# Patient Record
Sex: MALE | Race: WHITE | Employment: OTHER | URBAN - NONMETROPOLITAN AREA
[De-identification: names, ages, dates, MRNs, and addresses within clinical notes are randomized per-mention and may not be internally consistent; named-entity substitution may affect disease eponyms.]

---

## 2017-06-03 ENCOUNTER — HOSPITAL ENCOUNTER (EMERGENCY)
Age: 47
Discharge: HOME | End: 2017-06-03
Attending: EMERGENCY MEDICINE
Payer: MEDICARE

## 2017-06-03 VITALS
SYSTOLIC BLOOD PRESSURE: 133 MMHG | HEIGHT: 73 IN | RESPIRATION RATE: 18 BRPM | DIASTOLIC BLOOD PRESSURE: 91 MMHG | TEMPERATURE: 97.8 F | OXYGEN SATURATION: 97 % | HEART RATE: 71 BPM | BODY MASS INDEX: 30.22 KG/M2 | WEIGHT: 228 LBS

## 2017-06-03 DIAGNOSIS — S46.912A STRAIN OF UPPER ARM, LEFT, INITIAL ENCOUNTER: Primary | ICD-10-CM

## 2017-06-03 DIAGNOSIS — F11.20 NARCOTIC DEPENDENCY, CONTINUOUS (HCC): ICD-10-CM

## 2017-06-03 DIAGNOSIS — Z87.19 HISTORY OF CIRRHOSIS OF LIVER: ICD-10-CM

## 2017-06-03 DIAGNOSIS — Z94.4 HISTORY OF LIVER TRANSPLANT (HCC): ICD-10-CM

## 2017-06-03 PROCEDURE — 93005 ELECTROCARDIOGRAM TRACING: CPT

## 2017-06-03 PROCEDURE — 96372 THER/PROPH/DIAG INJ SC/IM: CPT

## 2017-06-03 PROCEDURE — 99283 EMERGENCY DEPT VISIT LOW MDM: CPT | Performed by: EMERGENCY MEDICINE

## 2017-06-03 PROCEDURE — 99282 EMERGENCY DEPT VISIT SF MDM: CPT

## 2017-06-03 PROCEDURE — 6360000002 HC RX W HCPCS: Performed by: EMERGENCY MEDICINE

## 2017-06-03 RX ORDER — KETOROLAC TROMETHAMINE 10 MG/1
10 TABLET, FILM COATED ORAL EVERY 6 HOURS PRN
Qty: 15 TABLET | Refills: 0 | Status: SHIPPED | OUTPATIENT
Start: 2017-06-03 | End: 2017-08-24 | Stop reason: ALTCHOICE

## 2017-06-03 RX ORDER — KETOROLAC TROMETHAMINE 30 MG/ML
30 INJECTION, SOLUTION INTRAMUSCULAR; INTRAVENOUS ONCE
Status: COMPLETED | OUTPATIENT
Start: 2017-06-03 | End: 2017-06-03

## 2017-06-03 RX ORDER — MYCOPHENOLATE MOFETIL 250 MG/1
500 CAPSULE ORAL 2 TIMES DAILY
COMMUNITY
End: 2021-02-15

## 2017-06-03 RX ADMIN — KETOROLAC TROMETHAMINE 30 MG: 30 INJECTION, SOLUTION INTRAMUSCULAR at 11:19

## 2017-06-03 ASSESSMENT — PAIN SCALES - GENERAL
PAINLEVEL_OUTOF10: 10
PAINLEVEL_OUTOF10: 10

## 2017-06-03 ASSESSMENT — PAIN DESCRIPTION - PAIN TYPE: TYPE: ACUTE PAIN

## 2017-06-12 LAB
ALBUMIN SERPL-MCNC: 4.1 G/DL (ref 3.5–5.2)
ALP BLD-CCNC: 123 U/L (ref 40–130)
ALT SERPL-CCNC: 25 U/L (ref 5–41)
ANION GAP SERPL CALCULATED.3IONS-SCNC: 18 MMOL/L (ref 7–19)
AST SERPL-CCNC: 33 U/L (ref 5–40)
BASOPHILS ABSOLUTE: 0.1 K/UL (ref 0–0.2)
BASOPHILS RELATIVE PERCENT: 0.7 % (ref 0–1)
BILIRUB SERPL-MCNC: 0.5 MG/DL (ref 0.2–1.2)
BUN BLDV-MCNC: 13 MG/DL (ref 6–20)
CALCIUM SERPL-MCNC: 8.8 MG/DL (ref 8.6–10)
CHLORIDE BLD-SCNC: 99 MMOL/L (ref 98–111)
CO2: 23 MMOL/L (ref 22–29)
CREAT SERPL-MCNC: 0.9 MG/DL (ref 0.5–1.2)
EOSINOPHILS ABSOLUTE: 0.1 K/UL (ref 0–0.6)
EOSINOPHILS RELATIVE PERCENT: 1.3 % (ref 0–5)
GAMMA GLUTAMYL TRANSFERASE: 209 U/L (ref 7–54)
GFR NON-AFRICAN AMERICAN: >60
GLUCOSE BLD-MCNC: 108 MG/DL (ref 74–109)
HCT VFR BLD CALC: 45 % (ref 42–52)
HEMOGLOBIN: 14.5 G/DL (ref 14–18)
LYMPHOCYTES ABSOLUTE: 0.7 K/UL (ref 1.1–4.5)
LYMPHOCYTES RELATIVE PERCENT: 9.9 % (ref 20–40)
MAGNESIUM: 1.3 MG/DL (ref 1.6–2.6)
MCH RBC QN AUTO: 33.4 PG (ref 27–31)
MCHC RBC AUTO-ENTMCNC: 32.2 G/DL (ref 33–37)
MCV RBC AUTO: 103.7 FL (ref 80–94)
MONOCYTES ABSOLUTE: 0.5 K/UL (ref 0–0.9)
MONOCYTES RELATIVE PERCENT: 7.8 % (ref 0–10)
NEUTROPHILS ABSOLUTE: 5.5 K/UL (ref 1.5–7.5)
NEUTROPHILS RELATIVE PERCENT: 79.7 % (ref 50–65)
PDW BLD-RTO: 12.9 % (ref 11.5–14.5)
PLATELET # BLD: 221 K/UL (ref 130–400)
PMV BLD AUTO: 10.8 FL (ref 9.4–12.4)
POTASSIUM SERPL-SCNC: 3.7 MMOL/L (ref 3.5–5)
RBC # BLD: 4.34 M/UL (ref 4.7–6.1)
SODIUM BLD-SCNC: 140 MMOL/L (ref 136–145)
TOTAL PROTEIN: 6.7 G/DL (ref 6.6–8.7)
WBC # BLD: 6.9 K/UL (ref 4.8–10.8)

## 2017-06-14 LAB
HIV-1 AND HIV-2 ANTIBODIES: NEGATIVE
TACROLIMUS BLOOD: 3.7 NG/ML

## 2017-06-15 LAB
EER HCV RNA QNT PCR: NORMAL
HCV RNA QNT REAL-TIME PCR INTERP: NOT DETECTED
HCV RNA, QUANTITATIVE REAL TIME PCR: <1.2 LOG IU
HEPATITIS C RNA PCR QUANT: <15 IU/ML

## 2017-07-25 LAB
ALBUMIN SERPL-MCNC: 4.1 G/DL (ref 3.5–5.2)
ALP BLD-CCNC: 215 U/L (ref 40–130)
ALT SERPL-CCNC: 54 U/L (ref 5–41)
ANION GAP SERPL CALCULATED.3IONS-SCNC: 17 MMOL/L (ref 7–19)
AST SERPL-CCNC: 94 U/L (ref 5–40)
BASOPHILS ABSOLUTE: 0 K/UL (ref 0–0.2)
BASOPHILS RELATIVE PERCENT: 0.5 % (ref 0–1)
BILIRUB SERPL-MCNC: 1 MG/DL (ref 0.2–1.2)
BUN BLDV-MCNC: 18 MG/DL (ref 6–20)
CALCIUM SERPL-MCNC: 9.4 MG/DL (ref 8.6–10)
CHLORIDE BLD-SCNC: 102 MMOL/L (ref 98–111)
CO2: 23 MMOL/L (ref 22–29)
CREAT SERPL-MCNC: 0.8 MG/DL (ref 0.5–1.2)
EOSINOPHILS ABSOLUTE: 0.2 K/UL (ref 0–0.6)
EOSINOPHILS RELATIVE PERCENT: 1.9 % (ref 0–5)
GAMMA GLUTAMYL TRANSFERASE: 508 U/L (ref 7–54)
GFR NON-AFRICAN AMERICAN: >60
GLUCOSE BLD-MCNC: 112 MG/DL (ref 74–109)
HCT VFR BLD CALC: 43.4 % (ref 42–52)
HEMOGLOBIN: 14.9 G/DL (ref 14–18)
LYMPHOCYTES ABSOLUTE: 0.9 K/UL (ref 1.1–4.5)
LYMPHOCYTES RELATIVE PERCENT: 10.8 % (ref 20–40)
MAGNESIUM: 1.2 MG/DL (ref 1.6–2.6)
MCH RBC QN AUTO: 32.8 PG (ref 27–31)
MCHC RBC AUTO-ENTMCNC: 34.3 G/DL (ref 33–37)
MCV RBC AUTO: 95.6 FL (ref 80–94)
MONOCYTES ABSOLUTE: 0.6 K/UL (ref 0–0.9)
MONOCYTES RELATIVE PERCENT: 7.2 % (ref 0–10)
NEUTROPHILS ABSOLUTE: 6.9 K/UL (ref 1.5–7.5)
NEUTROPHILS RELATIVE PERCENT: 79 % (ref 50–65)
PDW BLD-RTO: 12.7 % (ref 11.5–14.5)
PLATELET # BLD: 158 K/UL (ref 130–400)
PMV BLD AUTO: 10.8 FL (ref 9.4–12.4)
POTASSIUM SERPL-SCNC: 4.4 MMOL/L (ref 3.5–5)
RBC # BLD: 4.54 M/UL (ref 4.7–6.1)
SODIUM BLD-SCNC: 142 MMOL/L (ref 136–145)
TOTAL PROTEIN: 6.8 G/DL (ref 6.6–8.7)
WBC # BLD: 8.7 K/UL (ref 4.8–10.8)

## 2017-07-28 LAB — TACROLIMUS BLOOD: 7.6 NG/ML

## 2017-08-11 LAB
ALBUMIN SERPL-MCNC: 4.1 G/DL (ref 3.5–5.2)
ALP BLD-CCNC: 210 U/L (ref 40–130)
ALT SERPL-CCNC: 31 U/L (ref 5–41)
ANION GAP SERPL CALCULATED.3IONS-SCNC: 17 MMOL/L (ref 7–19)
AST SERPL-CCNC: 22 U/L (ref 5–40)
BASOPHILS ABSOLUTE: 0 K/UL (ref 0–0.2)
BASOPHILS RELATIVE PERCENT: 0.3 % (ref 0–1)
BILIRUB SERPL-MCNC: 1 MG/DL (ref 0.2–1.2)
BUN BLDV-MCNC: 12 MG/DL (ref 6–20)
CALCIUM SERPL-MCNC: 9.7 MG/DL (ref 8.6–10)
CHLORIDE BLD-SCNC: 91 MMOL/L (ref 98–111)
CO2: 26 MMOL/L (ref 22–29)
CREAT SERPL-MCNC: 0.8 MG/DL (ref 0.5–1.2)
EOSINOPHILS ABSOLUTE: 0 K/UL (ref 0–0.6)
EOSINOPHILS RELATIVE PERCENT: 0.4 % (ref 0–5)
GAMMA GLUTAMYL TRANSFERASE: 554 U/L (ref 7–54)
GFR NON-AFRICAN AMERICAN: >60
GLUCOSE BLD-MCNC: 229 MG/DL (ref 74–109)
HCT VFR BLD CALC: 43.2 % (ref 42–52)
HEMOGLOBIN: 15.3 G/DL (ref 14–18)
LYMPHOCYTES ABSOLUTE: 0.8 K/UL (ref 1.1–4.5)
LYMPHOCYTES RELATIVE PERCENT: 8 % (ref 20–40)
MAGNESIUM: 1.4 MG/DL (ref 1.6–2.6)
MCH RBC QN AUTO: 33.4 PG (ref 27–31)
MCHC RBC AUTO-ENTMCNC: 35.4 G/DL (ref 33–37)
MCV RBC AUTO: 94.3 FL (ref 80–94)
MONOCYTES ABSOLUTE: 0.8 K/UL (ref 0–0.9)
MONOCYTES RELATIVE PERCENT: 8.1 % (ref 0–10)
NEUTROPHILS ABSOLUTE: 8.4 K/UL (ref 1.5–7.5)
NEUTROPHILS RELATIVE PERCENT: 82.1 % (ref 50–65)
PDW BLD-RTO: 12.8 % (ref 11.5–14.5)
PLATELET # BLD: 183 K/UL (ref 130–400)
PMV BLD AUTO: 10.3 FL (ref 9.4–12.4)
POTASSIUM SERPL-SCNC: 3.9 MMOL/L (ref 3.5–5)
RBC # BLD: 4.58 M/UL (ref 4.7–6.1)
SODIUM BLD-SCNC: 134 MMOL/L (ref 136–145)
TOTAL PROTEIN: 7 G/DL (ref 6.6–8.7)
WBC # BLD: 10.2 K/UL (ref 4.8–10.8)

## 2017-08-12 LAB — TACROLIMUS BLOOD: 8.9 NG/ML

## 2017-08-24 ENCOUNTER — HOSPITAL ENCOUNTER (EMERGENCY)
Age: 47
Discharge: HOME | End: 2017-08-24
Attending: EMERGENCY MEDICINE
Payer: MEDICARE

## 2017-08-24 ENCOUNTER — APPOINTMENT (OUTPATIENT)
Dept: GENERAL RADIOLOGY | Age: 47
End: 2017-08-24
Payer: MEDICARE

## 2017-08-24 VITALS
BODY MASS INDEX: 27.83 KG/M2 | DIASTOLIC BLOOD PRESSURE: 94 MMHG | HEIGHT: 73 IN | RESPIRATION RATE: 18 BRPM | HEART RATE: 62 BPM | TEMPERATURE: 98.4 F | SYSTOLIC BLOOD PRESSURE: 164 MMHG | OXYGEN SATURATION: 97 % | WEIGHT: 210 LBS

## 2017-08-24 DIAGNOSIS — R79.89 ELEVATED SERUM CREATININE: ICD-10-CM

## 2017-08-24 DIAGNOSIS — K20.90 ESOPHAGITIS: Primary | ICD-10-CM

## 2017-08-24 LAB
ALBUMIN SERPL-MCNC: 3.9 G/DL (ref 3.5–5.2)
ALP BLD-CCNC: 154 U/L (ref 40–130)
ALT SERPL-CCNC: 24 U/L (ref 5–41)
ANION GAP SERPL CALCULATED.3IONS-SCNC: 15 MMOL/L (ref 7–19)
AST SERPL-CCNC: 19 U/L (ref 5–40)
BILIRUB SERPL-MCNC: 0.7 MG/DL (ref 0.2–1.2)
BUN BLDV-MCNC: 20 MG/DL (ref 6–20)
CALCIUM SERPL-MCNC: 9.3 MG/DL (ref 8.6–10)
CHLORIDE BLD-SCNC: 92 MMOL/L (ref 98–111)
CO2: 29 MMOL/L (ref 22–29)
CREAT SERPL-MCNC: 1.4 MG/DL (ref 0.5–1.2)
GFR NON-AFRICAN AMERICAN: 54
GLUCOSE BLD-MCNC: 130 MG/DL (ref 74–109)
HCT VFR BLD CALC: 42.6 % (ref 42–52)
HEMOGLOBIN: 15.4 G/DL (ref 14–18)
LIPASE: 9 U/L (ref 13–60)
MCH RBC QN AUTO: 33.9 PG (ref 27–31)
MCHC RBC AUTO-ENTMCNC: 36.2 G/DL (ref 33–37)
MCV RBC AUTO: 93.8 FL (ref 80–94)
PDW BLD-RTO: 12.2 % (ref 11.5–14.5)
PLATELET # BLD: 153 K/UL (ref 130–400)
PMV BLD AUTO: 9.9 FL (ref 9.4–12.4)
POTASSIUM SERPL-SCNC: 3.6 MMOL/L (ref 3.5–5)
RBC # BLD: 4.54 M/UL (ref 4.7–6.1)
SODIUM BLD-SCNC: 136 MMOL/L (ref 136–145)
TOTAL PROTEIN: 6.8 G/DL (ref 6.6–8.7)
WBC # BLD: 9.3 K/UL (ref 4.8–10.8)

## 2017-08-24 PROCEDURE — 99283 EMERGENCY DEPT VISIT LOW MDM: CPT | Performed by: EMERGENCY MEDICINE

## 2017-08-24 PROCEDURE — 71020 XR CHEST STANDARD TWO VW: CPT

## 2017-08-24 PROCEDURE — 93005 ELECTROCARDIOGRAM TRACING: CPT

## 2017-08-24 PROCEDURE — 85027 COMPLETE CBC AUTOMATED: CPT

## 2017-08-24 PROCEDURE — 2580000003 HC RX 258: Performed by: NURSE PRACTITIONER

## 2017-08-24 PROCEDURE — 6360000002 HC RX W HCPCS: Performed by: NURSE PRACTITIONER

## 2017-08-24 PROCEDURE — 83690 ASSAY OF LIPASE: CPT

## 2017-08-24 PROCEDURE — 36415 COLL VENOUS BLD VENIPUNCTURE: CPT

## 2017-08-24 PROCEDURE — 99284 EMERGENCY DEPT VISIT MOD MDM: CPT

## 2017-08-24 PROCEDURE — 80053 COMPREHEN METABOLIC PANEL: CPT

## 2017-08-24 PROCEDURE — 96374 THER/PROPH/DIAG INJ IV PUSH: CPT

## 2017-08-24 PROCEDURE — 6370000000 HC RX 637 (ALT 250 FOR IP): Performed by: NURSE PRACTITIONER

## 2017-08-24 RX ORDER — 0.9 % SODIUM CHLORIDE 0.9 %
1000 INTRAVENOUS SOLUTION INTRAVENOUS ONCE
Status: COMPLETED | OUTPATIENT
Start: 2017-08-24 | End: 2017-08-24

## 2017-08-24 RX ORDER — ONDANSETRON 2 MG/ML
4 INJECTION INTRAMUSCULAR; INTRAVENOUS ONCE
Status: COMPLETED | OUTPATIENT
Start: 2017-08-24 | End: 2017-08-24

## 2017-08-24 RX ORDER — SUCRALFATE 1 G/1
1 TABLET ORAL 4 TIMES DAILY
Qty: 120 TABLET | Refills: 0 | Status: SHIPPED | OUTPATIENT
Start: 2017-08-24 | End: 2019-12-09

## 2017-08-24 RX ORDER — OMEPRAZOLE 20 MG/1
20 CAPSULE, DELAYED RELEASE ORAL DAILY
Qty: 30 CAPSULE | Refills: 0 | Status: SHIPPED | OUTPATIENT
Start: 2017-08-24 | End: 2020-02-13

## 2017-08-24 RX ORDER — ONDANSETRON 4 MG/1
4 TABLET, ORALLY DISINTEGRATING ORAL EVERY 8 HOURS PRN
Qty: 15 TABLET | Refills: 0 | Status: SHIPPED | OUTPATIENT
Start: 2017-08-24 | End: 2019-12-09

## 2017-08-24 RX ADMIN — Medication 30 ML: at 14:32

## 2017-08-24 RX ADMIN — SODIUM CHLORIDE 1000 ML: 9 INJECTION, SOLUTION INTRAVENOUS at 14:32

## 2017-08-24 RX ADMIN — ONDANSETRON 4 MG: 2 INJECTION INTRAMUSCULAR; INTRAVENOUS at 14:32

## 2017-08-24 ASSESSMENT — ENCOUNTER SYMPTOMS
RESPIRATORY NEGATIVE: 1
TROUBLE SWALLOWING: 1
VOMITING: 1
ABDOMINAL PAIN: 1

## 2017-08-24 ASSESSMENT — PAIN SCALES - GENERAL: PAINLEVEL_OUTOF10: 10

## 2017-08-25 ENCOUNTER — APPOINTMENT (OUTPATIENT)
Dept: GENERAL RADIOLOGY | Age: 47
End: 2017-08-25
Payer: MEDICARE

## 2017-08-25 ENCOUNTER — HOSPITAL ENCOUNTER (EMERGENCY)
Age: 47
Discharge: LEFT BEFORE DISCHARGE | End: 2017-08-25
Payer: MEDICARE

## 2017-08-25 ENCOUNTER — HOSPITAL ENCOUNTER (EMERGENCY)
Age: 47
Discharge: HOME | End: 2017-08-25
Attending: EMERGENCY MEDICINE
Payer: MEDICARE

## 2017-08-25 ENCOUNTER — APPOINTMENT (OUTPATIENT)
Dept: CT IMAGING | Age: 47
End: 2017-08-25
Payer: MEDICARE

## 2017-08-25 VITALS
OXYGEN SATURATION: 93 % | WEIGHT: 210 LBS | HEIGHT: 73 IN | HEART RATE: 76 BPM | RESPIRATION RATE: 18 BRPM | DIASTOLIC BLOOD PRESSURE: 91 MMHG | TEMPERATURE: 97.8 F | BODY MASS INDEX: 27.83 KG/M2 | SYSTOLIC BLOOD PRESSURE: 151 MMHG

## 2017-08-25 VITALS
WEIGHT: 200 LBS | BODY MASS INDEX: 26.51 KG/M2 | TEMPERATURE: 97.8 F | HEIGHT: 73 IN | HEART RATE: 58 BPM | SYSTOLIC BLOOD PRESSURE: 163 MMHG | DIASTOLIC BLOOD PRESSURE: 94 MMHG | OXYGEN SATURATION: 98 % | RESPIRATION RATE: 16 BRPM

## 2017-08-25 VITALS
BODY MASS INDEX: 26.51 KG/M2 | WEIGHT: 200 LBS | SYSTOLIC BLOOD PRESSURE: 136 MMHG | DIASTOLIC BLOOD PRESSURE: 88 MMHG | TEMPERATURE: 98.6 F | OXYGEN SATURATION: 97 % | HEIGHT: 73 IN | HEART RATE: 61 BPM | RESPIRATION RATE: 20 BRPM

## 2017-08-25 DIAGNOSIS — R07.9 CHEST PAIN, UNSPECIFIED: Primary | ICD-10-CM

## 2017-08-25 DIAGNOSIS — E86.0 DEHYDRATION: ICD-10-CM

## 2017-08-25 DIAGNOSIS — K20.90 ESOPHAGITIS: ICD-10-CM

## 2017-08-25 LAB
ANION GAP SERPL CALCULATED.3IONS-SCNC: 15 MMOL/L (ref 7–19)
APTT: 24.2 SEC (ref 26–36.2)
BASOPHILS ABSOLUTE: 0 K/UL (ref 0–0.2)
BASOPHILS RELATIVE PERCENT: 0.3 % (ref 0–1)
BUN BLDV-MCNC: 27 MG/DL (ref 6–20)
CALCIUM SERPL-MCNC: 9.2 MG/DL (ref 8.6–10)
CHLORIDE BLD-SCNC: 92 MMOL/L (ref 98–111)
CO2: 29 MMOL/L (ref 22–29)
CREAT SERPL-MCNC: 1.6 MG/DL (ref 0.5–1.2)
EKG P AXIS: 50 DEGREES
EKG P-R INTERVAL: 140 MS
EKG Q-T INTERVAL: 440 MS
EKG QRS DURATION: 106 MS
EKG QTC CALCULATION (BAZETT): 445 MS
EKG T AXIS: 75 DEGREES
EOSINOPHILS ABSOLUTE: 0 K/UL (ref 0–0.6)
EOSINOPHILS RELATIVE PERCENT: 0.3 % (ref 0–5)
GFR NON-AFRICAN AMERICAN: 47
GLUCOSE BLD-MCNC: 117 MG/DL (ref 74–109)
HCT VFR BLD CALC: 41.4 % (ref 42–52)
HEMOGLOBIN: 14.9 G/DL (ref 14–18)
INR BLD: 0.98 (ref 0.88–1.18)
LYMPHOCYTES ABSOLUTE: 0.6 K/UL (ref 1.1–4.5)
LYMPHOCYTES RELATIVE PERCENT: 6.2 % (ref 20–40)
MCH RBC QN AUTO: 34.3 PG (ref 27–31)
MCHC RBC AUTO-ENTMCNC: 36 G/DL (ref 33–37)
MCV RBC AUTO: 95.2 FL (ref 80–94)
MONOCYTES ABSOLUTE: 0.9 K/UL (ref 0–0.9)
MONOCYTES RELATIVE PERCENT: 9 % (ref 0–10)
NEUTROPHILS ABSOLUTE: 8.6 K/UL (ref 1.5–7.5)
NEUTROPHILS RELATIVE PERCENT: 82.7 % (ref 50–65)
PDW BLD-RTO: 12.4 % (ref 11.5–14.5)
PLATELET # BLD: 143 K/UL (ref 130–400)
PMV BLD AUTO: 10.2 FL (ref 9.4–12.4)
POTASSIUM SERPL-SCNC: 3.9 MMOL/L (ref 3.5–5)
PROTHROMBIN TIME: 12.9 SEC (ref 12–14.6)
RBC # BLD: 4.35 M/UL (ref 4.7–6.1)
SODIUM BLD-SCNC: 136 MMOL/L (ref 136–145)
WBC # BLD: 10.4 K/UL (ref 4.8–10.8)

## 2017-08-25 PROCEDURE — 85610 PROTHROMBIN TIME: CPT

## 2017-08-25 PROCEDURE — 71260 CT THORAX DX C+: CPT

## 2017-08-25 PROCEDURE — 99285 EMERGENCY DEPT VISIT HI MDM: CPT

## 2017-08-25 PROCEDURE — 6360000004 HC RX CONTRAST MEDICATION: Performed by: EMERGENCY MEDICINE

## 2017-08-25 PROCEDURE — 85025 COMPLETE CBC W/AUTO DIFF WBC: CPT

## 2017-08-25 PROCEDURE — 36415 COLL VENOUS BLD VENIPUNCTURE: CPT

## 2017-08-25 PROCEDURE — 85730 THROMBOPLASTIN TIME PARTIAL: CPT

## 2017-08-25 PROCEDURE — 99284 EMERGENCY DEPT VISIT MOD MDM: CPT | Performed by: EMERGENCY MEDICINE

## 2017-08-25 PROCEDURE — 80048 BASIC METABOLIC PNL TOTAL CA: CPT

## 2017-08-25 RX ORDER — 0.9 % SODIUM CHLORIDE 0.9 %
500 INTRAVENOUS SOLUTION INTRAVENOUS ONCE
Status: DISCONTINUED | OUTPATIENT
Start: 2017-08-25 | End: 2017-08-25 | Stop reason: HOSPADM

## 2017-08-25 RX ADMIN — IOVERSOL 90 ML: 741 INJECTION INTRA-ARTERIAL; INTRAVENOUS at 20:22

## 2017-08-25 ASSESSMENT — PAIN SCALES - GENERAL
PAINLEVEL_OUTOF10: 10

## 2017-08-25 ASSESSMENT — PAIN DESCRIPTION - LOCATION: LOCATION: CHEST

## 2017-08-25 ASSESSMENT — PAIN DESCRIPTION - ORIENTATION: ORIENTATION: RIGHT

## 2017-08-26 ASSESSMENT — ENCOUNTER SYMPTOMS
SORE THROAT: 0
VOMITING: 1
BACK PAIN: 0
NAUSEA: 1
RHINORRHEA: 0
ABDOMINAL PAIN: 0
SHORTNESS OF BREATH: 0
CHEST TIGHTNESS: 0

## 2017-09-11 LAB
ALBUMIN SERPL-MCNC: 3.9 G/DL (ref 3.5–5.2)
ALP BLD-CCNC: 130 U/L (ref 40–130)
ALT SERPL-CCNC: 19 U/L (ref 5–41)
ANION GAP SERPL CALCULATED.3IONS-SCNC: 14 MMOL/L (ref 7–19)
AST SERPL-CCNC: 19 U/L (ref 5–40)
BASOPHILS ABSOLUTE: 0 K/UL (ref 0–0.2)
BASOPHILS RELATIVE PERCENT: 0.4 % (ref 0–1)
BILIRUB SERPL-MCNC: 0.6 MG/DL (ref 0.2–1.2)
BUN BLDV-MCNC: 16 MG/DL (ref 6–20)
CALCIUM SERPL-MCNC: 8.6 MG/DL (ref 8.6–10)
CHLORIDE BLD-SCNC: 100 MMOL/L (ref 98–111)
CO2: 25 MMOL/L (ref 22–29)
CREAT SERPL-MCNC: 0.9 MG/DL (ref 0.5–1.2)
EOSINOPHILS ABSOLUTE: 0.1 K/UL (ref 0–0.6)
EOSINOPHILS RELATIVE PERCENT: 1 % (ref 0–5)
GAMMA GLUTAMYL TRANSFERASE: 257 U/L (ref 7–54)
GFR NON-AFRICAN AMERICAN: >60
GLUCOSE BLD-MCNC: 121 MG/DL (ref 74–109)
HCT VFR BLD CALC: 40.4 % (ref 42–52)
HEMOGLOBIN: 14.1 G/DL (ref 14–18)
LYMPHOCYTES ABSOLUTE: 1.3 K/UL (ref 1.1–4.5)
LYMPHOCYTES RELATIVE PERCENT: 13.6 % (ref 20–40)
MAGNESIUM: 1.3 MG/DL (ref 1.6–2.6)
MCH RBC QN AUTO: 34.1 PG (ref 27–31)
MCHC RBC AUTO-ENTMCNC: 34.9 G/DL (ref 33–37)
MCV RBC AUTO: 97.8 FL (ref 80–94)
MONOCYTES ABSOLUTE: 0.7 K/UL (ref 0–0.9)
MONOCYTES RELATIVE PERCENT: 7.6 % (ref 0–10)
NEUTROPHILS ABSOLUTE: 7.4 K/UL (ref 1.5–7.5)
NEUTROPHILS RELATIVE PERCENT: 76.7 % (ref 50–65)
PDW BLD-RTO: 12.7 % (ref 11.5–14.5)
PLATELET # BLD: 162 K/UL (ref 130–400)
PMV BLD AUTO: 9.8 FL (ref 9.4–12.4)
POTASSIUM SERPL-SCNC: 3.9 MMOL/L (ref 3.5–5)
RBC # BLD: 4.13 M/UL (ref 4.7–6.1)
SODIUM BLD-SCNC: 139 MMOL/L (ref 136–145)
TOTAL PROTEIN: 6.4 G/DL (ref 6.6–8.7)
WBC # BLD: 9.7 K/UL (ref 4.8–10.8)

## 2017-09-12 LAB — TACROLIMUS BLOOD: 6.7 NG/ML

## 2017-10-10 LAB
ALBUMIN SERPL-MCNC: 3.8 G/DL (ref 3.5–5.2)
ALP BLD-CCNC: 171 U/L (ref 40–130)
ALT SERPL-CCNC: 42 U/L (ref 5–41)
ANION GAP SERPL CALCULATED.3IONS-SCNC: 12 MMOL/L (ref 7–19)
AST SERPL-CCNC: 34 U/L (ref 5–40)
BASOPHILS ABSOLUTE: 0 K/UL (ref 0–0.2)
BASOPHILS RELATIVE PERCENT: 0.5 % (ref 0–1)
BILIRUB SERPL-MCNC: 0.9 MG/DL (ref 0.2–1.2)
BUN BLDV-MCNC: 12 MG/DL (ref 6–20)
CALCIUM SERPL-MCNC: 8.9 MG/DL (ref 8.6–10)
CHLORIDE BLD-SCNC: 99 MMOL/L (ref 98–111)
CO2: 28 MMOL/L (ref 22–29)
CREAT SERPL-MCNC: 0.8 MG/DL (ref 0.5–1.2)
EOSINOPHILS ABSOLUTE: 0.1 K/UL (ref 0–0.6)
EOSINOPHILS RELATIVE PERCENT: 1.7 % (ref 0–5)
GAMMA GLUTAMYL TRANSFERASE: 558 U/L (ref 7–54)
GFR NON-AFRICAN AMERICAN: >60
GLUCOSE BLD-MCNC: 190 MG/DL (ref 74–109)
HCT VFR BLD CALC: 42.8 % (ref 42–52)
HEMOGLOBIN: 14.8 G/DL (ref 14–18)
LYMPHOCYTES ABSOLUTE: 1 K/UL (ref 1.1–4.5)
LYMPHOCYTES RELATIVE PERCENT: 12.9 % (ref 20–40)
MAGNESIUM: 1.3 MG/DL (ref 1.6–2.6)
MCH RBC QN AUTO: 34.3 PG (ref 27–31)
MCHC RBC AUTO-ENTMCNC: 34.6 G/DL (ref 33–37)
MCV RBC AUTO: 99.1 FL (ref 80–94)
MONOCYTES ABSOLUTE: 0.7 K/UL (ref 0–0.9)
MONOCYTES RELATIVE PERCENT: 9.1 % (ref 0–10)
NEUTROPHILS ABSOLUTE: 6 K/UL (ref 1.5–7.5)
NEUTROPHILS RELATIVE PERCENT: 74.9 % (ref 50–65)
PDW BLD-RTO: 12.7 % (ref 11.5–14.5)
PLATELET # BLD: 167 K/UL (ref 130–400)
PMV BLD AUTO: 10.6 FL (ref 9.4–12.4)
POTASSIUM SERPL-SCNC: 4 MMOL/L (ref 3.5–5)
RBC # BLD: 4.32 M/UL (ref 4.7–6.1)
SODIUM BLD-SCNC: 139 MMOL/L (ref 136–145)
TOTAL PROTEIN: 6.2 G/DL (ref 6.6–8.7)
WBC # BLD: 8 K/UL (ref 4.8–10.8)

## 2017-12-19 LAB
ALBUMIN SERPL-MCNC: 4.1 G/DL (ref 3.5–5.2)
ALP BLD-CCNC: 196 U/L (ref 40–130)
ALT SERPL-CCNC: 88 U/L (ref 5–41)
ANION GAP SERPL CALCULATED.3IONS-SCNC: 12 MMOL/L (ref 7–19)
AST SERPL-CCNC: 124 U/L (ref 5–40)
BASOPHILS ABSOLUTE: 0.1 K/UL (ref 0–0.2)
BASOPHILS RELATIVE PERCENT: 0.9 % (ref 0–1)
BILIRUB SERPL-MCNC: 0.7 MG/DL (ref 0.2–1.2)
BUN BLDV-MCNC: 20 MG/DL (ref 6–20)
CALCIUM SERPL-MCNC: 8.8 MG/DL (ref 8.6–10)
CHLORIDE BLD-SCNC: 101 MMOL/L (ref 98–111)
CO2: 26 MMOL/L (ref 22–29)
CREAT SERPL-MCNC: 0.6 MG/DL (ref 0.5–1.2)
EOSINOPHILS ABSOLUTE: 0.1 K/UL (ref 0–0.6)
EOSINOPHILS RELATIVE PERCENT: 1.6 % (ref 0–5)
GAMMA GLUTAMYL TRANSFERASE: 712 U/L (ref 7–54)
GFR NON-AFRICAN AMERICAN: >60
GLUCOSE BLD-MCNC: 106 MG/DL (ref 74–109)
HCT VFR BLD CALC: 45 % (ref 42–52)
HEMOGLOBIN: 15.4 G/DL (ref 14–18)
LYMPHOCYTES ABSOLUTE: 1.4 K/UL (ref 1.1–4.5)
LYMPHOCYTES RELATIVE PERCENT: 17.8 % (ref 20–40)
MAGNESIUM: 1.8 MG/DL (ref 1.6–2.6)
MCH RBC QN AUTO: 34.4 PG (ref 27–31)
MCHC RBC AUTO-ENTMCNC: 34.2 G/DL (ref 33–37)
MCV RBC AUTO: 100.4 FL (ref 80–94)
MONOCYTES ABSOLUTE: 0.6 K/UL (ref 0–0.9)
MONOCYTES RELATIVE PERCENT: 7.6 % (ref 0–10)
NEUTROPHILS ABSOLUTE: 5.7 K/UL (ref 1.5–7.5)
NEUTROPHILS RELATIVE PERCENT: 70.7 % (ref 50–65)
PDW BLD-RTO: 12.5 % (ref 11.5–14.5)
PLATELET # BLD: 181 K/UL (ref 130–400)
PMV BLD AUTO: 10.4 FL (ref 9.4–12.4)
POTASSIUM SERPL-SCNC: 4 MMOL/L (ref 3.5–5)
RBC # BLD: 4.48 M/UL (ref 4.7–6.1)
SODIUM BLD-SCNC: 139 MMOL/L (ref 136–145)
TOTAL PROTEIN: 6.6 G/DL (ref 6.6–8.7)
WBC # BLD: 8.1 K/UL (ref 4.8–10.8)

## 2017-12-21 LAB — TACROLIMUS BLOOD: 3 NG/ML

## 2017-12-29 LAB
ALBUMIN SERPL-MCNC: 4 G/DL (ref 3.5–5.2)
ALP BLD-CCNC: 179 U/L (ref 40–130)
ALT SERPL-CCNC: 56 U/L (ref 5–41)
ANION GAP SERPL CALCULATED.3IONS-SCNC: 14 MMOL/L (ref 7–19)
AST SERPL-CCNC: 66 U/L (ref 5–40)
BASOPHILS ABSOLUTE: 0 K/UL (ref 0–0.2)
BASOPHILS RELATIVE PERCENT: 0.5 % (ref 0–1)
BILIRUB SERPL-MCNC: 0.9 MG/DL (ref 0.2–1.2)
BUN BLDV-MCNC: 13 MG/DL (ref 6–20)
CALCIUM SERPL-MCNC: 8.9 MG/DL (ref 8.6–10)
CHLORIDE BLD-SCNC: 100 MMOL/L (ref 98–111)
CO2: 27 MMOL/L (ref 22–29)
CREAT SERPL-MCNC: 0.6 MG/DL (ref 0.5–1.2)
EOSINOPHILS ABSOLUTE: 0.1 K/UL (ref 0–0.6)
EOSINOPHILS RELATIVE PERCENT: 1 % (ref 0–5)
GAMMA GLUTAMYL TRANSFERASE: 657 U/L (ref 7–54)
GFR NON-AFRICAN AMERICAN: >60
GLUCOSE BLD-MCNC: 129 MG/DL (ref 74–109)
HCT VFR BLD CALC: 43.5 % (ref 42–52)
HEMOGLOBIN: 14.8 G/DL (ref 14–18)
LYMPHOCYTES ABSOLUTE: 1.5 K/UL (ref 1.1–4.5)
LYMPHOCYTES RELATIVE PERCENT: 16.6 % (ref 20–40)
MAGNESIUM: 1.6 MG/DL (ref 1.6–2.6)
MCH RBC QN AUTO: 33.8 PG (ref 27–31)
MCHC RBC AUTO-ENTMCNC: 34 G/DL (ref 33–37)
MCV RBC AUTO: 99.3 FL (ref 80–94)
MONOCYTES ABSOLUTE: 0.7 K/UL (ref 0–0.9)
MONOCYTES RELATIVE PERCENT: 7.6 % (ref 0–10)
NEUTROPHILS ABSOLUTE: 6.5 K/UL (ref 1.5–7.5)
NEUTROPHILS RELATIVE PERCENT: 73.1 % (ref 50–65)
PDW BLD-RTO: 12.2 % (ref 11.5–14.5)
PLATELET # BLD: 164 K/UL (ref 130–400)
PMV BLD AUTO: 10.3 FL (ref 9.4–12.4)
POTASSIUM SERPL-SCNC: 3.5 MMOL/L (ref 3.5–5)
RBC # BLD: 4.38 M/UL (ref 4.7–6.1)
SODIUM BLD-SCNC: 141 MMOL/L (ref 136–145)
TOTAL PROTEIN: 6.3 G/DL (ref 6.6–8.7)
WBC # BLD: 8.9 K/UL (ref 4.8–10.8)

## 2017-12-31 LAB — TACROLIMUS BLOOD: 6.9 NG/ML

## 2018-01-26 LAB
ALBUMIN SERPL-MCNC: 4 G/DL (ref 3.5–5.2)
ALP BLD-CCNC: 124 U/L (ref 40–130)
ALT SERPL-CCNC: 18 U/L (ref 5–41)
ANION GAP SERPL CALCULATED.3IONS-SCNC: 14 MMOL/L (ref 7–19)
AST SERPL-CCNC: 21 U/L (ref 5–40)
BILIRUB SERPL-MCNC: 0.7 MG/DL (ref 0.2–1.2)
BUN BLDV-MCNC: 22 MG/DL (ref 6–20)
CALCIUM SERPL-MCNC: 9.5 MG/DL (ref 8.6–10)
CHLORIDE BLD-SCNC: 100 MMOL/L (ref 98–111)
CO2: 28 MMOL/L (ref 22–29)
CREAT SERPL-MCNC: 0.8 MG/DL (ref 0.5–1.2)
GAMMA GLUTAMYL TRANSFERASE: 469 U/L (ref 7–54)
GFR NON-AFRICAN AMERICAN: >60
GLUCOSE BLD-MCNC: 110 MG/DL (ref 74–109)
HCT VFR BLD CALC: 45 % (ref 42–52)
HEMOGLOBIN: 15.1 G/DL (ref 14–18)
MAGNESIUM: 1.7 MG/DL (ref 1.6–2.6)
MCH RBC QN AUTO: 33.5 PG (ref 27–31)
MCHC RBC AUTO-ENTMCNC: 33.6 G/DL (ref 33–37)
MCV RBC AUTO: 99.8 FL (ref 80–94)
PDW BLD-RTO: 11.8 % (ref 11.5–14.5)
PLATELET # BLD: 188 K/UL (ref 130–400)
PMV BLD AUTO: 10 FL (ref 9.4–12.4)
POTASSIUM SERPL-SCNC: 4 MMOL/L (ref 3.5–5)
RBC # BLD: 4.51 M/UL (ref 4.7–6.1)
SODIUM BLD-SCNC: 142 MMOL/L (ref 136–145)
TOTAL PROTEIN: 6.5 G/DL (ref 6.6–8.7)
WBC # BLD: 9.3 K/UL (ref 4.8–10.8)

## 2018-01-28 LAB — TACROLIMUS BLOOD: 7 NG/ML

## 2019-12-09 ENCOUNTER — OFFICE VISIT (OUTPATIENT)
Dept: PRIMARY CARE CLINIC | Age: 49
End: 2019-12-09
Payer: MEDICARE

## 2019-12-09 VITALS
TEMPERATURE: 98 F | DIASTOLIC BLOOD PRESSURE: 80 MMHG | WEIGHT: 211 LBS | BODY MASS INDEX: 27.96 KG/M2 | HEART RATE: 96 BPM | SYSTOLIC BLOOD PRESSURE: 100 MMHG | HEIGHT: 73 IN | OXYGEN SATURATION: 98 % | RESPIRATION RATE: 20 BRPM

## 2019-12-09 DIAGNOSIS — Z86.19 HISTORY OF HEPATITIS C VIRUS INFECTION: ICD-10-CM

## 2019-12-09 DIAGNOSIS — F32.1 MAJOR DEPRESSIVE DISORDER, SINGLE EPISODE, MODERATE (HCC): ICD-10-CM

## 2019-12-09 DIAGNOSIS — Z94.4 HISTORY OF LIVER TRANSPLANT (HCC): ICD-10-CM

## 2019-12-09 DIAGNOSIS — Z87.19 HISTORY OF ESOPHAGEAL VARICES: Primary | ICD-10-CM

## 2019-12-09 DIAGNOSIS — Z87.19 HISTORY OF CIRRHOSIS: ICD-10-CM

## 2019-12-09 DIAGNOSIS — Z13.220 ENCOUNTER FOR SCREENING FOR LIPID DISORDER: ICD-10-CM

## 2019-12-09 PROBLEM — F32.2 CURRENT SEVERE EPISODE OF MAJOR DEPRESSIVE DISORDER WITHOUT PSYCHOTIC FEATURES (HCC): Status: ACTIVE | Noted: 2019-12-09

## 2019-12-09 PROCEDURE — 99214 OFFICE O/P EST MOD 30 MIN: CPT | Performed by: NURSE PRACTITIONER

## 2019-12-09 PROCEDURE — G0444 DEPRESSION SCREEN ANNUAL: HCPCS | Performed by: NURSE PRACTITIONER

## 2019-12-09 ASSESSMENT — ENCOUNTER SYMPTOMS
ABDOMINAL PAIN: 1
COUGH: 0
EYES NEGATIVE: 1
ALLERGIC/IMMUNOLOGIC NEGATIVE: 1
SORE THROAT: 0
VOMITING: 0
WHEEZING: 0
SHORTNESS OF BREATH: 0
RESPIRATORY NEGATIVE: 1
BLOOD IN STOOL: 0
DIARRHEA: 0
SINUS PRESSURE: 0
NAUSEA: 0
EYE DISCHARGE: 0
TROUBLE SWALLOWING: 0

## 2019-12-09 ASSESSMENT — PATIENT HEALTH QUESTIONNAIRE - PHQ9
SUM OF ALL RESPONSES TO PHQ QUESTIONS 1-9: 16
9. THOUGHTS THAT YOU WOULD BE BETTER OFF DEAD, OR OF HURTING YOURSELF: 2
SUM OF ALL RESPONSES TO PHQ9 QUESTIONS 1 & 2: 4
2. FEELING DOWN, DEPRESSED OR HOPELESS: 2
1. LITTLE INTEREST OR PLEASURE IN DOING THINGS: 2
8. MOVING OR SPEAKING SO SLOWLY THAT OTHER PEOPLE COULD HAVE NOTICED. OR THE OPPOSITE, BEING SO FIGETY OR RESTLESS THAT YOU HAVE BEEN MOVING AROUND A LOT MORE THAN USUAL: 2
10. IF YOU CHECKED OFF ANY PROBLEMS, HOW DIFFICULT HAVE THESE PROBLEMS MADE IT FOR YOU TO DO YOUR WORK, TAKE CARE OF THINGS AT HOME, OR GET ALONG WITH OTHER PEOPLE: 0
6. FEELING BAD ABOUT YOURSELF - OR THAT YOU ARE A FAILURE OR HAVE LET YOURSELF OR YOUR FAMILY DOWN: 1
4. FEELING TIRED OR HAVING LITTLE ENERGY: 2
3. TROUBLE FALLING OR STAYING ASLEEP: 2
SUM OF ALL RESPONSES TO PHQ QUESTIONS 1-9: 16
7. TROUBLE CONCENTRATING ON THINGS, SUCH AS READING THE NEWSPAPER OR WATCHING TELEVISION: 2
5. POOR APPETITE OR OVEREATING: 1

## 2019-12-15 PROBLEM — F32.1 MAJOR DEPRESSIVE DISORDER, SINGLE EPISODE, MODERATE (HCC): Status: ACTIVE | Noted: 2019-12-09

## 2019-12-17 ENCOUNTER — TELEPHONE (OUTPATIENT)
Dept: PRIMARY CARE CLINIC | Age: 49
End: 2019-12-17

## 2019-12-17 NOTE — TELEPHONE ENCOUNTER
----- Message from Fabian Rinne, [address] - NP sent at 12/16/2019  8:57 AM CST -----    Pt was LM for a return call    Need to know if he started his medication   vilazodone HCl (VIIBRYD) 10 MG TABS [[id number]  Start taking 0.5 tablets for 1 week then increase to a whole tablet. Spoke to pt and he would rather wait for the results of the testing before starting a medication. Stated that his mood is fine at this time and seems like he is       Regarding: Medication adjustment  Lets start patient on 10 mg Lexapro. Please inform patient if he has any suicidal, homicidal, or self injurious thoughts, he needs to seek medical help immediately. His next appointment is 1/10/20. If something changes between now and then, patient needs to call for a sooner appointment.

## 2019-12-18 ENCOUNTER — TELEPHONE (OUTPATIENT)
Dept: PRIMARY CARE CLINIC | Age: 49
End: 2019-12-18

## 2019-12-18 DIAGNOSIS — F32.1 MAJOR DEPRESSIVE DISORDER, SINGLE EPISODE, MODERATE (HCC): Primary | ICD-10-CM

## 2019-12-18 DIAGNOSIS — Z94.4 HISTORY OF LIVER TRANSPLANT (HCC): ICD-10-CM

## 2019-12-18 RX ORDER — VILAZODONE HYDROCHLORIDE 10 MG/1
TABLET ORAL
Qty: 30 TABLET | Refills: 2 | Status: SHIPPED | OUTPATIENT
Start: 2019-12-18 | End: 2020-02-13

## 2020-02-13 ENCOUNTER — OFFICE VISIT (OUTPATIENT)
Dept: PRIMARY CARE CLINIC | Age: 50
End: 2020-02-13
Payer: MEDICARE

## 2020-02-13 VITALS
HEIGHT: 72 IN | HEART RATE: 113 BPM | SYSTOLIC BLOOD PRESSURE: 110 MMHG | TEMPERATURE: 97.4 F | BODY MASS INDEX: 34 KG/M2 | DIASTOLIC BLOOD PRESSURE: 86 MMHG | OXYGEN SATURATION: 98 % | WEIGHT: 251 LBS | RESPIRATION RATE: 14 BRPM

## 2020-02-13 PROBLEM — F11.20 NARCOTIC DEPENDENCY, CONTINUOUS (HCC): Status: ACTIVE | Noted: 2020-02-13

## 2020-02-13 PROCEDURE — G0438 PPPS, INITIAL VISIT: HCPCS | Performed by: NURSE PRACTITIONER

## 2020-02-13 PROCEDURE — G8484 FLU IMMUNIZE NO ADMIN: HCPCS | Performed by: NURSE PRACTITIONER

## 2020-02-13 RX ORDER — OXYCODONE HYDROCHLORIDE 15 MG/1
TABLET ORAL
COMMUNITY
Start: 2020-01-28 | End: 2020-08-07 | Stop reason: ALTCHOICE

## 2020-02-13 RX ORDER — TACROLIMUS 1 MG/1
CAPSULE ORAL
Status: ON HOLD | COMMUNITY
Start: 2020-01-11 | End: 2021-03-05 | Stop reason: HOSPADM

## 2020-02-13 RX ORDER — PREDNISONE 10 MG/1
5 TABLET ORAL 2 TIMES DAILY
COMMUNITY
End: 2022-06-14 | Stop reason: SDUPTHER

## 2020-02-13 RX ORDER — DULOXETIN HYDROCHLORIDE 30 MG/1
30 CAPSULE, DELAYED RELEASE ORAL DAILY
Qty: 30 CAPSULE | Refills: 2 | Status: SHIPPED | OUTPATIENT
Start: 2020-02-13 | End: 2020-08-07

## 2020-02-13 ASSESSMENT — PATIENT HEALTH QUESTIONNAIRE - PHQ9
SUM OF ALL RESPONSES TO PHQ9 QUESTIONS 1 & 2: 6
9. THOUGHTS THAT YOU WOULD BE BETTER OFF DEAD, OR OF HURTING YOURSELF: 2
7. TROUBLE CONCENTRATING ON THINGS, SUCH AS READING THE NEWSPAPER OR WATCHING TELEVISION: 1
6. FEELING BAD ABOUT YOURSELF - OR THAT YOU ARE A FAILURE OR HAVE LET YOURSELF OR YOUR FAMILY DOWN: 3
8. MOVING OR SPEAKING SO SLOWLY THAT OTHER PEOPLE COULD HAVE NOTICED. OR THE OPPOSITE, BEING SO FIGETY OR RESTLESS THAT YOU HAVE BEEN MOVING AROUND A LOT MORE THAN USUAL: 0
1. LITTLE INTEREST OR PLEASURE IN DOING THINGS: 3
2. FEELING DOWN, DEPRESSED OR HOPELESS: 3
SUM OF ALL RESPONSES TO PHQ QUESTIONS 1-9: 19
10. IF YOU CHECKED OFF ANY PROBLEMS, HOW DIFFICULT HAVE THESE PROBLEMS MADE IT FOR YOU TO DO YOUR WORK, TAKE CARE OF THINGS AT HOME, OR GET ALONG WITH OTHER PEOPLE: 3
SUM OF ALL RESPONSES TO PHQ QUESTIONS 1-9: 19
4. FEELING TIRED OR HAVING LITTLE ENERGY: 3
3. TROUBLE FALLING OR STAYING ASLEEP: 3
5. POOR APPETITE OR OVEREATING: 1

## 2020-02-13 ASSESSMENT — COLUMBIA-SUICIDE SEVERITY RATING SCALE - C-SSRS
1. WITHIN THE PAST MONTH, HAVE YOU WISHED YOU WERE DEAD OR WISHED YOU COULD GO TO SLEEP AND NOT WAKE UP?: YES
2. HAVE YOU ACTUALLY HAD ANY THOUGHTS OF KILLING YOURSELF?: YES
5. HAVE YOU STARTED TO WORK OUT OR WORKED OUT THE DETAILS OF HOW TO KILL YOURSELF? DO YOU INTEND TO CARRY OUT THIS PLAN?: NO
3. HAVE YOU BEEN THINKING ABOUT HOW YOU MIGHT KILL YOURSELF?: YES
6. HAVE YOU EVER DONE ANYTHING, STARTED TO DO ANYTHING, OR PREPARED TO DO ANYTHING TO END YOUR LIFE?: NO
4. HAVE YOU HAD THESE THOUGHTS AND HAD SOME INTENTION OF ACTING ON THEM?: NO

## 2020-02-13 ASSESSMENT — LIFESTYLE VARIABLES
HOW OFTEN DO YOU HAVE A DRINK CONTAINING ALCOHOL: 2
HAS A RELATIVE, FRIEND, DOCTOR, OR ANOTHER HEALTH PROFESSIONAL EXPRESSED CONCERN ABOUT YOUR DRINKING OR SUGGESTED YOU CUT DOWN: 0
HAVE YOU OR SOMEONE ELSE BEEN INJURED AS A RESULT OF YOUR DRINKING: 0
HOW OFTEN DURING THE LAST YEAR HAVE YOU BEEN UNABLE TO REMEMBER WHAT HAPPENED THE NIGHT BEFORE BECAUSE YOU HAD BEEN DRINKING: 0
HOW OFTEN DURING THE LAST YEAR HAVE YOU HAD A FEELING OF GUILT OR REMORSE AFTER DRINKING: 0
AUDIT-C TOTAL SCORE: 6
HOW OFTEN DURING THE LAST YEAR HAVE YOU FAILED TO DO WHAT WAS NORMALLY EXPECTED FROM YOU BECAUSE OF DRINKING: 0
HOW MANY STANDARD DRINKS CONTAINING ALCOHOL DO YOU HAVE ON A TYPICAL DAY: 2
HOW OFTEN DURING THE LAST YEAR HAVE YOU NEEDED AN ALCOHOLIC DRINK FIRST THING IN THE MORNING TO GET YOURSELF GOING AFTER A NIGHT OF HEAVY DRINKING: 0
HOW OFTEN DO YOU HAVE SIX OR MORE DRINKS ON ONE OCCASION: 2

## 2020-02-13 NOTE — PROGRESS NOTES
Ollie Guzman - NP as PCP - General (Nurse Practitioner)    Wt Readings from Last 3 Encounters:   02/13/20 251 lb (113.9 kg)   12/09/19 211 lb (95.7 kg)   08/25/17 200 lb (90.7 kg)     Vitals:    02/13/20 0910   BP: 110/86   Pulse: 113   Resp: 14   Temp: 97.4 °F (36.3 °C)   TempSrc: Temporal   SpO2: 98%   Weight: 251 lb (113.9 kg)   Height: 6' (1.829 m)     Body mass index is 34.04 kg/m². Based upon direct observation of the patient, evaluation of cognition reveals recent and remote memory intact. General Appearance: alert and oriented to person, place and time, well developed and well- nourished, in no acute distress  Skin: warm and dry, no rash or erythema  Head: normocephalic and atraumatic  Eyes: pupils equal, round, and reactive to light, extraocular eye movements intact, conjunctivae normal  ENT: tympanic membrane, external ear and ear canal normal bilaterally, nose without deformity, nasal mucosa and turbinates normal without polyps  Neck: supple and non-tender without mass, no thyromegaly or thyroid nodules, no cervical lymphadenopathy  Pulmonary/Chest: clear to auscultation bilaterally- no wheezes, rales or rhonchi, normal air movement, no respiratory distress  Cardiovascular: normal rate, regular rhythm, normal S1 and S2, no murmurs, rubs, clicks, or gallops, distal pulses intact, no carotid bruits  Abdomen: soft, non-tender, non-distended, normal bowel sounds, no masses or organomegaly  Extremities: no cyanosis, clubbing or edema  Musculoskeletal: normal range of motion, no joint swelling, deformity or tenderness  Neurologic: reflexes normal and symmetric, no cranial nerve deficit, gait, coordination and speech normal    Patient's complete Health Risk Assessment and screening values have been reviewed and are found in Flowsheets. The following problems were reviewed today and where indicated follow up appointments were made and/or referrals ordered.     Positive Risk Factor Screenings with Interventions:     Fall Risk:  Timed Up and Go Test > 12 seconds?  (Complete if either Fall Risk answers are Yes): no  2 or more falls in past year?: (!) yes  Fall with injury in past year?: no  Fall Risk Interventions:    · Patient declines any further evaluation/treatment for this issue    Substance Abuse:  Social History     Tobacco History     Smoking Status  Current Some Day Smoker Smoking Start Date  12/9/1994 Last attempt to quit  9/1/2012 Smoking Frequency  1 pack/day for 25 years (25 pk yrs)    Smokeless Tobacco Use  Never Used    Tobacco Comment  works as a           Alcohol History     Alcohol Use Status  No Comment  quit x 8 months ago with one relapse, former ETOH          Drug Use     Drug Use Status  No          Sexual Activity     Sexually Active  Not Asked               Audit Questionnaire: Screen for Alcohol Misuse  How often do you have a drink containing alcohol?: Two to four times a month  How many standard drinks containing alcohol do you have on a typical day when drinking?: Five or six  How often do you have six or more drinks on one occasion?: Monthly  Audit-C Score: 6  During the past year, how often have you failed to do what was normally expected of you because of drinking?: Never  During the past year, how often have you needed a drink in the morning to get yourself going after a heavy drinking session?: Never  During the past year, how often have you had a feeling of guilt or remorse after drinking?: Never  During the past year, have you been unable to remember what happened the night before because you had been drinking?: Never  Have you or someone else been injured as a result of your drinking?: No  Has a relative or friend, doctor or health worker been concerned about your drinking or suggested you cut down?: No  Substance Abuse Interventions:  · Alcohol misuse/dependence:  patient agrees to limit alcohol intake to a moderate level- no more than 14 drinks/week and 4 drinks per occasion for men, or no more than 7 drinks/week and 3 drinks/occasion for women    General Health:  General  In general, how would you say your health is?: Fair  In the past 7 days, have you experienced any of the following? New or Increased Pain, New or Increased Fatigue, Loneliness, Social Isolation, Stress or Anger?: (!) Anger, Stress, Social Isolation, Loneliness, New or Increased Fatigue, New or Increased Pain  Do you get the social and emotional support that you need?: Yes  Do you have a Living Will?: (!) No  General Health Risk Interventions:  · Pain issues: referral to vascular  · Fatigue: patient declines any further evaluation/treatment for this issue  · Loneliness: depression - starting cymbalta today  · Social isolation: patient declines any further intervention for this issue    Health Habits/Nutrition:  Health Habits/Nutrition  Do you exercise for at least 20 minutes 2-3 times per week?: (!) No  Have you lost any weight without trying in the past 3 months?: No  Do you eat fewer than 2 meals per day?: No  Have you seen a dentist within the past year?: Yes  Body mass index is 34.04 kg/m².   Health Habits/Nutrition Interventions:  · Inadequate physical activity:  patient is not ready to increase his/her physical activity level at this time    Hearing/Vision:  No exam data present  Hearing/Vision  Do you or your family notice any trouble with your hearing?: No  Do you have difficulty driving, watching TV, or doing any of your daily activities because of your eyesight?: (!) Yes(at night driving)  Have you had an eye exam within the past year?: (!) No  Hearing/Vision Interventions:  · Vision concerns:  patient encouraged to make appointment with his/her eye specialist    Safety:  Safety  Do you have working smoke detectors?: Yes  Have all throw rugs been removed or fastened?: (!) No  Do you have non-slip mats or surfaces in all bathtubs/showers?: (!) No  Do all of your stairways have a railing or banister?:

## 2020-02-21 ENCOUNTER — TELEPHONE (OUTPATIENT)
Dept: PRIMARY CARE CLINIC | Age: 50
End: 2020-02-21

## 2020-02-21 NOTE — TELEPHONE ENCOUNTER
Farida called in regards to patient stating that the cymbalta that was just prescribed for the patient is making him sick, dizzy, and unable to function. Patient would like something different in place of that.

## 2020-02-26 ENCOUNTER — OFFICE VISIT (OUTPATIENT)
Dept: VASCULAR SURGERY | Age: 50
End: 2020-02-26
Payer: MEDICARE

## 2020-02-26 VITALS
DIASTOLIC BLOOD PRESSURE: 88 MMHG | HEART RATE: 58 BPM | SYSTOLIC BLOOD PRESSURE: 151 MMHG | TEMPERATURE: 97.5 F | OXYGEN SATURATION: 97 % | RESPIRATION RATE: 18 BRPM

## 2020-02-26 PROCEDURE — G8484 FLU IMMUNIZE NO ADMIN: HCPCS | Performed by: NURSE PRACTITIONER

## 2020-02-26 PROCEDURE — G8417 CALC BMI ABV UP PARAM F/U: HCPCS | Performed by: NURSE PRACTITIONER

## 2020-02-26 PROCEDURE — G8427 DOCREV CUR MEDS BY ELIG CLIN: HCPCS | Performed by: NURSE PRACTITIONER

## 2020-02-26 PROCEDURE — 99213 OFFICE O/P EST LOW 20 MIN: CPT | Performed by: NURSE PRACTITIONER

## 2020-02-26 PROCEDURE — 4004F PT TOBACCO SCREEN RCVD TLK: CPT | Performed by: NURSE PRACTITIONER

## 2020-02-26 NOTE — PROGRESS NOTES
of breath. Cardiovascular - no chest pain, syncope, or significant dizziness. No palpitations. has leg swelling. No claudication. Gastrointestinal - no abdominal swelling or pain. No blood in stool. No severe constipation, diarrhea, nausea, or vomiting. Genitourinary - No difficulty urinating, dysuria, frequency, or urgency. No flank pain or hematuria. Musculoskeletal - no back pain, gait disturbance, or myalgia. Skin - no color change, rash, pallor, or new wound. Neurologic - no dizziness, facial asymmetry, or light headedness. No seizures. No speech difficulty or lateralizing weakness. Hematologic - no easy bruising or excessive bleeding. Psychiatric - no severe anxiety or nervousness. No confusion. All other review of systems are negative. Physical Exam    BP (!) 151/88 Comment: left  Pulse 58   Temp 97.5 °F (36.4 °C)   Resp 18   SpO2 97%     Constitutional - well developed, well nourished. No diaphoresis or acute distress. HENT - head normocephalic. Right external ear canal appears normal.  Left external ear canal appears normal.  Septum appears midline. Eyes - conjunctiva normal.  EOMS normal.  No exudate. No icterus. Neck- ROM appears normal, no tracheal deviation. Cardiovascular - Regular rate and rhythm. Heart sounds are normal.  No murmur, rub, or gallop. Carotid pulses are 2+ to palpation bilaterally without bruit. Extremities - Radial and brachial pulses are 2+ to palpation bilaterally. Right femoral pulse: present 2+; Right popliteal pulse: absent Right DP: present 2+; Right PT absent; Left femoral pulse: present 2+; Left popliteal pulse: absent; Left DP: present 2+; Left PT: absent  No cyanosis, clubbing, has edema. No signs atheroembolic event. Varicose veins left large ropey all the way up leg. Right also with large varicose veins  Pulmonary - effort appears normal.  No respiratory distress. Lungs - Breath sounds normal. No wheezes or rales.     GI - Abdomen - soft, non tender, bowel sounds X 4 quadrants. No guarding or rebound tenderness. No distension or palpable mass. Genitourinary - deferred. Musculoskeletal - ROM appears normal.  Neurologic - alert and oriented X 3. Physiologic. Skin - warm, dry, and intact. No rash, erythema, or pallor. Psychiatric - mood, affect, and behavior appear normal.  Judgment and thought processes appear normal.    Options have been discussed with the patient including continued medical management. Patient has opted to proceed with continued medical management. Assessment    1. Leg pain, left    2. Leg pain, right    3. Leg swelling    4.  Varicose veins of both lower extremities with pain          Plan    Support hose  20-30 mmHg compression  Start/Continue ASA EC 81 mg daily  Leg elevation  Good moisturization  Good skin care  Follow up after  his venous scan for reflux  Diet   excercise

## 2020-02-27 PROBLEM — I83.813 VARICOSE VEINS OF BOTH LOWER EXTREMITIES WITH PAIN: Status: ACTIVE | Noted: 2020-02-27

## 2020-02-27 RX ORDER — DESVENLAFAXINE 50 MG/1
50 TABLET, EXTENDED RELEASE ORAL NIGHTLY
Qty: 30 TABLET | Refills: 1 | Status: SHIPPED | OUTPATIENT
Start: 2020-02-27 | End: 2020-08-07

## 2020-04-20 ENCOUNTER — TELEPHONE (OUTPATIENT)
Dept: VASCULAR SURGERY | Age: 50
End: 2020-04-20

## 2020-06-10 ENCOUNTER — TELEPHONE (OUTPATIENT)
Dept: VASCULAR SURGERY | Age: 50
End: 2020-06-10

## 2020-06-10 NOTE — TELEPHONE ENCOUNTER
I sw Farida the pt's gf to inform her unfortunately the pt's study was scheduled incorrectly and for that reason it must be cancelled. Once the appt is r/s to a correct time we will contact the pt with this information. Farida requested the soonest appt. I let her know we will do our best to accommodate. Farida voiced understanding and is aware.

## 2020-06-29 ENCOUNTER — TELEPHONE (OUTPATIENT)
Dept: PRIMARY CARE CLINIC | Age: 50
End: 2020-06-29

## 2020-07-07 ENCOUNTER — HOSPITAL ENCOUNTER (OUTPATIENT)
Dept: VASCULAR LAB | Age: 50
Discharge: HOME | End: 2020-07-07
Payer: MEDICARE

## 2020-07-07 PROCEDURE — 93970 EXTREMITY STUDY: CPT

## 2020-07-28 ENCOUNTER — TELEPHONE (OUTPATIENT)
Dept: PRIMARY CARE CLINIC | Age: 50
End: 2020-07-28
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
## 2020-07-31 ENCOUNTER — TELEPHONE (OUTPATIENT)
Dept: PRIMARY CARE CLINIC | Age: 50
End: 2020-07-31

## 2020-07-31 NOTE — TELEPHONE ENCOUNTER
Farida called on Driss's behalf. He saw his Liver  transplant  The other day. She would like to talk with Meaghan Lino to let her know a few things, he was started on insulin etc. He needs a couple of referrals, one being a local plastic surgeon. Please call her back at [phone], or you may call [facility]. I see that Cami already spoke with the Jesús Nicholas yesterday.    Day SWEENEY

## 2020-08-07 ENCOUNTER — OFFICE VISIT (OUTPATIENT)
Dept: PRIMARY CARE CLINIC | Age: 50
End: 2020-08-07
Payer: MEDICARE

## 2020-08-07 VITALS
HEIGHT: 72 IN | HEART RATE: 107 BPM | TEMPERATURE: 98.7 F | OXYGEN SATURATION: 98 % | BODY MASS INDEX: 35.8 KG/M2 | WEIGHT: 264.3 LBS | DIASTOLIC BLOOD PRESSURE: 80 MMHG | SYSTOLIC BLOOD PRESSURE: 124 MMHG

## 2020-08-07 PROBLEM — E66.01 MORBIDLY OBESE (HCC): Status: ACTIVE | Noted: 2020-08-07

## 2020-08-07 PROCEDURE — 3046F HEMOGLOBIN A1C LEVEL >9.0%: CPT | Performed by: NURSE PRACTITIONER

## 2020-08-07 PROCEDURE — G8417 CALC BMI ABV UP PARAM F/U: HCPCS | Performed by: NURSE PRACTITIONER

## 2020-08-07 PROCEDURE — 4004F PT TOBACCO SCREEN RCVD TLK: CPT | Performed by: NURSE PRACTITIONER

## 2020-08-07 PROCEDURE — 2022F DILAT RTA XM EVC RTNOPTHY: CPT | Performed by: NURSE PRACTITIONER

## 2020-08-07 PROCEDURE — 3017F COLORECTAL CA SCREEN DOC REV: CPT | Performed by: NURSE PRACTITIONER

## 2020-08-07 PROCEDURE — 99215 OFFICE O/P EST HI 40 MIN: CPT | Performed by: NURSE PRACTITIONER

## 2020-08-07 PROCEDURE — G8427 DOCREV CUR MEDS BY ELIG CLIN: HCPCS | Performed by: NURSE PRACTITIONER

## 2020-08-07 RX ORDER — INSULIN ASPART 100 [IU]/ML
INJECTION, SOLUTION INTRAVENOUS; SUBCUTANEOUS
COMMUNITY
Start: 2020-07-29 | End: 2021-02-15 | Stop reason: SDUPTHER

## 2020-08-07 RX ORDER — INSULIN GLARGINE 100 [IU]/ML
INJECTION, SOLUTION SUBCUTANEOUS
COMMUNITY
Start: 2020-07-30 | End: 2021-02-15 | Stop reason: SDUPTHER

## 2020-08-07 RX ORDER — LANCETS 30 GAUGE
1 EACH MISCELLANEOUS DAILY
Qty: 100 EACH | Refills: 3 | Status: SHIPPED | OUTPATIENT
Start: 2020-08-07 | End: 2020-09-04 | Stop reason: SDUPTHER

## 2020-08-07 RX ORDER — PEN NEEDLE, DIABETIC 31 GX5/16"
NEEDLE, DISPOSABLE MISCELLANEOUS
COMMUNITY
Start: 2020-07-30 | End: 2020-10-13

## 2020-08-07 RX ORDER — GLUCOSAMINE HCL/CHONDROITIN SU 500-400 MG
CAPSULE ORAL
Qty: 200 STRIP | Refills: 3 | Status: SHIPPED | OUTPATIENT
Start: 2020-08-07 | End: 2020-08-28 | Stop reason: SDUPTHER

## 2020-08-07 NOTE — PATIENT INSTRUCTIONS
NOVOLOG INSTRUCTIONS    Check your fasting blood sugar before breakfast, lunch, and dinner. Take 1 units of novolog plus the additional sliding scale amount for your blood sugar before that meal.    <150: 0  151-200:  1  201-250: 2  251-300: 3  301-350: 4  351-400: 5  >400: 6   Example: If your pre-meal blood sugar is 175, take 1 unit + 1 unit = 2 units. Keep a log of your sugars.      BASAGLAR INSTRUCTIONS    10 units nightly
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
## 2020-08-10 ASSESSMENT — ENCOUNTER SYMPTOMS
BLURRED VISION: 0
GASTROINTESTINAL NEGATIVE: 1
EYES NEGATIVE: 1
VISUAL CHANGE: 1
RESPIRATORY NEGATIVE: 1

## 2020-08-13 ENCOUNTER — VIRTUAL VISIT (OUTPATIENT)
Dept: VASCULAR SURGERY | Age: 50
End: 2020-08-13
Payer: MEDICARE

## 2020-08-13 PROCEDURE — 4004F PT TOBACCO SCREEN RCVD TLK: CPT | Performed by: NURSE PRACTITIONER

## 2020-08-13 PROCEDURE — 3017F COLORECTAL CA SCREEN DOC REV: CPT | Performed by: NURSE PRACTITIONER

## 2020-08-13 PROCEDURE — 99213 OFFICE O/P EST LOW 20 MIN: CPT | Performed by: NURSE PRACTITIONER

## 2020-08-13 PROCEDURE — G8427 DOCREV CUR MEDS BY ELIG CLIN: HCPCS | Performed by: NURSE PRACTITIONER

## 2020-08-13 PROCEDURE — G8417 CALC BMI ABV UP PARAM F/U: HCPCS | Performed by: NURSE PRACTITIONER
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
## 2020-08-17 ENCOUNTER — TELEPHONE (OUTPATIENT)
Dept: PRIMARY CARE CLINIC | Age: 50
End: 2020-08-17

## 2020-08-17 ENCOUNTER — HOSPITAL ENCOUNTER (OUTPATIENT)
Dept: WOUND CARE | Age: 50
Discharge: HOME | End: 2020-08-17
Payer: MEDICARE

## 2020-08-17 VITALS
HEART RATE: 47 BPM | HEIGHT: 72 IN | RESPIRATION RATE: 20 BRPM | SYSTOLIC BLOOD PRESSURE: 147 MMHG | DIASTOLIC BLOOD PRESSURE: 97 MMHG | BODY MASS INDEX: 35.76 KG/M2 | WEIGHT: 264 LBS | TEMPERATURE: 96.8 F

## 2020-08-17 PROBLEM — E11.628 TYPE 2 DIABETES MELLITUS WITH SKIN COMPLICATION, WITH LONG-TERM CURRENT USE OF INSULIN (HCC): Status: ACTIVE | Noted: 2020-08-17

## 2020-08-17 PROBLEM — Z79.4 TYPE 2 DIABETES MELLITUS WITH SKIN COMPLICATION, WITH LONG-TERM CURRENT USE OF INSULIN (HCC): Status: ACTIVE | Noted: 2020-08-17

## 2020-08-17 PROBLEM — T31.11 BURN (ANY DEGREE) INVOLVING 10-19 PERCENT OF BODY SURFACE WITH THIRD DEGREE BURN OF 10-19% (HCC): Status: ACTIVE | Noted: 2020-08-17

## 2020-08-17 PROCEDURE — 16020 DRESS/DEBRID P-THICK BURN S: CPT

## 2020-08-17 PROCEDURE — 99213 OFFICE O/P EST LOW 20 MIN: CPT

## 2020-08-17 PROCEDURE — 99213 OFFICE O/P EST LOW 20 MIN: CPT | Performed by: NURSE PRACTITIONER

## 2020-08-17 PROCEDURE — 16030 DRESS/DEBRID P-THICK BURN L: CPT | Performed by: NURSE PRACTITIONER

## 2020-08-17 ASSESSMENT — VISUAL ACUITY: OU: 1

## 2020-08-17 ASSESSMENT — PAIN SCALES - GENERAL: PAINLEVEL_OUTOF10: 5

## 2020-08-17 ASSESSMENT — ENCOUNTER SYMPTOMS: COLOR CHANGE: 1

## 2020-08-17 ASSESSMENT — PAIN DESCRIPTION - ORIENTATION: ORIENTATION: LEFT

## 2020-08-17 ASSESSMENT — PAIN DESCRIPTION - PAIN TYPE: TYPE: ACUTE PAIN

## 2020-08-17 ASSESSMENT — PAIN DESCRIPTION - LOCATION: LOCATION: FOOT
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
## 2020-08-24 ENCOUNTER — TELEPHONE (OUTPATIENT)
Dept: ADMINISTRATIVE | Age: 50
End: 2020-08-24

## 2020-08-25 ENCOUNTER — HOSPITAL ENCOUNTER (OUTPATIENT)
Dept: WOUND CARE | Age: 50
Discharge: HOME | End: 2020-08-25
Payer: MEDICARE

## 2020-08-25 VITALS
HEART RATE: 49 BPM | SYSTOLIC BLOOD PRESSURE: 129 MMHG | BODY MASS INDEX: 35.76 KG/M2 | RESPIRATION RATE: 16 BRPM | TEMPERATURE: 96.3 F | WEIGHT: 264 LBS | DIASTOLIC BLOOD PRESSURE: 80 MMHG | HEIGHT: 72 IN

## 2020-08-25 PROBLEM — E11.628 TYPE 2 DIABETES MELLITUS WITH SKIN COMPLICATION, WITH LONG-TERM CURRENT USE OF INSULIN (HCC): Chronic | Status: ACTIVE | Noted: 2020-08-17

## 2020-08-25 PROBLEM — Z79.4 TYPE 2 DIABETES MELLITUS WITH SKIN COMPLICATION, WITH LONG-TERM CURRENT USE OF INSULIN (HCC): Chronic | Status: ACTIVE | Noted: 2020-08-17

## 2020-08-25 PROBLEM — T21.22XD: Chronic | Status: ACTIVE | Noted: 2020-08-25

## 2020-08-25 PROCEDURE — 97597 DBRDMT OPN WND 1ST 20 CM/<: CPT

## 2020-08-25 PROCEDURE — 97598 DBRDMT OPN WND ADDL 20CM/<: CPT | Performed by: SURGERY

## 2020-08-25 PROCEDURE — 97598 DBRDMT OPN WND ADDL 20CM/<: CPT

## 2020-08-25 PROCEDURE — 97597 DBRDMT OPN WND 1ST 20 CM/<: CPT | Performed by: SURGERY

## 2020-08-25 ASSESSMENT — PAIN DESCRIPTION - PROGRESSION: CLINICAL_PROGRESSION: NOT CHANGED

## 2020-08-25 ASSESSMENT — PAIN SCALES - GENERAL: PAINLEVEL_OUTOF10: 5

## 2020-08-25 ASSESSMENT — PAIN DESCRIPTION - LOCATION: LOCATION: FOOT;ANKLE

## 2020-08-25 ASSESSMENT — PAIN DESCRIPTION - ONSET: ONSET: ON-GOING

## 2020-08-25 ASSESSMENT — PAIN DESCRIPTION - FREQUENCY: FREQUENCY: CONTINUOUS

## 2020-08-25 ASSESSMENT — PAIN DESCRIPTION - ORIENTATION: ORIENTATION: LEFT;RIGHT

## 2020-08-25 ASSESSMENT — PAIN DESCRIPTION - DESCRIPTORS: DESCRIPTORS: ACHING;CONSTANT

## 2020-08-25 ASSESSMENT — PAIN DESCRIPTION - PAIN TYPE: TYPE: ACUTE PAIN
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
## 2020-08-26 PROBLEM — L98.492 ABDOMINAL WALL SKIN ULCER, WITH FAT LAYER EXPOSED (HCC): Status: ACTIVE | Noted: 2020-08-26

## 2020-08-27 ENCOUNTER — TELEPHONE (OUTPATIENT)
Dept: PRIMARY CARE CLINIC | Age: 50
End: 2020-08-27

## 2020-08-28 RX ORDER — GLUCOSAMINE HCL/CHONDROITIN SU 500-400 MG
CAPSULE ORAL
Qty: 200 STRIP | Refills: 3 | Status: SHIPPED | OUTPATIENT
Start: 2020-08-28 | End: 2020-09-04 | Stop reason: SDUPTHER

## 2020-09-01 ENCOUNTER — HOSPITAL ENCOUNTER (OUTPATIENT)
Dept: WOUND CARE | Age: 50
Discharge: HOME | End: 2020-09-01

## 2020-09-04 ENCOUNTER — OFFICE VISIT (OUTPATIENT)
Dept: PRIMARY CARE CLINIC | Age: 50
End: 2020-09-04
Payer: MEDICARE

## 2020-09-04 ENCOUNTER — HOSPITAL ENCOUNTER (OUTPATIENT)
Dept: VASCULAR LAB | Age: 50
Discharge: HOME | End: 2020-09-04
Payer: MEDICARE

## 2020-09-04 VITALS
BODY MASS INDEX: 32.6 KG/M2 | RESPIRATION RATE: 18 BRPM | HEIGHT: 73 IN | WEIGHT: 246 LBS | SYSTOLIC BLOOD PRESSURE: 130 MMHG | TEMPERATURE: 98.9 F | OXYGEN SATURATION: 98 % | HEART RATE: 68 BPM | DIASTOLIC BLOOD PRESSURE: 80 MMHG

## 2020-09-04 LAB — HBA1C MFR BLD: 10.7 %

## 2020-09-04 PROCEDURE — G8427 DOCREV CUR MEDS BY ELIG CLIN: HCPCS | Performed by: NURSE PRACTITIONER

## 2020-09-04 PROCEDURE — 3017F COLORECTAL CA SCREEN DOC REV: CPT | Performed by: NURSE PRACTITIONER

## 2020-09-04 PROCEDURE — 99214 OFFICE O/P EST MOD 30 MIN: CPT | Performed by: NURSE PRACTITIONER

## 2020-09-04 PROCEDURE — 4004F PT TOBACCO SCREEN RCVD TLK: CPT | Performed by: NURSE PRACTITIONER

## 2020-09-04 PROCEDURE — 83036 HEMOGLOBIN GLYCOSYLATED A1C: CPT | Performed by: NURSE PRACTITIONER

## 2020-09-04 PROCEDURE — 2022F DILAT RTA XM EVC RTNOPTHY: CPT | Performed by: NURSE PRACTITIONER

## 2020-09-04 PROCEDURE — G8417 CALC BMI ABV UP PARAM F/U: HCPCS | Performed by: NURSE PRACTITIONER

## 2020-09-04 PROCEDURE — 3046F HEMOGLOBIN A1C LEVEL >9.0%: CPT | Performed by: NURSE PRACTITIONER

## 2020-09-04 PROCEDURE — 93923 UPR/LXTR ART STDY 3+ LVLS: CPT

## 2020-09-04 RX ORDER — GABAPENTIN 100 MG/1
100 CAPSULE ORAL 3 TIMES DAILY
Qty: 90 CAPSULE | Refills: 0 | OUTPATIENT
Start: 2020-09-04 | End: 2020-10-13 | Stop reason: SDUPTHER

## 2020-09-04 RX ORDER — LANCETS 30 GAUGE
EACH MISCELLANEOUS
Qty: 200 EACH | Refills: 3 | Status: SHIPPED | OUTPATIENT
Start: 2020-09-04

## 2020-09-04 RX ORDER — GLUCOSAMINE HCL/CHONDROITIN SU 500-400 MG
CAPSULE ORAL
Qty: 200 STRIP | Refills: 3 | Status: SHIPPED | OUTPATIENT
Start: 2020-09-04 | End: 2021-04-29 | Stop reason: SDUPTHER

## 2020-09-04 RX ORDER — GABAPENTIN 100 MG/1
100 CAPSULE ORAL 3 TIMES DAILY
Qty: 90 CAPSULE | Refills: 0 | Status: CANCELLED | OUTPATIENT
Start: 2020-09-04

## 2020-09-04 NOTE — TELEPHONE ENCOUNTER
[address] called to request a refill on his medication. Last office visit : 8/21/2020   Next office visit : 9/4/2020     Requested Prescriptions     Pending Prescriptions Disp Refills    Lancets MISC 200 each 3     Sig: Use to test blood glucose four times a day and as needed for hypoglycemic events  DXE11.622 Z79.4    blood glucose monitor strips 200 strip 3     Sig: Test 4 times a day & as needed for symptoms of irregular blood glucose. Dispense sufficient amount for indicated testing frequency plus additional to accommodate PRN testing needs.  HUM41.717 Z79.4            Keegan Laird MA

## 2020-09-04 NOTE — TELEPHONE ENCOUNTER
Per verbal order from [facility] Gabapentin 100mg tid. [address] called to request a refill on his medication. Last office visit : 9/4/2020   Next office visit : 10/5/2020     Last Sumaya Salty: 09/04/2020    Last UDS: No results found for: LABAMPH, LABBARB, LABBENZ, BUPRENUR, COCAIMETSCRU, GABAPENTIN, MDMA, METAMPU, OPIATESCREENURINE, OXTCOSU, PHENCYCLIDINESCREENURINE, PROPOXYPHENE, THCSCREENUR, TRICYUR              Requested Prescriptions     Pending Prescriptions Disp Refills    gabapentin (NEURONTIN) 100 MG capsule 90 capsule 0     Sig: Take 1 capsule by mouth 3 times daily for 30 days. Please approve or refuse this medication.    Jess Chaudhary MA

## 2020-09-04 NOTE — PROGRESS NOTES
Thought content normal.         Judgment: Judgment normal.       Monofilament Exam Reveals:  Pulses: normal  Edema:normal  Skin Lesions:calus present  Right Foot:    Left Foot:  Normal sensation at 0   Normal sensation at 0   Diminished sensation1,2,3,4,5,6,8,10 Diminished sensation at 1,2,3,4,5,6,8,10  No sensation at 7,9     No sensation at 7,9         Assessment:    ICD-10-CM    1. Type 2 diabetes mellitus with other skin ulcer, with long-term current use of insulin (HCC)  E11.622 POCT glycosylated hemoglobin (Hb A1C)    Z79.4  DIABETES FOOT EXAM   2. Encounter for screening for malignant neoplasm of colon  Z12.11 Cologuard (For External Results Only)   3. Encounter for counseling  Z71.9        Plan:   1. Type 2 diabetes mellitus with other skin ulcer, with long-term current use of insulin ([facility])  - POCT glycosylated hemoglobin (Hb A1C)  -  DIABETES FOOT EXAM    2. Encounter for screening for malignant neoplasm of colon  - Cologuard (For External Results Only); Future    3. Encounter for counseling  - medications, foot exam, cologuard, and follow-up    Over 50% of the total visit time of 25 minutes was spent on counseling and or coordination of care of diabetes, colon screening, medications, foot exam, cologuard, and follow-up. Orders Placed This Encounter   Procedures    Cologuard (For External Results Only)     This test is performed by an external laboratory and is used for result attachment only. It is required that this order requisition be faxed to: [email or web address] [place] @ [phone]. See [email or web address] for further information. Standing Status:   Future     Standing Expiration Date:   9/4/2021    POCT glycosylated hemoglobin (Hb A1C)     DIABETES FOOT EXAM     No orders of the defined types were placed in this encounter. There are no discontinued medications. Patient Instructions   NOVOLOG INSTRUCTIONS     Check your fasting blood sugar before breakfast, lunch, and dinner.   Take 1 units of novolog plus the additional sliding scale amount for your blood sugar before that meal.    <150: 0  151-200:  1  201-250: 2  251-300: 3  301-350: 4  351-400: 5  >400: 6   Example: If your pre-meal blood sugar is 175, take 1 unit + 1 unit = 2 units.     Keep a log of your sugars.      BASAGLAR INSTRUCTIONS     10 units nightly    MUST TAKE INSULIN     Patient given educational handouts and has had all questions answered. Patient voices understanding and agrees to plans along with risks and benefits of plan. Patient isinstructed to continue prior meds, diet, and exercise plans unless instructed otherwise. Patient agrees to follow up as instructed and sooner if needed. Patient agrees to go to ER if condition becomes emergent. Notesmay be completed with dictation device and spelling errors may occur. Return in about 4 weeks (around 10/2/2020) for DM.

## 2020-09-08 ENCOUNTER — HOSPITAL ENCOUNTER (OUTPATIENT)
Dept: WOUND CARE | Age: 50
Discharge: HOME | End: 2020-09-08

## 2020-09-09 ASSESSMENT — ENCOUNTER SYMPTOMS
GASTROINTESTINAL NEGATIVE: 1
EYES NEGATIVE: 1
BLURRED VISION: 0
RESPIRATORY NEGATIVE: 1

## 2020-10-13 ENCOUNTER — OFFICE VISIT (OUTPATIENT)
Dept: PRIMARY CARE CLINIC | Age: 50
End: 2020-10-13
Payer: MEDICARE

## 2020-10-13 VITALS
BODY MASS INDEX: 32.2 KG/M2 | SYSTOLIC BLOOD PRESSURE: 132 MMHG | HEART RATE: 64 BPM | RESPIRATION RATE: 18 BRPM | WEIGHT: 243 LBS | DIASTOLIC BLOOD PRESSURE: 84 MMHG | HEIGHT: 73 IN | TEMPERATURE: 97.7 F | OXYGEN SATURATION: 98 %

## 2020-10-13 LAB — HBA1C MFR BLD: 9.5 %

## 2020-10-13 PROCEDURE — G8427 DOCREV CUR MEDS BY ELIG CLIN: HCPCS | Performed by: NURSE PRACTITIONER

## 2020-10-13 PROCEDURE — 3017F COLORECTAL CA SCREEN DOC REV: CPT | Performed by: NURSE PRACTITIONER

## 2020-10-13 PROCEDURE — 3046F HEMOGLOBIN A1C LEVEL >9.0%: CPT | Performed by: NURSE PRACTITIONER

## 2020-10-13 PROCEDURE — 99213 OFFICE O/P EST LOW 20 MIN: CPT | Performed by: NURSE PRACTITIONER

## 2020-10-13 PROCEDURE — 2022F DILAT RTA XM EVC RTNOPTHY: CPT | Performed by: NURSE PRACTITIONER

## 2020-10-13 RX ORDER — PEN NEEDLE, DIABETIC 31 G X1/4"
1 NEEDLE, DISPOSABLE MISCELLANEOUS DAILY
Qty: 100 EACH | Refills: 3 | Status: SHIPPED | OUTPATIENT
Start: 2020-10-13 | End: 2021-04-29 | Stop reason: SDUPTHER

## 2020-10-13 RX ORDER — OMEPRAZOLE 20 MG/1
20 CAPSULE, DELAYED RELEASE ORAL DAILY
Qty: 30 CAPSULE | Refills: 3 | Status: SHIPPED | OUTPATIENT
Start: 2020-10-13 | End: 2021-03-31

## 2020-10-13 RX ORDER — GABAPENTIN 100 MG/1
100 CAPSULE ORAL 3 TIMES DAILY
Qty: 90 CAPSULE | Refills: 1 | Status: ON HOLD | OUTPATIENT
Start: 2020-10-13 | End: 2021-03-05

## 2020-10-13 ASSESSMENT — ENCOUNTER SYMPTOMS
GASTROINTESTINAL NEGATIVE: 1
RESPIRATORY NEGATIVE: 1
EYES NEGATIVE: 1

## 2020-10-13 NOTE — PROGRESS NOTES
(MEIJER PEN NEEDLES) 31G X 6 MM MISC 1 each by Does not apply route daily 100 each 3    omeprazole (PRILOSEC) 20 MG delayed release capsule Take 1 capsule by mouth daily 30 capsule 3    gabapentin (NEURONTIN) 100 MG capsule Take 1 capsule by mouth 3 times daily for 30 days. 90 capsule 1    Lancets MISC Use to test blood glucose four times a day and as needed for hypoglycemic events  DXE11.622 Z79.4 200 each 3    blood glucose monitor strips Test 4 times a day & as needed for symptoms of irregular blood glucose. Dispense sufficient amount for indicated testing frequency plus additional to accommodate PRN testing needs. VWZ19.228 Z79.4 200 strip 3    NOVOLOG FLEXPEN 100 UNIT/ML injection pen       BASAGLAR KWIKPEN 100 UNIT/ML injection pen       predniSONE (DELTASONE) 10 MG tablet Take by mouth      tacrolimus (PROGRAF) 1 MG capsule TAKE 3 CAPSULES BY MOUTH IN THE MORNING AND 3 CAPSULES BY MOUTH IN THE EVENING      mycophenolate (CELLCEPT) 250 MG capsule Take 500 mg by mouth 2 times daily       No current facility-administered medications for this visit. No Known Allergies    Family History   Problem Relation Age of Onset    Breast Cancer Mother 61    Emphysema Father                Subjective:      Review of Systems   Constitutional: Negative. HENT: Negative. Eyes: Negative. Respiratory: Negative. Cardiovascular: Negative. Gastrointestinal: Negative. Endocrine: Negative. Genitourinary: Negative. Musculoskeletal: Negative. Skin: Negative. Neurological: Negative. Hematological: Negative. Psychiatric/Behavioral: Negative. Objective:     Physical Exam  Vitals signs and nursing note reviewed. Constitutional:       Appearance: Normal appearance. HENT:      Head: Normocephalic and atraumatic.       Right Ear: Hearing, tympanic membrane, ear canal and external ear normal.      Left Ear: Hearing, tympanic membrane, ear canal and external ear normal.      Nose: Nose normal.      Mouth/Throat:      Lips: Pink. Mouth: Mucous membranes are moist.      Pharynx: Oropharynx is clear. Eyes:      General: Lids are normal.      Extraocular Movements: Extraocular movements intact. Conjunctiva/sclera: Conjunctivae normal.      Pupils: Pupils are equal, round, and reactive to light. Neck:      Musculoskeletal: Full passive range of motion without pain, normal range of motion and neck supple. Thyroid: No thyromegaly. Cardiovascular:      Rate and Rhythm: Normal rate and regular rhythm. Pulses: Normal pulses. Dorsalis pedis pulses are 2+ on the right side and 2+ on the left side. Posterior tibial pulses are 2+ on the right side and 2+ on the left side. Heart sounds: Normal heart sounds. Pulmonary:      Effort: Pulmonary effort is normal.      Breath sounds: Normal breath sounds and air entry. Abdominal:      General: Bowel sounds are normal.      Palpations: Abdomen is soft. Musculoskeletal:      Thoracic back: He exhibits normal range of motion and no tenderness. Lumbar back: He exhibits normal range of motion and no tenderness. Lymphadenopathy:      Cervical: No cervical adenopathy. Skin:     General: Skin is warm and dry. Capillary Refill: Capillary refill takes 2 to 3 seconds. Neurological:      General: No focal deficit present. Mental Status: He is alert and oriented to person, place, and time. Mental status is at baseline. Coordination: Coordination is intact. Psychiatric:         Mood and Affect: Mood normal.         Speech: Speech normal.         Behavior: Behavior normal.         Thought Content:  Thought content normal.         Cognition and Memory: Cognition and memory normal.         Judgment: Judgment normal.         /84   Pulse 64   Temp 97.7 °F (36.5 °C) (Temporal)   Resp 18   Ht 6' 1\" (1.854 m)   Wt 243 lb (110.2 kg)   SpO2 98%   BMI 32.06 kg/m²     Assessment:      Diagnosis

## 2020-10-13 NOTE — PATIENT INSTRUCTIONS
Increase night insulin up to 15 units at bedtime. If glucose is 150 or higher at meals take 5 units of Novolog      Patient Education        Learning About Diabetes Food Guidelines  Your Care Instructions     Meal planning is important to manage diabetes. It helps keep your blood sugar at a target level (which you set with your doctor). You don't have to eat special foods. You can eat what your family eats, including sweets once in a while. But you do have to pay attention to how often you eat and how much you eat of certain foods. You may want to work with a dietitian or a certified diabetes educator (CDE) to help you plan meals and snacks. A dietitian or CDE can also help you lose weight if that is one of your goals. What should you know about eating carbs? Managing the amount of carbohydrate (carbs) you eat is an important part of healthy meals when you have diabetes. Carbohydrate is found in many foods. · Learn which foods have carbs. And learn the amounts of carbs in different foods. ? Bread, cereal, pasta, and rice have about 15 grams of carbs in a serving. A serving is 1 slice of bread (1 ounce), ½ cup of cooked cereal, or 1/3 cup of cooked pasta or rice. ? Fruits have 15 grams of carbs in a serving. A serving is 1 small fresh fruit, such as an apple or orange; ½ of a banana; ½ cup of cooked or canned fruit; ½ cup of fruit juice; 1 cup of melon or raspberries; or 2 tablespoons of dried fruit. ? Milk and no-sugar-added yogurt have 15 grams of carbs in a serving. A serving is 1 cup of milk or 2/3 cup of no-sugar-added yogurt. ? Starchy vegetables have 15 grams of carbs in a serving. A serving is ½ cup of mashed potatoes or sweet potato; 1 cup winter squash; ½ of a small baked potato; ½ cup of cooked beans; or ½ cup cooked corn or green peas. · Learn how much carbs to eat each day and at each meal. A dietitian or CDE can teach you how to keep track of the amount of carbs you eat.  This is called carbohydrate counting. · If you are not sure how to count carbohydrate grams, use the Plate Method to plan meals. It is a good, quick way to make sure that you have a balanced meal. It also helps you spread carbs throughout the day. ? Divide your plate by types of foods. Put non-starchy vegetables on half the plate, meat or other protein food on one-quarter of the plate, and a grain or starchy vegetable in the final quarter of the plate. To this you can add a small piece of fruit and 1 cup of milk or yogurt, depending on how many carbs you are supposed to eat at a meal.  · Try to eat about the same amount of carbs at each meal. Do not \"save up\" your daily allowance of carbs to eat at one meal.  · Proteins have very little or no carbs per serving. Examples of proteins are beef, chicken, turkey, fish, eggs, tofu, cheese, cottage cheese, and peanut butter. A serving size of meat is 3 ounces, which is about the size of a deck of cards. Examples of meat substitute serving sizes (equal to 1 ounce of meat) are 1/4 cup of cottage cheese, 1 egg, 1 tablespoon of peanut butter, and ½ cup of tofu. How can you eat out and still eat healthy? · Learn to estimate the serving sizes of foods that have carbohydrate. If you measure food at home, it will be easier to estimate the amount in a serving of restaurant food. · If the meal you order has too much carbohydrate (such as potatoes, corn, or baked beans), ask to have a low-carbohydrate food instead. Ask for a salad or green vegetables. · If you use insulin, check your blood sugar before and after eating out to help you plan how much to eat in the future. · If you eat more carbohydrate at a meal than you had planned, take a walk or do other exercise. This will help lower your blood sugar. What else should you know? · Limit saturated fat, such as the fat from meat and dairy products.  This is a healthy choice because people who have diabetes are at higher risk of heart disease. So choose lean cuts of meat and nonfat or low-fat dairy products. Use olive or canola oil instead of butter or shortening when cooking. · Don't skip meals. Your blood sugar may drop too low if you skip meals and take insulin or certain medicines for diabetes. · Check with your doctor before you drink alcohol. Alcohol can cause your blood sugar to drop too low. Alcohol can also cause a bad reaction if you take certain diabetes medicines. Follow-up care is a key part of your treatment and safety. Be sure to make and go to all appointments, and call your doctor if you are having problems. It's also a good idea to know your test results and keep a list of the medicines you take. Where can you learn more? Go to https://Kognitio.[employer] org and sign in to your The [employer] List account. Enter Y746 in the Synerscope box to learn more about \"Learning About Diabetes Food Guidelines. \"     If you do not have an account, please click on the \"Sign Up Now\" link. Current as of: December 20, 2019               Content Version: 12.6  © 5335-8325 [employer]. Care instructions adapted under license by [facility] ([facility]). If you have questions about a medical condition or this instruction, always ask your healthcare professional. [address] any warranty or liability for your use of this information. Patient Education        Learning About Meal Planning for Diabetes  Why plan your meals? Meal planning can be a key part of managing diabetes. Planning meals and snacks with the right balance of carbohydrate, protein, and fat can help you keep your blood sugar at the target level you set with your doctor. You don't have to eat special foods. You can eat what your family eats, including sweets once in a while. But you do have to pay attention to how often you eat and how much you eat of certain foods.   You may want to work with a dietitian or a certified diabetes educator. He or she can give you tips and meal ideas and can answer your questions about meal planning. This health professional can also help you reach a healthy weight if that is one of your goals. What plan is right for you? Your dietitian or diabetes educator may suggest that you start with the plate format or carbohydrate counting. The plate format  The plate format is a simple way to help you manage how you eat. You plan meals by learning how much space each food should take on a plate. Using the plate format helps you spread carbohydrate throughout the day. It can make it easier to keep your blood sugar level within your target range. It also helps you see if you're eating healthy portion sizes. To use the plate format, you put non-starchy vegetables on half your plate. Add meat or meat substitutes on one-quarter of the plate. Put a grain or starchy vegetable (such as brown rice or a potato) on the final quarter of the plate. You can add a small piece of fruit and some low-fat or fat-free milk or yogurt, depending on your carbohydrate goal for each meal.  Here are some tips for using the plate format:  · Make sure that you are not using an oversized plate. A 9-inch plate is best. Many restaurants use larger plates. · Get used to using the plate format at home. Then you can use it when you eat out. · Write down your questions about using the plate format. Talk to your doctor, a dietitian, or a diabetes educator about your concerns. Carbohydrate counting  With carbohydrate counting, you plan meals based on the amount of carbohydrate in each food. Carbohydrate raises blood sugar higher and more quickly than any other nutrient. It is found in desserts, breads and cereals, and fruit. It's also found in starchy vegetables such as potatoes and corn, grains such as rice and pasta, and milk and yogurt. Spreading carbohydrate throughout the day helps keep your blood sugar levels within your target range.   Your daily amount depends on several things, including your weight, how active you are, which diabetes medicines you take, and what your goals are for your blood sugar levels. A registered dietitian or diabetes educator can help you plan how much carbohydrate to include in each meal and snack. A guideline for your daily amount of carbohydrate is:  · 45 to 60 grams at each meal. That's about the same as 3 to 4 carbohydrate servings. · 15 to 20 grams at each snack. That's about the same as 1 carbohydrate serving. The Nutrition Facts label on packaged foods tells you how much carbohydrate is in a serving of the food. First, look at the serving size on the food label. Is that the amount you eat in a serving? All of the nutrition information on a food label is based on that serving size. So if you eat more or less than that, you'll need to adjust the other numbers. Total carbohydrate is the next thing you need to look for on the label. If you count carbohydrate servings, one serving of carbohydrate is 15 grams. For foods that don't come with labels, such as fresh fruits and vegetables, you'll need a guide that lists carbohydrate in these foods. Ask your doctor, dietitian, or diabetes educator about books or other nutrition guides you can use. If you take insulin, you need to know how many grams of carbohydrate are in a meal. This lets you know how much rapid-acting insulin to take before you eat. If you use an insulin pump, you get a constant rate of insulin during the day. So the pump must be programmed at meals to give you extra insulin to cover the rise in blood sugar after meals. When you know how much carbohydrate you will eat, you can take the right amount of insulin. Or, if you always use the same amount of insulin, you need to make sure that you eat the same amount of carbohydrate at meals.   If you need more help to understand carbohydrate counting and food labels, ask your doctor, dietitian, or diabetes educator. How do you get started with meal planning? Here are some tips to get started:  · Plan your meals a week at a time. Don't forget to include snacks too. · Use cookbooks or online recipes to plan several main meals. Plan some quick meals for busy nights. You also can double some recipes that freeze well. Then you can save half for other busy nights when you don't have time to cook. · Make sure you have the ingredients you need for your recipes. If you're running low on basic items, put these items on your shopping list too. · List foods that you use to make breakfasts, lunches, and snacks. List plenty of fruits and vegetables. · Post this list on the refrigerator. Add to it as you think of more things you need. · Take the list to the store to do your weekly shopping. Follow-up care is a key part of your treatment and safety. Be sure to make and go to all appointments, and call your doctor if you are having problems. It's also a good idea to know your test results and keep a list of the medicines you take. Where can you learn more? Go to https://Reset Therapeuticspepiceworagenics.[employer]wa[employer]. org and sign in to your [employer] Bridge account. Enter C864 in the Finexkap box to learn more about \"Learning About Meal Planning for Diabetes. \"     If you do not have an account, please click on the \"Sign Up Now\" link. Current as of: December 20, 2019               Content Version: 12.6  © 7327-5948 [employer]. Care instructions adapted under license by [facility] ([facility]). If you have questions about a medical condition or this instruction, always ask your healthcare professional. [place] [address] any warranty or liability for your use of this information.

## 2020-10-19 ENCOUNTER — TELEPHONE (OUTPATIENT)
Dept: VASCULAR SURGERY | Age: 50
End: 2020-10-19

## 2020-12-02 ENCOUNTER — VIRTUAL VISIT (OUTPATIENT)
Dept: VASCULAR SURGERY | Age: 50
End: 2020-12-02
Payer: MEDICARE

## 2020-12-02 PROCEDURE — G8417 CALC BMI ABV UP PARAM F/U: HCPCS | Performed by: NURSE PRACTITIONER

## 2020-12-02 PROCEDURE — G8484 FLU IMMUNIZE NO ADMIN: HCPCS | Performed by: NURSE PRACTITIONER

## 2020-12-02 PROCEDURE — 3017F COLORECTAL CA SCREEN DOC REV: CPT | Performed by: NURSE PRACTITIONER

## 2020-12-02 PROCEDURE — 4004F PT TOBACCO SCREEN RCVD TLK: CPT | Performed by: NURSE PRACTITIONER

## 2020-12-02 PROCEDURE — G8427 DOCREV CUR MEDS BY ELIG CLIN: HCPCS | Performed by: NURSE PRACTITIONER

## 2020-12-02 PROCEDURE — 99213 OFFICE O/P EST LOW 20 MIN: CPT | Performed by: NURSE PRACTITIONER

## 2020-12-02 NOTE — PROGRESS NOTES
2020    TELEHEALTH EVALUATION -- Audio/Visual (During HVUFV-52 public health emergency)    HPI:    Patient is located at home  Provider is located at [facility]   Also present during call is no one    Raza Ayala (:  1970) has requested an audio/video evaluation for the following concern(s):    He has a known history of of varicose veins and chronic venous insufficiency. He reports prominent veins on the  Bilateral leg(s), spider veins on the  Bilateral leg(s), lower extremity edema on the  Bilateral leg(s), the veins are painful -- severity:  moderate and pain is aggravated by upright posture. He reports previous treatment includes none. He does not have a history of spontaneous rupture. Differential diagnosis for leg pain includes but is not limited to: varicose veins, peripheral vascular disease, arthritic pain, DVT, lumbar disc disease, and neurogenic pain. Prior to Visit Medications    Medication Sig Taking? Authorizing Provider   Insulin Pen Needle (MEIJER PEN NEEDLES) 31G X 6 MM MISC 1 each by Does not apply route daily  DINORAH Perez   omeprazole (PRILOSEC) 20 MG delayed release capsule Take 1 capsule by mouth daily  DINORAH Perez   gabapentin (NEURONTIN) 100 MG capsule Take 1 capsule by mouth 3 times daily for 30 days. DINORAH Peerz   Lancets MISC Use to test blood glucose four times a day and as needed for hypoglycemic events  DXE11.622 Z79.4  DINORAH Ray NP   blood glucose monitor strips Test 4 times a day & as needed for symptoms of irregular blood glucose. Dispense sufficient amount for indicated testing frequency plus additional to accommodate PRN testing needs.  ELE18.054 Z79.4  DINORAH Ray NP   NOVOLOG FLEXPEN 100 UNIT/ML injection pen   Historical Provider, MD   Yosi Ready 100 UNIT/ML injection pen   Historical Provider, MD   predniSONE (DELTASONE) 10 MG tablet Take by mouth  Historical Provider, MD   tacrolimus (PROGRAF) 1 MG capsule TAKE 3 CAPSULES BY MOUTH IN THE MORNING AND 3 CAPSULES BY MOUTH IN THE EVENING  Historical Provider, MD   mycophenolate (CELLCEPT) 250 MG capsule Take 500 mg by mouth 2 times daily  Historical Provider, MD       Social History     Tobacco Use    Smoking status: Current Some Day Smoker     Packs/day: 1.00     Years: 25.00     Pack years: 25.00     Start date: 1994     Last attempt to quit: 2012     Years since quittin.2    Smokeless tobacco: Never Used    Tobacco comment: works as a    Substance Use Topics    Alcohol use: No     Comment: quit x 8 months ago with one relapse, former ETOH    Drug use: No        No Known Allergies,   Past Medical History:   Diagnosis Date    Antral vascular ectasia     Appendicitis 2009    ruptured - no surgery done    Cirrhosis ([facility] [address]     Duodenal ulcer     Esophageal varices (HCC)     grade 2    Hepatitis C     Hernia     History of GI bleed     Liver disease     Personal history of ascites     Portal hypertensive gastropathy (HCC)     Thrombocytopenia (HCC)    ,   Past Surgical History:   Procedure Laterality Date    UPPER GASTROINTESTINAL ENDOSCOPY  2013    Dr.Jaiyeola    UPPER GASTROINTESTINAL ENDOSCOPY  13    Dr Juyd Carter   ,   Social History     Tobacco Use    Smoking status: Current Some Day Smoker     Packs/day: 1.00     Years: 25.00     Pack years: 25.00     Start date: 1994     Last attempt to quit: 2012     Years since quittin.2    Smokeless tobacco: Never Used    Tobacco comment: works as a    Substance Use Topics    Alcohol use: No     Comment: quit x 8 months ago with one relapse, former ETOH    Drug use: No   ,   Family History   Problem Relation Age of Onset    Breast Cancer Mother 61    Emphysema Father    ,   Health Maintenance   Topic Date Due    Pneumococcal 0-64 years Vaccine (1 of 1 - PPSV23) 1976    Diabetic retinal exam  1980    Lipid screen  1980  Diabetic microalbuminuria test  07/25/1988    Hepatitis B vaccine (1 of 3 - Risk 3-dose series) 07/25/1989    DTaP/Tdap/Td vaccine (1 - Tdap) 07/25/1989    Shingles Vaccine (1 of 2) 07/25/2020    Colon cancer screen colonoscopy  07/25/2020    Flu vaccine (1) 09/01/2020    A1C test (Diabetic or Prediabetic)  01/13/2021    Annual Wellness Visit (AWV)  02/13/2021    Diabetic foot exam  09/09/2021    HIV screen  Completed    Hepatitis A vaccine  Aged Out    Hib vaccine  Aged Out    Meningococcal (ACWY) vaccine  Aged Out       Review of Systems    Constitutional - no significant activity change, appetite change, or unexpected weight change. No fever or chills. No diaphoresis or significant fatigue. HENT - no significant rhinorrhea or epistaxis. No tinnitus or significant hearing loss. Eyes - no sudden vision change or amaurosis. Respiratory - no significant shortness of breath, wheezing, or stridor. No apnea, cough, or chest tightness associated with shortness of breath. Cardiovascular - no chest pain, syncope, or significant dizziness. No palpitations or significant leg swelling.  has not had claudication. Gastrointestinal -  has not had abdominal swelling or pain. No blood in stool. No severe constipation, diarrhea, nausea, or vomiting. Genitourinary - No difficulty urinating, dysuria, frequency, or urgency. No flank pain or hematuria. Musculoskeletal - has not had back pain, gait disturbance, or myalgia. Skin - no color change, rash, pallor, or new wound. Neurologic - no dizziness, facial asymmetry, or light headedness. No seizures. No speech difficulty or lateralizing weakness. Hematologic - no easy bruising or excessive bleeding. Psychiatric - no severe anxiety or nervousness. No confusion. All other review of systems are negative.     PHYSICAL EXAMINATION:    Due to this being a TeleHealth encounter, evaluation of the following organ systems is limited: Vitals/Constitutional/EENT/Resp/CV/GI//MS/Neuro/Skin/Heme-Lymph-Imm. Constitutional - well developed, well nourished. No diaphoresis or acute distress. HENT - head normocephalic. Right external ear canal appears normal.  Left external ear canal appears normal.  Septum appears midline. Eyes - conjunctiva normal.   No exudate. No icterus. Neck- ROM appears normal, no tracheal deviation. Extremities -No cyanosis, clubbing, has edema. No signs atheroembolic event. Large ropey varicose veins both legs  Pulmonary - effort appears normal.  No respiratory distress. No accessory muscle use  Musculoskeletal -   Motor grossly intact in visible lower extremities and upper extremities  Neurologic - alert and oriented X 3. Cranial Nerves II-XII grossly intact  Skin - intact. No rash, erythema, or pallor. Psychiatric - mood, affect, and behavior appear normal.  Judgment and thought processes appear normal.    Venous scan -   Right Side: The greater saphenous vein exhibits reflux lasting for a     maximum of 4922 milliseconds 2 cm distal to the saphenofemoral junction.     There is no evidence of deep venous thrombosis in the segments insonated.     There is 1342milli seconds of deep vein reflux.    Suann Pillar is no short saphenous vein reflux.     Left Side: The greater saphenous vein exhibits reflux lasting for a     maximum of 5120 milliseconds in the saphenofemoral junction. There is no     evidence of deep venous thrombosis in the segments insonated. There is no     deep vein reflux.    Suann Pillar is 2589milli seconds of short saphenous vein reflux.     Incompetent Perforating vein seen 10 cm above left medial maleolus.     Multiple varicosities seen left calf.     Study done with standing protocol. Individual images were reviewed. Results were reviewed with the patient. ASSESSMENT/PLAN:  1. Leg pain, right    2. Leg pain, left    3. Venous insufficiency    4.  Leg swelling        No follow-ups on

## 2021-02-08 ENCOUNTER — OFFICE VISIT (OUTPATIENT)
Dept: URGENT CARE | Age: 51
End: 2021-02-08
Payer: MEDICARE

## 2021-02-08 ENCOUNTER — APPOINTMENT (OUTPATIENT)
Dept: GENERAL RADIOLOGY | Age: 51
DRG: 935 | End: 2021-02-08
Payer: MEDICARE

## 2021-02-08 ENCOUNTER — HOSPITAL ENCOUNTER (EMERGENCY)
Age: 51
Discharge: HOME | DRG: 935 | End: 2021-02-08
Attending: EMERGENCY MEDICINE
Payer: MEDICARE

## 2021-02-08 VITALS
RESPIRATION RATE: 20 BRPM | OXYGEN SATURATION: 97 % | BODY MASS INDEX: 35.89 KG/M2 | HEART RATE: 128 BPM | TEMPERATURE: 97.8 F | SYSTOLIC BLOOD PRESSURE: 130 MMHG | WEIGHT: 272 LBS | DIASTOLIC BLOOD PRESSURE: 72 MMHG

## 2021-02-08 VITALS
SYSTOLIC BLOOD PRESSURE: 129 MMHG | HEIGHT: 72 IN | BODY MASS INDEX: 36.84 KG/M2 | TEMPERATURE: 98.2 F | HEART RATE: 92 BPM | DIASTOLIC BLOOD PRESSURE: 79 MMHG | WEIGHT: 272 LBS | OXYGEN SATURATION: 98 % | RESPIRATION RATE: 14 BRPM

## 2021-02-08 DIAGNOSIS — R42 DIZZINESS: ICD-10-CM

## 2021-02-08 DIAGNOSIS — Z94.4 HISTORY OF LIVER TRANSPLANT (HCC): ICD-10-CM

## 2021-02-08 DIAGNOSIS — N39.0 URINARY TRACT INFECTION WITHOUT HEMATURIA, SITE UNSPECIFIED: Primary | ICD-10-CM

## 2021-02-08 DIAGNOSIS — I49.9 IRREGULAR CARDIAC RHYTHM: Primary | ICD-10-CM

## 2021-02-08 DIAGNOSIS — R50.9 FEVER, UNSPECIFIED FEVER CAUSE: ICD-10-CM

## 2021-02-08 DIAGNOSIS — R00.0 SINUS TACHYCARDIA: ICD-10-CM

## 2021-02-08 DIAGNOSIS — R00.0 TACHYCARDIA: ICD-10-CM

## 2021-02-08 DIAGNOSIS — E86.0 DEHYDRATION: ICD-10-CM

## 2021-02-08 LAB
ALBUMIN SERPL-MCNC: 2.7 G/DL (ref 3.5–5.2)
ALP BLD-CCNC: 154 U/L (ref 40–130)
ALT SERPL-CCNC: 42 U/L (ref 5–41)
ANION GAP SERPL CALCULATED.3IONS-SCNC: 13 MMOL/L (ref 7–19)
APTT: 31.8 SEC (ref 26–36.2)
AST SERPL-CCNC: 74 U/L (ref 5–40)
ATYPICAL LYMPHOCYTE RELATIVE PERCENT: 2 % (ref 0–8)
BACTERIA: ABNORMAL /HPF
BANDED NEUTROPHILS RELATIVE PERCENT: 10 % (ref 0–5)
BASOPHILS ABSOLUTE: 0.1 K/UL (ref 0–0.2)
BASOPHILS RELATIVE PERCENT: 1 % (ref 0–1)
BILIRUB SERPL-MCNC: 0.6 MG/DL (ref 0.2–1.2)
BILIRUBIN URINE: ABNORMAL
BLOOD, URINE: ABNORMAL
BUN BLDV-MCNC: 16 MG/DL (ref 6–20)
CALCIUM SERPL-MCNC: 8.5 MG/DL (ref 8.6–10)
CHLORIDE BLD-SCNC: 95 MMOL/L (ref 98–111)
CHP ED QC CHECK: ABNORMAL
CLARITY: ABNORMAL
CO2: 18 MMOL/L (ref 22–29)
COLOR: ABNORMAL
CREAT SERPL-MCNC: 1 MG/DL (ref 0.5–1.2)
EOSINOPHILS ABSOLUTE: 0 K/UL (ref 0–0.6)
EOSINOPHILS RELATIVE PERCENT: 0 % (ref 0–5)
EPITHELIAL CELLS, UA: ABNORMAL /HPF
GFR AFRICAN AMERICAN: >59
GFR NON-AFRICAN AMERICAN: >60
GLUCOSE BLD-MCNC: 244 MG/DL
GLUCOSE BLD-MCNC: 271 MG/DL (ref 74–109)
GLUCOSE URINE: 100 MG/DL
HCT VFR BLD CALC: 43.4 % (ref 42–52)
HEMOGLOBIN: 14.4 G/DL (ref 14–18)
HYALINE CASTS: ABNORMAL /LPF (ref 0–5)
IMMATURE GRANULOCYTES #: 0.2 K/UL
INR BLD: 1.28 (ref 0.88–1.18)
KETONES, URINE: 40 MG/DL
LEUKOCYTE ESTERASE, URINE: ABNORMAL
LYMPHOCYTES ABSOLUTE: 0.3 K/UL (ref 1.1–4.5)
LYMPHOCYTES RELATIVE PERCENT: 1 % (ref 20–40)
MCH RBC QN AUTO: 30.1 PG (ref 27–31)
MCHC RBC AUTO-ENTMCNC: 33.2 G/DL (ref 33–37)
MCV RBC AUTO: 90.8 FL (ref 80–94)
MONOCYTES ABSOLUTE: 0 K/UL (ref 0–0.9)
MONOCYTES RELATIVE PERCENT: 0 % (ref 0–10)
NEUTROPHILS ABSOLUTE: 11 K/UL (ref 1.5–7.5)
NEUTROPHILS RELATIVE PERCENT: 86 % (ref 50–65)
NITRITE, URINE: NEGATIVE
PDW BLD-RTO: 12.9 % (ref 11.5–14.5)
PH UA: 6 (ref 5–8)
PLATELET # BLD: 180 K/UL (ref 130–400)
PLATELET SLIDE REVIEW: ADEQUATE
PMV BLD AUTO: 10 FL (ref 9.4–12.4)
POTASSIUM REFLEX MAGNESIUM: 4 MMOL/L (ref 3.5–5)
PRO-BNP: 566 PG/ML (ref 0–900)
PROTEIN UA: =>1000 MG/DL
PROTHROMBIN TIME: 16 SEC (ref 12–14.6)
RBC # BLD: 4.78 M/UL (ref 4.7–6.1)
RBC # BLD: NORMAL 10*6/UL
RBC UA: ABNORMAL /HPF (ref 0–2)
SARS-COV-2, NAAT: NOT DETECTED
SODIUM BLD-SCNC: 126 MMOL/L (ref 136–145)
SPECIFIC GRAVITY UA: 1.03 (ref 1–1.03)
TOTAL PROTEIN: 6.9 G/DL (ref 6.6–8.7)
TROPONIN: <0.01 NG/ML (ref 0–0.03)
UROBILINOGEN, URINE: 1 E.U./DL
WBC # BLD: 11.5 K/UL (ref 4.8–10.8)
WBC UA: ABNORMAL /HPF (ref 0–5)
YEAST: PRESENT /HPF

## 2021-02-08 PROCEDURE — 81001 URINALYSIS AUTO W/SCOPE: CPT

## 2021-02-08 PROCEDURE — 6370000000 HC RX 637 (ALT 250 FOR IP): Performed by: EMERGENCY MEDICINE

## 2021-02-08 PROCEDURE — 2580000003 HC RX 258: Performed by: EMERGENCY MEDICINE

## 2021-02-08 PROCEDURE — U0002 COVID-19 LAB TEST NON-CDC: HCPCS

## 2021-02-08 PROCEDURE — 96365 THER/PROPH/DIAG IV INF INIT: CPT

## 2021-02-08 PROCEDURE — 85730 THROMBOPLASTIN TIME PARTIAL: CPT

## 2021-02-08 PROCEDURE — 99283 EMERGENCY DEPT VISIT LOW MDM: CPT

## 2021-02-08 PROCEDURE — 85025 COMPLETE CBC W/AUTO DIFF WBC: CPT

## 2021-02-08 PROCEDURE — 6360000002 HC RX W HCPCS: Performed by: EMERGENCY MEDICINE

## 2021-02-08 PROCEDURE — G0405 EKG INTERPRET & REPORT PREVE: HCPCS | Performed by: NURSE PRACTITIONER

## 2021-02-08 PROCEDURE — 83880 ASSAY OF NATRIURETIC PEPTIDE: CPT

## 2021-02-08 PROCEDURE — 87186 SC STD MICRODIL/AGAR DIL: CPT

## 2021-02-08 PROCEDURE — 96375 TX/PRO/DX INJ NEW DRUG ADDON: CPT

## 2021-02-08 PROCEDURE — 2500000003 HC RX 250 WO HCPCS: Performed by: EMERGENCY MEDICINE

## 2021-02-08 PROCEDURE — 84484 ASSAY OF TROPONIN QUANT: CPT

## 2021-02-08 PROCEDURE — 93005 ELECTROCARDIOGRAM TRACING: CPT | Performed by: EMERGENCY MEDICINE

## 2021-02-08 PROCEDURE — 80053 COMPREHEN METABOLIC PANEL: CPT

## 2021-02-08 PROCEDURE — 82962 GLUCOSE BLOOD TEST: CPT | Performed by: NURSE PRACTITIONER

## 2021-02-08 PROCEDURE — 87150 DNA/RNA AMPLIFIED PROBE: CPT

## 2021-02-08 PROCEDURE — 71045 X-RAY EXAM CHEST 1 VIEW: CPT

## 2021-02-08 PROCEDURE — 36415 COLL VENOUS BLD VENIPUNCTURE: CPT

## 2021-02-08 PROCEDURE — 85610 PROTHROMBIN TIME: CPT

## 2021-02-08 PROCEDURE — 87040 BLOOD CULTURE FOR BACTERIA: CPT

## 2021-02-08 RX ORDER — FLUCONAZOLE 100 MG/1
200 TABLET ORAL ONCE
Status: COMPLETED | OUTPATIENT
Start: 2021-02-08 | End: 2021-02-08

## 2021-02-08 RX ORDER — 0.9 % SODIUM CHLORIDE 0.9 %
1000 INTRAVENOUS SOLUTION INTRAVENOUS ONCE
Status: COMPLETED | OUTPATIENT
Start: 2021-02-08 | End: 2021-02-08

## 2021-02-08 RX ORDER — CEFDINIR 300 MG/1
300 CAPSULE ORAL 2 TIMES DAILY
Qty: 14 CAPSULE | Refills: 0 | Status: ON HOLD | OUTPATIENT
Start: 2021-02-08 | End: 2021-02-12 | Stop reason: HOSPADM

## 2021-02-08 RX ORDER — DILTIAZEM HYDROCHLORIDE 5 MG/ML
10 INJECTION INTRAVENOUS ONCE
Status: COMPLETED | OUTPATIENT
Start: 2021-02-08 | End: 2021-02-08

## 2021-02-08 RX ORDER — ONDANSETRON 4 MG/1
4 TABLET, ORALLY DISINTEGRATING ORAL EVERY 8 HOURS PRN
Qty: 12 TABLET | Refills: 0 | Status: SHIPPED | OUTPATIENT
Start: 2021-02-08 | End: 2021-02-15 | Stop reason: ALTCHOICE

## 2021-02-08 RX ADMIN — FLUCONAZOLE 200 MG: 100 TABLET ORAL at 21:03

## 2021-02-08 RX ADMIN — DILTIAZEM HYDROCHLORIDE 10 MG: 5 INJECTION INTRAVENOUS at 15:01

## 2021-02-08 RX ADMIN — SODIUM CHLORIDE 1000 ML: 9 INJECTION, SOLUTION INTRAVENOUS at 15:01

## 2021-02-08 RX ADMIN — SODIUM CHLORIDE 1000 ML: 9 INJECTION, SOLUTION INTRAVENOUS at 19:55

## 2021-02-08 RX ADMIN — CEFTRIAXONE 1000 MG: 1 INJECTION, POWDER, FOR SOLUTION INTRAMUSCULAR; INTRAVENOUS at 19:55

## 2021-02-08 ASSESSMENT — ENCOUNTER SYMPTOMS
DIARRHEA: 0
VOMITING: 0
CONSTIPATION: 0
FACIAL SWELLING: 0
EYES NEGATIVE: 1
NAUSEA: 1
NAUSEA: 1
SORE THROAT: 0
ABDOMINAL PAIN: 0
BLOOD IN STOOL: 0
SHORTNESS OF BREATH: 1
VOICE CHANGE: 0
VOMITING: 0
CHOKING: 0
APNEA: 0
COUGH: 0
SORE THROAT: 0
DIARRHEA: 0
CONSTIPATION: 0
CHEST TIGHTNESS: 0
WHEEZING: 0
SHORTNESS OF BREATH: 0
SINUS PRESSURE: 0
EYE DISCHARGE: 0

## 2021-02-08 NOTE — ED PROVIDER NOTES
[facility] EMERGENCY DEPT  eMERGENCY dEPARTMENT eNCOUnter      Pt Name: Carlton Delarosa  MRN: [id number]  [place] 1970  Date of evaluation: 2/8/2021  Provider: MD Chau Dee       Chief Complaint   Patient presents with    Atrial Fibrillation     sent from urgent care. HISTORY OF PRESENT ILLNESS   (Location/Symptom, Timing/Onset,Context/Setting, Quality, Duration, Modifying Factors, Severity)  Note limiting factors. Carlton Delarosa is a 48 y.o. male who presents to the emergency department elevated heart rate    49-year-old male. Recipient of a liver transplant. His transplant team is in [place]. Developed fever of 100.9 this morning. Went to urgent care. Was found to be tachycardic and referred here for further evaluation higher level of care. He is not having any chest pain. States he has not felt 100% his usual.  Admits to not drinking enough fluids. Has not eaten today. He is a diabetic. Is not complaining of any obvious source for the infection cough congestion dysuria he has had some nausea today. But no volume of diarrhea. Everything's been going well with his liver. He is on immunosuppressive therapy. I have not been any issues recently. He has not had Covid or known exposure. No prior cardiac history. The history is provided by the patient and medical records. NursingNotes were reviewed. REVIEW OF SYSTEMS    (2-9 systems for level 4, 10 or more for level 5)     Review of Systems   Constitutional: Positive for appetite change, chills, fatigue and fever. HENT: Negative for congestion, drooling, facial swelling, nosebleeds, sinus pressure, sore throat and voice change. Eyes: Negative for discharge. Respiratory: Negative for apnea, cough, choking and shortness of breath. Cardiovascular: Negative for chest pain and leg swelling. Gastrointestinal: Positive for nausea.  Negative for abdominal pain, blood in stool, constipation, diarrhea and vomiting. Genitourinary: Negative for dysuria and enuresis. Musculoskeletal: Negative for joint swelling. Skin: Negative for rash and wound. Neurological: Negative for seizures and syncope. Psychiatric/Behavioral: Negative for behavioral problems, hallucinations and suicidal ideas. All other systems reviewed and are negative. A complete review of systems was performed and is negative except as noted above in the HPI. PAST MEDICAL HISTORY     Past Medical History:   Diagnosis Date    Antral vascular ectasia     Appendicitis 2009    ruptured - no surgery done    Cirrhosis ([facility] [address]     Duodenal ulcer     Esophageal varices (HCC)     grade 2    Hepatitis C     Hernia     History of GI bleed     Liver disease     Personal history of ascites     Portal hypertensive gastropathy (HCC)     Thrombocytopenia (HCC)          SURGICAL HISTORY       Past Surgical History:   Procedure Laterality Date    UPPER GASTROINTESTINAL ENDOSCOPY  4/16/2013    Dr.Jaiyeola    UPPER GASTROINTESTINAL ENDOSCOPY  5-9-13    Dr Kel Gomez       Previous Medications    BASAGLAR KWIKPEN 100 UNIT/ML INJECTION PEN        BLOOD GLUCOSE MONITOR STRIPS    Test 4 times a day & as needed for symptoms of irregular blood glucose. Dispense sufficient amount for indicated testing frequency plus additional to accommodate PRN testing needs. PYF53.953 Z79.4    GABAPENTIN (NEURONTIN) 100 MG CAPSULE    Take 1 capsule by mouth 3 times daily for 30 days.     INSULIN PEN NEEDLE (MEIJER PEN NEEDLES) 31G X 6 MM MISC    1 each by Does not apply route daily    LANCETS MISC    Use to test blood glucose four times a day and as needed for hypoglycemic events  DXE11.622 Z79.4    MYCOPHENOLATE (CELLCEPT) 250 MG CAPSULE    Take 500 mg by mouth 2 times daily    NOVOLOG FLEXPEN 100 UNIT/ML INJECTION PEN        OMEPRAZOLE (PRILOSEC) 20 MG DELAYED RELEASE CAPSULE    Take 1 capsule by mouth daily    PREDNISONE (DELTASONE) 10 MG TABLET    Take by mouth    TACROLIMUS (PROGRAF) 1 MG CAPSULE    TAKE 3 CAPSULES BY MOUTH IN THE MORNING AND 3 CAPSULES BY MOUTH IN THE EVENING       ALLERGIES     Patient has no known allergies.     FAMILY HISTORY       Family History   Problem Relation Age of Onset    Breast Cancer Mother 61   [facility] Emphysema Father           SOCIAL HISTORY       Social History     Socioeconomic History    Marital status: Single     Spouse name: None    Number of children: None    Years of education: None    Highest education level: None   Occupational History    None   Social Needs    Financial resource strain: None    Food insecurity     Worry: None     Inability: None    Transportation needs     Medical: None     Non-medical: None   Tobacco Use    Smoking status: Current Some Day Smoker     Packs/day: 1.00     Years: 25.00     Pack years: 25.00     Start date: 1994     Last attempt to quit: 2012     Years since quittin.4    Smokeless tobacco: Never Used    Tobacco comment: works as a    Substance and Sexual Activity    Alcohol use: No     Comment: quit x 8 months ago with one relapse, former ETOH    Drug use: No    Sexual activity: None   Lifestyle    Physical activity     Days per week: None     Minutes per session: None    Stress: None   Relationships    Social connections     Talks on phone: None     Gets together: None     Attends Temple service: None     Active member of club or organization: None     Attends meetings of clubs or organizations: None     Relationship status: None    Intimate partner violence     Fear of current or ex partner: None     Emotionally abused: None     Physically abused: None     Forced sexual activity: None   Other Topics Concern    None   Social History Narrative    None       SCREENINGS             PHYSICAL EXAM    (up to 7 for level 4, 8 or more for level 5)     ED Triage Vitals [21 1354]   BP Temp Temp Source Pulse Resp SpO2 Height Weight 108/68 98.2 °F (36.8 °C) Oral 143 16 99 % 6' (1.829 m) 272 lb (123.4 kg)       Physical Exam  Vitals signs and nursing note reviewed. Constitutional:       General: He is not in acute distress. Appearance: He is well-developed. HENT:      Head: Normocephalic and atraumatic. Right Ear: External ear normal.      Left Ear: External ear normal.   Eyes:      General: No scleral icterus. Conjunctiva/sclera: Conjunctivae normal.      Pupils: Pupils are equal, round, and reactive to light. Neck:      Musculoskeletal: Normal range of motion and neck supple. Cardiovascular:      Rate and Rhythm: Normal rate and regular rhythm. Pulses: Normal pulses. Heart sounds: Normal heart sounds. No murmur. Pulmonary:      Effort: Pulmonary effort is normal. No respiratory distress. Breath sounds: Normal breath sounds. Abdominal:      General: Bowel sounds are normal.      Palpations: Abdomen is soft. Tenderness: There is no abdominal tenderness. Musculoskeletal: Normal range of motion. Skin:     General: Skin is warm and dry. Coloration: Skin is not jaundiced or pale. Neurological:      General: No focal deficit present. Mental Status: He is alert and oriented to person, place, and time. Psychiatric:         Mood and Affect: Mood normal.         Behavior: Behavior normal.         DIAGNOSTIC RESULTS     EKG: All EKG's are interpreted by the Emergency Department Physician who either signs or Co-signs this chart in the absence of a cardiologist.    EKG #1 appearance of atrial for but occasional P wave. After treatment follow-up EKG shows sinus tachycardia. RADIOLOGY:   Non-plain film images such as CT, Ultrasound and MRI are read by the radiologist. Plainradiographic images are visualized and preliminarily interpreted by the emergency physician with the below findings:    I have reviewed the results.     Interpretation per the Radiologist below, if available at the time of this note:    XR CHEST PORTABLE   Final Result   Impression: Shallow inspiration, no acute findings. Signed by Dr Malini Lujan. Gladys on 2/8/2021 2:35 PM            ED BEDSIDE ULTRASOUND:   Performed by ED Physician - none    LABS:  Labs Reviewed   CBC WITH AUTO DIFFERENTIAL - Abnormal; Notable for the following components:       Result Value    WBC 11.5 (*)     Neutrophils % 86.0 (*)     Lymphocytes % 1.0 (*)     Neutrophils Absolute 11.0 (*)     Lymphocytes Absolute 0.3 (*)     Bands Relative 10 (*)     All other components within normal limits   COMPREHENSIVE METABOLIC PANEL W/ REFLEX TO MG FOR LOW K - Abnormal; Notable for the following components:    Sodium 126 (*)     Chloride 95 (*)     CO2 18 (*)     Glucose 271 (*)     Calcium 8.5 (*)     Albumin 2.7 (*)     Alkaline Phosphatase 154 (*)     ALT 42 (*)     AST 74 (*)     All other components within normal limits   PROTIME-INR - Abnormal; Notable for the following components:    Protime 16.0 (*)     INR 1.28 (*)     All other components within normal limits   URINE RT REFLEX TO CULTURE - Abnormal; Notable for the following components:    Color, UA ORANGE (*)     Clarity, UA CLOUDY (*)     Glucose, Ur 100 (*)     Bilirubin Urine MODERATE (*)     Ketones, Urine 40 (*)     Blood, Urine LARGE (*)     Leukocyte Esterase, Urine SMALL (*)     All other components within normal limits   MICROSCOPIC URINALYSIS - Abnormal; Notable for the following components:    RBC, UA 31-50 (*)     Bacteria, UA 1+ (*)     Yeast, UA Present (*)     All other components within normal limits   CULTURE, BLOOD 1   CULTURE, BLOOD 2   TROPONIN   BRAIN NATRIURETIC PEPTIDE   APTT   COVID-19       All other labs were within normal range or not returned as of this dictation.     EMERGENCY DEPARTMENT COURSE and DIFFERENTIALDIAGNOSIS/MDM:   Vitals:    Vitals:    02/08/21 1630 02/08/21 1633 02/08/21 1703 02/08/21 1732   BP:  103/72 103/75 98/78   Pulse: 95 105 101    Resp:  (!) 31 22    Temp: TempSrc:       SpO2: 96% 94% 92% 94%   Weight:       Height:           MDM  Number of Diagnoses or Management Options  Diagnosis management comments: On arrival suspicion of EKG might be atrial fib versus sinus tach with PACs. Occasionally I see a P wave. It was a variable rate. After fluids and 1 dose of Cardizem he is now sinus tachycardia 103. He did not have any chest pain and troponins negative. Slight elevated white count. His urine was very yellow he is not on any Azo or any other medications to affect it. He states he gets that way when he does not keep his diabetes and check blood sugars only 271. Sodium slightly low 126. Liver enzymes in his general range. His heart rate blood pressure improved with fluids. He admits to not drinking much recently. I believe he stable to go home but I am going to rehydrate him with a second liter of normal saline and go ahead and administer IV antibiotics Rocephin for coverage until we get his culture back and he can  the prescription. Encouraged hydration. I did give him 1 dose of Diflucan for his budding yeast seen on his urinalysis. He is circumcised. CONSULTS:  None    PROCEDURES:  Unless otherwise notedbelow, none     Procedures    FINAL IMPRESSION     1. Urinary tract infection without hematuria, site unspecified    2. Dehydration    3. Sinus tachycardia    4.  History of liver transplant [facility])          DISPOSITION/PLAN   DISPOSITION Discharge - Pending Orders Complete 02/08/2021 07:50:51 PM      PATIENT REFERRED TO:  @FUP@    DISCHARGE MEDICATIONS:  New Prescriptions    CEFDINIR (OMNICEF) 300 MG CAPSULE    Take 1 capsule by mouth 2 times daily for 7 days    ONDANSETRON (ZOFRAN ODT) 4 MG DISINTEGRATING TABLET    Take 1 tablet by mouth every 8 hours as needed for Nausea or Vomiting          (Please note that portions of this note were completed with a voice recognition program.  Efforts were made to edit the dictations butoccasionally words are mis-transcribed.)    Yvonne Headley MD (electronically signed)  AttendingEmergency Physician          Jason Wiseman MD  02/08/21 [id number]

## 2021-02-08 NOTE — PROGRESS NOTES
[address] [facility] URGENT CARE  [address] [facility]  [address] [address]  Dept: [phone]  Dept Fax: [phone]  Loc: [phone]    Jai Rankin is a 48 y.o. male who presents today for his medical conditions/complaintsas noted below. Jai Rankin is c/o of Fever, Headache, Fatigue, Dizziness, and Nausea        HPI:     HPI  Complaints of fever, headaches, fatigue, dizziness, and nausea. This has been ongoing for 24 hours. Concerned for covid, no known contact.       Past Medical History:   Diagnosis Date    Antral vascular ectasia     Appendicitis 2009    ruptured - no surgery done    Cirrhosis (HCC)     Duodenal ulcer     Esophageal varices (HCC)     grade 2    Hepatitis C     Hernia     History of GI bleed     Liver disease     Personal history of ascites     Portal hypertensive gastropathy (HCC)     Thrombocytopenia (HCC)      Past Surgical History:   Procedure Laterality Date    UPPER GASTROINTESTINAL ENDOSCOPY  2013    Dr.Jaiyeola    UPPER GASTROINTESTINAL ENDOSCOPY  13    Dr Crystal Hale       Family History   Problem Relation Age of Onset    Breast Cancer Mother 61    Emphysema Father        Social History     Tobacco Use    Smoking status: Current Some Day Smoker     Packs/day: 1.00     Years: 25.00     Pack years: 25.00     Start date: 1994     Last attempt to quit: 2012     Years since quittin.4    Smokeless tobacco: Never Used    Tobacco comment: works as a [employer]   Substance Use Topics    Alcohol use: No     Comment: quit x 8 months ago with one relapse, former ETOH      Current Outpatient Medications   Medication Sig Dispense Refill    Insulin Pen Needle (MEIJER PEN NEEDLES) 31G X 6 MM MISC 1 each by Does not apply route daily 100 each 3    omeprazole (PRILOSEC) 20 MG delayed release capsule Take 1 capsule by mouth daily 30 capsule 3 Orders Placed This Encounter   Procedures    POCT Glucose    DE EKG INTERPRET & REPORT PREVE    DE ELECTROCARDIOGRAM, COMPLETE     Results for orders placed or performed in visit on 02/08/21   POCT Glucose   Result Value Ref Range    Glucose 244 mg/dL    QC OK? Patient noted in irregular rhythm, sent to ER for higher level of care. Family drove patient and he declined ambulance. Left UC in stable condition; report given to Jackeline Garcia in ED. No follow-ups on file. No orders of the defined types were placed in this encounter. Patient given educational materials- see patient instructions. Discussed use, benefit, and side effects of prescribedmedications. All patient questions answered. Pt voiced understanding. Reviewedhealth maintenance. Instructed to continue current medications, diet and exercise. Patient agreed with treatment plan. Follow up as directed. There are no Patient Instructions on file for this visit.       Electronically signed by DINORAH Johnson CNP on 2/8/2021 at 1:24 PM

## 2021-02-09 ENCOUNTER — HOSPITAL ENCOUNTER (INPATIENT)
Age: 51
LOS: 3 days | Discharge: HOME | DRG: 935 | End: 2021-02-12
Attending: EMERGENCY MEDICINE | Admitting: STUDENT IN AN ORGANIZED HEALTH CARE EDUCATION/TRAINING PROGRAM
Payer: MEDICARE

## 2021-02-09 DIAGNOSIS — R78.81 BACTEREMIA: Primary | ICD-10-CM

## 2021-02-09 DIAGNOSIS — S31.109A OPEN WOUND OF ABDOMINAL WALL, INITIAL ENCOUNTER: ICD-10-CM

## 2021-02-09 DIAGNOSIS — B95.5 STREPTOCOCCAL BACTEREMIA: ICD-10-CM

## 2021-02-09 DIAGNOSIS — R78.81 STREPTOCOCCAL BACTEREMIA: ICD-10-CM

## 2021-02-09 DIAGNOSIS — Z94.4 HISTORY OF LIVER TRANSPLANT (HCC): ICD-10-CM

## 2021-02-09 LAB
ACINETOBACTER BAUMANNII BY PCR: NOT DETECTED
ALBUMIN SERPL-MCNC: 2.8 G/DL (ref 3.5–5.2)
ALP BLD-CCNC: 136 U/L (ref 40–130)
ALT SERPL-CCNC: 40 U/L (ref 5–41)
ANION GAP SERPL CALCULATED.3IONS-SCNC: 9 MMOL/L (ref 7–19)
AST SERPL-CCNC: 55 U/L (ref 5–40)
ATYPICAL LYMPHOCYTE RELATIVE PERCENT: 3 % (ref 0–8)
BANDED NEUTROPHILS RELATIVE PERCENT: 10 % (ref 0–5)
BASOPHILS ABSOLUTE: 0 K/UL (ref 0–0.2)
BASOPHILS RELATIVE PERCENT: 0 % (ref 0–1)
BILIRUB SERPL-MCNC: 0.4 MG/DL (ref 0.2–1.2)
BUN BLDV-MCNC: 15 MG/DL (ref 6–20)
CALCIUM SERPL-MCNC: 8.7 MG/DL (ref 8.6–10)
CANDIDA ALBICANS BY PCR: NOT DETECTED
CANDIDA GLABRATA BY PCR: NOT DETECTED
CANDIDA KRUSEI BY PCR: NOT DETECTED
CANDIDA PARAPSILOSIS BY PCR: NOT DETECTED
CANDIDA TROPICALIS BY PCR: NOT DETECTED
CHLORIDE BLD-SCNC: 101 MMOL/L (ref 98–111)
CO2: 24 MMOL/L (ref 22–29)
CREAT SERPL-MCNC: 0.8 MG/DL (ref 0.5–1.2)
EKG P AXIS: 59 DEGREES
EKG P-R INTERVAL: 154 MS
EKG Q-T INTERVAL: 346 MS
EKG QRS DURATION: 94 MS
EKG QTC CALCULATION (BAZETT): 433 MS
EKG T AXIS: 55 DEGREES
ENTEROBACTER CLOACAE COMPLEX BY PCR: NOT DETECTED
ENTEROBACTERALES BY PCR: NOT DETECTED
ENTEROCOCCUS BY PCR: NOT DETECTED
EOSINOPHILS ABSOLUTE: 0 K/UL (ref 0–0.6)
EOSINOPHILS RELATIVE PERCENT: 0 % (ref 0–5)
ESCHERICHIA COLI BY PCR: NOT DETECTED
GFR AFRICAN AMERICAN: >59
GFR NON-AFRICAN AMERICAN: >60
GLUCOSE BLD-MCNC: 174 MG/DL (ref 70–99)
GLUCOSE BLD-MCNC: 191 MG/DL (ref 74–109)
GLUCOSE BLD-MCNC: 260 MG/DL (ref 70–99)
HAEMOPHILUS INFLUENZAE BY PCR: NOT DETECTED
HCT VFR BLD CALC: 44.9 % (ref 42–52)
HEMOGLOBIN: 14.7 G/DL (ref 14–18)
IMMATURE GRANULOCYTES #: 0.1 K/UL
INR BLD: 1.14 (ref 0.88–1.18)
KLEBSIELLA OXYTOCA BY PCR: NOT DETECTED
KLEBSIELLA PNEUMONIAE GROUP BY PCR: NOT DETECTED
LACTIC ACID: 2 MMOL/L (ref 0.5–1.9)
LIPASE: 6 U/L (ref 13–60)
LISTERIA MONOCYTOGENES BY PCR: NOT DETECTED
LYMPHOCYTES ABSOLUTE: 0.5 K/UL (ref 1.1–4.5)
LYMPHOCYTES RELATIVE PERCENT: 4 % (ref 20–40)
MCH RBC QN AUTO: 30.8 PG (ref 27–31)
MCHC RBC AUTO-ENTMCNC: 32.7 G/DL (ref 33–37)
MCV RBC AUTO: 93.9 FL (ref 80–94)
MONOCYTES ABSOLUTE: 0.3 K/UL (ref 0–0.9)
MONOCYTES RELATIVE PERCENT: 4 % (ref 0–10)
NEISSERIA MENINGITIDIS BY PCR: NOT DETECTED
NEUTROPHILS ABSOLUTE: 6.1 K/UL (ref 1.5–7.5)
NEUTROPHILS RELATIVE PERCENT: 79 % (ref 50–65)
PDW BLD-RTO: 13 % (ref 11.5–14.5)
PERFORMED ON: ABNORMAL
PERFORMED ON: ABNORMAL
PLATELET # BLD: 147 K/UL (ref 130–400)
PLATELET SLIDE REVIEW: ADEQUATE
PMV BLD AUTO: 10.4 FL (ref 9.4–12.4)
POTASSIUM SERPL-SCNC: 4.3 MMOL/L (ref 3.5–5)
PROTEUS SPECIES BY PCR: NOT DETECTED
PROTHROMBIN TIME: 14.6 SEC (ref 12–14.6)
PSEUDOMONAS AERUGINOSA BY PCR: NOT DETECTED
RBC # BLD: 4.78 M/UL (ref 4.7–6.1)
RBC # BLD: NORMAL 10*6/UL
SARS-COV-2, NAAT: NOT DETECTED
SERRATIA MARCESCENS BY PCR: NOT DETECTED
SODIUM BLD-SCNC: 134 MMOL/L (ref 136–145)
STAPHYLOCOCCUS AUREUS BY PCR: NOT DETECTED
STAPHYLOCOCCUS SPECIES BY PCR: NOT DETECTED
STREPTOCOCCUS AGALACTIAE BY PCR: DETECTED
STREPTOCOCCUS PNEUMONIAE BY PCR: NOT DETECTED
STREPTOCOCCUS PYOGENES  BY PCR: NOT DETECTED
STREPTOCOCCUS SPECIES BY PCR: DETECTED
TOTAL PROTEIN: 6.9 G/DL (ref 6.6–8.7)
WBC # BLD: 6.9 K/UL (ref 4.8–10.8)

## 2021-02-09 PROCEDURE — 6360000002 HC RX W HCPCS: Performed by: EMERGENCY MEDICINE

## 2021-02-09 PROCEDURE — 80053 COMPREHEN METABOLIC PANEL: CPT

## 2021-02-09 PROCEDURE — 2580000003 HC RX 258: Performed by: EMERGENCY MEDICINE

## 2021-02-09 PROCEDURE — 87077 CULTURE AEROBIC IDENTIFY: CPT

## 2021-02-09 PROCEDURE — 2580000003 HC RX 258: Performed by: STUDENT IN AN ORGANIZED HEALTH CARE EDUCATION/TRAINING PROGRAM

## 2021-02-09 PROCEDURE — 85025 COMPLETE CBC W/AUTO DIFF WBC: CPT

## 2021-02-09 PROCEDURE — 85610 PROTHROMBIN TIME: CPT

## 2021-02-09 PROCEDURE — 36415 COLL VENOUS BLD VENIPUNCTURE: CPT

## 2021-02-09 PROCEDURE — 93010 ELECTROCARDIOGRAM REPORT: CPT | Performed by: INTERNAL MEDICINE

## 2021-02-09 PROCEDURE — 83605 ASSAY OF LACTIC ACID: CPT

## 2021-02-09 PROCEDURE — 1210000000 HC MED SURG R&B

## 2021-02-09 PROCEDURE — 82947 ASSAY GLUCOSE BLOOD QUANT: CPT

## 2021-02-09 PROCEDURE — 83690 ASSAY OF LIPASE: CPT

## 2021-02-09 PROCEDURE — 87040 BLOOD CULTURE FOR BACTERIA: CPT

## 2021-02-09 PROCEDURE — 6360000002 HC RX W HCPCS: Performed by: STUDENT IN AN ORGANIZED HEALTH CARE EDUCATION/TRAINING PROGRAM

## 2021-02-09 PROCEDURE — 96374 THER/PROPH/DIAG INJ IV PUSH: CPT

## 2021-02-09 PROCEDURE — 99283 EMERGENCY DEPT VISIT LOW MDM: CPT

## 2021-02-09 PROCEDURE — 80197 ASSAY OF TACROLIMUS: CPT

## 2021-02-09 PROCEDURE — 87150 DNA/RNA AMPLIFIED PROBE: CPT

## 2021-02-09 PROCEDURE — 6370000000 HC RX 637 (ALT 250 FOR IP): Performed by: STUDENT IN AN ORGANIZED HEALTH CARE EDUCATION/TRAINING PROGRAM

## 2021-02-09 PROCEDURE — U0002 COVID-19 LAB TEST NON-CDC: HCPCS

## 2021-02-09 RX ORDER — ONDANSETRON 2 MG/ML
4 INJECTION INTRAMUSCULAR; INTRAVENOUS EVERY 6 HOURS PRN
Status: DISCONTINUED | OUTPATIENT
Start: 2021-02-09 | End: 2021-02-12 | Stop reason: HOSPADM

## 2021-02-09 RX ORDER — MECOBALAMIN 5000 MCG
5 TABLET,DISINTEGRATING ORAL NIGHTLY
Status: DISCONTINUED | OUTPATIENT
Start: 2021-02-09 | End: 2021-02-12 | Stop reason: HOSPADM

## 2021-02-09 RX ORDER — POLYETHYLENE GLYCOL 3350 17 G/17G
17 POWDER, FOR SOLUTION ORAL DAILY PRN
Status: DISCONTINUED | OUTPATIENT
Start: 2021-02-09 | End: 2021-02-12 | Stop reason: HOSPADM

## 2021-02-09 RX ORDER — PANTOPRAZOLE SODIUM 40 MG/1
40 TABLET, DELAYED RELEASE ORAL
Status: DISCONTINUED | OUTPATIENT
Start: 2021-02-10 | End: 2021-02-12 | Stop reason: HOSPADM

## 2021-02-09 RX ORDER — ACETAMINOPHEN 650 MG/1
650 SUPPOSITORY RECTAL EVERY 6 HOURS PRN
Status: DISCONTINUED | OUTPATIENT
Start: 2021-02-09 | End: 2021-02-12 | Stop reason: HOSPADM

## 2021-02-09 RX ORDER — PROMETHAZINE HYDROCHLORIDE 25 MG/1
12.5 TABLET ORAL EVERY 6 HOURS PRN
Status: DISCONTINUED | OUTPATIENT
Start: 2021-02-09 | End: 2021-02-12 | Stop reason: HOSPADM

## 2021-02-09 RX ORDER — MYCOPHENOLATE MOFETIL 250 MG/1
500 CAPSULE ORAL 2 TIMES DAILY
Status: DISCONTINUED | OUTPATIENT
Start: 2021-02-09 | End: 2021-02-12 | Stop reason: HOSPADM

## 2021-02-09 RX ORDER — POTASSIUM CHLORIDE 7.45 MG/ML
10 INJECTION INTRAVENOUS PRN
Status: DISCONTINUED | OUTPATIENT
Start: 2021-02-09 | End: 2021-02-12 | Stop reason: HOSPADM

## 2021-02-09 RX ORDER — TACROLIMUS 1 MG/1
3 CAPSULE ORAL 2 TIMES DAILY
Status: DISCONTINUED | OUTPATIENT
Start: 2021-02-09 | End: 2021-02-12 | Stop reason: HOSPADM

## 2021-02-09 RX ORDER — SODIUM CHLORIDE 0.9 % (FLUSH) 0.9 %
10 SYRINGE (ML) INJECTION EVERY 12 HOURS SCHEDULED
Status: DISCONTINUED | OUTPATIENT
Start: 2021-02-09 | End: 2021-02-12 | Stop reason: HOSPADM

## 2021-02-09 RX ORDER — ACETAMINOPHEN 325 MG/1
650 TABLET ORAL EVERY 6 HOURS PRN
Status: DISCONTINUED | OUTPATIENT
Start: 2021-02-09 | End: 2021-02-12 | Stop reason: HOSPADM

## 2021-02-09 RX ORDER — SODIUM CHLORIDE 0.9 % (FLUSH) 0.9 %
10 SYRINGE (ML) INJECTION PRN
Status: DISCONTINUED | OUTPATIENT
Start: 2021-02-09 | End: 2021-02-12 | Stop reason: HOSPADM

## 2021-02-09 RX ORDER — GABAPENTIN 100 MG/1
100 CAPSULE ORAL 3 TIMES DAILY
Status: DISCONTINUED | OUTPATIENT
Start: 2021-02-09 | End: 2021-02-12 | Stop reason: HOSPADM

## 2021-02-09 RX ORDER — SODIUM CHLORIDE, SODIUM LACTATE, POTASSIUM CHLORIDE, CALCIUM CHLORIDE 600; 310; 30; 20 MG/100ML; MG/100ML; MG/100ML; MG/100ML
1000 INJECTION, SOLUTION INTRAVENOUS ONCE
Status: COMPLETED | OUTPATIENT
Start: 2021-02-09 | End: 2021-02-09

## 2021-02-09 RX ORDER — POTASSIUM CHLORIDE 20 MEQ/1
40 TABLET, EXTENDED RELEASE ORAL PRN
Status: DISCONTINUED | OUTPATIENT
Start: 2021-02-09 | End: 2021-02-12 | Stop reason: HOSPADM

## 2021-02-09 RX ORDER — PREDNISONE 10 MG/1
10 TABLET ORAL 2 TIMES DAILY
Status: DISCONTINUED | OUTPATIENT
Start: 2021-02-09 | End: 2021-02-12 | Stop reason: HOSPADM

## 2021-02-09 RX ORDER — ONDANSETRON 4 MG/1
4 TABLET, ORALLY DISINTEGRATING ORAL EVERY 8 HOURS PRN
Status: DISCONTINUED | OUTPATIENT
Start: 2021-02-09 | End: 2021-02-12 | Stop reason: HOSPADM

## 2021-02-09 RX ORDER — MAGNESIUM SULFATE IN WATER 40 MG/ML
2000 INJECTION, SOLUTION INTRAVENOUS PRN
Status: DISCONTINUED | OUTPATIENT
Start: 2021-02-09 | End: 2021-02-12 | Stop reason: HOSPADM

## 2021-02-09 RX ADMIN — CEFTRIAXONE 1000 MG: 1 INJECTION, POWDER, FOR SOLUTION INTRAMUSCULAR; INTRAVENOUS at 14:07

## 2021-02-09 RX ADMIN — MYCOPHENOLATE MOFETIL 500 MG: 250 CAPSULE ORAL at 21:40

## 2021-02-09 RX ADMIN — PREDNISONE 10 MG: 10 TABLET ORAL at 21:40

## 2021-02-09 RX ADMIN — SODIUM CHLORIDE, PRESERVATIVE FREE 10 ML: 5 INJECTION INTRAVENOUS at 21:41

## 2021-02-09 RX ADMIN — SODIUM CHLORIDE, POTASSIUM CHLORIDE, SODIUM LACTATE AND CALCIUM CHLORIDE 1000 ML: 600; 310; 30; 20 INJECTION, SOLUTION INTRAVENOUS at 14:08

## 2021-02-09 RX ADMIN — GABAPENTIN 100 MG: 100 CAPSULE ORAL at 17:32

## 2021-02-09 RX ADMIN — INSULIN LISPRO 3 UNITS: 100 INJECTION, SOLUTION INTRAVENOUS; SUBCUTANEOUS at 21:40

## 2021-02-09 RX ADMIN — Medication 5 MG: at 21:40

## 2021-02-09 RX ADMIN — ENOXAPARIN SODIUM 40 MG: 40 INJECTION SUBCUTANEOUS at 17:31

## 2021-02-09 RX ADMIN — TACROLIMUS 3 MG: 1 CAPSULE ORAL at 21:40

## 2021-02-09 RX ADMIN — GABAPENTIN 100 MG: 100 CAPSULE ORAL at 21:40

## 2021-02-09 ASSESSMENT — ENCOUNTER SYMPTOMS
BACK PAIN: 0
SHORTNESS OF BREATH: 0
ABDOMINAL PAIN: 0
VOMITING: 0
COUGH: 0

## 2021-02-09 NOTE — H&P
cefdinir (OMNICEF) 300 MG capsule Take 1 capsule by mouth 2 times daily for 7 days 2/8/21 2/15/21 Yes Robyn Renteria MD   ondansetron (ZOFRAN ODT) 4 MG disintegrating tablet Take 1 tablet by mouth every 8 hours as needed for Nausea or Vomiting 2/8/21  Yes Robyn Renteria MD   omeprazole (PRILOSEC) 20 MG delayed release capsule Take 1 capsule by mouth daily 10/13/20  Yes Elspeth Felty, APRN   gabapentin (NEURONTIN) 100 MG capsule Take 1 capsule by mouth 3 times daily for 30 days. 10/13/20 2/9/21 Yes Elspeth Felty, APRN   NOVOLOG FLEXPEN 100 UNIT/ML injection pen  7/29/20  Yes Historical Provider, MD   Rosi Huh 100 UNIT/ML injection pen  7/30/20  Yes Historical Provider, MD   predniSONE (DELTASONE) 10 MG tablet Take 10 mg by mouth 2 times daily    Yes Historical Provider, MD   tacrolimus (PROGRAF) 1 MG capsule TAKE 3 CAPSULES BY MOUTH IN THE MORNING AND 3 CAPSULES BY MOUTH IN THE EVENING 1/11/20  Yes Historical Provider, MD   mycophenolate (CELLCEPT) 250 MG capsule Take 500 mg by mouth 2 times daily   Yes Historical Provider, MD   Insulin Pen Needle (MEIJER PEN NEEDLES) 31G X 6 MM MISC 1 each by Does not apply route daily 10/13/20   Elspeth Felty, APRN   Lancets MISC Use to test blood glucose four times a day and as needed for hypoglycemic events  DXE11.622 Z79.4 9/4/20   DINORAH Castillo NP   blood glucose monitor strips Test 4 times a day & as needed for symptoms of irregular blood glucose. Dispense sufficient amount for indicated testing frequency plus additional to accommodate PRN testing needs. TDK51.108 Z79.4 9/4/20   DINORAH Castillo NP     Allergies:    Patient has no known allergies. Social History:    The patient currently lives at home. Tobacco:   reports that he has been smoking. He started smoking about 26 years ago. He has a 25.00 pack-year smoking history. He has never used smokeless tobacco.  Alcohol:   reports no history of alcohol use.   Illicit Drugs: denies    Family History:      Problem Relation Age of Onset   [employer] Breast Cancer Mother 61    Emphysema Father      Review of Systems:   Constitutional / general:  + fever / chills / sweats +fatigue  Head:  Denies headache / neck stiffness / trauma / visual change  Eyes:  Denies blurry vision / acute visual change or loss / itching / redness  ENT: Denies sore throat / hoarseness / nasal drainage / ear pain  CV: Denies chest pain / palpitations/ orthopnea   Respiratory: Denies cough / shortness of breath / sputum / hemoptysis  GI: + nausea /Denies vomiting / abdominal pain / diarrhea / constipation  : Denies dysuria / hesitancy / urgency / hematuria   Neuro: Denies paralysis / syncope / seizure / dysphagia /+ headache / paresthesias  Musculoskeletal:  Denies muscle weakness /joint stiffness / pain  Vascular: Denies edema / claudication / varicosities  Heme / endocrine: Denies easy bruising / bleeding / excessive sweating / heat or cold intolerance  Psychiatric: Denies depression / anxiety / insomnia / mood changes  Skin:  Denies new rashes / lesions / skin hair or nail changes    14 point review of systems is negative except as specifically addressed above. Physical Examination:  /89   Pulse 99   Temp 98.7 °F (37.1 °C)   Resp 16   Ht 6' 1\" (1.854 m)   Wt 260 lb (117.9 kg)   SpO2 97%   BMI 34.30 kg/m²   General appearance: alert, appears stated age, cooperative and no distress  Head: Normocephalic, without obvious abnormality, atraumatic  Eyes: conjunctivae/corneas clear. PERRL, EOM's intact.    Ears: normal external ears and nose, throat without exudate  Neck: no adenopathy, no carotid bruit, no JVD, supple, symmetrical, trachea midline   Lungs: clear to auscultation bilaterally,no rales or wheezes   Heart: regular rate and rhythm, S1, S2 normal, no murmur  Abdomen: soft, non-tender; non-distended, normal bowel sounds no masses, no organomegaly  Extremities: No lower extremity edema,  No erythema, no tenderness to palpation  Skin: Large abdominal scar/wound with eschar, open areas, denuded epidermis   Lymphatic: No palpable lymph node enlargment  Neurologic: Alert and oriented X 3, normal strength and tone. Normal symmetric reflexes.  Mental status: Alert, oriented, thought content appropriate  Cranial nerves:II-XII Grossly intact Sensory: normal Motor:grossly normal  Psychiatric: Alert and oriented, thought content appropriate, normal insight, mood appropriate    Diagnostic Data:  CBC:  Recent Labs     02/08/21  1450 02/09/21  1340   WBC 11.5* 6.9   HGB 14.4 14.7   HCT 43.4 44.9    147     BMP:  Recent Labs     02/08/21  1450 02/09/21  1340   * 134*   K 4.0 4.3   CL 95* 101   CO2 18* 24   BUN 16 15   CREATININE 1.0 0.8   CALCIUM 8.5* 8.7     Recent Labs     02/08/21  1450 02/09/21  1340   AST 74* 55*   ALT 42* 40   BILITOT 0.6 0.4   ALKPHOS 154* 136*     Coag Panel:   Recent Labs     02/08/21  1450 02/09/21  1340   INR 1.28* 1.14   PROTIME 16.0* 14.6   APTT 31.8  --      Cardiac Enzymes:   Recent Labs     02/08/21  1450   TROPONINI <0.01     Urinalysis:  Lab Results   Component Value Date    NITRU Negative 02/08/2021    WBCUA 6-9 02/08/2021    BACTERIA 1+ 02/08/2021    RBCUA 31-50 02/08/2021    BLOODU LARGE 02/08/2021    SPECGRAV 1.031 02/08/2021    GLUCOSEU 100 02/08/2021     Assessment/Plan:  Principal Problem:    Streptococcal bacteremia-continue Rocephin, await repeat blood cultures, ID consult placed    Active Problems:    Abdominal wound-wound care consulted, patient instructed to keep clean/dry, stop picking at wound      History of liver transplant (HCC)-Prednisone, Cellcept, Tacrolimus resumed       Type 2 diabetes mellitus with skin complication, with long-term current use of insulin (HCC)-SSI, accuchecks, hypoglycemia tx orders      GERD-PPI continued     Further orders per clinical course/attending    Signed:  Kassidy Mosqueda PA-C

## 2021-02-09 NOTE — PROGRESS NOTES
Pharmacy Michelle Samuel is a 48 y.o. male for whom pharmacy has been consulted to dose Tacrolimus, monitoring levels in liver transplant patient originally done at [facility]. Allergies:  Patient has no known allergies. Estimated Creatinine Clearance: 149 mL/min (based on SCr of 0.8 mg/dL). Assessment/Plan: Patient is currently taking Prograf 3mg in am and 3mg in evening. The pharmacy does not currently have monitoring and adjust Tacrolimus according to levels in its scope of practice. Lab does offer levels but has to send them out to [facility] with results returned in 24 hrs per lab personnel. [employer] lists the following about dosing with levels and timing to draw:    Whole blood concentrations should be used for monitoring (trough for oral therapy drawn typically within 30 minutes prior to the next dose); frequency varies depending on transplant type, time since transplantation, and clinical situation. Tacrolimus serum levels may be falsely elevated in infected liver transplant patients due to interference from beta-galactosidase antibodies. Reference Range   Note: In solid organ transplant recipients, goal levels vary depending on patient risk factors, concomitant immunosuppression, and time post-transplant. Liver transplant: Whole blood trough concentrations:  Immediate release: Months 1 to 12: 5 to 20 ng/mL  Recommended therapeutic ranges when administered in combination with everolimus for liver transplant ([employer] product labeling 2013): By 3 weeks after first everolimus dose and through month 12 post-transplant: 3 to 5 ng/mL  Extended release (Advagraf [[place] product], Envarsus PA [[place] product]):  Months 1 to 2: 5 to 20 ng/mL  > Month 2: 5 to 15 ng/mL    Up-to date:  A level of 6 ng/mL is usually satisfactory after six months, and maintenance at a level of 4 to 6 ng/mL is common beyond one year.  We aim for higher levels in patients who are transplanted for autoimmune liver diseases, including primary biliary cholangitis (PBC) and primary sclerosing cholangitis. Patients transplanted for alcohol-associated liver disease or hemochromatosis usually tolerate low levels of CNIs after their initial recovery. With improved survival, our goal is to use as little immunosuppression as possible to minimize the known long-term complications of these drugs, including renal insufficiency and post-transplant lymphoproliferative disorders (PTLD). Hope this helps in your treatment therapy. Sorry we do not provide this service at this time.     Electronically signed by Fercho Lewis, [address] [place] [address] on 2/9/2021 at 3:44 PM

## 2021-02-09 NOTE — ED NOTES
First set of blood cultures drawn at 1340 from left wrist.  Second set of blood cultures drawn from left AC at 1350. Both sets done by hospital policy.      Jo Ann Nicholas RN  02/09/21 [id number]
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
## 2021-02-10 LAB
ACINETOBACTER BAUMANNII BY PCR: NOT DETECTED
ANION GAP SERPL CALCULATED.3IONS-SCNC: 12 MMOL/L (ref 7–19)
BASOPHILS ABSOLUTE: 0 K/UL (ref 0–0.2)
BASOPHILS RELATIVE PERCENT: 0.5 % (ref 0–1)
BUN BLDV-MCNC: 12 MG/DL (ref 6–20)
CALCIUM SERPL-MCNC: 8.3 MG/DL (ref 8.6–10)
CANDIDA ALBICANS BY PCR: NOT DETECTED
CANDIDA GLABRATA BY PCR: NOT DETECTED
CANDIDA KRUSEI BY PCR: NOT DETECTED
CANDIDA PARAPSILOSIS BY PCR: NOT DETECTED
CANDIDA TROPICALIS BY PCR: NOT DETECTED
CHLORIDE BLD-SCNC: 101 MMOL/L (ref 98–111)
CO2: 21 MMOL/L (ref 22–29)
CREAT SERPL-MCNC: 0.8 MG/DL (ref 0.5–1.2)
EKG P AXIS: 80 DEGREES
EKG P-R INTERVAL: 98 MS
EKG Q-T INTERVAL: 294 MS
EKG QRS DURATION: 96 MS
EKG QTC CALCULATION (BAZETT): 451 MS
EKG T AXIS: 73 DEGREES
ENTEROBACTER CLOACAE COMPLEX BY PCR: NOT DETECTED
ENTEROBACTERALES BY PCR: NOT DETECTED
ENTEROCOCCUS BY PCR: NOT DETECTED
EOSINOPHILS ABSOLUTE: 0 K/UL (ref 0–0.6)
EOSINOPHILS RELATIVE PERCENT: 0.3 % (ref 0–5)
ESCHERICHIA COLI BY PCR: NOT DETECTED
GFR AFRICAN AMERICAN: >59
GFR NON-AFRICAN AMERICAN: >60
GLUCOSE BLD-MCNC: 242 MG/DL (ref 70–99)
GLUCOSE BLD-MCNC: 252 MG/DL (ref 70–99)
GLUCOSE BLD-MCNC: 262 MG/DL (ref 70–99)
GLUCOSE BLD-MCNC: 277 MG/DL (ref 74–109)
GLUCOSE BLD-MCNC: 286 MG/DL (ref 70–99)
HAEMOPHILUS INFLUENZAE BY PCR: NOT DETECTED
HCT VFR BLD CALC: 39.8 % (ref 42–52)
HEMOGLOBIN: 13.2 G/DL (ref 14–18)
IMMATURE GRANULOCYTES #: 0.1 K/UL
KLEBSIELLA OXYTOCA BY PCR: NOT DETECTED
KLEBSIELLA PNEUMONIAE GROUP BY PCR: NOT DETECTED
LACTIC ACID: 1 MMOL/L (ref 0.5–1.9)
LISTERIA MONOCYTOGENES BY PCR: NOT DETECTED
LYMPHOCYTES ABSOLUTE: 0.6 K/UL (ref 1.1–4.5)
LYMPHOCYTES RELATIVE PERCENT: 10 % (ref 20–40)
MCH RBC QN AUTO: 30.3 PG (ref 27–31)
MCHC RBC AUTO-ENTMCNC: 33.2 G/DL (ref 33–37)
MCV RBC AUTO: 91.3 FL (ref 80–94)
MONOCYTES ABSOLUTE: 0.7 K/UL (ref 0–0.9)
MONOCYTES RELATIVE PERCENT: 11 % (ref 0–10)
NEISSERIA MENINGITIDIS BY PCR: NOT DETECTED
NEUTROPHILS ABSOLUTE: 4.7 K/UL (ref 1.5–7.5)
NEUTROPHILS RELATIVE PERCENT: 77.1 % (ref 50–65)
PDW BLD-RTO: 13 % (ref 11.5–14.5)
PERFORMED ON: ABNORMAL
PLATELET # BLD: 131 K/UL (ref 130–400)
PMV BLD AUTO: 10.3 FL (ref 9.4–12.4)
POTASSIUM REFLEX MAGNESIUM: 3.8 MMOL/L (ref 3.5–5)
PROTEUS SPECIES BY PCR: NOT DETECTED
PSEUDOMONAS AERUGINOSA BY PCR: NOT DETECTED
RBC # BLD: 4.36 M/UL (ref 4.7–6.1)
SERRATIA MARCESCENS BY PCR: NOT DETECTED
SODIUM BLD-SCNC: 134 MMOL/L (ref 136–145)
STAPHYLOCOCCUS AUREUS BY PCR: NOT DETECTED
STAPHYLOCOCCUS SPECIES BY PCR: NOT DETECTED
STREPTOCOCCUS AGALACTIAE BY PCR: DETECTED
STREPTOCOCCUS PNEUMONIAE BY PCR: NOT DETECTED
STREPTOCOCCUS PYOGENES  BY PCR: NOT DETECTED
STREPTOCOCCUS SPECIES BY PCR: DETECTED
WBC # BLD: 6.1 K/UL (ref 4.8–10.8)

## 2021-02-10 PROCEDURE — 2580000003 HC RX 258: Performed by: INTERNAL MEDICINE

## 2021-02-10 PROCEDURE — 36415 COLL VENOUS BLD VENIPUNCTURE: CPT

## 2021-02-10 PROCEDURE — 6360000002 HC RX W HCPCS: Performed by: INTERNAL MEDICINE

## 2021-02-10 PROCEDURE — 2580000003 HC RX 258: Performed by: STUDENT IN AN ORGANIZED HEALTH CARE EDUCATION/TRAINING PROGRAM

## 2021-02-10 PROCEDURE — 85025 COMPLETE CBC W/AUTO DIFF WBC: CPT

## 2021-02-10 PROCEDURE — 6360000002 HC RX W HCPCS: Performed by: STUDENT IN AN ORGANIZED HEALTH CARE EDUCATION/TRAINING PROGRAM

## 2021-02-10 PROCEDURE — 1210000000 HC MED SURG R&B

## 2021-02-10 PROCEDURE — 80197 ASSAY OF TACROLIMUS: CPT

## 2021-02-10 PROCEDURE — 83605 ASSAY OF LACTIC ACID: CPT

## 2021-02-10 PROCEDURE — 80048 BASIC METABOLIC PNL TOTAL CA: CPT

## 2021-02-10 PROCEDURE — 82947 ASSAY GLUCOSE BLOOD QUANT: CPT

## 2021-02-10 PROCEDURE — 6370000000 HC RX 637 (ALT 250 FOR IP): Performed by: STUDENT IN AN ORGANIZED HEALTH CARE EDUCATION/TRAINING PROGRAM

## 2021-02-10 PROCEDURE — 6370000000 HC RX 637 (ALT 250 FOR IP): Performed by: PHYSICIAN ASSISTANT

## 2021-02-10 RX ORDER — MORPHINE SULFATE 4 MG/ML
1 INJECTION, SOLUTION INTRAMUSCULAR; INTRAVENOUS EVERY 6 HOURS PRN
Status: DISCONTINUED | OUTPATIENT
Start: 2021-02-10 | End: 2021-02-12 | Stop reason: HOSPADM

## 2021-02-10 RX ORDER — DEXTROSE MONOHYDRATE 25 G/50ML
12.5 INJECTION, SOLUTION INTRAVENOUS PRN
Status: DISCONTINUED | OUTPATIENT
Start: 2021-02-10 | End: 2021-02-12 | Stop reason: HOSPADM

## 2021-02-10 RX ORDER — INSULIN GLARGINE 100 [IU]/ML
10 INJECTION, SOLUTION SUBCUTANEOUS NIGHTLY
Status: DISCONTINUED | OUTPATIENT
Start: 2021-02-10 | End: 2021-02-11

## 2021-02-10 RX ORDER — NICOTINE POLACRILEX 4 MG
15 LOZENGE BUCCAL PRN
Status: DISCONTINUED | OUTPATIENT
Start: 2021-02-10 | End: 2021-02-12 | Stop reason: HOSPADM

## 2021-02-10 RX ORDER — DEXTROSE MONOHYDRATE 50 MG/ML
100 INJECTION, SOLUTION INTRAVENOUS PRN
Status: DISCONTINUED | OUTPATIENT
Start: 2021-02-10 | End: 2021-02-12 | Stop reason: HOSPADM

## 2021-02-10 RX ORDER — LORAZEPAM 2 MG/ML
1 INJECTION INTRAMUSCULAR ONCE
Status: COMPLETED | OUTPATIENT
Start: 2021-02-10 | End: 2021-02-10

## 2021-02-10 RX ORDER — LORAZEPAM 2 MG/ML
1 INJECTION INTRAMUSCULAR EVERY 6 HOURS PRN
Status: DISCONTINUED | OUTPATIENT
Start: 2021-02-10 | End: 2021-02-12 | Stop reason: HOSPADM

## 2021-02-10 RX ADMIN — TACROLIMUS 3 MG: 1 CAPSULE ORAL at 08:39

## 2021-02-10 RX ADMIN — INSULIN LISPRO 4 UNITS: 100 INJECTION, SOLUTION INTRAVENOUS; SUBCUTANEOUS at 16:56

## 2021-02-10 RX ADMIN — PANTOPRAZOLE SODIUM 40 MG: 40 TABLET, DELAYED RELEASE ORAL at 08:04

## 2021-02-10 RX ADMIN — ACETAMINOPHEN 650 MG: 325 TABLET ORAL at 00:28

## 2021-02-10 RX ADMIN — GABAPENTIN 100 MG: 100 CAPSULE ORAL at 08:39

## 2021-02-10 RX ADMIN — CEFAZOLIN SODIUM 1000 MG: 1 INJECTION, POWDER, FOR SOLUTION INTRAMUSCULAR; INTRAVENOUS at 15:18

## 2021-02-10 RX ADMIN — PREDNISONE 10 MG: 10 TABLET ORAL at 20:06

## 2021-02-10 RX ADMIN — PREDNISONE 10 MG: 10 TABLET ORAL at 08:39

## 2021-02-10 RX ADMIN — MYCOPHENOLATE MOFETIL 500 MG: 250 CAPSULE ORAL at 20:06

## 2021-02-10 RX ADMIN — MYCOPHENOLATE MOFETIL 500 MG: 250 CAPSULE ORAL at 08:39

## 2021-02-10 RX ADMIN — LORAZEPAM 1 MG: 2 INJECTION INTRAMUSCULAR; INTRAVENOUS at 02:59

## 2021-02-10 RX ADMIN — INSULIN LISPRO 6 UNITS: 100 INJECTION, SOLUTION INTRAVENOUS; SUBCUTANEOUS at 08:40

## 2021-02-10 RX ADMIN — INSULIN GLARGINE 10 UNITS: 100 INJECTION, SOLUTION SUBCUTANEOUS at 20:17

## 2021-02-10 RX ADMIN — ENOXAPARIN SODIUM 40 MG: 40 INJECTION SUBCUTANEOUS at 15:18

## 2021-02-10 RX ADMIN — LORAZEPAM 1 MG: 2 INJECTION INTRAMUSCULAR; INTRAVENOUS at 20:06

## 2021-02-10 RX ADMIN — TACROLIMUS 3 MG: 1 CAPSULE ORAL at 20:05

## 2021-02-10 RX ADMIN — PROMETHAZINE HYDROCHLORIDE 12.5 MG: 25 TABLET ORAL at 00:28

## 2021-02-10 RX ADMIN — GABAPENTIN 100 MG: 100 CAPSULE ORAL at 15:17

## 2021-02-10 RX ADMIN — INSULIN LISPRO 6 UNITS: 100 INJECTION, SOLUTION INTRAVENOUS; SUBCUTANEOUS at 13:27

## 2021-02-10 RX ADMIN — Medication 5 MG: at 20:06

## 2021-02-10 RX ADMIN — SODIUM CHLORIDE, PRESERVATIVE FREE 10 ML: 5 INJECTION INTRAVENOUS at 20:07

## 2021-02-10 RX ADMIN — GABAPENTIN 100 MG: 100 CAPSULE ORAL at 20:06

## 2021-02-10 RX ADMIN — MORPHINE SULFATE 1 MG: 4 INJECTION, SOLUTION INTRAMUSCULAR; INTRAVENOUS at 20:06

## 2021-02-10 RX ADMIN — CEFAZOLIN SODIUM 1000 MG: 1 INJECTION, POWDER, FOR SOLUTION INTRAMUSCULAR; INTRAVENOUS at 23:50

## 2021-02-10 RX ADMIN — INSULIN LISPRO 3 UNITS: 100 INJECTION, SOLUTION INTRAVENOUS; SUBCUTANEOUS at 20:17

## 2021-02-10 ASSESSMENT — PAIN DESCRIPTION - PAIN TYPE: TYPE: ACUTE PAIN

## 2021-02-10 ASSESSMENT — PAIN SCALES - GENERAL
PAINLEVEL_OUTOF10: 0
PAINLEVEL_OUTOF10: 7

## 2021-02-10 ASSESSMENT — PAIN DESCRIPTION - DESCRIPTORS: DESCRIPTORS: ACHING;POUNDING

## 2021-02-10 ASSESSMENT — PAIN DESCRIPTION - ONSET: ONSET: ON-GOING

## 2021-02-10 ASSESSMENT — PAIN DESCRIPTION - PROGRESSION: CLINICAL_PROGRESSION: GRADUALLY WORSENING

## 2021-02-10 ASSESSMENT — PAIN DESCRIPTION - ORIENTATION: ORIENTATION: ANTERIOR

## 2021-02-10 ASSESSMENT — PAIN DESCRIPTION - FREQUENCY: FREQUENCY: CONTINUOUS

## 2021-02-10 ASSESSMENT — PAIN - FUNCTIONAL ASSESSMENT: PAIN_FUNCTIONAL_ASSESSMENT: ACTIVITIES ARE NOT PREVENTED
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
## 2021-02-11 LAB
ALBUMIN SERPL-MCNC: 2.7 G/DL (ref 3.5–5.2)
ALP BLD-CCNC: 115 U/L (ref 40–130)
ALT SERPL-CCNC: 29 U/L (ref 5–41)
ANION GAP SERPL CALCULATED.3IONS-SCNC: 10 MMOL/L (ref 7–19)
AST SERPL-CCNC: 24 U/L (ref 5–40)
BASOPHILS ABSOLUTE: 0 K/UL (ref 0–0.2)
BASOPHILS RELATIVE PERCENT: 0.6 % (ref 0–1)
BILIRUB SERPL-MCNC: <0.2 MG/DL (ref 0.2–1.2)
BLOOD CULTURE, ROUTINE: ABNORMAL
BLOOD CULTURE, ROUTINE: ABNORMAL
BUN BLDV-MCNC: 15 MG/DL (ref 6–20)
CALCIUM SERPL-MCNC: 8.3 MG/DL (ref 8.6–10)
CHLORIDE BLD-SCNC: 101 MMOL/L (ref 98–111)
CO2: 22 MMOL/L (ref 22–29)
CREAT SERPL-MCNC: 0.7 MG/DL (ref 0.5–1.2)
CULTURE, BLOOD 2: ABNORMAL
EOSINOPHILS ABSOLUTE: 0 K/UL (ref 0–0.6)
EOSINOPHILS RELATIVE PERCENT: 0.1 % (ref 0–5)
GFR AFRICAN AMERICAN: >59
GFR NON-AFRICAN AMERICAN: >60
GLUCOSE BLD-MCNC: 234 MG/DL (ref 70–99)
GLUCOSE BLD-MCNC: 247 MG/DL (ref 70–99)
GLUCOSE BLD-MCNC: 294 MG/DL (ref 70–99)
GLUCOSE BLD-MCNC: 330 MG/DL (ref 70–99)
GLUCOSE BLD-MCNC: 355 MG/DL (ref 74–109)
HCT VFR BLD CALC: 39.9 % (ref 42–52)
HEMOGLOBIN: 13.5 G/DL (ref 14–18)
IMMATURE GRANULOCYTES #: 0.1 K/UL
LYMPHOCYTES ABSOLUTE: 1.1 K/UL (ref 1.1–4.5)
LYMPHOCYTES RELATIVE PERCENT: 14.8 % (ref 20–40)
MCH RBC QN AUTO: 30.5 PG (ref 27–31)
MCHC RBC AUTO-ENTMCNC: 33.8 G/DL (ref 33–37)
MCV RBC AUTO: 90.1 FL (ref 80–94)
MONOCYTES ABSOLUTE: 0.7 K/UL (ref 0–0.9)
MONOCYTES RELATIVE PERCENT: 9.4 % (ref 0–10)
NEUTROPHILS ABSOLUTE: 5.3 K/UL (ref 1.5–7.5)
NEUTROPHILS RELATIVE PERCENT: 73.6 % (ref 50–65)
ORGANISM: ABNORMAL
ORGANISM: ABNORMAL
PDW BLD-RTO: 12.9 % (ref 11.5–14.5)
PERFORMED ON: ABNORMAL
PLATELET # BLD: 147 K/UL (ref 130–400)
PMV BLD AUTO: 10.7 FL (ref 9.4–12.4)
POTASSIUM REFLEX MAGNESIUM: 4.5 MMOL/L (ref 3.5–5)
RBC # BLD: 4.43 M/UL (ref 4.7–6.1)
SODIUM BLD-SCNC: 133 MMOL/L (ref 136–145)
TOTAL PROTEIN: 5.7 G/DL (ref 6.6–8.7)
WBC # BLD: 7.3 K/UL (ref 4.8–10.8)

## 2021-02-11 PROCEDURE — 82947 ASSAY GLUCOSE BLOOD QUANT: CPT

## 2021-02-11 PROCEDURE — 6370000000 HC RX 637 (ALT 250 FOR IP): Performed by: PHYSICIAN ASSISTANT

## 2021-02-11 PROCEDURE — 6360000002 HC RX W HCPCS: Performed by: STUDENT IN AN ORGANIZED HEALTH CARE EDUCATION/TRAINING PROGRAM

## 2021-02-11 PROCEDURE — 6370000000 HC RX 637 (ALT 250 FOR IP): Performed by: INTERNAL MEDICINE

## 2021-02-11 PROCEDURE — 2580000003 HC RX 258: Performed by: INTERNAL MEDICINE

## 2021-02-11 PROCEDURE — 1210000000 HC MED SURG R&B

## 2021-02-11 PROCEDURE — 6360000002 HC RX W HCPCS: Performed by: INTERNAL MEDICINE

## 2021-02-11 PROCEDURE — 36415 COLL VENOUS BLD VENIPUNCTURE: CPT

## 2021-02-11 PROCEDURE — 80197 ASSAY OF TACROLIMUS: CPT

## 2021-02-11 PROCEDURE — 85025 COMPLETE CBC W/AUTO DIFF WBC: CPT

## 2021-02-11 PROCEDURE — 87324 CLOSTRIDIUM AG IA: CPT

## 2021-02-11 PROCEDURE — 6370000000 HC RX 637 (ALT 250 FOR IP): Performed by: STUDENT IN AN ORGANIZED HEALTH CARE EDUCATION/TRAINING PROGRAM

## 2021-02-11 PROCEDURE — 2580000003 HC RX 258: Performed by: STUDENT IN AN ORGANIZED HEALTH CARE EDUCATION/TRAINING PROGRAM

## 2021-02-11 PROCEDURE — 80053 COMPREHEN METABOLIC PANEL: CPT

## 2021-02-11 PROCEDURE — 87449 NOS EACH ORGANISM AG IA: CPT

## 2021-02-11 PROCEDURE — 87040 BLOOD CULTURE FOR BACTERIA: CPT

## 2021-02-11 RX ORDER — BISMUTH TRIBROMOPH/PETROLATUM 5"X9"
2 BANDAGE TOPICAL DAILY
Status: DISCONTINUED | OUTPATIENT
Start: 2021-02-11 | End: 2021-02-12 | Stop reason: HOSPADM

## 2021-02-11 RX ORDER — INSULIN GLARGINE 100 [IU]/ML
20 INJECTION, SOLUTION SUBCUTANEOUS NIGHTLY
Status: DISCONTINUED | OUTPATIENT
Start: 2021-02-11 | End: 2021-02-12 | Stop reason: HOSPADM

## 2021-02-11 RX ADMIN — PANTOPRAZOLE SODIUM 40 MG: 40 TABLET, DELAYED RELEASE ORAL at 06:34

## 2021-02-11 RX ADMIN — GABAPENTIN 100 MG: 100 CAPSULE ORAL at 10:31

## 2021-02-11 RX ADMIN — INSULIN LISPRO 8 UNITS: 100 INJECTION, SOLUTION INTRAVENOUS; SUBCUTANEOUS at 17:49

## 2021-02-11 RX ADMIN — MYCOPHENOLATE MOFETIL 500 MG: 250 CAPSULE ORAL at 10:31

## 2021-02-11 RX ADMIN — PREDNISONE 10 MG: 10 TABLET ORAL at 22:52

## 2021-02-11 RX ADMIN — MYCOPHENOLATE MOFETIL 500 MG: 250 CAPSULE ORAL at 22:52

## 2021-02-11 RX ADMIN — LORAZEPAM 1 MG: 2 INJECTION INTRAMUSCULAR; INTRAVENOUS at 19:15

## 2021-02-11 RX ADMIN — INSULIN LISPRO 2 UNITS: 100 INJECTION, SOLUTION INTRAVENOUS; SUBCUTANEOUS at 22:53

## 2021-02-11 RX ADMIN — INSULIN LISPRO 4 UNITS: 100 INJECTION, SOLUTION INTRAVENOUS; SUBCUTANEOUS at 13:44

## 2021-02-11 RX ADMIN — GABAPENTIN 100 MG: 100 CAPSULE ORAL at 22:52

## 2021-02-11 RX ADMIN — INSULIN LISPRO 10 UNITS: 100 INJECTION, SOLUTION INTRAVENOUS; SUBCUTANEOUS at 08:30

## 2021-02-11 RX ADMIN — SODIUM CHLORIDE, PRESERVATIVE FREE 10 ML: 5 INJECTION INTRAVENOUS at 10:33

## 2021-02-11 RX ADMIN — TACROLIMUS 3 MG: 1 CAPSULE ORAL at 22:57

## 2021-02-11 RX ADMIN — INSULIN HUMAN 5 UNITS: 100 INJECTION, SOLUTION PARENTERAL at 17:50

## 2021-02-11 RX ADMIN — CEFAZOLIN SODIUM 1000 MG: 1 INJECTION, POWDER, FOR SOLUTION INTRAMUSCULAR; INTRAVENOUS at 22:54

## 2021-02-11 RX ADMIN — CEFAZOLIN SODIUM 1000 MG: 1 INJECTION, POWDER, FOR SOLUTION INTRAMUSCULAR; INTRAVENOUS at 06:34

## 2021-02-11 RX ADMIN — CEFAZOLIN SODIUM 1000 MG: 1 INJECTION, POWDER, FOR SOLUTION INTRAMUSCULAR; INTRAVENOUS at 15:09

## 2021-02-11 RX ADMIN — ENOXAPARIN SODIUM 40 MG: 40 INJECTION SUBCUTANEOUS at 17:47

## 2021-02-11 RX ADMIN — PREDNISONE 10 MG: 10 TABLET ORAL at 10:31

## 2021-02-11 RX ADMIN — INSULIN GLARGINE 20 UNITS: 100 INJECTION, SOLUTION SUBCUTANEOUS at 22:53

## 2021-02-11 RX ADMIN — Medication 5 MG: at 22:53

## 2021-02-11 RX ADMIN — GABAPENTIN 100 MG: 100 CAPSULE ORAL at 13:54

## 2021-02-11 RX ADMIN — Medication 2 EACH: at 13:53

## 2021-02-11 RX ADMIN — TACROLIMUS 3 MG: 1 CAPSULE ORAL at 10:30

## 2021-02-11 RX ADMIN — SODIUM CHLORIDE, PRESERVATIVE FREE 10 ML: 5 INJECTION INTRAVENOUS at 23:21

## 2021-02-11 NOTE — PROGRESS NOTES
Infectious Diseases Progress Note    Patient:  Alok Love  YOB: 1970  MRN: [id number]   Admit date: 2/9/2021   Admitting Physician: Kenny Jarquin MD  Primary Care Physician: DINORAH Macedo NP    Chief Complaint/Interval History: He feels okay. He feels much better. He would like to go home soon. He was hoping today. He has some loose stool. No abdominal pain. No nausea or vomiting. No cardiopulmonary or genitourinary symptoms. He has not had further fever or chills. Strength and energy improving. He has been hemodynamically stable.     In/Out    Intake/Output Summary (Last 24 hours) at 2/11/2021 7087  Last data filed at 2/11/2021 0229  Gross per 24 hour   Intake 900 ml   Output --   Net 900 ml     Allergies: No Known Allergies  Current Meds:     insulin glargine (LANTUS) injection vial 10 Units, Nightly      glucose (GLUTOSE) 40 % oral gel 15 g, PRN      dextrose 50 % IV solution, PRN      glucagon (rDNA) injection 1 mg, PRN      dextrose 5 % solution, PRN      ceFAZolin (ANCEF) 1,000 mg in sterile water 10 mL IV syringe, Q8H      LORazepam (ATIVAN) injection 1 mg, Q6H PRN      morphine injection 1 mg, Q6H PRN      insulin lispro (HUMALOG) injection vial 0-12 Units, TID WC      insulin lispro (HUMALOG) injection vial 0-6 Units, Nightly      sodium chloride flush 0.9 % injection 10 mL, 2 times per day      sodium chloride flush 0.9 % injection 10 mL, PRN      enoxaparin (LOVENOX) injection 40 mg, Q24H      promethazine (PHENERGAN) tablet 12.5 mg, Q6H PRN    Or      ondansetron (ZOFRAN) injection 4 mg, Q6H PRN      polyethylene glycol (GLYCOLAX) packet 17 g, Daily PRN      acetaminophen (TYLENOL) tablet 650 mg, Q6H PRN    Or      acetaminophen (TYLENOL) suppository 650 mg, Q6H PRN      potassium chloride (KLOR-CON M) extended release tablet 40 mEq, PRN    Or      potassium bicarb-citric acid (EFFER-K) effervescent tablet 40 mEq, PRN    Or      potassium chloride 10 mEq/100 mL IVPB (Peripheral Line), PRN      magnesium sulfate 2000 mg in 50 mL IVPB premix, PRN      gabapentin (NEURONTIN) capsule 100 mg, TID      mycophenolate (CELLCEPT) capsule 500 mg, BID      pantoprazole (PROTONIX) tablet 40 mg, QAM AC      ondansetron (ZOFRAN-ODT) disintegrating tablet 4 mg, Q8H PRN      predniSONE (DELTASONE) tablet 10 mg, BID      tacrolimus (PROGRAF) capsule 3 mg, BID      melatonin disintegrating tablet 5 mg, Nightly      Review of Systems see HPI    VitalSigns:  /76   Pulse 71   Temp 97.2 °F (36.2 °C) (Temporal)   Resp 17   Ht 6' 1\" (1.854 m)   Wt 260 lb (117.9 kg)   SpO2 95%   BMI 34.30 kg/m²      Physical Exam  Line/IV (peripheral) site: No erythema, warmth, induration, or tenderness. Abdomen has dressing in place over chronic excoriated wound mid abdominal area. No erythema outside of dressing. Lungs clear to auscultation without crackles  Abdomen without guarding or rebound  Extremities without cellulitis. No significant edema.   Heart without murmur    Lab Results:  CBC:   Recent Labs     02/09/21  1340 02/10/21  0424 02/11/21  0531   WBC 6.9 6.1 7.3   HGB 14.7 13.2* 13.5*    131 147     BMP:  Recent Labs     02/09/21  1340 02/10/21  0424 02/11/21  0531   * 134* 133*   K 4.3 3.8 4.5    101 101   CO2 24 21* 22   BUN 15 12 15   CREATININE 0.8 0.8 0.7   GLUCOSE 191* 277* 355*     CultureResults:  Laboratory work February 9, 2021-gram-positive cocci in pairs and chains-identification pending  Laboratory work February 8, 2021:  Susceptibility    Strep agalactiae (beta strep group b) (1)    Antibiotic Interpretation KASSANDRA Status   ampicillin Sensitive <=0.25 mcg/mL    benzylpenicillin Sensitive 0.12 mcg/mL    cefTRIAXone Sensitive <=0.12 mcg/mL    clindamycin Sensitive <=0.25 mcg/mL    erythromycin Sensitive <=0.12 mcg/mL    vancomycin Sensitive 0.5 mcg/mL      Radiology: None    Additional Studies Reviewed:  None    Impression:  Bacteremia secondary to Streptococcus agalactiae. Suspect possible cutaneous source (abdominal wall). Clinically he is improving. Recommendations:  Continue treatment with cefazolin  Repeat blood cultures to make sure bacteremia is cleared  Repeat lab in a.m.   Continue to follow  Hope to transition to oral antibiotic treatment soon  Given his immunosuppression want to be aggressive with his antibiotic treatment  Discussed above with patient    Ann Marie Marrero MD
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
## 2021-02-12 LAB
ALBUMIN SERPL-MCNC: 2.8 G/DL (ref 3.5–5.2)
ALP BLD-CCNC: 118 U/L (ref 40–130)
ALT SERPL-CCNC: 28 U/L (ref 5–41)
ANION GAP SERPL CALCULATED.3IONS-SCNC: 11 MMOL/L (ref 7–19)
AST SERPL-CCNC: 29 U/L (ref 5–40)
BASOPHILS ABSOLUTE: 0 K/UL (ref 0–0.2)
BASOPHILS RELATIVE PERCENT: 0.5 % (ref 0–1)
BILIRUB SERPL-MCNC: 0.3 MG/DL (ref 0.2–1.2)
BLOOD CULTURE, ROUTINE: ABNORMAL
BUN BLDV-MCNC: 16 MG/DL (ref 6–20)
C DIFF TOXIN/ANTIGEN: ABNORMAL
CALCIUM SERPL-MCNC: 8 MG/DL (ref 8.6–10)
CHLORIDE BLD-SCNC: 102 MMOL/L (ref 98–111)
CO2: 23 MMOL/L (ref 22–29)
CREAT SERPL-MCNC: 0.8 MG/DL (ref 0.5–1.2)
EOSINOPHILS ABSOLUTE: 0 K/UL (ref 0–0.6)
EOSINOPHILS RELATIVE PERCENT: 0.2 % (ref 0–5)
GFR AFRICAN AMERICAN: >59
GFR NON-AFRICAN AMERICAN: >60
GLUCOSE BLD-MCNC: 235 MG/DL (ref 70–99)
GLUCOSE BLD-MCNC: 253 MG/DL (ref 70–99)
GLUCOSE BLD-MCNC: 281 MG/DL (ref 74–109)
HCT VFR BLD CALC: 40.4 % (ref 42–52)
HEMOGLOBIN: 13.4 G/DL (ref 14–18)
IMMATURE GRANULOCYTES #: 0.1 K/UL
LYMPHOCYTES ABSOLUTE: 1.1 K/UL (ref 1.1–4.5)
LYMPHOCYTES RELATIVE PERCENT: 12.6 % (ref 20–40)
MCH RBC QN AUTO: 30 PG (ref 27–31)
MCHC RBC AUTO-ENTMCNC: 33.2 G/DL (ref 33–37)
MCV RBC AUTO: 90.6 FL (ref 80–94)
MONOCYTES ABSOLUTE: 0.7 K/UL (ref 0–0.9)
MONOCYTES RELATIVE PERCENT: 7.9 % (ref 0–10)
NEUTROPHILS ABSOLUTE: 6.9 K/UL (ref 1.5–7.5)
NEUTROPHILS RELATIVE PERCENT: 77.7 % (ref 50–65)
ORGANISM: ABNORMAL
PDW BLD-RTO: 12.8 % (ref 11.5–14.5)
PERFORMED ON: ABNORMAL
PERFORMED ON: ABNORMAL
PLATELET # BLD: 156 K/UL (ref 130–400)
PMV BLD AUTO: 11 FL (ref 9.4–12.4)
POTASSIUM REFLEX MAGNESIUM: 4.1 MMOL/L (ref 3.5–5)
RBC # BLD: 4.46 M/UL (ref 4.7–6.1)
SODIUM BLD-SCNC: 136 MMOL/L (ref 136–145)
TACROLIMUS BLOOD: 7.7 NG/ML
TACROLIMUS BLOOD: <2 NG/ML
TOTAL PROTEIN: 5.6 G/DL (ref 6.6–8.7)
WBC # BLD: 8.8 K/UL (ref 4.8–10.8)

## 2021-02-12 PROCEDURE — 6370000000 HC RX 637 (ALT 250 FOR IP): Performed by: PHYSICIAN ASSISTANT

## 2021-02-12 PROCEDURE — 6360000002 HC RX W HCPCS: Performed by: INTERNAL MEDICINE

## 2021-02-12 PROCEDURE — 82947 ASSAY GLUCOSE BLOOD QUANT: CPT

## 2021-02-12 PROCEDURE — 6370000000 HC RX 637 (ALT 250 FOR IP): Performed by: STUDENT IN AN ORGANIZED HEALTH CARE EDUCATION/TRAINING PROGRAM

## 2021-02-12 PROCEDURE — 80053 COMPREHEN METABOLIC PANEL: CPT

## 2021-02-12 PROCEDURE — 2580000003 HC RX 258: Performed by: INTERNAL MEDICINE

## 2021-02-12 PROCEDURE — 6360000002 HC RX W HCPCS: Performed by: STUDENT IN AN ORGANIZED HEALTH CARE EDUCATION/TRAINING PROGRAM

## 2021-02-12 PROCEDURE — 36415 COLL VENOUS BLD VENIPUNCTURE: CPT

## 2021-02-12 PROCEDURE — 2580000003 HC RX 258: Performed by: STUDENT IN AN ORGANIZED HEALTH CARE EDUCATION/TRAINING PROGRAM

## 2021-02-12 PROCEDURE — 80197 ASSAY OF TACROLIMUS: CPT

## 2021-02-12 PROCEDURE — 85025 COMPLETE CBC W/AUTO DIFF WBC: CPT

## 2021-02-12 RX ORDER — CEPHALEXIN 500 MG/1
1000 CAPSULE ORAL EVERY 12 HOURS SCHEDULED
Status: DISCONTINUED | OUTPATIENT
Start: 2021-02-13 | End: 2021-02-12 | Stop reason: HOSPADM

## 2021-02-12 RX ORDER — CEPHALEXIN 500 MG/1
1000 CAPSULE ORAL EVERY 12 HOURS
Qty: 12 CAPSULE | Refills: 0 | Status: SHIPPED | OUTPATIENT
Start: 2021-02-13 | End: 2021-02-16

## 2021-02-12 RX ORDER — BISMUTH TRIBROMOPH/PETROLATUM 5"X9"
2 BANDAGE TOPICAL DAILY
Qty: 50 EACH | Refills: 0 | Status: SHIPPED | OUTPATIENT
Start: 2021-02-13 | End: 2021-03-31 | Stop reason: SDUPTHER

## 2021-02-12 RX ADMIN — PREDNISONE 10 MG: 10 TABLET ORAL at 09:24

## 2021-02-12 RX ADMIN — SODIUM CHLORIDE, PRESERVATIVE FREE 10 ML: 5 INJECTION INTRAVENOUS at 09:58

## 2021-02-12 RX ADMIN — MORPHINE SULFATE 1 MG: 4 INJECTION, SOLUTION INTRAMUSCULAR; INTRAVENOUS at 02:18

## 2021-02-12 RX ADMIN — CEFAZOLIN SODIUM 1000 MG: 1 INJECTION, POWDER, FOR SOLUTION INTRAMUSCULAR; INTRAVENOUS at 05:57

## 2021-02-12 RX ADMIN — SODIUM CHLORIDE 1000 MG: 9 INJECTION INTRAMUSCULAR; INTRAVENOUS; SUBCUTANEOUS at 12:51

## 2021-02-12 RX ADMIN — INSULIN HUMAN 5 UNITS: 100 INJECTION, SOLUTION PARENTERAL at 05:57

## 2021-02-12 RX ADMIN — MYCOPHENOLATE MOFETIL 500 MG: 250 CAPSULE ORAL at 09:24

## 2021-02-12 RX ADMIN — INSULIN LISPRO 6 UNITS: 100 INJECTION, SOLUTION INTRAVENOUS; SUBCUTANEOUS at 09:24

## 2021-02-12 RX ADMIN — PANTOPRAZOLE SODIUM 40 MG: 40 TABLET, DELAYED RELEASE ORAL at 05:57

## 2021-02-12 RX ADMIN — GABAPENTIN 100 MG: 100 CAPSULE ORAL at 09:24

## 2021-02-12 RX ADMIN — VANCOMYCIN HYDROCHLORIDE 250 MG: 1 INJECTION, POWDER, LYOPHILIZED, FOR SOLUTION INTRAVENOUS at 06:08

## 2021-02-12 RX ADMIN — LORAZEPAM 1 MG: 2 INJECTION INTRAMUSCULAR; INTRAVENOUS at 02:18

## 2021-02-12 RX ADMIN — TACROLIMUS 3 MG: 1 CAPSULE ORAL at 09:24

## 2021-02-12 ASSESSMENT — PAIN SCALES - GENERAL
PAINLEVEL_OUTOF10: 0
PAINLEVEL_OUTOF10: 7

## 2021-02-12 NOTE — PROGRESS NOTES
I assisted MAYLIN Rogers with patients discharge. Patient made me aware he had plans on driving himself home. The patient had not had any pain medication since 0218 2/12/21. I told the patient that it was against hospital family for patients to drive themselves home after inpatient admission. Patient stated he would have a family member pick him up. Patient refused wheelchair and walked himself to the elevators.

## 2021-02-12 NOTE — PROGRESS NOTES
Infectious Diseases Progress Note    Patient:  Ruth Ann Cao  YOB: 1970  MRN: [id number]   Admit date: 2/9/2021   Admitting Physician: Kong Law MD  Primary Care Physician: DINORAH Vega NP    Chief Complaint/Interval History: He is feeling better. He would like to go home. He indicates stool is starting to form. He is not having nausea vomiting. He has good oral intake. No fevers or chills. Stable hemodynamics. No abdominal pain or discomfort.     In/Out    Intake/Output Summary (Last 24 hours) at 2/12/2021 0836  Last data filed at 2/12/2021 0741  Gross per 24 hour   Intake 710 ml   Output 525 ml   Net 185 ml     Allergies: No Known Allergies  Current Meds:     vancomycin (VANCOCIN) oral solution 250 mg, 4 times per day      insulin glargine (LANTUS) injection vial 20 Units, Nightly      insulin regular (HUMULIN R;NOVOLIN R) injection 5 Units, TID AC      xeroform petrolatum gauze 5\"X9\" external pads 2 each, Daily      glucose (GLUTOSE) 40 % oral gel 15 g, PRN      dextrose 50 % IV solution, PRN      glucagon (rDNA) injection 1 mg, PRN      dextrose 5 % solution, PRN      ceFAZolin (ANCEF) 1,000 mg in sterile water 10 mL IV syringe, Q8H      LORazepam (ATIVAN) injection 1 mg, Q6H PRN      morphine injection 1 mg, Q6H PRN      insulin lispro (HUMALOG) injection vial 0-12 Units, TID WC      insulin lispro (HUMALOG) injection vial 0-6 Units, Nightly      sodium chloride flush 0.9 % injection 10 mL, 2 times per day      sodium chloride flush 0.9 % injection 10 mL, PRN      enoxaparin (LOVENOX) injection 40 mg, Q24H      promethazine (PHENERGAN) tablet 12.5 mg, Q6H PRN    Or      ondansetron (ZOFRAN) injection 4 mg, Q6H PRN      polyethylene glycol (GLYCOLAX) packet 17 g, Daily PRN      acetaminophen (TYLENOL) tablet 650 mg, Q6H PRN    Or      acetaminophen (TYLENOL) suppository 650 mg, Q6H PRN      potassium chloride (KLOR-CON M) extended release tablet 40 mEq, PRN Or      potassium bicarb-citric acid (EFFER-K) effervescent tablet 40 mEq, PRN    Or      potassium chloride 10 mEq/100 mL IVPB (Peripheral Line), PRN      magnesium sulfate 2000 mg in 50 mL IVPB premix, PRN      gabapentin (NEURONTIN) capsule 100 mg, TID      mycophenolate (CELLCEPT) capsule 500 mg, BID      pantoprazole (PROTONIX) tablet 40 mg, QAM AC      ondansetron (ZOFRAN-ODT) disintegrating tablet 4 mg, Q8H PRN      predniSONE (DELTASONE) tablet 10 mg, BID      tacrolimus (PROGRAF) capsule 3 mg, BID      melatonin disintegrating tablet 5 mg, Nightly      Review of Systems    VitalSigns:  /73   Pulse 80   Temp 98.9 °F (37.2 °C) (Temporal)   Resp 18   Ht 6' 1\" (1.854 m)   Wt 260 lb (117.9 kg)   SpO2 95%   BMI 34.30 kg/m²      Physical Exam  Line/IV (peripheral) site: No erythema, warmth, induration, or tenderness. Abdomen soft and nontender. No guarding or rebound. Heart without murmur    Lab Results:  CBC:   Recent Labs     02/10/21  0424 02/11/21  0531 02/12/21  0636   WBC 6.1 7.3 8.8   HGB 13.2* 13.5* 13.4*    147 156     BMP:  Recent Labs     02/10/21  0424 02/11/21  0531 02/12/21  0636   * 133* 136   K 3.8 4.5 4.1    101 102   CO2 21* 22 23   BUN 12 15 16   CREATININE 0.8 0.7 0.8   GLUCOSE 277* 355* 281*     C. difficile toxin/antigen screen-positive    CultureResults:    Blood cultures February 8, 2021-Streptococcus agalactiae  Blood cultures February 9, 2021-1 of 2 sets of Streptococcus appearing organism  Blood cultures yesterday no growth to date    Radiology: None    Additional Studies Reviewed:  None    Impression:    1. Bacteremia secondary to Streptococcus agalactiae-suspect possible abdominal wall source. Improving. See additional discussion and #2 below. 2.  He had had loose stool-this may have been toxin mediated related to his Streptococcus agalactiae bloodstream infection. C. difficile is also a possibility.   His abrupt onset of fever, chills,
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
## 2021-02-14 LAB
CULTURE, BLOOD 2: NORMAL
TACROLIMUS BLOOD: 11 NG/ML
TACROLIMUS BLOOD: 12 NG/ML

## 2021-02-15 ENCOUNTER — VIRTUAL VISIT (OUTPATIENT)
Dept: PRIMARY CARE CLINIC | Age: 51
End: 2021-02-15
Payer: MEDICARE

## 2021-02-15 DIAGNOSIS — B95.5 STREPTOCOCCAL BACTEREMIA: ICD-10-CM

## 2021-02-15 DIAGNOSIS — F33.41 RECURRENT MAJOR DEPRESSIVE DISORDER, IN PARTIAL REMISSION (HCC): ICD-10-CM

## 2021-02-15 DIAGNOSIS — Z09 HOSPITAL DISCHARGE FOLLOW-UP: Primary | ICD-10-CM

## 2021-02-15 DIAGNOSIS — R78.81 STREPTOCOCCAL BACTEREMIA: ICD-10-CM

## 2021-02-15 DIAGNOSIS — L98.492 ABDOMINAL WALL SKIN ULCER, WITH FAT LAYER EXPOSED (HCC): ICD-10-CM

## 2021-02-15 DIAGNOSIS — F11.21 NARCOTIC DEPENDENCE, IN REMISSION (HCC): ICD-10-CM

## 2021-02-15 DIAGNOSIS — Z79.4 TYPE 2 DIABETES MELLITUS WITH OTHER SKIN ULCER, WITH LONG-TERM CURRENT USE OF INSULIN (HCC): Chronic | ICD-10-CM

## 2021-02-15 DIAGNOSIS — I73.9 CLAUDICATION (HCC): ICD-10-CM

## 2021-02-15 DIAGNOSIS — E66.01 MORBIDLY OBESE (HCC): ICD-10-CM

## 2021-02-15 DIAGNOSIS — E11.622 TYPE 2 DIABETES MELLITUS WITH OTHER SKIN ULCER, WITH LONG-TERM CURRENT USE OF INSULIN (HCC): Chronic | ICD-10-CM

## 2021-02-15 DIAGNOSIS — Z94.4 HISTORY OF LIVER TRANSPLANT (HCC): ICD-10-CM

## 2021-02-15 PROCEDURE — 1111F DSCHRG MED/CURRENT MED MERGE: CPT | Performed by: NURSE PRACTITIONER

## 2021-02-15 PROCEDURE — 99496 TRANSJ CARE MGMT HIGH F2F 7D: CPT | Performed by: NURSE PRACTITIONER

## 2021-02-15 RX ORDER — INSULIN ASPART 100 [IU]/ML
1-6 INJECTION, SOLUTION INTRAVENOUS; SUBCUTANEOUS
Qty: 5 PEN | Refills: 1 | Status: SHIPPED | OUTPATIENT
Start: 2021-02-15 | End: 2021-06-01 | Stop reason: SDUPTHER

## 2021-02-15 RX ORDER — INSULIN GLARGINE 100 [IU]/ML
12 INJECTION, SOLUTION SUBCUTANEOUS NIGHTLY
Qty: 5 PEN | Refills: 1 | Status: SHIPPED | OUTPATIENT
Start: 2021-02-15 | End: 2021-03-28 | Stop reason: SDUPTHER

## 2021-02-15 RX ORDER — VANCOMYCIN HYDROCHLORIDE 250 MG/1
250 CAPSULE ORAL 3 TIMES DAILY
Qty: 42 CAPSULE | Refills: 0 | Status: ON HOLD | OUTPATIENT
Start: 2021-02-15 | End: 2021-03-05 | Stop reason: HOSPADM

## 2021-02-15 NOTE — PROGRESS NOTES
Consent:  Johanna Course  and/or health care decision maker is aware that that he may receive a bill for this telephone service, depending on his insurance coverage, and has provided verbal consent to proceed: Yes     Post-Discharge Transitional Care Management Services or Mery Olvera   YOB: 1970    Date of Office Visit:  2/15/2021  Date of Hospital Admission: 2/9/21  Date of Hospital Discharge: 2/12/21  Readmission Risk Score(high >=14%.  Medium >=10%):Readmission Risk Score: 24      Care management risk score Rising risk (score 2-5) and Complex Care (Scores >=6): 2     Non face to face  following discharge, date last encounter closed (first attempt may have been earlier): *No documented post hospital discharge outreach found in the last 14 days *No documented post hospital discharge outreach found in the last 14 days    Call initiated 2 business days of discharge: *No response recorded in the last 14 days     Patient Active Problem List   Diagnosis    Gastritis with hemorrhage due to alcohol    Pyloric ulcer    Duodenal ulcer, acute    History of GI bleed    Esophageal ulcer    History of cirrhosis    History of hepatitis C virus infection    History of liver transplant ([address]    History of esophageal varices    Major depressive disorder, single episode, moderate ([address]    Narcotic dependency, continuous ([address]    Varicose veins of both lower extremities with pain    Morbidly obese ([address]    Type 2 diabetes mellitus with skin complication, with long-term current use of insulin ([address]    Abdominal wall skin ulcer, with fat layer exposed ([address]    Streptococcal bacteremia    Claudication ([address]       No Known Allergies    Medications listed as ordered at the time of discharge from hospital   [facility] Medication Instructions TROY:    Printed on:02/17/21 1947   Medication Information                      BASAGLAR KWIKPEN 100 UNIT/ML injection pen Inject 12 Units into the skin nightly             Bismuth Tribromoph-Petrolatum (XEROFORM PETROLATUM GAUZE 5\"X9\") MISC external pads  Apply 2 each topically daily             blood glucose monitor strips  Test 4 times a day & as needed for symptoms of irregular blood glucose. Dispense sufficient amount for indicated testing frequency plus additional to accommodate PRN testing needs. XYZ98.320 Z79.4             gabapentin (NEURONTIN) 100 MG capsule  Take 1 capsule by mouth 3 times daily for 30 days.              Insulin Pen Needle (MEIJER PEN NEEDLES) 31G X 6 MM MISC  1 each by Does not apply route daily             Lancets MISC  Use to test blood glucose four times a day and as needed for hypoglycemic events  DXE11.622 Z79.4             NOVOLOG FLEXPEN 100 UNIT/ML injection pen  Inject 1-6 Units into the skin 3 times daily (before meals)             omeprazole (PRILOSEC) 20 MG delayed release capsule  Take 1 capsule by mouth daily             predniSONE (DELTASONE) 10 MG tablet  Take 10 mg by mouth 2 times daily              tacrolimus (PROGRAF) 1 MG capsule  TAKE 3 CAPSULES BY MOUTH IN THE MORNING AND 3 CAPSULES BY MOUTH IN THE EVENING             vancomycin (VANCOCIN) 250 MG capsule  Take 1 capsule by mouth 3 times daily for 14 days                   Medications marked \"taking\" at this time  Outpatient Medications Marked as Taking for the 2/15/21 encounter (Virtual Visit) with DINORAH Plascencia NP   Medication Sig Dispense Refill    BASAGLAR KWIKPEN 100 UNIT/ML injection pen Inject 12 Units into the skin nightly 5 pen 1    NOVOLOG FLEXPEN 100 UNIT/ML injection pen Inject 1-6 Units into the skin 3 times daily (before meals) 5 pen 1    vancomycin (VANCOCIN) 250 MG capsule Take 1 capsule by mouth 3 times daily for 14 days 42 capsule 0    [] cephALEXin (KEFLEX) 500 MG capsule Take 2 capsules by mouth every 12 hours for 3 days 12 capsule 0  Insulin Pen Needle (MEIJER PEN NEEDLES) 31G X 6 MM MISC 1 each by Does not apply route daily 100 each 3    omeprazole (PRILOSEC) 20 MG delayed release capsule Take 1 capsule by mouth daily 30 capsule 3    gabapentin (NEURONTIN) 100 MG capsule Take 1 capsule by mouth 3 times daily for 30 days. 90 capsule 1    Lancets MISC Use to test blood glucose four times a day and as needed for hypoglycemic events  DXE11.622 Z79.4 200 each 3    blood glucose monitor strips Test 4 times a day & as needed for symptoms of irregular blood glucose. Dispense sufficient amount for indicated testing frequency plus additional to accommodate PRN testing needs. RDN60.877 Z79.4 200 strip 3    predniSONE (DELTASONE) 10 MG tablet Take 10 mg by mouth 2 times daily       tacrolimus (PROGRAF) 1 MG capsule TAKE 3 CAPSULES BY MOUTH IN THE MORNING AND 3 CAPSULES BY MOUTH IN THE EVENING          Medications patient taking as of now reconciled against medications ordered at time of hospital discharge: Yes    Chief Complaint   Patient presents with   [address] from Hospital       HPI    Inpatient course: Discharge summary reviewed- see chart.     Interval history/Current status:     Stable   Follow-up with Infectious Disease 2.5 to 3 weeks - appt scheduled per patient   Reports he has to have blood work completed before visit   Wound care appt 2/18/21 2:30  Patient reports he was prescribed three different antibiotics to take at discharge; reports cephalexin and cefdinir were covered by insurance, but vancomycin in liquid form was not; patient reports he sent a message to the prescriber for the pill form and plans to  today   Patient was prescribed cefdinir by Dr Saint Clair on 2/8/2021 and was discontinued but still given to patient by pharmacy   Patient is only supposed to take cephalexin and vancomycin; instructed patient to stop cefdinir Diabetes: patient reports he runs around 180 at home, but was higher in the hospital because he wasn't on a diabetic diet   basaglar 10 units at night currently   novolog around 5 units currently    Review of Systems   Constitutional: Positive for fatigue. HENT: Negative. Eyes: Negative. Respiratory: Negative. Cardiovascular: Negative. Gastrointestinal: Negative. Endocrine: Negative. Negative for polydipsia, polyphagia and polyuria. Genitourinary: Negative. Musculoskeletal: Positive for arthralgias. Skin: Positive for wound (abdominal). Neurological: Positive for weakness and numbness (and tingling bilateral feet). Psychiatric/Behavioral: Negative for self-injury and suicidal ideas. There were no vitals filed for this visit. There is no height or weight on file to calculate BMI. Wt Readings from Last 3 Encounters:   02/09/21 260 lb (117.9 kg)   02/08/21 272 lb (123.4 kg)   02/08/21 272 lb (123.4 kg)     BP Readings from Last 3 Encounters:   02/12/21 125/73   02/08/21 129/79   02/08/21 130/72       PHYSICAL EXAMINATION:    Constitutional: [x] Appears well-developed and well-nourished [x] No apparent distress      [] Abnormal-   Mental status  [x] Alert and awake  [x] Oriented to person/place/time [x]Able to follow commands      Eyes:  EOM    [x]  Normal  [] Abnormal-  Sclera  [x]  Normal  [] Abnormal -         Discharge [x]  None visible  [] Abnormal -    HENT:   [x] Normocephalic, atraumatic.   [] Abnormal   [x] Mouth/Throat: Mucous membranes are moist.     External Ears [x] Normal  [] Abnormal-     Neck: [x] No visualized mass     Pulmonary/Chest: [x] Respiratory effort normal.  [x] No visualized signs of difficulty breathing or respiratory distress        [] Abnormal-      Musculoskeletal:   [x] Normal gait with no signs of ataxia         [x] Normal range of motion of neck        [] Abnormal- Neurological:        [x] No Facial Asymmetry (Cranial nerve 7 motor function) (limited exam to video visit)          [x] No gaze palsy        [] Abnormal-         Skin:        [x] No significant exanthematous lesions or discoloration noted on facial skin         [] Abnormal-            Psychiatric:       [x] Normal Affect [x] No Hallucinations        [] Abnormal-     Assessment/Plan:   Diagnosis Orders   1. Hospital discharge follow-up  CA DISCHARGE MEDS RECONCILED W/ CURRENT OUTPATIENT MED LIST   2. Streptococcal bacteremia Improving vancomycin (VANCOCIN) 250 MG capsule   3. Type 2 diabetes mellitus with other skin ulcer, with long-term current use of insulin (HCC) Inadequately Controlled BASAGLAR KWIKPEN 100 UNIT/ML injection pen    NOVOLOG FLEXPEN 100 UNIT/ML injection pen   4. Abdominal wall skin ulcer, with fat layer exposed ([address] Improving    5. History of liver transplant ([address] Stable    6. Recurrent major depressive disorder, in partial remission (HCC) Stable    7. Narcotic dependence, in remission ([address] Stable     Will follow-up in 6 months or PRN   8. Claudication ([address] Stable, but not controlled     Will follow-up in 6 months or PRN   9. Morbidly obese ([address] Stable, but not controlled     Will follow-up in 6 months or PRN     Medical Decision Making: high complexity    Pursuant to the emergency declaration under the [facility] waiver authority and the [employer] Act, this Virtual  Visit was conducted, with patient's consent, to reduce the patient's risk of exposure to COVID-19 and provide continuity of care for an established patient. Services were provided through a video synchronous discussion virtually to substitute for in-person clinic visit.

## 2021-02-16 VITALS
TEMPERATURE: 98.9 F | HEIGHT: 73 IN | WEIGHT: 260 LBS | SYSTOLIC BLOOD PRESSURE: 125 MMHG | OXYGEN SATURATION: 97 % | HEART RATE: 86 BPM | BODY MASS INDEX: 34.46 KG/M2 | DIASTOLIC BLOOD PRESSURE: 73 MMHG | RESPIRATION RATE: 16 BRPM

## 2021-02-16 LAB
BLOOD CULTURE, ROUTINE: NORMAL
CULTURE, BLOOD 2: NORMAL

## 2021-02-17 PROBLEM — I73.9 CLAUDICATION (HCC): Status: ACTIVE | Noted: 2021-02-17

## 2021-02-17 ASSESSMENT — ENCOUNTER SYMPTOMS
RESPIRATORY NEGATIVE: 1
EYES NEGATIVE: 1
GASTROINTESTINAL NEGATIVE: 1
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
## 2021-02-26 DIAGNOSIS — R50.9 FEVER, UNSPECIFIED FEVER CAUSE: ICD-10-CM

## 2021-02-26 DIAGNOSIS — Z79.4 TYPE 2 DIABETES MELLITUS WITH OTHER SKIN ULCER, WITH LONG-TERM CURRENT USE OF INSULIN (HCC): Primary | ICD-10-CM

## 2021-02-26 DIAGNOSIS — E11.622 TYPE 2 DIABETES MELLITUS WITH OTHER SKIN ULCER, WITH LONG-TERM CURRENT USE OF INSULIN (HCC): Primary | ICD-10-CM

## 2021-02-27 ENCOUNTER — ANESTHESIA EVENT (OUTPATIENT)
Dept: OPERATING ROOM | Age: 51
DRG: 854 | End: 2021-02-27
Payer: MEDICARE

## 2021-02-27 ENCOUNTER — APPOINTMENT (OUTPATIENT)
Dept: GENERAL RADIOLOGY | Age: 51
DRG: 854 | End: 2021-02-27
Payer: MEDICARE

## 2021-02-27 ENCOUNTER — HOSPITAL ENCOUNTER (INPATIENT)
Age: 51
LOS: 6 days | Discharge: HOME HEALTH | DRG: 854 | End: 2021-03-05
Attending: EMERGENCY MEDICINE | Admitting: FAMILY MEDICINE
Payer: MEDICARE

## 2021-02-27 ENCOUNTER — ANESTHESIA (OUTPATIENT)
Dept: OPERATING ROOM | Age: 51
DRG: 854 | End: 2021-02-27
Payer: MEDICARE

## 2021-02-27 VITALS
OXYGEN SATURATION: 97 % | TEMPERATURE: 99.3 F | SYSTOLIC BLOOD PRESSURE: 88 MMHG | RESPIRATION RATE: 12 BRPM | DIASTOLIC BLOOD PRESSURE: 49 MMHG

## 2021-02-27 DIAGNOSIS — M25.552 ACUTE HIP PAIN, LEFT: Primary | ICD-10-CM

## 2021-02-27 DIAGNOSIS — Z87.898 HISTORY OF BACTEREMIA: ICD-10-CM

## 2021-02-27 DIAGNOSIS — M00.252 STREPTOCOCCAL ARTHRITIS OF LEFT HIP (HCC): ICD-10-CM

## 2021-02-27 DIAGNOSIS — A41.9 SEPSIS, DUE TO UNSPECIFIED ORGANISM, UNSPECIFIED WHETHER ACUTE ORGAN DYSFUNCTION PRESENT (HCC): ICD-10-CM

## 2021-02-27 DIAGNOSIS — L08.9 CHRONIC WOUND INFECTION OF ABDOMEN, INITIAL ENCOUNTER: ICD-10-CM

## 2021-02-27 DIAGNOSIS — S31.109A CHRONIC WOUND INFECTION OF ABDOMEN, INITIAL ENCOUNTER: ICD-10-CM

## 2021-02-27 DIAGNOSIS — N28.9 ACUTE RENAL INSUFFICIENCY: ICD-10-CM

## 2021-02-27 DIAGNOSIS — Z94.4 HISTORY OF LIVER TRANSPLANT (HCC): ICD-10-CM

## 2021-02-27 DIAGNOSIS — G89.4 CHRONIC PAIN SYNDROME: ICD-10-CM

## 2021-02-27 DIAGNOSIS — E11.65 TYPE 2 DIABETES MELLITUS WITH HYPERGLYCEMIA, WITH LONG-TERM CURRENT USE OF INSULIN (HCC): ICD-10-CM

## 2021-02-27 DIAGNOSIS — Z79.4 TYPE 2 DIABETES MELLITUS WITH HYPERGLYCEMIA, WITH LONG-TERM CURRENT USE OF INSULIN (HCC): ICD-10-CM

## 2021-02-27 DIAGNOSIS — E87.20 LACTIC ACIDOSIS: ICD-10-CM

## 2021-02-27 DIAGNOSIS — E87.1 ACUTE HYPONATREMIA: ICD-10-CM

## 2021-02-27 DIAGNOSIS — E83.42 HYPOMAGNESEMIA: ICD-10-CM

## 2021-02-27 PROBLEM — M00.9 SEPTIC ARTHRITIS OF HIP (HCC): Status: ACTIVE | Noted: 2021-02-27

## 2021-02-27 LAB
ALBUMIN SERPL-MCNC: 2.3 G/DL (ref 3.5–5.2)
ALP BLD-CCNC: 307 U/L (ref 40–130)
ALT SERPL-CCNC: 17 U/L (ref 5–41)
ANION GAP SERPL CALCULATED.3IONS-SCNC: 14 MMOL/L (ref 7–19)
APTT: 36.4 SEC (ref 26–36.2)
AST SERPL-CCNC: 19 U/L (ref 5–40)
BANDED NEUTROPHILS RELATIVE PERCENT: 38 % (ref 0–5)
BASOPHILS ABSOLUTE: 0 K/UL (ref 0–0.2)
BASOPHILS RELATIVE PERCENT: 0 % (ref 0–1)
BILIRUB SERPL-MCNC: 0.5 MG/DL (ref 0.2–1.2)
BUN BLDV-MCNC: 24 MG/DL (ref 6–20)
C-REACTIVE PROTEIN: 33.96 MG/DL (ref 0–0.5)
CALCIUM SERPL-MCNC: 8 MG/DL (ref 8.6–10)
CHLORIDE BLD-SCNC: 93 MMOL/L (ref 98–111)
CO2: 20 MMOL/L (ref 22–29)
CREAT SERPL-MCNC: 1.6 MG/DL (ref 0.5–1.2)
DOHLE BODIES: ABNORMAL
EOSINOPHILS ABSOLUTE: 0 K/UL (ref 0–0.6)
EOSINOPHILS RELATIVE PERCENT: 0 % (ref 0–5)
GFR AFRICAN AMERICAN: 56
GFR NON-AFRICAN AMERICAN: 46
GLUCOSE BLD-MCNC: 307 MG/DL (ref 70–99)
GLUCOSE BLD-MCNC: 338 MG/DL (ref 74–109)
HCT VFR BLD CALC: 37.3 % (ref 42–52)
HEMOGLOBIN: 12.7 G/DL (ref 14–18)
IMMATURE GRANULOCYTES #: 0.1 K/UL
INR BLD: 1.19 (ref 0.88–1.18)
LACTIC ACID, SEPSIS: 3.7 MG/DL (ref 0.5–1.9)
LYMPHOCYTES ABSOLUTE: 0.1 K/UL (ref 1.1–4.5)
LYMPHOCYTES RELATIVE PERCENT: 1 % (ref 20–40)
MAGNESIUM: 0.9 MG/DL (ref 1.6–2.6)
MCH RBC QN AUTO: 30 PG (ref 27–31)
MCHC RBC AUTO-ENTMCNC: 34 G/DL (ref 33–37)
MCV RBC AUTO: 88 FL (ref 80–94)
METAMYELOCYTES RELATIVE PERCENT: 1 %
MONOCYTES ABSOLUTE: 0.2 K/UL (ref 0–0.9)
MONOCYTES RELATIVE PERCENT: 2 % (ref 0–10)
NEUTROPHILS ABSOLUTE: 8.1 K/UL (ref 1.5–7.5)
NEUTROPHILS RELATIVE PERCENT: 58 % (ref 50–65)
PDW BLD-RTO: 12.8 % (ref 11.5–14.5)
PERFORMED ON: ABNORMAL
PLATELET # BLD: 93 K/UL (ref 130–400)
PLATELET SLIDE REVIEW: ABNORMAL
PMV BLD AUTO: 10.6 FL (ref 9.4–12.4)
POTASSIUM REFLEX MAGNESIUM: 3.2 MMOL/L (ref 3.5–5)
PRO-BNP: 1260 PG/ML (ref 0–900)
PROTHROMBIN TIME: 15.1 SEC (ref 12–14.6)
RBC # BLD: 4.24 M/UL (ref 4.7–6.1)
RBC # BLD: NORMAL 10*6/UL
SARS-COV-2, NAAT: NOT DETECTED
SEDIMENTATION RATE, ERYTHROCYTE: 82 MM/HR (ref 0–15)
SODIUM BLD-SCNC: 127 MMOL/L (ref 136–145)
TOTAL PROTEIN: 6 G/DL (ref 6.6–8.7)
VACUOLATED NEUTROPHILS: ABNORMAL
WBC # BLD: 8.4 K/UL (ref 4.8–10.8)

## 2021-02-27 PROCEDURE — 87205 SMEAR GRAM STAIN: CPT

## 2021-02-27 PROCEDURE — 93005 ELECTROCARDIOGRAM TRACING: CPT | Performed by: EMERGENCY MEDICINE

## 2021-02-27 PROCEDURE — 87635 SARS-COV-2 COVID-19 AMP PRB: CPT

## 2021-02-27 PROCEDURE — 87186 SC STD MICRODIL/AGAR DIL: CPT

## 2021-02-27 PROCEDURE — 2580000003 HC RX 258: Performed by: EMERGENCY MEDICINE

## 2021-02-27 PROCEDURE — 85652 RBC SED RATE AUTOMATED: CPT

## 2021-02-27 PROCEDURE — 2500000003 HC RX 250 WO HCPCS: Performed by: NURSE ANESTHETIST, CERTIFIED REGISTERED

## 2021-02-27 PROCEDURE — 6360000002 HC RX W HCPCS: Performed by: EMERGENCY MEDICINE

## 2021-02-27 PROCEDURE — 7100000001 HC PACU RECOVERY - ADDTL 15 MIN: Performed by: ORTHOPAEDIC SURGERY

## 2021-02-27 PROCEDURE — 6360000002 HC RX W HCPCS: Performed by: FAMILY MEDICINE

## 2021-02-27 PROCEDURE — 7100000000 HC PACU RECOVERY - FIRST 15 MIN: Performed by: ORTHOPAEDIC SURGERY

## 2021-02-27 PROCEDURE — 96375 TX/PRO/DX INJ NEW DRUG ADDON: CPT

## 2021-02-27 PROCEDURE — 99283 EMERGENCY DEPT VISIT LOW MDM: CPT

## 2021-02-27 PROCEDURE — 6360000002 HC RX W HCPCS: Performed by: NURSE ANESTHETIST, CERTIFIED REGISTERED

## 2021-02-27 PROCEDURE — 2720000010 HC SURG SUPPLY STERILE: Performed by: ORTHOPAEDIC SURGERY

## 2021-02-27 PROCEDURE — 73502 X-RAY EXAM HIP UNI 2-3 VIEWS: CPT

## 2021-02-27 PROCEDURE — 1210000000 HC MED SURG R&B

## 2021-02-27 PROCEDURE — 36415 COLL VENOUS BLD VENIPUNCTURE: CPT

## 2021-02-27 PROCEDURE — 3700000000 HC ANESTHESIA ATTENDED CARE: Performed by: ORTHOPAEDIC SURGERY

## 2021-02-27 PROCEDURE — 2709999900 HC NON-CHARGEABLE SUPPLY: Performed by: ORTHOPAEDIC SURGERY

## 2021-02-27 PROCEDURE — 82947 ASSAY GLUCOSE BLOOD QUANT: CPT

## 2021-02-27 PROCEDURE — 86140 C-REACTIVE PROTEIN: CPT

## 2021-02-27 PROCEDURE — 87040 BLOOD CULTURE FOR BACTERIA: CPT

## 2021-02-27 PROCEDURE — 87070 CULTURE OTHR SPECIMN AEROBIC: CPT

## 2021-02-27 PROCEDURE — 80053 COMPREHEN METABOLIC PANEL: CPT

## 2021-02-27 PROCEDURE — 6360000002 HC RX W HCPCS: Performed by: ORTHOPAEDIC SURGERY

## 2021-02-27 PROCEDURE — 6370000000 HC RX 637 (ALT 250 FOR IP): Performed by: ORTHOPAEDIC SURGERY

## 2021-02-27 PROCEDURE — 71045 X-RAY EXAM CHEST 1 VIEW: CPT

## 2021-02-27 PROCEDURE — 85610 PROTHROMBIN TIME: CPT

## 2021-02-27 PROCEDURE — 3600000003 HC SURGERY LEVEL 3 BASE: Performed by: ORTHOPAEDIC SURGERY

## 2021-02-27 PROCEDURE — 6370000000 HC RX 637 (ALT 250 FOR IP): Performed by: FAMILY MEDICINE

## 2021-02-27 PROCEDURE — 2580000003 HC RX 258: Performed by: ORTHOPAEDIC SURGERY

## 2021-02-27 PROCEDURE — 2580000003 HC RX 258: Performed by: NURSE ANESTHETIST, CERTIFIED REGISTERED

## 2021-02-27 PROCEDURE — 83880 ASSAY OF NATRIURETIC PEPTIDE: CPT

## 2021-02-27 PROCEDURE — 87075 CULTR BACTERIA EXCEPT BLOOD: CPT

## 2021-02-27 PROCEDURE — 3700000001 HC ADD 15 MINUTES (ANESTHESIA): Performed by: ORTHOPAEDIC SURGERY

## 2021-02-27 PROCEDURE — 3600000013 HC SURGERY LEVEL 3 ADDTL 15MIN: Performed by: ORTHOPAEDIC SURGERY

## 2021-02-27 PROCEDURE — 83735 ASSAY OF MAGNESIUM: CPT

## 2021-02-27 PROCEDURE — 85730 THROMBOPLASTIN TIME PARTIAL: CPT

## 2021-02-27 PROCEDURE — 2580000003 HC RX 258: Performed by: FAMILY MEDICINE

## 2021-02-27 PROCEDURE — 85025 COMPLETE CBC W/AUTO DIFF WBC: CPT

## 2021-02-27 PROCEDURE — 83605 ASSAY OF LACTIC ACID: CPT

## 2021-02-27 PROCEDURE — 96374 THER/PROPH/DIAG INJ IV PUSH: CPT

## 2021-02-27 RX ORDER — MEPERIDINE HYDROCHLORIDE 50 MG/ML
12.5 INJECTION INTRAMUSCULAR; INTRAVENOUS; SUBCUTANEOUS EVERY 5 MIN PRN
Status: DISCONTINUED | OUTPATIENT
Start: 2021-02-27 | End: 2021-02-27 | Stop reason: HOSPADM

## 2021-02-27 RX ORDER — FENTANYL CITRATE 50 UG/ML
25 INJECTION, SOLUTION INTRAMUSCULAR; INTRAVENOUS ONCE
Status: COMPLETED | OUTPATIENT
Start: 2021-02-27 | End: 2021-02-27

## 2021-02-27 RX ORDER — OXYCODONE HYDROCHLORIDE 5 MG/1
10 TABLET ORAL EVERY 4 HOURS PRN
Status: DISCONTINUED | OUTPATIENT
Start: 2021-02-27 | End: 2021-03-05 | Stop reason: HOSPADM

## 2021-02-27 RX ORDER — GABAPENTIN 100 MG/1
100 CAPSULE ORAL 3 TIMES DAILY
Status: DISCONTINUED | OUTPATIENT
Start: 2021-02-27 | End: 2021-03-05 | Stop reason: HOSPADM

## 2021-02-27 RX ORDER — SODIUM CHLORIDE 0.9 % (FLUSH) 0.9 %
10 SYRINGE (ML) INJECTION EVERY 12 HOURS SCHEDULED
Status: DISCONTINUED | OUTPATIENT
Start: 2021-02-27 | End: 2021-03-04 | Stop reason: SDUPTHER

## 2021-02-27 RX ORDER — SODIUM CHLORIDE 9 MG/ML
INJECTION, SOLUTION INTRAVENOUS CONTINUOUS
Status: DISCONTINUED | OUTPATIENT
Start: 2021-02-27 | End: 2021-02-27

## 2021-02-27 RX ORDER — PANTOPRAZOLE SODIUM 40 MG/1
40 TABLET, DELAYED RELEASE ORAL
Status: DISCONTINUED | OUTPATIENT
Start: 2021-02-28 | End: 2021-03-05 | Stop reason: HOSPADM

## 2021-02-27 RX ORDER — DIPHENHYDRAMINE HYDROCHLORIDE 50 MG/ML
12.5 INJECTION INTRAMUSCULAR; INTRAVENOUS
Status: DISCONTINUED | OUTPATIENT
Start: 2021-02-27 | End: 2021-02-27 | Stop reason: HOSPADM

## 2021-02-27 RX ORDER — MIDAZOLAM HYDROCHLORIDE 1 MG/ML
INJECTION INTRAMUSCULAR; INTRAVENOUS PRN
Status: DISCONTINUED | OUTPATIENT
Start: 2021-02-27 | End: 2021-02-27 | Stop reason: SDUPTHER

## 2021-02-27 RX ORDER — HYDRALAZINE HYDROCHLORIDE 20 MG/ML
5 INJECTION INTRAMUSCULAR; INTRAVENOUS EVERY 10 MIN PRN
Status: DISCONTINUED | OUTPATIENT
Start: 2021-02-27 | End: 2021-02-27 | Stop reason: HOSPADM

## 2021-02-27 RX ORDER — LIDOCAINE HYDROCHLORIDE 10 MG/ML
INJECTION, SOLUTION EPIDURAL; INFILTRATION; INTRACAUDAL; PERINEURAL PRN
Status: DISCONTINUED | OUTPATIENT
Start: 2021-02-27 | End: 2021-02-27 | Stop reason: SDUPTHER

## 2021-02-27 RX ORDER — ENALAPRILAT 2.5 MG/2ML
1.25 INJECTION INTRAVENOUS
Status: DISCONTINUED | OUTPATIENT
Start: 2021-02-27 | End: 2021-02-27 | Stop reason: HOSPADM

## 2021-02-27 RX ORDER — HYDROMORPHONE HYDROCHLORIDE 1 MG/ML
1 INJECTION, SOLUTION INTRAMUSCULAR; INTRAVENOUS; SUBCUTANEOUS
Status: DISCONTINUED | OUTPATIENT
Start: 2021-02-27 | End: 2021-03-05 | Stop reason: HOSPADM

## 2021-02-27 RX ORDER — PROPOFOL 10 MG/ML
INJECTION, EMULSION INTRAVENOUS PRN
Status: DISCONTINUED | OUTPATIENT
Start: 2021-02-27 | End: 2021-02-27 | Stop reason: SDUPTHER

## 2021-02-27 RX ORDER — DEXTROSE MONOHYDRATE 50 MG/ML
100 INJECTION, SOLUTION INTRAVENOUS PRN
Status: DISCONTINUED | OUTPATIENT
Start: 2021-02-27 | End: 2021-03-05 | Stop reason: HOSPADM

## 2021-02-27 RX ORDER — ACETAMINOPHEN 325 MG/1
650 TABLET ORAL EVERY 6 HOURS PRN
Status: DISCONTINUED | OUTPATIENT
Start: 2021-02-27 | End: 2021-03-05 | Stop reason: HOSPADM

## 2021-02-27 RX ORDER — HYDROMORPHONE HYDROCHLORIDE 1 MG/ML
0.5 INJECTION, SOLUTION INTRAMUSCULAR; INTRAVENOUS; SUBCUTANEOUS
Status: DISCONTINUED | OUTPATIENT
Start: 2021-02-27 | End: 2021-03-01

## 2021-02-27 RX ORDER — SODIUM CHLORIDE 0.9 % (FLUSH) 0.9 %
10 SYRINGE (ML) INJECTION PRN
Status: DISCONTINUED | OUTPATIENT
Start: 2021-02-27 | End: 2021-03-04 | Stop reason: SDUPTHER

## 2021-02-27 RX ORDER — DIPHENHYDRAMINE HCL 25 MG
25 TABLET ORAL EVERY 6 HOURS PRN
Status: DISCONTINUED | OUTPATIENT
Start: 2021-02-27 | End: 2021-03-05 | Stop reason: HOSPADM

## 2021-02-27 RX ORDER — INSULIN GLARGINE 100 [IU]/ML
12 INJECTION, SOLUTION SUBCUTANEOUS NIGHTLY
Status: DISCONTINUED | OUTPATIENT
Start: 2021-02-27 | End: 2021-03-05 | Stop reason: HOSPADM

## 2021-02-27 RX ORDER — SODIUM CHLORIDE, SODIUM LACTATE, POTASSIUM CHLORIDE, CALCIUM CHLORIDE 600; 310; 30; 20 MG/100ML; MG/100ML; MG/100ML; MG/100ML
INJECTION, SOLUTION INTRAVENOUS CONTINUOUS PRN
Status: DISCONTINUED | OUTPATIENT
Start: 2021-02-27 | End: 2021-02-27 | Stop reason: SDUPTHER

## 2021-02-27 RX ORDER — HYDROMORPHONE HYDROCHLORIDE 1 MG/ML
0.25 INJECTION, SOLUTION INTRAMUSCULAR; INTRAVENOUS; SUBCUTANEOUS EVERY 5 MIN PRN
Status: DISCONTINUED | OUTPATIENT
Start: 2021-02-27 | End: 2021-02-27 | Stop reason: HOSPADM

## 2021-02-27 RX ORDER — NICOTINE POLACRILEX 4 MG
15 LOZENGE BUCCAL PRN
Status: DISCONTINUED | OUTPATIENT
Start: 2021-02-27 | End: 2021-03-05 | Stop reason: HOSPADM

## 2021-02-27 RX ORDER — FENTANYL CITRATE 50 UG/ML
INJECTION, SOLUTION INTRAMUSCULAR; INTRAVENOUS PRN
Status: DISCONTINUED | OUTPATIENT
Start: 2021-02-27 | End: 2021-02-27 | Stop reason: SDUPTHER

## 2021-02-27 RX ORDER — MORPHINE SULFATE 4 MG/ML
4 INJECTION, SOLUTION INTRAMUSCULAR; INTRAVENOUS ONCE
Status: COMPLETED | OUTPATIENT
Start: 2021-02-27 | End: 2021-02-27

## 2021-02-27 RX ORDER — ONDANSETRON 2 MG/ML
INJECTION INTRAMUSCULAR; INTRAVENOUS PRN
Status: DISCONTINUED | OUTPATIENT
Start: 2021-02-27 | End: 2021-02-27 | Stop reason: SDUPTHER

## 2021-02-27 RX ORDER — LABETALOL HYDROCHLORIDE 5 MG/ML
5 INJECTION, SOLUTION INTRAVENOUS EVERY 10 MIN PRN
Status: DISCONTINUED | OUTPATIENT
Start: 2021-02-27 | End: 2021-02-27 | Stop reason: HOSPADM

## 2021-02-27 RX ORDER — HYDROMORPHONE HYDROCHLORIDE 1 MG/ML
2 INJECTION, SOLUTION INTRAMUSCULAR; INTRAVENOUS; SUBCUTANEOUS
Status: DISCONTINUED | OUTPATIENT
Start: 2021-02-27 | End: 2021-03-05 | Stop reason: HOSPADM

## 2021-02-27 RX ORDER — SODIUM CHLORIDE 0.9 % (FLUSH) 0.9 %
10 SYRINGE (ML) INJECTION EVERY 12 HOURS SCHEDULED
Status: DISCONTINUED | OUTPATIENT
Start: 2021-02-27 | End: 2021-03-01 | Stop reason: SDUPTHER

## 2021-02-27 RX ORDER — MORPHINE SULFATE 4 MG/ML
4 INJECTION, SOLUTION INTRAMUSCULAR; INTRAVENOUS EVERY 5 MIN PRN
Status: DISCONTINUED | OUTPATIENT
Start: 2021-02-27 | End: 2021-02-27 | Stop reason: HOSPADM

## 2021-02-27 RX ORDER — ROCURONIUM BROMIDE 10 MG/ML
INJECTION, SOLUTION INTRAVENOUS PRN
Status: DISCONTINUED | OUTPATIENT
Start: 2021-02-27 | End: 2021-02-27 | Stop reason: SDUPTHER

## 2021-02-27 RX ORDER — MORPHINE SULFATE 4 MG/ML
2 INJECTION, SOLUTION INTRAMUSCULAR; INTRAVENOUS EVERY 5 MIN PRN
Status: DISCONTINUED | OUTPATIENT
Start: 2021-02-27 | End: 2021-02-27 | Stop reason: HOSPADM

## 2021-02-27 RX ORDER — METOCLOPRAMIDE HYDROCHLORIDE 5 MG/ML
10 INJECTION INTRAMUSCULAR; INTRAVENOUS
Status: DISCONTINUED | OUTPATIENT
Start: 2021-02-27 | End: 2021-02-27 | Stop reason: HOSPADM

## 2021-02-27 RX ORDER — DEXTROSE MONOHYDRATE 25 G/50ML
12.5 INJECTION, SOLUTION INTRAVENOUS PRN
Status: DISCONTINUED | OUTPATIENT
Start: 2021-02-27 | End: 2021-03-05 | Stop reason: HOSPADM

## 2021-02-27 RX ORDER — SENNA AND DOCUSATE SODIUM 50; 8.6 MG/1; MG/1
1 TABLET, FILM COATED ORAL 2 TIMES DAILY
Status: DISCONTINUED | OUTPATIENT
Start: 2021-02-27 | End: 2021-03-05 | Stop reason: HOSPADM

## 2021-02-27 RX ORDER — PROMETHAZINE HYDROCHLORIDE 25 MG/ML
6.25 INJECTION, SOLUTION INTRAMUSCULAR; INTRAVENOUS
Status: DISCONTINUED | OUTPATIENT
Start: 2021-02-27 | End: 2021-02-27 | Stop reason: HOSPADM

## 2021-02-27 RX ORDER — HYDROMORPHONE HYDROCHLORIDE 1 MG/ML
1 INJECTION, SOLUTION INTRAMUSCULAR; INTRAVENOUS; SUBCUTANEOUS
Status: DISCONTINUED | OUTPATIENT
Start: 2021-02-27 | End: 2021-03-01

## 2021-02-27 RX ORDER — SUCCINYLCHOLINE CHLORIDE 20 MG/ML
INJECTION INTRAMUSCULAR; INTRAVENOUS PRN
Status: DISCONTINUED | OUTPATIENT
Start: 2021-02-27 | End: 2021-02-27 | Stop reason: SDUPTHER

## 2021-02-27 RX ORDER — SODIUM CHLORIDE 9 MG/ML
INJECTION, SOLUTION INTRAVENOUS CONTINUOUS
Status: DISCONTINUED | OUTPATIENT
Start: 2021-02-27 | End: 2021-03-05 | Stop reason: HOSPADM

## 2021-02-27 RX ORDER — OXYMETAZOLINE HYDROCHLORIDE 0.05 G/100ML
2 SPRAY NASAL ONCE
Status: DISCONTINUED | OUTPATIENT
Start: 2021-02-27 | End: 2021-02-27

## 2021-02-27 RX ORDER — HYDROMORPHONE HYDROCHLORIDE 1 MG/ML
0.5 INJECTION, SOLUTION INTRAMUSCULAR; INTRAVENOUS; SUBCUTANEOUS EVERY 5 MIN PRN
Status: DISCONTINUED | OUTPATIENT
Start: 2021-02-27 | End: 2021-02-27 | Stop reason: HOSPADM

## 2021-02-27 RX ORDER — ONDANSETRON 2 MG/ML
4 INJECTION INTRAMUSCULAR; INTRAVENOUS ONCE
Status: COMPLETED | OUTPATIENT
Start: 2021-02-27 | End: 2021-02-27

## 2021-02-27 RX ORDER — SODIUM CHLORIDE 0.9 % (FLUSH) 0.9 %
10 SYRINGE (ML) INJECTION PRN
Status: DISCONTINUED | OUTPATIENT
Start: 2021-02-27 | End: 2021-03-01 | Stop reason: SDUPTHER

## 2021-02-27 RX ORDER — SODIUM CHLORIDE, SODIUM LACTATE, POTASSIUM CHLORIDE, AND CALCIUM CHLORIDE .6; .31; .03; .02 G/100ML; G/100ML; G/100ML; G/100ML
30 INJECTION, SOLUTION INTRAVENOUS ONCE
Status: COMPLETED | OUTPATIENT
Start: 2021-02-27 | End: 2021-02-27

## 2021-02-27 RX ORDER — OXYCODONE HYDROCHLORIDE 5 MG/1
20 TABLET ORAL EVERY 4 HOURS PRN
Status: DISCONTINUED | OUTPATIENT
Start: 2021-02-27 | End: 2021-03-05 | Stop reason: HOSPADM

## 2021-02-27 RX ORDER — ACETAMINOPHEN 650 MG/1
650 SUPPOSITORY RECTAL EVERY 6 HOURS PRN
Status: DISCONTINUED | OUTPATIENT
Start: 2021-02-27 | End: 2021-03-05 | Stop reason: HOSPADM

## 2021-02-27 RX ADMIN — ONDANSETRON HYDROCHLORIDE 4 MG: 2 SOLUTION INTRAMUSCULAR; INTRAVENOUS at 14:22

## 2021-02-27 RX ADMIN — SODIUM CHLORIDE, SODIUM LACTATE, POTASSIUM CHLORIDE, AND CALCIUM CHLORIDE: 600; 310; 30; 20 INJECTION, SOLUTION INTRAVENOUS at 18:35

## 2021-02-27 RX ADMIN — MORPHINE SULFATE 4 MG: 4 INJECTION, SOLUTION INTRAMUSCULAR; INTRAVENOUS at 14:22

## 2021-02-27 RX ADMIN — SODIUM CHLORIDE: 9 INJECTION, SOLUTION INTRAVENOUS at 21:43

## 2021-02-27 RX ADMIN — FENTANYL CITRATE 50 MCG: 50 INJECTION, SOLUTION INTRAMUSCULAR; INTRAVENOUS at 18:25

## 2021-02-27 RX ADMIN — DOCUSATE SODIUM 50 MG AND SENNOSIDES 8.6 MG 1 TABLET: 8.6; 5 TABLET, FILM COATED ORAL at 21:42

## 2021-02-27 RX ADMIN — FENTANYL CITRATE 25 MCG: 50 INJECTION, SOLUTION INTRAMUSCULAR; INTRAVENOUS at 16:53

## 2021-02-27 RX ADMIN — INSULIN LISPRO 4 UNITS: 100 INJECTION, SOLUTION INTRAVENOUS; SUBCUTANEOUS at 23:21

## 2021-02-27 RX ADMIN — CEFAZOLIN SODIUM 2 G: 10 INJECTION, POWDER, FOR SOLUTION INTRAVENOUS at 18:34

## 2021-02-27 RX ADMIN — MIDAZOLAM 2 MG: 1 INJECTION INTRAMUSCULAR; INTRAVENOUS at 17:48

## 2021-02-27 RX ADMIN — VANCOMYCIN HYDROCHLORIDE 1750 MG: 5 INJECTION, POWDER, LYOPHILIZED, FOR SOLUTION INTRAVENOUS at 21:43

## 2021-02-27 RX ADMIN — SODIUM CHLORIDE, SODIUM LACTATE, POTASSIUM CHLORIDE, AND CALCIUM CHLORIDE: 600; 310; 30; 20 INJECTION, SOLUTION INTRAVENOUS at 17:48

## 2021-02-27 RX ADMIN — Medication 12 UNITS: at 23:21

## 2021-02-27 RX ADMIN — ONDANSETRON HYDROCHLORIDE 4 MG: 2 INJECTION, SOLUTION INTRAMUSCULAR; INTRAVENOUS at 17:58

## 2021-02-27 RX ADMIN — FENTANYL CITRATE 100 MCG: 50 INJECTION, SOLUTION INTRAMUSCULAR; INTRAVENOUS at 17:58

## 2021-02-27 RX ADMIN — PROPOFOL 150 MG: 10 INJECTION, EMULSION INTRAVENOUS at 17:58

## 2021-02-27 RX ADMIN — FENTANYL CITRATE 50 MCG: 50 INJECTION, SOLUTION INTRAMUSCULAR; INTRAVENOUS at 19:00

## 2021-02-27 RX ADMIN — FENTANYL CITRATE 50 MCG: 50 INJECTION, SOLUTION INTRAMUSCULAR; INTRAVENOUS at 18:35

## 2021-02-27 RX ADMIN — HYDROMORPHONE HYDROCHLORIDE 1 MG: 1 INJECTION, SOLUTION INTRAMUSCULAR; INTRAVENOUS; SUBCUTANEOUS at 23:42

## 2021-02-27 RX ADMIN — SUCCINYLCHOLINE CHLORIDE 100 MG: 20 INJECTION, SOLUTION INTRAMUSCULAR; INTRAVENOUS at 17:58

## 2021-02-27 RX ADMIN — ROCURONIUM BROMIDE 10 MG: 10 INJECTION, SOLUTION INTRAVENOUS at 17:58

## 2021-02-27 RX ADMIN — LIDOCAINE HYDROCHLORIDE 5 ML: 10 INJECTION, SOLUTION EPIDURAL; INFILTRATION; INTRACAUDAL; PERINEURAL at 17:58

## 2021-02-27 RX ADMIN — HYDROMORPHONE HYDROCHLORIDE 0.25 MG: 1 INJECTION, SOLUTION INTRAMUSCULAR; INTRAVENOUS; SUBCUTANEOUS at 19:51

## 2021-02-27 RX ADMIN — SODIUM CHLORIDE, SODIUM LACTATE, POTASSIUM CHLORIDE, AND CALCIUM CHLORIDE 3402 ML: 600; 310; 30; 20 INJECTION, SOLUTION INTRAVENOUS at 14:29

## 2021-02-27 RX ADMIN — SUGAMMADEX 250 MG: 100 INJECTION, SOLUTION INTRAVENOUS at 18:48

## 2021-02-27 RX ADMIN — OXYCODONE 20 MG: 5 TABLET ORAL at 21:42

## 2021-02-27 ASSESSMENT — ENCOUNTER SYMPTOMS
COUGH: 0
DIARRHEA: 0
RHINORRHEA: 0
VOICE CHANGE: 0
VOMITING: 0
EYE PAIN: 0
ABDOMINAL PAIN: 0
EYE REDNESS: 0
SHORTNESS OF BREATH: 0

## 2021-02-27 ASSESSMENT — PAIN SCALES - GENERAL
PAINLEVEL_OUTOF10: 10

## 2021-02-27 NOTE — ANESTHESIA PRE PROCEDURE
Department of Anesthesiology  Preprocedure Note       Name:  Alma Pablo   Age:  48 y.o.  :  1970                                          MRN:  [id number]         Date:  2021      Surgeon: Simone Partida):  Jayce Dean MD    Procedure: Procedure(s):  HIP INCISION AND DRAINAGE    Medications prior to admission:   Prior to Admission medications    Medication Sig Start Date End Date Taking? Authorizing Provider   Marv Sportsman 100 UNIT/ML injection pen Inject 12 Units into the skin nightly 2/15/21   Guadelupe Brittle, APRN - HETAL   NOVOLOG FLEXPEN 100 UNIT/ML injection pen Inject 1-6 Units into the skin 3 times daily (before meals) 2/15/21   Guadelupe Brittle, APRN - NP   vancomycin (VANCOCIN) 250 MG capsule Take 1 capsule by mouth 3 times daily for 14 days 2/15/21 3/1/21  Guadelupe Brittle, APRN - NP   Bismuth Tribromoph-Petrolatum (XEROFORM PETROLATUM GAUZE 5\"X9\") MISC external pads Apply 2 each topically daily 21   Melanie Townsend PA-C   Insulin Pen Needle (MEIJER PEN NEEDLES) 31G X 6 MM MISC 1 each by Does not apply route daily 10/13/20   DINORAH Wise   omeprazole (PRILOSEC) 20 MG delayed release capsule Take 1 capsule by mouth daily 10/13/20   DINORAH Wise   gabapentin (NEURONTIN) 100 MG capsule Take 1 capsule by mouth 3 times daily for 30 days. 10/13/20 2/15/21  DINORAH Wise   Lancets MISC Use to test blood glucose four times a day and as needed for hypoglycemic events  DXE11.622 Z79.4 20   Guadelupe Brittle, APRN - NP   blood glucose monitor strips Test 4 times a day & as needed for symptoms of irregular blood glucose. Dispense sufficient amount for indicated testing frequency plus additional to accommodate PRN testing needs.  LHT57.494 Z79.4 20   Guadelupe Brittle, APRN - NP   predniSONE (DELTASONE) 10 MG tablet Take 10 mg by mouth 2 times daily     Historical Provider, MD tacrolimus (PROGRAF) 1 MG capsule TAKE 3 CAPSULES BY MOUTH IN THE MORNING AND 3 CAPSULES BY MOUTH IN THE EVENING 1/11/20   Historical Provider, MD       Current medications:    No current facility-administered medications for this encounter. Current Outpatient Medications   Medication Sig Dispense Refill    BASAGLAR KWIKPEN 100 UNIT/ML injection pen Inject 12 Units into the skin nightly 5 pen 1    NOVOLOG FLEXPEN 100 UNIT/ML injection pen Inject 1-6 Units into the skin 3 times daily (before meals) 5 pen 1    vancomycin (VANCOCIN) 250 MG capsule Take 1 capsule by mouth 3 times daily for 14 days 42 capsule 0    Bismuth Tribromoph-Petrolatum (XEROFORM PETROLATUM GAUZE 5\"X9\") MISC external pads Apply 2 each topically daily 50 each 0    Insulin Pen Needle (MEIJER PEN NEEDLES) 31G X 6 MM MISC 1 each by Does not apply route daily 100 each 3    omeprazole (PRILOSEC) 20 MG delayed release capsule Take 1 capsule by mouth daily 30 capsule 3    gabapentin (NEURONTIN) 100 MG capsule Take 1 capsule by mouth 3 times daily for 30 days. 90 capsule 1    Lancets MISC Use to test blood glucose four times a day and as needed for hypoglycemic events  DXE11.622 Z79.4 200 each 3    blood glucose monitor strips Test 4 times a day & as needed for symptoms of irregular blood glucose. Dispense sufficient amount for indicated testing frequency plus additional to accommodate PRN testing needs.  UIN31.697 Z79.4 200 strip 3    predniSONE (DELTASONE) 10 MG tablet Take 10 mg by mouth 2 times daily       tacrolimus (PROGRAF) 1 MG capsule TAKE 3 CAPSULES BY MOUTH IN THE MORNING AND 3 CAPSULES BY MOUTH IN THE EVENING         Allergies:  No Known Allergies    Problem List:    Patient Active Problem List   Diagnosis Code    Cirrhosis ([facility] [address] K74.60    Gastritis with hemorrhage due to alcohol K29.21    Pyloric ulcer K25.9    Duodenal ulcer, acute K26.3    History of GI bleed Z87.19    Esophageal ulcer K22.10 Pulse: 108 115 115 112   Resp: 23 22 15 20   Temp:    98.5 °F (36.9 °C)   TempSrc:    Temporal   SpO2: 92% 91% 93% 92%   Weight:       Height:                                                  BP Readings from Last 3 Encounters:   02/27/21 96/62   02/12/21 125/73   02/08/21 129/79       NPO Status:                                                                                 BMI:   Wt Readings from Last 3 Encounters:   02/27/21 250 lb (113.4 kg)   02/09/21 260 lb (117.9 kg)   02/08/21 272 lb (123.4 kg)     Body mass index is 32.98 kg/m². CBC:   Lab Results   Component Value Date    WBC 8.4 02/27/2021    RBC 4.24 02/27/2021    HGB 12.7 02/27/2021    HCT 37.3 02/27/2021    MCV 88.0 02/27/2021    RDW 12.8 02/27/2021    PLT 93 02/27/2021       CMP:   Lab Results   Component Value Date     02/27/2021    K 3.2 02/27/2021    CL 93 02/27/2021    CO2 20 02/27/2021    BUN 24 02/27/2021    CREATININE 1.6 02/27/2021    GFRAA 56 02/27/2021    LABGLOM 46 02/27/2021    GLUCOSE 338 02/27/2021    PROT 6.0 02/27/2021    CALCIUM 8.0 02/27/2021    BILITOT 0.5 02/27/2021    ALKPHOS 307 02/27/2021    AST 19 02/27/2021    ALT 17 02/27/2021       POC Tests: No results for input(s): POCGLU, POCNA, POCK, POCCL, POCBUN, POCHEMO, POCHCT in the last 72 hours.     Coags:   Lab Results   Component Value Date    PROTIME 15.1 02/27/2021    INR 1.19 02/27/2021    APTT 36.4 02/27/2021       HCG (If Applicable): No results found for: PREGTESTUR, PREGSERUM, HCG, HCGQUANT     ABGs: No results found for: PHART, PO2ART, RAO7TTM, VNO5LLA, BEART, K7XANRXW     Type & Screen (If Applicable):  No results found for: LABABO, LABRH    Drug/Infectious Status (If Applicable):  No results found for: HIV, HEPCAB    COVID-19 Screening (If Applicable):   Lab Results   Component Value Date    COVID19 Not Detected 02/27/2021         Anesthesia Evaluation  Patient summary reviewed and Nursing notes reviewed  Airway: Mallampati: II  TM distance: >3 FB Neck ROM: full  Mouth opening: > = 3 FB Dental:          Pulmonary:Negative Pulmonary ROS and normal exam                               Cardiovascular:                      Neuro/Psych:   (+) psychiatric history: stable with treatmentdepression/anxiety             GI/Hepatic/Renal:   (+) PUD, hepatitis: C, liver disease: esophageal varices, morbid obesity         ROS comment: History of liver transplant. Endo/Other:    (+) DiabetesType II DM, , : arthritis: OA and no interval change. , . Abdominal:   (+) obese,         Vascular:                                        Anesthesia Plan      general     ASA 3 - emergent       Induction: intravenous. MIPS: Postoperative opioids intended and Prophylactic antiemetics administered. Anesthetic plan and risks discussed with patient.                       Jonathan Singh, APRN - CRNA   2/27/2021

## 2021-02-27 NOTE — ED PROVIDER NOTES
[facility] EMERGENCY DEPT  EMERGENCY DEPARTMENT ENCOUNTER      Pt Name: Dimitri Santillan  MRN: [id number]  [place] 1970  Date of evaluation: 2/27/2021  Provider: Tiara Menjivar MD    CHIEF COMPLAINT       Chief Complaint   Patient presents with    Hip Pain     c/o left hip pain poor historian, recent hospitalization         HISTORY OF PRESENT ILLNESS   (Location/Symptom, Timing/Onset,Context/Setting, Quality, Duration, Modifying Factors, Severity)  Note limiting factors. Dimitri Santillan is a 48 y.o. male who presents to the emergency department with complaint of left hip pain that has been gradually worsening over the last 5 days. Patient was recently hospitalized after having Streptococcus bacteremia with suspected source of abdominal wall infection. Patient states the wounds on the abdominal wall healing and seem to be doing much better states he started having pain in the hip 5 days ago that has gradually worsened since then to the point now where he cannot move the hip or bear weight without severe pain. States he did have a fever 2 days ago but none since then. Still taking oral antibiotics and thinks he has about 4 days left on the course. Of note patient also has a history of liver cirrhosis with history of liver transplant. During recent hospitalization was also found to have C. difficile colitis after he developed diarrhea. Patient has been on oral Keflex since discharge for Streptococcus agalactiae that grew out in the blood cultures. Per medical records, patient should have finished the Keflex over a week ago but seems like he is still taking oral vancomycin for the C. difficile colitis. HPI    NursingNotes were reviewed. REVIEW OF SYSTEMS    (2-9 systems for level 4, 10 or more for level 5)     Review of Systems   Constitutional: Positive for fever. Negative for fatigue. HENT: Negative for congestion, rhinorrhea and voice change. Eyes: Negative for pain and redness.    Respiratory: Negative for cough and shortness of breath. Cardiovascular: Negative for chest pain. Gastrointestinal: Negative for abdominal pain, diarrhea and vomiting. Endocrine: Negative. Genitourinary: Negative. Musculoskeletal: Positive for arthralgias. Negative for gait problem. Skin: Negative for rash and wound. Neurological: Negative for headaches. Hematological: Negative. Psychiatric/Behavioral: Negative. All other systems reviewed and are negative. A complete review of systems was performed and is negative except as noted above in the HPI. PAST MEDICAL HISTORY     Past Medical History:   Diagnosis Date    Antral vascular ectasia     Appendicitis 2009    ruptured - no surgery done    Cirrhosis ([facility] [address]     Duodenal ulcer     Esophageal varices (HCC)     grade 2    Hepatitis C     Hernia     History of GI bleed     Liver disease     Personal history of ascites     Portal hypertensive gastropathy (HCC)     Thrombocytopenia (HCC)          SURGICAL HISTORY       Past Surgical History:   Procedure Laterality Date    UPPER GASTROINTESTINAL ENDOSCOPY  4/16/2013    Dr.Jaiyeola    UPPER GASTROINTESTINAL ENDOSCOPY  5-9-13    Dr Eric Denton       Previous Medications    BASAGLAR KWIKPEN 100 UNIT/ML INJECTION PEN    Inject 12 Units into the skin nightly    BISMUTH TRIBROMOPH-PETROLATUM (XEROFORM PETROLATUM GAUZE 5\"X9\") MISC EXTERNAL PADS    Apply 2 each topically daily    BLOOD GLUCOSE MONITOR STRIPS    Test 4 times a day & as needed for symptoms of irregular blood glucose. Dispense sufficient amount for indicated testing frequency plus additional to accommodate PRN testing needs. LRW53.836 Z79.4    GABAPENTIN (NEURONTIN) 100 MG CAPSULE    Take 1 capsule by mouth 3 times daily for 30 days.     INSULIN PEN NEEDLE (MEIJER PEN NEEDLES) 31G X 6 MM MISC    1 each by Does not apply route daily    LANCETS MISC    Use to test blood glucose four times a day and as needed for hypoglycemic events  DXE11.622 Z79.4    NOVOLOG FLEXPEN 100 UNIT/ML INJECTION PEN    Inject 1-6 Units into the skin 3 times daily (before meals)    OMEPRAZOLE (PRILOSEC) 20 MG DELAYED RELEASE CAPSULE    Take 1 capsule by mouth daily    PREDNISONE (DELTASONE) 10 MG TABLET    Take 10 mg by mouth 2 times daily     TACROLIMUS (PROGRAF) 1 MG CAPSULE    TAKE 3 CAPSULES BY MOUTH IN THE MORNING AND 3 CAPSULES BY MOUTH IN THE EVENING    VANCOMYCIN (VANCOCIN) 250 MG CAPSULE    Take 1 capsule by mouth 3 times daily for 14 days       ALLERGIES     Patient has no known allergies.     FAMILY HISTORY       Family History   Problem Relation Age of Onset    Breast Cancer Mother 61   Luana Draft Emphysema Father           SOCIAL HISTORY       Social History     Socioeconomic History    Marital status: Single     Spouse name: Not on file    Number of children: Not on file    Years of education: Not on file    Highest education level: Not on file   Occupational History    Not on file   Social Needs    Financial resource strain: Not on file    Food insecurity     Worry: Not on file     Inability: Not on file    Transportation needs     Medical: Not on file     Non-medical: Not on file   Tobacco Use    Smoking status: Current Some Day Smoker     Packs/day: 1.00     Years: 25.00     Pack years: 25.00     Start date: 1994     Last attempt to quit: 2012     Years since quittin.4    Smokeless tobacco: Never Used    Tobacco comment: works as a    Substance and Sexual Activity    Alcohol use: No     Comment: quit x 8 months ago with one relapse, former ETOH    Drug use: No    Sexual activity: Not on file   Lifestyle    Physical activity     Days per week: Not on file     Minutes per session: Not on file    Stress: Not on file   Relationships    Social connections     Talks on phone: Not on file     Gets together: Not on file     Attends Anabaptism service: Not on file     Active member of club or organization: Not on file     Attends meetings of clubs or organizations: Not on file     Relationship status: Not on file    Intimate partner violence     Fear of current or ex partner: Not on file     Emotionally abused: Not on file     Physically abused: Not on file     Forced sexual activity: Not on file   Other Topics Concern    Not on file   Social History Narrative    Not on file       SCREENINGS             PHYSICAL EXAM    (up to 7 for level 4, 8 or more for level 5)     ED Triage Vitals   BP Temp Temp src Pulse Resp SpO2 Height Weight   -- -- -- -- -- -- -- --       Physical Exam  Vitals signs and nursing note reviewed. Constitutional:       General: He is not in acute distress. Appearance: Normal appearance. He is well-developed. He is not diaphoretic. HENT:      Head: Normocephalic and atraumatic. Mouth/Throat:      Pharynx: No oropharyngeal exudate. Eyes:      General: No scleral icterus. Pupils: Pupils are equal, round, and reactive to light. Neck:      Musculoskeletal: Normal range of motion. Trachea: No tracheal deviation. Cardiovascular:      Rate and Rhythm: Normal rate. Pulses: Normal pulses. Heart sounds: Normal heart sounds. Pulmonary:      Effort: Pulmonary effort is normal.      Breath sounds: Normal breath sounds. No stridor. No wheezing or rhonchi. Abdominal:      General: There is no distension. Palpations: Abdomen is soft. Abdomen is not rigid. Tenderness: There is no abdominal tenderness. There is no guarding. Hernia: No hernia is present. Musculoskeletal:         General: No deformity. Left hip: He exhibits decreased range of motion. Comments: No obvious external abnormalities over the LEFT hip. There is severely reduced range of motion with severe pain with minimal passive range of motion of the left hip.       Normal range of motion including internal and external rotation of the RIGHT  hip without difficulty   Skin:     General: Skin is warm and dry. Findings: No rash. Comments: Skin over abdominal wound appears to be healing with no warmth, redness, drainage   Neurological:      Mental Status: He is alert and oriented to person, place, and time. Cranial Nerves: No cranial nerve deficit. Coordination: Coordination normal.   Psychiatric:         Behavior: Behavior normal.         DIAGNOSTIC RESULTS     EKG: All EKG's are interpreted by the Emergency Department Physician who either signs or Co-signs this chart in the absence of a cardiologist.      RADIOLOGY:   Non-plain film images such as CT, Ultrasound and MRI are read by the radiologist. Alicia Saldivar images are visualized and preliminarily interpreted by the emergency physician with the below findings:      Interpretation per the Radiologist below, if available at the time of this note:    XR CHEST PORTABLE   Final Result   No evidence of acute cardiopulmonary process. Signed by Dr Michelle Guadalupe on 2/27/2021 1:49 PM      XR HIP 2-3 VW W PELVIS LEFT   Final Result   No acute bony abnormality appreciated.    Signed by Dr Michelle Guadalupe on 2/27/2021 1:53 PM            ED BEDSIDE ULTRASOUND:   Performed by ED Physician - none    LABS:  Labs Reviewed   CBC WITH AUTO DIFFERENTIAL - Abnormal; Notable for the following components:       Result Value    RBC 4.24 (*)     Hemoglobin 12.7 (*)     Hematocrit 37.3 (*)     Platelets 93 (*)     Lymphocytes % 1.0 (*)     Neutrophils Absolute 8.1 (*)     Lymphocytes Absolute 0.1 (*)     Bands Relative 38 (*)     Metamyelocytes Relative 1 (*)     Vacuolated Neutrophils Occasional (*)     All other components within normal limits   COMPREHENSIVE METABOLIC PANEL W/ REFLEX TO MG FOR LOW K - Abnormal; Notable for the following components:    Sodium 127 (*)     Potassium reflex Magnesium 3.2 (*)     Chloride 93 (*)     CO2 20 (*)     Glucose 338 (*)     BUN 24 (*)     CREATININE 1.6 (*)     GFR Non- 46 (*)     GFR  American 56 (*)     Calcium 8.0 (*)     Total Protein 6.0 (*)     Albumin 2.3 (*)     Alkaline Phosphatase 307 (*)     All other components within normal limits   LACTATE, SEPSIS - Abnormal; Notable for the following components:    Lactic Acid, Sepsis 3.7 (*)     All other components within normal limits    Narrative:     CALL  Ramos  KLED tel. ,  Chemistry results called to and read back by Jamila Rivera in ED, 02/27/2021  14:02, by ALCIDES   PROTIME-INR - Abnormal; Notable for the following components:    Protime 15.1 (*)     INR 1.19 (*)     All other components within normal limits   APTT - Abnormal; Notable for the following components:    aPTT 36.4 (*)     All other components within normal limits   BRAIN NATRIURETIC PEPTIDE - Abnormal; Notable for the following components:    Pro-BNP 1,260 (*)     All other components within normal limits   SEDIMENTATION RATE - Abnormal; Notable for the following components:    Sed Rate 82 (*)     All other components within normal limits   C-REACTIVE PROTEIN - Abnormal; Notable for the following components:    CRP 33.96 (*)     All other components within normal limits   MAGNESIUM - Abnormal; Notable for the following components:    Magnesium 0.9 (*)     All other components within normal limits   COVID-19, RAPID   CULTURE, BLOOD 1   CULTURE, BLOOD 2   LACTATE, SEPSIS       All other labs were within normal range or not returned as of this dictation.     Medications   lactated ringers bolus (0 mLs Intravenous Stopped 2/27/21 1539)   morphine injection 4 mg (4 mg Intravenous Given 2/27/21 1422)   ondansetron (ZOFRAN) injection 4 mg (4 mg Intravenous Given 2/27/21 1422)       EMERGENCY DEPARTMENT COURSE and DIFFERENTIALDIAGNOSIS/MDM:   Vitals:    Vitals:    02/27/21 1420 02/27/21 1433 02/27/21 1503 02/27/21 1533   BP: (!) 91/58 (!) 92/58 (!) 84/51 81/63   Pulse: 113 123 109 108   Resp: 18 15 27 23   Temp: 98.6 °F (37 °C)      TempSrc: Oral      SpO2: 95% 93% 91% 92%   Weight: 250 lb (113.4 kg)      Height: 6' 1\" (1.854 m)          [facility]    ED Course as of Feb 27 1555   Sat Feb 27, 2021   1435 History and physical exam concerning for septic arthritis of the left hip given recent bacteremia and significantly decreased range of motion to active and passive movement. Labs show elevated lactate at 3.7 with elevation in inflammatory markers. White blood cell count is normal, though was not significantly elevated when patient was recently admitted with confirmed bacteremia. Orthopedics was consulted who agrees with concern for left hip arthritis and plans for the OR for tap of the left hip and likely washout this afternoon. [HARLEEN]   1435 Pressure has trended down since arrival here to systolic in the 62T though has stabilized for the last several minutes after initiating IV fluids. [HARLEEN]      ED Course User Index  [HARLEEN] Sha Jasso MD     Case was discussed with orthopedic surgery who specifically request antibiotics be withheld until cultures can be obtained at which point antibiotics will be initiated. Based on the evaluation and work-up here patient is felt to require further monitoring, work-up, or treatment that is available in the emergency department. Case was discussed with hospitalist who agrees for observation or admission for further management. Treatment and stabilization as necessary were provided in the emergency department prior to transfer of care to the medicine service. CONSULTS:  PHARMACY TO DOSE VANCOMYCIN  IP CONSULT TO INFECTIOUS DISEASES  IP CONSULT TO GI    PROCEDURES:  Unless otherwise notedbelow, none     Procedures  CRITICAL CARE TIME   Total Critical Care time was 60 minutes, excluding separately reportable procedures. There was a high probability of clinically significant/life threatening deterioration in the patient's condition which required my urgent intervention. FINAL IMPRESSION     1. Acute hip pain, left    2.  Sepsis, due to unspecified organism, unspecified whether acute organ dysfunction present ([facility] [place] [address]    3. Acute renal insufficiency    4. Acute hyponatremia    5. Type 2 diabetes mellitus with hyperglycemia, with long-term current use of insulin (HCC)    6. History of liver transplant ([facility] [place] [address]    7. History of bacteremia    8. Chronic wound infection of abdomen, initial encounter    9. Hypomagnesemia    10. Streptococcal arthritis of left hip (HCC)    11. Lactic acidosis          DISPOSITION/PLAN   DISPOSITION        PATIENT REFERRED TO:  No follow-up provider specified.     DISCHARGE MEDICATIONS:  New Prescriptions    No medications on file          (Please note that portions of this note were completed with a voice recognition program.  Efforts were made to edit the dictations butoccasionally words are mis-transcribed.)    Jasmin Mai MD (electronically signed)  AttendingEmergency Physician    te     Jasmin Rubin MD  02/27/21 [address] Jennifer Deleon MD  02/27/21 [id number]

## 2021-02-27 NOTE — ED NOTES
Pt currently hypotensive, Dr. Nandini Wyatt notified. Pt remains alert and oriented.       Alexus Acosta RN  02/27/21 [id number]

## 2021-02-27 NOTE — ED NOTES
Surgery here to get patient. 3rd floor called to inform patient going to surgery at this time at will be to room 317 afterwards.       Barb Pan RN  02/27/21 [id number]

## 2021-02-27 NOTE — H&P
Hospital Medicine  History and Physical    Patient:  Codey Sykes  MRN: [id number]    CHIEF COMPLAINT:  Fever, left hip pain    History Obtained From: patient, chart    INTERIM HISTORY AND PHYSICAL FROM THAT DATED February 9, 2021    PCP: DINORAH Lares NP    HISTORY OF PRESENT ILLNESS:  48year old white male presents to the emergency department with complaint of progressive left hip pain x2.5 days, progressing from the point of no pain to where he is unable to stand and needs to be lifted. He was recently admitted for septicemia due to Streptococcus agalactiae. He was treated with ceftriaxone and developed Clostridioides difficile diarrhea. He was discharged 2-12 on Keflex for 3 days and oral vancomycin. He states he was doing well until he developed fever and chills this week, followed by hip pain. No treatment, no exacerbating or relieving factors. History is otherwise unchanged. REVIEW OF SYSTEMS:  14 systems reviewed and unchanged except as noted above. Past Medical History:      Diagnosis Date    Antral vascular ectasia     Appendicitis 2009    ruptured - no surgery done    Cirrhosis ([facility] [address] [facility]     Duodenal ulcer     Esophageal varices (HCC)     grade 2    Hepatitis C     Hernia     History of GI bleed     Liver disease     Personal history of ascites     Portal hypertensive gastropathy (HCC)     Thrombocytopenia (HCC)        Past Surgical History:      Procedure Laterality Date    UPPER GASTROINTESTINAL ENDOSCOPY  4/16/2013    Dr.Jaiyeola    UPPER GASTROINTESTINAL ENDOSCOPY  5-9-13    Dr Jacinto Lab       Medications Prior to Admission:    Prior to Admission medications    Medication Sig Start Date End Date Taking?  Authorizing Provider   Rosi Huh 100 UNIT/ML injection pen Inject 12 Units into the skin nightly 2/15/21   DINORAH Castillo NP   NOVOLOG FLEXPEN 100 UNIT/ML injection pen Inject 1-6 Units into the skin 3 times daily (before meals) 2/15/21   DINORAH Castillo NP   vancomycin (VANCOCIN) 250 MG capsule Take 1 capsule by mouth 3 times daily for 14 days 2/15/21 3/1/21  DINORAH Quintero NP   Bismuth Tribromoph-Petrolatum (XEROFORM PETROLATUM GAUZE 5\"X9\") MISC external pads Apply 2 each topically daily 2/13/21   Jess Hammond PA-C   Insulin Pen Needle (MEIJER PEN NEEDLES) 31G X 6 MM MISC 1 each by Does not apply route daily 10/13/20   DINORAH De La Fuente   omeprazole (PRILOSEC) 20 MG delayed release capsule Take 1 capsule by mouth daily 10/13/20   DINORAH De La Fuente   gabapentin (NEURONTIN) 100 MG capsule Take 1 capsule by mouth 3 times daily for 30 days. 10/13/20 2/15/21  DINORAH De La Fuente   Lancets MISC Use to test blood glucose four times a day and as needed for hypoglycemic events  DXE11.622 Z79.4 9/4/20   DINORAH Quintero NP   blood glucose monitor strips Test 4 times a day & as needed for symptoms of irregular blood glucose. Dispense sufficient amount for indicated testing frequency plus additional to accommodate PRN testing needs. WWO49.618 Z79.4 9/4/20   DINROAH Quintero NP   predniSONE (DELTASONE) 10 MG tablet Take 10 mg by mouth 2 times daily     Historical Provider, MD   tacrolimus (PROGRAF) 1 MG capsule TAKE 3 CAPSULES BY MOUTH IN THE MORNING AND 3 CAPSULES BY MOUTH IN THE EVENING 1/11/20   Historical Provider, MD       Allergies:  Patient has no known allergies. Social History:   TOBACCO:   reports that he has been smoking. He started smoking about 26 years ago. He has a 25.00 pack-year smoking history. He has never used smokeless tobacco.  ETOH:   reports no history of alcohol use.     Family History:       Problem Relation Age of Onset    Breast Cancer Mother 61    Emphysema Father            Physical Exam:    Vitals: BP (!) 91/58   Pulse 113   Temp 98.6 °F (37 °C) (Oral)   Resp 18   Ht 6' 1\" (1.854 m)   Wt 250 lb (113.4 kg)   SpO2 95%   BMI 32.98 kg/m²   24HR INTAKE/OUTPUT:  No intake or output data in the 24 hours ending 02/27/21 1534  General appearance: alert and cooperative with exam  HEENT: atraumatic, eyes with clear conjunctiva and normal lids, pupils and irises normal, external ears and nose are normal, lips normal. Neck without masses, lympadenopathy, bruit, thyroid normal  Lungs: no increased work of breathing, clear to auscultation bilaterally without rales, rhonchi or wheezes  Heart: regular rate and rhythm, S1, S2 normal, no murmur, click, rub or gallop  Abdomen: soft, non-tender; bowel sounds normal; no masses,  no organomegaly and obese  Extremities: impaired range of motion left hip in all fields of motion  Neurologic: No focal neurologic deficits, normal sensation, alert and oriented, affect and mood appropriate. Skin: no rashes, nodules. CBC:   Recent Labs     02/27/21  1330   WBC 8.4   HGB 12.7*   PLT 93*     BMP:    Recent Labs     02/27/21  1330   *   K 3.2*   CL 93*   CO2 20*   BUN 24*   CREATININE 1.6*   GLUCOSE 338*     Hepatic:   Recent Labs     02/27/21  1330   AST 19   ALT 17   BILITOT 0.5   ALKPHOS 307*     Troponin T: No results for input(s): TROPONINI in the last 72 hours. ABGs: No results for input(s): PH, PCO2, PO2, HCO3, BE, O2SAT in the last 72 hours. INR:   Recent Labs     02/27/21  1330   INR 1.19*     Lactic Acid: No results for input(s): LACTA in the last 72 hours. -----------------------------------------------------------------  PA/lat CXR: no acute process    Assessment and Plan   Principal Problem:    [facility]  Active Problems:    History of cirrhosis    History of hepatitis C virus infection    History of liver transplant ([facility]    Type 2 diabetes mellitus with skin complication, with long-term current use of insulin (HCC)  Resolved Problems:    * No resolved hospital problems. *      Admit to med/surg. Day #1 vancomycin empiric therapy for septic arthritis with recent history of septicemia.  To be given after orthopedic surgery has obtained fluid

## 2021-02-28 LAB
ALBUMIN SERPL-MCNC: 2 G/DL (ref 3.5–5.2)
ALP BLD-CCNC: 177 U/L (ref 40–130)
ALT SERPL-CCNC: 14 U/L (ref 5–41)
ANION GAP SERPL CALCULATED.3IONS-SCNC: 15 MMOL/L (ref 7–19)
AST SERPL-CCNC: 19 U/L (ref 5–40)
BANDED NEUTROPHILS RELATIVE PERCENT: 10 % (ref 0–5)
BASOPHILS ABSOLUTE: 0 K/UL (ref 0–0.2)
BASOPHILS RELATIVE PERCENT: 0 % (ref 0–1)
BILIRUB SERPL-MCNC: 0.3 MG/DL (ref 0.2–1.2)
BUN BLDV-MCNC: 26 MG/DL (ref 6–20)
CALCIUM SERPL-MCNC: 7.3 MG/DL (ref 8.6–10)
CHLORIDE BLD-SCNC: 92 MMOL/L (ref 98–111)
CO2: 22 MMOL/L (ref 22–29)
CREAT SERPL-MCNC: 1.8 MG/DL (ref 0.5–1.2)
EOSINOPHILS ABSOLUTE: 0 K/UL (ref 0–0.6)
EOSINOPHILS RELATIVE PERCENT: 0 % (ref 0–5)
GFR AFRICAN AMERICAN: 48
GFR NON-AFRICAN AMERICAN: 40
GLUCOSE BLD-MCNC: 205 MG/DL (ref 70–99)
GLUCOSE BLD-MCNC: 217 MG/DL (ref 70–99)
GLUCOSE BLD-MCNC: 280 MG/DL (ref 70–99)
GLUCOSE BLD-MCNC: 290 MG/DL (ref 70–99)
GLUCOSE BLD-MCNC: 351 MG/DL (ref 74–109)
HCT VFR BLD CALC: 36.2 % (ref 42–52)
HEMOGLOBIN: 12 G/DL (ref 14–18)
IMMATURE GRANULOCYTES #: 0.1 K/UL
LACTIC ACID: 1.8 MMOL/L (ref 0.5–1.9)
LYMPHOCYTES ABSOLUTE: 0.8 K/UL (ref 1.1–4.5)
LYMPHOCYTES RELATIVE PERCENT: 9 % (ref 20–40)
MCH RBC QN AUTO: 29.4 PG (ref 27–31)
MCHC RBC AUTO-ENTMCNC: 33.1 G/DL (ref 33–37)
MCV RBC AUTO: 88.7 FL (ref 80–94)
MONOCYTES ABSOLUTE: 0.2 K/UL (ref 0–0.9)
MONOCYTES RELATIVE PERCENT: 2 % (ref 0–10)
NEUTROPHILS ABSOLUTE: 8.4 K/UL (ref 1.5–7.5)
NEUTROPHILS RELATIVE PERCENT: 79 % (ref 50–65)
OVALOCYTES: ABNORMAL
PDW BLD-RTO: 13 % (ref 11.5–14.5)
PERFORMED ON: ABNORMAL
PLATELET # BLD: 63 K/UL (ref 130–400)
PLATELET SLIDE REVIEW: ABNORMAL
PMV BLD AUTO: 11.2 FL (ref 9.4–12.4)
POTASSIUM REFLEX MAGNESIUM: 4.3 MMOL/L (ref 3.5–5)
RBC # BLD: 4.08 M/UL (ref 4.7–6.1)
SODIUM BLD-SCNC: 129 MMOL/L (ref 136–145)
TOTAL PROTEIN: 4.6 G/DL (ref 6.6–8.7)
WBC # BLD: 9.4 K/UL (ref 4.8–10.8)

## 2021-02-28 PROCEDURE — 80053 COMPREHEN METABOLIC PANEL: CPT

## 2021-02-28 PROCEDURE — 2580000003 HC RX 258: Performed by: INTERNAL MEDICINE

## 2021-02-28 PROCEDURE — 99222 1ST HOSP IP/OBS MODERATE 55: CPT | Performed by: INTERNAL MEDICINE

## 2021-02-28 PROCEDURE — 6360000002 HC RX W HCPCS: Performed by: INTERNAL MEDICINE

## 2021-02-28 PROCEDURE — 82947 ASSAY GLUCOSE BLOOD QUANT: CPT

## 2021-02-28 PROCEDURE — 6370000000 HC RX 637 (ALT 250 FOR IP): Performed by: FAMILY MEDICINE

## 2021-02-28 PROCEDURE — 2580000003 HC RX 258: Performed by: ORTHOPAEDIC SURGERY

## 2021-02-28 PROCEDURE — 6360000002 HC RX W HCPCS: Performed by: ORTHOPAEDIC SURGERY

## 2021-02-28 PROCEDURE — 83605 ASSAY OF LACTIC ACID: CPT

## 2021-02-28 PROCEDURE — 0S9B0ZZ DRAINAGE OF LEFT HIP JOINT, OPEN APPROACH: ICD-10-PCS | Performed by: ORTHOPAEDIC SURGERY

## 2021-02-28 PROCEDURE — 36415 COLL VENOUS BLD VENIPUNCTURE: CPT

## 2021-02-28 PROCEDURE — 85025 COMPLETE CBC W/AUTO DIFF WBC: CPT

## 2021-02-28 PROCEDURE — 1210000000 HC MED SURG R&B

## 2021-02-28 PROCEDURE — 6370000000 HC RX 637 (ALT 250 FOR IP): Performed by: ORTHOPAEDIC SURGERY

## 2021-02-28 PROCEDURE — 6360000002 HC RX W HCPCS: Performed by: FAMILY MEDICINE

## 2021-02-28 RX ADMIN — INSULIN LISPRO 4 UNITS: 100 INJECTION, SOLUTION INTRAVENOUS; SUBCUTANEOUS at 17:18

## 2021-02-28 RX ADMIN — OXYCODONE 20 MG: 5 TABLET ORAL at 13:17

## 2021-02-28 RX ADMIN — INSULIN LISPRO 6 UNITS: 100 INJECTION, SOLUTION INTRAVENOUS; SUBCUTANEOUS at 13:18

## 2021-02-28 RX ADMIN — INSULIN LISPRO 6 UNITS: 100 INJECTION, SOLUTION INTRAVENOUS; SUBCUTANEOUS at 08:46

## 2021-02-28 RX ADMIN — GABAPENTIN 100 MG: 100 CAPSULE ORAL at 20:00

## 2021-02-28 RX ADMIN — SODIUM CHLORIDE: 9 INJECTION, SOLUTION INTRAVENOUS at 08:39

## 2021-02-28 RX ADMIN — HYDROMORPHONE HYDROCHLORIDE 1 MG: 1 INJECTION, SOLUTION INTRAMUSCULAR; INTRAVENOUS; SUBCUTANEOUS at 05:47

## 2021-02-28 RX ADMIN — GABAPENTIN 100 MG: 100 CAPSULE ORAL at 13:17

## 2021-02-28 RX ADMIN — PANTOPRAZOLE SODIUM 40 MG: 40 TABLET, DELAYED RELEASE ORAL at 05:47

## 2021-02-28 RX ADMIN — Medication 2000 MG: at 01:44

## 2021-02-28 RX ADMIN — Medication 2000 MG: at 08:39

## 2021-02-28 RX ADMIN — ENOXAPARIN SODIUM 40 MG: 40 INJECTION SUBCUTANEOUS at 05:47

## 2021-02-28 RX ADMIN — OXYCODONE 20 MG: 5 TABLET ORAL at 08:45

## 2021-02-28 RX ADMIN — DOCUSATE SODIUM 50 MG AND SENNOSIDES 8.6 MG 1 TABLET: 8.6; 5 TABLET, FILM COATED ORAL at 20:00

## 2021-02-28 RX ADMIN — CEFAZOLIN 1000 MG: 1 INJECTION, POWDER, FOR SOLUTION INTRAMUSCULAR; INTRAVENOUS at 17:17

## 2021-02-28 RX ADMIN — OXYCODONE 20 MG: 5 TABLET ORAL at 01:48

## 2021-02-28 RX ADMIN — OXYCODONE 20 MG: 5 TABLET ORAL at 17:18

## 2021-02-28 RX ADMIN — Medication 12 UNITS: at 22:47

## 2021-02-28 RX ADMIN — HYDROMORPHONE HYDROCHLORIDE 1 MG: 1 INJECTION, SOLUTION INTRAMUSCULAR; INTRAVENOUS; SUBCUTANEOUS at 20:01

## 2021-02-28 RX ADMIN — INSULIN LISPRO 2 UNITS: 100 INJECTION, SOLUTION INTRAVENOUS; SUBCUTANEOUS at 20:07

## 2021-02-28 RX ADMIN — GABAPENTIN 100 MG: 100 CAPSULE ORAL at 08:39

## 2021-02-28 ASSESSMENT — PAIN SCALES - GENERAL
PAINLEVEL_OUTOF10: 10
PAINLEVEL_OUTOF10: 8
PAINLEVEL_OUTOF10: 10

## 2021-02-28 NOTE — CONSULTS
[facility] [address], [address] [facility]                                  CONSULTATION    PATIENT NAME: Gaby Knight                      :        1970  MED REC NO:   [id number]                              ROOM:       [facility]  ACCOUNT NO:   [de-identified]                           ADMIT DATE: 2021  PROVIDER:     Soco Jameson MD      CONSULT DATE:  2021    ORTHOPEDIC SURGERY CONSULTATION    TIME:  Approximately 1:30 p.m. REASON FOR REFERRAL:  Possible septic left hip. HISTORY:  This is a 58-year-old male who presented to the emergency room  with about a 2-1/2-day history of worsening pain in the left hip. The  patient said that it started out as pain in the groin region and now,  this morning, he could not even bear weight on it. Of significance, the  patient has recently been hospitalized and was discharged on 2021  for Streptococcus bacteremia. The septic source was an abdominal wall  infection. He has had progressive pain now in the left hip. Pertinent  positives, the patient has a history of liver cirrhosis, liver  transplant. During the recent hospitalization, he was also found to  have C. diff colitis and developed diarrhea. The patient has been on  oral Keflex since being discharged with Streptococcus agalactiae that  grew out in his blood cultures during that hospitalization. PAST MEDICAL HISTORY:  Appendicitis, cirrhosis, duodenal ulcers,  esophageal varices, hepatitis C, hernia repair, history of GI bleed,  history of ascites, thrombocytopenia. He is also a diabetic. PAST SURGICAL HISTORY:  Surgeries include liver transplant in the past.    MEDICATIONS:  Include insulin, gabapentin, omeprazole, prednisone,  vancomycin. He is on an immunosuppressant called Prograf and he is also  on prednisone. PHYSICAL EXAMINATION:  GENERAL:  On exam, this is a 58-year-old gentleman, alert and oriented  x3. MUSCULOSKELETAL:  As he lies in bed, he has pain with any rotation of  the left hip at all. He has no tenderness on the knee. He is able to  dorsiflex and plantar flex the left foot. Capillary refill is good in  the left foot. LABORATORY DATA:  Shows a white cell count of 8.4, hemoglobin of 12.7,  and platelets are 27,091. His CRP is elevated at 33. 96.    X-rays from today reveal a normal-appearing left hip with no evidence of  any fracture or arthritic process. IMPRESSION:  1. Probable septic left hip. 2.  Recent hospitalization for Streptococcus agalactiae bacteremia,  recently discharged on 02/12/2021. PLAN:  At this point, I talked to the patient and we will have to take  him emergently to the operating room for hip aspiration, probable I and  D of left hip. We talked about the risks, benefits, alternatives and  prognosis, and we will proceed with this as soon as possible.         Jose Roberto Correia MD    D: 02/27/2021 20:18:30      T: 02/27/2021 22:19:49     [email or web address]  Job#: [id number]     Doc#: [id number]    CC:

## 2021-02-28 NOTE — CONSULTS
[facility] DAYANA [address], [address] [facility]                                  CONSULTATION    PATIENT NAME: Marley Headley                      :        1970  MED REC NO:   [id number]                              ROOM:       [facility]  ACCOUNT NO:   [de-identified]                           ADMIT DATE: 2021  PROVIDER:     Claudette Bernard MD    CONSULT DATE:  2021    ASSESSMENT:  1. Liver transplant for end-stage cirrhosis with hepatitis C 5 years  [place] [facility], maintained on chronic immunosuppressive therapy. 2.  Chronic superficial abdominal wound. 3.  New septic arthritis of the hip.                                                            4.  Increased risk for Avascular necrosis of the hips on Prednisone. RECOMMENDATIONS:  The electronic medical records indicate that the  patient has been on maintenance Prograf 1 mg 3 capsules p.o. b.i.d. and  prednisone 10 mg p.o. b.i.d. However, the patient states that his  medical insurance would not cover for Prograf and therefore, the patient  has been maintained on a different immunosuppressive drug for the last  year. He does not recall the name of the medication. I have called  [address] [place] [address] Transplant Department through telephone number  [phone] and left a message for them to give me a call with the  needed information concerning his most recent treatment. For the time  being, I have recommended that we continue the patient on Prograf 1 mg 3  capsules p.o. b.i.d., monitor drug levels, follow LFT's trend and gradually    taper Prednisone. HISTORY OF PRESENT ILLNESS:  This chronically-ill 80-year-old white male  has a background history of hepatitis C virus infection and cirrhosis  for which he underwent a liver transplant at the [address] [place] [address]  approximately 5 years ago.   Other GI disorders have included gastritis with hemorrhage due to alcohol abuse in the past, pyloric or duodenal  ulcer, GERD with esophageal ulcer, history of esophageal varices, and a  prior infection with streptococcal bacteremia. The patient has had a  previous hospitalization approximately 3 weeks ago here at [facility] for fever with a chronic open wound of the abdominal wall. Last admission, he had culture identifying beta strep bacteremia and  this was thought to be originating from abdominal wall cellulitis for  which he was treated with vancomycin. He is now admitted with signs and  symptoms of a septic arthritis of the left hip for which he has  undergone incision and drainage & antibiotics awaiting culture results. I am consulted to give advice concerning his immunosuppressive therapy  during this problem of septic arthritis and abdominal wound infection. The patient tells me that he was previously on Prograf 1 mg 3 capsules  p.o. daily up until a year ago at which time his insurance company would  not pay for the medication and therefore, he was switched to another  immunosuppressive agent of unknown name. The patient is unable to  inform me of the medication and dosage. He is also on maintenance  prednisone 10 mg p.o. b.i.d. prior to admission. His other GI problems with GERD, prior ulcer disease, and portal  hypertensive gastropathy and esophageal varices is currently not  symptomatic while maintained on Protonix. He does not take any NSAIDs. PAST MEDICAL HISTORY:  History of cirrhosis with liver transplant and  hepatitis C, hypertensive gastropathy, type 2 insulin-dependent diabetes  mellitus, prior ruptured appendicitis. ALLERGIES TO MEDICATIONS:  None known. CURRENT MAINTENANCE MEDICATIONS:  See admission medication  reconciliation list, which has been reviewed. SOCIAL HISTORY:  The patient lives with his girlfriend. He is a former  smoker of cigarettes, but now vapes.   There is no history of alcohol or

## 2021-02-28 NOTE — ANESTHESIA POSTPROCEDURE EVALUATION
Department of Anesthesiology  Postprocedure Note    Patient: Samantha Ledesma  MRN: [id number]  YOB: 1970  Date of evaluation: 2/27/2021  Time:  7:21 PM     Procedure Summary     Date: 02/27/21 Room / Location: [address]    Anesthesia Start: 0665 Anesthesia Stop: 1921    Procedure: HIP INCISION AND DRAINAGE (Left Hip) Diagnosis: (Left hip infection)    Surgeons: Josefina Patel MD Responsible Provider: DINORAH Cho CRNA    Anesthesia Type: general ASA Status: 3 - Emergent          Anesthesia Type: general    Ashwin Phase I:      Ashwin Phase II:      Last vitals: Reviewed and per EMR flowsheets.        Anesthesia Post Evaluation    Patient location during evaluation: bedside  Patient participation: complete - patient participated  Level of consciousness: awake and alert  Pain score: 0  Airway patency: patent  Nausea & Vomiting: no nausea  Complications: no  Cardiovascular status: hemodynamically stable  Respiratory status: acceptable  Hydration status: euvolemic

## 2021-02-28 NOTE — PROGRESS NOTES
Hospitalist Progress Note  2/28/2021 1:04 PM  Subjective:   Admit Date: 2/27/2021  PCP: DINORAH Ovalle NP    Chief Complaint: left hip pain    Subjective: Patient still complains of severe left hip pain, states he is still unable to move the hip significantly. I&D performed yesterday without complication. Denies new complaints, history is otherwise unchanged. Cumulative Hospital History:   2-27: Presents to ED with recurrent chills and fever with worsening left hip pain for 2.5 days. Recently hospitalized with Streptococcus agalactiae septicemia, treated with ceftriaxone. He developed C. difficile diarrhea on this and was discharged on cephalexin and oral vancomycin. Orthopedic surgery consulted for aspiration and likely I&D. Received one dose of vancomycin and was then placed on cefazolin postoperatively. 2-28: ID consulted, continue cefazolin. Fluid culture and sensitivity pending to guide therapy. PT and OT evaluations tomorrow. ROS: 14 point review of systems is negative except as specifically addressed above. DIET GENERAL;     Intake/Output Summary (Last 24 hours) at 2/28/2021 1304  Last data filed at 2/28/2021 1125  Gross per 24 hour   Intake 3621.42 ml   Output 850 ml   Net 2771.42 ml     Medications:   dextrose      sodium chloride 125 mL/hr at 02/28/21 1267     Current Facility-Administered Medications   Medication Dose Route Frequency Provider Last Rate Last Admin    ceFAZolin (ANCEF) 1,000 mg in sterile water 10 mL IV syringe  1,000 mg Intravenous Q8H Idris Ritchie MD        insulin glargine (LANTUS) injection vial 12 Units  12 Units Subcutaneous Nightly Lucia Diana DO   12 Units at 02/27/21 2321    gabapentin (NEURONTIN) capsule 100 mg  100 mg Oral TID Lucia Diana DO   100 mg at 02/28/21 0839    pantoprazole (PROTONIX) tablet 40 mg  40 mg Oral QAM AC Kurt Ramos DO   40 mg at 02/28/21 0547    sodium chloride flush 0.9 % injection 10 mL  10 mL Intravenous 2 times per day Lucia Diana DO  sodium chloride flush 0.9 % injection 10 mL  10 mL Intravenous PRN Ermias Luria, DO        acetaminophen (TYLENOL) tablet 650 mg  650 mg Oral Q6H PRN Ermias Luria, DO        Or    acetaminophen (TYLENOL) suppository 650 mg  650 mg Rectal Q6H PRN Ermias Luria, DO        insulin lispro (HUMALOG) injection vial 0-12 Units  0-12 Units Subcutaneous TID [facility] [place] Ramos, DO   6 Units at 02/28/21 0846    insulin lispro (HUMALOG) injection vial 0-6 Units  0-6 Units Subcutaneous Nightly [facility], DO   4 Units at 02/27/21 2321    glucose (GLUTOSE) 40 % oral gel 15 g  15 g Oral PRN [facility] Ramos, DO        dextrose 50 % IV solution  12.5 g Intravenous PRN Ermias Novakria, DO        glucagon (rDNA) injection 1 mg  1 mg Intramuscular PRN Ermias Novakria, DO        dextrose 5 % solution  100 mL/hr Intravenous PRN Ermias Novakria, DO        sodium chloride flush 0.9 % injection 10 mL  10 mL Intravenous 2 times per day Yaneth Montero MD        sodium chloride flush 0.9 % injection 10 mL  10 mL Intravenous PRN Yaneth Montero MD        0.9 % sodium chloride infusion   Intravenous Continuous Yaneth Montero  mL/hr at 02/28/21 0839 New Bag at 02/28/21 0839    oxyCODONE (ROXICODONE) immediate release tablet 10 mg  10 mg Oral Q4H PRN Yaneth Montero MD        Or    oxyCODONE (ROXICODONE) immediate release tablet 20 mg  20 mg Oral Q4H PRN Yaneth Montero MD   20 mg at 02/28/21 0845    HYDROmorphone HCl PF (DILAUDID) injection 0.5 mg  0.5 mg Intravenous Q3H PRN Yaneth Montero MD        Or    HYDROmorphone HCl PF (DILAUDID) injection 1 mg  1 mg Intravenous Q3H PRN Yaneth Montero MD   1 mg at 02/28/21 0547    HYDROmorphone HCl PF (DILAUDID) injection 1 mg  1 mg Intravenous Q3H PRN Yaneth Montero MD        Or    HYDROmorphone HCl PF (DILAUDID) injection 2 mg  2 mg Intravenous Q3H PRN Yaneth Montero MD        diphenhydrAMINE (BENADRYL) tablet 25 mg  25 mg Oral Q6H PRN Yaneth Montero MD        sennosides-docusate sodium (SENOKOT-S) 8.6-50 MG tablet 1 tablet  1 tablet Oral BID Jaycee Hong MD   1 tablet at 02/27/21 2142        Labs:     Recent Labs     02/27/21  1330 02/28/21  0142   WBC 8.4 9.4   RBC 4.24* 4.08*   HGB 12.7* 12.0*   HCT 37.3* 36.2*   MCV 88.0 88.7   MCH 30.0 29.4   MCHC 34.0 33.1   PLT 93* 63*     Recent Labs     02/27/21  1330 02/28/21  0143   * 129*   K 3.2* 4.3   ANIONGAP 14 15   CL 93* 92*   CO2 20* 22   BUN 24* 26*   CREATININE 1.6* 1.8*   GLUCOSE 338* 351*   CALCIUM 8.0* 7.3*     Recent Labs     02/27/21  1330   MG 0.9*     Recent Labs     02/27/21  1330 02/28/21  0143   AST 19 19   ALT 17 14   BILITOT 0.5 0.3   ALKPHOS 307* 177*     ABGs:No results for input(s): PH, PO2, PCO2, HCO3, BE, O2SAT in the last 72 hours. Troponin T: No results for input(s): TROPONINI in the last 72 hours.   INR:   Recent Labs     02/27/21  1330   INR 1.19*     Lactic Acid:   Recent Labs     02/28/21  0710   LACTA 1.8       Objective:   Vitals: /72   Pulse 92   Temp 99 °F (37.2 °C) (Temporal)   Resp 18   Ht 6' 1\" (1.854 m)   Wt 248 lb 9.6 oz (112.8 kg)   SpO2 96%   BMI 32.80 kg/m²   24HR INTAKE/OUTPUT:      Intake/Output Summary (Last 24 hours) at 2/28/2021 1304  Last data filed at 2/28/2021 1125  Gross per 24 hour   Intake 3621.42 ml   Output 850 ml   Net 2771.42 ml     General appearance: alert and cooperative with exam  HEENT: atraumatic, eyes with clear conjunctiva and normal lids, pupils and irises normal, external ears and nose are normal, lips normal  Neck without masses, lympadenopathy, bruit, thyroid normal  Lungs: no increased work of breathing, \"clear to auscultation bilaterally\" without rales, rhonchi or wheezes  Heart: regular rate and rhythm, S1, S2 normal, no murmur, click, rub or gallop  Abdomen: soft, non-tender; bowel sounds normal; no masses,  no organomegaly and obese  Extremities: edema trace bilaterally, modified Barbara's negative  Neurologic: no focal neurologic deficits, normal sensation, alert and oriented, affect and mood appropriate  Skin: some erythema noted left hip and proximal thigh    Assessment and Plan:   Principal Problem:    Septic arthritis of hip ([facility]  Active Problems:    History of cirrhosis    History of hepatitis C virus infection    History of liver transplant ([facility]    Type 2 diabetes mellitus with skin complication, with long-term current use of insulin (HCC)    Acute hip pain, left  Resolved Problems:    * No resolved hospital problems. *      POD #1 I&D left hip. Day #1 cefazolin empiric therapy for septic arthritis, believed to be due to Streptococcus agalactiae. C&S pending. ID onboard. Clostridioides difficile antigen testing if recurrent diarrhea.     Advance Directive: Full Code    DVT prophylaxis: SCDs    Discharge planning: DO Leonie Coe Hospitalist

## 2021-02-28 NOTE — PROGRESS NOTES
Subjective:     Post-Operative Day: 1 Status Post left hip I&D  Systemic or Specific Complaints:No Complaints  no nausea Pain 5    Objective:     Patient Vitals for the past 24 hrs:   BP Temp Temp src Pulse Resp SpO2 Height Weight   02/28/21 0630 98/67 98.8 °F (37.1 °C) Temporal 109 14 100 % -- --   02/28/21 0545 118/79 98.5 °F (36.9 °C) Temporal 105 16 100 % -- --   02/27/21 2342 122/83 97.1 °F (36.2 °C) Temporal 85 14 99 % -- 248 lb 9.6 oz (112.8 kg)   02/27/21 2015 104/69 97 °F (36.1 °C) Temporal 92 14 97 % -- --   02/27/21 2007 -- -- -- 93 13 93 % -- --   02/27/21 2005 96/65 98.1 °F (36.7 °C) Temporal 92 11 93 % -- --   02/27/21 2000 94/61 -- -- 93 13 92 % -- --   02/27/21 1955 99/63 -- -- 95 13 92 % -- --   02/27/21 1950 100/63 -- -- 95 13 92 % -- --   02/27/21 1945 94/64 -- -- 96 14 91 % -- --   02/27/21 1940 105/69 -- -- 95 9 92 % -- --   02/27/21 1935 92/60 -- -- 97 15 92 % -- --   02/27/21 1930 100/62 -- -- 100 17 91 % -- --   02/27/21 1925 (!) 99/58 -- -- 103 15 (!) 89 % -- --   02/27/21 1920 (!) 90/54 98.8 °F (37.1 °C) Temporal 108 17 (!) 89 % -- --   02/27/21 1700 96/62 98.5 °F (36.9 °C) Temporal 112 20 92 % -- --   02/27/21 1633 83/60 -- -- 115 15 93 % -- --   02/27/21 1604 (!) 79/54 -- -- 115 22 91 % -- --   02/27/21 1533 81/63 -- -- 108 23 92 % -- --   02/27/21 1503 (!) 84/51 -- -- 109 27 91 % -- --   02/27/21 1433 (!) 92/58 -- -- 123 15 93 % -- --   02/27/21 1420 (!) 91/58 98.6 °F (37 °C) Oral 113 18 95 % 6' 1\" (1.854 m) 250 lb (113.4 kg)   02/27/21 1417 (!) 91/58 -- -- 129 25 93 % -- --   02/27/21 1415 91/67 -- -- -- -- 94 % -- --   02/27/21 1333 (!) 135/118 -- -- -- -- -- -- --   02/27/21 1308 (!) 127/106 -- -- -- -- (!) 87 % -- --       General: alert, appears stated age and cooperative   Exam: Incision clean, dry, and intact, no evidence of infection.     Neurovascular: Exam normal       Data Review:  Recent Labs     02/27/21  1330 02/28/21  0142   HGB 12.7* 12.0*     Recent Labs 02/28/21  0143   *   K 4.3   CREATININE 1.8*     Recent Labs     02/28/21 0143   LABGLOM 40*     Gram stain: gram positive  cocci      Assessment:     Status Post left hip I&D for septic joint. Plan:     Probable septic L hip secondary to strep organism. Will await cultures and ID recommendations.       Electronically signed by Loraine Curtis MD on 2/28/2021 at 9:00 AM

## 2021-02-28 NOTE — PROGRESS NOTES
Pharmacy Note  Vancomycin Consult    Madison Johnson is a 48 y.o. male started on Vancomycin for Bone/Joint Infection; consult received from Dr. Meagan Johnston to manage therapy. Also receiving the following antibiotics: Ancef. Principal Problem:    Septic arthritis of hip ([facility] [address]  Active Problems:    History of cirrhosis    History of hepatitis C virus infection    History of liver transplant ([facility] [address]    Type 2 diabetes mellitus with skin complication, with long-term current use of insulin (HCC)  Resolved Problems:    * No resolved hospital problems. *      Allergies:  Patient has no known allergies. Temp max: 98.8    Recent Labs     02/27/21  1330   BUN 24*       Recent Labs     02/27/21  1330   CREATININE 1.6*       Recent Labs     02/27/21  1330   WBC 8.4         Intake/Output Summary (Last 24 hours) at 2/27/2021 2242  Last data filed at 2/27/2021 1910  Gross per 24 hour   Intake 1200 ml   Output --   Net 1200 ml       Culture Date Source Results   2/27/21 Surgical Gram+ cocci                 Ht Readings from Last 1 Encounters:   02/27/21 6' 1\" (1.854 m)        Wt Readings from Last 1 Encounters:   02/27/21 250 lb (113.4 kg)         Body mass index is 32.98 kg/m². Estimated Creatinine Clearance: 73 mL/min (A) (based on SCr of 1.6 mg/dL (H)). Assessment/Plan:  Will initiate vancomycin as Pulse Dosing (1750mg x1 dose) due to decline in patients renal function. Timing of trough level will be determined based on culture results, renal function, and clinical response. Thank you for the consult. Will continue to follow.     Electronically signed by Tashia Fay [facility] on 2/27/2021 at 10:42 PM

## 2021-02-28 NOTE — CONSULTS
Scheduled Medications:    insulin glargine  12 Units Subcutaneous Nightly    gabapentin  100 mg Oral TID    pantoprazole  40 mg Oral QAM AC    sodium chloride flush  10 mL Intravenous 2 times per day    insulin lispro  0-12 Units Subcutaneous TID WC    insulin lispro  0-6 Units Subcutaneous Nightly    sodium chloride flush  10 mL Intravenous 2 times per day    ceFAZolin (ANCEF) IVPB  2,000 mg Intravenous Q8H    sennosides-docusate sodium  1 tablet Oral BID    vancomycin (VANCOCIN) intermittent dosing (placeholder)   Other RX Placeholder     Current PRN Medications:  sodium chloride flush, acetaminophen **OR** acetaminophen, glucose, dextrose, glucagon (rDNA), dextrose, sodium chloride flush, oxyCODONE **OR** oxyCODONE, HYDROmorphone **OR** HYDROmorphone, HYDROmorphone **OR** HYDROmorphone, diphenhydrAMINE    Allergies:  No Known Allergies    Past Medical History: History cirrhosis with liver transplantation. Hepatitis C. Hernia. Hypertensive gastropathy. Previous appendicitis which ruptured. Per chart review no surgery. Diabetes mellitus. No history of valvular heart disease or coronary artery disease. He reports no history of renal dysfunction.     Past Surgical History: Liver transplantation     Social History: Indicates he lives with his girlfriend. Prior tobacco use. Now vapes. No alcohol or illicit drug use. Retired .     Family History: Mother breast cancer    Exposure History: No close contacts have been ill    Review of Systems:   No recent weight gain or weight loss  No cough or sputum production  No chest pain or chest pressure  No back pain  No other joint pain besides the left hip  He indicates the abdominal wall ulcerated areas and discoloration have improved subsequent to his last hospitalization.   No dysuria or urinary frequency  No neurological complaints    Vital Signs:  /72   Pulse 92   Temp 99 °F (37.2 °C) (Temporal)   Resp 18   Ht 6' 1\" (1.854 m)   Wt 248 lb strep bacteremia in early February. He likely had abdominal wall source with transient bacteremia. 2.  Left hip septic arthritis-suspect he had seeded the left hip at the time of his prior bacteremia. He developed acute symptoms suggestive of left hip septic arthritis within the past few days. Gram stain of left hip fluid from surgical I&D yesterday would be consistent with strep. 3.  History liver transplantation  4.   Diabetes mellitus    Recommendations:    Continue treatment with cefazolin  Await ID and susceptibility testing from left hip culture-suspect will be Streptococcus agalactiae  Anticipate PICC line placement and a 4 to 6-week course of intravenous antibiotic therapy  If he is not able to get up and move and ambulate he may need physical therapy prior to return home  Going to adjust dose of cefazolin given his mild renal dysfunction (we will give him cefazolin 1 g IV every 8)  Continue to follow    Sharri Brody MD  02/28/21  11:12 AM

## 2021-02-28 NOTE — OP NOTE
VEENAGABBIE YUN [facility] OF [facility] JEFF [address], [address] [facility]                                OPERATIVE REPORT    PATIENT NAME: Karo Kyle                      :        1970  MED REC NO:   [id number]                              ROOM:       [facility]  ACCOUNT NO:   [de-identified]                           ADMIT DATE: 2021  PROVIDER:     Keisha Whittington MD      DATE OF PROCEDURE:  2021    PREOPERATIVE DIAGNOSIS:  Probable septic left hip. POSTOPERATIVE DIAGNOSIS:  Probable septic left hip. PROCEDURE:  1. Fluoroscopic-guided left hip aspiration. 2.  Left hip arthrotomy, and I and D. SURGEON:  Keisha Whittington MD    FIRST SURGICAL ASSISTANT:  Johnathan Burden PA-C. Please note, he is a  critical assistant in performing the above-stated procedures. ANESTHESIA:  General.    EBL:  Minimal.    FLUIDS:  1200 mL of crystalloid. CULTURES:  Left hip fluid, sent for Gram stain, aerobic and anaerobic  cultures. FINDINGS:  Approximately 8 mL of very thick and cloudy intra-articular  fluid from the left hip. INDICATIONS:  This is a 59-year-old male who has had gradually worsening  pain in the left hip over the last 2-1/2 days. The patient told me that  this morning he could not even bear weight without having severe pain. He just recently was hospitalized, having Streptococcus bacteremia, with  suspected abdominal wall infection. He has been on oral Keflex, since  being discharged, for Streptococcus agalactiae that grew out in his  blood cultures. Because of the severe pain in his left hip, the  suspicion was that he had a septic left hip. OPERATIVE PROCEDURE:  After informed consent, he was taken to the  operating room. The anterior aspect of the left hip was first prepped. We under fluoroscopic guidance placed an 18-gauge needle into the joint  and could not recover any fluid.   We then re-prepped and draped the left hip.  I felt that the patient's clinical presentation was still  indicative of a septic left hip. Following this, we then localized the  hip under fluoroscopy. We then made an 8 cm incision starting lateral  to the ASIS and extending distally. After incising through the skin,  TFL fascia was incised. We then carefully took the TFL laterally,  sartorius and rectus medially. A small puncture was made in the capsule  and we immediately found purulent fluid. This was aspirated and this  was seen off the inferior portion of the joint intra-articularly. Capsulitis performed. We then also were able to express purulent fluid  from superior and posteriorly around the femoral neck. Again, this was  clearly an intra-articular sepsis. This was sent to lab for Gram stain  and culture. Following this, we then irrigated with 3 liters of  antibiotic irrigation, followed by another 6 liters of normal saline  irrigation. There was a minimal amount of any more fluid in the hip at  all and I did not feel any necessity to drain this. We then closed the  TFL fascia with 0 Vicryl suture, 2-0 Vicryl suture for subcutaneous  tissues, and 3-0 Vicryl for subcuticular stitch. Prineo Dermabond and  soft dressings were applied. The patient was taken to the recovery room  in stable condition. POSTOPERATIVE PLANS:  The patient is already on oral vancomycin. We  will start him on Ancef, and Infectious Disease consult will be  obtained. Again, this was clearly a septic left hip.         Vince Rodriguez MD    D: 02/27/2021 20:12:50      T: 02/28/2021 0:47:48     [email or web address]  Job#: [id number]     Doc#: [id number]    CC:

## 2021-02-28 NOTE — PROGRESS NOTES
[address] arrived to room # 317. Presented with: I&D to left hip  Mental Status: Patient is oriented, alert, coherent, logical, thought processes intact and able to concentrate and follow conversation. Vitals:    02/27/21 2342   BP: 122/83   Pulse: 85   Resp: 14   Temp: 97.1 °F (36.2 °C)   SpO2: 99%     Patient safety contract and falls prevention contract reviewed with patient Yes. Oriented Patient to room. Call light within reach. Yes. Needs, issues or concerns expressed at this time: yes, pain addressed.       Electronically signed by Juliette Camarena RN on 2/28/2021 at 12:08 AM

## 2021-02-28 NOTE — PROGRESS NOTES
PHYSICAL THERAPY        DATE: 2/28/2021    Received order for physical therapy evaluation. The evaluation was attempted but was unable to be completed due to:     [x] The patient currently has an order for bed rest.     [x] The patient declined.     [] The patient was unavailable. [] Nursing declined.     [] Waiting on clarification orders from Ortho / Neuro     [] The patient is currently restrained. Due to facility policy on restraints physical therapy is deferred when a patient is restrained.         Electronically signed by Segundo Orozco PT on 2/28/2021 at 7:57 AM

## 2021-03-01 ENCOUNTER — PATIENT MESSAGE (OUTPATIENT)
Dept: VASCULAR SURGERY | Age: 51
End: 2021-03-01

## 2021-03-01 LAB
ALBUMIN SERPL-MCNC: 2.1 G/DL (ref 3.5–5.2)
ALP BLD-CCNC: 182 U/L (ref 40–130)
ALT SERPL-CCNC: 7 U/L (ref 5–41)
ANION GAP SERPL CALCULATED.3IONS-SCNC: 12 MMOL/L (ref 7–19)
AST SERPL-CCNC: 18 U/L (ref 5–40)
ATYPICAL LYMPHOCYTE RELATIVE PERCENT: 2 % (ref 0–8)
BANDED NEUTROPHILS RELATIVE PERCENT: 4 % (ref 0–5)
BASOPHILS ABSOLUTE: 0 K/UL (ref 0–0.2)
BASOPHILS RELATIVE PERCENT: 0 % (ref 0–1)
BILIRUB SERPL-MCNC: 0.4 MG/DL (ref 0.2–1.2)
BUN BLDV-MCNC: 26 MG/DL (ref 6–20)
CALCIUM SERPL-MCNC: 7.6 MG/DL (ref 8.6–10)
CHLORIDE BLD-SCNC: 96 MMOL/L (ref 98–111)
CO2: 24 MMOL/L (ref 22–29)
CREAT SERPL-MCNC: 1.8 MG/DL (ref 0.5–1.2)
CULTURE, BLOOD 2: ABNORMAL
CULTURE, BLOOD 2: ABNORMAL
EOSINOPHILS ABSOLUTE: 0.1 K/UL (ref 0–0.6)
EOSINOPHILS RELATIVE PERCENT: 1 % (ref 0–5)
GFR AFRICAN AMERICAN: 48
GFR NON-AFRICAN AMERICAN: 40
GLUCOSE BLD-MCNC: 151 MG/DL (ref 70–99)
GLUCOSE BLD-MCNC: 158 MG/DL (ref 74–109)
GLUCOSE BLD-MCNC: 160 MG/DL (ref 70–99)
GLUCOSE BLD-MCNC: 175 MG/DL (ref 70–99)
GLUCOSE BLD-MCNC: 185 MG/DL (ref 70–99)
HCT VFR BLD CALC: 35.5 % (ref 42–52)
HEMOGLOBIN: 11.5 G/DL (ref 14–18)
IMMATURE GRANULOCYTES #: 0.1 K/UL
LYMPHOCYTES ABSOLUTE: 1.4 K/UL (ref 1.1–4.5)
LYMPHOCYTES RELATIVE PERCENT: 13 % (ref 20–40)
MCH RBC QN AUTO: 29.6 PG (ref 27–31)
MCHC RBC AUTO-ENTMCNC: 32.4 G/DL (ref 33–37)
MCV RBC AUTO: 91.5 FL (ref 80–94)
MONOCYTES ABSOLUTE: 0.4 K/UL (ref 0–0.9)
MONOCYTES RELATIVE PERCENT: 4 % (ref 0–10)
MYELOCYTE PERCENT: 1 %
NEUTROPHILS ABSOLUTE: 7.6 K/UL (ref 1.5–7.5)
NEUTROPHILS RELATIVE PERCENT: 75 % (ref 50–65)
ORGANISM: ABNORMAL
OVALOCYTES: ABNORMAL
PDW BLD-RTO: 13.3 % (ref 11.5–14.5)
PERFORMED ON: ABNORMAL
PLATELET # BLD: 47 K/UL (ref 130–400)
PLATELET SLIDE REVIEW: ABNORMAL
PMV BLD AUTO: 12 FL (ref 9.4–12.4)
POTASSIUM REFLEX MAGNESIUM: 3.9 MMOL/L (ref 3.5–5)
RBC # BLD: 3.88 M/UL (ref 4.7–6.1)
SODIUM BLD-SCNC: 132 MMOL/L (ref 136–145)
TOTAL PROTEIN: 4.7 G/DL (ref 6.6–8.7)
WBC # BLD: 9.5 K/UL (ref 4.8–10.8)

## 2021-03-01 PROCEDURE — 2580000003 HC RX 258: Performed by: INTERNAL MEDICINE

## 2021-03-01 PROCEDURE — 82947 ASSAY GLUCOSE BLOOD QUANT: CPT

## 2021-03-01 PROCEDURE — 36415 COLL VENOUS BLD VENIPUNCTURE: CPT

## 2021-03-01 PROCEDURE — 85025 COMPLETE CBC W/AUTO DIFF WBC: CPT

## 2021-03-01 PROCEDURE — 6370000000 HC RX 637 (ALT 250 FOR IP): Performed by: ORTHOPAEDIC SURGERY

## 2021-03-01 PROCEDURE — 2700000000 HC OXYGEN THERAPY PER DAY

## 2021-03-01 PROCEDURE — 1210000000 HC MED SURG R&B

## 2021-03-01 PROCEDURE — 80053 COMPREHEN METABOLIC PANEL: CPT

## 2021-03-01 PROCEDURE — 6360000002 HC RX W HCPCS: Performed by: FAMILY MEDICINE

## 2021-03-01 PROCEDURE — 6370000000 HC RX 637 (ALT 250 FOR IP): Performed by: FAMILY MEDICINE

## 2021-03-01 PROCEDURE — 6360000002 HC RX W HCPCS: Performed by: INTERNAL MEDICINE

## 2021-03-01 PROCEDURE — 87040 BLOOD CULTURE FOR BACTERIA: CPT

## 2021-03-01 PROCEDURE — 6360000002 HC RX W HCPCS: Performed by: ORTHOPAEDIC SURGERY

## 2021-03-01 PROCEDURE — 2580000003 HC RX 258: Performed by: ORTHOPAEDIC SURGERY

## 2021-03-01 RX ORDER — LIDOCAINE 4 G/G
1 PATCH TOPICAL DAILY
Status: DISCONTINUED | OUTPATIENT
Start: 2021-03-01 | End: 2021-03-05 | Stop reason: HOSPADM

## 2021-03-01 RX ORDER — TACROLIMUS 1 MG/1
3 CAPSULE ORAL 2 TIMES DAILY
Status: DISCONTINUED | OUTPATIENT
Start: 2021-03-01 | End: 2021-03-05 | Stop reason: HOSPADM

## 2021-03-01 RX ADMIN — CEFAZOLIN 1000 MG: 1 INJECTION, POWDER, FOR SOLUTION INTRAMUSCULAR; INTRAVENOUS at 17:41

## 2021-03-01 RX ADMIN — SODIUM CHLORIDE: 9 INJECTION, SOLUTION INTRAVENOUS at 16:46

## 2021-03-01 RX ADMIN — HYDROMORPHONE HYDROCHLORIDE 1 MG: 1 INJECTION, SOLUTION INTRAMUSCULAR; INTRAVENOUS; SUBCUTANEOUS at 06:55

## 2021-03-01 RX ADMIN — Medication 12 UNITS: at 21:16

## 2021-03-01 RX ADMIN — TACROLIMUS 3 MG: 1 CAPSULE ORAL at 21:15

## 2021-03-01 RX ADMIN — INSULIN LISPRO 1 UNITS: 100 INJECTION, SOLUTION INTRAVENOUS; SUBCUTANEOUS at 21:16

## 2021-03-01 RX ADMIN — TACROLIMUS 3 MG: 1 CAPSULE ORAL at 10:30

## 2021-03-01 RX ADMIN — DOCUSATE SODIUM 50 MG AND SENNOSIDES 8.6 MG 1 TABLET: 8.6; 5 TABLET, FILM COATED ORAL at 21:15

## 2021-03-01 RX ADMIN — PANTOPRAZOLE SODIUM 40 MG: 40 TABLET, DELAYED RELEASE ORAL at 06:55

## 2021-03-01 RX ADMIN — GABAPENTIN 100 MG: 100 CAPSULE ORAL at 08:19

## 2021-03-01 RX ADMIN — DOCUSATE SODIUM 50 MG AND SENNOSIDES 8.6 MG 1 TABLET: 8.6; 5 TABLET, FILM COATED ORAL at 08:19

## 2021-03-01 RX ADMIN — SODIUM CHLORIDE: 9 INJECTION, SOLUTION INTRAVENOUS at 08:13

## 2021-03-01 RX ADMIN — OXYCODONE 20 MG: 5 TABLET ORAL at 02:11

## 2021-03-01 RX ADMIN — GABAPENTIN 100 MG: 100 CAPSULE ORAL at 13:54

## 2021-03-01 RX ADMIN — HYDROMORPHONE HYDROCHLORIDE 1 MG: 1 INJECTION, SOLUTION INTRAMUSCULAR; INTRAVENOUS; SUBCUTANEOUS at 17:40

## 2021-03-01 RX ADMIN — INSULIN LISPRO 2 UNITS: 100 INJECTION, SOLUTION INTRAVENOUS; SUBCUTANEOUS at 17:42

## 2021-03-01 RX ADMIN — HYDROMORPHONE HYDROCHLORIDE 1 MG: 1 INJECTION, SOLUTION INTRAMUSCULAR; INTRAVENOUS; SUBCUTANEOUS at 13:58

## 2021-03-01 RX ADMIN — INSULIN LISPRO 2 UNITS: 100 INJECTION, SOLUTION INTRAVENOUS; SUBCUTANEOUS at 12:53

## 2021-03-01 RX ADMIN — OXYCODONE 20 MG: 5 TABLET ORAL at 21:18

## 2021-03-01 RX ADMIN — CEFAZOLIN 1000 MG: 1 INJECTION, POWDER, FOR SOLUTION INTRAMUSCULAR; INTRAVENOUS at 10:12

## 2021-03-01 RX ADMIN — INSULIN LISPRO 2 UNITS: 100 INJECTION, SOLUTION INTRAVENOUS; SUBCUTANEOUS at 08:22

## 2021-03-01 RX ADMIN — GABAPENTIN 100 MG: 100 CAPSULE ORAL at 21:15

## 2021-03-01 RX ADMIN — CEFAZOLIN 1000 MG: 1 INJECTION, POWDER, FOR SOLUTION INTRAMUSCULAR; INTRAVENOUS at 02:11

## 2021-03-01 RX ADMIN — HYDROMORPHONE HYDROCHLORIDE 1 MG: 1 INJECTION, SOLUTION INTRAMUSCULAR; INTRAVENOUS; SUBCUTANEOUS at 10:11

## 2021-03-01 ASSESSMENT — PAIN SCALES - GENERAL
PAINLEVEL_OUTOF10: 2
PAINLEVEL_OUTOF10: 2
PAINLEVEL_OUTOF10: 6
PAINLEVEL_OUTOF10: 2

## 2021-03-01 NOTE — PROGRESS NOTES
Physical Therapy  Attempt eval 3/1 in AM. Pt refused, stating he \"just can't move at all\" and would be willing to try again tomorrow. Will attempt 3/2.     Electronically signed by Candida Hdz on 3/1/2021 at 8:40 AM

## 2021-03-01 NOTE — PROGRESS NOTES
Infectious Diseases Progress Note    Patient:  Leatha Concepcion  YOB: 1970  MRN: [id number]   Admit date: 2/27/2021   Admitting Physician: Nasima Barragan DO  Primary Care Physician: DINORAH Hernandez NP    Chief Complaint/Interval History: He is without fever overnight. He continues to have left hip discomfort. He has trouble moving his left leg due to discomfort. He has bilateral lower extremity lymphedema left greater than right. He does not report cough or shortness of breath. No rash or skin itching. No nausea or diarrhea with his antimicrobial treatment.     In/Out    Intake/Output Summary (Last 24 hours) at 3/1/2021 0645  Last data filed at 2/28/2021 1808  Gross per 24 hour   Intake 3379.58 ml   Output 400 ml   Net 2979.58 ml     Allergies: No Known Allergies  Current Meds:     ceFAZolin (ANCEF) 1,000 mg in sterile water 10 mL IV syringe, Q8H      insulin glargine (LANTUS) injection vial 12 Units, Nightly      gabapentin (NEURONTIN) capsule 100 mg, TID      pantoprazole (PROTONIX) tablet 40 mg, QAM AC      sodium chloride flush 0.9 % injection 10 mL, 2 times per day      sodium chloride flush 0.9 % injection 10 mL, PRN      acetaminophen (TYLENOL) tablet 650 mg, Q6H PRN    Or      acetaminophen (TYLENOL) suppository 650 mg, Q6H PRN      insulin lispro (HUMALOG) injection vial 0-12 Units, TID WC      insulin lispro (HUMALOG) injection vial 0-6 Units, Nightly      glucose (GLUTOSE) 40 % oral gel 15 g, PRN      dextrose 50 % IV solution, PRN      glucagon (rDNA) injection 1 mg, PRN      dextrose 5 % solution, PRN      sodium chloride flush 0.9 % injection 10 mL, 2 times per day      sodium chloride flush 0.9 % injection 10 mL, PRN      0.9 % sodium chloride infusion, Continuous      oxyCODONE (ROXICODONE) immediate release tablet 10 mg, Q4H PRN    Or      oxyCODONE (ROXICODONE) immediate release tablet 20 mg, Q4H PRN      HYDROmorphone HCl PF (DILAUDID) injection 0.5 mg, Q3H PRN Or      HYDROmorphone HCl PF (DILAUDID) injection 1 mg, Q3H PRN      HYDROmorphone HCl PF (DILAUDID) injection 1 mg, Q3H PRN    Or      HYDROmorphone HCl PF (DILAUDID) injection 2 mg, Q3H PRN      diphenhydrAMINE (BENADRYL) tablet 25 mg, Q6H PRN      sennosides-docusate sodium (SENOKOT-S) 8.6-50 MG tablet 1 tablet, BID      Review of Systems see HPI    VitalSigns:  BP 96/62   Pulse 108   Temp 97.4 °F (36.3 °C) (Temporal)   Resp 16   Ht 6' 1\" (1.854 m)   Wt 290 lb 3 oz (131.6 kg)   SpO2 93%   BMI 38.29 kg/m²      Physical Exam  Line/IV (peripheral) site: No erythema, warmth, induration, or tenderness. Left hip dressing without drainage  No left hip drain remains in place  Bilateral lower extremity [id number]+. Left slightly greater than right. Lungs without crackles  Abdomen soft and nontender    Lab Results:  CBC:   Recent Labs     02/27/21  1330 02/28/21  0142 03/01/21  0223   WBC 8.4 9.4 9.5   HGB 12.7* 12.0* 11.5*   PLT 93* 63* 47*     BMP:  Recent Labs     02/27/21  1330 02/28/21  0143 03/01/21  0223   * 129* 132*   K 3.2* 4.3 3.9   CL 93* 92* 96*   CO2 20* 22 24   BUN 24* 26* 26*   CREATININE 1.6* 1.8* 1.8*   GLUCOSE 338* 351* 158*     CultureResults:  Blood cultures February 27, 2021-Streptococcus agalactiae  Surgical cultures from left hip February 27, 2021-Streptococcus agalactiae    Radiology: None    Additional Studies Reviewed:  None    Impression:  1. Group B strep bacteremia in early February. Likely seeded left hip joint. He developed increasing left hip discomfort acutely over the past few days prior to admission. Appears to have recurrent strep bacteremia and left hip septic arthritis. 2.  Left hip septic arthritis-likely seeded at the time of his original bacteremia. 3.  History liver transplantation  4. Diabetes mellitus  5.   Renal insufficiency    Recommendations:  Continue cefazolin  Cefazolin should have excellent activity against his group B strep  Planning long

## 2021-03-01 NOTE — PROGRESS NOTES
Hospitalist Progress Note  3/1/2021 12:17 PM  Subjective:   Admit Date: 2/27/2021  PCP: DINORAH Daniel NP    Chief Complaint: left hip pain    Subjective: Still having uncontrolled pain. Is only able to rotate his left lower extremity at the hip partially and this causes severe pain. Has not worked with PT and OT. Requests topical pain control. Denies new complaints, history is otherwise unchanged. Cumulative Hospital History:   2-27: Presents to ED with recurrent chills and fever with worsening left hip pain for 2.5 days. Recently hospitalized with Streptococcus agalactiae septicemia, treated with ceftriaxone. He developed C. difficile diarrhea on this and was discharged on cephalexin and oral vancomycin. Orthopedic surgery consulted for aspiration and likely I&D. Received one dose of vancomycin and was then placed on cefazolin postoperatively. 2-28: ID consulted, continue cefazolin. Fluid culture and sensitivity pending to guide therapy. PT and OT evaluations tomorrow. 3-1: Still having too much pain to work with PT and OT. On high dose hydromorphone. Will add transdermal lidocaine to see if it helps. Encouraged to do whatever he is capable of with PT and OT. Restart tacrolimus with daily monitoring, aim for lower range of acceptable serum level. ROS: 14 point review of systems is negative except as specifically addressed above. DIET GENERAL;     Intake/Output Summary (Last 24 hours) at 3/1/2021 1217  Last data filed at 3/1/2021 1153  Gross per 24 hour   Intake 958.16 ml   Output 900 ml   Net 58.16 ml     Medications:   dextrose      sodium chloride 125 mL/hr at 03/01/21 0813     Current Facility-Administered Medications   Medication Dose Route Frequency Provider Last Rate Last Admin    tacrolimus (PROGRAF) capsule 3 mg  3 mg Oral BID Kurt Ramos, DO   3 mg at 03/01/21 1030    ceFAZolin (ANCEF) 1,000 mg in sterile water 10 mL IV syringe  1,000 mg Intravenous Q8H Ozzy Antonio MD   1,000 mg at 03/01/21 1012    insulin glargine (LANTUS) injection vial 12 Units  12 Units Subcutaneous Nightly [place] Ramos, DO   12 Units at 02/28/21 2247    gabapentin (NEURONTIN) capsule 100 mg  100 mg Oral TID Karyna Finer, DO   100 mg at 03/01/21 0819    pantoprazole (PROTONIX) tablet 40 mg  40 mg Oral QAM AC Kurt Ramos, DO   40 mg at 03/01/21 2261    acetaminophen (TYLENOL) tablet 650 mg  650 mg Oral Q6H PRN Karyna Finer, DO        Or    acetaminophen (TYLENOL) suppository 650 mg  650 mg Rectal Q6H PRN Karyna Finer, DO        insulin lispro (HUMALOG) injection vial 0-12 Units  0-12 Units Subcutaneous TID WC [place] Ramos, DO   2 Units at 03/01/21 7516    insulin lispro (HUMALOG) injection vial 0-6 Units  0-6 Units Subcutaneous Nightly [place] Ramos, DO   2 Units at 02/28/21 2007    glucose (GLUTOSE) 40 % oral gel 15 g  15 g Oral PRN Karyna Finer, DO        dextrose 50 % IV solution  12.5 g Intravenous PRN Karyna Finer, DO        glucagon (rDNA) injection 1 mg  1 mg Intramuscular PRN Karyna Finer, DO        dextrose 5 % solution  100 mL/hr Intravenous PRN Karyna Stringer, DO        sodium chloride flush 0.9 % injection 10 mL  10 mL Intravenous 2 times per day Steven Luevano MD        sodium chloride flush 0.9 % injection 10 mL  10 mL Intravenous PRN Steven Luevano MD        0.9 % sodium chloride infusion   Intravenous Continuous Steven Luevano  mL/hr at 03/01/21 0813 New Bag at 03/01/21 0813    oxyCODONE (ROXICODONE) immediate release tablet 10 mg  10 mg Oral Q4H PRN Steven Luevano MD        Or    oxyCODONE (ROXICODONE) immediate release tablet 20 mg  20 mg Oral Q4H PRN Steven Luevano MD   20 mg at 03/01/21 0211    HYDROmorphone HCl PF (DILAUDID) injection 0.5 mg  0.5 mg Intravenous Q3H PRN Steven Luevano MD        Or    HYDROmorphone HCl PF (DILAUDID) injection 1 mg  1 mg Intravenous Q3H PRN Steven Luevano MD   1 mg at 02/28/21 0547    HYDROmorphone HCl PF (DILAUDID) injection 1 mg  1 mg Intravenous Q3H PRN Steven Luevano MD 1 mg at 03/01/21 1011    Or    HYDROmorphone HCl PF (DILAUDID) injection 2 mg  2 mg Intravenous Q3H PRN Bebe Pang MD        diphenhydrAMINE (BENADRYL) tablet 25 mg  25 mg Oral Q6H PRN Bebe Pang MD        sennosides-docusate sodium (SENOKOT-S) 8.6-50 MG tablet 1 tablet  1 tablet Oral BID Bebe Pang MD   1 tablet at 03/01/21 7947        Labs:     Recent Labs     02/27/21  1330 02/28/21  0142 03/01/21 0223   WBC 8.4 9.4 9.5   RBC 4.24* 4.08* 3.88*   HGB 12.7* 12.0* 11.5*   HCT 37.3* 36.2* 35.5*   MCV 88.0 88.7 91.5   MCH 30.0 29.4 29.6   MCHC 34.0 33.1 32.4*   PLT 93* 63* 47*     Recent Labs     02/27/21  1330 02/28/21  0143 03/01/21 0223   * 129* 132*   K 3.2* 4.3 3.9   ANIONGAP 14 15 12   CL 93* 92* 96*   CO2 20* 22 24   BUN 24* 26* 26*   CREATININE 1.6* 1.8* 1.8*   GLUCOSE 338* 351* 158*   CALCIUM 8.0* 7.3* 7.6*     Recent Labs     02/27/21  1330   MG 0.9*     Recent Labs     02/27/21  1330 02/28/21  0143 03/01/21 0223   AST 19 19 18   ALT 17 14 7   BILITOT 0.5 0.3 0.4   ALKPHOS 307* 177* 182*     ABGs:No results for input(s): PH, PO2, PCO2, HCO3, BE, O2SAT in the last 72 hours. Troponin T: No results for input(s): TROPONINI in the last 72 hours.   INR:   Recent Labs     02/27/21  1330   INR 1.19*     Lactic Acid:   Recent Labs     02/28/21  0710   LACTA 1.8       Objective:   Vitals: BP 98/65   Pulse 94   Temp 97.6 °F (36.4 °C) (Temporal)   Resp 16   Ht 6' 1\" (1.854 m)   Wt 290 lb 3 oz (131.6 kg)   SpO2 96%   BMI 38.29 kg/m²   24HR INTAKE/OUTPUT:      Intake/Output Summary (Last 24 hours) at 3/1/2021 1217  Last data filed at 3/1/2021 1153  Gross per 24 hour   Intake 958.16 ml   Output 900 ml   Net 58.16 ml     General appearance: alert and cooperative with exam  HEENT: atraumatic, eyes with clear conjunctiva and normal lids, pupils and irises normal, external ears and nose are normal, lips normal  Neck without masses, lympadenopathy, bruit, thyroid normal  Lungs: no increased work of breathing, \"clear to auscultation bilaterally\" without rales, rhonchi or wheezes  Heart: regular rate and rhythm, S1, S2 normal, no murmur, click, rub or gallop  Abdomen: soft, non-tender; bowel sounds normal; no masses,  no organomegaly and obese  Extremities: edema trace bilaterally, modified Barbara's negative  Neurologic: no focal neurologic deficits, normal sensation, alert and oriented, affect and mood appropriate  Skin: some erythema noted left hip and proximal thigh    Assessment and Plan:   Principal Problem:    Septic arthritis of hip (HCC)  Active Problems:    History of cirrhosis    History of hepatitis C virus infection    History of liver transplant ([facility] [address]    Type 2 diabetes mellitus with skin complication, with long-term current use of insulin (HCC)    Acute hip pain, left  Resolved Problems:    * No resolved hospital problems. *      POD #2 I&D left hip. Day #2 cefazolin empiric therapy for septic arthritis, believed to be due to Streptococcus agalactiae. C&S pending. ID onboard. Restart tacrolimus 3 mg BID. Daily level monitoring, aim for lower end of target range due to severe infection. Lidocaine transdermal trial.    Clostridioides difficile antigen testing if recurrent diarrhea.     Advance Directive: Full Code    DVT prophylaxis: SCDs    Discharge planning: PERRI Moses DO  Rounding Hospitalist

## 2021-03-01 NOTE — TELEPHONE ENCOUNTER
From: Lory Hancock  To: DINORAH Lopez  Sent: 3/1/2021 10:45 AM CST  Subject: Non-Urgent Medical Question    I have an appointment with you 3/4/21 at 9:30 am. I am in [address] with a really bad blood infection & do not know how long I will be here.

## 2021-03-02 LAB
ALBUMIN SERPL-MCNC: 1.7 G/DL (ref 3.5–5.2)
ALP BLD-CCNC: 238 U/L (ref 40–130)
ALT SERPL-CCNC: <5 U/L (ref 5–41)
ANION GAP SERPL CALCULATED.3IONS-SCNC: 18 MMOL/L (ref 7–19)
AST SERPL-CCNC: 19 U/L (ref 5–40)
ATYPICAL LYMPHOCYTE RELATIVE PERCENT: 1 % (ref 0–8)
BANDED NEUTROPHILS RELATIVE PERCENT: 4 % (ref 0–5)
BASOPHILS ABSOLUTE: 0 K/UL (ref 0–0.2)
BASOPHILS RELATIVE PERCENT: 0 % (ref 0–1)
BILIRUB SERPL-MCNC: 0.4 MG/DL (ref 0.2–1.2)
BLOOD CULTURE, ROUTINE: ABNORMAL
BLOOD CULTURE, ROUTINE: ABNORMAL
BUN BLDV-MCNC: 25 MG/DL (ref 6–20)
CALCIUM SERPL-MCNC: 8 MG/DL (ref 8.6–10)
CHLORIDE BLD-SCNC: 96 MMOL/L (ref 98–111)
CO2: 16 MMOL/L (ref 22–29)
CREAT SERPL-MCNC: 1.4 MG/DL (ref 0.5–1.2)
EKG P AXIS: 68 DEGREES
EKG P-R INTERVAL: 144 MS
EKG Q-T INTERVAL: 332 MS
EKG QRS DURATION: 102 MS
EKG QTC CALCULATION (BAZETT): 459 MS
EKG T AXIS: 71 DEGREES
EOSINOPHILS ABSOLUTE: 0 K/UL (ref 0–0.6)
EOSINOPHILS RELATIVE PERCENT: 0 % (ref 0–5)
GFR AFRICAN AMERICAN: >59
GFR NON-AFRICAN AMERICAN: 54
GLUCOSE BLD-MCNC: 174 MG/DL (ref 74–109)
GLUCOSE BLD-MCNC: 188 MG/DL (ref 70–99)
GLUCOSE BLD-MCNC: 193 MG/DL (ref 70–99)
GLUCOSE BLD-MCNC: 209 MG/DL (ref 70–99)
GLUCOSE BLD-MCNC: 216 MG/DL (ref 70–99)
HCT VFR BLD CALC: 34.9 % (ref 42–52)
HEMOGLOBIN: 11.5 G/DL (ref 14–18)
IMMATURE GRANULOCYTES #: 0.1 K/UL
LYMPHOCYTES ABSOLUTE: 0.5 K/UL (ref 1.1–4.5)
LYMPHOCYTES RELATIVE PERCENT: 4 % (ref 20–40)
MCH RBC QN AUTO: 29.7 PG (ref 27–31)
MCHC RBC AUTO-ENTMCNC: 33 G/DL (ref 33–37)
MCV RBC AUTO: 90.2 FL (ref 80–94)
MONOCYTES ABSOLUTE: 0.5 K/UL (ref 0–0.9)
MONOCYTES RELATIVE PERCENT: 5 % (ref 0–10)
NEUTROPHILS ABSOLUTE: 8.9 K/UL (ref 1.5–7.5)
NEUTROPHILS RELATIVE PERCENT: 86 % (ref 50–65)
ORGANISM: ABNORMAL
PDW BLD-RTO: 13.2 % (ref 11.5–14.5)
PERFORMED ON: ABNORMAL
PLATELET # BLD: 49 K/UL (ref 130–400)
PLATELET SLIDE REVIEW: ABNORMAL
PMV BLD AUTO: 12.1 FL (ref 9.4–12.4)
POTASSIUM REFLEX MAGNESIUM: 3.9 MMOL/L (ref 3.5–5)
RBC # BLD: 3.87 M/UL (ref 4.7–6.1)
SODIUM BLD-SCNC: 130 MMOL/L (ref 136–145)
TOTAL PROTEIN: 5.1 G/DL (ref 6.6–8.7)
WBC # BLD: 9.9 K/UL (ref 4.8–10.8)

## 2021-03-02 PROCEDURE — 6370000000 HC RX 637 (ALT 250 FOR IP): Performed by: FAMILY MEDICINE

## 2021-03-02 PROCEDURE — 80197 ASSAY OF TACROLIMUS: CPT

## 2021-03-02 PROCEDURE — 6370000000 HC RX 637 (ALT 250 FOR IP): Performed by: ORTHOPAEDIC SURGERY

## 2021-03-02 PROCEDURE — 80053 COMPREHEN METABOLIC PANEL: CPT

## 2021-03-02 PROCEDURE — 82947 ASSAY GLUCOSE BLOOD QUANT: CPT

## 2021-03-02 PROCEDURE — 93010 ELECTROCARDIOGRAM REPORT: CPT | Performed by: INTERNAL MEDICINE

## 2021-03-02 PROCEDURE — 2700000000 HC OXYGEN THERAPY PER DAY

## 2021-03-02 PROCEDURE — 2580000003 HC RX 258: Performed by: ORTHOPAEDIC SURGERY

## 2021-03-02 PROCEDURE — 85025 COMPLETE CBC W/AUTO DIFF WBC: CPT

## 2021-03-02 PROCEDURE — 6360000002 HC RX W HCPCS: Performed by: FAMILY MEDICINE

## 2021-03-02 PROCEDURE — 36415 COLL VENOUS BLD VENIPUNCTURE: CPT

## 2021-03-02 PROCEDURE — 6360000002 HC RX W HCPCS: Performed by: INTERNAL MEDICINE

## 2021-03-02 PROCEDURE — 6360000002 HC RX W HCPCS: Performed by: ORTHOPAEDIC SURGERY

## 2021-03-02 PROCEDURE — 2580000003 HC RX 258: Performed by: INTERNAL MEDICINE

## 2021-03-02 PROCEDURE — 1210000000 HC MED SURG R&B

## 2021-03-02 RX ADMIN — OXYCODONE 10 MG: 5 TABLET ORAL at 23:58

## 2021-03-02 RX ADMIN — SODIUM CHLORIDE: 9 INJECTION, SOLUTION INTRAVENOUS at 01:29

## 2021-03-02 RX ADMIN — CEFAZOLIN 1000 MG: 1 INJECTION, POWDER, FOR SOLUTION INTRAMUSCULAR; INTRAVENOUS at 01:29

## 2021-03-02 RX ADMIN — Medication 12 UNITS: at 23:52

## 2021-03-02 RX ADMIN — INSULIN LISPRO 4 UNITS: 100 INJECTION, SOLUTION INTRAVENOUS; SUBCUTANEOUS at 17:20

## 2021-03-02 RX ADMIN — ACETAMINOPHEN 650 MG: 325 TABLET ORAL at 14:46

## 2021-03-02 RX ADMIN — INSULIN LISPRO 4 UNITS: 100 INJECTION, SOLUTION INTRAVENOUS; SUBCUTANEOUS at 12:19

## 2021-03-02 RX ADMIN — ACETAMINOPHEN 650 MG: 325 TABLET ORAL at 03:03

## 2021-03-02 RX ADMIN — INSULIN LISPRO 2 UNITS: 100 INJECTION, SOLUTION INTRAVENOUS; SUBCUTANEOUS at 10:31

## 2021-03-02 RX ADMIN — OXYCODONE 20 MG: 5 TABLET ORAL at 06:14

## 2021-03-02 RX ADMIN — TACROLIMUS 3 MG: 1 CAPSULE ORAL at 23:51

## 2021-03-02 RX ADMIN — HYDROMORPHONE HYDROCHLORIDE 1 MG: 1 INJECTION, SOLUTION INTRAMUSCULAR; INTRAVENOUS; SUBCUTANEOUS at 02:38

## 2021-03-02 RX ADMIN — GABAPENTIN 100 MG: 100 CAPSULE ORAL at 23:52

## 2021-03-02 RX ADMIN — OXYCODONE 20 MG: 5 TABLET ORAL at 16:41

## 2021-03-02 RX ADMIN — SODIUM CHLORIDE, PRESERVATIVE FREE 10 ML: 5 INJECTION INTRAVENOUS at 23:43

## 2021-03-02 RX ADMIN — TACROLIMUS 3 MG: 1 CAPSULE ORAL at 10:29

## 2021-03-02 RX ADMIN — GABAPENTIN 100 MG: 100 CAPSULE ORAL at 10:31

## 2021-03-02 RX ADMIN — CEFAZOLIN 1000 MG: 1 INJECTION, POWDER, FOR SOLUTION INTRAMUSCULAR; INTRAVENOUS at 10:28

## 2021-03-02 RX ADMIN — PANTOPRAZOLE SODIUM 40 MG: 40 TABLET, DELAYED RELEASE ORAL at 06:12

## 2021-03-02 RX ADMIN — GABAPENTIN 100 MG: 100 CAPSULE ORAL at 14:40

## 2021-03-02 RX ADMIN — SODIUM CHLORIDE, PRESERVATIVE FREE 10 ML: 5 INJECTION INTRAVENOUS at 10:29

## 2021-03-02 RX ADMIN — DOCUSATE SODIUM 50 MG AND SENNOSIDES 8.6 MG 1 TABLET: 8.6; 5 TABLET, FILM COATED ORAL at 10:31

## 2021-03-02 RX ADMIN — OXYCODONE 20 MG: 5 TABLET ORAL at 10:30

## 2021-03-02 RX ADMIN — CEFAZOLIN 1000 MG: 1 INJECTION, POWDER, FOR SOLUTION INTRAMUSCULAR; INTRAVENOUS at 17:20

## 2021-03-02 RX ADMIN — INSULIN LISPRO 1 UNITS: 100 INJECTION, SOLUTION INTRAVENOUS; SUBCUTANEOUS at 23:58

## 2021-03-02 RX ADMIN — SODIUM CHLORIDE, PRESERVATIVE FREE 10 ML: 5 INJECTION INTRAVENOUS at 17:21

## 2021-03-02 RX ADMIN — ACETAMINOPHEN 650 MG: 325 TABLET ORAL at 23:58

## 2021-03-02 ASSESSMENT — PAIN SCALES - GENERAL
PAINLEVEL_OUTOF10: 10
PAINLEVEL_OUTOF10: 5
PAINLEVEL_OUTOF10: 7
PAINLEVEL_OUTOF10: 8
PAINLEVEL_OUTOF10: 3
PAINLEVEL_OUTOF10: 7

## 2021-03-02 NOTE — PROGRESS NOTES
Physical Therapy    Pt declines any attempt at mobility. Discussed techniques of getting to side of bed with assist to move leg. Pt states he thinks tomorrow will be better. Will cont to follow.     Electronically signed by Luis Dubon PT on 3/2/2021 at 2:09 PM

## 2021-03-02 NOTE — PROGRESS NOTES
Subjective:     Post-Operative Day:3  Status Post left hip I&D  Systemic or Specific Complaints:No Complaints  no nausea Pain 5    Objective:     Patient Vitals for the past 24 hrs:   BP Temp Temp src Pulse Resp SpO2 Weight   03/02/21 0647 122/68 97.4 °F (36.3 °C) Temporal 67 18 95 % --   03/02/21 0600 -- -- -- -- -- -- 280 lb (127 kg)   03/02/21 0006 (!) 106/56 98 °F (36.7 °C) Temporal 88 20 96 % --   03/01/21 1944 (!) 100/57 97.6 °F (36.4 °C) -- 94 20 -- --   03/01/21 1053 98/65 97.6 °F (36.4 °C) Temporal 94 16 96 % --       General: alert, appears stated age and cooperative   Exam: Incision clean, dry, and intact, no evidence of infection. Neurovascular: Exam normal       Data Review:  Recent Labs     03/01/21 0223 03/02/21  0340   HGB 11.5* 11.5*     Recent Labs     03/01/21 0223   *   K 3.9   CREATININE 1.8*     Recent Labs     03/01/21 0223   LABGLOM 40*     Gram stain: gram positive  Cocci  CX: strep agalactiae      Assessment:     Status Post left hip I&D for septic joint. Plan:     Probable septic L hip secondary to strep agalactiae . NBX per ID.     Electronically signed by David Baxter MD on 3/2/2021 at 7:54 AM

## 2021-03-02 NOTE — PROGRESS NOTES
Hospitalist Progress Note  3/2/2021 2:53 PM  Subjective:   Admit Date: 2/27/2021  PCP: DINORAH Daniel - NP    Chief Complaint: left hip pain    Subjective: Pain still no better. When admonished again to work with therapy, he states \"I have been\" but again refused PT and OT today. Afebrile. Denies new complaints, history is otherwise unchanged. Cumulative Hospital History:   2-27: Presents to ED with recurrent chills and fever with worsening left hip pain for 2.5 days. Recently hospitalized with Streptococcus agalactiae septicemia, treated with ceftriaxone. He developed C. difficile diarrhea on this and was discharged on cephalexin and oral vancomycin. Orthopedic surgery consulted for aspiration and likely I&D. Received one dose of vancomycin and was then placed on cefazolin postoperatively. 2-28: ID consulted, continue cefazolin. Fluid culture and sensitivity pending to guide therapy. PT and OT evaluations tomorrow. 3-1: Still having too much pain to work with PT and OT. On high dose hydromorphone. Will add transdermal lidocaine to see if it helps. Encouraged to do whatever he is capable of with PT and OT. Restart tacrolimus with daily monitoring, aim for lower range of acceptable serum level. 3-2: Still not working with PT and OT due to pain. Suspect he will need rehab placement. ROS: 14 point review of systems is negative except as specifically addressed above. DIET GENERAL;     Intake/Output Summary (Last 24 hours) at 3/2/2021 1453  Last data filed at 3/2/2021 1349  Gross per 24 hour   Intake 1628 ml   Output 1650 ml   Net -22 ml     Medications:   dextrose      sodium chloride 125 mL/hr at 03/02/21 0129     Current Facility-Administered Medications   Medication Dose Route Frequency Provider Last Rate Last Admin    tacrolimus (PROGRAF) capsule 3 mg  3 mg Oral BID Kurt Ramos, DO   3 mg at 03/02/21 1029    lidocaine 4 % external patch 1 patch  1 patch Transdermal Daily Kurt Ramos, DO   1 patch at 03/02/21 1029    ceFAZolin (ANCEF) 1,000 mg in sterile water 10 mL IV syringe  1,000 mg Intravenous Q8H Alecia Saini MD   1,000 mg at 03/02/21 1028    insulin glargine (LANTUS) injection vial 12 Units  12 Units Subcutaneous Nightly Crawford Ramos, DO   12 Units at 03/01/21 2116    gabapentin (NEURONTIN) capsule 100 mg  100 mg Oral TID Maci Stage, DO   100 mg at 03/02/21 1440    pantoprazole (PROTONIX) tablet 40 mg  40 mg Oral QAM AC Crawford Ramos, DO   40 mg at 03/02/21 0612    acetaminophen (TYLENOL) tablet 650 mg  650 mg Oral Q6H PRN Crawford Ramos, DO   650 mg at 03/02/21 1446    Or    acetaminophen (TYLENOL) suppository 650 mg  650 mg Rectal Q6H PRN Maci Stage, DO        insulin lispro (HUMALOG) injection vial 0-12 Units  0-12 Units Subcutaneous TID [facility] Ramos, DO   4 Units at 03/02/21 1219    insulin lispro (HUMALOG) injection vial 0-6 Units  0-6 Units Subcutaneous Nightly Crawford Ramos, DO   1 Units at 03/01/21 2116    glucose (GLUTOSE) 40 % oral gel 15 g  15 g Oral PRN [facility] Ramos, DO        dextrose 50 % IV solution  12.5 g Intravenous PRN Maci Stage, DO        glucagon (rDNA) injection 1 mg  1 mg Intramuscular PRN Maci Stage, DO        dextrose 5 % solution  100 mL/hr Intravenous PRN Maci Stage, DO        sodium chloride flush 0.9 % injection 10 mL  10 mL Intravenous 2 times per day Poli Lindsay MD   10 mL at 03/02/21 1029    sodium chloride flush 0.9 % injection 10 mL  10 mL Intravenous PRN Poli Lindsay MD        0.9 % sodium chloride infusion   Intravenous Continuous Poli Lindsay  mL/hr at 03/02/21 0129 New Bag at 03/02/21 0129    oxyCODONE (ROXICODONE) immediate release tablet 10 mg  10 mg Oral Q4H PRN Poli Lindsay MD        Or    oxyCODONE (ROXICODONE) immediate release tablet 20 mg  20 mg Oral Q4H PRN Poli Lindsay MD   20 mg at 03/02/21 1030    HYDROmorphone HCl PF (DILAUDID) injection 1 mg  1 mg Intravenous Q3H PRN Poli Lindsay MD   1 mg at 03/02/21 0238    Or    HYDROmorphone HCl PF (DILAUDID) injection 2 mg  2 mg Intravenous Q3H PRN Yaneth Montero MD        diphenhydrAMINE (BENADRYL) tablet 25 mg  25 mg Oral Q6H PRN Yaneth Montero MD        sennosides-docusate sodium (SENOKOT-S) 8.6-50 MG tablet 1 tablet  1 tablet Oral BID Yaneth Montero MD   1 tablet at 03/02/21 1031        Labs:     Recent Labs     02/28/21 0142 03/01/21 0223 03/02/21  0340   WBC 9.4 9.5 9.9   RBC 4.08* 3.88* 3.87*   HGB 12.0* 11.5* 11.5*   HCT 36.2* 35.5* 34.9*   MCV 88.7 91.5 90.2   MCH 29.4 29.6 29.7   MCHC 33.1 32.4* 33.0   PLT 63* 47* 49*     Recent Labs     02/28/21 0143 03/01/21 0223 03/02/21  0340   * 132* 130*   K 4.3 3.9 3.9   ANIONGAP 15 12 18   CL 92* 96* 96*   CO2 22 24 16*   BUN 26* 26* 25*   CREATININE 1.8* 1.8* 1.4*   GLUCOSE 351* 158* 174*   CALCIUM 7.3* 7.6* 8.0*     No results for input(s): MG, PHOS in the last 72 hours. Recent Labs     02/28/21 0143 03/01/21 0223 03/02/21  0340   AST 19 18 19   ALT 14 7 <5*   BILITOT 0.3 0.4 0.4   ALKPHOS 177* 182* 238*     ABGs:No results for input(s): PH, PO2, PCO2, HCO3, BE, O2SAT in the last 72 hours. Troponin T: No results for input(s): TROPONINI in the last 72 hours. INR:   No results for input(s): INR in the last 72 hours.   Lactic Acid:   Recent Labs     02/28/21  0710   LACTA 1.8       Objective:   Vitals: /70   Pulse 70   Temp 98.9 °F (37.2 °C) (Temporal)   Resp 18   Ht 6' 1\" (1.854 m)   Wt 280 lb (127 kg)   SpO2 96%   BMI 36.94 kg/m²   24HR INTAKE/OUTPUT:      Intake/Output Summary (Last 24 hours) at 3/2/2021 1453  Last data filed at 3/2/2021 1349  Gross per 24 hour   Intake 1628 ml   Output 1650 ml   Net -22 ml     General appearance: alert and cooperative with exam  HEENT: atraumatic, eyes with clear conjunctiva and normal lids, pupils and irises normal, external ears and nose are normal, lips normal  Neck without masses, lympadenopathy, bruit, thyroid normal  Lungs: no increased work of breathing, \"clear to auscultation

## 2021-03-02 NOTE — PROGRESS NOTES
Infectious Diseases Progress Note    Patient:  Christel Palmer  YOB: 1970  MRN: [id number]   Admit date: 2/27/2021   Admitting Physician: Doretha Avila DO  Primary Care Physician: DINORAH South NP    Chief Complaint/Interval History: He indicates he can move his hip a little bit today. He seems to feel slightly better. No rash or skin itching. No diarrhea.     In/Out    Intake/Output Summary (Last 24 hours) at 3/2/2021 0859  Last data filed at 3/2/2021 0647  Gross per 24 hour   Intake 1018 ml   Output 1200 ml   Net -182 ml     Allergies: No Known Allergies  Current Meds:     tacrolimus (PROGRAF) capsule 3 mg, BID      lidocaine 4 % external patch 1 patch, Daily      ceFAZolin (ANCEF) 1,000 mg in sterile water 10 mL IV syringe, Q8H      insulin glargine (LANTUS) injection vial 12 Units, Nightly      gabapentin (NEURONTIN) capsule 100 mg, TID      pantoprazole (PROTONIX) tablet 40 mg, QAM AC      acetaminophen (TYLENOL) tablet 650 mg, Q6H PRN    Or      acetaminophen (TYLENOL) suppository 650 mg, Q6H PRN      insulin lispro (HUMALOG) injection vial 0-12 Units, TID WC      insulin lispro (HUMALOG) injection vial 0-6 Units, Nightly      glucose (GLUTOSE) 40 % oral gel 15 g, PRN      dextrose 50 % IV solution, PRN      glucagon (rDNA) injection 1 mg, PRN      dextrose 5 % solution, PRN      sodium chloride flush 0.9 % injection 10 mL, 2 times per day      sodium chloride flush 0.9 % injection 10 mL, PRN      0.9 % sodium chloride infusion, Continuous      oxyCODONE (ROXICODONE) immediate release tablet 10 mg, Q4H PRN    Or      oxyCODONE (ROXICODONE) immediate release tablet 20 mg, Q4H PRN      HYDROmorphone HCl PF (DILAUDID) injection 1 mg, Q3H PRN    Or      HYDROmorphone HCl PF (DILAUDID) injection 2 mg, Q3H PRN      diphenhydrAMINE (BENADRYL) tablet 25 mg, Q6H PRN      sennosides-docusate sodium (SENOKOT-S) 8.6-50 MG tablet 1 tablet, BID      Review of Systems see HPI    VitalSigns:  /68   Pulse 67   Temp 97.4 °F (36.3 °C) (Temporal)   Resp 18   Ht 6' 1\" (1.854 m)   Wt 280 lb (127 kg)   SpO2 95%   BMI 36.94 kg/m²      Physical Exam  Line/IV site: No erythema, warmth, induration, or tenderness. He had a little bit of contraction of left quad and was able to flex his hip slightly. That is more than he was doing previously. He overall looks a little more comfortable. Lab Results:  CBC:   Recent Labs     02/28/21  0142 03/01/21  0223 03/02/21  0340   WBC 9.4 9.5 9.9   HGB 12.0* 11.5* 11.5*   PLT 63* 47* 49*     BMP:  Recent Labs     02/27/21  1330 02/28/21  0143 03/01/21  0223   * 129* 132*   K 3.2* 4.3 3.9   CL 93* 92* 96*   CO2 20* 22 24   BUN 24* 26* 26*   CREATININE 1.6* 1.8* 1.8*   GLUCOSE 338* 351* 158*     CultureResults:      Radiology: None    Additional Studies Reviewed:  None    Impression:  1. Group B strep bacteremia  2. Left hip septic arthritis secondary to group B strep  3. History liver transplantation  4. Diabetes  5.   Renal insufficiency    Recommendations:  Continue cefazolin  Continue physical therapy  Planning intravenous cefazolin through March 28, 2021 (4-week course of IV therapy for native joint septic arthritis in immunocompromised patient)  Possible LTAC for intravenous antibiotic therapy and physical therapy    Siddhartha Kline MD

## 2021-03-02 NOTE — PROGRESS NOTES
Infectious Diseases Progress Note    Patient:  Stef Lopez  YOB: 1970  MRN: [id number]   Admit date: 2/27/2021   Admitting Physician: Stacie Freitas DO  Primary Care Physician: DINORAH Chaves NP    Chief Complaint/Interval History: He is without fever or chills. He has no rash or skin itching. Left hip still quite sore, but a little bit better. Indicates he was able to move it slightly earlier today. He reports no lower extremity numbness. He has had no diarrhea. No joint complaints elsewhere. He indicates no back pain or discomfort.     In/Out    Intake/Output Summary (Last 24 hours) at 3/2/2021 1014  Last data filed at 3/2/2021 0957  Gross per 24 hour   Intake 1138 ml   Output 1200 ml   Net -62 ml     Allergies: No Known Allergies  Current Meds:     tacrolimus (PROGRAF) capsule 3 mg, BID      lidocaine 4 % external patch 1 patch, Daily      ceFAZolin (ANCEF) 1,000 mg in sterile water 10 mL IV syringe, Q8H      insulin glargine (LANTUS) injection vial 12 Units, Nightly      gabapentin (NEURONTIN) capsule 100 mg, TID      pantoprazole (PROTONIX) tablet 40 mg, QAM AC      acetaminophen (TYLENOL) tablet 650 mg, Q6H PRN    Or      acetaminophen (TYLENOL) suppository 650 mg, Q6H PRN      insulin lispro (HUMALOG) injection vial 0-12 Units, TID WC      insulin lispro (HUMALOG) injection vial 0-6 Units, Nightly      glucose (GLUTOSE) 40 % oral gel 15 g, PRN      dextrose 50 % IV solution, PRN      glucagon (rDNA) injection 1 mg, PRN      dextrose 5 % solution, PRN      sodium chloride flush 0.9 % injection 10 mL, 2 times per day      sodium chloride flush 0.9 % injection 10 mL, PRN      0.9 % sodium chloride infusion, Continuous      oxyCODONE (ROXICODONE) immediate release tablet 10 mg, Q4H PRN    Or      oxyCODONE (ROXICODONE) immediate release tablet 20 mg, Q4H PRN      HYDROmorphone HCl PF (DILAUDID) injection 1 mg, Q3H PRN    Or      HYDROmorphone HCl PF (DILAUDID) injection 2 mg, Q3H PRN      diphenhydrAMINE (BENADRYL) tablet 25 mg, Q6H PRN      sennosides-docusate sodium (SENOKOT-S) 8.6-50 MG tablet 1 tablet, BID      Review of Systems see HPI    VitalSigns:  /68   Pulse 67   Temp 97.4 °F (36.3 °C) (Temporal)   Resp 18   Ht 6' 1\" (1.854 m)   Wt 280 lb (127 kg)   SpO2 95%   BMI 36.94 kg/m²      Physical Exam  Line/IV site: No erythema, warmth, induration, or tenderness. He has bilateral lower extremity edema. Swelling significant. Left greater than right. He has intact sensation and capillary refill both lower extremities. He is able to dorsi and plantarflex both feet and he is able to bend at both knees. He is able to contract the left quadriceps muscle, but he does not flex or extend much at the left hip due to discomfort. He is able to internally and externally rotate his hip without significant discomfort. Skin is without rash or skin itching. Lab Results:  CBC:   Recent Labs     02/28/21  0142 03/01/21  0223 03/02/21  0340   WBC 9.4 9.5 9.9   HGB 12.0* 11.5* 11.5*   PLT 63* 47* 49*     BMP:  Recent Labs     02/27/21  1330 02/28/21  0143 03/01/21  0223   * 129* 132*   K 3.2* 4.3 3.9   CL 93* 92* 96*   CO2 20* 22 24   BUN 24* 26* 26*   CREATININE 1.6* 1.8* 1.8*   GLUCOSE 338* 351* 158*     CultureResults:  Left hip culture:  Susceptibility    Strep agalactiae (beta strep group b) (1)    Antibiotic Interpretation KASSANDRA Status    ampicillin Sensitive <=0.25 mcg/mL     benzylpenicillin Sensitive 0.12 mcg/mL     cefTRIAXone Sensitive <=0.12 mcg/mL     clindamycin Sensitive <=0.25 mcg/mL     erythromycin Sensitive <=0.12 mcg/mL     vancomycin Sensitive 0.5 mcg/mL       Blood cultures February 27, 2021-Streptococcus agalactiae  Blood cultures yesterday no growth to date    Radiology: None    Additional Studies Reviewed:  None    Impression:  1. Group B strep bacteremia  2.   Left hip septic arthritis secondary to group B strep-hematogenous spread (native hip)  3. History liver transplantation  4. Diabetes mellitus  5.   Renal insufficiency    Recommendations:  Continue cefazolin  Encouraging work with physical therapy  Hopefully we can get him moving more and improve his range of motion/function with PT  Do not think he can go home until he can move better and manage activities of daily living more effectively  Planning 4 to 6 weeks of cefazolin treatment  Okay with me for PICC line placement  Discussed with hospitalist  Continue to follow    Michelle Robert MD

## 2021-03-02 NOTE — PLAN OF CARE
Problem: Pain:  Goal: Pain level will decrease  Description: Pain level will decrease  3/1/2021 2324 by Gopal Trinidad RN  Outcome: Ongoing  3/1/2021 1045 by Tashi Aguilar RN  Outcome: Ongoing  Goal: Control of acute pain  Description: Control of acute pain  3/1/2021 2324 by Gopal Trinidad RN  Outcome: Ongoing  3/1/2021 1045 by Tashi Aguilar RN  Outcome: Ongoing  Goal: Control of chronic pain  Description: Control of chronic pain  3/1/2021 2324 by Gopal Trinidad RN  Outcome: Ongoing  3/1/2021 1045 by Tashi Aguilar RN  Outcome: Ongoing     Problem: Falls - Risk of:  Goal: Will remain free from falls  Description: Will remain free from falls  3/1/2021 2324 by Gopal Trinidad RN  Outcome: Ongoing  3/1/2021 1045 by Tashi Aguilar RN  Outcome: Ongoing  Goal: Absence of physical injury  Description: Absence of physical injury  3/1/2021 2324 by Gopal Trinidad RN  Outcome: Ongoing  3/1/2021 1045 by Tashi Aguilar RN  Outcome: Ongoing

## 2021-03-03 PROBLEM — E83.42 HYPOMAGNESEMIA: Status: ACTIVE | Noted: 2021-03-03

## 2021-03-03 PROBLEM — E87.6 HYPOKALEMIA: Status: ACTIVE | Noted: 2021-03-03

## 2021-03-03 LAB
ALBUMIN SERPL-MCNC: 1.9 G/DL (ref 3.5–5.2)
ALP BLD-CCNC: 238 U/L (ref 40–130)
ALT SERPL-CCNC: <5 U/L (ref 5–41)
ANAEROBIC CULTURE: ABNORMAL
ANION GAP SERPL CALCULATED.3IONS-SCNC: 15 MMOL/L (ref 7–19)
AST SERPL-CCNC: 17 U/L (ref 5–40)
BASOPHILS ABSOLUTE: 0.1 K/UL (ref 0–0.2)
BASOPHILS RELATIVE PERCENT: 1 % (ref 0–1)
BILIRUB SERPL-MCNC: 0.5 MG/DL (ref 0.2–1.2)
BUN BLDV-MCNC: 21 MG/DL (ref 6–20)
CALCIUM SERPL-MCNC: 7.9 MG/DL (ref 8.6–10)
CHLORIDE BLD-SCNC: 95 MMOL/L (ref 98–111)
CO2: 21 MMOL/L (ref 22–29)
CREAT SERPL-MCNC: 1.2 MG/DL (ref 0.5–1.2)
CULTURE SURGICAL: ABNORMAL
DOHLE BODIES: ABNORMAL
EOSINOPHILS ABSOLUTE: 0 K/UL (ref 0–0.6)
EOSINOPHILS RELATIVE PERCENT: 0 % (ref 0–5)
GFR AFRICAN AMERICAN: >59
GFR NON-AFRICAN AMERICAN: >60
GLUCOSE BLD-MCNC: 190 MG/DL (ref 70–99)
GLUCOSE BLD-MCNC: 202 MG/DL (ref 74–109)
GLUCOSE BLD-MCNC: 206 MG/DL (ref 70–99)
GLUCOSE BLD-MCNC: 216 MG/DL (ref 70–99)
GLUCOSE BLD-MCNC: 219 MG/DL (ref 70–99)
GRAM STAIN RESULT: ABNORMAL
HCT VFR BLD CALC: 34.3 % (ref 42–52)
HEMOGLOBIN: 11.3 G/DL (ref 14–18)
IMMATURE GRANULOCYTES #: 0.2 K/UL
LYMPHOCYTES ABSOLUTE: 1.9 K/UL (ref 1.1–4.5)
LYMPHOCYTES RELATIVE PERCENT: 20 % (ref 20–40)
MAGNESIUM: 1.4 MG/DL (ref 1.6–2.6)
MCH RBC QN AUTO: 29.1 PG (ref 27–31)
MCHC RBC AUTO-ENTMCNC: 32.9 G/DL (ref 33–37)
MCV RBC AUTO: 88.4 FL (ref 80–94)
MONOCYTES ABSOLUTE: 0.6 K/UL (ref 0–0.9)
MONOCYTES RELATIVE PERCENT: 6 % (ref 0–10)
NEUTROPHILS ABSOLUTE: 6.9 K/UL (ref 1.5–7.5)
NEUTROPHILS RELATIVE PERCENT: 73 % (ref 50–65)
ORGANISM: ABNORMAL
OVALOCYTES: ABNORMAL
PDW BLD-RTO: 13 % (ref 11.5–14.5)
PERFORMED ON: ABNORMAL
PHOSPHORUS: 3 MG/DL (ref 2.5–4.5)
PLATELET # BLD: 73 K/UL (ref 130–400)
PLATELET SLIDE REVIEW: ABNORMAL
PMV BLD AUTO: 12.1 FL (ref 9.4–12.4)
POTASSIUM REFLEX MAGNESIUM: 3.4 MMOL/L (ref 3.5–5)
RBC # BLD: 3.88 M/UL (ref 4.7–6.1)
SODIUM BLD-SCNC: 131 MMOL/L (ref 136–145)
TOTAL PROTEIN: 4.5 G/DL (ref 6.6–8.7)
TOXIC GRANULATION: ABNORMAL
WBC # BLD: 9.5 K/UL (ref 4.8–10.8)

## 2021-03-03 PROCEDURE — 99232 SBSQ HOSP IP/OBS MODERATE 35: CPT | Performed by: INTERNAL MEDICINE

## 2021-03-03 PROCEDURE — 36415 COLL VENOUS BLD VENIPUNCTURE: CPT

## 2021-03-03 PROCEDURE — 82947 ASSAY GLUCOSE BLOOD QUANT: CPT

## 2021-03-03 PROCEDURE — 80053 COMPREHEN METABOLIC PANEL: CPT

## 2021-03-03 PROCEDURE — 6360000002 HC RX W HCPCS: Performed by: ORTHOPAEDIC SURGERY

## 2021-03-03 PROCEDURE — 6370000000 HC RX 637 (ALT 250 FOR IP): Performed by: INTERNAL MEDICINE

## 2021-03-03 PROCEDURE — 6370000000 HC RX 637 (ALT 250 FOR IP): Performed by: ORTHOPAEDIC SURGERY

## 2021-03-03 PROCEDURE — 6370000000 HC RX 637 (ALT 250 FOR IP): Performed by: FAMILY MEDICINE

## 2021-03-03 PROCEDURE — 6360000002 HC RX W HCPCS: Performed by: FAMILY MEDICINE

## 2021-03-03 PROCEDURE — 2580000003 HC RX 258: Performed by: INTERNAL MEDICINE

## 2021-03-03 PROCEDURE — 84100 ASSAY OF PHOSPHORUS: CPT

## 2021-03-03 PROCEDURE — 1210000000 HC MED SURG R&B

## 2021-03-03 PROCEDURE — 83735 ASSAY OF MAGNESIUM: CPT

## 2021-03-03 PROCEDURE — 2580000003 HC RX 258: Performed by: ORTHOPAEDIC SURGERY

## 2021-03-03 PROCEDURE — 6360000002 HC RX W HCPCS: Performed by: INTERNAL MEDICINE

## 2021-03-03 PROCEDURE — 85025 COMPLETE CBC W/AUTO DIFF WBC: CPT

## 2021-03-03 PROCEDURE — 6360000002 HC RX W HCPCS: Performed by: HOSPITALIST

## 2021-03-03 PROCEDURE — 80197 ASSAY OF TACROLIMUS: CPT

## 2021-03-03 RX ORDER — PREDNISONE 10 MG/1
10 TABLET ORAL 2 TIMES DAILY
Status: DISCONTINUED | OUTPATIENT
Start: 2021-03-03 | End: 2021-03-05 | Stop reason: HOSPADM

## 2021-03-03 RX ORDER — MAGNESIUM SULFATE IN WATER 40 MG/ML
2000 INJECTION, SOLUTION INTRAVENOUS ONCE
Status: COMPLETED | OUTPATIENT
Start: 2021-03-03 | End: 2021-03-03

## 2021-03-03 RX ORDER — POTASSIUM CHLORIDE 7.45 MG/ML
10 INJECTION INTRAVENOUS
Status: DISCONTINUED | OUTPATIENT
Start: 2021-03-03 | End: 2021-03-03

## 2021-03-03 RX ORDER — POTASSIUM CHLORIDE 7.45 MG/ML
10 INJECTION INTRAVENOUS
Status: DISPENSED | OUTPATIENT
Start: 2021-03-03 | End: 2021-03-03

## 2021-03-03 RX ADMIN — PREDNISONE 10 MG: 10 TABLET ORAL at 11:38

## 2021-03-03 RX ADMIN — CEFAZOLIN 1000 MG: 1 INJECTION, POWDER, FOR SOLUTION INTRAMUSCULAR; INTRAVENOUS at 02:15

## 2021-03-03 RX ADMIN — POTASSIUM CHLORIDE 10 MEQ: 7.46 INJECTION, SOLUTION INTRAVENOUS at 11:45

## 2021-03-03 RX ADMIN — GABAPENTIN 100 MG: 100 CAPSULE ORAL at 15:01

## 2021-03-03 RX ADMIN — PREDNISONE 10 MG: 10 TABLET ORAL at 20:01

## 2021-03-03 RX ADMIN — MAGNESIUM SULFATE HEPTAHYDRATE 2000 MG: 40 INJECTION, SOLUTION INTRAVENOUS at 09:33

## 2021-03-03 RX ADMIN — SODIUM CHLORIDE: 9 INJECTION, SOLUTION INTRAVENOUS at 10:14

## 2021-03-03 RX ADMIN — HYDROMORPHONE HYDROCHLORIDE 1 MG: 1 INJECTION, SOLUTION INTRAMUSCULAR; INTRAVENOUS; SUBCUTANEOUS at 17:16

## 2021-03-03 RX ADMIN — TACROLIMUS 3 MG: 1 CAPSULE ORAL at 08:09

## 2021-03-03 RX ADMIN — OXYCODONE 10 MG: 5 TABLET ORAL at 11:38

## 2021-03-03 RX ADMIN — Medication 12 UNITS: at 21:01

## 2021-03-03 RX ADMIN — OXYCODONE 10 MG: 5 TABLET ORAL at 05:12

## 2021-03-03 RX ADMIN — PANTOPRAZOLE SODIUM 40 MG: 40 TABLET, DELAYED RELEASE ORAL at 05:12

## 2021-03-03 RX ADMIN — DOCUSATE SODIUM 50 MG AND SENNOSIDES 8.6 MG 1 TABLET: 8.6; 5 TABLET, FILM COATED ORAL at 20:00

## 2021-03-03 RX ADMIN — CEFAZOLIN 1000 MG: 1 INJECTION, POWDER, FOR SOLUTION INTRAMUSCULAR; INTRAVENOUS at 08:09

## 2021-03-03 RX ADMIN — GABAPENTIN 100 MG: 100 CAPSULE ORAL at 08:09

## 2021-03-03 RX ADMIN — OXYCODONE 20 MG: 5 TABLET ORAL at 20:00

## 2021-03-03 RX ADMIN — CEFAZOLIN 1000 MG: 1 INJECTION, POWDER, FOR SOLUTION INTRAMUSCULAR; INTRAVENOUS at 17:16

## 2021-03-03 RX ADMIN — INSULIN LISPRO 4 UNITS: 100 INJECTION, SOLUTION INTRAVENOUS; SUBCUTANEOUS at 08:17

## 2021-03-03 RX ADMIN — HYDROMORPHONE HYDROCHLORIDE 1 MG: 1 INJECTION, SOLUTION INTRAMUSCULAR; INTRAVENOUS; SUBCUTANEOUS at 21:46

## 2021-03-03 RX ADMIN — TACROLIMUS 3 MG: 1 CAPSULE ORAL at 20:00

## 2021-03-03 RX ADMIN — HYDROMORPHONE HYDROCHLORIDE 1 MG: 1 INJECTION, SOLUTION INTRAMUSCULAR; INTRAVENOUS; SUBCUTANEOUS at 08:16

## 2021-03-03 RX ADMIN — ACETAMINOPHEN 650 MG: 325 TABLET ORAL at 08:16

## 2021-03-03 RX ADMIN — INSULIN LISPRO 4 UNITS: 100 INJECTION, SOLUTION INTRAVENOUS; SUBCUTANEOUS at 17:17

## 2021-03-03 RX ADMIN — INSULIN LISPRO 2 UNITS: 100 INJECTION, SOLUTION INTRAVENOUS; SUBCUTANEOUS at 12:10

## 2021-03-03 RX ADMIN — POTASSIUM CHLORIDE 10 MEQ: 7.46 INJECTION, SOLUTION INTRAVENOUS at 10:44

## 2021-03-03 RX ADMIN — HYDROMORPHONE HYDROCHLORIDE 1 MG: 1 INJECTION, SOLUTION INTRAMUSCULAR; INTRAVENOUS; SUBCUTANEOUS at 03:05

## 2021-03-03 RX ADMIN — GABAPENTIN 100 MG: 100 CAPSULE ORAL at 20:01

## 2021-03-03 RX ADMIN — SODIUM CHLORIDE, PRESERVATIVE FREE 10 ML: 5 INJECTION INTRAVENOUS at 08:11

## 2021-03-03 ASSESSMENT — PAIN SCALES - GENERAL
PAINLEVEL_OUTOF10: 10
PAINLEVEL_OUTOF10: 5
PAINLEVEL_OUTOF10: 8
PAINLEVEL_OUTOF10: 5
PAINLEVEL_OUTOF10: 9

## 2021-03-03 ASSESSMENT — PAIN DESCRIPTION - LOCATION
LOCATION: HIP
LOCATION: HIP

## 2021-03-03 NOTE — PROGRESS NOTES
Hospitalist Progress Note  3/3/2021 5:01 PM  Subjective:   Admit Date: 2/27/2021  PCP: DINORAH Dietz NP    Chief Complaint: left hip pain    Subjective: Patient still feeling weak and tired. Refused participating with physical therapy again. Cumulative Hospital History:   2-27: Presents to ED with recurrent chills and fever with worsening left hip pain for 2.5 days. Recently hospitalized with Streptococcus agalactiae septicemia, treated with ceftriaxone. He developed C. difficile diarrhea on this and was discharged on cephalexin and oral vancomycin. Orthopedic surgery consulted for aspiration and likely I&D. Received one dose of vancomycin and was then placed on cefazolin postoperatively. 2-28: ID consulted, continue cefazolin. Fluid culture and sensitivity pending to guide therapy. PT and OT evaluations tomorrow. 3-1: Still having too much pain to work with PT and OT. On high dose hydromorphone. Will add transdermal lidocaine to see if it helps. Encouraged to do whatever he is capable of with PT and OT. Restart tacrolimus with daily monitoring, aim for lower range of acceptable serum level. 3-2: Still not working with PT and OT due to pain. Suspect he will need rehab placement. 3-3: Patient will require 4 to 6 weeks of cefazolin treatment for antibiotics. PICC line placement ordered. Encourage patient to participate in PT OT. He would need DC planning to skilled nursing facility as he is very weak and not even able to participate in PT OT. Patient started on steroids as per GI.    ROS: 14 point review of systems is negative except as specifically addressed above.     DIET CARB CONTROL; Carb Control: 5 carb choices (75 gms)/meal  Dietary Nutrition Supplements: Low Calorie High Protein Supplement    Intake/Output Summary (Last 24 hours) at 3/3/2021 1701  Last data filed at 3/3/2021 1341  Gross per 24 hour   Intake 10 ml   Output 2050 ml   Net -2040 ml     Medications:   dextrose      sodium chloride 125 mL/hr at 03/03/21 1014     Current Facility-Administered Medications   Medication Dose Route Frequency Provider Last Rate Last Admin    predniSONE (DELTASONE) tablet 10 mg  10 mg Oral BID Len Chavez MD   10 mg at 03/03/21 1138    tacrolimus (PROGRAF) capsule 3 mg  3 mg Oral BID Abdirizak Fearing, DO   3 mg at 03/03/21 0809    lidocaine 4 % external patch 1 patch  1 patch Transdermal Daily Abdirizak Fearing, DO   1 patch at 03/03/21 0810    ceFAZolin (ANCEF) 1,000 mg in sterile water 10 mL IV syringe  1,000 mg Intravenous Q8H Katy Thomas MD   1,000 mg at 03/03/21 0809    insulin glargine (LANTUS) injection vial 12 Units  12 Units Subcutaneous Nightly Kurt Ramos, DO   12 Units at 03/02/21 2352    gabapentin (NEURONTIN) capsule 100 mg  100 mg Oral TID Abdirizak Fearing, DO   100 mg at 03/03/21 1501    pantoprazole (PROTONIX) tablet 40 mg  40 mg Oral QAM AC Kurt Ramos, DO   40 mg at 03/03/21 1180    acetaminophen (TYLENOL) tablet 650 mg  650 mg Oral Q6H PRN Kurt Ramos, DO   650 mg at 03/03/21 0816    Or    acetaminophen (TYLENOL) suppository 650 mg  650 mg Rectal Q6H PRN Abdirizak Fearing, DO        insulin lispro (HUMALOG) injection vial 0-12 Units  0-12 Units Subcutaneous TID WC Kurt Ramos, DO   2 Units at 03/03/21 1210    insulin lispro (HUMALOG) injection vial 0-6 Units  0-6 Units Subcutaneous Nightly Kurt Ramos, DO   1 Units at 03/02/21 2358    glucose (GLUTOSE) 40 % oral gel 15 g  15 g Oral PRN Kurt Ramos, DO        dextrose 50 % IV solution  12.5 g Intravenous PRN Abdirizak Fearing, DO        glucagon (rDNA) injection 1 mg  1 mg Intramuscular PRN Abdirizak Fearing, DO        dextrose 5 % solution  100 mL/hr Intravenous PRN Abdirizak Fearing, DO        sodium chloride flush 0.9 % injection 10 mL  10 mL Intravenous 2 times per day Parag Weinstein MD   10 mL at 03/03/21 0811    sodium chloride flush 0.9 % injection 10 mL  10 mL Intravenous PRN Parag Weinstein MD   10 mL at 03/02/21 1721    0.9 % sodium chloride infusion   Intravenous Continuous Elaine Hernandez  mL/hr at 03/03/21 1014 New Bag at 03/03/21 1014    oxyCODONE (ROXICODONE) immediate release tablet 10 mg  10 mg Oral Q4H PRN Elaine Hernandez MD   10 mg at 03/03/21 1138    Or    oxyCODONE (ROXICODONE) immediate release tablet 20 mg  20 mg Oral Q4H PRN Elaine Hernandez MD   20 mg at 03/02/21 1641    HYDROmorphone HCl PF (DILAUDID) injection 1 mg  1 mg Intravenous Q3H PRN Elaine Hernandez MD   1 mg at 03/03/21 7316    Or    HYDROmorphone HCl PF (DILAUDID) injection 2 mg  2 mg Intravenous Q3H PRN Elaine Hernandez MD        diphenhydrAMINE (BENADRYL) tablet 25 mg  25 mg Oral Q6H PRN Elaine Hernandez MD        sennosides-docusate sodium (SENOKOT-S) 8.6-50 MG tablet 1 tablet  1 tablet Oral BID Elaine Hernandez MD   1 tablet at 03/02/21 1031        Labs:     Recent Labs     03/01/21 0223 03/02/21 0340 03/03/21  0342   WBC 9.5 9.9 9.5   RBC 3.88* 3.87* 3.88*   HGB 11.5* 11.5* 11.3*   HCT 35.5* 34.9* 34.3*   MCV 91.5 90.2 88.4   MCH 29.6 29.7 29.1   MCHC 32.4* 33.0 32.9*   PLT 47* 49* 73*     Recent Labs     03/01/21 0223 03/02/21 0340 03/03/21  0342   * 130* 131*   K 3.9 3.9 3.4*   ANIONGAP 12 18 15   CL 96* 96* 95*   CO2 24 16* 21*   BUN 26* 25* 21*   CREATININE 1.8* 1.4* 1.2   GLUCOSE 158* 174* 202*   CALCIUM 7.6* 8.0* 7.9*     Recent Labs     03/03/21  0342   MG 1.4*   PHOS 3.0     Recent Labs     03/01/21 0223 03/02/21  0340 03/03/21  0342   AST 18 19 17   ALT 7 <5* <5*   BILITOT 0.4 0.4 0.5   ALKPHOS 182* 238* 238*     ABGs:No results for input(s): PH, PO2, PCO2, HCO3, BE, O2SAT in the last 72 hours. Troponin T: No results for input(s): TROPONINI in the last 72 hours. INR:   No results for input(s): INR in the last 72 hours. Lactic Acid:   No results for input(s): LACTA in the last 72 hours.     Objective:   Vitals: /78   Pulse 70   Temp 97.9 °F (36.6 °C) (Temporal)   Resp 18   Ht 6' 1\" (1.854 m)   Wt 280 lb (127 kg)   SpO2 96%   BMI 36.94 kg/m²   24HR INTAKE/OUTPUT:      Intake/Output Summary (Last 24 hours) at 3/3/2021 1701  Last data filed at 3/3/2021 1341  Gross per 24 hour   Intake 10 ml   Output 2050 ml   Net -2040 ml     General appearance: alert and cooperative with exam  HEENT: atraumatic, eyes with clear conjunctiva and normal lids, pupils and irises normal, external ears and nose are normal, lips normal  Neck without masses, lympadenopathy, bruit, thyroid normal  Lungs: no increased work of breathing, \"clear to auscultation bilaterally\" without rales, rhonchi or wheezes  Heart: regular rate and rhythm, S1, S2 normal, no murmur, click, rub or gallop  Abdomen: soft, non-tender; bowel sounds normal; no masses,  no organomegaly and obese  Extremities: edema trace bilaterally, modified Barbara's negative  Neurologic: no focal neurologic deficits, normal sensation, alert and oriented, affect and mood appropriate  Skin: some erythema noted left hip and proximal thigh    Assessment and Plan:   Principal Problem:    Septic arthritis of hip (HCC)  Active Problems:    History of cirrhosis    History of hepatitis C virus infection    History of liver transplant ([facility] [address]    Type 2 diabetes mellitus with skin complication, with long-term current use of insulin ([facility])    Acute hip pain, left    Hypokalemia    Hypomagnesemia  Resolved Problems:    * No resolved hospital problems. *      POD #4 I&D left hip. Day #4 cefazolin empiric therapy for septic arthritis, believed to be due to Streptococcus agalactiae. C&S pending. ID onboard. PICC line placement ordered for DC planning on IV antibiotics    Case management consult given to help with DC planning    IV potassium and magnesium replacement ordered    Restart tacrolimus 3 mg BID. Daily level monitoring, aim for lower end of target range due to severe infection.     Patient restarted on prednisone 20 mg p.o. twice daily as per GI    Lidocaine transdermal for pain control    Clostridioides difficile antigen testing if recurrent diarrhea. Still not working with PT and OT due to pain. I again pleaded with him to do whatever he is capable of. Advance Directive: Full Code    DVT prophylaxis: SCDs    Discharge planning: TBD    Repeat labs in a.m. Electrolyte replacement as per protocol. Patient will be monitored very closely on the floor. Further recommendations as per the hospital course. Patient  is on DVT prophylaxis  Current medications reviewed  Lab work reviewed  Radiology/Chest x-ray films reviewed  Treatment recommendations from suspecialities reviewed, appreciated and agreed with  Discussed with the nurse and addressed all questions/concerns  Discussed with Patient and/or Family at the bedside in detail . .. they understand and agree with the management plan.       Elis Philip MD  [facility] [place] Hospitalist

## 2021-03-03 NOTE — PROGRESS NOTES
Occupational Therapy    Pt. Refused repeated attempts by OT to participate with eval or sit up on EOB to start. Declined offer to elevate HOB above 20 deg to offer some hip flexion. Feels pain will be improved tomorrow. Continued to encourage pt. To sit up higher in the bed.   Electronically signed by Margaret Masterson OT on 3/3/2021 at 12:07 PM

## 2021-03-03 NOTE — PROGRESS NOTES
Physical Therapy  Att 3/3. Pt declined and states Kabam. \" Will continue to follow.   Electronically signed by Marcellina Lundborg on 3/3/2021 at 12:03 PM

## 2021-03-03 NOTE — PROGRESS NOTES
No hepatobiliary complaints, no diarrhea, afebrile, excoriation of skin over RUQ of abdomen, LFT's stable on Tacrolimus for liver transplant, Tacrolimus blood levels in progress, patient previously on Prednisone 10 mg po BID at home so reordered and then taper.

## 2021-03-04 LAB
ALBUMIN SERPL-MCNC: 2 G/DL (ref 3.5–5.2)
ALP BLD-CCNC: 402 U/L (ref 40–130)
ALT SERPL-CCNC: 8 U/L (ref 5–41)
ANION GAP SERPL CALCULATED.3IONS-SCNC: 14 MMOL/L (ref 7–19)
AST SERPL-CCNC: 22 U/L (ref 5–40)
BASOPHILS ABSOLUTE: 0 K/UL (ref 0–0.2)
BASOPHILS RELATIVE PERCENT: 0.2 % (ref 0–1)
BILIRUB SERPL-MCNC: 0.6 MG/DL (ref 0.2–1.2)
BUN BLDV-MCNC: 21 MG/DL (ref 6–20)
CALCIUM SERPL-MCNC: 8.3 MG/DL (ref 8.6–10)
CHLORIDE BLD-SCNC: 94 MMOL/L (ref 98–111)
CO2: 20 MMOL/L (ref 22–29)
CREAT SERPL-MCNC: 1 MG/DL (ref 0.5–1.2)
EOSINOPHILS ABSOLUTE: 0 K/UL (ref 0–0.6)
EOSINOPHILS RELATIVE PERCENT: 0 % (ref 0–5)
GFR AFRICAN AMERICAN: >59
GFR NON-AFRICAN AMERICAN: >60
GLUCOSE BLD-MCNC: 189 MG/DL (ref 70–99)
GLUCOSE BLD-MCNC: 227 MG/DL (ref 70–99)
GLUCOSE BLD-MCNC: 238 MG/DL (ref 70–99)
GLUCOSE BLD-MCNC: 260 MG/DL (ref 74–109)
GLUCOSE BLD-MCNC: 272 MG/DL (ref 70–99)
HCT VFR BLD CALC: 37.5 % (ref 42–52)
HEMOGLOBIN: 12.6 G/DL (ref 14–18)
IMMATURE GRANULOCYTES #: 0.3 K/UL
LACTIC ACID: 0.8 MMOL/L (ref 0.5–1.9)
LYMPHOCYTES ABSOLUTE: 0.3 K/UL (ref 1.1–4.5)
LYMPHOCYTES RELATIVE PERCENT: 3.3 % (ref 20–40)
MAGNESIUM: 1.5 MG/DL (ref 1.6–2.6)
MCH RBC QN AUTO: 29.2 PG (ref 27–31)
MCHC RBC AUTO-ENTMCNC: 33.6 G/DL (ref 33–37)
MCV RBC AUTO: 87 FL (ref 80–94)
MONOCYTES ABSOLUTE: 0.6 K/UL (ref 0–0.9)
MONOCYTES RELATIVE PERCENT: 5.4 % (ref 0–10)
NEUTROPHILS ABSOLUTE: 9.2 K/UL (ref 1.5–7.5)
NEUTROPHILS RELATIVE PERCENT: 88.1 % (ref 50–65)
PDW BLD-RTO: 13 % (ref 11.5–14.5)
PERFORMED ON: ABNORMAL
PHOSPHORUS: 2.6 MG/DL (ref 2.5–4.5)
PLATELET # BLD: 115 K/UL (ref 130–400)
PMV BLD AUTO: 11.9 FL (ref 9.4–12.4)
POTASSIUM REFLEX MAGNESIUM: 4.1 MMOL/L (ref 3.5–5)
RBC # BLD: 4.31 M/UL (ref 4.7–6.1)
SODIUM BLD-SCNC: 128 MMOL/L (ref 136–145)
TACROLIMUS BLOOD: 38.9 NG/ML
TOTAL PROTEIN: 5.8 G/DL (ref 6.6–8.7)
WBC # BLD: 10.4 K/UL (ref 4.8–10.8)

## 2021-03-04 PROCEDURE — 83735 ASSAY OF MAGNESIUM: CPT

## 2021-03-04 PROCEDURE — 82947 ASSAY GLUCOSE BLOOD QUANT: CPT

## 2021-03-04 PROCEDURE — 83605 ASSAY OF LACTIC ACID: CPT

## 2021-03-04 PROCEDURE — C1751 CATH, INF, PER/CENT/MIDLINE: HCPCS

## 2021-03-04 PROCEDURE — 6360000002 HC RX W HCPCS: Performed by: HOSPITALIST

## 2021-03-04 PROCEDURE — 6370000000 HC RX 637 (ALT 250 FOR IP): Performed by: INTERNAL MEDICINE

## 2021-03-04 PROCEDURE — 84100 ASSAY OF PHOSPHORUS: CPT

## 2021-03-04 PROCEDURE — 6370000000 HC RX 637 (ALT 250 FOR IP): Performed by: ORTHOPAEDIC SURGERY

## 2021-03-04 PROCEDURE — 36415 COLL VENOUS BLD VENIPUNCTURE: CPT

## 2021-03-04 PROCEDURE — 2580000003 HC RX 258: Performed by: INTERNAL MEDICINE

## 2021-03-04 PROCEDURE — 6360000002 HC RX W HCPCS: Performed by: ORTHOPAEDIC SURGERY

## 2021-03-04 PROCEDURE — 80053 COMPREHEN METABOLIC PANEL: CPT

## 2021-03-04 PROCEDURE — 6360000002 HC RX W HCPCS: Performed by: FAMILY MEDICINE

## 2021-03-04 PROCEDURE — 6370000000 HC RX 637 (ALT 250 FOR IP): Performed by: FAMILY MEDICINE

## 2021-03-04 PROCEDURE — 2580000003 HC RX 258: Performed by: ORTHOPAEDIC SURGERY

## 2021-03-04 PROCEDURE — 6360000002 HC RX W HCPCS: Performed by: INTERNAL MEDICINE

## 2021-03-04 PROCEDURE — 02HV33Z INSERTION OF INFUSION DEVICE INTO SUPERIOR VENA CAVA, PERCUTANEOUS APPROACH: ICD-10-PCS | Performed by: HOSPITALIST

## 2021-03-04 PROCEDURE — 1210000000 HC MED SURG R&B

## 2021-03-04 PROCEDURE — 36569 INSJ PICC 5 YR+ W/O IMAGING: CPT

## 2021-03-04 PROCEDURE — 80197 ASSAY OF TACROLIMUS: CPT

## 2021-03-04 PROCEDURE — 2580000003 HC RX 258: Performed by: HOSPITALIST

## 2021-03-04 PROCEDURE — 2500000003 HC RX 250 WO HCPCS: Performed by: HOSPITALIST

## 2021-03-04 PROCEDURE — 76937 US GUIDE VASCULAR ACCESS: CPT

## 2021-03-04 PROCEDURE — 85025 COMPLETE CBC W/AUTO DIFF WBC: CPT

## 2021-03-04 RX ORDER — SODIUM CHLORIDE 0.9 % (FLUSH) 0.9 %
10 SYRINGE (ML) INJECTION EVERY 12 HOURS SCHEDULED
Status: DISCONTINUED | OUTPATIENT
Start: 2021-03-04 | End: 2021-03-05 | Stop reason: HOSPADM

## 2021-03-04 RX ORDER — LIDOCAINE HYDROCHLORIDE 10 MG/ML
5 INJECTION, SOLUTION EPIDURAL; INFILTRATION; INTRACAUDAL; PERINEURAL ONCE
Status: COMPLETED | OUTPATIENT
Start: 2021-03-04 | End: 2021-03-04

## 2021-03-04 RX ORDER — SODIUM CHLORIDE 0.9 % (FLUSH) 0.9 %
10 SYRINGE (ML) INJECTION PRN
Status: DISCONTINUED | OUTPATIENT
Start: 2021-03-04 | End: 2021-03-05 | Stop reason: HOSPADM

## 2021-03-04 RX ADMIN — INSULIN LISPRO 4 UNITS: 100 INJECTION, SOLUTION INTRAVENOUS; SUBCUTANEOUS at 17:00

## 2021-03-04 RX ADMIN — OXYCODONE 10 MG: 5 TABLET ORAL at 06:46

## 2021-03-04 RX ADMIN — GABAPENTIN 100 MG: 100 CAPSULE ORAL at 09:42

## 2021-03-04 RX ADMIN — SODIUM CHLORIDE, PRESERVATIVE FREE 10 ML: 5 INJECTION INTRAVENOUS at 09:52

## 2021-03-04 RX ADMIN — INSULIN LISPRO 2 UNITS: 100 INJECTION, SOLUTION INTRAVENOUS; SUBCUTANEOUS at 13:46

## 2021-03-04 RX ADMIN — INSULIN LISPRO 2 UNITS: 100 INJECTION, SOLUTION INTRAVENOUS; SUBCUTANEOUS at 20:40

## 2021-03-04 RX ADMIN — OXYCODONE 10 MG: 5 TABLET ORAL at 00:56

## 2021-03-04 RX ADMIN — PREDNISONE 10 MG: 10 TABLET ORAL at 20:26

## 2021-03-04 RX ADMIN — HYDROMORPHONE HYDROCHLORIDE 1 MG: 1 INJECTION, SOLUTION INTRAMUSCULAR; INTRAVENOUS; SUBCUTANEOUS at 15:14

## 2021-03-04 RX ADMIN — DOCUSATE SODIUM 50 MG AND SENNOSIDES 8.6 MG 1 TABLET: 8.6; 5 TABLET, FILM COATED ORAL at 09:44

## 2021-03-04 RX ADMIN — TACROLIMUS 3 MG: 1 CAPSULE ORAL at 09:44

## 2021-03-04 RX ADMIN — TACROLIMUS 3 MG: 1 CAPSULE ORAL at 20:25

## 2021-03-04 RX ADMIN — SODIUM CHLORIDE, PRESERVATIVE FREE 10 ML: 5 INJECTION INTRAVENOUS at 20:27

## 2021-03-04 RX ADMIN — PREDNISONE 10 MG: 10 TABLET ORAL at 09:45

## 2021-03-04 RX ADMIN — HYDROMORPHONE HYDROCHLORIDE 1 MG: 1 INJECTION, SOLUTION INTRAMUSCULAR; INTRAVENOUS; SUBCUTANEOUS at 09:42

## 2021-03-04 RX ADMIN — GABAPENTIN 100 MG: 100 CAPSULE ORAL at 13:46

## 2021-03-04 RX ADMIN — Medication 12 UNITS: at 20:40

## 2021-03-04 RX ADMIN — SODIUM CHLORIDE: 9 INJECTION, SOLUTION INTRAVENOUS at 13:46

## 2021-03-04 RX ADMIN — HYDROMORPHONE HYDROCHLORIDE 1 MG: 1 INJECTION, SOLUTION INTRAMUSCULAR; INTRAVENOUS; SUBCUTANEOUS at 20:27

## 2021-03-04 RX ADMIN — PANTOPRAZOLE SODIUM 40 MG: 40 TABLET, DELAYED RELEASE ORAL at 04:30

## 2021-03-04 RX ADMIN — CEFAZOLIN 1000 MG: 1 INJECTION, POWDER, FOR SOLUTION INTRAMUSCULAR; INTRAVENOUS at 00:57

## 2021-03-04 RX ADMIN — GABAPENTIN 100 MG: 100 CAPSULE ORAL at 20:26

## 2021-03-04 RX ADMIN — MAGNESIUM SULFATE HEPTAHYDRATE 3000 MG: 500 INJECTION, SOLUTION INTRAMUSCULAR; INTRAVENOUS at 13:46

## 2021-03-04 RX ADMIN — LIDOCAINE HYDROCHLORIDE 5 ML: 10 INJECTION, SOLUTION EPIDURAL; INFILTRATION; INTRACAUDAL; PERINEURAL at 13:49

## 2021-03-04 RX ADMIN — OXYCODONE 20 MG: 5 TABLET ORAL at 16:47

## 2021-03-04 RX ADMIN — CEFAZOLIN 1000 MG: 1 INJECTION, POWDER, FOR SOLUTION INTRAMUSCULAR; INTRAVENOUS at 16:47

## 2021-03-04 RX ADMIN — CEFAZOLIN 1000 MG: 1 INJECTION, POWDER, FOR SOLUTION INTRAMUSCULAR; INTRAVENOUS at 09:45

## 2021-03-04 RX ADMIN — INSULIN LISPRO 6 UNITS: 100 INJECTION, SOLUTION INTRAVENOUS; SUBCUTANEOUS at 09:45

## 2021-03-04 ASSESSMENT — PAIN SCALES - GENERAL
PAINLEVEL_OUTOF10: 8
PAINLEVEL_OUTOF10: 10
PAINLEVEL_OUTOF10: 8
PAINLEVEL_OUTOF10: 6
PAINLEVEL_OUTOF10: 10
PAINLEVEL_OUTOF10: 7
PAINLEVEL_OUTOF10: 10
PAINLEVEL_OUTOF10: 10

## 2021-03-04 NOTE — PROGRESS NOTES
Arrived for PICC line placement. Able to get signed consent, however patient declined to have procedure done at this time. Stating \" No, not now. I don't feel like it. I need to sleep. \" I let patient know that procedure did not involve anything on his part and again patient declined. Will check back at another time.

## 2021-03-04 NOTE — PROGRESS NOTES
Pt states that \"Dr. Antolin Jacobs was happy with me with how much I have been moving around and stuff. \" I asked pt if therapy had been by to see him and the pt stated \"I did talk with them but I did not feel like doing anything because I'm just hurting and I can just do it later. \"    He continues to ask for his pain medication and is currently resting in bed. The pt is also refusing for a PICC line to be placed.

## 2021-03-04 NOTE — PROGRESS NOTES
Infectious Diseases Progress Note    Patient:  aJnet May  YOB: 1970  MRN: [id number]   Admit date: 2/27/2021   Admitting Physician: Ruth Martin MD  Primary Care Physician: DINORAH Guillen NP    Chief Complaint/Interval History: He is without fever. He is hemodynamically stable. He is not having diarrhea. No abdominal pain or discomfort. Continues to have some hip pain. Girlfriend at bedside. She indicates he had told physical therapy today did not want to work with them today, but would work with them tomorrow. He still is not moving much. Explained again to the patient and also talked with his girlfriend. Explained we can administer IV antibiotic treatment at home, but for him to be able to go home in my opinion he would need to be moving more significantly, able to stand, able to walk, and able to do more to manage his own activities of daily living. They are both in agreement.     In/Out    Intake/Output Summary (Last 24 hours) at 3/4/2021 1735  Last data filed at 3/4/2021 1649  Gross per 24 hour   Intake 563 ml   Output 2525 ml   Net -1962 ml     Allergies: No Known Allergies  Current Meds:     sodium chloride flush 0.9 % injection 10 mL, 2 times per day      sodium chloride flush 0.9 % injection 10 mL, PRN      magnesium sulfate 3,000 mg in dextrose 5 % 100 mL IVPB, Once      predniSONE (DELTASONE) tablet 10 mg, BID      tacrolimus (PROGRAF) capsule 3 mg, BID      lidocaine 4 % external patch 1 patch, Daily      ceFAZolin (ANCEF) 1,000 mg in sterile water 10 mL IV syringe, Q8H      insulin glargine (LANTUS) injection vial 12 Units, Nightly      gabapentin (NEURONTIN) capsule 100 mg, TID      pantoprazole (PROTONIX) tablet 40 mg, QAM AC      acetaminophen (TYLENOL) tablet 650 mg, Q6H PRN    Or      acetaminophen (TYLENOL) suppository 650 mg, Q6H PRN      insulin lispro (HUMALOG) injection vial 0-12 Units, TID WC      insulin lispro (HUMALOG) injection vial 0-6 Units, Nightly      glucose (GLUTOSE) 40 % oral gel 15 g, PRN      dextrose 50 % IV solution, PRN      glucagon (rDNA) injection 1 mg, PRN      dextrose 5 % solution, PRN      0.9 % sodium chloride infusion, Continuous      oxyCODONE (ROXICODONE) immediate release tablet 10 mg, Q4H PRN    Or      oxyCODONE (ROXICODONE) immediate release tablet 20 mg, Q4H PRN      HYDROmorphone HCl PF (DILAUDID) injection 1 mg, Q3H PRN    Or      HYDROmorphone HCl PF (DILAUDID) injection 2 mg, Q3H PRN      diphenhydrAMINE (BENADRYL) tablet 25 mg, Q6H PRN      sennosides-docusate sodium (SENOKOT-S) 8.6-50 MG tablet 1 tablet, BID      Review of Systems no cardiopulmonary symptoms    VitalSigns:  BP (!) 138/91   Pulse 60   Temp 97.6 °F (36.4 °C) (Temporal)   Resp 20   Ht 6' 1\" (1.854 m)   Wt 295 lb 5 oz (134 kg)   SpO2 97%   BMI 38.96 kg/m²      Physical Exam  Line/IV (right arm PIC) site: No erythema, warmth, induration, or tenderness. Abdomen soft nontender  Extremities edematous left slightly greater than right  He is able to contract the left quad. He is able to flex at the hip slightly. I was able to get a little more today with passive range of motion of the left hip. Lab Results:  CBC:   Recent Labs     03/02/21  0340 03/03/21  0342 03/04/21  0253   WBC 9.9 9.5 10.4   HGB 11.5* 11.3* 12.6*   PLT 49* 73* 115*     BMP:  Recent Labs     03/02/21  0340 03/03/21  0342 03/04/21  0253   * 131* 128*   K 3.9 3.4* 4.1   CL 96* 95* 94*   CO2 16* 21* 20*   BUN 25* 21* 21*   CREATININE 1.4* 1.2 1.0   GLUCOSE 174* 202* 260*     CultureResults:  Blood cultures February 27, 2021-Streptococcus agalactiae  Blood cultures March 1, 2021-no growth  Left hip cultures February 27, 2021-Streptococcus agalactiae    Radiology: None    Additional Studies Reviewed:  None    Impression:  1. Group B strep bacteremia  2. Left hip septic arthritis with group B strep (Streptococcus agalactiae)  3. History liver transplantation  4. Diabetes mellitus  5. Renal insufficiency-resolved  6. Thrombocytopenia-multifactorial.  Sepsis on admission. Liver disease. Platelet count improving.       Recommendations:  Continue cefazolin  Encouraging work with physical therapy  No change in antibiotic treatment at present  Planning IV cefazolin for a 4-week course of therapy-last dose of cefazolin March 28, 2021 (4-week course of IV antibiotic therapy for native joint septic arthritis in immunocompromised patient)  Continue to follow    Della Arnold MD

## 2021-03-05 ENCOUNTER — HOSPITAL ENCOUNTER (OUTPATIENT)
Facility: HOSPITAL | Age: 51
Discharge: HOME | End: 2021-03-25
Attending: INTERNAL MEDICINE | Admitting: INTERNAL MEDICINE

## 2021-03-05 VITALS
BODY MASS INDEX: 38.24 KG/M2 | TEMPERATURE: 99.2 F | RESPIRATION RATE: 18 BRPM | HEART RATE: 73 BPM | OXYGEN SATURATION: 97 % | DIASTOLIC BLOOD PRESSURE: 86 MMHG | HEIGHT: 73 IN | SYSTOLIC BLOOD PRESSURE: 143 MMHG | WEIGHT: 288.56 LBS

## 2021-03-05 PROBLEM — E44.0 MODERATE MALNUTRITION (HCC): Status: ACTIVE | Noted: 2021-03-05

## 2021-03-05 PROBLEM — E87.6 HYPOKALEMIA: Status: RESOLVED | Noted: 2021-03-03 | Resolved: 2021-03-05

## 2021-03-05 PROBLEM — Z71.6 TOBACCO ABUSE COUNSELING: Status: ACTIVE | Noted: 2021-03-05

## 2021-03-05 PROBLEM — F17.219 CIGARETTE NICOTINE DEPENDENCE WITH NICOTINE-INDUCED DISORDER: Status: ACTIVE | Noted: 2021-03-05

## 2021-03-05 PROBLEM — Z91.199 PERSONAL HISTORY OF NONCOMPLIANCE WITH MEDICAL TREATMENT AND REGIMEN: Status: ACTIVE | Noted: 2021-03-05

## 2021-03-05 LAB
ALBUMIN SERPL-MCNC: 2.1 G/DL (ref 3.5–5.2)
ALP BLD-CCNC: 305 U/L (ref 40–130)
ALT SERPL-CCNC: 5 U/L (ref 5–41)
ANION GAP SERPL CALCULATED.3IONS-SCNC: 11 MMOL/L (ref 7–19)
AST SERPL-CCNC: 14 U/L (ref 5–40)
BASOPHILS ABSOLUTE: 0 K/UL (ref 0–0.2)
BASOPHILS RELATIVE PERCENT: 0.2 % (ref 0–1)
BILIRUB SERPL-MCNC: 0.5 MG/DL (ref 0.2–1.2)
BUN BLDV-MCNC: 21 MG/DL (ref 6–20)
CALCIUM SERPL-MCNC: 7.6 MG/DL (ref 8.6–10)
CHLORIDE BLD-SCNC: 96 MMOL/L (ref 98–111)
CO2: 23 MMOL/L (ref 22–29)
CREAT SERPL-MCNC: 0.8 MG/DL (ref 0.5–1.2)
EOSINOPHILS ABSOLUTE: 0 K/UL (ref 0–0.6)
EOSINOPHILS RELATIVE PERCENT: 0.1 % (ref 0–5)
GFR AFRICAN AMERICAN: >59
GFR NON-AFRICAN AMERICAN: >60
GLUCOSE BLD-MCNC: 210 MG/DL (ref 70–99)
GLUCOSE BLD-MCNC: 225 MG/DL (ref 70–99)
GLUCOSE BLD-MCNC: 240 MG/DL (ref 70–99)
GLUCOSE BLD-MCNC: 253 MG/DL (ref 74–109)
GLUCOSE BLDC GLUCOMTR-MCNC: 241 MG/DL (ref 70–130)
HCT VFR BLD CALC: 34.2 % (ref 42–52)
HEMOGLOBIN: 11.6 G/DL (ref 14–18)
IMMATURE GRANULOCYTES #: 0.3 K/UL
LYMPHOCYTES ABSOLUTE: 0.7 K/UL (ref 1.1–4.5)
LYMPHOCYTES RELATIVE PERCENT: 6.5 % (ref 20–40)
MAGNESIUM: 1.5 MG/DL (ref 1.6–2.6)
MCH RBC QN AUTO: 29.1 PG (ref 27–31)
MCHC RBC AUTO-ENTMCNC: 33.9 G/DL (ref 33–37)
MCV RBC AUTO: 85.9 FL (ref 80–94)
MONOCYTES ABSOLUTE: 0.9 K/UL (ref 0–0.9)
MONOCYTES RELATIVE PERCENT: 8.7 % (ref 0–10)
NEUTROPHILS ABSOLUTE: 8.2 K/UL (ref 1.5–7.5)
NEUTROPHILS RELATIVE PERCENT: 81.6 % (ref 50–65)
PDW BLD-RTO: 13 % (ref 11.5–14.5)
PERFORMED ON: ABNORMAL
PLATELET # BLD: 124 K/UL (ref 130–400)
PMV BLD AUTO: 11.2 FL (ref 9.4–12.4)
POTASSIUM REFLEX MAGNESIUM: 3.5 MMOL/L (ref 3.5–5)
RBC # BLD: 3.98 M/UL (ref 4.7–6.1)
SODIUM BLD-SCNC: 130 MMOL/L (ref 136–145)
TACROLIMUS BLOOD: 36.7 NG/ML
TOTAL PROTEIN: 4.9 G/DL (ref 6.6–8.7)
WBC # BLD: 10.1 K/UL (ref 4.8–10.8)

## 2021-03-05 PROCEDURE — 97161 PT EVAL LOW COMPLEX 20 MIN: CPT

## 2021-03-05 PROCEDURE — 6370000000 HC RX 637 (ALT 250 FOR IP): Performed by: ORTHOPAEDIC SURGERY

## 2021-03-05 PROCEDURE — 6360000002 HC RX W HCPCS: Performed by: HOSPITALIST

## 2021-03-05 PROCEDURE — 6360000002 HC RX W HCPCS: Performed by: ORTHOPAEDIC SURGERY

## 2021-03-05 PROCEDURE — 6360000002 HC RX W HCPCS: Performed by: INTERNAL MEDICINE

## 2021-03-05 PROCEDURE — 63710000001 TACROLIMUS PER 1 MG: Performed by: INTERNAL MEDICINE

## 2021-03-05 PROCEDURE — 83735 ASSAY OF MAGNESIUM: CPT

## 2021-03-05 PROCEDURE — 6370000000 HC RX 637 (ALT 250 FOR IP): Performed by: INTERNAL MEDICINE

## 2021-03-05 PROCEDURE — 82947 ASSAY GLUCOSE BLOOD QUANT: CPT

## 2021-03-05 PROCEDURE — 97165 OT EVAL LOW COMPLEX 30 MIN: CPT

## 2021-03-05 PROCEDURE — 6360000002 HC RX W HCPCS: Performed by: FAMILY MEDICINE

## 2021-03-05 PROCEDURE — 2580000003 HC RX 258: Performed by: ORTHOPAEDIC SURGERY

## 2021-03-05 PROCEDURE — 82962 GLUCOSE BLOOD TEST: CPT

## 2021-03-05 PROCEDURE — 80053 COMPREHEN METABOLIC PANEL: CPT

## 2021-03-05 PROCEDURE — 80197 ASSAY OF TACROLIMUS: CPT

## 2021-03-05 PROCEDURE — 85025 COMPLETE CBC W/AUTO DIFF WBC: CPT

## 2021-03-05 PROCEDURE — 36592 COLLECT BLOOD FROM PICC: CPT

## 2021-03-05 PROCEDURE — 6370000000 HC RX 637 (ALT 250 FOR IP): Performed by: FAMILY MEDICINE

## 2021-03-05 PROCEDURE — 2580000003 HC RX 258: Performed by: INTERNAL MEDICINE

## 2021-03-05 PROCEDURE — 25010000002 HYDROMORPHONE 1 MG/ML SOLUTION: Performed by: INTERNAL MEDICINE

## 2021-03-05 PROCEDURE — 97530 THERAPEUTIC ACTIVITIES: CPT

## 2021-03-05 RX ORDER — SACCHAROMYCES BOULARDII 250 MG
250 CAPSULE ORAL 2 TIMES DAILY
Status: DISCONTINUED | OUTPATIENT
Start: 2021-03-05 | End: 2021-03-25 | Stop reason: HOSPADM

## 2021-03-05 RX ORDER — CEFAZOLIN SODIUM 1 G/3ML
1000 INJECTION, POWDER, FOR SOLUTION INTRAMUSCULAR; INTRAVENOUS EVERY 8 HOURS
Qty: 69000 MG | Refills: 0
Start: 2021-03-05 | End: 2021-03-31

## 2021-03-05 RX ORDER — PREDNISONE 10 MG/1
10 TABLET ORAL 2 TIMES DAILY WITH MEALS
Status: DISCONTINUED | OUTPATIENT
Start: 2021-03-06 | End: 2021-03-25 | Stop reason: HOSPADM

## 2021-03-05 RX ORDER — TACROLIMUS 1 MG/1
3 CAPSULE ORAL EVERY 12 HOURS
Status: DISCONTINUED | OUTPATIENT
Start: 2021-03-05 | End: 2021-03-25 | Stop reason: HOSPADM

## 2021-03-05 RX ORDER — MAGNESIUM SULFATE IN WATER 40 MG/ML
4000 INJECTION, SOLUTION INTRAVENOUS ONCE
Status: COMPLETED | OUTPATIENT
Start: 2021-03-05 | End: 2021-03-05

## 2021-03-05 RX ORDER — MAGNESIUM SULFATE IN WATER 40 MG/ML
2000 INJECTION, SOLUTION INTRAVENOUS ONCE
Status: DISCONTINUED | OUTPATIENT
Start: 2021-03-05 | End: 2021-03-05 | Stop reason: DRUGHIGH

## 2021-03-05 RX ORDER — DIPHENHYDRAMINE HCL 25 MG
25 CAPSULE ORAL EVERY 6 HOURS PRN
Status: DISCONTINUED | OUTPATIENT
Start: 2021-03-05 | End: 2021-03-25 | Stop reason: HOSPADM

## 2021-03-05 RX ORDER — GABAPENTIN 100 MG/1
100 CAPSULE ORAL 3 TIMES DAILY
Qty: 9 CAPSULE | Refills: 0 | Status: SHIPPED | OUTPATIENT
Start: 2021-03-05 | End: 2021-04-07

## 2021-03-05 RX ORDER — PANTOPRAZOLE SODIUM 40 MG/1
40 TABLET, DELAYED RELEASE ORAL
Status: DISCONTINUED | OUTPATIENT
Start: 2021-03-06 | End: 2021-03-25 | Stop reason: HOSPADM

## 2021-03-05 RX ORDER — ACETAMINOPHEN 325 MG/1
650 TABLET ORAL EVERY 6 HOURS PRN
Status: DISCONTINUED | OUTPATIENT
Start: 2021-03-05 | End: 2021-03-25 | Stop reason: HOSPADM

## 2021-03-05 RX ORDER — LIDOCAINE 50 MG/G
1 PATCH TOPICAL
Status: DISCONTINUED | OUTPATIENT
Start: 2021-03-06 | End: 2021-03-25 | Stop reason: HOSPADM

## 2021-03-05 RX ORDER — MAGNESIUM SULFATE IN WATER 40 MG/ML
4000 INJECTION, SOLUTION INTRAVENOUS ONCE
Status: DISCONTINUED | OUTPATIENT
Start: 2021-03-05 | End: 2021-03-05

## 2021-03-05 RX ORDER — GABAPENTIN 100 MG/1
100 CAPSULE ORAL EVERY 8 HOURS SCHEDULED
Status: DISCONTINUED | OUTPATIENT
Start: 2021-03-05 | End: 2021-03-25 | Stop reason: HOSPADM

## 2021-03-05 RX ORDER — AMOXICILLIN 250 MG
1 CAPSULE ORAL 2 TIMES DAILY
Status: DISCONTINUED | OUTPATIENT
Start: 2021-03-05 | End: 2021-03-25 | Stop reason: HOSPADM

## 2021-03-05 RX ORDER — POTASSIUM CHLORIDE 20 MEQ/1
20 TABLET, EXTENDED RELEASE ORAL DAILY
Qty: 30 TABLET | Refills: 0 | Status: SHIPPED | OUTPATIENT
Start: 2021-03-05 | End: 2021-03-31 | Stop reason: SDUPTHER

## 2021-03-05 RX ORDER — SODIUM CHLORIDE 0.9 % (FLUSH) 0.9 %
10 SYRINGE (ML) INJECTION EVERY 12 HOURS SCHEDULED
Status: DISCONTINUED | OUTPATIENT
Start: 2021-03-05 | End: 2021-03-25 | Stop reason: HOSPADM

## 2021-03-05 RX ORDER — TACROLIMUS 1 MG/1
3 CAPSULE ORAL 2 TIMES DAILY
Qty: 180 CAPSULE | Refills: 0 | Status: SHIPPED | OUTPATIENT
Start: 2021-03-05 | End: 2022-07-21

## 2021-03-05 RX ORDER — DEXTROSE MONOHYDRATE 25 G/50ML
25 INJECTION, SOLUTION INTRAVENOUS
Status: DISCONTINUED | OUTPATIENT
Start: 2021-03-05 | End: 2021-03-25 | Stop reason: HOSPADM

## 2021-03-05 RX ORDER — LANOLIN ALCOHOL/MO/W.PET/CERES
400 CREAM (GRAM) TOPICAL 2 TIMES DAILY
Qty: 60 TABLET | Refills: 0 | Status: SHIPPED | OUTPATIENT
Start: 2021-03-05 | End: 2021-04-06 | Stop reason: SDUPTHER

## 2021-03-05 RX ORDER — OXYCODONE HYDROCHLORIDE 5 MG/1
10 TABLET ORAL EVERY 4 HOURS PRN
Status: DISCONTINUED | OUTPATIENT
Start: 2021-03-05 | End: 2021-03-11

## 2021-03-05 RX ORDER — OXYCODONE HYDROCHLORIDE AND ACETAMINOPHEN 5; 325 MG/1; MG/1
1 TABLET ORAL EVERY 6 HOURS PRN
Qty: 12 TABLET | Refills: 0 | Status: SHIPPED | OUTPATIENT
Start: 2021-03-05 | End: 2021-03-08

## 2021-03-05 RX ORDER — SODIUM CHLORIDE 9 MG/ML
125 INJECTION, SOLUTION INTRAVENOUS CONTINUOUS
Status: DISCONTINUED | OUTPATIENT
Start: 2021-03-05 | End: 2021-03-08

## 2021-03-05 RX ORDER — SODIUM CHLORIDE 0.9 % (FLUSH) 0.9 %
10 SYRINGE (ML) INJECTION AS NEEDED
Status: DISCONTINUED | OUTPATIENT
Start: 2021-03-05 | End: 2021-03-25 | Stop reason: HOSPADM

## 2021-03-05 RX ORDER — OXYCODONE HYDROCHLORIDE 5 MG/1
20 TABLET ORAL EVERY 4 HOURS PRN
Status: DISCONTINUED | OUTPATIENT
Start: 2021-03-05 | End: 2021-03-11

## 2021-03-05 RX ORDER — NICOTINE POLACRILEX 4 MG
15 LOZENGE BUCCAL
Status: DISCONTINUED | OUTPATIENT
Start: 2021-03-05 | End: 2021-03-25 | Stop reason: HOSPADM

## 2021-03-05 RX ADMIN — HYDROMORPHONE HYDROCHLORIDE 1 MG: 1 INJECTION, SOLUTION INTRAMUSCULAR; INTRAVENOUS; SUBCUTANEOUS at 00:23

## 2021-03-05 RX ADMIN — TACROLIMUS 3 MG: 1 CAPSULE ORAL at 09:25

## 2021-03-05 RX ADMIN — INSULIN LISPRO 4 UNITS: 100 INJECTION, SOLUTION INTRAVENOUS; SUBCUTANEOUS at 17:31

## 2021-03-05 RX ADMIN — SODIUM CHLORIDE: 9 INJECTION, SOLUTION INTRAVENOUS at 02:14

## 2021-03-05 RX ADMIN — CEFAZOLIN 1000 MG: 1 INJECTION, POWDER, FOR SOLUTION INTRAMUSCULAR; INTRAVENOUS at 09:25

## 2021-03-05 RX ADMIN — PANTOPRAZOLE SODIUM 40 MG: 40 TABLET, DELAYED RELEASE ORAL at 06:03

## 2021-03-05 RX ADMIN — OXYCODONE 20 MG: 5 TABLET ORAL at 02:17

## 2021-03-05 RX ADMIN — OXYCODONE 20 MG: 5 TABLET ORAL at 16:46

## 2021-03-05 RX ADMIN — CEFAZOLIN 1000 MG: 1 INJECTION, POWDER, FOR SOLUTION INTRAMUSCULAR; INTRAVENOUS at 02:12

## 2021-03-05 RX ADMIN — GABAPENTIN 100 MG: 100 CAPSULE ORAL at 13:41

## 2021-03-05 RX ADMIN — OXYCODONE 20 MG: 5 TABLET ORAL at 11:16

## 2021-03-05 RX ADMIN — INSULIN LISPRO 4 UNITS: 100 INJECTION, SOLUTION INTRAVENOUS; SUBCUTANEOUS at 12:49

## 2021-03-05 RX ADMIN — HYDROMORPHONE HYDROCHLORIDE 2 MG: 1 INJECTION, SOLUTION INTRAMUSCULAR; INTRAVENOUS; SUBCUTANEOUS at 13:46

## 2021-03-05 RX ADMIN — DOCUSATE SODIUM 50 MG AND SENNOSIDES 8.6 MG 1 TABLET: 8.6; 5 TABLET, FILM COATED ORAL at 09:31

## 2021-03-05 RX ADMIN — GABAPENTIN 100 MG: 100 CAPSULE ORAL at 09:25

## 2021-03-05 RX ADMIN — PREDNISONE 10 MG: 10 TABLET ORAL at 09:31

## 2021-03-05 RX ADMIN — MAGNESIUM SULFATE HEPTAHYDRATE 4000 MG: 40 INJECTION, SOLUTION INTRAVENOUS at 11:17

## 2021-03-05 RX ADMIN — INSULIN LISPRO 4 UNITS: 100 INJECTION, SOLUTION INTRAVENOUS; SUBCUTANEOUS at 10:10

## 2021-03-05 RX ADMIN — HYDROMORPHONE HYDROCHLORIDE 2 MG: 1 INJECTION, SOLUTION INTRAMUSCULAR; INTRAVENOUS; SUBCUTANEOUS at 09:52

## 2021-03-05 RX ADMIN — HYDROMORPHONE HYDROCHLORIDE 2 MG: 1 INJECTION, SOLUTION INTRAMUSCULAR; INTRAVENOUS; SUBCUTANEOUS at 07:07

## 2021-03-05 ASSESSMENT — PAIN DESCRIPTION - LOCATION
LOCATION: HIP
LOCATION: HIP

## 2021-03-05 ASSESSMENT — PAIN - FUNCTIONAL ASSESSMENT: PAIN_FUNCTIONAL_ASSESSMENT: PREVENTS OR INTERFERES WITH MANY ACTIVE NOT PASSIVE ACTIVITIES

## 2021-03-05 ASSESSMENT — PAIN SCALES - WONG BAKER: WONGBAKER_NUMERICALRESPONSE: 10

## 2021-03-05 ASSESSMENT — PAIN DESCRIPTION - FREQUENCY
FREQUENCY: CONTINUOUS
FREQUENCY: CONTINUOUS

## 2021-03-05 ASSESSMENT — PAIN SCALES - GENERAL
PAINLEVEL_OUTOF10: 10

## 2021-03-05 ASSESSMENT — PAIN DESCRIPTION - PAIN TYPE: TYPE: SURGICAL PAIN

## 2021-03-05 ASSESSMENT — PAIN DESCRIPTION - PROGRESSION: CLINICAL_PROGRESSION: GRADUALLY WORSENING

## 2021-03-05 ASSESSMENT — PAIN DESCRIPTION - DESCRIPTORS: DESCRIPTORS: ACHING

## 2021-03-05 NOTE — PROGRESS NOTES
Occupational Therapy    Pt. Declined offer to work with therapy in PM, stating his back was hurting.  Plans to d/c to LTAC this PM.  Electronically signed by Hugo Mora OT on 3/5/2021 at 1:58 PM

## 2021-03-05 NOTE — PROGRESS NOTES
Infectious Diseases Progress Note    Patient:  Amanda [address]  YOB: 1970  MRN: [id number]   Admit date: 2/27/2021   Admitting Physician: Felipe Us MD  Primary Care Physician: DINORAH Eaton NP    Chief Complaint/Interval History: He will likely go to [facility] later today. No new localizing complaints. Moving left hip slightly more. He is without fever. No diarrhea. No rash.     In/Out    Intake/Output Summary (Last 24 hours) at 3/5/2021 1049  Last data filed at 3/5/2021 0604  Gross per 24 hour   Intake 563 ml   Output 2700 ml   Net -2137 ml     Allergies: No Known Allergies  Current Meds:     magnesium sulfate 4000 mg in 100 mL IVPB premix, Once      sodium chloride flush 0.9 % injection 10 mL, 2 times per day      sodium chloride flush 0.9 % injection 10 mL, PRN      predniSONE (DELTASONE) tablet 10 mg, BID      tacrolimus (PROGRAF) capsule 3 mg, BID      lidocaine 4 % external patch 1 patch, Daily      ceFAZolin (ANCEF) 1,000 mg in sterile water 10 mL IV syringe, Q8H      insulin glargine (LANTUS) injection vial 12 Units, Nightly      gabapentin (NEURONTIN) capsule 100 mg, TID      pantoprazole (PROTONIX) tablet 40 mg, QAM AC      acetaminophen (TYLENOL) tablet 650 mg, Q6H PRN    Or      acetaminophen (TYLENOL) suppository 650 mg, Q6H PRN      insulin lispro (HUMALOG) injection vial 0-12 Units, TID WC      insulin lispro (HUMALOG) injection vial 0-6 Units, Nightly      glucose (GLUTOSE) 40 % oral gel 15 g, PRN      dextrose 50 % IV solution, PRN      glucagon (rDNA) injection 1 mg, PRN      dextrose 5 % solution, PRN      0.9 % sodium chloride infusion, Continuous      oxyCODONE (ROXICODONE) immediate release tablet 10 mg, Q4H PRN    Or      oxyCODONE (ROXICODONE) immediate release tablet 20 mg, Q4H PRN      HYDROmorphone HCl PF (DILAUDID) injection 1 mg, Q3H PRN    Or      HYDROmorphone HCl PF (DILAUDID) injection 2 mg, Q3H PRN      diphenhydrAMINE (BENADRYL) tablet 25 mg, Q6H PRN      sennosides-docusate sodium (SENOKOT-S) 8.6-50 MG tablet 1 tablet, BID      Review of Systems see HPI    VitalSigns:  BP (!) 150/84   Pulse 84   Temp 97.4 °F (36.3 °C) (Temporal)   Resp 18   Ht 6' 1\" (1.854 m)   Wt 288 lb 9 oz (130.9 kg)   SpO2 96%   BMI 38.07 kg/m²      Physical Exam  Line/IV site: No erythema, warmth, induration, or tenderness. Left hip incision without drainage  Left hip without erythema  Edema both lower extremities left greater than right    Lab Results:  CBC:   Recent Labs     03/03/21  0342 03/04/21  0253 03/05/21  0415   WBC 9.5 10.4 10.1   HGB 11.3* 12.6* 11.6*   PLT 73* 115* 124*     BMP:  Recent Labs     03/03/21  0342 03/04/21  0253 03/05/21  0415   * 128* 130*   K 3.4* 4.1 3.5   CL 95* 94* 96*   CO2 21* 20* 23   BUN 21* 21* 21*   CREATININE 1.2 1.0 0.8   GLUCOSE 202* 260* 253*     Radiology: None    Additional Studies Reviewed:  None    Impression:  1. Group B strep bacteremia  2. Left hip septic arthritis with group B strep  3. History liver transplantation  4. Diabetes mellitus  5. Resolved renal insufficiency  6.   Thrombocytopenia-improving    Recommendations:  Continue cefazolin  Likely transfer to [facility] today  Planning cefazolin through March 28, 2021  Will plan to continue to follow on LTAC if he is transferred there    Devan Aquino MD

## 2021-03-05 NOTE — DISCHARGE SUMMARY
0.6 0.5   ALKPHOS 238* 402* 305*     Troponin T: No results for input(s): TROPONINI in the last 72 hours. Pro-BNP: No results for input(s): BNP in the last 72 hours. INR: No results for input(s): INR in the last 72 hours. UA:No results for input(s): NITRITE, COLORU, PHUR, LABCAST, WBCUA, RBCUA, MUCUS, TRICHOMONAS, YEAST, BACTERIA, CLARITYU, SPECGRAV, LEUKOCYTESUR, UROBILINOGEN, BILIRUBINUR, BLOODU, GLUCOSEU, AMORPHOUS in the last 72 hours. Invalid input(s): Nichole Antonio  A1C: No results for input(s): LABA1C in the last 72 hours. ABG:No results for input(s): PHART, SPO1DUA, PO2ART, POC2FPG, BEART, HGBAE, O8AYWPAU, CARBOXHGBART in the last 72 hours. Impressions of imaging performed in 48 hours before discharge:    No results found. [facility]   Home Medication Instructions XDS:[id number]    Printed on:03/05/21 9648   Medication Information                      BASAGLAR KWIKPEN 100 UNIT/ML injection pen  Inject 12 Units into the skin nightly             Bismuth Tribromoph-Petrolatum (XEROFORM PETROLATUM GAUZE 5\"X9\") MISC external pads  Apply 2 each topically daily             blood glucose monitor strips  Test 4 times a day & as needed for symptoms of irregular blood glucose. Dispense sufficient amount for indicated testing frequency plus additional to accommodate PRN testing needs. EZX34.861 Z79.4             ceFAZolin (ANCEF) 1 g injection  Infuse 1,000 mg intravenously every 8 hours for 23 days             gabapentin (NEURONTIN) 100 MG capsule  Take 1 capsule by mouth 3 times daily for 3 days.              Insulin Pen Needle (MEIJER PEN NEEDLES) 31G X 6 MM MISC  1 each by Does not apply route daily             Lancets MISC  Use to test blood glucose four times a day and as needed for hypoglycemic events  DXE11.622 Z79.4             magnesium oxide (MAG-OX) 400 (240 Mg) MG tablet  Take 1 tablet by mouth 2 times daily             NOVOLOG FLEXPEN 100 UNIT/ML injection pen  Inject 1-6 Units into the skin 3 times daily (before meals)             omeprazole (PRILOSEC) 20 MG delayed release capsule  Take 1 capsule by mouth daily             oxyCODONE-acetaminophen (PERCOCET) 5-325 MG per tablet  Take 1 tablet by mouth every 6 hours as needed for Pain for up to 3 days. Intended supply: 3 days. Take lowest dose possible to manage pain             potassium chloride (KLOR-CON M) 20 MEQ extended release tablet  Take 1 tablet by mouth daily             predniSONE (DELTASONE) 10 MG tablet  Take 10 mg by mouth 2 times daily              tacrolimus (PROGRAF) 1 MG capsule  Take 3 capsules by mouth 2 times daily                   ISSUES TO BE ADDRESSED AT HOSPITAL FOLLOW-UP VISIT:    Advised patient to follow-up closely with PCP upon discharge for management of chronic medical issues  Please see the detailed discharge directions delivered from earlier today! Condition on Discharge: gradually improving  Discharge Disposition: LTAC    Recommended Follow Up:  No follow-up provider specified. Followup Appointments Scheduled at Time of Discharge:  Future Appointments   Date Time Provider Logan Perry   3/19/2021 10:00 AM Uche Fallon, [address] [place], [place] [address]        Discharge Instructions:   Please see the discharge paperwork. Patient was seen at bedside today, and the examination shows improvement since yesterday. Detailed discharge directions delivered to the patient by myself and our nursing staff, who verbalizes understanding and is very happy and satisfied with the plan. Patient has been advised to continue all medications as prescribed and advised, and f/u with PCP within 1 week. Patient is stable from medical standpoint to be discharged. Total time spent during patient evaluation and assessment, discussion with the nurse/family, addressing discharge medications/scripts and coordination of care for safe discharge was in excess of 50 minutes.       Signed Electronically:    Sherren Jacobs, MD  3:29 PM 3/5/2021

## 2021-03-05 NOTE — CARE COORDINATION
GABRIEL met with pt at bedside re: dc plans/possible placement. Pt stated at this time he is unable to move, but went on to state that once his leg loses fluid he will be fine again. SW informed pt of SNF. Pt denied and stated he wants HH. Lives with girlfriend. Will inform Sonia Vaughan with [facility].
Just had a conversation with pt re: dc plans. Discussed option of LTAC with pt and he at first was not sure about it. Informed him that technically he was ready for dc and the options he had were ltac and  Nursing home since he was not safe for dc home with [facility]. PT did agree to go to [facility], I called and spoke with Rob Grande from [address] [place] while in the pts room and he confirmed to her he was agreeable to go. Rob Grande will fax over the paperwork and pt should be able to dc later today. He wants to wait until his fiance gets off work at 330 before he goes over there. She needs to  some stuff from his room. Pt will transport to LTAC per ambulance.   Electronically signed by Reji Alvarado RN on 3/5/2021 at 10:58 AM
Spoke with patient regarding MD orders for [facility] services. Patient agreeable and has chosen [facility]. Referral Faxed. [address] [place] [address] [phone]. [phone]. Please notify [address] [place] [address] when patient discharges and fax DC Summary,  DC med list and any new [facility] orders. The Patient was provided with a choice of provider and agrees   with the discharge plan. [x] Yes [] No    Freedom of choice list was provided with basic dialogue that supports the patient's individualized plan of care/goals, treatment preferences and shares the quality data associated with the providers.  [x] Yes [] No  Electronically signed by Man Severino on 3/1/2021 at 9:29 AM
during last admission- pt was adamant he was going home  Lives at home with fiance- did not want HH at that time
Barriers to discharge:  abx       Additional CM/SW Notes: patient states he lives at home with his fiance and plans on same upon dc. Currently pt c/o lots of pain to left hip and is not participating with therapy. Informed pt that they are looking at 4-6 weeks of poss iv abx pending cultures. PT states he has done this before but did it as an outpt.  ambualtion is an issue at the moment. Discussed with him the possibilty of going to a skilled nursing home   But he refuses this option at this time. Wants to give the abx a few days to work and see how the pain gets before making any descions. Will cont to follow case for needs . Electronically signed by Mehnaz Segundo RN on 3/1/2021 at 4:21 PM            Anders spencer and/or his family were provided with choice of provider.         Mehnaz Segundo RN  [facility]  Care Management Department  Ph:  [phone]   Fax:

## 2021-03-05 NOTE — PLAN OF CARE
Problem: Pain:  Description: Pain management should include both nonpharmacologic and pharmacologic interventions.   Goal: Pain level will decrease  Description: Pain level will decrease  Outcome: Ongoing  Goal: Control of acute pain  Description: Control of acute pain  Outcome: Ongoing  Goal: Control of chronic pain  Description: Control of chronic pain  Outcome: Ongoing     Problem: Falls - Risk of:  Goal: Will remain free from falls  Description: Will remain free from falls  Outcome: Ongoing  Goal: Absence of physical injury  Description: Absence of physical injury  Outcome: Ongoing     Problem: Nutrition  Goal: Optimal nutrition therapy  Outcome: Ongoing     Problem: Infection - Central Venous Catheter-Associated Bloodstream Infection:  Goal: Will show no infection signs and symptoms  Description: Will show no infection signs and symptoms  Outcome: Ongoing

## 2021-03-05 NOTE — PROGRESS NOTES
Physical Therapy    Facility/Department: [facility]/VAS/MED  Initial Assessment    NAME: Anita Dakin  : 1970  MRN: [id number]    Date of Service: 3/5/2021    Discharge Recommendations:  Continue to assess pending progress, Patient would benefit from continued therapy after discharge        Assessment   Body structures, Functions, Activity limitations: Decreased functional mobility ; Decreased ADL status; Decreased ROM; Decreased strength;Decreased balance;Decreased endurance; Increased pain;Decreased posture  Assessment: Pt will benefit from skilled therapy to address mobility deficits. Pt more willing to work w therapy this am and still c/o high pain levels. Pt able to clear mattress w attempt at STS to scoot towards head of bed but declined attempts at coming to full stand or bear much weight through LLE. Pt left in bed w needs within reach. Will continue to follow to encourage safe mobility to dec fall risk and assess needs for home equipment. Specific instructions for Next Treatment: bed mobility, transfers  Prognosis: Guarded  Decision Making: Low Complexity  PT Education: PT Role;Plan of Care;General Safety;Transfer Training;Weight-bearing Education; Functional Mobility Training  REQUIRES PT FOLLOW UP: Yes  Activity Tolerance  Activity Tolerance: Patient Tolerated treatment well;Patient limited by pain       Patient Diagnosis(es): The primary encounter diagnosis was Acute hip pain, left. Diagnoses of Sepsis, due to unspecified organism, unspecified whether acute organ dysfunction present [facility]), Acute renal insufficiency, Acute hyponatremia, Type 2 diabetes mellitus with hyperglycemia, with long-term current use of insulin ([facility] [address] History of liver transplant ([facility] [address] History of bacteremia, Chronic wound infection of abdomen, initial encounter, Hypomagnesemia, Streptococcal arthritis of left hip ([facility] [address] and Lactic acidosis were also pertinent to this visit.      has a past medical history of Antral vascular ectasia, Appendicitis, Cirrhosis ([facility] [address] Duodenal ulcer, Esophageal varices ([facility] [address] Hepatitis C, Hernia, History of GI bleed, Liver disease, Personal history of ascites, Portal hypertensive gastropathy ([facility] [address] and Thrombocytopenia ([facility] [address] has a past surgical history that includes Upper gastrointestinal endoscopy (4/16/2013); Upper gastrointestinal endoscopy (5-9-13); and hip surgery (Left, 2/27/2021). Restrictions  Restrictions/Precautions  Restrictions/Precautions: Weight Bearing  Lower Extremity Weight Bearing Restrictions  Left Lower Extremity Weight Bearing: Weight Bearing As Tolerated  Vision/Hearing        Subjective  General  Patient assessed for rehabilitation services?: Yes  Response To Previous Treatment: Not applicable  Family / Caregiver Present: No  Diagnosis: Septic arthritis of hip - I&D  Follows Commands: Within Functional Limits  Subjective  Subjective: Pt willing to attempt bed mobility this am w PT/OT. Pain Screening  Patient Currently in Pain: Yes  Pain Assessment  Pain Assessment: Faces  Sarah-Baker Pain Rating: Hurts worst  Pain Type: Surgical pain  Pain Location: Hip  Pain Orientation: Left  Pain Descriptors: Aching  Pain Frequency: Continuous  Pain Onset: Gradual  Clinical Progression: Gradually worsening  Functional Pain Assessment: Prevents or interferes with all active and some passive activities  Non-Pharmaceutical Pain Intervention(s): Ambulation/Increased Activity  Response to Pain Intervention: Patient Satisfied  Vital Signs  Patient Currently in Pain: Yes  Pre Treatment Pain Screening  Intervention List: Patient able to continue with treatment  Comments / Details: Pt states pain is \"unbearable\" but still able to work w therapy this am. Previously received pain medication per pt report.     Orientation     Social/Functional History  Social/Functional History  Lives With: Significant other  ADL Assistance: Independent  Homemaking Assistance: Independent  Ambulation Assistance: Independent  Transfer Assistance: Independent  Cognition        Objective          AROM RLE (degrees)  RLE AROM: WFL  AROM LLE (degrees)  LLE AROM : WFL  LLE General AROM: Able to sit at EOB and extend knee to neutral  Strength RLE  Strength RLE: WFL  Comment: Grossly antigravity  Strength LLE  Strength LLE: WFL  Comment: Grossly 3-/5        Bed mobility  Bridging: Moderate assistance (to lift LLE)  Supine to Sit: Moderate assistance;2 Person assistance  Sit to Supine:  Moderate assistance  Scooting: Stand by assistance  Comment: Req assist to lift LLE, assist to lift trunk for supine>sit  Transfers  Sit to Stand: Stand by assistance(Unable to come to full stand but able to clear mattress while putting minimal weight on LLE)  Stand to sit: Stand by assistance        Balance  Posture: Fair  Sitting - Static: Good  Sitting - Dynamic: Good        Plan   Plan  Times per week: 5-7x  Times per day: Daily  Plan weeks: 2 weeks  Specific instructions for Next Treatment: bed mobility, transfers  Current Treatment Recommendations: Strengthening, ROM, Balance Training, Functional Mobility Training, Transfer Training, Endurance Training, Gait Training, Pain Management, Safety Education & Training, Positioning, Equipment Evaluation, Education, & procurement, Patient/Caregiver Education & Training  Safety Devices  Type of devices: Left in bed, Call light within reach, Nurse notified, Bed alarm in place                                                                Goals  Short term goals  Time Frame for Short term goals: 2 weeks  Short term goal 1: Supine<>sit, mod Ind  Short term goal 2: STS, CGA  Short term goal 3: Amb 150ft, CGA       Therapy Time   Individual Concurrent Group Co-treatment   Time In           Time Out           Minutes                   Arabella Arias       Electronically signed by Arabella Arias on 3/5/2021 at 9:32 AM

## 2021-03-05 NOTE — PROGRESS NOTES
Occupational Therapy   Occupational Therapy Initial Assessment  Date: 3/5/2021   Patient Name: Stacy Hook  MRN: [id number]     : 1970    Date of Service: 3/5/2021    Discharge Recommendations:          Assessment   Performance deficits / Impairments: Decreased functional mobility ; Decreased endurance;Decreased ADL status; Decreased balance  Assessment: Pt having trouble bearing weight on LLE. Will progress as tolerated. Treatment Diagnosis: Septic L LE  Prognosis: Good  Decision Making: Low Complexity  Activity Tolerance  Activity Tolerance: Patient limited by pain           Patient Diagnosis(es): The primary encounter diagnosis was Acute hip pain, left. Diagnoses of Sepsis, due to unspecified organism, unspecified whether acute organ dysfunction present [facility]), Acute renal insufficiency, Acute hyponatremia, Type 2 diabetes mellitus with hyperglycemia, with long-term current use of insulin ([address] History of liver transplant ([address] History of bacteremia, Chronic wound infection of abdomen, initial encounter, Hypomagnesemia, Streptococcal arthritis of left hip ([address] and Lactic acidosis were also pertinent to this visit. has a past medical history of Antral vascular ectasia, Appendicitis, Cirrhosis ([address] Duodenal ulcer, Esophageal varices ([address] Hepatitis C, Hernia, History of GI bleed, Liver disease, Personal history of ascites, Portal hypertensive gastropathy ([address] and Thrombocytopenia ([address] has a past surgical history that includes Upper gastrointestinal endoscopy (2013); Upper gastrointestinal endoscopy (13); and hip surgery (Left, 2021).     Treatment Diagnosis: Septic L LE      Restrictions  Restrictions/Precautions  Restrictions/Precautions: Weight Bearing  Lower Extremity Weight Bearing Restrictions  Left Lower Extremity Weight Bearing: Weight Bearing As Tolerated    Subjective   General  Chart Reviewed: Yes  Patient assessed for rehabilitation services?: Yes  Family / Caregiver Present: No  Diagnosis: Septic LLE  General Comment  Comments: Pt. states LLE is extremely painful, willing to try sitting on EOB  Patient Currently in Pain: Yes  Pain Assessment  Pain Assessment: Faces  Pain Level: 10  Sarah-Baker Pain Rating: Hurts worst  Pain Type: Surgical pain  Pain Location: Hip  Pain Orientation: Left  Pain Descriptors: Aching  Pain Frequency: Continuous  Pain Onset: Gradual  Clinical Progression: Gradually worsening  Functional Pain Assessment: Prevents or interferes with many active not passive activities  Non-Pharmaceutical Pain Intervention(s): Rest  Response to Pain Intervention: Patient Satisfied  Vital Signs  Patient Currently in Pain: Yes  Social/Functional History  Social/Functional History  Lives With: Significant other  ADL Assistance: Independent  Homemaking Assistance: Independent  Ambulation Assistance: Independent  Transfer Assistance: Independent       Objective   Vision: Within Functional Limits  Hearing: Within functional limits    Orientation  Overall Orientation Status: Within Normal Limits        ADL  Feeding: Independent  Grooming: Independent  UE Bathing: Supervision  LE Bathing: Maximum assistance  UE Dressing: Supervision  LE Dressing: Dependent/Total  Toileting: Maximum assistance  Additional Comments: Unable to stand        Bed mobility  Supine to Sit: Moderate assistance  Sit to Supine: Moderate assistance  Transfers  Sit to stand: Unable to assess  Transfer Comments: SBA to half stand and move hips up to [facility].   Unable to stand fully erect because of LLE pain     Cognition  Overall Cognitive Status: WFL                 LUE AROM (degrees)  LUE AROM : WFL  RUE AROM (degrees)  RUE AROM : WFL  LUE Strength  Gross LUE Strength: WFL  RUE Strength  Gross RUE Strength: WFL                   Plan   Plan  Times per week: 3-5x/week  Times per day: Daily    G-Code     OutComes Score                                                  AM-PAC Score             Goals  Short term goals  Time Frame for Short term goals: 1 week  Short term goal 1: Meena of 2 with BSC tfers  Short term goal 2: Tolerate standing 45 seconds to assist with ADLs  Short term goal 3: Mod A with clothing mgmt in standing  Long term goals  Long term goal 1: Return to PLOF       Therapy Time   Individual Concurrent Group Co-treatment   Time In           Time Out           Minutes                   Milton Herron, OT

## 2021-03-05 NOTE — PROGRESS NOTES
Comprehensive Nutrition Assessment    Type and Reason for Visit:  Reassess    Nutrition Recommendations/Plan: modify current ONS to flavor pt likes. Continue encouragement to eat    Nutrition Assessment:  \"If I can't get up to the bathroom or comode to have a BM I can't eat. \"  Is asking for strawberry flavored ONS--will modify current Ensure hi pro to Glucerna    Malnutrition Assessment:  Malnutrition Status: Moderate malnutrition    Context:  Acute Illness     Findings of the 6 clinical characteristics of malnutrition:  Energy Intake:  7 - 50% or less of estimated energy requirements for 5 or more days  Weight Loss:  7 - Greater than 2% over 1 week     Body Fat Loss:  No significant body fat loss     Muscle Mass Loss:  No significant muscle mass loss    Fluid Accumulation:  1 - Mild Extremities   Strength:  Not Performed    Estimated Daily Nutrient Needs:  Energy (kcal):  0419-9081 kcals (15-18 kcals/kg);  Weight Used for Energy Requirements:  Current     Protein (g):  100-167g; Weight Used for Protein Requirements:  Current        Fluid (ml/day):  8462-3141 ml; Method Used for Fluid Requirements:  1 ml/kcal      Nutrition Related Findings:  well nourished      Wounds:  Surgical Incision       Current Nutrition Therapies:    DIET CARB CONTROL; Carb Control: 5 carb choices (75 gms)/meal  Dietary Nutrition Supplements: Diabetic Oral Supplement    Anthropometric Measures:  · Height: 6' 1\" (185.4 cm)  · Current Body Weight: 288 lb 9 oz (130.9 kg)   · Admission Body Weight: 250 lb (113.4 kg)    · Usual Body Weight: 272 lb (123.4 kg)(2/2021)     · Ideal Body Weight: 184 lbs; % Ideal Body Weight 156.8 %   · BMI: 38.1  · Adjusted Body Weight:  ; No Adjustment   · BMI Categories: Obese Class 2 (BMI 35.0 -39.9)       Nutrition Diagnosis:   · Inadequate protein-energy intake related to increase demand for energy/nutrients as evidenced by intake 0-25%, weight loss, wounds      Nutrition Interventions:   Food and/or Nutrient Delivery:  Continue Current Diet, Modify Oral Nutrition Supplement  Nutrition Education/Counseling:  No recommendation at this time   Coordination of Nutrition Care:  Continue to monitor while inpatient    Goals:  PO intake 50% or greater.        Nutrition Monitoring and Evaluation:   Behavioral-Environmental Outcomes:  None Identified   Food/Nutrient Intake Outcomes:  Food and Nutrient Intake, Supplement Intake  Physical Signs/Symptoms Outcomes:  Biochemical Data, Hemodynamic Status, Nutrition Focused Physical Findings, Skin, Weight     Discharge Planning:    Continue current diet, Continue Oral Nutrition Supplement     Electronically signed by Vu Woodall MS, RD, LD on 3/5/21 at 11:22 AM CST    Contact: [phone]

## 2021-03-05 NOTE — PROGRESS NOTES
Hospitalist Progress Note  3/4/2021 11:18 PM  Subjective:   Admit Date: 2/27/2021  PCP: DINORAH Mathews NP    Chief Complaint: left hip pain    Subjective: Patient was still refusing this morning about participating in physical therapy and PICC line insertion. The nurse and I spoke with him in detail and he finally agreed with PICC line placement. Cumulative Hospital History:   2-27: Presents to ED with recurrent chills and fever with worsening left hip pain for 2.5 days. Recently hospitalized with Streptococcus agalactiae septicemia, treated with ceftriaxone. He developed C. difficile diarrhea on this and was discharged on cephalexin and oral vancomycin. Orthopedic surgery consulted for aspiration and likely I&D. Received one dose of vancomycin and was then placed on cefazolin postoperatively. 2-28: ID consulted, continue cefazolin. Fluid culture and sensitivity pending to guide therapy. PT and OT evaluations tomorrow. 3-1: Still having too much pain to work with PT and OT. On high dose hydromorphone. Will add transdermal lidocaine to see if it helps. Encouraged to do whatever he is capable of with PT and OT. Restart tacrolimus with daily monitoring, aim for lower range of acceptable serum level. 3-2: Still not working with PT and OT due to pain. Suspect he will need rehab placement. 3-3: Patient will require 4 to 6 weeks of cefazolin treatment for antibiotics. PICC line placement ordered. Encourage patient to participate in PT OT. He would need DC planning to skilled nursing facility as he is very weak and not even able to participate in PT OT. Patient started on steroids as per GI.    3-4: Patient finally agreed to PICC line placement. We spoke with him about participating in physical therapy so that he can go home otherwise he will have to go to skilled nursing facility. He would need IV antibiotics till March 28, 2021 as per ID.     ROS: 14 point review of systems is negative except as specifically addressed above.     DIET CARB CONTROL; Carb Control: 5 carb choices (75 gms)/meal  Dietary Nutrition Supplements: Low Calorie High Protein Supplement    Intake/Output Summary (Last 24 hours) at 3/4/2021 2318  Last data filed at 3/4/2021 1649  Gross per 24 hour   Intake 563 ml   Output 2525 ml   Net -1962 ml     Medications:   dextrose      sodium chloride 125 mL/hr at 03/04/21 1346     Current Facility-Administered Medications   Medication Dose Route Frequency Provider Last Rate Last Admin    sodium chloride flush 0.9 % injection 10 mL  10 mL Intravenous 2 times per day Sherif Jensen MD   10 mL at 03/04/21 2027    sodium chloride flush 0.9 % injection 10 mL  10 mL Intravenous PRN Sherif Jensen MD        predniSONE (DELTASONE) tablet 10 mg  10 mg Oral BID Clive Alvares MD   10 mg at 03/04/21 2026    tacrolimus (PROGRAF) capsule 3 mg  3 mg Oral BID [facility], DO   3 mg at 03/04/21 2025    lidocaine 4 % external patch 1 patch  1 patch Transdermal Daily Nicole Estill, DO   1 patch at 03/04/21 0945    ceFAZolin (ANCEF) 1,000 mg in sterile water 10 mL IV syringe  1,000 mg Intravenous Q8H Conner Lerner MD   1,000 mg at 03/04/21 1647    insulin glargine (LANTUS) injection vial 12 Units  12 Units Subcutaneous Nightly Kurt Ramos, DO   12 Units at 03/03/21 2101    gabapentin (NEURONTIN) capsule 100 mg  100 mg Oral TID Nicole Casper, DO   100 mg at 03/04/21 2026    pantoprazole (PROTONIX) tablet 40 mg  40 mg Oral QAM [facility] Ramos, DO   40 mg at 03/04/21 0430    acetaminophen (TYLENOL) tablet 650 mg  650 mg Oral Q6H PRN [place] Ramos, DO   650 mg at 03/03/21 0816    Or    acetaminophen (TYLENOL) suppository 650 mg  650 mg Rectal Q6H PRN Nicole Casper, DO        insulin lispro (HUMALOG) injection vial 0-12 Units  0-12 Units Subcutaneous TID [facility] Ramos, DO   4 Units at 03/04/21 1700    insulin lispro (HUMALOG) injection vial 0-6 Units  0-6 Units Subcutaneous Nightly Nicole Casper, DO   1 Units at 03/02/21 9214    glucose (GLUTOSE) 40 % oral gel 15 g  15 g Oral PRN Littie Rival, DO        dextrose 50 % IV solution  12.5 g Intravenous PRN Littie Rival, DO        glucagon (rDNA) injection 1 mg  1 mg Intramuscular PRN Littie Rival, DO        dextrose 5 % solution  100 mL/hr Intravenous PRN Ha Dura Ramos, DO        0.9 % sodium chloride infusion   Intravenous Continuous Rashel Mccormack  mL/hr at 03/04/21 1346 New Bag at 03/04/21 1346    oxyCODONE (ROXICODONE) immediate release tablet 10 mg  10 mg Oral Q4H PRN Rashel Mccormack MD   10 mg at 03/04/21 1892    Or    oxyCODONE (ROXICODONE) immediate release tablet 20 mg  20 mg Oral Q4H PRN Rashel Mccormack MD   20 mg at 03/04/21 1647    HYDROmorphone HCl PF (DILAUDID) injection 1 mg  1 mg Intravenous Q3H PRN Rashel Mccormack MD   1 mg at 03/04/21 2027    Or    HYDROmorphone HCl PF (DILAUDID) injection 2 mg  2 mg Intravenous Q3H PRN Rashel Mccormack MD        diphenhydrAMINE (BENADRYL) tablet 25 mg  25 mg Oral Q6H PRN Rashel Mccormack MD        sennosides-docusate sodium (SENOKOT-S) 8.6-50 MG tablet 1 tablet  1 tablet Oral BID Rashel Mccormack MD   1 tablet at 03/04/21 0944        Labs:     Recent Labs     03/02/21  0340 03/03/21  0342 03/04/21  0253   WBC 9.9 9.5 10.4   RBC 3.87* 3.88* 4.31*   HGB 11.5* 11.3* 12.6*   HCT 34.9* 34.3* 37.5*   MCV 90.2 88.4 87.0   MCH 29.7 29.1 29.2   MCHC 33.0 32.9* 33.6   PLT 49* 73* 115*     Recent Labs     03/02/21  0340 03/03/21  0342 03/04/21  0253   * 131* 128*   K 3.9 3.4* 4.1   ANIONGAP 18 15 14   CL 96* 95* 94*   CO2 16* 21* 20*   BUN 25* 21* 21*   CREATININE 1.4* 1.2 1.0   GLUCOSE 174* 202* 260*   CALCIUM 8.0* 7.9* 8.3*     Recent Labs     03/03/21  0342 03/04/21  0253   MG 1.4* 1.5*   PHOS 3.0 2.6     Recent Labs     03/02/21  0340 03/03/21  0342 03/04/21  0253   AST 19 17 22   ALT <5* <5* 8   BILITOT 0.4 0.5 0.6   ALKPHOS 238* 238* 402*     ABGs:No results for input(s): PH, PO2, PCO2, HCO3, BE, O2SAT in the last 72 hours.   Troponin T: No results for input(s): TROPONINI in the last 72 hours. INR:   No results for input(s): INR in the last 72 hours. Lactic Acid:   Recent Labs     03/04/21  0924   LACTA 0.8       Objective:   Vitals: /78   Pulse 60   Temp 97.6 °F (36.4 °C) (Temporal)   Resp 18   Ht 6' 1\" (1.854 m)   Wt 295 lb 5 oz (134 kg)   SpO2 97%   BMI 38.96 kg/m²   24HR INTAKE/OUTPUT:      Intake/Output Summary (Last 24 hours) at 3/4/2021 2318  Last data filed at 3/4/2021 1649  Gross per 24 hour   Intake 563 ml   Output 2525 ml   Net -1962 ml     General appearance: alert and cooperative with exam  HEENT: atraumatic, eyes with clear conjunctiva and normal lids, pupils and irises normal, external ears and nose are normal, lips normal  Neck without masses, lympadenopathy, bruit, thyroid normal  Lungs: no increased work of breathing, \"clear to auscultation bilaterally\" without rales, rhonchi or wheezes  Heart: regular rate and rhythm, S1, S2 normal, no murmur, click, rub or gallop  Abdomen: soft, non-tender; bowel sounds normal; no masses,  no organomegaly and obese  Extremities: edema trace bilaterally, modified Barbara's negative  Neurologic: no focal neurologic deficits, normal sensation, alert and oriented, affect and mood appropriate  Skin: some erythema noted left hip and proximal thigh    Assessment and Plan:   Principal Problem:    Septic arthritis of hip (HCC)  Active Problems:    History of cirrhosis    History of hepatitis C virus infection    History of liver transplant [address] [address]    Type 2 diabetes mellitus with skin complication, with long-term current use of insulin (HCC)    Acute hip pain, left    Hypokalemia    Hypomagnesemia  Resolved Problems:    * No resolved hospital problems. *      POD #5 I&D left hip. Day #5 cefazolin empiric therapy for septic arthritis, believed to be due to Streptococcus agalactiae. C&S pending. ID onboard.     PICC line placement done today for administration of outpatient IV antibiotics till

## 2021-03-05 NOTE — PLAN OF CARE
Nutrition Problem #1: Inadequate protein-energy intake  Intervention: Food and/or Nutrient Delivery: Continue Current Diet, Modify Oral Nutrition Supplement  Nutritional Goals: PO intake 50% or greater.

## 2021-03-06 LAB
ALBUMIN SERPL-MCNC: 1.9 G/DL (ref 3.5–5.2)
ALBUMIN/GLOB SERPL: 0.5 G/DL
ALP SERPL-CCNC: 449 U/L (ref 39–117)
ALT SERPL W P-5'-P-CCNC: 10 U/L (ref 1–41)
ANION GAP SERPL CALCULATED.3IONS-SCNC: 9 MMOL/L (ref 5–15)
AST SERPL-CCNC: 37 U/L (ref 1–40)
BASOPHILS # BLD AUTO: 0.06 10*3/MM3 (ref 0–0.2)
BASOPHILS NFR BLD AUTO: 0.6 % (ref 0–1.5)
BILIRUB SERPL-MCNC: 0.8 MG/DL (ref 0–1.2)
BLOOD CULTURE, ROUTINE: NORMAL
BUN SERPL-MCNC: 16 MG/DL (ref 6–20)
BUN/CREAT SERPL: 22.9 (ref 7–25)
CALCIUM SPEC-SCNC: 8 MG/DL (ref 8.6–10.5)
CHLORIDE SERPL-SCNC: 97 MMOL/L (ref 98–107)
CO2 SERPL-SCNC: 27 MMOL/L (ref 22–29)
CREAT SERPL-MCNC: 0.7 MG/DL (ref 0.76–1.27)
CULTURE, BLOOD 2: NORMAL
DEPRECATED RDW RBC AUTO: 39.1 FL (ref 37–54)
EOSINOPHIL # BLD AUTO: 0.06 10*3/MM3 (ref 0–0.4)
EOSINOPHIL NFR BLD AUTO: 0.6 % (ref 0.3–6.2)
ERYTHROCYTE [DISTWIDTH] IN BLOOD BY AUTOMATED COUNT: 13.1 % (ref 12.3–15.4)
GFR SERPL CREATININE-BSD FRML MDRD: 119 ML/MIN/1.73
GLOBULIN UR ELPH-MCNC: 3.5 GM/DL
GLUCOSE BLDC GLUCOMTR-MCNC: 166 MG/DL (ref 70–130)
GLUCOSE BLDC GLUCOMTR-MCNC: 191 MG/DL (ref 70–130)
GLUCOSE BLDC GLUCOMTR-MCNC: 202 MG/DL (ref 70–130)
GLUCOSE BLDC GLUCOMTR-MCNC: 236 MG/DL (ref 70–130)
GLUCOSE SERPL-MCNC: 185 MG/DL (ref 65–99)
HCT VFR BLD AUTO: 33.7 % (ref 37.5–51)
HGB BLD-MCNC: 11.9 G/DL (ref 13–17.7)
LYMPHOCYTES # BLD AUTO: 1.01 10*3/MM3 (ref 0.7–3.1)
LYMPHOCYTES NFR BLD AUTO: 9.9 % (ref 19.6–45.3)
MCH RBC QN AUTO: 29 PG (ref 26.6–33)
MCHC RBC AUTO-ENTMCNC: 35.3 G/DL (ref 31.5–35.7)
MCV RBC AUTO: 82 FL (ref 79–97)
MONOCYTES # BLD AUTO: 0.85 10*3/MM3 (ref 0.1–0.9)
MONOCYTES NFR BLD AUTO: 8.3 % (ref 5–12)
NEUTROPHILS NFR BLD AUTO: 7.91 10*3/MM3 (ref 1.7–7)
NEUTROPHILS NFR BLD AUTO: 77.1 % (ref 42.7–76)
PLATELET # BLD AUTO: 134 10*3/MM3 (ref 140–450)
PMV BLD AUTO: 11.5 FL (ref 6–12)
POTASSIUM SERPL-SCNC: 3.1 MMOL/L (ref 3.5–5.2)
PREALB SERPL-MCNC: 9 MG/DL (ref 20–40)
PROT SERPL-MCNC: 5.4 G/DL (ref 6–8.5)
RBC # BLD AUTO: 4.11 10*6/MM3 (ref 4.14–5.8)
SODIUM SERPL-SCNC: 133 MMOL/L (ref 136–145)
TACROLIMUS BLOOD: 37.4 NG/ML
TACROLIMUS BLOOD: 45 NG/ML
WBC # BLD AUTO: 10.25 10*3/MM3 (ref 3.4–10.8)

## 2021-03-06 PROCEDURE — 84134 ASSAY OF PREALBUMIN: CPT | Performed by: INTERNAL MEDICINE

## 2021-03-06 PROCEDURE — 63710000001 PREDNISONE PER 5 MG: Performed by: INTERNAL MEDICINE

## 2021-03-06 PROCEDURE — 85025 COMPLETE CBC W/AUTO DIFF WBC: CPT | Performed by: INTERNAL MEDICINE

## 2021-03-06 PROCEDURE — 82962 GLUCOSE BLOOD TEST: CPT

## 2021-03-06 PROCEDURE — 25010000002 HYDROMORPHONE 1 MG/ML SOLUTION: Performed by: INTERNAL MEDICINE

## 2021-03-06 PROCEDURE — 80053 COMPREHEN METABOLIC PANEL: CPT | Performed by: INTERNAL MEDICINE

## 2021-03-06 PROCEDURE — 63710000001 INSULIN LISPRO (HUMAN) PER 5 UNITS: Performed by: INTERNAL MEDICINE

## 2021-03-06 PROCEDURE — 97161 PT EVAL LOW COMPLEX 20 MIN: CPT

## 2021-03-06 PROCEDURE — 63710000001 TACROLIMUS PER 1 MG: Performed by: INTERNAL MEDICINE

## 2021-03-06 NOTE — H&P
MAVERICK Cazares APRN          Internal Medicine History and Physical      Name: Gil Monroy  MRN: [id number]     Acct: [id number]  Room: 2/    Admit Date: 3/5/2021  PCP: Angelia Macdonald    Chief Complaint:     IV antibiotic therapy for Group B bacteremia/septic arthritis      History Obtained From:     chart review and the patient    History of Present Illness:      Gil Monroy is a  50 y.o.  male who presents with need for IV antibiotic therapy following an acute care stay.  Mr. Monroy initially presented to [facility] emergency room on 2/27/2021 with recurrent chills and fever and worsening left hip pain being present for approximately 2-1/2 days prior to presentation.  He had previous been hospitalized with Streptococcus agalactiae septicemia and treated with cefuroxime.  Unfortunately he then developed C. difficile and was treated with cephalexin and oral vancomycin.  Mr. Monroy was evaluated by infectious disease and recommendation was to continue with cefazolin for 4 weeks.     In relation to Mr. Monroy's history of liver transplant he was taking tacrolimus 3 mg p.o. 3 times daily prior to hospitalization.  The tacrolimus dose was decreased to 3 mg p.o. twice daily due to being on steroids and recommendation is to wean off steroids and monitor tacrolimus level closely and maintain a low normal level while tapering of steroids.    Past Medical History:     History of cirrhosis with liver transplant  History of hepatitis C  Hypertensive gastropathy  Duodenal ulcers  Esophageal varices  History of thrombocytopenia  Appendicitis  Diabetes mellitus type 2    Past Surgical History:     Liver transplant    Medications Prior to Admission:       Cefazolin 1 g every 8 hours IV  Gabapentin 100 mg p.o. 3 times daily  Lidocaine patch applied to left thigh daily  Pantoprazole 40 mg p.o. daily before breakfast  Prednisone 10 mg p.o. twice daily  daily  Tacrolimus 3 mg twice daily  Tylenol 650 mg p.o. every 6 hours as needed pain/fever  Medrol 25 mg p.o. every 6 hours as needed pruritus  Oxycodone 10 mg every 4 hours as needed pain  Oxycodone 20 mg p.o. every 4 hours as needed for severe pain  Dilaudid 1 mg IV every 3 hours for pain 4-6 as needed  Dilaudid 2 mg IV every 3 hours pain scale 7-10 as needed    Allergies:       Patient has no known allergies.    Social History:     Tobacco:   Former smoker, now vapes  Alcohol:      has no history on file for alcohol use.  Drug Use:  has no history on file for drug use.    Family History:     Mother had a history of breast cancer.  No history of GI malignancies.    Review of Systems:     Review of Systems   Constitutional: Positive for malaise/fatigue. Negative for chills, decreased appetite and fever.   HENT: Negative for congestion, hoarse voice, nosebleeds and sore throat.    Eyes: Negative for blurred vision, discharge, pain and visual disturbance.   Cardiovascular: Negative for chest pain, dyspnea on exertion, irregular heartbeat, leg swelling, orthopnea and palpitations.   Respiratory: Negative for cough, shortness of breath, sputum production and wheezing.    Hematologic/Lymphatic: Negative for bleeding problem. Does not bruise/bleed easily.   Skin: Negative for dry skin, flushing, itching, poor wound healing, rash and suspicious lesions.   Musculoskeletal: Positive for muscle weakness. Negative for arthritis, back pain, falls, joint pain, joint swelling and myalgias.        Left hip pain   Gastrointestinal: Negative for bloating, abdominal pain, change in bowel habit, diarrhea, flatus, heartburn, melena and nausea.   Genitourinary: Negative for frequency, hesitancy, incomplete emptying, pelvic pain and urgency.   Neurological: Negative for difficulty with concentration, disturbances in coordination, dizziness, headaches, numbness, paresthesias, tremors and weakness.   Psychiatric/Behavioral: Negative for  "altered mental status, hallucinations and memory loss. The patient is not nervous/anxious.        Code Status:    There are no questions and answers to display.       Physical Exam:     Vitals:  Ht 185.4 cm (73\")   Wt 131 kg (288 lb)   BMI 38.00 kg/m²   Temp 97.3, HR 86, RR 14, /85, SPO2 99% on room air      Physical Exam  Constitutional:       Appearance: Normal appearance. He is well-developed.   HENT:      Head: Normocephalic and atraumatic.      Nose: Nose normal.   Eyes:      General: Lids are normal.      Conjunctiva/sclera: Conjunctivae normal.      Pupils: Pupils are equal, round, and reactive to light.   Neck:      Trachea: Trachea normal.   Cardiovascular:      Rate and Rhythm: Normal rate and regular rhythm.      Heart sounds: Normal heart sounds.   Abdominal:      General: Bowel sounds are normal.      Palpations: Abdomen is soft.   Musculoskeletal:         General: Normal range of motion.      Cervical back: Normal range of motion and neck supple.   Skin:     General: Skin is warm and dry.      Findings: No erythema, petechiae or rash.      Nails: There is no clubbing.   Neurological:      Mental Status: He is alert.      Cranial Nerves: No cranial nerve deficit.      Sensory: No sensory deficit.   Psychiatric:         Speech: Speech normal.         Behavior: Behavior normal. Behavior is cooperative.         Thought Content: Thought content normal.         Judgment: Judgment normal.               Data:     Lab Results (last 7 days)       Procedure Component Value Units Date/Time    Prealbumin [[id number]]  (Abnormal) Collected: 03/06/21 0430    Specimen: Blood Updated: 03/06/21 1245     Prealbumin 9.0 mg/dL     POC Glucose Once [[id number]]  (Abnormal) Collected: 03/06/21 1135    Specimen: Blood Updated: 03/06/21 1200     Glucose 202 mg/dL      Comment: : [email or web address] Harris KristaMeter ID: [id number]       POC Glucose Once [[id number]]  (Abnormal) Collected: 03/06/21 0741    Specimen: Blood " Updated: 03/06/21 0752     Glucose 166 mg/dL      Comment: : MAIA Shaffer PatMeter ID: [id number]       Comprehensive Metabolic Panel [[id number]]  (Abnormal) Collected: 03/06/21 0430    Specimen: Blood Updated: 03/06/21 0605     Glucose 185 mg/dL      BUN 16 mg/dL      Creatinine 0.70 mg/dL      Sodium 133 mmol/L      Potassium 3.1 mmol/L      Chloride 97 mmol/L      CO2 27.0 mmol/L      Calcium 8.0 mg/dL      Total Protein 5.4 g/dL      Albumin 1.90 g/dL      ALT (SGPT) 10 U/L      AST (SGOT) 37 U/L      Alkaline Phosphatase 449 U/L      Total Bilirubin 0.8 mg/dL      eGFR Non African Amer 119 mL/min/1.73      Globulin 3.5 gm/dL      A/G Ratio 0.5 g/dL      BUN/Creatinine Ratio 22.9     Anion Gap 9.0 mmol/L     Narrative:      GFR Normal >60  Chronic Kidney Disease <60  Kidney Failure <15      CBC & Differential [[id number]]  (Abnormal) Collected: 03/06/21 0430    Specimen: Blood Updated: 03/06/21 0527    Narrative:      The following orders were created for panel order CBC & Differential.  Procedure                               Abnormality         Status                     ---------                               -----------         ------                     CBC Auto Differential[[id number]]        Abnormal            Final result                 Please view results for these tests on the individual orders.    CBC Auto Differential [[id number]]  (Abnormal) Collected: 03/06/21 0430    Specimen: Blood Updated: 03/06/21 0527     WBC 10.25 10*3/mm3      RBC 4.11 10*6/mm3      Hemoglobin 11.9 g/dL      Hematocrit 33.7 %      MCV 82.0 fL      MCH 29.0 pg      MCHC 35.3 g/dL      RDW 13.1 %      RDW-SD 39.1 fl      MPV 11.5 fL      Platelets 134 10*3/mm3      Neutrophil % 77.1 %      Lymphocyte % 9.9 %      Monocyte % 8.3 %      Eosinophil % 0.6 %      Basophil % 0.6 %      Neutrophils, Absolute 7.91 10*3/mm3      Lymphocytes, Absolute 1.01 10*3/mm3      Monocytes, Absolute 0.85 10*3/mm3      Eosinophils,  Absolute 0.06 10*3/mm3      Basophils, Absolute 0.06 10*3/mm3     POC Glucose Once [[id number]]  (Abnormal) Collected: 03/05/21 1959    Specimen: Blood Updated: 03/05/21 2011     Glucose 241 mg/dL      Comment: : MERRILL RamMeter ID: [id number]             No results found.      Assessment:         Plan:     Group B strep bacteremia  Left hip septic arthritis with grade B strep (Streptococcus agalactiae)  History of cirrhosis/hepatitis C virus  History of liver transplant  Diabetes mellitus type 2  Multifactoral thrombocytopenia secondary to liver disease and sepsis, platelet count 134,000 today  Moderate malnutrition  Electrolyte imbalance, hypokalemia with potassium of 3.1 and hypomagnesemia    Continue medications as prior to transfer.  Factious disease consultation continue cefazolin for 4-week course, last dose on 3/21/2021, per infectious disease.  Maintain patient safety.  PT/OT evaluation and treat.  Monitor tacrolimus level in a.m.  Monitor glucose.  Monitor counts, BMP in a.m.  Further recommendations per Dr. Shaffer    Electronically signed by ANDREW Marie on 3/6/2021 at 17:58 CST     Copy sent to Angelia Farrell  I have discussed the care of Gil Monroy, including pertinent history and exam findings, with the nurse practitioner.    I have seen and examined the patient and the key elements of all parts of the encounter have been performed by me.  I agree with the assessment, plan and orders as documented by ANDREW Peterson, after I modified the exam findings and the plan of treatments and the final version is my approved version of the assessment.        Electronically signed by Miguel Shaffer MD on 3/6/2021 at 19:05 CST

## 2021-03-06 NOTE — PROGRESS NOTES
Adult Nutrition  Assessment/PES    Patient Name:  Gil Monroy  YOB: 1970  MRN: [id number]  Admit Date:  3/5/2021    Assessment Date:  3/6/2021    Comments:  New admit to [facility]. No PO intake to assess at this time. Pt has increased nutrition needs to promote wound healing. Ordered Boost GC (strawberry flavor per pt preference) with all meals. Will continue to follow for nutrition needs.     Reason for Assessment     Row Name 03/06/21 0939          Reason for Assessment    Reason For Assessment  physician consult     Diagnosis  infection/sepsis;liver disease;endocrine conditions         Nutrition/Diet History     Row Name 03/06/21 0940          Nutrition/Diet History    Typical Food/Fluid Intake  New admit to [facility]. Pt is on a Regular ADA diet. He has increased nutrition needs to promote wound healing.     Supplemental Drinks/Foods/Additives  Previously had been on Glucerna TID, will order Boost GC with all meals.         Anthropometrics     Row Name 03/06/21 0941          Admit Weight    Admit Weight  131 kg (288 lb)        Body Mass Index (BMI)    BMI Assessment  BMI 35-39.9: obesity grade II         Labs/Tests/Procedures/Meds     Row Name 03/06/21 0941          Labs/Procedures/Meds    Lab Results Reviewed  reviewed        Medications    Pertinent Medications Reviewed  reviewed         Physical Findings     Row Name 03/06/21 0942          Physical Findings    Overall Physical Appearance  obese     Skin  surgical incision;edema         Estimated/Assessed Needs     Row Name 03/06/21 0943          Calculation Measurements    Weight Used For Calculations  131 kg (288 lb)        Estimated/Assessed Needs    Additional Documentation  Fluid Requirements (Group);KCAL/KG (Group);Protein Requirements (Group)        KCAL/KG    KCAL/KG  20 Kcal/Kg (kcal)     20 Kcal/Kg (kcal)  2612.72        Protein Requirements    Weight Used For Protein Calculations  83.5 kg (184 lb)     Est Protein Requirement Amount  (gms/kg)  1.4 gm protein     Estimated Protein Requirements (gms/day)  116.85        Fluid Requirements    Fluid Requirements (mL/day)  2612     Estimated Fluid Requirement Method  RDA Method     RDA Method (mL)  2612         Nutrition Prescription Ordered     Row Name 03/06/21 0944          Nutrition Prescription PO    Current PO Diet  Regular     Common Modifiers  Consistent Carbohydrate         Evaluation of Received Nutrient/Fluid Intake     Row Name 03/06/21 0943          Nutrient/Fluid Evaluation    Additional Documentation  Fluid Intake Evaluation (Group) new admit to [facility], no fluid or PO intake documented at this time        PO Evaluation    Number of Days PO Intake Evaluated  Insufficient Data               Problem/Interventions:  Problem 1     Row Name 03/06/21 0945          Nutrition Diagnoses Problem 1    Problem 1  Increased Nutrient Needs     Macronutrient  Kcal;Protein     Etiology (related to)  Medical Diagnosis     Hepatic  Other (comment) hx of liver transplant; hx of cirrhosis     Infectious Disease  Other (comment) Group B strep bacteremia; L hip septic arthritis     Skin  Surgical wound     Signs/Symptoms (evidenced by)  Other (comment) to promote wound healing               Intervention Goal     Row Name 03/06/21 0947          Intervention Goal    General  Reduce/improve symptoms;Meet nutritional needs for age/condition     PO  Establish PO;Meet estimated needs     Weight  Appropriate weight loss         Nutrition Intervention     Row Name 03/06/21 0948          Nutrition Intervention    RD/Tech Action  Follow Tx progress;Care plan reviewd;Recommend/ordered     Recommended/Ordered  Supplement         Nutrition Prescription     Row Name 03/06/21 0948          Nutrition Prescription PO    PO Prescription  Begin/change supplement     Supplement  Boost Glucose Control     Supplement Frequency  3 times a day     New PO Prescription Ordered?  Yes         Education/Evaluation     Row Name 03/06/21  0948          Education    Education  No discharge needs identified at this time        Monitor/Evaluation    Monitor  Per protocol           Electronically signed by:  Divya Choe  03/06/21 09:52 CST

## 2021-03-06 NOTE — CONSULTS
INFECTIOUS DISEASES CONSULT NOTE    Patient:  Gil Monroy 50 y.o. male  ROOM # 572/1  YOB: 1970  MRN: [id number]  [facility]:  [id number]  Admit date: 3/5/2021   Admitting Physician: Miguel Shaffer MD  Primary Care Physician: Angelia Macdonald  REFERRING PROVIDER: Miguel Shaffer, *    Reason for Consultation: Assistance with antibiotic management for group B strep bacteremia and left hip septic arthritis.    History of Present Illness/Chief Complaint: Pleasant 50-year-old man.  He had been admitted to [facility] February 8.  He had a chronic excoriated area on his abdomen.  He had abdominal wall cellulitis with group B strep bacteremia.  He received antibiotic treatment with improvement.  He returned to [facility] on February 27.  He had had the acute onset of left hip pain starting 3 days prior.  He again had group B strep bloodstream infection.  He underwent surgery.  He had evidence of septic arthritis involving the left hip.  He had incision and drainage of the native hip.  Operative cultures grew group B strep.  He has had some ongoing discomfort in the left hip area and has been slow to improve with physical therapy.  He was transferred to [facility] for additional IV antibiotic treatment, physical therapy, and assistance with management.  He has had prior liver transplantation and is on immunosuppressive medication therapy.  No new symptoms at present.  Overall he has been showing some slow improvement.  Infectious disease asked to assist with infection management.    Current Scheduled Medications:   ceFAZolin, 1 g, Intravenous, Q8H  gabapentin, 100 mg, Oral, Q8H  insulin lispro, 0-9 Units, Subcutaneous, TID AC  insulin lispro, 2-7 Units, Subcutaneous, Nightly  lidocaine, 1 patch, Transdermal, Q24H  pantoprazole, 40 mg, Oral, Q AM  predniSONE, 10 mg, Oral, BID With Meals  saccharomyces boulardii, 250 mg, Oral, BID  senna-docusate sodium, 1 tablet, Oral, BID  sodium chloride,  "10 mL, Intravenous, Q12H  tacrolimus, 3 mg, Oral, Q12H      Current PRN Medications:  •  acetaminophen  •  dextrose  •  dextrose  •  diphenhydrAMINE  •  glucagon (human recombinant)  •  HYDROmorphone  •  HYDROmorphone  •  oxyCODONE  •  oxyCODONE  •  sodium chloride    Allergies:  No Known Allergies    Past Medical History: History cirrhosis with liver transplantation. Hepatitis C. Hernia. Hypertensive gastropathy. Previous appendicitis which ruptured. Per chart review no surgery. Diabetes mellitus.  No history of valvular heart disease or coronary artery disease. He reports no history of renal dysfunction.     Past Surgical History: Liver transplantation     Social History: Indicates he lives with his girlfriend. Prior tobacco use. Now vapes. No alcohol or illicit drug use. Retired .     Family History: Mother breast cancer    Exposure History: No close contacts have been ill    Review of Systems   No cough or shortness of breath  No nausea or vomiting  No diarrhea  No rash or skin itching    Vital Signs:  Ht 185.4 cm (73\")   Wt 131 kg (288 lb)   BMI 38.00 kg/m²  No data recorded.    Physical Exam  Vital signs - reviewed.  Line/IV (right arm PICC) site - No erythema or tenderness.  Left hip incision without erythema or drainage  Bilateral lower extremity edema left greater than right.  This is improving from when he was at [facility].  Lungs clear without crackles or wheezes  Heart distant heart sounds without apparent murmur  When I gave him some assistance he is able to flex and extend at the left hip.  He is also able to flex and extend at the left knee.  He has no clonus with forced dorsiflexion of either foot.  He has intact sensation both lower extremities.  With assistance he has improving strength and range of motion of the left hip.    Lab Results:  CBC:   Results from last 7 days   Lab Units 03/06/21  0430 03/05/21  0415 03/04/21  0253 03/03/21  0342 03/02/21  0340 03/01/21  0223 02/28/21  0142   WBC " 10*3/mm3 10.25 10.1 10.4 9.5 9.9 9.5 9.4   HEMOGLOBIN g/dL 11.9* 11.6* 12.6* 11.3* 11.5* 11.5* 12.0*   HEMATOCRIT % 33.7* 34.2* 37.5* 34.3* 34.9* 35.5* 36.2*   PLATELETS 10*3/mm3 134* 124* 115* 73* 49* 47* 63*     CMP:   Results from last 7 days   Lab Units 03/06/21  0430   SODIUM mmol/L 133*   POTASSIUM mmol/L 3.1*   CHLORIDE mmol/L 97*   CO2 mmol/L 27.0   BUN mg/dL 16   CREATININE mg/dL 0.70*   CALCIUM mg/dL 8.0*   BILIRUBIN mg/dL 0.8   ALK PHOS U/L 449*   ALT (SGPT) U/L 10   AST (SGOT) U/L 37   GLUCOSE mg/dL 185*     Culture results:  Blood cultures February 27, 2021-Streptococcus agalactiae  Blood cultures March 1, 2021-no growth  Left hip cultures February 27, 2021 (operative cultures)-Streptococcus agalactiae    Susceptibility testing from left hip culture:  Organism Antibiotic Method Susceptibility   Strep agalactiae (beta strep group b) ampicillin BACTERIAL SUSCEPTIBILITY PANEL BY RODRÍGUEZ <=0.25 mcg/mL: Sensitive     Strep agalactiae (beta strep group b) benzylpenicillin BACTERIAL SUSCEPTIBILITY PANEL BY RODRÍGUEZ 0.12 mcg/mL: Sensitive     Strep agalactiae (beta strep group b) cefTRIAXone BACTERIAL SUSCEPTIBILITY PANEL BY RODRÍGUEZ <=0.12 mcg/mL: Sensitive     Strep agalactiae (beta strep group b) clindamycin BACTERIAL SUSCEPTIBILITY PANEL BY RODRÍGUEZ <=0.25 mcg/mL: Sensitive     Strep agalactiae (beta strep group b) erythromycin BACTERIAL SUSCEPTIBILITY PANEL BY RODRÍGUEZ <=0.12 mcg/mL: Sensitive     Strep agalactiae (beta strep group b) vancomycin BACTERIAL SUSCEPTIBILITY PANEL BY RODRÍGUEZ      Impression:   1.  Group B strep bacteremia  2.  Left hip septic arthritis secondary to group B streptococcus (negative hip)  3.  History of liver transplantation  4.  Diabetes mellitus  5.  Resolved renal insufficiency    Recommendations:    Continue cefazolin  Encouraging her to work with physical therapy  I think his muscles have tightened from some immobility post operatively.  With assistance he can do much more with his left hip  He seems to  be making favorable progress  Encouraging him to sit at bedside with assistance and get up to chair with meals etc.  Would continue with physical therapy  Continue cefazolin  Planning to continue intravenous cefazolin for a 4-week course following his surgery (last dose of cefazolin will be March 28, 2021)-that will be a 4-week course for native joint septic arthritis in immunocompromised patient  Will see every 2 to 3 days  Please call in interim if questions for infectious diseases      Zev George MD  03/06/21  10:11 CST

## 2021-03-07 LAB
ANION GAP SERPL CALCULATED.3IONS-SCNC: 10 MMOL/L (ref 5–15)
BUN SERPL-MCNC: 21 MG/DL (ref 6–20)
BUN/CREAT SERPL: 23.9 (ref 7–25)
CALCIUM SPEC-SCNC: 8.1 MG/DL (ref 8.6–10.5)
CHLORIDE SERPL-SCNC: 95 MMOL/L (ref 98–107)
CO2 SERPL-SCNC: 26 MMOL/L (ref 22–29)
CREAT SERPL-MCNC: 0.88 MG/DL (ref 0.76–1.27)
GFR SERPL CREATININE-BSD FRML MDRD: 92 ML/MIN/1.73
GLUCOSE BLDC GLUCOMTR-MCNC: 225 MG/DL (ref 70–130)
GLUCOSE BLDC GLUCOMTR-MCNC: 231 MG/DL (ref 70–130)
GLUCOSE BLDC GLUCOMTR-MCNC: 236 MG/DL (ref 70–130)
GLUCOSE BLDC GLUCOMTR-MCNC: 312 MG/DL (ref 70–130)
GLUCOSE SERPL-MCNC: 257 MG/DL (ref 65–99)
MAGNESIUM SERPL-MCNC: 1.5 MG/DL (ref 1.6–2.6)
POTASSIUM SERPL-SCNC: 3.9 MMOL/L (ref 3.5–5.2)
SODIUM SERPL-SCNC: 131 MMOL/L (ref 136–145)

## 2021-03-07 PROCEDURE — 80197 ASSAY OF TACROLIMUS: CPT | Performed by: NURSE PRACTITIONER

## 2021-03-07 PROCEDURE — 63710000001 PREDNISONE PER 5 MG: Performed by: INTERNAL MEDICINE

## 2021-03-07 PROCEDURE — 63710000001 TACROLIMUS PER 1 MG: Performed by: INTERNAL MEDICINE

## 2021-03-07 PROCEDURE — 83735 ASSAY OF MAGNESIUM: CPT | Performed by: NURSE PRACTITIONER

## 2021-03-07 PROCEDURE — 25010000002 MAGNESIUM SULFATE 2 GM/50ML SOLUTION: Performed by: INTERNAL MEDICINE

## 2021-03-07 PROCEDURE — 80048 BASIC METABOLIC PNL TOTAL CA: CPT | Performed by: NURSE PRACTITIONER

## 2021-03-07 PROCEDURE — 63710000001 INSULIN LISPRO (HUMAN) PER 5 UNITS: Performed by: INTERNAL MEDICINE

## 2021-03-07 PROCEDURE — 63710000001 INSULIN DETEMIR PER 5 UNITS: Performed by: INTERNAL MEDICINE

## 2021-03-07 PROCEDURE — 82962 GLUCOSE BLOOD TEST: CPT

## 2021-03-07 PROCEDURE — 25010000002 HYDROMORPHONE 1 MG/ML SOLUTION: Performed by: INTERNAL MEDICINE

## 2021-03-07 RX ORDER — MAGNESIUM SULFATE HEPTAHYDRATE 40 MG/ML
2 INJECTION, SOLUTION INTRAVENOUS
Status: DISPENSED | OUTPATIENT
Start: 2021-03-07 | End: 2021-03-07

## 2021-03-07 NOTE — PROGRESS NOTES
MAVERICK Cazares APRN         Internal Medicine Progress Note    3/7/2021   14:43 CST    Name:  Gil Monroy  MRN:    [id number]     Acct:     [id number]   Room:  [address] [place] [address] Day: 0     Admit Date: 3/5/2021  7:17 PM  PCP: Angelia Macdonald    Subjective:     C/C: IV antibiotic therapy for Group B bacteremia/septic arthritis      Interval History: Status:  stayed the same.  Resting in bed.  Fiancé at bedside.  Up to bedside commode and repositioning self in bed, encouraged increasing activity and participation with physical and Occupational Therapy.  Tolerating IV antibiotic therapy without difficulty.  Glucose ranging from 191-257.  Hypomagnesemia with a magnesium of 1.5.  Potassium normalized 3.9.  Mild hyponatremia. Monitor tacrolimus level in process from this a.m.      Review of Systems   Constitutional: Positive for malaise/fatigue. Negative for chills, decreased appetite and fever.   HENT: Negative for congestion, hoarse voice, nosebleeds and sore throat.    Eyes: Negative for blurred vision, discharge, pain and visual disturbance.   Cardiovascular: Negative for chest pain, dyspnea on exertion, irregular heartbeat, leg swelling, orthopnea and palpitations.   Respiratory: Negative for cough, shortness of breath, sputum production and wheezing.    Hematologic/Lymphatic: Negative for bleeding problem. Does not bruise/bleed easily.   Skin: Negative for dry skin, flushing, itching, poor wound healing, rash and suspicious lesions.   Musculoskeletal: Positive for muscle weakness. Negative for arthritis, back pain, falls, joint pain, joint swelling and myalgias.        Left hip pain   Gastrointestinal: Negative for bloating, abdominal pain, change in bowel habit, diarrhea, flatus, heartburn, melena and nausea.   Genitourinary: Negative for frequency, hesitancy, incomplete emptying, pelvic pain and urgency.   Neurological: Negative for difficulty with concentration,  disturbances in coordination, dizziness, headaches, numbness, paresthesias, tremors and weakness.   Psychiatric/Behavioral: Negative for altered mental status, hallucinations and memory loss. The patient is not nervous/anxious.          Medications:     Allergies: No Known Allergies    Current Meds:   Current Facility-Administered Medications:     acetaminophen (TYLENOL) tablet 650 mg, 650 mg, Oral, Q6H PRN, Miguel Shaffer MD    ceFAZolin (ANCEF) 1 g/100 mL 0.9% NS IVPB (mbp), 1 g, Intravenous, Q8H, Miguel Shaffer MD    dextrose (D50W) 25 g/ 50mL Intravenous Solution 25 g, 25 g, Intravenous, Q15 Min PRN, Miguel Shaffer MD    dextrose (GLUTOSE) oral gel 15 g, 15 g, Oral, Q15 Min PRN, Miguel Shaffer MD    diphenhydrAMINE (BENADRYL) capsule 25 mg, 25 mg, Oral, Q6H PRN, Miguel Shaffer MD    gabapentin (NEURONTIN) capsule 100 mg, 100 mg, Oral, Q8H, Miguel Shaffer MD    glucagon (human recombinant) (GLUCAGEN DIAGNOSTIC) injection 1 mg, 1 mg, Subcutaneous, Q15 Min PRN, Miguel Shaffer MD    HYDROmorphone (DILAUDID) injection 1 mg, 1 mg, Intravenous, Q3H PRN, Miguel Shaffer MD    HYDROmorphone (DILAUDID) injection 2 mg, 2 mg, Intravenous, Q3H PRN, Miguel Shaffer MD    insulin lispro (humaLOG) injection 0-9 Units, 0-9 Units, Subcutaneous, TID AC, Miguel Shaffer MD    insulin lispro (humaLOG) injection 2-7 Units, 2-7 Units, Subcutaneous, Nightly, Miguel Shaffer MD    lidocaine (LIDODERM) 5 % 1 patch, 1 patch, Transdermal, Q24H, Miguel Shaffer MD    oxyCODONE (ROXICODONE) immediate release tablet 10 mg, 10 mg, Oral, Q4H PRN, Miguel Shaffer MD    oxyCODONE (ROXICODONE) immediate release tablet 20 mg, 20 mg, Oral, Q4H PRN, Miguel Shaffer MD    pantoprazole (PROTONIX) EC tablet 40 mg, 40 mg, Oral, Q AM, Miguel Shaffer MD    predniSONE (DELTASONE) tablet 10 mg, 10 mg, Oral, BID With Meals, Miguel Shaffer,  "MD    saccharomyces boulardii (FLORASTOR) capsule 250 mg, 250 mg, Oral, BID, Miguel Shaffer MD    sennosides-docusate (PERICOLACE) 8.6-50 MG per tablet 1 tablet, 1 tablet, Oral, BID, Miguel Shaffer MD    sodium chloride 0.9 % flush 10 mL, 10 mL, Intravenous, Q12H, Miguel Shaffer MD    sodium chloride 0.9 % flush 10 mL, 10 mL, Intravenous, PRN, Miguel Shaffer MD    sodium chloride 0.9 % infusion, 125 mL/hr, Intravenous, Continuous, Miguel Shaffer MD    tacrolimus (PROGRAF) capsule 3 mg, 3 mg, Oral, Q12H, Miguel Shaffer MD    Data:     Code Status:    There are no questions and answers to display.       No family history on file.    Social History     Socioeconomic History    Marital status: Unknown     Spouse name: Not on file    Number of children: Not on file    Years of education: Not on file    Highest education level: Not on file       Vitals:  Ht 185.4 cm (73\")   Wt 131 kg (288 lb)   BMI 38.00 kg/m²     Temp 97.3, HR 78, RR 14, /80, SPO2 99% on room air    I/O (24Hr):  No intake or output data in the 24 hours ending 03/07/21 1443    Labs and imaging:      Recent Results (from the past 12 hour(s))   Basic Metabolic Panel    Collection Time: 03/07/21  4:54 AM    Specimen: Blood   Result Value Ref Range    Glucose 257 (H) 65 - 99 mg/dL    BUN 21 (H) 6 - 20 mg/dL    Creatinine 0.88 0.76 - 1.27 mg/dL    Sodium 131 (L) 136 - 145 mmol/L    Potassium 3.9 3.5 - 5.2 mmol/L    Chloride 95 (L) 98 - 107 mmol/L    CO2 26.0 22.0 - 29.0 mmol/L    Calcium 8.1 (L) 8.6 - 10.5 mg/dL    eGFR Non African Amer 92 >60 mL/min/1.73    BUN/Creatinine Ratio 23.9 7.0 - 25.0    Anion Gap 10.0 5.0 - 15.0 mmol/L   Magnesium    Collection Time: 03/07/21  4:54 AM    Specimen: Blood   Result Value Ref Range    Magnesium 1.5 (L) 1.6 - 2.6 mg/dL   POC Glucose Once    Collection Time: 03/07/21  7:50 AM    Specimen: Blood   Result Value Ref Range    Glucose 231 (H) 70 - 130 mg/dL   POC Glucose " Once    Collection Time: 03/07/21 11:26 AM    Specimen: Blood   Result Value Ref Range    Glucose 225 (H) 70 - 130 mg/dL         Physical Examination:        Physical Exam  Constitutional:       Appearance: Normal appearance. He is well-developed.   HENT:      Head: Normocephalic and atraumatic.      Nose: Nose normal.   Eyes:      General: Lids are normal.      Conjunctiva/sclera: Conjunctivae normal.      Pupils: Pupils are equal, round, and reactive to light.   Neck:      Trachea: Trachea normal.   Cardiovascular:      Rate and Rhythm: Normal rate and regular rhythm.      Heart sounds: Normal heart sounds.   Abdominal:      General: Bowel sounds are normal.      Palpations: Abdomen is soft.   Musculoskeletal:         General: Normal range of motion.      Cervical back: Normal range of motion and neck supple.   Skin:     General: Skin is warm and dry.      Findings: No erythema, petechiae or rash.      Nails: There is no clubbing.   Neurological:      Mental Status: He is alert.      Cranial Nerves: No cranial nerve deficit.      Sensory: No sensory deficit.   Psychiatric:         Speech: Speech normal.         Behavior: Behavior normal. Behavior is cooperative.         Thought Content: Thought content normal.         Judgment: Judgment normal.           Assessment:            Plan:           Group B strep bacteremia  Left hip septic arthritis with grade B strep (Streptococcus agalactiae)  History of cirrhosis/hepatitis C virus  History of liver transplant  Diabetes mellitus type 2  Multifactoral thrombocytopenia secondary to liver disease and sepsis, platelet count 134,000 today  Moderate malnutrition  Electrolyte imbalance, hypokalemia with potassium of 3.1 and hypomagnesemia     Continue current plan of care.  Continue IV antibiotic therapy per ID with cefazolin for 4-week course, last dose anticipated on 3/21/2021.  Maintain patient safety.  PT/OT evaluation and treat.   Will need significant encouragement and  participating with physical therapy continue tacrolimus 3 mg p.o. twice daily, dose decreased due to being on steroids and recommendation is to wean off steroids and monitor tacrolimus level closely and maintain a low normal level while tapering of steroids.  Once completed steroid therapy return to tacrolimus 3 mg p.o. 3 times daily. Tacrolimus level in process from this a.m.  Monitor glucose and adjust insulin as needed.  Monitor counts.   Replace magnesium.         Electronically signed by ANDREW Marie on 3/7/2021 at 14:43 CST     I have discussed the care of Gil Monroy, including pertinent history and exam findings, with the nurse practitioner.    I have seen and examined the patient and the key elements of all parts of the encounter have been performed by me.  I agree with the assessment, plan and orders as documented by ANDREW Peterson, after I modified the exam findings and the plan of treatments and the final version is my approved version of the assessment.        Electronically signed by Miguel Shaffer MD on 3/7/2021 at 20:26 CST

## 2021-03-08 LAB
GLUCOSE BLDC GLUCOMTR-MCNC: 169 MG/DL (ref 70–130)
GLUCOSE BLDC GLUCOMTR-MCNC: 194 MG/DL (ref 70–130)
GLUCOSE BLDC GLUCOMTR-MCNC: 235 MG/DL (ref 70–130)
GLUCOSE BLDC GLUCOMTR-MCNC: 317 MG/DL (ref 70–130)
MAGNESIUM SERPL-MCNC: 1.8 MG/DL (ref 1.6–2.6)
WHOLE BLOOD HOLD SPECIMEN: NORMAL

## 2021-03-08 PROCEDURE — 83735 ASSAY OF MAGNESIUM: CPT | Performed by: INTERNAL MEDICINE

## 2021-03-08 PROCEDURE — 97530 THERAPEUTIC ACTIVITIES: CPT | Performed by: PHYSICAL THERAPIST

## 2021-03-08 PROCEDURE — 97116 GAIT TRAINING THERAPY: CPT | Performed by: PHYSICAL THERAPIST

## 2021-03-08 PROCEDURE — 82962 GLUCOSE BLOOD TEST: CPT

## 2021-03-08 PROCEDURE — 63710000001 TACROLIMUS PER 1 MG: Performed by: INTERNAL MEDICINE

## 2021-03-08 PROCEDURE — 25010000002 HYDROMORPHONE 1 MG/ML SOLUTION: Performed by: INTERNAL MEDICINE

## 2021-03-08 PROCEDURE — 97165 OT EVAL LOW COMPLEX 30 MIN: CPT | Performed by: OCCUPATIONAL THERAPIST

## 2021-03-08 PROCEDURE — 63710000001 INSULIN DETEMIR PER 5 UNITS: Performed by: NURSE PRACTITIONER

## 2021-03-08 PROCEDURE — 63710000001 INSULIN LISPRO (HUMAN) PER 5 UNITS: Performed by: INTERNAL MEDICINE

## 2021-03-08 PROCEDURE — 63710000001 PREDNISONE PER 5 MG: Performed by: INTERNAL MEDICINE

## 2021-03-08 RX ORDER — PETROLATUM,WHITE/LANOLIN
OINTMENT (GRAM) TOPICAL EVERY 12 HOURS SCHEDULED
Status: DISCONTINUED | OUTPATIENT
Start: 2021-03-08 | End: 2021-03-25 | Stop reason: HOSPADM

## 2021-03-08 RX ORDER — PETROLATUM,WHITE/LANOLIN
OINTMENT (GRAM) TOPICAL AS NEEDED
Status: DISCONTINUED | OUTPATIENT
Start: 2021-03-08 | End: 2021-03-25 | Stop reason: HOSPADM

## 2021-03-08 RX ORDER — LACTULOSE 20 G/30ML
20 SOLUTION ORAL
Status: DISPENSED | OUTPATIENT
Start: 2021-03-08 | End: 2021-03-08

## 2021-03-08 NOTE — PROGRESS NOTES
"Infectious Diseases Progress Note    Patient:  Gil Monroy  YOB: 1970  MRN: [id number]   Admit date: 3/5/2021   Admitting Physician: Miguel Shaffer MD  Primary Care Physician: Angelia Macdonald    Chief Complaint/Interval History: He is without fever.  No diarrhea or rash.  Discussed with nursing.  He was able to walk from bed to the door and back to the chair with standby assistance from nursing and using a walker.  Patient does indicate he is working with physical therapy.  Although remained sore in the left hip, he appears to be making progress in terms of strength, range of motion, and mobility.  Overall seems to have less discomfort.  No new localizing symptoms.    No intake or output data in the 24 hours ending 03/08/21 1230  Allergies: No Known Allergies  Current Scheduled Medications:   ceFAZolin, 1 g, Intravenous, Q8H  gabapentin, 100 mg, Oral, Q8H  insulin detemir, 15 Units, Subcutaneous, Nightly  insulin lispro, 0-9 Units, Subcutaneous, TID AC  insulin lispro, 2-7 Units, Subcutaneous, Nightly  lactulose, 20 g, Oral, Once  lidocaine, 1 patch, Transdermal, Q24H  pantoprazole, 40 mg, Oral, Q AM  predniSONE, 10 mg, Oral, BID With Meals  saccharomyces boulardii, 250 mg, Oral, BID  senna-docusate sodium, 1 tablet, Oral, BID  sodium chloride, 10 mL, Intravenous, Q12H  tacrolimus, 3 mg, Oral, Q12H      Current PRN Medications:  •  acetaminophen  •  dextrose  •  dextrose  •  diphenhydrAMINE  •  glucagon (human recombinant)  •  HYDROmorphone  •  HYDROmorphone  •  oxyCODONE  •  oxyCODONE  •  sodium chloride    Review of Systems   No cough or shortness of breath.  No urinary symptoms  No rash or skin itching    Vital Signs:  Ht 185.4 cm (73\")   Wt 124 kg (272 lb 8 oz)   BMI 35.95 kg/m²     Physical Exam  Vital signs - reviewed.  Line/IV (right arm PICC) site - No erythema, warmth, induration, or tenderness.  Left hip incision without any erythema or drainage  Skin without drug rash  He " seems to have some improving range of motion evolving the left hip  Overall looks a little more comfortable than he did last week with attempts at hip movement    Lab Results:  CBC:   Results from last 7 days   Lab Units 03/06/21  0430 03/05/21  0415 03/04/21  0253 03/03/21  0342 03/02/21  0340   WBC 10*3/mm3 10.25 10.1 10.4 9.5 9.9   HEMOGLOBIN g/dL 11.9* 11.6* 12.6* 11.3* 11.5*   HEMATOCRIT % 33.7* 34.2* 37.5* 34.3* 34.9*   PLATELETS 10*3/mm3 134* 124* 115* 73* 49*     BMP:  Results from last 7 days   Lab Units 03/07/21  0454 03/06/21  0430   SODIUM mmol/L 131* 133*   POTASSIUM mmol/L 3.9 3.1*   CHLORIDE mmol/L 95* 97*   CO2 mmol/L 26.0 27.0   BUN mg/dL 21* 16   CREATININE mg/dL 0.88 0.70*   GLUCOSE mg/dL 257* 185*   CALCIUM mg/dL 8.1* 8.0*   ALT (SGPT) U/L  --  10     Radiology: None  Additional Studies Reviewed: None    Impression:   1.  Group B strep bacteremia  2.  Left hip septic arthritis with group B strep  3.  History of liver transplantation  4.  Diabetes mellitus  5.  Resolved renal insufficiency    Recommendations:   Continue treatment cefazolin  Continue physical therapy  Encouraging him to get up to chair with meals  Planning 4 weeks of intravenous antibiotic therapy from his surgical irrigation and debridement.  On March 28, 2021 he will completed 4-week course of intravenous antibiotic therapy for native joint septic arthritis.  No change in recommendations at present  Continue to follow  Will see every 2 to 3 days.  Please call in interim if any questions or new problems for infectious diseases.    Zev George MD

## 2021-03-08 NOTE — PROGRESS NOTES
"[facility] Fall Assessment Note    Gil Monroy is a 50 y.o.male  [Ht: 185.4 cm (73\"); Wt: 124 kg (272 lb 8 oz)] admitted 3/5/2021  7:17 PM.    Current medications associated with an increased risk for fall include:  Gabapentin  Levemir  Humalog  Diphenhydramine  Hydromorphone  Oxycodone    L Danilo Stafford [facility]  03/08/2115:40 CST         "

## 2021-03-08 NOTE — PROGRESS NOTES
Edmund Shaffer M.D.  ANDREW Triana         Internal Medicine Progress Note    3/8/2021   09:56 CST    Name:  Gil Monroy  MRN:    [id number]     Acct:     [id number]   Room:  [facility] Day: 0     Admit Date: 3/5/2021  7:17 PM  PCP: Angelia Macdonald    Subjective:     C/C: need for continued IV antibiotic therapy and rehabilitation efforts      Interval History: Status:  stayed the same. Up to chair. No family at bedside. Afebrile. C/o increased left hip pain. Progressing with therapy. Reports no BM since 3/1, but also reports that he has not been out of bed and has not had much of an appetite.     Review of Systems   Constitutional: Positive for malaise/fatigue. Negative for chills, decreased appetite and fever.   HENT: Negative for congestion, hoarse voice, nosebleeds and sore throat.    Eyes: Negative for blurred vision, discharge, pain and visual disturbance.   Cardiovascular: Negative for chest pain, dyspnea on exertion, irregular heartbeat, leg swelling, orthopnea and palpitations.   Respiratory: Negative for cough, shortness of breath, sputum production and wheezing.    Hematologic/Lymphatic: Negative for bleeding problem. Does not bruise/bleed easily.   Skin: Negative for dry skin, flushing, itching, poor wound healing, rash and suspicious lesions.   Musculoskeletal: Positive for muscle weakness. Negative for arthritis, back pain, falls, joint pain, joint swelling and myalgias.        Left hip pain   Gastrointestinal: Positive for constipation. Negative for bloating, abdominal pain, change in bowel habit, diarrhea, flatus, heartburn, melena and nausea.   Genitourinary: Negative for frequency, hesitancy, incomplete emptying, pelvic pain and urgency.   Neurological: Negative for difficulty with concentration, disturbances in coordination, dizziness, headaches, numbness, paresthesias, tremors and weakness.   Psychiatric/Behavioral: Negative for altered mental status,  hallucinations and memory loss. The patient is not nervous/anxious.          Medications:     Allergies: No Known Allergies    Current Meds:   Current Facility-Administered Medications:   •  acetaminophen (TYLENOL) tablet 650 mg, 650 mg, Oral, Q6H PRN, Miguel Shaffer MD  •  ceFAZolin (ANCEF) 1 g/100 mL 0.9% NS IVPB (mbp), 1 g, Intravenous, Q8H, Miguel Shaffer MD  •  dextrose (D50W) 25 g/ 50mL Intravenous Solution 25 g, 25 g, Intravenous, Q15 Min PRN, Miguel Shaffer MD  •  dextrose (GLUTOSE) oral gel 15 g, 15 g, Oral, Q15 Min PRN, Miguel Shaffer MD  •  diphenhydrAMINE (BENADRYL) capsule 25 mg, 25 mg, Oral, Q6H PRN, Miguel Shaffer MD  •  gabapentin (NEURONTIN) capsule 100 mg, 100 mg, Oral, Q8H, Miguel Shaffer MD  •  glucagon (human recombinant) (GLUCAGEN DIAGNOSTIC) injection 1 mg, 1 mg, Subcutaneous, Q15 Min PRN, Miguel Shaffer MD  •  HYDROmorphone (DILAUDID) injection 1 mg, 1 mg, Intravenous, Q3H PRN, Miguel Shaffer MD  •  HYDROmorphone (DILAUDID) injection 2 mg, 2 mg, Intravenous, Q3H PRN, Miguel Shaffer MD  •  insulin detemir (LEVEMIR) injection 10 Units, 10 Units, Subcutaneous, Nightly, Miguel Shaffer MD  •  insulin lispro (humaLOG) injection 0-9 Units, 0-9 Units, Subcutaneous, TID AC, Miguel Shaffer MD  •  insulin lispro (humaLOG) injection 2-7 Units, 2-7 Units, Subcutaneous, Nightly, Miguel Shaffer MD  •  lidocaine (LIDODERM) 5 % 1 patch, 1 patch, Transdermal, Q24H, Miguel Shaffer MD  •  oxyCODONE (ROXICODONE) immediate release tablet 10 mg, 10 mg, Oral, Q4H PRN, Miguel Shaffer MD  •  oxyCODONE (ROXICODONE) immediate release tablet 20 mg, 20 mg, Oral, Q4H PRN, Miguel Shaffer MD  •  pantoprazole (PROTONIX) EC tablet 40 mg, 40 mg, Oral, Q AM, Miguel Shaffer MD  •  predniSONE (DELTASONE) tablet 10 mg, 10 mg, Oral, BID With Meals, Miguel Shaffer MD  •  saccharomyces raynei  "(FLORASTOR) capsule 250 mg, 250 mg, Oral, BID, Miguel Shaffer MD  •  sennosides-docusate (PERICOLACE) 8.6-50 MG per tablet 1 tablet, 1 tablet, Oral, BID, Miguel Shaffer MD  •  sodium chloride 0.9 % flush 10 mL, 10 mL, Intravenous, Q12H, Miguel Shaffer MD  •  sodium chloride 0.9 % flush 10 mL, 10 mL, Intravenous, PRN, Miguel Shaffer MD  •  sodium chloride 0.9 % infusion, 125 mL/hr, Intravenous, Continuous, Miguel Shaffer MD  •  tacrolimus (PROGRAF) capsule 3 mg, 3 mg, Oral, Q12H, Miguel Shaffer MD    Data:     Code Status:    There are no questions and answers to display.       No family history on file.    Social History     Socioeconomic History   • Marital status: Unknown     Spouse name: Not on file   • Number of children: Not on file   • Years of education: Not on file   • Highest education level: Not on file       Vitals:  Ht 185.4 cm (73\")   Wt 124 kg (272 lb 8 oz)   BMI 35.95 kg/m²     T 97.9, HR 74, RR 16, /80, SPO2 98% (room air)    I/O (24Hr):  No intake or output data in the 24 hours ending 03/08/21 0956    Labs and imaging:      Recent Results (from the past 12 hour(s))   Magnesium    Collection Time: 03/08/21  5:04 AM    Specimen: Blood   Result Value Ref Range    Magnesium 1.8 1.6 - 2.6 mg/dL   POC Glucose Once    Collection Time: 03/08/21  7:31 AM    Specimen: Blood   Result Value Ref Range    Glucose 169 (H) 70 - 130 mg/dL         Physical Examination:        Physical Exam  Vitals and nursing note reviewed.   Constitutional:       Appearance: Normal appearance. He is well-developed.   HENT:      Head: Normocephalic and atraumatic.      Right Ear: External ear normal.      Left Ear: External ear normal.      Nose: Nose normal.      Mouth/Throat:      Mouth: Mucous membranes are moist.      Pharynx: Oropharynx is clear.   Eyes:      General: Lids are normal.      Conjunctiva/sclera: Conjunctivae normal.      Pupils: Pupils are equal, round, and " reactive to light.   Neck:      Trachea: Trachea normal.   Cardiovascular:      Rate and Rhythm: Normal rate and regular rhythm.      Heart sounds: Normal heart sounds.   Pulmonary:      Effort: Pulmonary effort is normal.      Breath sounds: Normal breath sounds.   Abdominal:      General: Bowel sounds are normal.      Palpations: Abdomen is soft.   Musculoskeletal:         General: Normal range of motion.      Cervical back: Normal range of motion and neck supple.      Comments: Generalized weakness  Decreased ROM left hip   Skin:     General: Skin is warm and dry.      Findings: No erythema, petechiae or rash.      Nails: There is no clubbing.      Comments: Incision left hip c/d/i   Neurological:      Mental Status: He is alert and oriented to person, place, and time. Mental status is at baseline.      Cranial Nerves: No cranial nerve deficit.      Sensory: No sensory deficit.   Psychiatric:         Speech: Speech normal.         Behavior: Behavior normal. Behavior is cooperative.         Thought Content: Thought content normal.         Judgment: Judgment normal.           Assessment:            Plan:           1. Group B strep bacteremia  2. Septic arthritis left hip  3. DM2 with hyperglycemia on long term insulin  4. Chronic immunosuppression  5. Constipation  6. Multifactorial anemia  7. History of cirrhosis/hepatitis C virus  8. History of liver transplant   9. Thrombocytopenia    Continue current treatment. Monitor counts. Increase activity. Labs Thursday. Continue IV antibiotics under direction of ID. Maintain patient safety. Wound care per wound care team. Glycemic control. Aggressive therapies as tolerated.       Electronically signed by ANDREW Hernandez on 3/8/2021 at 09:56 CST   I have discussed the care of Gil Monroy, including pertinent history and exam findings, with the nurse practitioner.    I have seen and examined the patient and the key elements of all parts of the encounter have  been performed by me.  I agree with the assessment, plan and orders as documented by ANDREW Triana, after I modified the exam findings and the plan of treatments and the final version is my approved version of the assessment.        Electronically signed by Miguel Shaffer MD on 3/8/2021 at 20:42 CST

## 2021-03-09 LAB
GLUCOSE BLDC GLUCOMTR-MCNC: 162 MG/DL (ref 70–130)
GLUCOSE BLDC GLUCOMTR-MCNC: 204 MG/DL (ref 70–130)
GLUCOSE BLDC GLUCOMTR-MCNC: 226 MG/DL (ref 70–130)
GLUCOSE BLDC GLUCOMTR-MCNC: 243 MG/DL (ref 70–130)

## 2021-03-09 PROCEDURE — 82962 GLUCOSE BLOOD TEST: CPT

## 2021-03-09 PROCEDURE — 63710000001 TACROLIMUS PER 1 MG: Performed by: INTERNAL MEDICINE

## 2021-03-09 PROCEDURE — 63710000001 INSULIN DETEMIR PER 5 UNITS: Performed by: NURSE PRACTITIONER

## 2021-03-09 PROCEDURE — 97116 GAIT TRAINING THERAPY: CPT

## 2021-03-09 PROCEDURE — 63710000001 INSULIN LISPRO (HUMAN) PER 5 UNITS: Performed by: INTERNAL MEDICINE

## 2021-03-09 PROCEDURE — 25010000002 HYDROMORPHONE 1 MG/ML SOLUTION: Performed by: INTERNAL MEDICINE

## 2021-03-09 PROCEDURE — 63710000001 PREDNISONE PER 5 MG: Performed by: INTERNAL MEDICINE

## 2021-03-09 PROCEDURE — 97110 THERAPEUTIC EXERCISES: CPT

## 2021-03-09 NOTE — PROGRESS NOTES
[place], [place], [address]    DEPARTMENT OF HOSPITALIST MEDICINE      PLAN  OF  CARE  NOTE:      Please note patient had a diagnosis of \"Sepsis\" upon admission leading to septic arthritis of the hip on review of the H&P done by Dr. Pauline Singh on 2/27/2021 and noted by infectious disease specialist during patient's stay. Patient's septic arthritis was treated and patient was discharged to [facility] in stable condition. He would require 4 weeks of IV antibiotics as per ID discharge recommendations.         Sherif Jensen MD  3/9/2021, 3:37 PM

## 2021-03-09 NOTE — PROGRESS NOTES
Edmund Shaffer M.D.  ANDREW Triana         Internal Medicine Progress Note    3/9/2021   14:28 CST    Name:  Gil Monroy  MRN:    [id number]     Acct:     [id number]   Room:  [address] [place] [address] Day: 0     Admit Date: 3/5/2021  7:17 PM  PCP: Angelia Macdonald    Subjective:     C/C: need for continued IV antibiotic therapy and rehabilitation efforts      Interval History: Status:  stayed the same. Resting in bed.  No family at bedside. Woke from sleep. Nursing reports that he was recently medicated for c/o pain. Afebrile. C/o increased left hip pain. Progressing with therapy.     Review of Systems   Constitutional: Positive for malaise/fatigue. Negative for chills, decreased appetite and fever.   HENT: Negative for congestion, hoarse voice, nosebleeds and sore throat.    Eyes: Negative for blurred vision, discharge, pain and visual disturbance.   Cardiovascular: Negative for chest pain, dyspnea on exertion, irregular heartbeat, leg swelling, orthopnea and palpitations.   Respiratory: Negative for cough, shortness of breath, sputum production and wheezing.    Hematologic/Lymphatic: Negative for bleeding problem. Does not bruise/bleed easily.   Skin: Negative for dry skin, flushing, itching, poor wound healing, rash and suspicious lesions.   Musculoskeletal: Positive for muscle weakness. Negative for arthritis, back pain, falls, joint pain, joint swelling and myalgias.        Left hip pain   Gastrointestinal: Positive for constipation. Negative for bloating, abdominal pain, change in bowel habit, diarrhea, flatus, heartburn, melena and nausea.   Genitourinary: Negative for frequency, hesitancy, incomplete emptying, pelvic pain and urgency.   Neurological: Negative for difficulty with concentration, disturbances in coordination, dizziness, headaches, numbness, paresthesias, tremors and weakness.   Psychiatric/Behavioral: Negative for altered mental status, hallucinations and memory loss. The  patient is not nervous/anxious.          Medications:     Allergies: No Known Allergies    Current Meds:   Current Facility-Administered Medications:   •  acetaminophen (TYLENOL) tablet 650 mg, 650 mg, Oral, Q6H PRN, Miguel Shaffer MD  •  ceFAZolin (ANCEF) 1 g/100 mL 0.9% NS IVPB (mbp), 1 g, Intravenous, Q8H, Zve Burris MD  •  dextrose (D50W) 25 g/ 50mL Intravenous Solution 25 g, 25 g, Intravenous, Q15 Min PRN, Miguel Shaffer MD  •  dextrose (GLUTOSE) oral gel 15 g, 15 g, Oral, Q15 Min PRN, Miguel Shaffer MD  •  diphenhydrAMINE (BENADRYL) capsule 25 mg, 25 mg, Oral, Q6H PRN, Miguel Shaffer MD  •  gabapentin (NEURONTIN) capsule 100 mg, 100 mg, Oral, Q8H, Miguel Shaffer MD  •  glucagon (human recombinant) (GLUCAGEN DIAGNOSTIC) injection 1 mg, 1 mg, Subcutaneous, Q15 Min PRN, Miguel Shaffer MD  •  HYDROmorphone (DILAUDID) injection 1 mg, 1 mg, Intravenous, Q3H PRN, Miguel Shaffer MD  •  HYDROmorphone (DILAUDID) injection 2 mg, 2 mg, Intravenous, Q3H PRN, Miguel Shaffer MD  •  insulin detemir (LEVEMIR) injection 15 Units, 15 Units, Subcutaneous, Nightly, Lolita Crooks, APRN  •  insulin lispro (humaLOG) injection 0-9 Units, 0-9 Units, Subcutaneous, TID AC, Miguel Shaffer MD  •  insulin lispro (humaLOG) injection 2-7 Units, 2-7 Units, Subcutaneous, Nightly, Miguel Shaffer MD  •  lidocaine (LIDODERM) 5 % 1 patch, 1 patch, Transdermal, Q24H, Miguel Shaffer MD  •  oxyCODONE (ROXICODONE) immediate release tablet 10 mg, 10 mg, Oral, Q4H PRN, Miguel Shaffer MD  •  oxyCODONE (ROXICODONE) immediate release tablet 20 mg, 20 mg, Oral, Q4H PRN, Miguel Shaffer MD  •  pantoprazole (PROTONIX) EC tablet 40 mg, 40 mg, Oral, Q AM, Miguel Shaffer MD  •  predniSONE (DELTASONE) tablet 10 mg, 10 mg, Oral, BID With Meals, Miguel Shaffer MD  •  saccharomyces boulardii (FLORASTOR) capsule 250 mg, 250 mg, Oral, BID,  "Miguel Shaffer MD  •  sennosides-docusate (PERICOLACE) 8.6-50 MG per tablet 1 tablet, 1 tablet, Oral, BID, Miguel Shaffer MD  •  sodium chloride 0.9 % flush 10 mL, 10 mL, Intravenous, Q12H, Miguel Shaffer MD  •  sodium chloride 0.9 % flush 10 mL, 10 mL, Intravenous, PRN, Miguel Shaffer MD  •  tacrolimus (PROGRAF) capsule 3 mg, 3 mg, Oral, Q12H, Miguel Shaffer MD  •  vitamin A & D ointment, , Topical, Q12H, Heather Amos APRN  •  vitamin A & D ointment, , Topical, PRN, Heather Amos APRN    Data:     Code Status:    There are no questions and answers to display.       No family history on file.    Social History     Socioeconomic History   • Marital status: Unknown     Spouse name: Not on file   • Number of children: Not on file   • Years of education: Not on file   • Highest education level: Not on file       Vitals:  Ht 185.4 cm (73\")   Wt 124 kg (272 lb 8 oz)   BMI 35.95 kg/m²     T 98.5, HR 82, RR 16, /83, SPO2 98% (room air)    I/O (24Hr):  No intake or output data in the 24 hours ending 03/09/21 1428    Labs and imaging:      Recent Results (from the past 12 hour(s))   POC Glucose Once    Collection Time: 03/09/21  7:54 AM    Specimen: Blood   Result Value Ref Range    Glucose 162 (H) 70 - 130 mg/dL   POC Glucose Once    Collection Time: 03/09/21 11:17 AM    Specimen: Blood   Result Value Ref Range    Glucose 204 (H) 70 - 130 mg/dL         Physical Examination:        Physical Exam  Vitals and nursing note reviewed.   Constitutional:       Appearance: Normal appearance. He is well-developed.   HENT:      Head: Normocephalic and atraumatic.      Right Ear: External ear normal.      Left Ear: External ear normal.      Nose: Nose normal.      Mouth/Throat:      Mouth: Mucous membranes are moist.      Pharynx: Oropharynx is clear.   Eyes:      General: Lids are normal.      Conjunctiva/sclera: Conjunctivae normal.      Pupils: Pupils are " equal, round, and reactive to light.   Neck:      Trachea: Trachea normal.   Cardiovascular:      Rate and Rhythm: Normal rate and regular rhythm.      Heart sounds: Normal heart sounds.   Pulmonary:      Effort: Pulmonary effort is normal.      Breath sounds: Normal breath sounds.   Abdominal:      General: Bowel sounds are normal.      Palpations: Abdomen is soft.   Musculoskeletal:         General: Normal range of motion.      Cervical back: Normal range of motion and neck supple.      Comments: Generalized weakness  Decreased ROM left hip   Skin:     General: Skin is warm and dry.      Findings: No erythema, petechiae or rash.      Nails: There is no clubbing.      Comments: Incision left hip c/d/i   Neurological:      Mental Status: He is alert and oriented to person, place, and time. Mental status is at baseline.      Cranial Nerves: No cranial nerve deficit.      Sensory: No sensory deficit.   Psychiatric:         Speech: Speech normal.         Behavior: Behavior normal. Behavior is cooperative.         Thought Content: Thought content normal.         Judgment: Judgment normal.           Assessment:            Plan:           1. Group B strep bacteremia  2. Septic arthritis left hip  3. DM2 with hyperglycemia on long term insulin  4. Chronic immunosuppression  5. Constipation  6. Multifactorial anemia  7. History of cirrhosis/hepatitis C virus  8. History of liver transplant   9. Thrombocytopenia    Continue current treatment. Monitor counts. Increase activity. Labs Thursday. Continue IV antibiotics under direction of ID. Maintain patient safety. Wound care per wound care team. Glycemic control. Aggressive therapies as tolerated. Continue pain control efforts - avoiding oversedation.       Electronically signed by ANDREW Hernandez on 3/9/2021 at 14:28 CST

## 2021-03-10 LAB
GLUCOSE BLDC GLUCOMTR-MCNC: 157 MG/DL (ref 70–130)
GLUCOSE BLDC GLUCOMTR-MCNC: 262 MG/DL (ref 70–130)
GLUCOSE BLDC GLUCOMTR-MCNC: 264 MG/DL (ref 70–130)
GLUCOSE BLDC GLUCOMTR-MCNC: 280 MG/DL (ref 70–130)
TACROLIMUS BLD LC/MS/MS-MCNC: 34.8 NG/ML (ref 2–20)

## 2021-03-10 PROCEDURE — 25010000002 HYDROMORPHONE 1 MG/ML SOLUTION: Performed by: INTERNAL MEDICINE

## 2021-03-10 PROCEDURE — 63710000001 INSULIN DETEMIR PER 5 UNITS: Performed by: INTERNAL MEDICINE

## 2021-03-10 PROCEDURE — 63710000001 TACROLIMUS PER 1 MG: Performed by: INTERNAL MEDICINE

## 2021-03-10 PROCEDURE — 63710000001 PREDNISONE PER 5 MG: Performed by: INTERNAL MEDICINE

## 2021-03-10 PROCEDURE — 63710000001 INSULIN LISPRO (HUMAN) PER 5 UNITS: Performed by: INTERNAL MEDICINE

## 2021-03-10 PROCEDURE — 82962 GLUCOSE BLOOD TEST: CPT

## 2021-03-10 PROCEDURE — 97110 THERAPEUTIC EXERCISES: CPT

## 2021-03-10 NOTE — PROGRESS NOTES
"Infectious Diseases Progress Note    Patient:  Gil Monroy  YOB: 1970  MRN: [id number]   Admit date: 3/5/2021   Admitting Physician: Miguel Shaffer MD  Primary Care Physician: Angelia Macdonald    Chief Complaint/Interval History: No new symptoms.  Indicates he walks some with physical therapy today in the room.  Still spending most of the time in bed not moving a whole lot.  Seems to have a little bit more flexion of the left hip.  No pain at right arm PICC site.  No diarrhea, rash, or skin itching.    No intake or output data in the 24 hours ending 03/10/21 5709  Allergies: No Known Allergies  Current Scheduled Medications:   ceFAZolin, 1 g, Intravenous, Q8H  gabapentin, 100 mg, Oral, Q8H  insulin detemir, 20 Units, Subcutaneous, Nightly  insulin lispro, 0-9 Units, Subcutaneous, TID AC  insulin lispro, 2-7 Units, Subcutaneous, Nightly  lidocaine, 1 patch, Transdermal, Q24H  pantoprazole, 40 mg, Oral, Q AM  predniSONE, 10 mg, Oral, BID With Meals  saccharomyces boulardii, 250 mg, Oral, BID  senna-docusate sodium, 1 tablet, Oral, BID  sodium chloride, 10 mL, Intravenous, Q12H  tacrolimus, 3 mg, Oral, Q12H  vitamin A & D, , Topical, Q12H       Current PRN Medications:  •  acetaminophen  •  dextrose  •  dextrose  •  diphenhydrAMINE  •  glucagon (human recombinant)  •  HYDROmorphone  •  HYDROmorphone  •  oxyCODONE  •  oxyCODONE  •  sodium chloride  •  vitamin A & D    Review of Systems see HPI    Vital Signs:  Ht 185.4 cm (73\")   Wt 124 kg (272 lb 8 oz)   BMI 35.95 kg/m²     Physical Exam  Vital signs - reviewed.  Line/IV site - No erythema, warmth, induration, or tenderness.    Lab Results:  CBC:   Results from last 7 days   Lab Units 03/06/21  0430 03/05/21  0415 03/04/21  0253   WBC 10*3/mm3 10.25 10.1 10.4   HEMOGLOBIN g/dL 11.9* 11.6* 12.6*   HEMATOCRIT % 33.7* 34.2* 37.5*   PLATELETS 10*3/mm3 134* 124* 115*     BMP:  Results from last 7 days   Lab Units 03/07/21  0454 03/06/21  0430 "   SODIUM mmol/L 131* 133*   POTASSIUM mmol/L 3.9 3.1*   CHLORIDE mmol/L 95* 97*   CO2 mmol/L 26.0 27.0   BUN mg/dL 21* 16   CREATININE mg/dL 0.88 0.70*   GLUCOSE mg/dL 257* 185*   CALCIUM mg/dL 8.1* 8.0*   ALT (SGPT) U/L  --  10     Radiology: None  Additional Studies Reviewed: None    Impression:   1.  Group B strep bacteremia  2.  Left hip septic arthritis with group B strep  3.  History of liver transplantation  4.  Diabetes    Recommendations:   Continue cefazolin  Continue physical therapy  No new recommendations at present    Zev George MD

## 2021-03-10 NOTE — PROGRESS NOTES
Physician Progress Note      Jessee Rodriguez  CSN #:                  [id number]  :                       1970  ADMIT DATE:       2021 1:02 PM  Kinza Diallo DATE:        3/5/2021 7:41 PM  RESPONDING  PROVIDER #:        Jeni JARQUIN MD          QUERY TEXT:    Pt admitted with Septic arthritis. Noted documentation of Sepsis present on   admission by ID consultant on 3/4. If possible, please document in progress   notes and discharge summary:    The medical record reflects the following:  Risk Factors: Septic arthritis, Strep B Bacteremia, DM,  Clinical Indicators: WBC 8.4. Heart rate 114. B/P 84/51, Lactic acid 1.8. Treatment:  Ancef, Vancomycin    Thank you    Kaleigh Whyte RN, BSN, [facility]  [phone]  Options provided:  -- Sepsis confirmed present on admission  -- Sepsis ruled out  -- Defer to ID consultant documentation regarding Sepsis  -- Other - I will add my own diagnosis  -- Disagree - Not applicable / Not valid  -- Disagree - Clinically unable to determine / Unknown  -- Refer to Clinical Documentation Reviewer    PROVIDER RESPONSE TEXT:    The diagnosis of Sepsis was confirmed as present on admission.     Query created by: King Medina on 3/9/2021 12:11 PM      Electronically signed by:  Yury Gerber MD 3/10/2021 2:49 AM

## 2021-03-10 NOTE — PROGRESS NOTES
Edmund Shaffer M.D.  ANDREW Triana         Internal Medicine Progress Note    3/10/2021   09:06 CST    Name:  Gil Monroy  MRN:    [id number]     Acct:     [id number]   Room:  [address] [place] [address] Day: 0     Admit Date: 3/5/2021  7:17 PM  PCP: Angelia Macdonald    Subjective:     C/C: need for continued IV antibiotic therapy and rehabilitation efforts      Interval History: Status:  stayed the same. Resting in bed. No family at bedside. Still with c/o increased left hip pain as well as chronic back and bilateral knee pain. Blood sugars in 200s. Afebrile. Progressing with therapy.   Review of Systems   Constitutional: Positive for malaise/fatigue. Negative for chills, decreased appetite and fever.   HENT: Negative for congestion, hoarse voice, nosebleeds and sore throat.    Eyes: Negative for blurred vision, discharge, pain and visual disturbance.   Cardiovascular: Negative for chest pain, dyspnea on exertion, irregular heartbeat, leg swelling, orthopnea and palpitations.   Respiratory: Negative for cough, shortness of breath, sputum production and wheezing.    Hematologic/Lymphatic: Negative for bleeding problem. Does not bruise/bleed easily.   Skin: Negative for dry skin, flushing, itching, poor wound healing, rash and suspicious lesions.   Musculoskeletal: Positive for muscle weakness. Negative for arthritis, back pain, falls, joint pain, joint swelling and myalgias.        Left hip pain   Gastrointestinal: Positive for constipation. Negative for bloating, abdominal pain, change in bowel habit, diarrhea, flatus, heartburn, melena and nausea.   Genitourinary: Negative for frequency, hesitancy, incomplete emptying, pelvic pain and urgency.   Neurological: Negative for difficulty with concentration, disturbances in coordination, dizziness, headaches, numbness, paresthesias, tremors and weakness.   Psychiatric/Behavioral: Negative for altered mental status, hallucinations and memory loss. The  patient is not nervous/anxious.          Medications:     Allergies: No Known Allergies    Current Meds:   Current Facility-Administered Medications:   •  acetaminophen (TYLENOL) tablet 650 mg, 650 mg, Oral, Q6H PRN, Miguel Shaffer MD  •  ceFAZolin (ANCEF) 1 g/100 mL 0.9% NS IVPB (mbp), 1 g, Intravenous, Q8H, Zev Burris MD  •  dextrose (D50W) 25 g/ 50mL Intravenous Solution 25 g, 25 g, Intravenous, Q15 Min PRN, Miguel Shaffer MD  •  dextrose (GLUTOSE) oral gel 15 g, 15 g, Oral, Q15 Min PRN, Miguel Shaffer MD  •  diphenhydrAMINE (BENADRYL) capsule 25 mg, 25 mg, Oral, Q6H PRN, Miguel Shaffer MD  •  gabapentin (NEURONTIN) capsule 100 mg, 100 mg, Oral, Q8H, Miguel Shaffer MD  •  glucagon (human recombinant) (GLUCAGEN DIAGNOSTIC) injection 1 mg, 1 mg, Subcutaneous, Q15 Min PRN, Miguel Shaffer MD  •  HYDROmorphone (DILAUDID) injection 1 mg, 1 mg, Intravenous, Q3H PRN, Miguel Shaffer MD  •  HYDROmorphone (DILAUDID) injection 2 mg, 2 mg, Intravenous, Q3H PRN, Miguel Shaffer MD  •  insulin detemir (LEVEMIR) injection 15 Units, 15 Units, Subcutaneous, Nightly, Lolita Crooks, APRN  •  insulin lispro (humaLOG) injection 0-9 Units, 0-9 Units, Subcutaneous, TID AC, Miguel Shaffer MD  •  insulin lispro (humaLOG) injection 2-7 Units, 2-7 Units, Subcutaneous, Nightly, Miguel Shaffer MD  •  lidocaine (LIDODERM) 5 % 1 patch, 1 patch, Transdermal, Q24H, Miguel Shaffer MD  •  oxyCODONE (ROXICODONE) immediate release tablet 10 mg, 10 mg, Oral, Q4H PRN, Miguel Shaffer MD  •  oxyCODONE (ROXICODONE) immediate release tablet 20 mg, 20 mg, Oral, Q4H PRN, Miguel Shaffer MD  •  pantoprazole (PROTONIX) EC tablet 40 mg, 40 mg, Oral, Q AM, Miguel Shaffer MD  •  predniSONE (DELTASONE) tablet 10 mg, 10 mg, Oral, BID With Meals, Miguel Shaffer MD  •  saccharomyces boulardii (FLORASTOR) capsule 250 mg, 250 mg, Oral, BID,  "Miguel Shaffer MD  •  sennosides-docusate (PERICOLACE) 8.6-50 MG per tablet 1 tablet, 1 tablet, Oral, BID, Miguel Shaffer MD  •  sodium chloride 0.9 % flush 10 mL, 10 mL, Intravenous, Q12H, Miguel Shaffer MD  •  sodium chloride 0.9 % flush 10 mL, 10 mL, Intravenous, PRN, Miguel Shaffer MD  •  tacrolimus (PROGRAF) capsule 3 mg, 3 mg, Oral, Q12H, Miguel Shaffer MD  •  vitamin A & D ointment, , Topical, Q12H, Heather Amos APRN  •  vitamin A & D ointment, , Topical, PRN, Heather Amos APRN    Data:     Code Status:    There are no questions and answers to display.       No family history on file.    Social History     Socioeconomic History   • Marital status: Unknown     Spouse name: Not on file   • Number of children: Not on file   • Years of education: Not on file   • Highest education level: Not on file       Vitals:  Ht 185.4 cm (73\")   Wt 124 kg (272 lb 8 oz)   BMI 35.95 kg/m²     T 98.3, HR 70, RR 16, /90, SPO2 97% (room air)    I/O (24Hr):  No intake or output data in the 24 hours ending 03/10/21 0906    Labs and imaging:      Recent Results (from the past 12 hour(s))   POC Glucose Once    Collection Time: 03/10/21  7:22 AM    Specimen: Blood   Result Value Ref Range    Glucose 262 (H) 70 - 130 mg/dL         Physical Examination:        Physical Exam  Vitals and nursing note reviewed.   Constitutional:       Appearance: Normal appearance. He is well-developed.   HENT:      Head: Normocephalic and atraumatic.      Right Ear: External ear normal.      Left Ear: External ear normal.      Nose: Nose normal.      Mouth/Throat:      Mouth: Mucous membranes are moist.      Pharynx: Oropharynx is clear.   Eyes:      General: Lids are normal.      Conjunctiva/sclera: Conjunctivae normal.      Pupils: Pupils are equal, round, and reactive to light.   Neck:      Trachea: Trachea normal.   Cardiovascular:      Rate and Rhythm: Normal rate and regular " rhythm.      Heart sounds: Normal heart sounds.   Pulmonary:      Effort: Pulmonary effort is normal.      Breath sounds: Normal breath sounds.   Abdominal:      General: Bowel sounds are normal.      Palpations: Abdomen is soft.   Musculoskeletal:         General: Normal range of motion.      Cervical back: Normal range of motion and neck supple.      Comments: Generalized weakness  Decreased ROM left hip   Skin:     General: Skin is warm and dry.      Findings: No erythema, petechiae or rash.      Nails: There is no clubbing.      Comments: Incision left hip c/d/i   Neurological:      Mental Status: He is alert and oriented to person, place, and time. Mental status is at baseline.      Cranial Nerves: No cranial nerve deficit.      Sensory: No sensory deficit.   Psychiatric:         Speech: Speech normal.         Behavior: Behavior normal. Behavior is cooperative.         Thought Content: Thought content normal.         Judgment: Judgment normal.           Assessment:            Plan:           1. Group B strep bacteremia  2. Septic arthritis left hip  3. DM2 with hyperglycemia on long term insulin  4. Chronic immunosuppression  5. Constipation  6. Multifactorial anemia  7. History of cirrhosis/hepatitis C virus  8. History of liver transplant   9. Thrombocytopenia    Continue current treatment. Monitor counts. Increase activity. Labs in am. Continue IV antibiotics under direction of ID. Maintain patient safety. Wound care per wound care team. Glycemic control. Aggressive therapies as tolerated. Continue pain control efforts - avoiding oversedation.       Electronically signed by ANDREW Hernandez on 3/10/2021 at 09:06 CST   I have discussed the care of Gil Monroy, including pertinent history and exam findings, with the nurse practitioner.    I have seen and examined the patient and the key elements of all parts of the encounter have been performed by me.  I agree with the assessment, plan and orders as  documented by ANDREW Triana, after I modified the exam findings and the plan of treatments and the final version is my approved version of the assessment.        Electronically signed by Miguel Shaffer MD on 3/10/2021 at 15:33 CST

## 2021-03-11 LAB
ALBUMIN SERPL-MCNC: 2.2 G/DL (ref 3.5–5.2)
ALBUMIN/GLOB SERPL: 0.6 G/DL
ALP SERPL-CCNC: 274 U/L (ref 39–117)
ALT SERPL W P-5'-P-CCNC: 9 U/L (ref 1–41)
ANION GAP SERPL CALCULATED.3IONS-SCNC: 8 MMOL/L (ref 5–15)
AST SERPL-CCNC: 17 U/L (ref 1–40)
BASOPHILS # BLD AUTO: 0.06 10*3/MM3 (ref 0–0.2)
BASOPHILS NFR BLD AUTO: 0.4 % (ref 0–1.5)
BILIRUB SERPL-MCNC: 0.6 MG/DL (ref 0–1.2)
BUN SERPL-MCNC: 15 MG/DL (ref 6–20)
BUN/CREAT SERPL: 19 (ref 7–25)
CALCIUM SPEC-SCNC: 8.4 MG/DL (ref 8.6–10.5)
CHLORIDE SERPL-SCNC: 95 MMOL/L (ref 98–107)
CO2 SERPL-SCNC: 27 MMOL/L (ref 22–29)
CREAT SERPL-MCNC: 0.79 MG/DL (ref 0.76–1.27)
CRP SERPL-MCNC: 13.02 MG/DL (ref 0–0.5)
DEPRECATED RDW RBC AUTO: 40.4 FL (ref 37–54)
EOSINOPHIL # BLD AUTO: 0.07 10*3/MM3 (ref 0–0.4)
EOSINOPHIL NFR BLD AUTO: 0.5 % (ref 0.3–6.2)
ERYTHROCYTE [DISTWIDTH] IN BLOOD BY AUTOMATED COUNT: 13.1 % (ref 12.3–15.4)
ERYTHROCYTE [SEDIMENTATION RATE] IN BLOOD: 43 MM/HR (ref 0–15)
GFR SERPL CREATININE-BSD FRML MDRD: 104 ML/MIN/1.73
GLOBULIN UR ELPH-MCNC: 3.8 GM/DL
GLUCOSE BLDC GLUCOMTR-MCNC: 202 MG/DL (ref 70–130)
GLUCOSE BLDC GLUCOMTR-MCNC: 233 MG/DL (ref 70–130)
GLUCOSE BLDC GLUCOMTR-MCNC: 267 MG/DL (ref 70–130)
GLUCOSE BLDC GLUCOMTR-MCNC: 274 MG/DL (ref 70–130)
GLUCOSE SERPL-MCNC: 263 MG/DL (ref 65–99)
HCT VFR BLD AUTO: 31.1 % (ref 37.5–51)
HGB BLD-MCNC: 10.6 G/DL (ref 13–17.7)
IMM GRANULOCYTES # BLD AUTO: 0.22 10*3/MM3 (ref 0–0.05)
IMM GRANULOCYTES NFR BLD AUTO: 1.6 % (ref 0–0.5)
LYMPHOCYTES # BLD AUTO: 1.05 10*3/MM3 (ref 0.7–3.1)
LYMPHOCYTES NFR BLD AUTO: 7.5 % (ref 19.6–45.3)
MCH RBC QN AUTO: 29.1 PG (ref 26.6–33)
MCHC RBC AUTO-ENTMCNC: 34.1 G/DL (ref 31.5–35.7)
MCV RBC AUTO: 85.4 FL (ref 79–97)
MONOCYTES # BLD AUTO: 1.15 10*3/MM3 (ref 0.1–0.9)
MONOCYTES NFR BLD AUTO: 8.2 % (ref 5–12)
NEUTROPHILS NFR BLD AUTO: 11.39 10*3/MM3 (ref 1.7–7)
NEUTROPHILS NFR BLD AUTO: 81.8 % (ref 42.7–76)
NRBC BLD AUTO-RTO: 0 /100 WBC (ref 0–0.2)
PLATELET # BLD AUTO: 149 10*3/MM3 (ref 140–450)
PMV BLD AUTO: 11.4 FL (ref 6–12)
POTASSIUM SERPL-SCNC: 4.2 MMOL/L (ref 3.5–5.2)
PROT SERPL-MCNC: 6 G/DL (ref 6–8.5)
RBC # BLD AUTO: 3.64 10*6/MM3 (ref 4.14–5.8)
SODIUM SERPL-SCNC: 130 MMOL/L (ref 136–145)
WBC # BLD AUTO: 13.94 10*3/MM3 (ref 3.4–10.8)

## 2021-03-11 PROCEDURE — 25010000002 HYDROMORPHONE 1 MG/ML SOLUTION: Performed by: NURSE PRACTITIONER

## 2021-03-11 PROCEDURE — 82962 GLUCOSE BLOOD TEST: CPT

## 2021-03-11 PROCEDURE — 80053 COMPREHEN METABOLIC PANEL: CPT | Performed by: INTERNAL MEDICINE

## 2021-03-11 PROCEDURE — 63710000001 TACROLIMUS PER 1 MG: Performed by: INTERNAL MEDICINE

## 2021-03-11 PROCEDURE — 86140 C-REACTIVE PROTEIN: CPT | Performed by: INTERNAL MEDICINE

## 2021-03-11 PROCEDURE — 25010000002 HYDROMORPHONE 1 MG/ML SOLUTION: Performed by: INTERNAL MEDICINE

## 2021-03-11 PROCEDURE — 85025 COMPLETE CBC W/AUTO DIFF WBC: CPT | Performed by: INTERNAL MEDICINE

## 2021-03-11 PROCEDURE — 63710000001 INSULIN DETEMIR PER 5 UNITS: Performed by: NURSE PRACTITIONER

## 2021-03-11 PROCEDURE — 63710000001 INSULIN LISPRO (HUMAN) PER 5 UNITS: Performed by: INTERNAL MEDICINE

## 2021-03-11 PROCEDURE — 85651 RBC SED RATE NONAUTOMATED: CPT | Performed by: INTERNAL MEDICINE

## 2021-03-11 PROCEDURE — 63710000001 PREDNISONE PER 5 MG: Performed by: INTERNAL MEDICINE

## 2021-03-11 RX ORDER — OXYCODONE HYDROCHLORIDE 5 MG/1
20 TABLET ORAL EVERY 4 HOURS PRN
Status: DISCONTINUED | OUTPATIENT
Start: 2021-03-11 | End: 2021-03-18 | Stop reason: SDUPTHER

## 2021-03-11 NOTE — PROGRESS NOTES
"[facility]  TUAN Shaffer M.D.  ANDREW Triana         Internal Medicine Progress Note    3/11/2021   10:49 CST    Name:  Gil Monroy  MRN:    [id number]     Acct:     [id number]   Room:  [address] [place] [address] Day: 0     Admit Date: 3/5/2021  7:17 PM  PCP: Angelia Macdonald    Subjective:     C/C: need for continued IV antibiotic therapy and rehabilitation efforts      Interval History: Status:  stayed the same. Resting in bed. No family at bedside. Woke from sleep. States, \"I'm fine as long as I don't move.\"  Still with c/o increased left hip pain as well as chronic back and bilateral knee pain. Decreased participation with therapy and ADLs. Has been refusing therapy. Appears on review of MAR that patient has refused pain pills in favor of IV dilaudid.   Review of Systems   Constitutional: Positive for malaise/fatigue. Negative for chills, decreased appetite and fever.   HENT: Negative for congestion, hoarse voice, nosebleeds and sore throat.    Eyes: Negative for blurred vision, discharge, pain and visual disturbance.   Cardiovascular: Negative for chest pain, dyspnea on exertion, irregular heartbeat, leg swelling, orthopnea and palpitations.   Respiratory: Negative for cough, shortness of breath, sputum production and wheezing.    Hematologic/Lymphatic: Negative for bleeding problem. Does not bruise/bleed easily.   Skin: Negative for dry skin, flushing, itching, poor wound healing, rash and suspicious lesions.   Musculoskeletal: Positive for muscle weakness. Negative for arthritis, back pain, falls, joint pain, joint swelling and myalgias.        Left hip pain   Gastrointestinal: Positive for constipation. Negative for bloating, abdominal pain, change in bowel habit, diarrhea, flatus, heartburn, melena and nausea.   Genitourinary: Negative for frequency, hesitancy, incomplete emptying, pelvic pain and urgency.   Neurological: Negative for difficulty with concentration, disturbances in coordination, " dizziness, headaches, numbness, paresthesias, tremors and weakness.   Psychiatric/Behavioral: Negative for altered mental status, hallucinations and memory loss. The patient is not nervous/anxious.          Medications:     Allergies: No Known Allergies    Current Meds:   Current Facility-Administered Medications:   •  acetaminophen (TYLENOL) tablet 650 mg, 650 mg, Oral, Q6H PRN, Miguel Shaffer MD  •  ceFAZolin (ANCEF) 1 g/100 mL 0.9% NS IVPB (mbp), 1 g, Intravenous, Q8H, Zev Burris MD  •  dextrose (D50W) 25 g/ 50mL Intravenous Solution 25 g, 25 g, Intravenous, Q15 Min PRN, Miguel Shaffer MD  •  dextrose (GLUTOSE) oral gel 15 g, 15 g, Oral, Q15 Min PRN, Miguel Shaffer MD  •  diphenhydrAMINE (BENADRYL) capsule 25 mg, 25 mg, Oral, Q6H PRN, Miguel Shaffer MD  •  gabapentin (NEURONTIN) capsule 100 mg, 100 mg, Oral, Q8H, Miguel Shaffer MD  •  glucagon (human recombinant) (GLUCAGEN DIAGNOSTIC) injection 1 mg, 1 mg, Subcutaneous, Q15 Min PRN, Miguel Shaffer MD  •  HYDROmorphone (DILAUDID) injection 1 mg, 1 mg, Intravenous, Q3H PRN, Miguel Shaffer MD  •  HYDROmorphone (DILAUDID) injection 2 mg, 2 mg, Intravenous, Q3H PRN, Miguel Shaffer MD  •  insulin detemir (LEVEMIR) injection 20 Units, 20 Units, Subcutaneous, Nightly, Miguel Shaffer MD  •  insulin lispro (humaLOG) injection 0-9 Units, 0-9 Units, Subcutaneous, TID AC, Miguel Shaffer MD  •  insulin lispro (humaLOG) injection 2-7 Units, 2-7 Units, Subcutaneous, Nightly, Miguel Shaffer MD  •  lidocaine (LIDODERM) 5 % 1 patch, 1 patch, Transdermal, Q24H, Miguel Shaffer MD  •  oxyCODONE (ROXICODONE) immediate release tablet 10 mg, 10 mg, Oral, Q4H PRN, Miguel Shaffer MD  •  oxyCODONE (ROXICODONE) immediate release tablet 20 mg, 20 mg, Oral, Q4H PRN, Miguel Shaffer MD  •  pantoprazole (PROTONIX) EC tablet 40 mg, 40 mg, Oral, Q AM, Miguel Shaffer MD  •   "predniSONE (DELTASONE) tablet 10 mg, 10 mg, Oral, BID With Meals, Miguel Shaffer MD  •  saccharomyces boulardii (FLORASTOR) capsule 250 mg, 250 mg, Oral, BID, Miguel Shaffer MD  •  sennosides-docusate (PERICOLACE) 8.6-50 MG per tablet 1 tablet, 1 tablet, Oral, BID, Miguel Shaffer MD  •  sodium chloride 0.9 % flush 10 mL, 10 mL, Intravenous, Q12H, Miguel Shaffer MD  •  sodium chloride 0.9 % flush 10 mL, 10 mL, Intravenous, PRN, Miguel Shaffer MD  •  tacrolimus (PROGRAF) capsule 3 mg, 3 mg, Oral, Q12H, Miguel Shaffer MD  •  vitamin A & D ointment, , Topical, Q12H, Heather Amos APRN  •  vitamin A & D ointment, , Topical, PRN, Heather Amos APRN    Data:     Code Status:    There are no questions and answers to display.       No family history on file.    Social History     Socioeconomic History   • Marital status: Unknown     Spouse name: Not on file   • Number of children: Not on file   • Years of education: Not on file   • Highest education level: Not on file       Vitals:  Ht 185.4 cm (73\")   Wt 124 kg (272 lb 8 oz)   BMI 35.95 kg/m²     T 98.2, HR 81, RR 20, /81, SPO2 98% (room air)    I/O (24Hr):  No intake or output data in the 24 hours ending 03/11/21 1049    Labs and imaging:      Recent Results (from the past 12 hour(s))   Comprehensive Metabolic Panel    Collection Time: 03/11/21  4:43 AM    Specimen: Blood   Result Value Ref Range    Glucose 263 (H) 65 - 99 mg/dL    BUN 15 6 - 20 mg/dL    Creatinine 0.79 0.76 - 1.27 mg/dL    Sodium 130 (L) 136 - 145 mmol/L    Potassium 4.2 3.5 - 5.2 mmol/L    Chloride 95 (L) 98 - 107 mmol/L    CO2 27.0 22.0 - 29.0 mmol/L    Calcium 8.4 (L) 8.6 - 10.5 mg/dL    Total Protein 6.0 6.0 - 8.5 g/dL    Albumin 2.20 (L) 3.50 - 5.20 g/dL    ALT (SGPT) 9 1 - 41 U/L    AST (SGOT) 17 1 - 40 U/L    Alkaline Phosphatase 274 (H) 39 - 117 U/L    Total Bilirubin 0.6 0.0 - 1.2 mg/dL    eGFR Non  Amer 104 " >60 mL/min/1.73    Globulin 3.8 gm/dL    A/G Ratio 0.6 g/dL    BUN/Creatinine Ratio 19.0 7.0 - 25.0    Anion Gap 8.0 5.0 - 15.0 mmol/L   Sedimentation Rate    Collection Time: 03/11/21  4:43 AM    Specimen: Blood   Result Value Ref Range    Sed Rate 43 (H) 0 - 15 mm/hr   C-reactive Protein    Collection Time: 03/11/21  4:43 AM    Specimen: Blood   Result Value Ref Range    C-Reactive Protein 13.02 (H) 0.00 - 0.50 mg/dL   CBC Auto Differential    Collection Time: 03/11/21  4:43 AM    Specimen: Blood   Result Value Ref Range    WBC 13.94 (H) 3.40 - 10.80 10*3/mm3    RBC 3.64 (L) 4.14 - 5.80 10*6/mm3    Hemoglobin 10.6 (L) 13.0 - 17.7 g/dL    Hematocrit 31.1 (L) 37.5 - 51.0 %    MCV 85.4 79.0 - 97.0 fL    MCH 29.1 26.6 - 33.0 pg    MCHC 34.1 31.5 - 35.7 g/dL    RDW 13.1 12.3 - 15.4 %    RDW-SD 40.4 37.0 - 54.0 fl    MPV 11.4 6.0 - 12.0 fL    Platelets 149 140 - 450 10*3/mm3    Neutrophil % 81.8 (H) 42.7 - 76.0 %    Lymphocyte % 7.5 (L) 19.6 - 45.3 %    Monocyte % 8.2 5.0 - 12.0 %    Eosinophil % 0.5 0.3 - 6.2 %    Basophil % 0.4 0.0 - 1.5 %    Immature Grans % 1.6 (H) 0.0 - 0.5 %    Neutrophils, Absolute 11.39 (H) 1.70 - 7.00 10*3/mm3    Lymphocytes, Absolute 1.05 0.70 - 3.10 10*3/mm3    Monocytes, Absolute 1.15 (H) 0.10 - 0.90 10*3/mm3    Eosinophils, Absolute 0.07 0.00 - 0.40 10*3/mm3    Basophils, Absolute 0.06 0.00 - 0.20 10*3/mm3    Immature Grans, Absolute 0.22 (H) 0.00 - 0.05 10*3/mm3    nRBC 0.0 0.0 - 0.2 /100 WBC   POC Glucose Once    Collection Time: 03/11/21  7:44 AM    Specimen: Blood   Result Value Ref Range    Glucose 202 (H) 70 - 130 mg/dL         Physical Examination:        Physical Exam  Vitals and nursing note reviewed.   Constitutional:       Appearance: Normal appearance. He is well-developed.   HENT:      Head: Normocephalic and atraumatic.      Right Ear: External ear normal.      Left Ear: External ear normal.      Nose: Nose normal.      Mouth/Throat:      Mouth: Mucous membranes are moist.       Pharynx: Oropharynx is clear.   Eyes:      General: Lids are normal.      Conjunctiva/sclera: Conjunctivae normal.      Pupils: Pupils are equal, round, and reactive to light.   Neck:      Trachea: Trachea normal.   Cardiovascular:      Rate and Rhythm: Normal rate and regular rhythm.      Heart sounds: Normal heart sounds.   Pulmonary:      Effort: Pulmonary effort is normal.      Breath sounds: Normal breath sounds.   Abdominal:      General: Bowel sounds are normal.      Palpations: Abdomen is soft.   Musculoskeletal:         General: Normal range of motion.      Cervical back: Normal range of motion and neck supple.      Comments: Generalized weakness  Decreased ROM left hip   Skin:     General: Skin is warm and dry.      Findings: No erythema, petechiae or rash.      Nails: There is no clubbing.      Comments: Incision left hip c/d/i   Neurological:      Mental Status: He is alert and oriented to person, place, and time. Mental status is at baseline.      Cranial Nerves: No cranial nerve deficit.      Sensory: No sensory deficit.   Psychiatric:         Speech: Speech normal.         Behavior: Behavior normal. Behavior is cooperative.         Thought Content: Thought content normal.         Judgment: Judgment normal.           Assessment:            Plan:           1. Group B strep bacteremia  2. Septic arthritis left hip  3. DM2 with hyperglycemia on long term insulin  4. Chronic immunosuppression  5. Constipation  6. Multifactorial anemia  7. History of cirrhosis/hepatitis C virus  8. History of liver transplant   9. Thrombocytopenia    Continue current treatment. Monitor counts. Increase activity. Labs Monday. Continue IV antibiotics under direction of ID. Maintain patient safety. Wound care per wound care team. Glycemic control. Aggressive therapies as tolerated. Continue pain control efforts - avoiding oversedation. Will decrease IV pain medication as patient is 1 week into his stay at our facility and is  refusing and/or having trouble staying awake to participate with therapy.       Electronically signed by ANDREW Hernandez on 3/11/2021 at 10:49 CST

## 2021-03-12 LAB
GLUCOSE BLDC GLUCOMTR-MCNC: 117 MG/DL (ref 70–130)
GLUCOSE BLDC GLUCOMTR-MCNC: 232 MG/DL (ref 70–130)
GLUCOSE BLDC GLUCOMTR-MCNC: 234 MG/DL (ref 70–130)
GLUCOSE BLDC GLUCOMTR-MCNC: 350 MG/DL (ref 70–130)

## 2021-03-12 PROCEDURE — 63710000001 INSULIN LISPRO (HUMAN) PER 5 UNITS: Performed by: INTERNAL MEDICINE

## 2021-03-12 PROCEDURE — 97116 GAIT TRAINING THERAPY: CPT

## 2021-03-12 PROCEDURE — 63710000001 TACROLIMUS PER 1 MG: Performed by: INTERNAL MEDICINE

## 2021-03-12 PROCEDURE — 97110 THERAPEUTIC EXERCISES: CPT

## 2021-03-12 PROCEDURE — 25010000002 HYDROMORPHONE 1 MG/ML SOLUTION: Performed by: NURSE PRACTITIONER

## 2021-03-12 PROCEDURE — 63710000001 PREDNISONE PER 5 MG: Performed by: INTERNAL MEDICINE

## 2021-03-12 PROCEDURE — 82962 GLUCOSE BLOOD TEST: CPT

## 2021-03-12 PROCEDURE — 63710000001 INSULIN DETEMIR PER 5 UNITS: Performed by: NURSE PRACTITIONER

## 2021-03-12 NOTE — PROGRESS NOTES
Adult Nutrition  Assessment/PES    Patient Name:  Gil Monroy  YOB: 1970  MRN: [id number]  Admit Date:  3/5/2021    Assessment Date:  3/12/2021    Comments:  Pt reports good appetite, however doesn't feel like eating as much today. Has declined drinking Boost Glucose Control at this time (has 8 available at bedside). D/c'd supplement order at this time. Encouraged intake and will continue to follow up weekly.     Reason for Assessment              Reason for Assessment    Reason For Assessment  follow-up protocol         Nutrition/Diet History              Nutrition/Diet History    Typical Food/Fluid Intake  Pt reports he has a good appetite; didn't eat lunch today states he didn't feel good. States at other meals he's eaten very well.     Supplemental Drinks/Foods/Additives  Has 8 supplements at bedside; Pt states he prefers them cold, offered to get a cold one/pour one over ice Pt declined at this time. RN reports he hasn't been drinking them either.           Labs/Tests/Procedures/Meds              Labs/Procedures/Meds    Lab Results Reviewed  reviewed     Lab Results Comments  Glucose, AlkPhos, CRP, WBC, HH        Medications    Pertinent Medications Reviewed  reviewed, pertinent     Pertinent Medications Comments  see MAR         Physical Findings              Physical Findings    Overall Physical Appearance  obese     Gastrointestinal  other (see comments) last BM 3/10     Skin  surgical incision;other (see comments) Butch Score 18           Nutrition Prescription Ordered              Nutrition Prescription PO    Current PO Diet  Regular     Supplement  Boost Glucose Control (Glucerna Shake) strawberry     Supplement Frequency  3 times a day     Common Modifiers  Consistent Carbohydrate         Evaluation of Received Nutrient/Fluid Intake              Nutrient/Fluid Evaluation    Number of Days Evaluated  3 days     Additional Documentation  Fluid Intake Evaluation (Group)        Fluid  Intake Evaluation    Oral Fluid (mL)  1680     IV Fluid (mL)  200        PO Evaluation    Number of Days PO Intake Evaluated  3 days     Number of Meals  9     % PO Intake  86%               Problem/Interventions:  Problem 1              Nutrition Diagnoses Problem 1    Problem 1  Increased Nutrient Needs     Macronutrient  Kcal;Protein     Etiology (related to)  Medical Diagnosis     Hepatic  Other (comment) hx of liver transplant; hx of cirrhosis     Infectious Disease  Other (comment) Group B strep bacteremia; L hip septic arthritis     Skin  Surgical wound     Signs/Symptoms (evidenced by)  Other (comment) to promote wound healing               Intervention Goal              Intervention Goal    General  Reduce/improve symptoms;Nutrition support treatment;Meet nutritional needs for age/condition;Disease management/therapy     PO  Maintain intake;Meet estimated needs     Weight  Appropriate weight loss         Nutrition Intervention              Nutrition Intervention    RD/Tech Action  Follow Tx progress;Care plan reviewd;Encourage intake;Adjusted supplement         Nutrition Prescription              Nutrition Prescription PO    PO Prescription  Discontinue supplement due to Pt not drinking them at this time; did let him know we could reorder them if he drinks what is at bedside and would like more     New PO Prescription Ordered?  Yes         Education/Evaluation              Education    Education  No discharge needs identified at this time        Monitor/Evaluation    Monitor  Per protocol           Electronically signed by:  Vilma Fernandez RDN, CIARRA  03/12/21 15:22 CST

## 2021-03-12 NOTE — PROGRESS NOTES
MAVERICK Cazares APRN         Internal Medicine Progress Note    3/12/2021   09:40 CST    Name:  Gil Monroy  MRN:    [id number]     Acct:     [id number]   Room:  [address] [place] [address] Day: 0     Admit Date: 3/5/2021  7:17 PM  PCP: Angelia Macdonald    Subjective:     C/C: need for continued IV antibiotic therapy and rehabilitation efforts      Interval History: Status:  stayed the same. Resting in bed. No family at bedside. C/o chills and not feeling well today. Continues with limited participation with therapies/ADLs. Spends most of his time in bed. C/o generalized pain. Pain medication regimen adjusted yesterday. Afebrile.   Review of Systems   Constitutional: Positive for malaise/fatigue. Negative for chills, decreased appetite and fever.   HENT: Negative for congestion, hoarse voice, nosebleeds and sore throat.    Eyes: Negative for blurred vision, discharge, pain and visual disturbance.   Cardiovascular: Negative for chest pain, dyspnea on exertion, irregular heartbeat, leg swelling, orthopnea and palpitations.   Respiratory: Negative for cough, shortness of breath, sputum production and wheezing.    Hematologic/Lymphatic: Negative for bleeding problem. Does not bruise/bleed easily.   Skin: Negative for dry skin, flushing, itching, poor wound healing, rash and suspicious lesions.   Musculoskeletal: Positive for muscle weakness. Negative for arthritis, back pain, falls, joint pain, joint swelling and myalgias.        Left hip pain   Gastrointestinal: Positive for constipation. Negative for bloating, abdominal pain, change in bowel habit, diarrhea, flatus, heartburn, melena and nausea.   Genitourinary: Negative for frequency, hesitancy, incomplete emptying, pelvic pain and urgency.   Neurological: Negative for difficulty with concentration, disturbances in coordination, dizziness, headaches, numbness, paresthesias, tremors and weakness.   Psychiatric/Behavioral: Negative for  altered mental status, hallucinations and memory loss. The patient is not nervous/anxious.          Medications:     Allergies: No Known Allergies    Current Meds:   Current Facility-Administered Medications:   •  acetaminophen (TYLENOL) tablet 650 mg, 650 mg, Oral, Q6H PRN, Miguel Shaffer MD  •  ceFAZolin (ANCEF) 1 g/100 mL 0.9% NS IVPB (mbp), 1 g, Intravenous, Q8H, Zev Burris MD  •  dextrose (D50W) 25 g/ 50mL Intravenous Solution 25 g, 25 g, Intravenous, Q15 Min PRN, Miguel Shaffer MD  •  dextrose (GLUTOSE) oral gel 15 g, 15 g, Oral, Q15 Min PRN, Miguel Shaffer MD  •  diphenhydrAMINE (BENADRYL) capsule 25 mg, 25 mg, Oral, Q6H PRN, Miguel Shaffer MD  •  gabapentin (NEURONTIN) capsule 100 mg, 100 mg, Oral, Q8H, Miguel Shaffer MD  •  glucagon (human recombinant) (GLUCAGEN DIAGNOSTIC) injection 1 mg, 1 mg, Subcutaneous, Q15 Min PRN, Miguel Shaffer MD  •  HYDROmorphone (DILAUDID) injection 1 mg, 1 mg, Intravenous, Q8H PRN, Lolita Crooks, APRN  •  insulin detemir (LEVEMIR) injection 25 Units, 25 Units, Subcutaneous, Nightly, Lolita Crooks, APRN  •  insulin lispro (humaLOG) injection 0-9 Units, 0-9 Units, Subcutaneous, TID AC, Miguel Shaffer MD  •  insulin lispro (humaLOG) injection 2-7 Units, 2-7 Units, Subcutaneous, Nightly, Miguel Shaffer MD  •  lidocaine (LIDODERM) 5 % 1 patch, 1 patch, Transdermal, Q24H, Miguel Shaffer MD  •  oxyCODONE (ROXICODONE) immediate release tablet 20 mg, 20 mg, Oral, Q4H PRN, Lolita Crooks, APRN  •  pantoprazole (PROTONIX) EC tablet 40 mg, 40 mg, Oral, Q AM, Miguel Shaffer MD  •  predniSONE (DELTASONE) tablet 10 mg, 10 mg, Oral, BID With Meals, Miguel Shaffer MD  •  saccharomyces boulardii (FLORASTOR) capsule 250 mg, 250 mg, Oral, BID, Miguel Shaffer MD  •  sennosides-docusate (PERICOLACE) 8.6-50 MG per tablet 1 tablet, 1 tablet, Oral, BID, Miguel Shaffer MD  •   "sodium chloride 0.9 % flush 10 mL, 10 mL, Intravenous, Q12H, Miguel Shaffer MD  •  sodium chloride 0.9 % flush 10 mL, 10 mL, Intravenous, PRN, Miguel Shaffer MD  •  tacrolimus (PROGRAF) capsule 3 mg, 3 mg, Oral, Q12H, Miguel Shaffer MD  •  vitamin A & D ointment, , Topical, Q12H, Heather Amos APRN  •  vitamin A & D ointment, , Topical, PRN, Heather Amos APRN    Data:     Code Status:    There are no questions and answers to display.       No family history on file.    Social History     Socioeconomic History   • Marital status: Unknown     Spouse name: Not on file   • Number of children: Not on file   • Years of education: Not on file   • Highest education level: Not on file       Vitals:  Ht 185.4 cm (73\")   Wt 124 kg (272 lb 8 oz)   BMI 35.95 kg/m²     T 98.0, HR 97, RR 20, /70, SPO2 98% (room air)    I/O (24Hr):  No intake or output data in the 24 hours ending 03/12/21 0940    Labs and imaging:      Recent Results (from the past 12 hour(s))   POC Glucose Once    Collection Time: 03/12/21  7:20 AM    Specimen: Blood   Result Value Ref Range    Glucose 117 70 - 130 mg/dL         Physical Examination:        Physical Exam  Vitals and nursing note reviewed.   Constitutional:       Appearance: Normal appearance. He is well-developed.   HENT:      Head: Normocephalic and atraumatic.      Right Ear: External ear normal.      Left Ear: External ear normal.      Nose: Nose normal.      Mouth/Throat:      Mouth: Mucous membranes are moist.      Pharynx: Oropharynx is clear.   Eyes:      General: Lids are normal.      Conjunctiva/sclera: Conjunctivae normal.      Pupils: Pupils are equal, round, and reactive to light.   Neck:      Trachea: Trachea normal.   Cardiovascular:      Rate and Rhythm: Normal rate and regular rhythm.      Heart sounds: Normal heart sounds.   Pulmonary:      Effort: Pulmonary effort is normal.      Breath sounds: Normal breath sounds. "   Abdominal:      General: Bowel sounds are normal.      Palpations: Abdomen is soft.   Musculoskeletal:         General: Normal range of motion.      Cervical back: Normal range of motion and neck supple.      Comments: Generalized weakness  Decreased ROM left hip   Skin:     General: Skin is warm and dry.      Findings: No erythema, petechiae or rash.      Nails: There is no clubbing.      Comments: Incision left hip c/d/i  Bruising, abrasion to abdomen noted   Neurological:      Mental Status: He is alert and oriented to person, place, and time. Mental status is at baseline.      Cranial Nerves: No cranial nerve deficit.      Sensory: No sensory deficit.   Psychiatric:         Speech: Speech normal.         Behavior: Behavior normal. Behavior is cooperative.         Thought Content: Thought content normal.         Judgment: Judgment normal.           Assessment:            Plan:           1. Group B strep bacteremia  2. Septic arthritis left hip  3. DM2 with hyperglycemia on long term insulin  4. Chronic immunosuppression  5. Constipation  6. Multifactorial anemia  7. History of cirrhosis/hepatitis C virus  8. History of liver transplant   9. Thrombocytopenia    Continue current treatment. Monitor counts. Increase activity. Labs Monday. Continue IV antibiotics under direction of ID. Maintain patient safety. Wound care per wound care team. Glycemic control. Aggressive therapies as tolerated. Continue pain control efforts - avoiding oversedation. Encourage participation with therapies/ADLs.       Electronically signed by ANDREW Hernandez on 3/12/2021 at 09:40 CST   I have discussed the care of Gil Monroy, including pertinent history and exam findings, with the nurse practitioner.    I have seen and examined the patient and the key elements of all parts of the encounter have been performed by me.  I agree with the assessment, plan and orders as documented by ANDREW Triana, after I modified the  exam findings and the plan of treatments and the final version is my approved version of the assessment.        Electronically signed by Miguel Shaffer MD on 3/12/2021 at 20:58 CST

## 2021-03-13 LAB
GLUCOSE BLDC GLUCOMTR-MCNC: 127 MG/DL (ref 70–130)
GLUCOSE BLDC GLUCOMTR-MCNC: 135 MG/DL (ref 70–130)
GLUCOSE BLDC GLUCOMTR-MCNC: 291 MG/DL (ref 70–130)
GLUCOSE BLDC GLUCOMTR-MCNC: 317 MG/DL (ref 70–130)

## 2021-03-13 PROCEDURE — 63710000001 PREDNISONE PER 5 MG: Performed by: INTERNAL MEDICINE

## 2021-03-13 PROCEDURE — 97116 GAIT TRAINING THERAPY: CPT

## 2021-03-13 PROCEDURE — 63710000001 INSULIN LISPRO (HUMAN) PER 5 UNITS: Performed by: INTERNAL MEDICINE

## 2021-03-13 PROCEDURE — 63710000001 INSULIN DETEMIR PER 5 UNITS: Performed by: NURSE PRACTITIONER

## 2021-03-13 PROCEDURE — 63710000001 TACROLIMUS PER 1 MG: Performed by: INTERNAL MEDICINE

## 2021-03-13 PROCEDURE — 82962 GLUCOSE BLOOD TEST: CPT

## 2021-03-13 NOTE — PROGRESS NOTES
Edmund Shaffer M.D.  ANDREW Triana APRN          Internal Medicine Progress Note    3/13/2021   14:50 CST    Name:  Gil Monroy  MRN:    [id number]     Acct:     [id number]   Room:  [address] [place] [address] Day: 0     Admit Date: 3/5/2021  7:17 PM  PCP: Angelia Macdonald    Subjective:     C/C: need for continued IV antibiotic therapy and rehabilitation efforts      Interval History: Status:  stayed the same. Resting in bed. No family at bedside.  Patient wouldn't participate with therapy this morning but walked the hallways with PT this afternoon, nurse states he is still limited with participation. Spends most of his time in bed. C/o generalized pain. Pain medication regimen adjusted two days ago. Afebrile.     Review of Systems   Constitutional: Positive for malaise/fatigue. Negative for chills, decreased appetite and fever.   HENT: Negative for congestion, hoarse voice, nosebleeds and sore throat.    Eyes: Negative for blurred vision, discharge, pain and visual disturbance.   Cardiovascular: Negative for chest pain, dyspnea on exertion, irregular heartbeat, leg swelling, orthopnea and palpitations.   Respiratory: Negative for cough, shortness of breath, sputum production and wheezing.    Hematologic/Lymphatic: Negative for bleeding problem. Does not bruise/bleed easily.   Skin: Negative for dry skin, flushing, itching, poor wound healing, rash and suspicious lesions.   Musculoskeletal: Positive for muscle weakness. Negative for arthritis, back pain, falls, joint pain, joint swelling and myalgias.        Left hip pain   Gastrointestinal: Positive for constipation. Negative for bloating, abdominal pain, change in bowel habit, diarrhea, flatus, heartburn, melena and nausea.   Genitourinary: Negative for frequency, hesitancy, incomplete emptying, pelvic pain and urgency.   Neurological: Negative for difficulty with concentration, disturbances in coordination, dizziness, headaches,  numbness, paresthesias, tremors and weakness.   Psychiatric/Behavioral: Negative for altered mental status, hallucinations and memory loss. The patient is not nervous/anxious.          Medications:     Allergies: No Known Allergies    Current Meds:   Current Facility-Administered Medications:   •  acetaminophen (TYLENOL) tablet 650 mg, 650 mg, Oral, Q6H PRN, Miguel Shaffer MD  •  ceFAZolin (ANCEF) 1 g/100 mL 0.9% NS IVPB (mbp), 1 g, Intravenous, Q8H, Zev Burris MD  •  dextrose (D50W) 25 g/ 50mL Intravenous Solution 25 g, 25 g, Intravenous, Q15 Min PRN, Miguel Shaffer MD  •  dextrose (GLUTOSE) oral gel 15 g, 15 g, Oral, Q15 Min PRN, Miguel Shaffer MD  •  diphenhydrAMINE (BENADRYL) capsule 25 mg, 25 mg, Oral, Q6H PRN, Miguel Shaffer MD  •  gabapentin (NEURONTIN) capsule 100 mg, 100 mg, Oral, Q8H, Miguel Shaffer MD  •  glucagon (human recombinant) (GLUCAGEN DIAGNOSTIC) injection 1 mg, 1 mg, Subcutaneous, Q15 Min PRN, Miguel Shaffer MD  •  HYDROmorphone (DILAUDID) injection 1 mg, 1 mg, Intravenous, Q8H PRN, Lolita Crooks, APRN  •  insulin detemir (LEVEMIR) injection 25 Units, 25 Units, Subcutaneous, Nightly, Lolita Crooks, APRN  •  insulin lispro (humaLOG) injection 0-9 Units, 0-9 Units, Subcutaneous, TID AC, Miguel Shaffer MD  •  insulin lispro (humaLOG) injection 2-7 Units, 2-7 Units, Subcutaneous, Nightly, Miguel Shaffer MD  •  lidocaine (LIDODERM) 5 % 1 patch, 1 patch, Transdermal, Q24H, Miguel Shaffer MD  •  oxyCODONE (ROXICODONE) immediate release tablet 20 mg, 20 mg, Oral, Q4H PRN, Lolita Crooks, APRRO  •  pantoprazole (PROTONIX) EC tablet 40 mg, 40 mg, Oral, Q AM, Miguel Shaffer MD  •  predniSONE (DELTASONE) tablet 10 mg, 10 mg, Oral, BID With Meals, Miguel Shaffer MD  •  saccharomyces boulardii (FLORASTOR) capsule 250 mg, 250 mg, Oral, BID, Miguel Shaffer MD  •  sennosides-docusate  "(PERICOLACE) 8.6-50 MG per tablet 1 tablet, 1 tablet, Oral, BID, Miguel Shaffer MD  •  sodium chloride 0.9 % flush 10 mL, 10 mL, Intravenous, Q12H, Miguel Shaffer MD  •  sodium chloride 0.9 % flush 10 mL, 10 mL, Intravenous, PRN, Miguel Shaffer MD  •  tacrolimus (PROGRAF) capsule 3 mg, 3 mg, Oral, Q12H, Miguel Shaffer MD  •  vitamin A & D ointment, , Topical, Q12H, Heather Amos APRN  •  vitamin A & D ointment, , Topical, PRN, Heather Amos APRN    Data:     Code Status:    There are no questions and answers to display.       No family history on file.    Social History     Socioeconomic History   • Marital status: Unknown     Spouse name: Not on file   • Number of children: Not on file   • Years of education: Not on file   • Highest education level: Not on file       Vitals:  Ht 185.4 cm (73\")   Wt 124 kg (272 lb 8 oz)   BMI 35.95 kg/m²     T 97.7, HR 84, RR 14, /74, SPO2 94% (room air)    I/O (24Hr):  No intake or output data in the 24 hours ending 03/13/21 1450    Labs and imaging:      Recent Results (from the past 12 hour(s))   POC Glucose Once    Collection Time: 03/13/21  7:36 AM    Specimen: Blood   Result Value Ref Range    Glucose 127 70 - 130 mg/dL   POC Glucose Once    Collection Time: 03/13/21 11:16 AM    Specimen: Blood   Result Value Ref Range    Glucose 135 (H) 70 - 130 mg/dL         Physical Examination:        Physical Exam  Vitals and nursing note reviewed.   Constitutional:       Appearance: Normal appearance. He is well-developed.   HENT:      Head: Normocephalic and atraumatic.      Right Ear: External ear normal.      Left Ear: External ear normal.      Nose: Nose normal.      Mouth/Throat:      Mouth: Mucous membranes are moist.      Pharynx: Oropharynx is clear.   Eyes:      General: Lids are normal.      Conjunctiva/sclera: Conjunctivae normal.      Pupils: Pupils are equal, round, and reactive to light.   Neck:      Trachea: " Trachea normal.   Cardiovascular:      Rate and Rhythm: Normal rate and regular rhythm.      Heart sounds: Normal heart sounds.   Pulmonary:      Effort: Pulmonary effort is normal.      Breath sounds: Normal breath sounds.   Abdominal:      General: Bowel sounds are normal.      Palpations: Abdomen is soft.   Musculoskeletal:         General: Normal range of motion.      Cervical back: Normal range of motion and neck supple.      Comments: Generalized weakness  Decreased ROM left hip   Skin:     General: Skin is warm and dry.      Findings: No erythema, petechiae or rash.      Nails: There is no clubbing.      Comments: Incision left hip c/d/i  Bruising, abrasion to abdomen noted   Neurological:      Mental Status: He is alert and oriented to person, place, and time. Mental status is at baseline.      Cranial Nerves: No cranial nerve deficit.      Sensory: No sensory deficit.   Psychiatric:         Speech: Speech normal.         Behavior: Behavior normal. Behavior is cooperative.         Thought Content: Thought content normal.         Judgment: Judgment normal.           Assessment:            Plan:           1. Group B strep bacteremia  2. Septic arthritis left hip  3. DM2 with hyperglycemia on long term insulin  4. Chronic immunosuppression  5. Constipation  6. Multifactorial anemia  7. History of cirrhosis/hepatitis C virus  8. History of liver transplant   9. Thrombocytopenia    Continue current treatment. Monitor counts. Increase activity. Labs Monday. Continue IV antibiotics under direction of ID, renéef ordered end date 3/28/2021. Maintain patient safety. Wound care per wound care team. Glycemic control. Aggressive therapies as tolerated. Continue pain control efforts - avoiding oversedation. Encourage participation with therapies/ADLs.       Electronically signed by ANDREW Foote on 3/13/2021 at 14:50 CST

## 2021-03-13 NOTE — PROGRESS NOTES
"Infectious Diseases Progress Note    Patient:  Gil Monroy  YOB: 1970  MRN: [id number]   Admit date: 3/5/2021   Admitting Physician: iMguel Shaffer MD  Primary Care Physician: Angelia Macdonald    Chief Complaint/Interval History: He is without fever.  No diarrhea or rash.  He is working with physical therapy.  Indicates he was able to walk with assistance and a walker today out to near the nursing station and back.    No intake or output data in the 24 hours ending 03/13/21 1514  Allergies: No Known Allergies  Current Scheduled Medications:   ceFAZolin, 1 g, Intravenous, Q8H  gabapentin, 100 mg, Oral, Q8H  insulin detemir, 25 Units, Subcutaneous, Nightly  insulin lispro, 0-9 Units, Subcutaneous, TID AC  insulin lispro, 2-7 Units, Subcutaneous, Nightly  lidocaine, 1 patch, Transdermal, Q24H  pantoprazole, 40 mg, Oral, Q AM  predniSONE, 10 mg, Oral, BID With Meals  saccharomyces boulardii, 250 mg, Oral, BID  senna-docusate sodium, 1 tablet, Oral, BID  sodium chloride, 10 mL, Intravenous, Q12H  tacrolimus, 3 mg, Oral, Q12H  vitamin A & D, , Topical, Q12H      Current PRN Medications:  •  acetaminophen  •  dextrose  •  dextrose  •  diphenhydrAMINE  •  glucagon (human recombinant)  •  HYDROmorphone  •  oxyCODONE  •  sodium chloride  •  vitamin A & D    Review of Systems no cardiopulmonary complaints    Vital Signs:  Ht 185.4 cm (73\")   Wt 124 kg (272 lb 8 oz)   BMI 35.95 kg/m²     Physical Exam  Vital signs - reviewed.  Line/IV (right arm PICC) site - No erythema, warmth, induration, or tenderness.  Prior left hip incision without induration or drainage.  No erythema.  Seems to be moving left hip better.  Extremities remain edematous but improved from admission    Lab Results:  CBC:   Results from last 7 days   Lab Units 03/11/21 0443   WBC 10*3/mm3 13.94*   HEMOGLOBIN g/dL 10.6*   HEMATOCRIT % 31.1*   PLATELETS 10*3/mm3 149     BMP:  Results from last 7 days   Lab Units 03/11/21 0443 " 03/07/21  0454   SODIUM mmol/L 130* 131*   POTASSIUM mmol/L 4.2 3.9   CHLORIDE mmol/L 95* 95*   CO2 mmol/L 27.0 26.0   BUN mg/dL 15 21*   CREATININE mg/dL 0.79 0.88   GLUCOSE mg/dL 263* 257*   CALCIUM mg/dL 8.4* 8.1*   ALT (SGPT) U/L 9  --      Impression:   1.  Group B strep bacteremia  2.  Left hip septic arthritis with group B strep  3.  History of liver transplantation  4.  Diabetes mellitus    Recommendations:   Encouraged him to continue to work with physical therapy  Continue cefazolin  He will complete 4 weeks of intravenous cefazolin following incision and drainage of his left hip on March 28, 2021 (that will be a 4-week course of therapy for left hip septic arthritis and immunocompromised patient-native joint)  Continue cefazolin  Will see every 2 to 3 days  Please call in interim if new symptoms or problems    Zev George MD

## 2021-03-14 LAB
GLUCOSE BLDC GLUCOMTR-MCNC: 211 MG/DL (ref 70–130)
GLUCOSE BLDC GLUCOMTR-MCNC: 242 MG/DL (ref 70–130)
GLUCOSE BLDC GLUCOMTR-MCNC: 286 MG/DL (ref 70–130)
GLUCOSE BLDC GLUCOMTR-MCNC: 288 MG/DL (ref 70–130)

## 2021-03-14 PROCEDURE — 63710000001 INSULIN DETEMIR PER 5 UNITS: Performed by: NURSE PRACTITIONER

## 2021-03-14 PROCEDURE — 63710000001 INSULIN LISPRO (HUMAN) PER 5 UNITS: Performed by: INTERNAL MEDICINE

## 2021-03-14 PROCEDURE — 63710000001 PREDNISONE PER 5 MG: Performed by: INTERNAL MEDICINE

## 2021-03-14 PROCEDURE — 63710000001 TACROLIMUS PER 1 MG: Performed by: INTERNAL MEDICINE

## 2021-03-14 PROCEDURE — 82962 GLUCOSE BLOOD TEST: CPT

## 2021-03-14 NOTE — PROGRESS NOTES
Edmund Shaffer M.D.  ANDREW Triana APRN          Internal Medicine Progress Note    3/14/2021   14:38 CDT    Name:  Gil Monroy  MRN:    [id number]     Acct:     [id number]   Room:  [address] [place] [address] Day: 0     Admit Date: 3/5/2021  7:17 PM  PCP: Angelia Macdonald    Subjective:     C/C: need for continued IV antibiotic therapy and rehabilitation efforts      Interval History: Status:  stayed the same. Resting in bed. No family at bedside.  Patient cooperative with PT today, past two days patient has ambulated in the hallways.  Spends most of his time in bed. Pain medication adjusted last week, tolerating dose now.  Afebrile.     Review of Systems   Constitutional: Positive for malaise/fatigue. Negative for chills, decreased appetite and fever.   HENT: Negative for congestion, hoarse voice, nosebleeds and sore throat.    Eyes: Negative for blurred vision, discharge, pain and visual disturbance.   Cardiovascular: Negative for chest pain, dyspnea on exertion, irregular heartbeat, leg swelling, orthopnea and palpitations.   Respiratory: Negative for cough, shortness of breath, sputum production and wheezing.    Hematologic/Lymphatic: Negative for bleeding problem. Does not bruise/bleed easily.   Skin: Negative for dry skin, flushing, itching, poor wound healing, rash and suspicious lesions.   Musculoskeletal: Positive for muscle weakness. Negative for arthritis, back pain, falls, joint pain, joint swelling and myalgias.        Left hip pain   Gastrointestinal: Negative for bloating, abdominal pain, change in bowel habit, constipation, diarrhea, flatus, heartburn, melena and nausea.   Genitourinary: Negative for frequency, hesitancy, incomplete emptying, pelvic pain and urgency.   Neurological: Negative for difficulty with concentration, disturbances in coordination, dizziness, headaches, numbness, paresthesias, tremors and weakness.   Psychiatric/Behavioral: Negative for altered  mental status, hallucinations and memory loss. The patient is not nervous/anxious.          Medications:     Allergies: No Known Allergies    Current Meds:   Current Facility-Administered Medications:     acetaminophen (TYLENOL) tablet 650 mg, 650 mg, Oral, Q6H PRN, Miguel Shaffer MD    ceFAZolin (ANCEF) 1 g/100 mL 0.9% NS IVPB (mbp), 1 g, Intravenous, Q8H, eZv Burris MD    dextrose (D50W) 25 g/ 50mL Intravenous Solution 25 g, 25 g, Intravenous, Q15 Min PRN, Miguel Shaffer MD    dextrose (GLUTOSE) oral gel 15 g, 15 g, Oral, Q15 Min PRN, Miguel Shaffer MD    diphenhydrAMINE (BENADRYL) capsule 25 mg, 25 mg, Oral, Q6H PRN, Miguel Shaffer MD    gabapentin (NEURONTIN) capsule 100 mg, 100 mg, Oral, Q8H, Miguel Shaffer MD    glucagon (human recombinant) (GLUCAGEN DIAGNOSTIC) injection 1 mg, 1 mg, Subcutaneous, Q15 Min PRN, Miguel Shaffer MD    HYDROmorphone (DILAUDID) injection 1 mg, 1 mg, Intravenous, Q8H PRN, Lolita Crooks APRN    insulin detemir (LEVEMIR) injection 25 Units, 25 Units, Subcutaneous, Nightly, Lolita Crooks Vannessa, ANDREW    insulin lispro (humaLOG) injection 0-9 Units, 0-9 Units, Subcutaneous, TID AC, Miguel Shaffer MD    insulin lispro (humaLOG) injection 2-7 Units, 2-7 Units, Subcutaneous, Nightly, Miguel Shaffer MD    lidocaine (LIDODERM) 5 % 1 patch, 1 patch, Transdermal, Q24H, Miguel Shaffer MD    magnesium hydroxide (MILK OF MAGNESIA) suspension 2400 mg/10mL 30 mL, 30 mL, Oral, Daily PRN, Miguel Shaffer MD    oxyCODONE (ROXICODONE) immediate release tablet 20 mg, 20 mg, Oral, Q4H PRN, Lolita Crooks APRN    pantoprazole (PROTONIX) EC tablet 40 mg, 40 mg, Oral, Q AM, Miguel Shaffer MD    predniSONE (DELTASONE) tablet 10 mg, 10 mg, Oral, BID With Meals, Miguel Shaffer MD    saccharomyces boulardii (FLORASTOR) capsule 250 mg, 250 mg, Oral, BID, Miguel Shaffer MD     "sennosides-docusate (PERICOLACE) 8.6-50 MG per tablet 1 tablet, 1 tablet, Oral, BID, Miguel Shaffer MD    sodium chloride 0.9 % flush 10 mL, 10 mL, Intravenous, Q12H, Miguel Shaffer MD    sodium chloride 0.9 % flush 10 mL, 10 mL, Intravenous, PRN, Miguel Shaffer MD    tacrolimus (PROGRAF) capsule 3 mg, 3 mg, Oral, Q12H, Miguel Shaffer MD    vitamin A & D ointment, , Topical, Q12H, Hetaher Amos APRN    vitamin A & D ointment, , Topical, PRN, Heather Amos APRN    Data:     Code Status:    There are no questions and answers to display.       No family history on file.    Social History     Socioeconomic History    Marital status: Unknown     Spouse name: Not on file    Number of children: Not on file    Years of education: Not on file    Highest education level: Not on file       Vitals:  Ht 185.4 cm (73\")   Wt 124 kg (272 lb 8 oz)   BMI 35.95 kg/m²     T 978.5, HR 85, RR 20, /75, SPO2 96% (room air)    I/O (24Hr):  No intake or output data in the 24 hours ending 03/14/21 1438    Labs and imaging:      Recent Results (from the past 12 hour(s))   POC Glucose Once    Collection Time: 03/14/21  7:36 AM    Specimen: Blood   Result Value Ref Range    Glucose 242 (H) 70 - 130 mg/dL   POC Glucose Once    Collection Time: 03/14/21 11:15 AM    Specimen: Blood   Result Value Ref Range    Glucose 211 (H) 70 - 130 mg/dL         Physical Examination:        Physical Exam  Vitals and nursing note reviewed.   Constitutional:       Appearance: Normal appearance. He is well-developed.   HENT:      Head: Normocephalic and atraumatic.      Right Ear: External ear normal.      Left Ear: External ear normal.      Nose: Nose normal.      Mouth/Throat:      Mouth: Mucous membranes are moist.      Pharynx: Oropharynx is clear.   Eyes:      General: Lids are normal.      Conjunctiva/sclera: Conjunctivae normal.      Pupils: Pupils are equal, round, and reactive to light. "   Neck:      Trachea: Trachea normal.   Cardiovascular:      Rate and Rhythm: Normal rate and regular rhythm.      Heart sounds: Normal heart sounds.   Pulmonary:      Effort: Pulmonary effort is normal.      Breath sounds: Normal breath sounds.   Abdominal:      General: Bowel sounds are normal.      Palpations: Abdomen is soft.   Musculoskeletal:         General: Tenderness present. Normal range of motion.      Cervical back: Normal range of motion and neck supple.      Comments: Generalized weakness  Decreased ROM left hip   Skin:     General: Skin is warm and dry.      Findings: Bruising and erythema present. No petechiae or rash.      Nails: There is no clubbing.      Comments: Incision left hip c/d/i  Bruising, abrasion to abdomen noted  Petechia present to left forearm    Neurological:      Mental Status: He is alert and oriented to person, place, and time. Mental status is at baseline.      Cranial Nerves: No cranial nerve deficit.      Sensory: No sensory deficit.   Psychiatric:         Speech: Speech normal.         Behavior: Behavior normal. Behavior is cooperative.         Thought Content: Thought content normal.         Judgment: Judgment normal.           Assessment:            Plan:           Group B strep bacteremia  Septic arthritis left hip  DM2 with hyperglycemia on long term insulin  Chronic immunosuppression  Constipation  Multifactorial anemia  History of cirrhosis/hepatitis C virus  History of liver transplant   9. Thrombocytopenia    Continue current treatment. Monitor counts. Increase activity. Labs Monday. Continue IV antibiotics under direction of zheng GARCIA ordered end date 3/28/2021. Maintain patient safety. Wound care per wound care team. Glycemic control. Aggressive therapies as tolerated. Continue pain control efforts - avoiding oversedation. Encourage participation with therapies/ADLs.       Electronically signed by ANDREW Foote on 3/14/2021 at 14:38 CDT   I have discussed the  care of Gil Monroy, including pertinent history and exam findings, with the nurse practitioner.    I have seen and examined the patient and the key elements of all parts of the encounter have been performed by me.  I agree with the assessment, plan and orders as documented by Jac MORALES, after I modified the exam findings and the plan of treatments and the final version is my approved version of the assessment.        Electronically signed by Miguel Shaffer MD on 3/14/2021 at 19:12 CDT

## 2021-03-15 LAB
ANION GAP SERPL CALCULATED.3IONS-SCNC: 6 MMOL/L (ref 5–15)
BASOPHILS # BLD AUTO: 0.07 10*3/MM3 (ref 0–0.2)
BASOPHILS NFR BLD AUTO: 0.4 % (ref 0–1.5)
BUN SERPL-MCNC: 16 MG/DL (ref 6–20)
BUN/CREAT SERPL: 18 (ref 7–25)
CALCIUM SPEC-SCNC: 8.6 MG/DL (ref 8.6–10.5)
CHLORIDE SERPL-SCNC: 95 MMOL/L (ref 98–107)
CO2 SERPL-SCNC: 28 MMOL/L (ref 22–29)
CREAT SERPL-MCNC: 0.89 MG/DL (ref 0.76–1.27)
CRP SERPL-MCNC: 19.2 MG/DL (ref 0–0.5)
DEPRECATED RDW RBC AUTO: 41.1 FL (ref 37–54)
EOSINOPHIL # BLD AUTO: 0.01 10*3/MM3 (ref 0–0.4)
EOSINOPHIL NFR BLD AUTO: 0.1 % (ref 0.3–6.2)
ERYTHROCYTE [DISTWIDTH] IN BLOOD BY AUTOMATED COUNT: 13.2 % (ref 12.3–15.4)
ERYTHROCYTE [SEDIMENTATION RATE] IN BLOOD: 55 MM/HR (ref 0–15)
GFR SERPL CREATININE-BSD FRML MDRD: 90 ML/MIN/1.73
GLUCOSE BLDC GLUCOMTR-MCNC: 153 MG/DL (ref 70–130)
GLUCOSE BLDC GLUCOMTR-MCNC: 177 MG/DL (ref 70–130)
GLUCOSE BLDC GLUCOMTR-MCNC: 199 MG/DL (ref 70–130)
GLUCOSE BLDC GLUCOMTR-MCNC: 245 MG/DL (ref 70–130)
GLUCOSE SERPL-MCNC: 203 MG/DL (ref 65–99)
HCT VFR BLD AUTO: 30 % (ref 37.5–51)
HGB BLD-MCNC: 9.9 G/DL (ref 13–17.7)
IMM GRANULOCYTES # BLD AUTO: 0.17 10*3/MM3 (ref 0–0.05)
IMM GRANULOCYTES NFR BLD AUTO: 1 % (ref 0–0.5)
LYMPHOCYTES # BLD AUTO: 0.9 10*3/MM3 (ref 0.7–3.1)
LYMPHOCYTES NFR BLD AUTO: 5.5 % (ref 19.6–45.3)
MCH RBC QN AUTO: 28.3 PG (ref 26.6–33)
MCHC RBC AUTO-ENTMCNC: 33 G/DL (ref 31.5–35.7)
MCV RBC AUTO: 85.7 FL (ref 79–97)
MONOCYTES # BLD AUTO: 1.15 10*3/MM3 (ref 0.1–0.9)
MONOCYTES NFR BLD AUTO: 7 % (ref 5–12)
NEUTROPHILS NFR BLD AUTO: 14.06 10*3/MM3 (ref 1.7–7)
NEUTROPHILS NFR BLD AUTO: 86 % (ref 42.7–76)
NRBC BLD AUTO-RTO: 0 /100 WBC (ref 0–0.2)
PLATELET # BLD AUTO: 245 10*3/MM3 (ref 140–450)
PMV BLD AUTO: 11 FL (ref 6–12)
POTASSIUM SERPL-SCNC: 4.6 MMOL/L (ref 3.5–5.2)
RBC # BLD AUTO: 3.5 10*6/MM3 (ref 4.14–5.8)
SODIUM SERPL-SCNC: 129 MMOL/L (ref 136–145)
WBC # BLD AUTO: 16.36 10*3/MM3 (ref 3.4–10.8)

## 2021-03-15 PROCEDURE — 85025 COMPLETE CBC W/AUTO DIFF WBC: CPT | Performed by: INTERNAL MEDICINE

## 2021-03-15 PROCEDURE — 85651 RBC SED RATE NONAUTOMATED: CPT | Performed by: INTERNAL MEDICINE

## 2021-03-15 PROCEDURE — 63710000001 INSULIN DETEMIR PER 5 UNITS: Performed by: NURSE PRACTITIONER

## 2021-03-15 PROCEDURE — 97116 GAIT TRAINING THERAPY: CPT

## 2021-03-15 PROCEDURE — 63710000001 INSULIN LISPRO (HUMAN) PER 5 UNITS: Performed by: INTERNAL MEDICINE

## 2021-03-15 PROCEDURE — 86140 C-REACTIVE PROTEIN: CPT | Performed by: INTERNAL MEDICINE

## 2021-03-15 PROCEDURE — 63710000001 TACROLIMUS PER 1 MG: Performed by: INTERNAL MEDICINE

## 2021-03-15 PROCEDURE — 82962 GLUCOSE BLOOD TEST: CPT

## 2021-03-15 PROCEDURE — 80048 BASIC METABOLIC PNL TOTAL CA: CPT | Performed by: INTERNAL MEDICINE

## 2021-03-15 PROCEDURE — 63710000001 PREDNISONE PER 5 MG: Performed by: INTERNAL MEDICINE

## 2021-03-15 NOTE — PROGRESS NOTES
Edmund Shaffer M.D.  ANDREW Triana APRN          Internal Medicine Progress Note    3/15/2021   10:37 CDT    Name:  Gil Monroy  MRN:    [id number]     Acct:     [id number]   Room:  [address] [place] [address] Day: 0     Admit Date: 3/5/2021  7:17 PM  PCP: Angelia Macdonald    Subjective:     C/C: need for continued IV antibiotic therapy and rehabilitation efforts      Interval History: Status:  stayed the same. Resting in bed. No family at bedside. Increased participation with therapies. Increased erythema to left hip incision. Dressing applied to abdomen as patient was picking at lesions. Asking about possible discharge. Sodium low. Blood sugars elevated. WBC elevated. Afebrile.     Review of Systems   Constitutional: Positive for malaise/fatigue. Negative for chills, decreased appetite and fever.   HENT: Negative for congestion, hoarse voice, nosebleeds and sore throat.    Eyes: Negative for blurred vision, discharge, pain and visual disturbance.   Cardiovascular: Negative for chest pain, dyspnea on exertion, irregular heartbeat, leg swelling, orthopnea and palpitations.   Respiratory: Negative for cough, shortness of breath, sputum production and wheezing.    Hematologic/Lymphatic: Negative for bleeding problem. Does not bruise/bleed easily.   Skin: Negative for dry skin, flushing, itching, poor wound healing, rash and suspicious lesions.   Musculoskeletal: Positive for muscle weakness. Negative for arthritis, back pain, falls, joint pain, joint swelling and myalgias.        Left hip pain   Gastrointestinal: Negative for bloating, abdominal pain, change in bowel habit, constipation, diarrhea, flatus, heartburn, melena and nausea.   Genitourinary: Negative for frequency, hesitancy, incomplete emptying, pelvic pain and urgency.   Neurological: Negative for difficulty with concentration, disturbances in coordination, dizziness, headaches, numbness, paresthesias, tremors and weakness.    Psychiatric/Behavioral: Negative for altered mental status, hallucinations and memory loss. The patient is not nervous/anxious.          Medications:     Allergies: No Known Allergies    Current Meds:   Current Facility-Administered Medications:   •  acetaminophen (TYLENOL) tablet 650 mg, 650 mg, Oral, Q6H PRN, Miguel Shaffer MD  •  ceFAZolin (ANCEF) 1 g/100 mL 0.9% NS IVPB (mbp), 1 g, Intravenous, Q8H, Zev Burris MD  •  dextrose (D50W) 25 g/ 50mL Intravenous Solution 25 g, 25 g, Intravenous, Q15 Min PRN, Miguel Shaffer MD  •  dextrose (GLUTOSE) oral gel 15 g, 15 g, Oral, Q15 Min PRN, Miguel Shaffer MD  •  diphenhydrAMINE (BENADRYL) capsule 25 mg, 25 mg, Oral, Q6H PRN, Miguel Shaffer MD  •  gabapentin (NEURONTIN) capsule 100 mg, 100 mg, Oral, Q8H, Miguel Shaffer MD  •  glucagon (human recombinant) (GLUCAGEN DIAGNOSTIC) injection 1 mg, 1 mg, Subcutaneous, Q15 Min PRN, Miguel Shaffer MD  •  HYDROmorphone (DILAUDID) injection 1 mg, 1 mg, Intravenous, Q8H PRN, Lolita Crooks APRN  •  insulin detemir (LEVEMIR) injection 25 Units, 25 Units, Subcutaneous, Nightly, Lolita Crooks, ANDREW  •  insulin lispro (humaLOG) injection 0-9 Units, 0-9 Units, Subcutaneous, TID AC, Miguel Shaffer MD  •  insulin lispro (humaLOG) injection 2-7 Units, 2-7 Units, Subcutaneous, Nightly, Miguel Shaffer MD  •  lidocaine (LIDODERM) 5 % 1 patch, 1 patch, Transdermal, Q24H, Miguel Shaffer MD  •  magnesium hydroxide (MILK OF MAGNESIA) suspension 2400 mg/10mL 30 mL, 30 mL, Oral, Daily PRN, Miguel Shaffer MD  •  oxyCODONE (ROXICODONE) immediate release tablet 20 mg, 20 mg, Oral, Q4H PRN, Lolita Crooks APRN  •  pantoprazole (PROTONIX) EC tablet 40 mg, 40 mg, Oral, Q AM, Miguel Shaffer MD  •  predniSONE (DELTASONE) tablet 10 mg, 10 mg, Oral, BID With Meals, Miguel Shaffer MD  •  saccharomyces boulardii (FLORASTOR) capsule 250 mg,  "250 mg, Oral, BID, Miguel Shaffer MD  •  sennosides-docusate (PERICOLACE) 8.6-50 MG per tablet 1 tablet, 1 tablet, Oral, BID, Miguel Shaffer MD  •  sodium chloride 0.9 % flush 10 mL, 10 mL, Intravenous, Q12H, Miguel Shaffer MD  •  sodium chloride 0.9 % flush 10 mL, 10 mL, Intravenous, PRN, Mgiuel Shaffer MD  •  tacrolimus (PROGRAF) capsule 3 mg, 3 mg, Oral, Q12H, Miguel Shaffer MD  •  vitamin A & D ointment, , Topical, Q12H, Heather Amos APRN  •  vitamin A & D ointment, , Topical, PRN, Heather Amos APRN    Data:     Code Status:    There are no questions and answers to display.       No family history on file.    Social History     Socioeconomic History   • Marital status: Unknown     Spouse name: Not on file   • Number of children: Not on file   • Years of education: Not on file   • Highest education level: Not on file       Vitals:  Ht 185.4 cm (73\")   Wt 119 kg (262 lb 6.4 oz)   BMI 34.62 kg/m²     T 98.0, HR 81, RR 16,   136/85, SPO2 97% (room air)    I/O (24Hr):  No intake or output data in the 24 hours ending 03/15/21 1037    Labs and imaging:      Recent Results (from the past 12 hour(s))   Basic Metabolic Panel    Collection Time: 03/15/21  5:04 AM    Specimen: Blood   Result Value Ref Range    Glucose 203 (H) 65 - 99 mg/dL    BUN 16 6 - 20 mg/dL    Creatinine 0.89 0.76 - 1.27 mg/dL    Sodium 129 (L) 136 - 145 mmol/L    Potassium 4.6 3.5 - 5.2 mmol/L    Chloride 95 (L) 98 - 107 mmol/L    CO2 28.0 22.0 - 29.0 mmol/L    Calcium 8.6 8.6 - 10.5 mg/dL    eGFR Non African Amer 90 >60 mL/min/1.73    BUN/Creatinine Ratio 18.0 7.0 - 25.0    Anion Gap 6.0 5.0 - 15.0 mmol/L   Sedimentation Rate    Collection Time: 03/15/21  5:04 AM    Specimen: Blood   Result Value Ref Range    Sed Rate 55 (H) 0 - 15 mm/hr   C-reactive Protein    Collection Time: 03/15/21  5:04 AM    Specimen: Blood   Result Value Ref Range    C-Reactive Protein 19.20 (H) 0.00 " - 0.50 mg/dL   CBC Auto Differential    Collection Time: 03/15/21  5:04 AM    Specimen: Blood   Result Value Ref Range    WBC 16.36 (H) 3.40 - 10.80 10*3/mm3    RBC 3.50 (L) 4.14 - 5.80 10*6/mm3    Hemoglobin 9.9 (L) 13.0 - 17.7 g/dL    Hematocrit 30.0 (L) 37.5 - 51.0 %    MCV 85.7 79.0 - 97.0 fL    MCH 28.3 26.6 - 33.0 pg    MCHC 33.0 31.5 - 35.7 g/dL    RDW 13.2 12.3 - 15.4 %    RDW-SD 41.1 37.0 - 54.0 fl    MPV 11.0 6.0 - 12.0 fL    Platelets 245 140 - 450 10*3/mm3    Neutrophil % 86.0 (H) 42.7 - 76.0 %    Lymphocyte % 5.5 (L) 19.6 - 45.3 %    Monocyte % 7.0 5.0 - 12.0 %    Eosinophil % 0.1 (L) 0.3 - 6.2 %    Basophil % 0.4 0.0 - 1.5 %    Immature Grans % 1.0 (H) 0.0 - 0.5 %    Neutrophils, Absolute 14.06 (H) 1.70 - 7.00 10*3/mm3    Lymphocytes, Absolute 0.90 0.70 - 3.10 10*3/mm3    Monocytes, Absolute 1.15 (H) 0.10 - 0.90 10*3/mm3    Eosinophils, Absolute 0.01 0.00 - 0.40 10*3/mm3    Basophils, Absolute 0.07 0.00 - 0.20 10*3/mm3    Immature Grans, Absolute 0.17 (H) 0.00 - 0.05 10*3/mm3    nRBC 0.0 0.0 - 0.2 /100 WBC   POC Glucose Once    Collection Time: 03/15/21  7:34 AM    Specimen: Blood   Result Value Ref Range    Glucose 153 (H) 70 - 130 mg/dL         Physical Examination:        Physical Exam  Vitals and nursing note reviewed.   Constitutional:       Appearance: Normal appearance. He is well-developed.   HENT:      Head: Normocephalic and atraumatic.      Right Ear: External ear normal.      Left Ear: External ear normal.      Nose: Nose normal.      Mouth/Throat:      Mouth: Mucous membranes are moist.      Pharynx: Oropharynx is clear.   Eyes:      General: Lids are normal.      Conjunctiva/sclera: Conjunctivae normal.      Pupils: Pupils are equal, round, and reactive to light.   Neck:      Trachea: Trachea normal.   Cardiovascular:      Rate and Rhythm: Normal rate and regular rhythm.      Heart sounds: Normal heart sounds.   Pulmonary:      Effort: Pulmonary effort is normal.      Breath sounds:  Normal breath sounds.   Abdominal:      General: Bowel sounds are normal.      Palpations: Abdomen is soft.   Musculoskeletal:         General: Tenderness present. Normal range of motion.      Cervical back: Normal range of motion and neck supple.      Comments: Generalized weakness  Decreased ROM left hip   Skin:     General: Skin is warm and dry.      Findings: Bruising and erythema present. No petechiae or rash.      Nails: There is no clubbing.      Comments: Incision left hip c/d/i with surrounding erythema  Bruising, abrasion to abdomen noted  Petechia present to left forearm    Neurological:      Mental Status: He is alert and oriented to person, place, and time. Mental status is at baseline.      Cranial Nerves: No cranial nerve deficit.      Sensory: No sensory deficit.   Psychiatric:         Speech: Speech normal.         Behavior: Behavior normal. Behavior is cooperative.         Thought Content: Thought content normal.         Judgment: Judgment normal.           Assessment:            Plan:           1. Group B strep bacteremia  2. Septic arthritis left hip  3. DM2 with hyperglycemia on long term insulin  4. Chronic immunosuppression  5. Constipation  6. Multifactorial anemia  7. History of cirrhosis/hepatitis C virus  8. History of liver transplant   9. Thrombocytopenia    Continue current treatment. Monitor counts. Increase activity. Labs Thursday. Continue IV antibiotics under direction of zheng GARCIA ordered end date 3/28/2021. Maintain patient safety. Wound care per wound care team. Glycemic control. Aggressive therapies as tolerated. Continue pain control efforts - avoiding oversedation. Encourage participation with therapies/ADLs.       Electronically signed by ANDREW Hernandez on 3/15/2021 at 10:37 CDT   I have discussed the care of Gil Monroy, including pertinent history and exam findings, with the nurse practitioner.    I have seen and examined the patient and the key elements of all  parts of the encounter have been performed by me.  I agree with the assessment, plan and orders as documented by ANDREW Triana, after I modified the exam findings and the plan of treatments and the final version is my approved version of the assessment.        Electronically signed by Miguel Shaffer MD on 3/15/2021 at 19:30 CDT

## 2021-03-15 NOTE — PROGRESS NOTES
"[facility] Fall Assessment Note    Gil Monroy is a 50 y.o.male  [Ht: 185.4 cm (73\"); Wt: 119 kg (262 lb 6.4 oz)] admitted 3/5/2021  7:17 PM.    Current medications associated with an increased risk for fall include:  Gabapentin  Levemir  Humalog  Diphenhydramine  Oxycodone    VASILE [facility]  03/15/2112:51 CDT         "

## 2021-03-16 ENCOUNTER — TELEPHONE (OUTPATIENT)
Dept: PRIMARY CARE CLINIC | Age: 51
End: 2021-03-16

## 2021-03-16 LAB
GLUCOSE BLDC GLUCOMTR-MCNC: 165 MG/DL (ref 70–130)
GLUCOSE BLDC GLUCOMTR-MCNC: 188 MG/DL (ref 70–130)
GLUCOSE BLDC GLUCOMTR-MCNC: 204 MG/DL (ref 70–130)
GLUCOSE BLDC GLUCOMTR-MCNC: 300 MG/DL (ref 70–130)

## 2021-03-16 PROCEDURE — 63710000001 TACROLIMUS PER 1 MG: Performed by: INTERNAL MEDICINE

## 2021-03-16 PROCEDURE — 63710000001 PREDNISONE PER 5 MG: Performed by: INTERNAL MEDICINE

## 2021-03-16 PROCEDURE — 63710000001 INSULIN DETEMIR PER 5 UNITS: Performed by: NURSE PRACTITIONER

## 2021-03-16 PROCEDURE — 63710000001 INSULIN LISPRO (HUMAN) PER 5 UNITS: Performed by: INTERNAL MEDICINE

## 2021-03-16 PROCEDURE — 82962 GLUCOSE BLOOD TEST: CPT

## 2021-03-16 PROCEDURE — 97530 THERAPEUTIC ACTIVITIES: CPT

## 2021-03-16 NOTE — PROGRESS NOTES
Edmund Shaffer M.D.  ANDREW Triana           Internal Medicine Progress Note    3/16/2021   12:57 CDT    Name:  Gil Monroy  MRN:    [id number]     Acct:     [id number]   Room:  [address] [place] [address] Day: 0     Admit Date: 3/5/2021  7:17 PM  PCP: Angelia Macdonald    Subjective:     C/C: need for continued IV antibiotic therapy and rehabilitation efforts      Interval History: Status:  stayed the same. Resting in bed. No family at bedside.Woke from sleep. Has had some increased participation with therapies, but has refused thus far today per therapy report. Less erythema to left hip incision today. Afebrile. Blood sugars range 160s-240s.     Review of Systems   Constitutional: Positive for malaise/fatigue. Negative for chills, decreased appetite and fever.   HENT: Negative for congestion, hoarse voice, nosebleeds and sore throat.    Eyes: Negative for blurred vision, discharge, pain and visual disturbance.   Cardiovascular: Negative for chest pain, dyspnea on exertion, irregular heartbeat, leg swelling, orthopnea and palpitations.   Respiratory: Negative for cough, shortness of breath, sputum production and wheezing.    Hematologic/Lymphatic: Negative for bleeding problem. Does not bruise/bleed easily.   Skin: Negative for dry skin, flushing, itching, poor wound healing, rash and suspicious lesions.   Musculoskeletal: Positive for muscle weakness. Negative for arthritis, back pain, falls, joint pain, joint swelling and myalgias.        Left hip pain   Gastrointestinal: Negative for bloating, abdominal pain, change in bowel habit, constipation, diarrhea, flatus, heartburn, melena and nausea.   Genitourinary: Negative for frequency, hesitancy, incomplete emptying, pelvic pain and urgency.   Neurological: Negative for difficulty with concentration, disturbances in coordination, dizziness, headaches, numbness, paresthesias, tremors and weakness.   Psychiatric/Behavioral: Negative for altered mental  status, hallucinations and memory loss. The patient is not nervous/anxious.          Medications:     Allergies: No Known Allergies    Current Meds:   Current Facility-Administered Medications:   •  acetaminophen (TYLENOL) tablet 650 mg, 650 mg, Oral, Q6H PRN, Miguel Shaffer MD  •  ceFAZolin (ANCEF) 1 g/100 mL 0.9% NS IVPB (mbp), 1 g, Intravenous, Q8H, Zev Burris MD  •  dextrose (D50W) 25 g/ 50mL Intravenous Solution 25 g, 25 g, Intravenous, Q15 Min PRN, Miguel Shaffer MD  •  dextrose (GLUTOSE) oral gel 15 g, 15 g, Oral, Q15 Min PRN, Miguel Shaffer MD  •  diphenhydrAMINE (BENADRYL) capsule 25 mg, 25 mg, Oral, Q6H PRN, Miguel Shaffer MD  •  gabapentin (NEURONTIN) capsule 100 mg, 100 mg, Oral, Q8H, Miguel Shaffre MD  •  glucagon (human recombinant) (GLUCAGEN DIAGNOSTIC) injection 1 mg, 1 mg, Subcutaneous, Q15 Min PRN, Miguel Shaffer MD  •  insulin detemir (LEVEMIR) injection 25 Units, 25 Units, Subcutaneous, Nightly, Lolita Crooks, ANDREW  •  insulin lispro (humaLOG) injection 0-9 Units, 0-9 Units, Subcutaneous, TID AC, Miguel Shaffer MD  •  insulin lispro (humaLOG) injection 2-7 Units, 2-7 Units, Subcutaneous, Nightly, Miguel Shaffer MD  •  lidocaine (LIDODERM) 5 % 1 patch, 1 patch, Transdermal, Q24H, Miguel Shaffer MD  •  magnesium hydroxide (MILK OF MAGNESIA) suspension 2400 mg/10mL 30 mL, 30 mL, Oral, Daily PRN, Miguel Shaffer MD  •  oxyCODONE (ROXICODONE) immediate release tablet 20 mg, 20 mg, Oral, Q4H PRN, Lolita Crooks, ANDREW  •  pantoprazole (PROTONIX) EC tablet 40 mg, 40 mg, Oral, Q AM, Miguel Shaffer MD  •  predniSONE (DELTASONE) tablet 10 mg, 10 mg, Oral, BID With Meals, Miguel Shaffer MD  •  saccharomyces boulardii (FLORASTOR) capsule 250 mg, 250 mg, Oral, BID, Miguel Shaffer MD  •  sennosides-docusate (PERICOLACE) 8.6-50 MG per tablet 1 tablet, 1 tablet, Oral, BID, Miguel Shaffer  "MD Shiva  •  sodium chloride 0.9 % flush 10 mL, 10 mL, Intravenous, Q12H, Miguel Shaffer MD  •  sodium chloride 0.9 % flush 10 mL, 10 mL, Intravenous, PRN, Miguel Shaffer MD  •  tacrolimus (PROGRAF) capsule 3 mg, 3 mg, Oral, Q12H, Miguel Shaffer MD  •  vitamin A & D ointment, , Topical, Q12H, Heather Amos APRN  •  vitamin A & D ointment, , Topical, PRN, Heather Amos APRN    Data:     Code Status:    There are no questions and answers to display.       No family history on file.    Social History     Socioeconomic History   • Marital status: Unknown     Spouse name: Not on file   • Number of children: Not on file   • Years of education: Not on file   • Highest education level: Not on file       Vitals:  Ht 185.4 cm (73\")   Wt 119 kg (262 lb 6.4 oz)   BMI 34.62 kg/m²     T 98.3, HR 84, RR 18, /75, SPO2 96% (room air)    I/O (24Hr):  No intake or output data in the 24 hours ending 03/16/21 1257    Labs and imaging:      Recent Results (from the past 12 hour(s))   POC Glucose Once    Collection Time: 03/16/21  7:20 AM    Specimen: Blood   Result Value Ref Range    Glucose 165 (H) 70 - 130 mg/dL   POC Glucose Once    Collection Time: 03/16/21 11:32 AM    Specimen: Blood   Result Value Ref Range    Glucose 188 (H) 70 - 130 mg/dL         Physical Examination:        Physical Exam  Vitals and nursing note reviewed.   Constitutional:       Appearance: Normal appearance. He is well-developed.   HENT:      Head: Normocephalic and atraumatic.      Right Ear: External ear normal.      Left Ear: External ear normal.      Nose: Nose normal.      Mouth/Throat:      Mouth: Mucous membranes are moist.      Pharynx: Oropharynx is clear.   Eyes:      General: Lids are normal.      Conjunctiva/sclera: Conjunctivae normal.      Pupils: Pupils are equal, round, and reactive to light.   Neck:      Trachea: Trachea normal.   Cardiovascular:      Rate and Rhythm: Normal rate and " regular rhythm.      Heart sounds: Normal heart sounds.   Pulmonary:      Effort: Pulmonary effort is normal.      Breath sounds: Normal breath sounds.   Abdominal:      General: Bowel sounds are normal.      Palpations: Abdomen is soft.   Musculoskeletal:         General: Tenderness present. Normal range of motion.      Cervical back: Normal range of motion and neck supple.      Comments: Generalized weakness  Decreased ROM left hip   Skin:     General: Skin is warm and dry.      Findings: Bruising and erythema present. No petechiae or rash.      Nails: There is no clubbing.      Comments: Incision left hip c/d/i with surrounding erythema  Bruising, abrasion to abdomen noted  Petechia present to left forearm    Neurological:      Mental Status: He is alert and oriented to person, place, and time. Mental status is at baseline.      Cranial Nerves: No cranial nerve deficit.      Sensory: No sensory deficit.   Psychiatric:         Speech: Speech normal.         Behavior: Behavior normal. Behavior is cooperative.         Thought Content: Thought content normal.         Judgment: Judgment normal.           Assessment:            Plan:           1. Group B strep bacteremia  2. Septic arthritis left hip  3. DM2 with hyperglycemia on long term insulin  4. Chronic immunosuppression  5. Constipation  6. Multifactorial anemia  7. History of cirrhosis/hepatitis C virus  8. History of liver transplant   9. Thrombocytopenia    Continue current treatment. Monitor counts. Increase activity. Labs Thursday. Continue IV antibiotics through 3/28  under direction of ID. Maintain patient safety. Wound care per wound care team. Glycemic control. Aggressive therapies as tolerated. Continue pain control efforts - avoiding oversedation. Encourage participation with therapies/ADLs.       Electronically signed by ANDREW Hernandez on 3/16/2021 at 12:57 CDT   I have discussed the care of Gil Monroy, including pertinent history and  exam findings, with the nurse practitioner.    I have seen and examined the patient and the key elements of all parts of the encounter have been performed by me.  I agree with the assessment, plan and orders as documented by ANDREW Triana, after I modified the exam findings and the plan of treatments and the final version is my approved version of the assessment.        Electronically signed by Miguel Shaffer MD on 3/16/2021 at 19:29 CDT

## 2021-03-16 NOTE — TELEPHONE ENCOUNTER
SKILLED NURSING FACILITY:   INITIAL CALL POST-HOSPITAL DISCHARGE    SNF: Continue Care @ [facility]    PHONE NUMBER: [phone]     / :    THERAPY: He continues to refuse therapy, he frustrating staff at [address] [place] [address]. ANTICIPATED LENGTH OF STAY:  Per staff he may only be able to stay for 30 days per Medicare requirements.

## 2021-03-17 LAB
GLUCOSE BLDC GLUCOMTR-MCNC: 215 MG/DL (ref 70–130)
GLUCOSE BLDC GLUCOMTR-MCNC: 230 MG/DL (ref 70–130)
GLUCOSE BLDC GLUCOMTR-MCNC: 263 MG/DL (ref 70–130)

## 2021-03-17 PROCEDURE — 63710000001 INSULIN LISPRO (HUMAN) PER 5 UNITS: Performed by: INTERNAL MEDICINE

## 2021-03-17 PROCEDURE — 63710000001 INSULIN DETEMIR PER 5 UNITS: Performed by: INTERNAL MEDICINE

## 2021-03-17 PROCEDURE — 82962 GLUCOSE BLOOD TEST: CPT

## 2021-03-17 PROCEDURE — 63710000001 PREDNISONE PER 5 MG: Performed by: INTERNAL MEDICINE

## 2021-03-17 PROCEDURE — 63710000001 TACROLIMUS PER 1 MG: Performed by: INTERNAL MEDICINE

## 2021-03-17 RX ORDER — PETROLATUM,WHITE/LANOLIN
OINTMENT (GRAM) TOPICAL DAILY
Status: DISCONTINUED | OUTPATIENT
Start: 2021-03-17 | End: 2021-03-25 | Stop reason: HOSPADM

## 2021-03-17 NOTE — PROGRESS NOTES
Edmund Shaffer M.D.  ANDREW Triana           Internal Medicine Progress Note    3/17/2021   09:46 CDT    Name:  Gil Monroy  MRN:    [id number]     Acct:     [id number]   Room:  [facility] Day: 0     Admit Date: 3/5/2021  7:17 PM  PCP: Angelia Macdonald    Subjective:     C/C: need for continued IV antibiotic therapy and rehabilitation efforts      Interval History: Status:  stayed the same. Resting in bed. No family at bedside.Woke from sleep. Has had some increased participation with therapies. Less erythema to left hip incision today, but still with areas of moisture. Afebrile. Blood sugars range 160s-300s. Asking about completing antibiotics at home.     Review of Systems   Constitutional: Positive for malaise/fatigue. Negative for chills, decreased appetite and fever.   HENT: Negative for congestion, hoarse voice, nosebleeds and sore throat.    Eyes: Negative for blurred vision, discharge, pain and visual disturbance.   Cardiovascular: Negative for chest pain, dyspnea on exertion, irregular heartbeat, leg swelling, orthopnea and palpitations.   Respiratory: Negative for cough, shortness of breath, sputum production and wheezing.    Hematologic/Lymphatic: Negative for bleeding problem. Does not bruise/bleed easily.   Skin: Negative for dry skin, flushing, itching, poor wound healing, rash and suspicious lesions.   Musculoskeletal: Positive for muscle weakness. Negative for arthritis, back pain, falls, joint pain, joint swelling and myalgias.        Left hip pain   Gastrointestinal: Negative for bloating, abdominal pain, change in bowel habit, constipation, diarrhea, flatus, heartburn, melena and nausea.   Genitourinary: Negative for frequency, hesitancy, incomplete emptying, pelvic pain and urgency.   Neurological: Negative for difficulty with concentration, disturbances in coordination, dizziness, headaches, numbness, paresthesias, tremors and weakness.   Psychiatric/Behavioral:  Negative for altered mental status, hallucinations and memory loss. The patient is not nervous/anxious.          Medications:     Allergies: No Known Allergies    Current Meds:   Current Facility-Administered Medications:   •  acetaminophen (TYLENOL) tablet 650 mg, 650 mg, Oral, Q6H PRN, Miguel Shaffer MD  •  ceFAZolin (ANCEF) 1 g/100 mL 0.9% NS IVPB (mbp), 1 g, Intravenous, Q8H, Zev Burris MD  •  dextrose (D50W) 25 g/ 50mL Intravenous Solution 25 g, 25 g, Intravenous, Q15 Min PRN, Miguel Shaffer MD  •  dextrose (GLUTOSE) oral gel 15 g, 15 g, Oral, Q15 Min PRN, Miguel Shaffer MD  •  diphenhydrAMINE (BENADRYL) capsule 25 mg, 25 mg, Oral, Q6H PRN, Miguel Shaffer MD  •  gabapentin (NEURONTIN) capsule 100 mg, 100 mg, Oral, Q8H, Miguel Shaffer MD  •  glucagon (human recombinant) (GLUCAGEN DIAGNOSTIC) injection 1 mg, 1 mg, Subcutaneous, Q15 Min PRN, Miguel Shaffer MD  •  insulin detemir (LEVEMIR) injection 25 Units, 25 Units, Subcutaneous, Nightly, Lolita Crooks APRN  •  insulin lispro (humaLOG) injection 0-9 Units, 0-9 Units, Subcutaneous, TID AC, Miguel Shaffer MD  •  insulin lispro (humaLOG) injection 2-7 Units, 2-7 Units, Subcutaneous, Nightly, Miguel Shaffer MD  •  lidocaine (LIDODERM) 5 % 1 patch, 1 patch, Transdermal, Q24H, Miguel Shaffer MD  •  magnesium hydroxide (MILK OF MAGNESIA) suspension 2400 mg/10mL 30 mL, 30 mL, Oral, Daily PRN, Miguel Shaffer MD  •  oxyCODONE (ROXICODONE) immediate release tablet 20 mg, 20 mg, Oral, Q4H PRN, Lolita Crooks, ANDREW  •  pantoprazole (PROTONIX) EC tablet 40 mg, 40 mg, Oral, Q AM, Miguel Shaffer MD  •  predniSONE (DELTASONE) tablet 10 mg, 10 mg, Oral, BID With Meals, Miguel Shaffer MD  •  saccharomyces boulardii (FLORASTOR) capsule 250 mg, 250 mg, Oral, BID, Miguel Shaffer MD  •  sennosides-docusate (PERICOLACE) 8.6-50 MG per tablet 1 tablet, 1 tablet,  "Oral, BID, Miguel Shaffer MD  •  sodium chloride 0.9 % flush 10 mL, 10 mL, Intravenous, Q12H, Miguel Shaffer MD  •  sodium chloride 0.9 % flush 10 mL, 10 mL, Intravenous, PRN, Miguel Shaffer MD  •  tacrolimus (PROGRAF) capsule 3 mg, 3 mg, Oral, Q12H, Miguel Shaffer MD  •  vitamin A & D ointment, , Topical, Q12H, Heather Amos APRN  •  vitamin A & D ointment, , Topical, PRN, Heather Amos APRN    Data:     Code Status:    There are no questions and answers to display.       No family history on file.    Social History     Socioeconomic History   • Marital status: Unknown     Spouse name: Not on file   • Number of children: Not on file   • Years of education: Not on file   • Highest education level: Not on file       Vitals:  Ht 185.4 cm (73\")   Wt 119 kg (262 lb 6.4 oz)   BMI 34.62 kg/m²     T 97.8, HR 83, RR 24, /80, SPO2 98% (room air)    I/O (24Hr):  No intake or output data in the 24 hours ending 03/17/21 0946    Labs and imaging:      No results found for this or any previous visit (from the past 12 hour(s)).      Physical Examination:        Physical Exam  Vitals and nursing note reviewed.   Constitutional:       Appearance: Normal appearance. He is well-developed.   HENT:      Head: Normocephalic and atraumatic.      Right Ear: External ear normal.      Left Ear: External ear normal.      Nose: Nose normal.      Mouth/Throat:      Mouth: Mucous membranes are moist.      Pharynx: Oropharynx is clear.   Eyes:      General: Lids are normal.      Conjunctiva/sclera: Conjunctivae normal.      Pupils: Pupils are equal, round, and reactive to light.   Neck:      Trachea: Trachea normal.   Cardiovascular:      Rate and Rhythm: Normal rate and regular rhythm.      Heart sounds: Normal heart sounds.   Pulmonary:      Effort: Pulmonary effort is normal.      Breath sounds: Normal breath sounds.   Abdominal:      General: Bowel sounds are normal.      " Palpations: Abdomen is soft.   Musculoskeletal:         General: Tenderness present. Normal range of motion.      Cervical back: Normal range of motion and neck supple.      Comments: Generalized weakness  Decreased ROM left hip   Skin:     General: Skin is warm and dry.      Findings: Bruising and erythema present. No petechiae or rash.      Nails: There is no clubbing.      Comments: Incision left hip c/d/i with surrounding erythema  Bruising, abrasion to abdomen noted - dressing in place  Petechia present to left forearm    Neurological:      Mental Status: He is alert and oriented to person, place, and time. Mental status is at baseline.      Cranial Nerves: No cranial nerve deficit.      Sensory: No sensory deficit.   Psychiatric:         Speech: Speech normal.         Behavior: Behavior normal. Behavior is cooperative.         Thought Content: Thought content normal.         Judgment: Judgment normal.           Assessment:            Plan:           1. Group B strep bacteremia  2. Septic arthritis left hip  3. DM2 with hyperglycemia on long term insulin  4. Chronic immunosuppression  5. Constipation  6. Multifactorial anemia  7. History of cirrhosis/hepatitis C virus  8. History of liver transplant   9. Thrombocytopenia    Continue current treatment. Monitor counts. Increase activity. Labs in am. Continue IV antibiotics through 3/28  under direction of ID. Maintain patient safety. Wound care per wound care team. Glycemic control. Aggressive therapies as tolerated. Continue pain control efforts - avoiding oversedation. Encourage participation with therapies/ADLs.       Electronically signed by ANDREW Hernandez on 3/17/2021 at 09:46 CDT   I have discussed the care of Gil Monroy, including pertinent history and exam findings, with the nurse practitioner.    I have seen and examined the patient and the key elements of all parts of the encounter have been performed by me.  I agree with the assessment,  plan and orders as documented by ANDREW Triana, after I modified the exam findings and the plan of treatments and the final version is my approved version of the assessment.        Electronically signed by Miguel Shaffer MD on 3/17/2021 at 18:01 CDT

## 2021-03-18 LAB
ANION GAP SERPL CALCULATED.3IONS-SCNC: 11 MMOL/L (ref 5–15)
BASOPHILS # BLD AUTO: 0.04 10*3/MM3 (ref 0–0.2)
BASOPHILS NFR BLD AUTO: 0.3 % (ref 0–1.5)
BUN SERPL-MCNC: 15 MG/DL (ref 6–20)
BUN/CREAT SERPL: 15 (ref 7–25)
CALCIUM SPEC-SCNC: 8.4 MG/DL (ref 8.6–10.5)
CHLORIDE SERPL-SCNC: 97 MMOL/L (ref 98–107)
CO2 SERPL-SCNC: 24 MMOL/L (ref 22–29)
CREAT SERPL-MCNC: 1 MG/DL (ref 0.76–1.27)
CRP SERPL-MCNC: 12.28 MG/DL (ref 0–0.5)
DEPRECATED RDW RBC AUTO: 41.1 FL (ref 37–54)
EOSINOPHIL # BLD AUTO: 0.06 10*3/MM3 (ref 0–0.4)
EOSINOPHIL NFR BLD AUTO: 0.4 % (ref 0.3–6.2)
ERYTHROCYTE [DISTWIDTH] IN BLOOD BY AUTOMATED COUNT: 13.3 % (ref 12.3–15.4)
ERYTHROCYTE [SEDIMENTATION RATE] IN BLOOD: 49 MM/HR (ref 0–15)
GFR SERPL CREATININE-BSD FRML MDRD: 79 ML/MIN/1.73
GLUCOSE BLDC GLUCOMTR-MCNC: 131 MG/DL (ref 70–130)
GLUCOSE BLDC GLUCOMTR-MCNC: 183 MG/DL (ref 70–130)
GLUCOSE BLDC GLUCOMTR-MCNC: 221 MG/DL (ref 70–130)
GLUCOSE BLDC GLUCOMTR-MCNC: 99 MG/DL (ref 70–130)
GLUCOSE SERPL-MCNC: 209 MG/DL (ref 65–99)
HCT VFR BLD AUTO: 29.8 % (ref 37.5–51)
HGB BLD-MCNC: 10 G/DL (ref 13–17.7)
IMM GRANULOCYTES # BLD AUTO: 0.23 10*3/MM3 (ref 0–0.05)
IMM GRANULOCYTES NFR BLD AUTO: 1.5 % (ref 0–0.5)
LYMPHOCYTES # BLD AUTO: 1.18 10*3/MM3 (ref 0.7–3.1)
LYMPHOCYTES NFR BLD AUTO: 7.8 % (ref 19.6–45.3)
MCH RBC QN AUTO: 28.6 PG (ref 26.6–33)
MCHC RBC AUTO-ENTMCNC: 33.6 G/DL (ref 31.5–35.7)
MCV RBC AUTO: 85.1 FL (ref 79–97)
MONOCYTES # BLD AUTO: 1.21 10*3/MM3 (ref 0.1–0.9)
MONOCYTES NFR BLD AUTO: 8 % (ref 5–12)
NEUTROPHILS NFR BLD AUTO: 12.39 10*3/MM3 (ref 1.7–7)
NEUTROPHILS NFR BLD AUTO: 82 % (ref 42.7–76)
NRBC BLD AUTO-RTO: 0 /100 WBC (ref 0–0.2)
PLATELET # BLD AUTO: 275 10*3/MM3 (ref 140–450)
PMV BLD AUTO: 10.6 FL (ref 6–12)
POTASSIUM SERPL-SCNC: 4.4 MMOL/L (ref 3.5–5.2)
RBC # BLD AUTO: 3.5 10*6/MM3 (ref 4.14–5.8)
SODIUM SERPL-SCNC: 132 MMOL/L (ref 136–145)
WBC # BLD AUTO: 15.11 10*3/MM3 (ref 3.4–10.8)

## 2021-03-18 PROCEDURE — 86140 C-REACTIVE PROTEIN: CPT | Performed by: INTERNAL MEDICINE

## 2021-03-18 PROCEDURE — 63710000001 INSULIN LISPRO (HUMAN) PER 5 UNITS: Performed by: INTERNAL MEDICINE

## 2021-03-18 PROCEDURE — 80048 BASIC METABOLIC PNL TOTAL CA: CPT | Performed by: INTERNAL MEDICINE

## 2021-03-18 PROCEDURE — 63710000001 INSULIN DETEMIR PER 5 UNITS: Performed by: NURSE PRACTITIONER

## 2021-03-18 PROCEDURE — 63710000001 TACROLIMUS PER 1 MG: Performed by: INTERNAL MEDICINE

## 2021-03-18 PROCEDURE — 85025 COMPLETE CBC W/AUTO DIFF WBC: CPT | Performed by: INTERNAL MEDICINE

## 2021-03-18 PROCEDURE — 87493 C DIFF AMPLIFIED PROBE: CPT | Performed by: INTERNAL MEDICINE

## 2021-03-18 PROCEDURE — 85651 RBC SED RATE NONAUTOMATED: CPT | Performed by: INTERNAL MEDICINE

## 2021-03-18 PROCEDURE — 63710000001 PREDNISONE PER 5 MG: Performed by: INTERNAL MEDICINE

## 2021-03-18 PROCEDURE — 82962 GLUCOSE BLOOD TEST: CPT

## 2021-03-18 RX ORDER — OXYCODONE HYDROCHLORIDE 5 MG/1
20 TABLET ORAL EVERY 4 HOURS PRN
Status: DISPENSED | OUTPATIENT
Start: 2021-03-18 | End: 2021-03-25

## 2021-03-18 NOTE — PROGRESS NOTES
Edmund Shaffer M.D.  ANDREW Triana           Internal Medicine Progress Note    3/18/2021   09:51 CDT    Name:  Gil Monroy  MRN:    [id number]     Acct:     [id number]   Room:  2[facility] Day: 0     Admit Date: 3/5/2021  7:17 PM  PCP: Angelia Macdonald    Subjective:     C/C: need for continued IV antibiotic therapy and rehabilitation efforts      Interval History: Status:  stayed the same. Resting in bed. No family at bedside. Has had some increased participation with therapies.  Afebrile. Blood sugars range 130s-260s. Asking about completing antibiotics at home. Has had further dehiscence of left groin wound. Is not getting along with therapies - has refused to work with them, but states that he's getting up and about in his room independently.     Review of Systems   Constitutional: Positive for malaise/fatigue. Negative for chills, decreased appetite and fever.   HENT: Negative for congestion, hoarse voice, nosebleeds and sore throat.    Eyes: Negative for blurred vision, discharge, pain and visual disturbance.   Cardiovascular: Negative for chest pain, dyspnea on exertion, irregular heartbeat, leg swelling, orthopnea and palpitations.   Respiratory: Negative for cough, shortness of breath, sputum production and wheezing.    Hematologic/Lymphatic: Negative for bleeding problem. Does not bruise/bleed easily.   Skin: Negative for dry skin, flushing, itching, poor wound healing, rash and suspicious lesions.   Musculoskeletal: Positive for muscle weakness. Negative for arthritis, back pain, falls, joint pain, joint swelling and myalgias.        Left hip pain   Gastrointestinal: Negative for bloating, abdominal pain, change in bowel habit, constipation, diarrhea, flatus, heartburn, melena and nausea.   Genitourinary: Negative for frequency, hesitancy, incomplete emptying, pelvic pain and urgency.   Neurological: Negative for difficulty with concentration, disturbances in coordination,  dizziness, headaches, numbness, paresthesias, tremors and weakness.   Psychiatric/Behavioral: Negative for altered mental status, hallucinations and memory loss. The patient is not nervous/anxious.          Medications:     Allergies: No Known Allergies    Current Meds:   Current Facility-Administered Medications:   •  acetaminophen (TYLENOL) tablet 650 mg, 650 mg, Oral, Q6H PRN, Miguel Shaffer MD  •  ceFAZolin (ANCEF) 1 g/100 mL 0.9% NS IVPB (mbp), 1 g, Intravenous, Q8H, Zev Burris MD  •  dextrose (D50W) 25 g/ 50mL Intravenous Solution 25 g, 25 g, Intravenous, Q15 Min PRN, Miguel Shaffer MD  •  dextrose (GLUTOSE) oral gel 15 g, 15 g, Oral, Q15 Min PRN, Miguel Shaffer MD  •  diphenhydrAMINE (BENADRYL) capsule 25 mg, 25 mg, Oral, Q6H PRN, Miguel Shaffer MD  •  gabapentin (NEURONTIN) capsule 100 mg, 100 mg, Oral, Q8H, Miguel Shaffer MD  •  glucagon (human recombinant) (GLUCAGEN DIAGNOSTIC) injection 1 mg, 1 mg, Subcutaneous, Q15 Min PRN, Miguel Shaffer MD  •  insulin detemir (LEVEMIR) injection 30 Units, 30 Units, Subcutaneous, Nightly, Miguel Shaffer MD  •  insulin lispro (humaLOG) injection 0-9 Units, 0-9 Units, Subcutaneous, TID AC, Miguel Shaffer MD  •  insulin lispro (humaLOG) injection 2-7 Units, 2-7 Units, Subcutaneous, Nightly, Miguel Shaffer MD  •  lidocaine (LIDODERM) 5 % 1 patch, 1 patch, Transdermal, Q24H, Miguel Shaffer MD  •  magnesium hydroxide (MILK OF MAGNESIA) suspension 2400 mg/10mL 30 mL, 30 mL, Oral, Daily PRN, Miguel Shaffer MD  •  oxyCODONE (ROXICODONE) immediate release tablet 20 mg, 20 mg, Oral, Q4H PRN, Miguel Shaffer MD  •  pantoprazole (PROTONIX) EC tablet 40 mg, 40 mg, Oral, Q AM, Miguel Shaffer MD  •  predniSONE (DELTASONE) tablet 10 mg, 10 mg, Oral, BID With Meals, Miguel Shaffer MD  •  saccharomyces boulardii (FLORASTOR) capsule 250 mg, 250 mg, Oral, BID, Miguel Shaffer  "MD Shiva  •  sennosides-docusate (PERICOLACE) 8.6-50 MG per tablet 1 tablet, 1 tablet, Oral, BID, Miguel Shaffer MD  •  sodium chloride 0.9 % flush 10 mL, 10 mL, Intravenous, Q12H, Miguel Shaffer MD  •  sodium chloride 0.9 % flush 10 mL, 10 mL, Intravenous, PRN, Miguel Shaffer MD  •  tacrolimus (PROGRAF) capsule 3 mg, 3 mg, Oral, Q12H, Miguel Shaffer MD  •  vitamin A & D ointment, , Topical, Q12H, Heather Amos APRN  •  vitamin A & D ointment, , Topical, PRN, Heather Amos APRN  •  vitamin A & D ointment, , Topical, Daily, Miguel Shaffer MD    Data:     Code Status:    There are no questions and answers to display.       No family history on file.    Social History     Socioeconomic History   • Marital status: Unknown     Spouse name: Not on file   • Number of children: Not on file   • Years of education: Not on file   • Highest education level: Not on file       Vitals:  Ht 185.4 cm (73\")   Wt 119 kg (262 lb 6.4 oz)   BMI 34.62 kg/m²     T 98.4, , RR 18, /55, SPO2 97% (room air)    I/O (24Hr):  No intake or output data in the 24 hours ending 03/18/21 0951    Labs and imaging:      Recent Results (from the past 12 hour(s))   Basic Metabolic Panel    Collection Time: 03/18/21  5:30 AM    Specimen: Blood   Result Value Ref Range    Glucose 209 (H) 65 - 99 mg/dL    BUN 15 6 - 20 mg/dL    Creatinine 1.00 0.76 - 1.27 mg/dL    Sodium 132 (L) 136 - 145 mmol/L    Potassium 4.4 3.5 - 5.2 mmol/L    Chloride 97 (L) 98 - 107 mmol/L    CO2 24.0 22.0 - 29.0 mmol/L    Calcium 8.4 (L) 8.6 - 10.5 mg/dL    eGFR Non African Amer 79 >60 mL/min/1.73    BUN/Creatinine Ratio 15.0 7.0 - 25.0    Anion Gap 11.0 5.0 - 15.0 mmol/L   Sedimentation Rate    Collection Time: 03/18/21  5:30 AM    Specimen: Blood   Result Value Ref Range    Sed Rate 49 (H) 0 - 15 mm/hr   C-reactive Protein    Collection Time: 03/18/21  5:30 AM    Specimen: Blood   Result Value Ref Range "    C-Reactive Protein 12.28 (H) 0.00 - 0.50 mg/dL   CBC Auto Differential    Collection Time: 03/18/21  5:30 AM    Specimen: Blood   Result Value Ref Range    WBC 15.11 (H) 3.40 - 10.80 10*3/mm3    RBC 3.50 (L) 4.14 - 5.80 10*6/mm3    Hemoglobin 10.0 (L) 13.0 - 17.7 g/dL    Hematocrit 29.8 (L) 37.5 - 51.0 %    MCV 85.1 79.0 - 97.0 fL    MCH 28.6 26.6 - 33.0 pg    MCHC 33.6 31.5 - 35.7 g/dL    RDW 13.3 12.3 - 15.4 %    RDW-SD 41.1 37.0 - 54.0 fl    MPV 10.6 6.0 - 12.0 fL    Platelets 275 140 - 450 10*3/mm3    Neutrophil % 82.0 (H) 42.7 - 76.0 %    Lymphocyte % 7.8 (L) 19.6 - 45.3 %    Monocyte % 8.0 5.0 - 12.0 %    Eosinophil % 0.4 0.3 - 6.2 %    Basophil % 0.3 0.0 - 1.5 %    Immature Grans % 1.5 (H) 0.0 - 0.5 %    Neutrophils, Absolute 12.39 (H) 1.70 - 7.00 10*3/mm3    Lymphocytes, Absolute 1.18 0.70 - 3.10 10*3/mm3    Monocytes, Absolute 1.21 (H) 0.10 - 0.90 10*3/mm3    Eosinophils, Absolute 0.06 0.00 - 0.40 10*3/mm3    Basophils, Absolute 0.04 0.00 - 0.20 10*3/mm3    Immature Grans, Absolute 0.23 (H) 0.00 - 0.05 10*3/mm3    nRBC 0.0 0.0 - 0.2 /100 WBC   POC Glucose Once    Collection Time: 03/18/21  7:52 AM    Specimen: Blood   Result Value Ref Range    Glucose 131 (H) 70 - 130 mg/dL         Physical Examination:        Physical Exam  Vitals and nursing note reviewed.   Constitutional:       Appearance: Normal appearance. He is well-developed.   HENT:      Head: Normocephalic and atraumatic.      Right Ear: External ear normal.      Left Ear: External ear normal.      Nose: Nose normal.      Mouth/Throat:      Mouth: Mucous membranes are moist.      Pharynx: Oropharynx is clear.   Eyes:      General: Lids are normal.      Conjunctiva/sclera: Conjunctivae normal.      Pupils: Pupils are equal, round, and reactive to light.   Neck:      Trachea: Trachea normal.   Cardiovascular:      Rate and Rhythm: Normal rate and regular rhythm.      Heart sounds: Normal heart sounds.   Pulmonary:      Effort: Pulmonary effort is  normal.      Breath sounds: Normal breath sounds.   Abdominal:      General: Bowel sounds are normal.      Palpations: Abdomen is soft.   Musculoskeletal:         General: Tenderness present. Normal range of motion.      Cervical back: Normal range of motion and neck supple.      Comments: Generalized weakness  Decreased ROM left hip   Skin:     General: Skin is warm and dry.      Findings: Bruising and erythema present. No petechiae or rash.      Nails: There is no clubbing.      Comments: Incision left hip with evidence of dehiscence  Bruising, abrasion to abdomen noted - dressing in place  Petechiae present to left forearm    Neurological:      Mental Status: He is alert and oriented to person, place, and time. Mental status is at baseline.      Cranial Nerves: No cranial nerve deficit.      Sensory: No sensory deficit.   Psychiatric:         Speech: Speech normal.         Behavior: Behavior normal. Behavior is cooperative.         Thought Content: Thought content normal.         Judgment: Judgment normal.           Assessment:            Plan:           1. Group B strep bacteremia  2. Septic arthritis left hip  3. DM2 with hyperglycemia on long term insulin  4. Chronic immunosuppression  5. Constipation  6. Multifactorial anemia  7. History of cirrhosis/hepatitis C virus  8. History of liver transplant   9. Thrombocytopenia    Continue current treatment. Monitor counts. Increase activity. Labs Monday. Continue IV antibiotics through 3/28  under direction of ID. Maintain patient safety. Wound care per wound care team. Glycemic control. Aggressive therapies as tolerated. Continue pain control efforts - avoiding oversedation. Encourage participation with therapies/ADLs.       Electronically signed by ANDREW Hernandez on 3/18/2021 at 09:51 CDT

## 2021-03-18 NOTE — PROGRESS NOTES
"Adult Nutrition  Assessment/PES    Patient Name:  Gil Monroy  YOB: 1970  MRN: [id number]  Admit Date:  3/5/2021    Assessment Date:  3/18/2021    Comments:  Pt reports appetite good. He is on a Consistent Carbohydrate diet with PO intake avg 59% of the last 9 meals over the past 3 days. He declines nutrition supplements , states he doesn't want to drink them anymore.  Encouraged intake and will continue to follow.    Reason for Assessment              Reason for Assessment    Reason For Assessment  follow-up protocol         Nutrition/Diet History              Nutrition/Diet History    Typical Food/Fluid Intake  Pt reports he's in pain so he probably hasn't eaten as much but says his appetite is still good.     Supplemental Drinks/Foods/Additives  no more supplements at bedside, Pt states he drank \"quite a few of them\" offered to reorder them however he declined, states he doesn't want anymore.         Anthropometrics              Usual Body Weight (UBW)    Weight Loss Time Frame  current wt (taken 3/17/21) 263 lbs; loss 8 lbs in the past wk, net loss 25 lbs since admission        Body Mass Index (BMI)    BMI Assessment  BMI 30-34.9: obesity grade I         Labs/Tests/Procedures/Meds              Labs/Procedures/Meds    Lab Results Reviewed  reviewed        Medications    Pertinent Medications Reviewed  reviewed     Pertinent Medications Comments  see MAR         Physical Findings              Physical Findings    Overall Physical Appearance  obese     Gastrointestinal  other (see comments) last BM 3/17     Skin  surgical incision L hip (dehisence) and abdominal wound; Butch Score 22           Nutrition Prescription Ordered              Nutrition Prescription PO    Current PO Diet  Regular     Common Modifiers  Consistent Carbohydrate         Evaluation of Received Nutrient/Fluid Intake              Nutrient/Fluid Evaluation    Number of Days Evaluated  3 days     Additional Documentation  Fluid " Intake Evaluation (Group)        Fluid Intake Evaluation    Oral Fluid (mL)  1480     IV Fluid (mL)  198.12        PO Evaluation    Number of Days PO Intake Evaluated  3 days     Number of Meals  9     % PO Intake  59.44%               Problem/Interventions:  Problem 1              Nutrition Diagnoses Problem 1    Problem 1  Increased Nutrient Needs     Macronutrient  Kcal;Protein     Etiology (related to)  Medical Diagnosis     Hepatic  Other (comment) hx of liver transplant; hx of cirrhosis     Infectious Disease  Other (comment) Group B strep bacteremia; L hip septic arthritis     Skin  Surgical wound     Signs/Symptoms (evidenced by)  Other (comment) to promote wound healing               Intervention Goal              Intervention Goal    General  Nutrition support treatment;Meet nutritional needs for age/condition;Disease management/therapy     PO  Increase intake;Meet estimated needs     Weight  No significant weight loss         Nutrition Intervention              Nutrition Intervention    RD/Tech Action  Follow Tx progress;Care plan reviewd;Encourage intake;Supplement offered/refused           Education/Evaluation              Education    Education  No discharge needs identified at this time        Monitor/Evaluation    Monitor  Per protocol           Electronically signed by:  Vilma Fernandez RDN, CIARRA  03/18/21 15:51 CDT

## 2021-03-19 LAB
C DIFF TOX GENS STL QL NAA+PROBE: POSITIVE
GLUCOSE BLDC GLUCOMTR-MCNC: 112 MG/DL (ref 70–130)
GLUCOSE BLDC GLUCOMTR-MCNC: 160 MG/DL (ref 70–130)
GLUCOSE BLDC GLUCOMTR-MCNC: 193 MG/DL (ref 70–130)
GLUCOSE BLDC GLUCOMTR-MCNC: 96 MG/DL (ref 70–130)

## 2021-03-19 PROCEDURE — 63710000001 PREDNISONE PER 5 MG: Performed by: INTERNAL MEDICINE

## 2021-03-19 PROCEDURE — 25010000002 CEFTRIAXONE: Performed by: INTERNAL MEDICINE

## 2021-03-19 PROCEDURE — 63710000001 INSULIN DETEMIR PER 5 UNITS: Performed by: NURSE PRACTITIONER

## 2021-03-19 PROCEDURE — 63710000001 TACROLIMUS PER 1 MG: Performed by: INTERNAL MEDICINE

## 2021-03-19 PROCEDURE — 63710000001 INSULIN LISPRO (HUMAN) PER 5 UNITS: Performed by: INTERNAL MEDICINE

## 2021-03-19 PROCEDURE — 82962 GLUCOSE BLOOD TEST: CPT

## 2021-03-19 NOTE — PROGRESS NOTES
"Infectious Diseases Progress Note    Patient:  Gil Monroy  YOB: 1970  MRN: [id number]   Admit date: 3/5/2021   Admitting Physician: Miguel Shaffer MD  Primary Care Physician: Angelia Macdonald    Chief Complaint/Interval History: He is complaining of some loose stools.  Bowel movements fairly frequent.  They are not voluminous.  He does not report abdominal pain.  No nausea or vomiting.  He is without fever.  He indicates he is walking to and from the bathroom with his walker.  He is getting around much better.  When he first goes to flex the hip it is sore.  When he is up and moving it feels better.  He clearly has made good strides with ambulation.    No intake or output data in the 24 hours ending 03/18/21 2219  Allergies: No Known Allergies  Current Scheduled Medications:   ceFAZolin, 1 g, Intravenous, Q8H  gabapentin, 100 mg, Oral, Q8H  insulin detemir, 35 Units, Subcutaneous, Nightly  insulin lispro, 0-9 Units, Subcutaneous, TID AC  insulin lispro, 2-7 Units, Subcutaneous, Nightly  lidocaine, 1 patch, Transdermal, Q24H  pantoprazole, 40 mg, Oral, Q AM  predniSONE, 10 mg, Oral, BID With Meals  saccharomyces boulardii, 250 mg, Oral, BID  senna-docusate sodium, 1 tablet, Oral, BID  sodium chloride, 10 mL, Intravenous, Q12H  tacrolimus, 3 mg, Oral, Q12H  vitamin A & D, , Topical, Q12H  vitamin A & D, , Topical, Daily      Current PRN Medications:  •  acetaminophen  •  dextrose  •  dextrose  •  diphenhydrAMINE  •  glucagon (human recombinant)  •  magnesium hydroxide  •  oxyCODONE  •  sodium chloride  •  vitamin A & D    Review of Systems see HPI    Vital Signs:  Ht 185.4 cm (73\")   Wt 119 kg (262 lb 6.4 oz)   BMI 34.62 kg/m²     Physical Exam  Vital signs - reviewed.  Line/IV (PICC) site - No erythema, warmth, induration, or tenderness.  Left hip operative incision without significant erythema or drainage  Abdomen soft.  Bowel sounds present.  No significant tenderness.  No guarding or " rebound.  Skin without rash    Lab Results:  CBC:   Results from last 7 days   Lab Units 03/18/21  0530 03/15/21  0504   WBC 10*3/mm3 15.11* 16.36*   HEMOGLOBIN g/dL 10.0* 9.9*   HEMATOCRIT % 29.8* 30.0*   PLATELETS 10*3/mm3 275 245     BMP:  Results from last 7 days   Lab Units 03/18/21  0530 03/15/21  0504   SODIUM mmol/L 132* 129*   POTASSIUM mmol/L 4.4 4.6   CHLORIDE mmol/L 97* 95*   CO2 mmol/L 24.0 28.0   BUN mg/dL 15 16   CREATININE mg/dL 1.00 0.89   GLUCOSE mg/dL 209* 203*   CALCIUM mg/dL 8.4* 8.6     Radiology: None  Additional Studies Reviewed: None    Impression:   1.  Group B strep bacteremia  2.  Left hip septic arthritis with group B strep  3.  History liver transplantation  4.  Diabetes mellitus  5.  Loose stool-antibiotic associated diarrhea.  We will check for C. difficile.    Recommendations:   He is making provement with discomfort and ambulation  He has had or close to being able to manage at home   Check stool C. difficile  Planning to continue IV antibiotic treatment through March 28, 2021 (4 weeks following irrigation of left hip)  To make it easier for antibiotic administration will change from cefazolin to ceftriaxone 1 g IV daily    Zev George MD

## 2021-03-19 NOTE — PROGRESS NOTES
"Infectious Diseases Progress Note    Patient:  Gil Monroy  YOB: 1970  MRN: [id number]   Admit date: 3/5/2021   Admitting Physician: Miguel Shaffer MD  Primary Care Physician: Angelia Macdonald    Chief Complaint/Interval History: He has some sensation of stool frequency and urgency.  He is not having large bowel movements.  His stool C. difficile has returned positive.  He is ambulating better on his hip than he was previously.  He has been showing improvement.  Good oral intake.    No intake or output data in the 24 hours ending 03/19/21 1319  Allergies: No Known Allergies  Current Scheduled Medications:   cefTRIAXone, 1 g, Intravenous, Q24H  gabapentin, 100 mg, Oral, Q8H  insulin detemir, 35 Units, Subcutaneous, Nightly  insulin lispro, 0-9 Units, Subcutaneous, TID AC  insulin lispro, 2-7 Units, Subcutaneous, Nightly  lidocaine, 1 patch, Transdermal, Q24H  pantoprazole, 40 mg, Oral, Q AM  predniSONE, 10 mg, Oral, BID With Meals  saccharomyces boulardii, 250 mg, Oral, BID  senna-docusate sodium, 1 tablet, Oral, BID  sodium chloride, 10 mL, Intravenous, Q12H  tacrolimus, 3 mg, Oral, Q12H  vancomycin, 125 mg, Oral, Q6H  vitamin A & D, , Topical, Q12H  vitamin A & D, , Topical, Daily      Current PRN Medications:  •  acetaminophen  •  dextrose  •  dextrose  •  diphenhydrAMINE  •  glucagon (human recombinant)  •  magnesium hydroxide  •  oxyCODONE  •  sodium chloride  •  vitamin A & D    Review of Systems see HPI    Vital Signs:  Ht 185.4 cm (73\")   Wt 119 kg (262 lb 6.4 oz)   BMI 34.62 kg/m²     Physical Exam  Vital signs - reviewed.  Line/IV site - No erythema, warmth, induration, or tenderness.  Abdomen is soft and nontender.  No guarding or rebound.  Left hip incision without drainage  He is clearly moving his hip more easily.  He ambulates with walker    Lab Results:  CBC:   Results from last 7 days   Lab Units 03/18/21  0530 03/15/21  0504   WBC 10*3/mm3 15.11* 16.36*   HEMOGLOBIN " g/dL 10.0* 9.9*   HEMATOCRIT % 29.8* 30.0*   PLATELETS 10*3/mm3 275 245     BMP:  Results from last 7 days   Lab Units 03/18/21  0530 03/15/21  0504   SODIUM mmol/L 132* 129*   POTASSIUM mmol/L 4.4 4.6   CHLORIDE mmol/L 97* 95*   CO2 mmol/L 24.0 28.0   BUN mg/dL 15 16   CREATININE mg/dL 1.00 0.89   GLUCOSE mg/dL 209* 203*   CALCIUM mg/dL 8.4* 8.6     Culture Results:   Procedure Abnormality Status   Clostridium Difficile Toxin, PCR - Stool, Per Rectum AbnormalAbnormal   Final result     Radiology: None  Additional Studies Reviewed: None    Impression:   1.  Group B strep bacteremia  2.  Left hip septic arthritis with group B strep  3.  New diagnosis of C. difficile  4.  History of liver transplantation  5.  Diabetes mellitus    Recommendations:   He has had 20 days of intravenous antibiotic therapy  I was planning 4 weeks of intravenous antibiotic treatment given his septic arthritis and immunosuppression  We may need to truncate his antibiotic treatment course given C. difficile development  Recommend we continue ceftriaxone for the next few days  Continue oral vancomycin which was initiated today  If stools improve, will continue with antibiotic treatment  If issues with C. difficile seem to be progressing or not improving, we may need to discontinue his ceftriaxone  Continue physical therapy  Will see again March 22, 2021  Dr. Carrillo available in the interim if worsening or new questions for infectious diseases.  Please contact her in the interim if additional questions arise.    Zev George MD

## 2021-03-19 NOTE — PROGRESS NOTES
Edmund Shaffer M.D.  ANDREW Triana           Internal Medicine Progress Note    3/19/2021   10:15 CDT    Name:  Gil Monroy  MRN:    [id number]     Acct:     [id number]   Room:  [address] [place] [address] Day: 0     Admit Date: 3/5/2021  7:17 PM  PCP: Angelia Macdonald    Subjective:     C/C: need for continued IV antibiotic therapy and rehabilitation efforts      Interval History: Status:  stayed the same. Resting in bed. No family at bedside. Has had some increased participation with therapies.  Afebrile. Blood sugars range 90s-220s.  Has had further dehiscence of left groin wound. Has had some loose stools and stool positive for c. Diff.     Review of Systems   Constitutional: Positive for malaise/fatigue. Negative for chills, decreased appetite and fever.   HENT: Negative for congestion, hoarse voice, nosebleeds and sore throat.    Eyes: Negative for blurred vision, discharge, pain and visual disturbance.   Cardiovascular: Negative for chest pain, dyspnea on exertion, irregular heartbeat, leg swelling, orthopnea and palpitations.   Respiratory: Negative for cough, shortness of breath, sputum production and wheezing.    Hematologic/Lymphatic: Negative for bleeding problem. Does not bruise/bleed easily.   Skin: Negative for dry skin, flushing, itching, poor wound healing, rash and suspicious lesions.   Musculoskeletal: Positive for muscle weakness. Negative for arthritis, back pain, falls, joint pain, joint swelling and myalgias.        Left hip pain   Gastrointestinal: Negative for bloating, abdominal pain, change in bowel habit, constipation, diarrhea, flatus, heartburn, melena and nausea.   Genitourinary: Negative for frequency, hesitancy, incomplete emptying, pelvic pain and urgency.   Neurological: Negative for difficulty with concentration, disturbances in coordination, dizziness, headaches, numbness, paresthesias, tremors and weakness.   Psychiatric/Behavioral: Negative for altered mental  status, hallucinations and memory loss. The patient is not nervous/anxious.          Medications:     Allergies: No Known Allergies    Current Meds:   Current Facility-Administered Medications:   •  acetaminophen (TYLENOL) tablet 650 mg, 650 mg, Oral, Q6H PRN, Miguel Shafefr MD  •  cefTRIAXone (ROCEPHIN) 1 g/100 mL 0.9% NS (MBP), 1 g, Intravenous, Q24H, Zev Burris MD  •  dextrose (D50W) 25 g/ 50mL Intravenous Solution 25 g, 25 g, Intravenous, Q15 Min PRN, Miguel Shaffer MD  •  dextrose (GLUTOSE) oral gel 15 g, 15 g, Oral, Q15 Min PRN, Miguel Shaffer MD  •  diphenhydrAMINE (BENADRYL) capsule 25 mg, 25 mg, Oral, Q6H PRN, Miguel Shaffer MD  •  gabapentin (NEURONTIN) capsule 100 mg, 100 mg, Oral, Q8H, Miguel Shaffer MD  •  glucagon (human recombinant) (GLUCAGEN DIAGNOSTIC) injection 1 mg, 1 mg, Subcutaneous, Q15 Min PRN, Miguel Shaffer MD  •  insulin detemir (LEVEMIR) injection 35 Units, 35 Units, Subcutaneous, Nightly, Lolita Crooks, ANDREW  •  insulin lispro (humaLOG) injection 0-9 Units, 0-9 Units, Subcutaneous, TID AC, Miguel Shaffer MD  •  insulin lispro (humaLOG) injection 2-7 Units, 2-7 Units, Subcutaneous, Nightly, Miguel Shaffer MD  •  lidocaine (LIDODERM) 5 % 1 patch, 1 patch, Transdermal, Q24H, Miguel Shaffer MD  •  magnesium hydroxide (MILK OF MAGNESIA) suspension 2400 mg/10mL 30 mL, 30 mL, Oral, Daily PRN, Miguel Shaffer MD  •  oxyCODONE (ROXICODONE) immediate release tablet 20 mg, 20 mg, Oral, Q4H PRN, Miguel Shaffer MD  •  pantoprazole (PROTONIX) EC tablet 40 mg, 40 mg, Oral, Q AM, Miguel Shaffer MD  •  predniSONE (DELTASONE) tablet 10 mg, 10 mg, Oral, BID With Meals, Miguel Shaffer MD  •  saccharomyces boulardii (FLORASTOR) capsule 250 mg, 250 mg, Oral, BID, Miguel Shaffer MD  •  sennosides-docusate (PERICOLACE) 8.6-50 MG per tablet 1 tablet, 1 tablet, Oral, BID, Miguel Shaffer  "MD Shiva  •  sodium chloride 0.9 % flush 10 mL, 10 mL, Intravenous, Q12H, Miguel Shaffer MD  •  sodium chloride 0.9 % flush 10 mL, 10 mL, Intravenous, PRN, Miguel Shaffer MD  •  tacrolimus (PROGRAF) capsule 3 mg, 3 mg, Oral, Q12H, Miguel Shaffer MD  •  vitamin A & D ointment, , Topical, Q12H, Heather Amos APRN  •  vitamin A & D ointment, , Topical, PRN, Heather Amos APRN  •  vitamin A & D ointment, , Topical, Daily, Miguel Shaffer MD    Data:     Code Status:    There are no questions and answers to display.       No family history on file.    Social History     Socioeconomic History   • Marital status: Unknown     Spouse name: Not on file   • Number of children: Not on file   • Years of education: Not on file   • Highest education level: Not on file       Vitals:  Ht 185.4 cm (73\")   Wt 119 kg (262 lb 6.4 oz)   BMI 34.62 kg/m²     T 97.8, , RR 19, /76, SPO2 98% (room air)    I/O (24Hr):  No intake or output data in the 24 hours ending 03/19/21 1015    Labs and imaging:      Recent Results (from the past 12 hour(s))   Clostridium Difficile Toxin, PCR - Stool, Per Rectum    Collection Time: 03/18/21 11:59 PM    Specimen: Per Rectum; Stool   Result Value Ref Range    C. Difficile Toxins by PCR Positive (A) Negative   POC Glucose Once    Collection Time: 03/19/21  7:50 AM    Specimen: Blood   Result Value Ref Range    Glucose 96 70 - 130 mg/dL         Physical Examination:        Physical Exam  Vitals and nursing note reviewed.   Constitutional:       Appearance: Normal appearance. He is well-developed.   HENT:      Head: Normocephalic and atraumatic.      Right Ear: External ear normal.      Left Ear: External ear normal.      Nose: Nose normal.      Mouth/Throat:      Mouth: Mucous membranes are moist.      Pharynx: Oropharynx is clear.   Eyes:      General: Lids are normal.      Conjunctiva/sclera: Conjunctivae normal.      Pupils: Pupils are " equal, round, and reactive to light.   Neck:      Trachea: Trachea normal.   Cardiovascular:      Rate and Rhythm: Normal rate and regular rhythm.      Heart sounds: Normal heart sounds.   Pulmonary:      Effort: Pulmonary effort is normal.      Breath sounds: Normal breath sounds.   Abdominal:      General: Bowel sounds are normal.      Palpations: Abdomen is soft.   Musculoskeletal:         General: Tenderness present. Normal range of motion.      Cervical back: Normal range of motion and neck supple.      Comments: Generalized weakness  Decreased ROM left hip   Skin:     General: Skin is warm and dry.      Findings: Bruising and erythema present. No petechiae or rash.      Nails: There is no clubbing.      Comments: Incision left hip with evidence of dehiscence  Bruising, abrasion to abdomen noted - dressing in place  Petechiae present to left forearm    Neurological:      Mental Status: He is alert and oriented to person, place, and time. Mental status is at baseline.      Cranial Nerves: No cranial nerve deficit.      Sensory: No sensory deficit.   Psychiatric:         Speech: Speech normal.         Behavior: Behavior normal. Behavior is cooperative.         Thought Content: Thought content normal.         Judgment: Judgment normal.           Assessment:            Plan:           1. Group B strep bacteremia  2. Septic arthritis left hip  3. DM2 with hyperglycemia on long term insulin  4. Chronic immunosuppression  5. Constipation  6. Multifactorial anemia  7. History of cirrhosis/hepatitis C virus  8. History of liver transplant   9. Thrombocytopenia  10. C. diff    Continue current treatment. Monitor counts. Increase activity. Labs Monday. Continue IV antibiotics through 3/28  under direction of ID. Maintain patient safety. Wound care per wound care team. Glycemic control. Aggressive therapies as tolerated. Continue pain control efforts - avoiding oversedation. Encourage participation with therapies/ADLs.  Begin po vancomycin.       Electronically signed by ANDREW Hernandez on 3/19/2021 at 10:15 CDT   I have discussed the care of Gil Monroy, including pertinent history and exam findings, with the nurse practitioner.    I have seen and examined the patient and the key elements of all parts of the encounter have been performed by me.  I agree with the assessment, plan and orders as documented by ANDREW Triana, after I modified the exam findings and the plan of treatments and the final version is my approved version of the assessment.        Electronically signed by Miguel Shaffer MD on 3/19/2021 at 18:47 CDT

## 2021-03-20 LAB
GLUCOSE BLDC GLUCOMTR-MCNC: 107 MG/DL (ref 70–130)
GLUCOSE BLDC GLUCOMTR-MCNC: 176 MG/DL (ref 70–130)
GLUCOSE BLDC GLUCOMTR-MCNC: 187 MG/DL (ref 70–130)
GLUCOSE BLDC GLUCOMTR-MCNC: 80 MG/DL (ref 70–130)

## 2021-03-20 PROCEDURE — 25010000002 CEFTRIAXONE: Performed by: INTERNAL MEDICINE

## 2021-03-20 PROCEDURE — 63710000001 INSULIN LISPRO (HUMAN) PER 5 UNITS: Performed by: INTERNAL MEDICINE

## 2021-03-20 PROCEDURE — 63710000001 TACROLIMUS PER 1 MG: Performed by: INTERNAL MEDICINE

## 2021-03-20 PROCEDURE — 63710000001 INSULIN DETEMIR PER 5 UNITS: Performed by: NURSE PRACTITIONER

## 2021-03-20 PROCEDURE — 63710000001 PREDNISONE PER 5 MG: Performed by: INTERNAL MEDICINE

## 2021-03-20 PROCEDURE — 82962 GLUCOSE BLOOD TEST: CPT

## 2021-03-20 NOTE — PROGRESS NOTES
Edmund Shaffer M.D.  ANDREW Triana           Internal Medicine Progress Note    3/20/2021   10:48 CDT    Name:  Gil Monroy  MRN:    [id number]     Acct:     [id number]   Room:  [address] [place] [address] Day: 0     Admit Date: 3/5/2021  7:17 PM  PCP: Angelia Macdonald    Subjective:     C/C: need for continued IV antibiotic therapy and rehabilitation efforts      Interval History: Status:  stayed the same. Resting in bed. No family at bedside. Has had some increased participation with therapies.  Afebrile. Blood sugars range 90s-220s.  Has had further dehiscence of left groin wound. Has had some loose stools and stool positive for c. Diff.     Review of Systems   Constitutional: Positive for malaise/fatigue. Negative for chills, decreased appetite and fever.   HENT: Negative for congestion, hoarse voice, nosebleeds and sore throat.    Eyes: Negative for blurred vision, discharge, pain and visual disturbance.   Cardiovascular: Negative for chest pain, dyspnea on exertion, irregular heartbeat, leg swelling, orthopnea and palpitations.   Respiratory: Negative for cough, shortness of breath, sputum production and wheezing.    Hematologic/Lymphatic: Negative for bleeding problem. Does not bruise/bleed easily.   Skin: Negative for dry skin, flushing, itching, poor wound healing, rash and suspicious lesions.   Musculoskeletal: Positive for muscle weakness. Negative for arthritis, back pain, falls, joint pain, joint swelling and myalgias.        Left hip pain   Gastrointestinal: Negative for bloating, abdominal pain, change in bowel habit, constipation, diarrhea, flatus, heartburn, melena and nausea.   Genitourinary: Negative for frequency, hesitancy, incomplete emptying, pelvic pain and urgency.   Neurological: Negative for difficulty with concentration, disturbances in coordination, dizziness, headaches, numbness, paresthesias, tremors and weakness.   Psychiatric/Behavioral: Negative for altered mental  status, hallucinations and memory loss. The patient is not nervous/anxious.          Medications:     Allergies: No Known Allergies    Current Meds:   Current Facility-Administered Medications:   •  acetaminophen (TYLENOL) tablet 650 mg, 650 mg, Oral, Q6H PRN, Miguel Shaffer MD  •  cefTRIAXone (ROCEPHIN) 1 g/100 mL 0.9% NS (MBP), 1 g, Intravenous, Q24H, Zev Burris MD  •  dextrose (D50W) 25 g/ 50mL Intravenous Solution 25 g, 25 g, Intravenous, Q15 Min PRN, Miguel Shaffer MD  •  dextrose (GLUTOSE) oral gel 15 g, 15 g, Oral, Q15 Min PRN, Miguel Shaffer MD  •  diphenhydrAMINE (BENADRYL) capsule 25 mg, 25 mg, Oral, Q6H PRN, Miguel Shaffer MD  •  gabapentin (NEURONTIN) capsule 100 mg, 100 mg, Oral, Q8H, Miguel Shaffer MD  •  glucagon (human recombinant) (GLUCAGEN DIAGNOSTIC) injection 1 mg, 1 mg, Subcutaneous, Q15 Min PRN, Miguel Shaffer MD  •  insulin detemir (LEVEMIR) injection 35 Units, 35 Units, Subcutaneous, Nightly, Lolita Crooks, ANDREW  •  insulin lispro (humaLOG) injection 0-9 Units, 0-9 Units, Subcutaneous, TID AC, Miguel Shaffer MD  •  insulin lispro (humaLOG) injection 2-7 Units, 2-7 Units, Subcutaneous, Nightly, Miguel Shaffer MD  •  lidocaine (LIDODERM) 5 % 1 patch, 1 patch, Transdermal, Q24H, Miguel Shaffer MD  •  magnesium hydroxide (MILK OF MAGNESIA) suspension 2400 mg/10mL 30 mL, 30 mL, Oral, Daily PRN, Miguel Shaffer MD  •  oxyCODONE (ROXICODONE) immediate release tablet 20 mg, 20 mg, Oral, Q4H PRN, Miguel Shaffer MD  •  pantoprazole (PROTONIX) EC tablet 40 mg, 40 mg, Oral, Q AM, Miguel Shaffer MD  •  predniSONE (DELTASONE) tablet 10 mg, 10 mg, Oral, BID With Meals, Miguel Shaffer MD  •  saccharomyces boulardii (FLORASTOR) capsule 250 mg, 250 mg, Oral, BID, Miguel Shaffer MD  •  sennosides-docusate (PERICOLACE) 8.6-50 MG per tablet 1 tablet, 1 tablet, Oral, BID, Miguel Shaffer  "MD Shiva  •  sodium chloride 0.9 % flush 10 mL, 10 mL, Intravenous, Q12H, Miguel Shaffer MD  •  sodium chloride 0.9 % flush 10 mL, 10 mL, Intravenous, PRN, Miguel Shaffer MD  •  tacrolimus (PROGRAF) capsule 3 mg, 3 mg, Oral, Q12H, Miguel Shaffer MD  •  vancomycin oral solution reconstituted 125 mg, 125 mg, Oral, Q6H, Miguel Shaffer MD  •  vitamin A & D ointment, , Topical, Q12H, Heather Amos APRN  •  vitamin A & D ointment, , Topical, PRN, Heather Amos APRN  •  vitamin A & D ointment, , Topical, Daily, Miguel Shaffer MD    Data:     Code Status:    There are no questions and answers to display.       No family history on file.    Social History     Socioeconomic History   • Marital status: Unknown     Spouse name: Not on file   • Number of children: Not on file   • Years of education: Not on file   • Highest education level: Not on file       Vitals:  Ht 185.4 cm (73\")   Wt 119 kg (262 lb 6.4 oz)   BMI 34.62 kg/m²     T 97.3, HR 95, RR 16, /76, SPO2 97% (room air)    I/O (24Hr):  No intake or output data in the 24 hours ending 03/20/21 1048    Labs and imaging:      Recent Results (from the past 12 hour(s))   POC Glucose Once    Collection Time: 03/19/21 10:51 PM    Specimen: Blood   Result Value Ref Range    Glucose 160 (H) 70 - 130 mg/dL   POC Glucose Once    Collection Time: 03/20/21  7:40 AM    Specimen: Blood   Result Value Ref Range    Glucose 80 70 - 130 mg/dL         Physical Examination:        Physical Exam  Vitals and nursing note reviewed.   Constitutional:       Appearance: Normal appearance. He is well-developed.   HENT:      Head: Normocephalic and atraumatic.      Right Ear: External ear normal.      Left Ear: External ear normal.      Nose: Nose normal.      Mouth/Throat:      Mouth: Mucous membranes are moist.      Pharynx: Oropharynx is clear.   Eyes:      General: Lids are normal.      Conjunctiva/sclera: Conjunctivae " normal.      Pupils: Pupils are equal, round, and reactive to light.   Neck:      Trachea: Trachea normal.   Cardiovascular:      Rate and Rhythm: Normal rate and regular rhythm.      Heart sounds: Normal heart sounds.   Pulmonary:      Effort: Pulmonary effort is normal.      Breath sounds: Normal breath sounds.   Abdominal:      General: Bowel sounds are normal.      Palpations: Abdomen is soft.   Musculoskeletal:         General: Tenderness present. Normal range of motion.      Cervical back: Normal range of motion and neck supple.      Comments: Generalized weakness  Decreased ROM left hip   Skin:     General: Skin is warm and dry.      Findings: Bruising and erythema present. No petechiae or rash.      Nails: There is no clubbing.      Comments: Incision left hip with evidence of dehiscence  Bruising, abrasion to abdomen noted - dressing in place  Petechiae present to left forearm    Neurological:      Mental Status: He is alert and oriented to person, place, and time. Mental status is at baseline.      Cranial Nerves: No cranial nerve deficit.      Sensory: No sensory deficit.   Psychiatric:         Speech: Speech normal.         Behavior: Behavior normal. Behavior is cooperative.         Thought Content: Thought content normal.         Judgment: Judgment normal.           Assessment:            Plan:           1. Group B strep bacteremia  2. Septic arthritis left hip  3. DM2 with hyperglycemia on long term insulin  4. Chronic immunosuppression  5. Constipation  6. Multifactorial anemia  7. History of cirrhosis/hepatitis C virus  8. History of liver transplant   9. Thrombocytopenia  10. C. diff    Continue current treatment. Monitor counts. Increase activity. Labs Monday. Continue IV antibiotics through 3/28  under direction of ID. Maintain patient safety. Wound care per wound care team. Glycemic control. Aggressive therapies as tolerated. Continue pain control efforts - avoiding oversedation. Encourage  participation with therapies/ADLs.    Electronically signed by Miguel Shaffer MD on 3/20/2021 at 10:48 CDT   DESTINEE MORALES

## 2021-03-21 LAB
GLUCOSE BLDC GLUCOMTR-MCNC: 165 MG/DL (ref 70–130)
GLUCOSE BLDC GLUCOMTR-MCNC: 74 MG/DL (ref 70–130)
GLUCOSE BLDC GLUCOMTR-MCNC: 79 MG/DL (ref 70–130)

## 2021-03-21 PROCEDURE — 63710000001 INSULIN DETEMIR PER 5 UNITS: Performed by: NURSE PRACTITIONER

## 2021-03-21 PROCEDURE — 63710000001 INSULIN LISPRO (HUMAN) PER 5 UNITS: Performed by: INTERNAL MEDICINE

## 2021-03-21 PROCEDURE — 63710000001 PREDNISONE PER 5 MG: Performed by: INTERNAL MEDICINE

## 2021-03-21 PROCEDURE — 63710000001 TACROLIMUS PER 1 MG: Performed by: INTERNAL MEDICINE

## 2021-03-21 PROCEDURE — 82962 GLUCOSE BLOOD TEST: CPT

## 2021-03-21 PROCEDURE — 25010000002 CEFTRIAXONE: Performed by: INTERNAL MEDICINE

## 2021-03-21 NOTE — PROGRESS NOTES
Edmund Shaffer M.D.  ANDREW Triana           Internal Medicine Progress Note    3/21/2021   15:48 CDT    Name:  Gil Monroy  MRN:    [id number]     Acct:     [id number]   Room:  [address] [place] [address] Day: 0     Admit Date: 3/5/2021  7:17 PM  PCP: Angelia Macdonald    Subjective:     C/C: need for continued IV antibiotic therapy and rehabilitation efforts      Interval History: Status:  States that he feels better today and believes legs are less swollen.. Resting in bed. No family at bedside. Reports that he has been more active today.  Left groin wound remains red. Has had increased gas     Review of Systems   Constitutional: Positive for malaise/fatigue. Negative for chills, decreased appetite and fever.   HENT: Negative for congestion, hoarse voice, nosebleeds and sore throat.    Eyes: Negative for blurred vision, discharge, pain and visual disturbance.   Cardiovascular: Negative for chest pain, dyspnea on exertion, irregular heartbeat, leg swelling, orthopnea and palpitations.   Respiratory: Negative for cough, shortness of breath, sputum production and wheezing.    Hematologic/Lymphatic: Negative for bleeding problem. Does not bruise/bleed easily.   Skin: Negative for dry skin, flushing, itching, poor wound healing, rash and suspicious lesions.   Musculoskeletal: Positive for muscle weakness. Negative for arthritis, back pain, falls, joint pain, joint swelling and myalgias.        Left hip pain   Gastrointestinal: Positive for flatus. Negative for bloating, abdominal pain, change in bowel habit, constipation, diarrhea, heartburn, melena and nausea.   Genitourinary: Negative for frequency, hesitancy, incomplete emptying, pelvic pain and urgency.   Neurological: Negative for difficulty with concentration, disturbances in coordination, dizziness, headaches, numbness, paresthesias, tremors and weakness.   Psychiatric/Behavioral: Negative for altered mental status, hallucinations and memory loss.  The patient is not nervous/anxious.          Medications:     Allergies: No Known Allergies    Current Meds:   Current Facility-Administered Medications:   •  acetaminophen (TYLENOL) tablet 650 mg, 650 mg, Oral, Q6H PRN, Miguel Shaffer MD  •  cefTRIAXone (ROCEPHIN) 1 g/100 mL 0.9% NS (MBP), 1 g, Intravenous, Q24H, Zev Burris MD  •  dextrose (D50W) 25 g/ 50mL Intravenous Solution 25 g, 25 g, Intravenous, Q15 Min PRN, Miguel Shaffer MD  •  dextrose (GLUTOSE) oral gel 15 g, 15 g, Oral, Q15 Min PRN, Miguel Shaffer MD  •  diphenhydrAMINE (BENADRYL) capsule 25 mg, 25 mg, Oral, Q6H PRN, Miguel Shaffer MD  •  gabapentin (NEURONTIN) capsule 100 mg, 100 mg, Oral, Q8H, Miguel Shaffer MD  •  glucagon (human recombinant) (GLUCAGEN DIAGNOSTIC) injection 1 mg, 1 mg, Subcutaneous, Q15 Min PRN, Miguel Shaffer MD  •  insulin detemir (LEVEMIR) injection 35 Units, 35 Units, Subcutaneous, Nightly, Lolita Crooks APRN  •  insulin lispro (humaLOG) injection 0-9 Units, 0-9 Units, Subcutaneous, TID AC, Miguel Shaffer MD  •  insulin lispro (humaLOG) injection 2-7 Units, 2-7 Units, Subcutaneous, Nightly, Miguel Shaffer MD  •  lidocaine (LIDODERM) 5 % 1 patch, 1 patch, Transdermal, Q24H, Miguel Shaffer MD  •  magnesium hydroxide (MILK OF MAGNESIA) suspension 2400 mg/10mL 30 mL, 30 mL, Oral, Daily PRN, Miguel Shaffer MD  •  oxyCODONE (ROXICODONE) immediate release tablet 20 mg, 20 mg, Oral, Q4H PRN, Miguel Shaffer MD  •  pantoprazole (PROTONIX) EC tablet 40 mg, 40 mg, Oral, Q AM, Miguel Shaffer MD  •  predniSONE (DELTASONE) tablet 10 mg, 10 mg, Oral, BID With Meals, Miguel Shaffer MD  •  saccharomyces boulardii (FLORASTOR) capsule 250 mg, 250 mg, Oral, BID, Miguel Shaffer MD  •  sennosides-docusate (PERICOLACE) 8.6-50 MG per tablet 1 tablet, 1 tablet, Oral, BID, Miguel Shaffer MD  •  sodium chloride 0.9 % flush 10  "mL, 10 mL, Intravenous, Q12H, Miguel Shaffer MD  •  sodium chloride 0.9 % flush 10 mL, 10 mL, Intravenous, PRN, Miguel Shaffer MD  •  tacrolimus (PROGRAF) capsule 3 mg, 3 mg, Oral, Q12H, Migeul Shaffer MD  •  vancomycin oral solution reconstituted 125 mg, 125 mg, Oral, Q6H, Miguel Shaffer MD  •  vitamin A & D ointment, , Topical, Q12H, Heather Amos APRN  •  vitamin A & D ointment, , Topical, PRN, Heather Amos APRN  •  vitamin A & D ointment, , Topical, Daily, Miguel Shaffer MD    Data:     Code Status:    There are no questions and answers to display.       No family history on file.    Social History     Socioeconomic History   • Marital status: Unknown     Spouse name: Not on file   • Number of children: Not on file   • Years of education: Not on file   • Highest education level: Not on file       Vitals:  Ht 185.4 cm (73\")   Wt 119 kg (262 lb 6.4 oz)   BMI 34.62 kg/m²     T 97.8, HR 99, RR 14, /89, SPO2  100% (room air)    I/O (24Hr):  No intake or output data in the 24 hours ending 03/21/21 1548    Labs and imaging:      Recent Results (from the past 12 hour(s))   POC Glucose Once    Collection Time: 03/21/21  7:36 AM    Specimen: Blood   Result Value Ref Range    Glucose 79 70 - 130 mg/dL   POC Glucose Once    Collection Time: 03/21/21 11:33 AM    Specimen: Blood   Result Value Ref Range    Glucose 74 70 - 130 mg/dL         Physical Examination:        Physical Exam  Vitals and nursing note reviewed.   Constitutional:       Appearance: Normal appearance. He is well-developed.   HENT:      Head: Normocephalic and atraumatic.      Right Ear: External ear normal.      Left Ear: External ear normal.      Nose: Nose normal.      Mouth/Throat:      Mouth: Mucous membranes are moist.      Pharynx: Oropharynx is clear.   Eyes:      General: Lids are normal.      Conjunctiva/sclera: Conjunctivae normal.      Pupils: Pupils are equal, round, and " reactive to light.   Neck:      Trachea: Trachea normal.   Cardiovascular:      Rate and Rhythm: Normal rate and regular rhythm.      Heart sounds: Normal heart sounds.   Pulmonary:      Effort: Pulmonary effort is normal.      Breath sounds: Normal breath sounds.   Abdominal:      General: Bowel sounds are normal.      Palpations: Abdomen is soft.   Musculoskeletal:         General: Tenderness present. Normal range of motion.      Cervical back: Normal range of motion and neck supple.      Comments: Generalized weakness  Decreased ROM left hip   Skin:     General: Skin is warm and dry.      Findings: Bruising and erythema present. No petechiae or rash.      Nails: There is no clubbing.      Comments: Incision left hip with evidence of dehiscence  Bruising, abrasion to abdomen noted - dressing in place  Petechiae present to left forearm    Neurological:      Mental Status: He is alert and oriented to person, place, and time. Mental status is at baseline.      Cranial Nerves: No cranial nerve deficit.      Sensory: No sensory deficit.   Psychiatric:         Speech: Speech normal.         Behavior: Behavior normal. Behavior is cooperative.         Thought Content: Thought content normal.         Judgment: Judgment normal.           Assessment:            Plan:           1. Group B strep bacteremia  2. Septic arthritis left hip  3. DM2 with hyperglycemia on long term insulin  4. Chronic immunosuppression  5. Constipation  6. Multifactorial anemia  7. History of cirrhosis/hepatitis C virus  8. History of liver transplant   9. Thrombocytopenia  10. C. diff    Continue current treatment. Monitor counts. Increase activity. Labs Monday. Continue IV antibiotics through 3/28  under direction of ID. Maintain patient safety. Wound care per wound care team. Glycemic control. Aggressive therapies as tolerated. Continue pain control efforts - avoiding oversedation. Continue to encourage therapy and movement.    Electronically  signed by Miguel Shaffer MD on 3/21/2021 at 15:48 CDT   DESTINEE MORALES

## 2021-03-22 LAB
ANION GAP SERPL CALCULATED.3IONS-SCNC: 9 MMOL/L (ref 5–15)
BASOPHILS # BLD AUTO: 0.03 10*3/MM3 (ref 0–0.2)
BASOPHILS NFR BLD AUTO: 0.2 % (ref 0–1.5)
BUN SERPL-MCNC: 23 MG/DL (ref 6–20)
BUN/CREAT SERPL: 21.7 (ref 7–25)
CALCIUM SPEC-SCNC: 8.1 MG/DL (ref 8.6–10.5)
CHLORIDE SERPL-SCNC: 99 MMOL/L (ref 98–107)
CO2 SERPL-SCNC: 25 MMOL/L (ref 22–29)
CREAT SERPL-MCNC: 1.06 MG/DL (ref 0.76–1.27)
CRP SERPL-MCNC: 14.44 MG/DL (ref 0–0.5)
DEPRECATED RDW RBC AUTO: 40.5 FL (ref 37–54)
EOSINOPHIL # BLD AUTO: 0.1 10*3/MM3 (ref 0–0.4)
EOSINOPHIL NFR BLD AUTO: 0.7 % (ref 0.3–6.2)
ERYTHROCYTE [DISTWIDTH] IN BLOOD BY AUTOMATED COUNT: 13.2 % (ref 12.3–15.4)
ERYTHROCYTE [SEDIMENTATION RATE] IN BLOOD: 55 MM/HR (ref 0–15)
GFR SERPL CREATININE-BSD FRML MDRD: 74 ML/MIN/1.73
GLUCOSE BLDC GLUCOMTR-MCNC: 130 MG/DL (ref 70–130)
GLUCOSE BLDC GLUCOMTR-MCNC: 158 MG/DL (ref 70–130)
GLUCOSE BLDC GLUCOMTR-MCNC: 197 MG/DL (ref 70–130)
GLUCOSE BLDC GLUCOMTR-MCNC: 257 MG/DL (ref 70–130)
GLUCOSE SERPL-MCNC: 158 MG/DL (ref 65–99)
HCT VFR BLD AUTO: 29 % (ref 37.5–51)
HGB BLD-MCNC: 9.7 G/DL (ref 13–17.7)
IMM GRANULOCYTES # BLD AUTO: 0.22 10*3/MM3 (ref 0–0.05)
IMM GRANULOCYTES NFR BLD AUTO: 1.5 % (ref 0–0.5)
LYMPHOCYTES # BLD AUTO: 0.92 10*3/MM3 (ref 0.7–3.1)
LYMPHOCYTES NFR BLD AUTO: 6.4 % (ref 19.6–45.3)
MCH RBC QN AUTO: 28 PG (ref 26.6–33)
MCHC RBC AUTO-ENTMCNC: 33.4 G/DL (ref 31.5–35.7)
MCV RBC AUTO: 83.8 FL (ref 79–97)
MONOCYTES # BLD AUTO: 0.76 10*3/MM3 (ref 0.1–0.9)
MONOCYTES NFR BLD AUTO: 5.3 % (ref 5–12)
NEUTROPHILS NFR BLD AUTO: 12.31 10*3/MM3 (ref 1.7–7)
NEUTROPHILS NFR BLD AUTO: 85.9 % (ref 42.7–76)
NRBC BLD AUTO-RTO: 0 /100 WBC (ref 0–0.2)
PLATELET # BLD AUTO: 265 10*3/MM3 (ref 140–450)
PMV BLD AUTO: 10.1 FL (ref 6–12)
POTASSIUM SERPL-SCNC: 4.6 MMOL/L (ref 3.5–5.2)
RBC # BLD AUTO: 3.46 10*6/MM3 (ref 4.14–5.8)
SODIUM SERPL-SCNC: 133 MMOL/L (ref 136–145)
WBC # BLD AUTO: 14.34 10*3/MM3 (ref 3.4–10.8)

## 2021-03-22 PROCEDURE — 63710000001 INSULIN DETEMIR PER 5 UNITS: Performed by: NURSE PRACTITIONER

## 2021-03-22 PROCEDURE — 63710000001 PREDNISONE PER 5 MG: Performed by: INTERNAL MEDICINE

## 2021-03-22 PROCEDURE — 85025 COMPLETE CBC W/AUTO DIFF WBC: CPT | Performed by: INTERNAL MEDICINE

## 2021-03-22 PROCEDURE — 63710000001 INSULIN LISPRO (HUMAN) PER 5 UNITS: Performed by: INTERNAL MEDICINE

## 2021-03-22 PROCEDURE — 85651 RBC SED RATE NONAUTOMATED: CPT | Performed by: INTERNAL MEDICINE

## 2021-03-22 PROCEDURE — 25010000002 CEFTRIAXONE: Performed by: INTERNAL MEDICINE

## 2021-03-22 PROCEDURE — 82962 GLUCOSE BLOOD TEST: CPT

## 2021-03-22 PROCEDURE — 63710000001 TACROLIMUS PER 1 MG: Performed by: INTERNAL MEDICINE

## 2021-03-22 PROCEDURE — 86140 C-REACTIVE PROTEIN: CPT | Performed by: INTERNAL MEDICINE

## 2021-03-22 PROCEDURE — 80048 BASIC METABOLIC PNL TOTAL CA: CPT | Performed by: INTERNAL MEDICINE

## 2021-03-22 RX ORDER — DIAPER,BRIEF,INFANT-TODD,DISP
EACH MISCELLANEOUS
Status: DISCONTINUED | OUTPATIENT
Start: 2021-03-22 | End: 2021-03-25 | Stop reason: HOSPADM

## 2021-03-22 NOTE — PROGRESS NOTES
"Infectious Diseases Progress Note    Patient:  Gil Monroy  YOB: 1970  MRN: [id number]   Admit date: 3/5/2021   Admitting Physician: Miguel Shaffer MD  Primary Care Physician: Angelia Macdonald    Chief Complaint/Interval History: He feels okay.  Stool frequency has improved.  He is moving his hip much better.  He is hoping to go home soon.  He is tolerating oral intake.  No nausea, vomiting, or abdominal pain.    No intake or output data in the 24 hours ending 03/22/21 0758  Allergies: No Known Allergies  Current Scheduled Medications:   cefTRIAXone, 1 g, Intravenous, Q24H  gabapentin, 100 mg, Oral, Q8H  insulin detemir, 35 Units, Subcutaneous, Nightly  insulin lispro, 0-9 Units, Subcutaneous, TID AC  insulin lispro, 2-7 Units, Subcutaneous, Nightly  lidocaine, 1 patch, Transdermal, Q24H  pantoprazole, 40 mg, Oral, Q AM  predniSONE, 10 mg, Oral, BID With Meals  saccharomyces boulardii, 250 mg, Oral, BID  senna-docusate sodium, 1 tablet, Oral, BID  sodium chloride, 10 mL, Intravenous, Q12H  tacrolimus, 3 mg, Oral, Q12H  vancomycin, 125 mg, Oral, Q6H  vitamin A & D, , Topical, Q12H  vitamin A & D, , Topical, Daily      Current PRN Medications:  •  acetaminophen  •  dextrose  •  dextrose  •  diphenhydrAMINE  •  glucagon (human recombinant)  •  magnesium hydroxide  •  oxyCODONE  •  sodium chloride  •  vitamin A & D    Review of Systems see HPI    Vital Signs:  Ht 185.4 cm (73\")   Wt 119 kg (262 lb 6.4 oz)   BMI 34.62 kg/m²     Physical Exam  Vital signs - reviewed.  Line/IV site - No erythema, warmth, induration, or tenderness.  Abdomen soft and nontender  Bowel sounds present  He was able to flex and extend at the left hip while lying in the bed.  He is moving much better.  He was doing this without significant limitation.  Previously attempts to begin to flex at the left hip caused him discomfort.  He has been up and ambulatory    Lab Results:  CBC:   Results from last 7 days   Lab Units " 03/22/21  0550 03/18/21  0530   WBC 10*3/mm3 14.34* 15.11*   HEMOGLOBIN g/dL 9.7* 10.0*   HEMATOCRIT % 29.0* 29.8*   PLATELETS 10*3/mm3 265 275     BMP:  Results from last 7 days   Lab Units 03/22/21  0550 03/18/21  0530   SODIUM mmol/L 133* 132*   POTASSIUM mmol/L 4.6 4.4   CHLORIDE mmol/L 99 97*   CO2 mmol/L 25.0 24.0   BUN mg/dL 23* 15   CREATININE mg/dL 1.06 1.00   GLUCOSE mg/dL 158* 209*   CALCIUM mg/dL 8.1* 8.4*     Sedimentation rate 55  CRP 14  CBC today white blood cell count 14.3, hemoglobin 9.7, platelet 265    Radiology: None  Additional Studies Reviewed: None    Impression:   1.  Group B strep bacteremia  2.  Left hip septic arthritis with group B strep  3.  C. difficile  4.  History liver transplantation  5.  Diabetes mellitus    Recommendations:   Seems to be improving  Continue ceftriaxone  Continue oral vancomycin  Depending on clinical course I might truncate his IV treatment slightly and look for possible discharge midweek if okay with others    Zev George MD

## 2021-03-22 NOTE — PROGRESS NOTES
"LT[facility] Fall Assessment Note    Gil Monroy is a 50 y.o.male  [Ht: 185.4 cm (73\"); Wt: 119 kg (262 lb 6.4 oz)] admitted 3/5/2021  7:17 PM.    Current medications associated with an increased risk for fall include:  Gabapentin  Levemir  Humalog  Diphenhydramine  Oxycodone    VASILE Stafford [facility]  03/22/2112:23 CDT         "

## 2021-03-22 NOTE — PROGRESS NOTES
Edmund Shaffer M.D.  ANDREW Triana           Internal Medicine Progress Note    3/22/2021   13:00 CDT    Name:  Gil Monroy  MRN:    [id number]     Acct:     [id number]   Room:  [address] [place] [address] Day: 0     Admit Date: 3/5/2021  7:17 PM  PCP: Angelia Macdonald    Subjective:     C/C: need for continued IV antibiotic therapy and rehabilitation efforts      Interval History: Status: improved. Resting in bed.  No family at bedside. Afebrile. Counts stable. WBC continues slow trend toward normal. Tolerating treatment. Reports no diarrhea stools overnight. Reports that he is ambulating more and needing less assistance. Anxious for discharge.     Review of Systems   Constitutional: Positive for malaise/fatigue. Negative for chills, decreased appetite and fever.   HENT: Negative for congestion, hoarse voice, nosebleeds and sore throat.    Eyes: Negative for blurred vision, discharge, pain and visual disturbance.   Cardiovascular: Negative for chest pain, dyspnea on exertion, irregular heartbeat, leg swelling, orthopnea and palpitations.   Respiratory: Negative for cough, shortness of breath, sputum production and wheezing.    Hematologic/Lymphatic: Negative for bleeding problem. Does not bruise/bleed easily.   Skin: Negative for dry skin, flushing, itching, poor wound healing, rash and suspicious lesions.   Musculoskeletal: Positive for muscle weakness. Negative for arthritis, back pain, falls, joint pain, joint swelling and myalgias.        Left hip pain   Gastrointestinal: Positive for flatus. Negative for bloating, abdominal pain, change in bowel habit, constipation, diarrhea, heartburn, melena and nausea.   Genitourinary: Negative for frequency, hesitancy, incomplete emptying, pelvic pain and urgency.   Neurological: Negative for difficulty with concentration, disturbances in coordination, dizziness, headaches, numbness, paresthesias, tremors and weakness.   Psychiatric/Behavioral: Negative for  altered mental status, hallucinations and memory loss. The patient is not nervous/anxious.          Medications:     Allergies: No Known Allergies    Current Meds:   Current Facility-Administered Medications:   •  acetaminophen (TYLENOL) tablet 650 mg, 650 mg, Oral, Q6H PRN, Miguel Shaffer MD  •  cefTRIAXone (ROCEPHIN) 1 g/100 mL 0.9% NS (MBP), 1 g, Intravenous, Q24H, Zev Burris MD  •  dextrose (D50W) 25 g/ 50mL Intravenous Solution 25 g, 25 g, Intravenous, Q15 Min PRN, Miguel Shaffer MD  •  dextrose (GLUTOSE) oral gel 15 g, 15 g, Oral, Q15 Min PRN, Miguel Shafefr MD  •  diphenhydrAMINE (BENADRYL) capsule 25 mg, 25 mg, Oral, Q6H PRN, Miguel Shaffer MD  •  gabapentin (NEURONTIN) capsule 100 mg, 100 mg, Oral, Q8H, Miguel Shaffer MD  •  glucagon (human recombinant) (GLUCAGEN DIAGNOSTIC) injection 1 mg, 1 mg, Subcutaneous, Q15 Min PRN, Miguel Shaffer MD  •  insulin detemir (LEVEMIR) injection 35 Units, 35 Units, Subcutaneous, Nightly, Lolita Crooks, ANDREW  •  insulin lispro (humaLOG) injection 0-9 Units, 0-9 Units, Subcutaneous, TID AC, Miguel Shaffer MD  •  insulin lispro (humaLOG) injection 2-7 Units, 2-7 Units, Subcutaneous, Nightly, Miguel Shaffer MD  •  lidocaine (LIDODERM) 5 % 1 patch, 1 patch, Transdermal, Q24H, Miguel Shaffer MD  •  magnesium hydroxide (MILK OF MAGNESIA) suspension 2400 mg/10mL 30 mL, 30 mL, Oral, Daily PRN, Miguel Shaffer MD  •  oxyCODONE (ROXICODONE) immediate release tablet 20 mg, 20 mg, Oral, Q4H PRN, Miguel Shaffer MD  •  pantoprazole (PROTONIX) EC tablet 40 mg, 40 mg, Oral, Q AM, Miguel Shaffer MD  •  predniSONE (DELTASONE) tablet 10 mg, 10 mg, Oral, BID With Meals, Miguel Shaffer MD  •  saccharomyces boulardii (FLORASTOR) capsule 250 mg, 250 mg, Oral, BID, Miguel Shaffer MD  •  sennosides-docusate (PERICOLACE) 8.6-50 MG per tablet 1 tablet, 1 tablet, Oral, BID,  "Miguel Shaffer MD  •  sodium chloride 0.9 % flush 10 mL, 10 mL, Intravenous, Q12H, Miguel Shaffer MD  •  sodium chloride 0.9 % flush 10 mL, 10 mL, Intravenous, PRN, Miguel Shaffer MD  •  tacrolimus (PROGRAF) capsule 3 mg, 3 mg, Oral, Q12H, Miguel Shaffer MD  •  vancomycin oral solution reconstituted 125 mg, 125 mg, Oral, Q6H, Miguel Shaffer MD  •  vitamin A & D ointment, , Topical, Q12H, Heather Amos APRN  •  vitamin A & D ointment, , Topical, PRN, Heather Amos APRN  •  vitamin A & D ointment, , Topical, Daily, Miguel Shaffer MD    Data:     Code Status:    There are no questions and answers to display.       No family history on file.    Social History     Socioeconomic History   • Marital status: Unknown     Spouse name: Not on file   • Number of children: Not on file   • Years of education: Not on file   • Highest education level: Not on file       Vitals:  Ht 185.4 cm (73\")   Wt 119 kg (262 lb 6.4 oz)   BMI 34.62 kg/m²     T 98.2, HR 69, RR 18, /77, SPO2  98% (room air)    I/O (24Hr):  No intake or output data in the 24 hours ending 03/22/21 1300    Labs and imaging:      Recent Results (from the past 12 hour(s))   Basic Metabolic Panel    Collection Time: 03/22/21  5:50 AM    Specimen: Blood   Result Value Ref Range    Glucose 158 (H) 65 - 99 mg/dL    BUN 23 (H) 6 - 20 mg/dL    Creatinine 1.06 0.76 - 1.27 mg/dL    Sodium 133 (L) 136 - 145 mmol/L    Potassium 4.6 3.5 - 5.2 mmol/L    Chloride 99 98 - 107 mmol/L    CO2 25.0 22.0 - 29.0 mmol/L    Calcium 8.1 (L) 8.6 - 10.5 mg/dL    eGFR Non African Amer 74 >60 mL/min/1.73    BUN/Creatinine Ratio 21.7 7.0 - 25.0    Anion Gap 9.0 5.0 - 15.0 mmol/L   Sedimentation Rate    Collection Time: 03/22/21  5:50 AM    Specimen: Blood   Result Value Ref Range    Sed Rate 55 (H) 0 - 15 mm/hr   C-reactive Protein    Collection Time: 03/22/21  5:50 AM    Specimen: Blood   Result Value Ref Range    " C-Reactive Protein 14.44 (H) 0.00 - 0.50 mg/dL   CBC Auto Differential    Collection Time: 03/22/21  5:50 AM    Specimen: Blood   Result Value Ref Range    WBC 14.34 (H) 3.40 - 10.80 10*3/mm3    RBC 3.46 (L) 4.14 - 5.80 10*6/mm3    Hemoglobin 9.7 (L) 13.0 - 17.7 g/dL    Hematocrit 29.0 (L) 37.5 - 51.0 %    MCV 83.8 79.0 - 97.0 fL    MCH 28.0 26.6 - 33.0 pg    MCHC 33.4 31.5 - 35.7 g/dL    RDW 13.2 12.3 - 15.4 %    RDW-SD 40.5 37.0 - 54.0 fl    MPV 10.1 6.0 - 12.0 fL    Platelets 265 140 - 450 10*3/mm3    Neutrophil % 85.9 (H) 42.7 - 76.0 %    Lymphocyte % 6.4 (L) 19.6 - 45.3 %    Monocyte % 5.3 5.0 - 12.0 %    Eosinophil % 0.7 0.3 - 6.2 %    Basophil % 0.2 0.0 - 1.5 %    Immature Grans % 1.5 (H) 0.0 - 0.5 %    Neutrophils, Absolute 12.31 (H) 1.70 - 7.00 10*3/mm3    Lymphocytes, Absolute 0.92 0.70 - 3.10 10*3/mm3    Monocytes, Absolute 0.76 0.10 - 0.90 10*3/mm3    Eosinophils, Absolute 0.10 0.00 - 0.40 10*3/mm3    Basophils, Absolute 0.03 0.00 - 0.20 10*3/mm3    Immature Grans, Absolute 0.22 (H) 0.00 - 0.05 10*3/mm3    nRBC 0.0 0.0 - 0.2 /100 WBC   POC Glucose Once    Collection Time: 03/22/21  7:20 AM    Specimen: Blood   Result Value Ref Range    Glucose 130 70 - 130 mg/dL   POC Glucose Once    Collection Time: 03/22/21 11:30 AM    Specimen: Blood   Result Value Ref Range    Glucose 158 (H) 70 - 130 mg/dL         Physical Examination:        Physical Exam  Vitals and nursing note reviewed.   Constitutional:       Appearance: Normal appearance. He is well-developed.   HENT:      Head: Normocephalic and atraumatic.      Right Ear: External ear normal.      Left Ear: External ear normal.      Nose: Nose normal.      Mouth/Throat:      Mouth: Mucous membranes are moist.      Pharynx: Oropharynx is clear.   Eyes:      General: Lids are normal.      Conjunctiva/sclera: Conjunctivae normal.      Pupils: Pupils are equal, round, and reactive to light.   Neck:      Trachea: Trachea normal.   Cardiovascular:      Rate and  Rhythm: Normal rate and regular rhythm.      Heart sounds: Normal heart sounds.   Pulmonary:      Effort: Pulmonary effort is normal.      Breath sounds: Normal breath sounds.   Abdominal:      General: Bowel sounds are normal.      Palpations: Abdomen is soft.   Musculoskeletal:         General: Tenderness present. Normal range of motion.      Cervical back: Normal range of motion and neck supple.      Comments: Generalized weakness  Decreased ROM left hip   Skin:     General: Skin is warm and dry.      Findings: Bruising and erythema present. No petechiae or rash.      Nails: There is no clubbing.      Comments: Dressing left groin c/d/i  Bruising, abrasion to abdomen noted - dressing in place  Petechiae present to left forearm    Neurological:      Mental Status: He is alert and oriented to person, place, and time. Mental status is at baseline.      Cranial Nerves: No cranial nerve deficit.      Sensory: No sensory deficit.   Psychiatric:         Speech: Speech normal.         Behavior: Behavior normal. Behavior is cooperative.         Thought Content: Thought content normal.         Judgment: Judgment normal.           Assessment:            Plan:           1. Group B strep bacteremia  2. Septic arthritis left hip  3. DM2 with hyperglycemia on long term insulin  4. Chronic immunosuppression  5. Constipation  6. Multifactorial anemia  7. History of cirrhosis/hepatitis C virus  8. History of liver transplant   9. Thrombocytopenia - resolved  10. C. diff    Continue current treatment. Monitor counts. Increase activity. Labs Thursday. Continue IV antibiotics through 3/28 under direction of ID. Maintain patient safety. Wound care per wound care team. Glycemic control. Aggressive therapies as tolerated. Continue pain control efforts.     Electronically signed by ANDREW Hernandez on 3/22/2021 at 13:00 CDT

## 2021-03-23 VITALS — WEIGHT: 253.2 LBS | HEIGHT: 73 IN | BODY MASS INDEX: 33.56 KG/M2

## 2021-03-23 LAB
GLUCOSE BLDC GLUCOMTR-MCNC: 102 MG/DL (ref 70–130)
GLUCOSE BLDC GLUCOMTR-MCNC: 109 MG/DL (ref 70–130)
GLUCOSE BLDC GLUCOMTR-MCNC: 242 MG/DL (ref 70–130)
GLUCOSE BLDC GLUCOMTR-MCNC: 297 MG/DL (ref 70–130)

## 2021-03-23 PROCEDURE — 63710000001 INSULIN DETEMIR PER 5 UNITS: Performed by: NURSE PRACTITIONER

## 2021-03-23 PROCEDURE — 63710000001 INSULIN LISPRO (HUMAN) PER 5 UNITS: Performed by: INTERNAL MEDICINE

## 2021-03-23 PROCEDURE — 63710000001 TACROLIMUS PER 1 MG: Performed by: INTERNAL MEDICINE

## 2021-03-23 PROCEDURE — 63710000001 PREDNISONE PER 5 MG: Performed by: INTERNAL MEDICINE

## 2021-03-23 PROCEDURE — 82962 GLUCOSE BLOOD TEST: CPT

## 2021-03-23 PROCEDURE — 25010000002 CEFTRIAXONE: Performed by: INTERNAL MEDICINE

## 2021-03-23 NOTE — PROGRESS NOTES
Edmund Shaffer M.D.  ANDREW Triana           Internal Medicine Progress Note    3/23/2021   13:20 CDT    Name:  Gil Monroy  MRN:    [id number]     Acct:     [id number]   Room:  [address] [place] [address] Day: 0     Admit Date: 3/5/2021  7:17 PM  PCP: Angelia Macdonald    Subjective:     C/C: need for continued IV antibiotic therapy and rehabilitation efforts      Interval History: Status: improved. Resting in bed.  No family at bedside. Woke from sleep.  Afebrile. Blood sugars stable - ranging 100s-250s. Tolerating treatment. Reports continued improvement in diarrhea stools.  Reports that he is ambulating more and needing less assistance. Anxious for discharge.     Review of Systems   Constitutional: Positive for malaise/fatigue. Negative for chills, decreased appetite and fever.   HENT: Negative for congestion, hoarse voice, nosebleeds and sore throat.    Eyes: Negative for blurred vision, discharge, pain and visual disturbance.   Cardiovascular: Negative for chest pain, dyspnea on exertion, irregular heartbeat, leg swelling, orthopnea and palpitations.   Respiratory: Negative for cough, shortness of breath, sputum production and wheezing.    Hematologic/Lymphatic: Negative for bleeding problem. Does not bruise/bleed easily.   Skin: Negative for dry skin, flushing, itching, poor wound healing, rash and suspicious lesions.   Musculoskeletal: Positive for muscle weakness. Negative for arthritis, back pain, falls, joint pain, joint swelling and myalgias.        Left hip pain   Gastrointestinal: Positive for flatus. Negative for bloating, abdominal pain, change in bowel habit, constipation, diarrhea, heartburn, melena and nausea.   Genitourinary: Negative for frequency, hesitancy, incomplete emptying, pelvic pain and urgency.   Neurological: Negative for difficulty with concentration, disturbances in coordination, dizziness, headaches, numbness, paresthesias, tremors and weakness.    Psychiatric/Behavioral: Negative for altered mental status, hallucinations and memory loss. The patient is not nervous/anxious.          Medications:     Allergies: No Known Allergies    Current Meds:   Current Facility-Administered Medications:   •  acetaminophen (TYLENOL) tablet 650 mg, 650 mg, Oral, Q6H PRN, Miguel Shaffer MD  •  bacitracin ointment, , Topical, Q24H, Heather Amos, ANDREW  •  cefTRIAXone (ROCEPHIN) 1 g/100 mL 0.9% NS (MBP), 1 g, Intravenous, Q24H, Zev Burris MD  •  dextrose (D50W) 25 g/ 50mL Intravenous Solution 25 g, 25 g, Intravenous, Q15 Min PRN, Miguel Shaffer MD  •  dextrose (GLUTOSE) oral gel 15 g, 15 g, Oral, Q15 Min PRN, Miguel Shaffer MD  •  diphenhydrAMINE (BENADRYL) capsule 25 mg, 25 mg, Oral, Q6H PRN, Miguel Shaffer MD  •  gabapentin (NEURONTIN) capsule 100 mg, 100 mg, Oral, Q8H, Miguel Shaffer MD  •  glucagon (human recombinant) (GLUCAGEN DIAGNOSTIC) injection 1 mg, 1 mg, Subcutaneous, Q15 Min PRN, Miguel Shaffer MD  •  insulin detemir (LEVEMIR) injection 35 Units, 35 Units, Subcutaneous, Nightly, Lolita Crooks, APRN  •  insulin lispro (humaLOG) injection 0-9 Units, 0-9 Units, Subcutaneous, TID AC, Miguel Shaffer MD  •  insulin lispro (humaLOG) injection 2-7 Units, 2-7 Units, Subcutaneous, Nightly, Miguel Shaffer MD  •  lidocaine (LIDODERM) 5 % 1 patch, 1 patch, Transdermal, Q24H, Miguel Shaffer MD  •  magnesium hydroxide (MILK OF MAGNESIA) suspension 2400 mg/10mL 30 mL, 30 mL, Oral, Daily PRN, Miguel Shaffer MD  •  oxyCODONE (ROXICODONE) immediate release tablet 20 mg, 20 mg, Oral, Q4H PRN, Miguel Shaffer MD  •  pantoprazole (PROTONIX) EC tablet 40 mg, 40 mg, Oral, Q AM, Miguel Shaffer MD  •  predniSONE (DELTASONE) tablet 10 mg, 10 mg, Oral, BID With Meals, Miguel Shaffer MD  •  saccharomyces boulardii (FLORASTOR) capsule 250 mg, 250 mg, Oral, BID, Edmund  "Miguel Shannon MD  •  sennosides-docusate (PERICOLACE) 8.6-50 MG per tablet 1 tablet, 1 tablet, Oral, BID, Miguel Shaffer MD  •  sodium chloride 0.9 % flush 10 mL, 10 mL, Intravenous, Q12H, Miguel Shaffer MD  •  sodium chloride 0.9 % flush 10 mL, 10 mL, Intravenous, PRN, Miguel Shaffer MD  •  tacrolimus (PROGRAF) capsule 3 mg, 3 mg, Oral, Q12H, Miguel Shaffer MD  •  vancomycin oral solution reconstituted 125 mg, 125 mg, Oral, Q6H, Miguel Shaffer MD  •  vitamin A & D ointment, , Topical, Q12H, Heather Amos APRN  •  vitamin A & D ointment, , Topical, PRN, Heather Amos APRN  •  vitamin A & D ointment, , Topical, Daily, Miguel Shaffer MD    Data:     Code Status:    There are no questions and answers to display.       No family history on file.    Social History     Socioeconomic History   • Marital status: Unknown     Spouse name: Not on file   • Number of children: Not on file   • Years of education: Not on file   • Highest education level: Not on file       Vitals:  Ht 185.4 cm (73\")   Wt 115 kg (253 lb 3.2 oz)   BMI 33.41 kg/m²     T 97.7, HR 73, RR 18, /73, SPO2  98% (room air)    I/O (24Hr):  No intake or output data in the 24 hours ending 03/23/21 1320    Labs and imaging:      Recent Results (from the past 12 hour(s))   POC Glucose Once    Collection Time: 03/23/21  7:20 AM    Specimen: Blood   Result Value Ref Range    Glucose 109 70 - 130 mg/dL   POC Glucose Once    Collection Time: 03/23/21 11:14 AM    Specimen: Blood   Result Value Ref Range    Glucose 102 70 - 130 mg/dL         Physical Examination:        Physical Exam  Vitals and nursing note reviewed.   Constitutional:       Appearance: Normal appearance. He is well-developed.   HENT:      Head: Normocephalic and atraumatic.      Right Ear: External ear normal.      Left Ear: External ear normal.      Nose: Nose normal.      Mouth/Throat:      Mouth: Mucous membranes are " moist.      Pharynx: Oropharynx is clear.   Eyes:      General: Lids are normal.      Conjunctiva/sclera: Conjunctivae normal.      Pupils: Pupils are equal, round, and reactive to light.   Neck:      Trachea: Trachea normal.   Cardiovascular:      Rate and Rhythm: Normal rate and regular rhythm.      Heart sounds: Normal heart sounds.   Pulmonary:      Effort: Pulmonary effort is normal.      Breath sounds: Normal breath sounds.   Abdominal:      General: Bowel sounds are normal.      Palpations: Abdomen is soft.   Musculoskeletal:         General: Tenderness present. Normal range of motion.      Cervical back: Normal range of motion and neck supple.      Comments: Generalized weakness  Decreased ROM left hip   Skin:     General: Skin is warm and dry.      Findings: Bruising and erythema present. No petechiae or rash.      Nails: There is no clubbing.      Comments: Dressing left groin c/d/i  Bruising, abrasion to abdomen noted - dressing in place  Petechiae present to left forearm    Neurological:      Mental Status: He is alert and oriented to person, place, and time. Mental status is at baseline.      Cranial Nerves: No cranial nerve deficit.      Sensory: No sensory deficit.   Psychiatric:         Speech: Speech normal.         Behavior: Behavior normal. Behavior is cooperative.         Thought Content: Thought content normal.         Judgment: Judgment normal.           Assessment:            Plan:           1. Group B strep bacteremia  2. Septic arthritis left hip  3. DM2 with hyperglycemia on long term insulin  4. Chronic immunosuppression  5. Constipation  6. Multifactorial anemia  7. History of cirrhosis/hepatitis C virus  8. History of liver transplant   9. Thrombocytopenia - resolved  10. C. diff    Continue current treatment. Monitor counts. Increase activity. Labs Thursday. Continue IV antibiotics through 3/28 under direction of ID. ID indicates that antibiotics may be stopped early and the patient  possibly ok for discharge to home [facility]. Maintain patient safety. Wound care per wound care team. Glycemic control. Aggressive therapies as tolerated. Continue pain control efforts.     Electronically signed by ANDREW Hernandez on 3/23/2021 at 13:20 CDT     I have discussed the care of Gil Monroy, including pertinent history and exam findings, with the nurse practitioner.    I have seen and examined the patient and the key elements of all parts of the encounter have been performed by me.  I agree with the assessment, plan and orders as documented by ANDREW Triana, after I modified the exam findings and the plan of treatments and the final version is my approved version of the assessment.        Electronically signed by Miguel Shaffer MD on 3/23/2021 at 21:44 CDT

## 2021-03-24 ENCOUNTER — TELEPHONE (OUTPATIENT)
Dept: INTERNAL MEDICINE CLINIC | Age: 51
End: 2021-03-24

## 2021-03-24 LAB
ALBUMIN SERPL-MCNC: 2.3 G/DL (ref 3.5–5.2)
ALBUMIN/GLOB SERPL: 0.7 G/DL
ALP SERPL-CCNC: 507 U/L (ref 39–117)
ALT SERPL W P-5'-P-CCNC: 33 U/L (ref 1–41)
ANION GAP SERPL CALCULATED.3IONS-SCNC: 7 MMOL/L (ref 5–15)
AST SERPL-CCNC: 23 U/L (ref 1–40)
AVERAGE GLUCOSE: NORMAL
BASOPHILS # BLD AUTO: 0.07 10*3/MM3 (ref 0–0.2)
BASOPHILS NFR BLD AUTO: 0.5 % (ref 0–1.5)
BILIRUB SERPL-MCNC: 0.2 MG/DL (ref 0–1.2)
BUN SERPL-MCNC: 20 MG/DL (ref 6–20)
BUN/CREAT SERPL: 20.2 (ref 7–25)
CALCIUM SPEC-SCNC: 8 MG/DL (ref 8.6–10.5)
CHLORIDE SERPL-SCNC: 99 MMOL/L (ref 98–107)
CO2 SERPL-SCNC: 28 MMOL/L (ref 22–29)
CREAT SERPL-MCNC: 0.99 MG/DL (ref 0.76–1.27)
CRP SERPL-MCNC: 5.35 MG/DL (ref 0–0.5)
DEPRECATED RDW RBC AUTO: 40.7 FL (ref 37–54)
EOSINOPHIL # BLD AUTO: 0.07 10*3/MM3 (ref 0–0.4)
EOSINOPHIL NFR BLD AUTO: 0.5 % (ref 0.3–6.2)
ERYTHROCYTE [DISTWIDTH] IN BLOOD BY AUTOMATED COUNT: 13.2 % (ref 12.3–15.4)
GFR SERPL CREATININE-BSD FRML MDRD: 80 ML/MIN/1.73
GLOBULIN UR ELPH-MCNC: 3.4 GM/DL
GLUCOSE BLDC GLUCOMTR-MCNC: 131 MG/DL (ref 70–130)
GLUCOSE BLDC GLUCOMTR-MCNC: 137 MG/DL (ref 70–130)
GLUCOSE BLDC GLUCOMTR-MCNC: 282 MG/DL (ref 70–130)
GLUCOSE BLDC GLUCOMTR-MCNC: 296 MG/DL (ref 70–130)
GLUCOSE SERPL-MCNC: 294 MG/DL (ref 65–99)
HBA1C MFR BLD: 8 %
HBA1C MFR BLD: 8 % (ref 4.8–5.6)
HCT VFR BLD AUTO: 28.2 % (ref 37.5–51)
HGB BLD-MCNC: 9.4 G/DL (ref 13–17.7)
IMM GRANULOCYTES # BLD AUTO: 0.38 10*3/MM3 (ref 0–0.05)
IMM GRANULOCYTES NFR BLD AUTO: 2.9 % (ref 0–0.5)
LYMPHOCYTES # BLD AUTO: 0.77 10*3/MM3 (ref 0.7–3.1)
LYMPHOCYTES NFR BLD AUTO: 5.9 % (ref 19.6–45.3)
MCH RBC QN AUTO: 28.1 PG (ref 26.6–33)
MCHC RBC AUTO-ENTMCNC: 33.3 G/DL (ref 31.5–35.7)
MCV RBC AUTO: 84.2 FL (ref 79–97)
MONOCYTES # BLD AUTO: 0.68 10*3/MM3 (ref 0.1–0.9)
MONOCYTES NFR BLD AUTO: 5.2 % (ref 5–12)
NEUTROPHILS NFR BLD AUTO: 10.99 10*3/MM3 (ref 1.7–7)
NEUTROPHILS NFR BLD AUTO: 85 % (ref 42.7–76)
NRBC BLD AUTO-RTO: 0 /100 WBC (ref 0–0.2)
PLATELET # BLD AUTO: 290 10*3/MM3 (ref 140–450)
PMV BLD AUTO: 10 FL (ref 6–12)
POTASSIUM SERPL-SCNC: 5.1 MMOL/L (ref 3.5–5.2)
PROT SERPL-MCNC: 5.7 G/DL (ref 6–8.5)
RBC # BLD AUTO: 3.35 10*6/MM3 (ref 4.14–5.8)
SODIUM SERPL-SCNC: 134 MMOL/L (ref 136–145)
WBC # BLD AUTO: 12.96 10*3/MM3 (ref 3.4–10.8)

## 2021-03-24 PROCEDURE — 83036 HEMOGLOBIN GLYCOSYLATED A1C: CPT | Performed by: INTERNAL MEDICINE

## 2021-03-24 PROCEDURE — 82962 GLUCOSE BLOOD TEST: CPT

## 2021-03-24 PROCEDURE — 63710000001 INSULIN LISPRO (HUMAN) PER 5 UNITS: Performed by: INTERNAL MEDICINE

## 2021-03-24 PROCEDURE — 80053 COMPREHEN METABOLIC PANEL: CPT | Performed by: INTERNAL MEDICINE

## 2021-03-24 PROCEDURE — 63710000001 INSULIN DETEMIR PER 5 UNITS: Performed by: NURSE PRACTITIONER

## 2021-03-24 PROCEDURE — 63710000001 PREDNISONE PER 5 MG: Performed by: INTERNAL MEDICINE

## 2021-03-24 PROCEDURE — 86140 C-REACTIVE PROTEIN: CPT | Performed by: INTERNAL MEDICINE

## 2021-03-24 PROCEDURE — 63710000001 TACROLIMUS PER 1 MG: Performed by: INTERNAL MEDICINE

## 2021-03-24 PROCEDURE — 85025 COMPLETE CBC W/AUTO DIFF WBC: CPT | Performed by: INTERNAL MEDICINE

## 2021-03-24 PROCEDURE — 25010000002 CEFTRIAXONE: Performed by: INTERNAL MEDICINE

## 2021-03-24 NOTE — TELEPHONE ENCOUNTER
Pt is being dc from [facility] tomorrow with referral for [address] follow pt for [facility] ?

## 2021-03-24 NOTE — PROGRESS NOTES
Edmund Shaffer M.D.  ANDREW Triana           Internal Medicine Progress Note    3/24/2021   10:24 CDT    Name:  Gil Monroy  MRN:    [id number]     Acct:     [id number]   Room:  [address] [place] [address] Day: 0     Admit Date: 3/5/2021  7:17 PM  PCP: Angelia Macdonald    Subjective:     C/C: need for continued IV antibiotic therapy and rehabilitation efforts      Interval History: Status: improved. Resting in bed.  No family at bedside.   Afebrile. Blood sugars stable - ranging 100s-290s. Tolerating treatment. Reports continued improvement in diarrhea stools.  Reports that he is ambulating more and needing less assistance. Anxious for discharge. Wound debrided yesterday.     Review of Systems   Constitutional: Positive for malaise/fatigue. Negative for chills, decreased appetite and fever.   HENT: Negative for congestion, hoarse voice, nosebleeds and sore throat.    Eyes: Negative for blurred vision, discharge, pain and visual disturbance.   Cardiovascular: Negative for chest pain, dyspnea on exertion, irregular heartbeat, leg swelling, orthopnea and palpitations.   Respiratory: Negative for cough, shortness of breath, sputum production and wheezing.    Hematologic/Lymphatic: Negative for bleeding problem. Does not bruise/bleed easily.   Skin: Negative for dry skin, flushing, itching, poor wound healing, rash and suspicious lesions.   Musculoskeletal: Positive for muscle weakness. Negative for arthritis, back pain, falls, joint pain, joint swelling and myalgias.        Left hip pain   Gastrointestinal: Positive for flatus. Negative for bloating, abdominal pain, change in bowel habit, constipation, diarrhea, heartburn, melena and nausea.   Genitourinary: Negative for frequency, hesitancy, incomplete emptying, pelvic pain and urgency.   Neurological: Negative for difficulty with concentration, disturbances in coordination, dizziness, headaches, numbness, paresthesias, tremors and weakness.    Psychiatric/Behavioral: Negative for altered mental status, hallucinations and memory loss. The patient is not nervous/anxious.          Medications:     Allergies: No Known Allergies    Current Meds:   Current Facility-Administered Medications:   •  acetaminophen (TYLENOL) tablet 650 mg, 650 mg, Oral, Q6H PRN, Miguel Shaffer MD  •  bacitracin ointment, , Topical, Q24H, Heather Amos, ANDREW  •  cefTRIAXone (ROCEPHIN) 1 g/100 mL 0.9% NS (MBP), 1 g, Intravenous, Q24H, Zev Burris MD  •  dextrose (D50W) 25 g/ 50mL Intravenous Solution 25 g, 25 g, Intravenous, Q15 Min PRN, Miguel Shaffer MD  •  dextrose (GLUTOSE) oral gel 15 g, 15 g, Oral, Q15 Min PRN, Miguel Shaffer MD  •  diphenhydrAMINE (BENADRYL) capsule 25 mg, 25 mg, Oral, Q6H PRN, Miguel Shaffer MD  •  gabapentin (NEURONTIN) capsule 100 mg, 100 mg, Oral, Q8H, Miguel Shaffer MD  •  glucagon (human recombinant) (GLUCAGEN DIAGNOSTIC) injection 1 mg, 1 mg, Subcutaneous, Q15 Min PRN, Miguel Shaffer MD  •  insulin detemir (LEVEMIR) injection 35 Units, 35 Units, Subcutaneous, Nightly, Lolita Crooks, APRN  •  insulin lispro (humaLOG) injection 0-9 Units, 0-9 Units, Subcutaneous, TID AC, Miguel Shaffer MD  •  insulin lispro (humaLOG) injection 2-7 Units, 2-7 Units, Subcutaneous, Nightly, Miguel Shaffer MD  •  lidocaine (LIDODERM) 5 % 1 patch, 1 patch, Transdermal, Q24H, Miguel Shaffer MD  •  magnesium hydroxide (MILK OF MAGNESIA) suspension 2400 mg/10mL 30 mL, 30 mL, Oral, Daily PRN, Miguel Shaffer MD  •  oxyCODONE (ROXICODONE) immediate release tablet 20 mg, 20 mg, Oral, Q4H PRN, Miguel Shaffer MD  •  pantoprazole (PROTONIX) EC tablet 40 mg, 40 mg, Oral, Q AM, Miguel Shaffer MD  •  predniSONE (DELTASONE) tablet 10 mg, 10 mg, Oral, BID With Meals, Miguel Shaffer MD  •  saccharomyces boulardii (FLORASTOR) capsule 250 mg, 250 mg, Oral, BID, Edmund  "Miguel Shannon MD  •  sennosides-docusate (PERICOLACE) 8.6-50 MG per tablet 1 tablet, 1 tablet, Oral, BID, Miguel Shaffer MD  •  sodium chloride 0.9 % flush 10 mL, 10 mL, Intravenous, Q12H, Miguel Shaffer MD  •  sodium chloride 0.9 % flush 10 mL, 10 mL, Intravenous, PRN, Miguel Shaffer MD  •  tacrolimus (PROGRAF) capsule 3 mg, 3 mg, Oral, Q12H, Miguel Shaffer MD  •  vancomycin oral solution reconstituted 125 mg, 125 mg, Oral, Q6H, Miguel Shaffer MD  •  vitamin A & D ointment, , Topical, Q12H, Heather Amos APRN  •  vitamin A & D ointment, , Topical, PRN, Heather Amos APRN  •  vitamin A & D ointment, , Topical, Daily, Miguel Shaffer MD    Data:     Code Status:    There are no questions and answers to display.       No family history on file.    Social History     Socioeconomic History   • Marital status: Unknown     Spouse name: Not on file   • Number of children: Not on file   • Years of education: Not on file   • Highest education level: Not on file       Vitals:  Ht 185.4 cm (73\")   Wt 115 kg (253 lb 3.2 oz)   BMI 33.41 kg/m²     T 97.8, HR 64, RR 16, /91, SPO2  99% (room air)    I/O (24Hr):  No intake or output data in the 24 hours ending 03/24/21 1024    Labs and imaging:      Recent Results (from the past 12 hour(s))   POC Glucose Once    Collection Time: 03/24/21  8:41 AM    Specimen: Blood   Result Value Ref Range    Glucose 137 (H) 70 - 130 mg/dL         Physical Examination:        Physical Exam  Vitals and nursing note reviewed.   Constitutional:       Appearance: Normal appearance. He is well-developed.   HENT:      Head: Normocephalic and atraumatic.      Right Ear: External ear normal.      Left Ear: External ear normal.      Nose: Nose normal.      Mouth/Throat:      Mouth: Mucous membranes are moist.      Pharynx: Oropharynx is clear.   Eyes:      General: Lids are normal.      Conjunctiva/sclera: Conjunctivae normal. "      Pupils: Pupils are equal, round, and reactive to light.   Neck:      Trachea: Trachea normal.   Cardiovascular:      Rate and Rhythm: Normal rate and regular rhythm.      Heart sounds: Normal heart sounds.   Pulmonary:      Effort: Pulmonary effort is normal.      Breath sounds: Normal breath sounds.   Abdominal:      General: Bowel sounds are normal.      Palpations: Abdomen is soft.   Musculoskeletal:         General: Tenderness present. Normal range of motion.      Cervical back: Normal range of motion and neck supple.      Comments: Generalized weakness  Decreased ROM left hip   Skin:     General: Skin is warm and dry.      Findings: Bruising and erythema present. No petechiae or rash.      Nails: There is no clubbing.      Comments: Dressing left groin c/d/i  Abrasion to abdomen  Petechiae present to left forearm    Neurological:      Mental Status: He is alert and oriented to person, place, and time. Mental status is at baseline.      Cranial Nerves: No cranial nerve deficit.      Sensory: No sensory deficit.   Psychiatric:         Speech: Speech normal.         Behavior: Behavior normal. Behavior is cooperative.         Thought Content: Thought content normal.         Judgment: Judgment normal.           Assessment:            Plan:           1. Group B strep bacteremia  2. Septic arthritis left hip  3. DM2 with hyperglycemia on long term insulin  4. Chronic immunosuppression  5. Constipation  6. Multifactorial anemia  7. History of cirrhosis/hepatitis C virus  8. History of liver transplant   9. Thrombocytopenia - resolved  10. C. diff    Continue current treatment. Monitor counts. Increase activity. Labs in am. Continue IV antibiotics under direction of ID. ID indicates that antibiotics may be stopped early and the patient possibly ok for discharge to home midweek. Maintain patient safety. Wound care per wound care team. Glycemic control. Aggressive therapies as tolerated. Continue pain control  efforts. Possible discharge to home in am.     Electronically signed by ANDREW Hernandez on 3/24/2021 at 10:24 CDT     I have discussed the care of Gil Monroy, including pertinent history and exam findings, with the nurse practitioner.    I have seen and examined the patient and the key elements of all parts of the encounter have been performed by me.  I agree with the assessment, plan and orders as documented by ANDREW Triana, after I modified the exam findings and the plan of treatments and the final version is my approved version of the assessment.        Electronically signed by Miguel Shaffer MD on 3/24/2021 at 21:43 CDT

## 2021-03-24 NOTE — PROGRESS NOTES
"Infectious Diseases Progress Note    Patient:  Gil Monroy  YOB: 1970  MRN: [id number]   Admit date: 3/5/2021   Admitting Physician: Miguel Shaffer MD  Primary Care Physician: Angelia Macdonald    Chief Complaint/Interval History: He is improving.  He feels much better.  He had 1 bowel movement today.  Loose, but not diarrheal per his report.  He indicates his left hip is continuing to improve.  He indicates he is ambulating quite well.  He feels he is getting around well enough to go home.  He is very anxious for discharge home tomorrow.  He has essentially completed 4 weeks of intravenous antibiotic therapy.  Discussed with Dr. Shaffer today.  Discussed with discharge planning nurse this afternoon.    No intake or output data in the 24 hours ending 03/24/21 1401  Allergies: No Known Allergies  Current Scheduled Medications:   bacitracin, , Topical, Q24H  cefTRIAXone, 1 g, Intravenous, Q24H  gabapentin, 100 mg, Oral, Q8H  insulin detemir, 35 Units, Subcutaneous, Nightly  insulin lispro, 0-9 Units, Subcutaneous, TID AC  insulin lispro, 2-7 Units, Subcutaneous, Nightly  lidocaine, 1 patch, Transdermal, Q24H  pantoprazole, 40 mg, Oral, Q AM  predniSONE, 10 mg, Oral, BID With Meals  saccharomyces boulardii, 250 mg, Oral, BID  senna-docusate sodium, 1 tablet, Oral, BID  sodium chloride, 10 mL, Intravenous, Q12H  tacrolimus, 3 mg, Oral, Q12H  vancomycin, 125 mg, Oral, Q6H  vitamin A & D, , Topical, Q12H  vitamin A & D, , Topical, Daily      Current PRN Medications:  •  acetaminophen  •  dextrose  •  dextrose  •  diphenhydrAMINE  •  glucagon (human recombinant)  •  magnesium hydroxide  •  oxyCODONE  •  sodium chloride  •  vitamin A & D    Review of Systems no abdominal pain or discomfort.    Vital Signs:  Ht 185.4 cm (73\")   Wt 115 kg (253 lb 3.2 oz)   BMI 33.41 kg/m²     Physical Exam  Vital signs - reviewed.  Line/IV (PICC) site - No erythema, warmth, induration, or tenderness.  Left hip with " some superficial opening of his incision.  No sinus tract.  No surrounding erythema.  No induration.  Abdomen soft and nontender.  No guarding or rebound.    Lab Results:  CBC:   Results from last 7 days   Lab Units 03/22/21  0550 03/18/21  0530   WBC 10*3/mm3 14.34* 15.11*   HEMOGLOBIN g/dL 9.7* 10.0*   HEMATOCRIT % 29.0* 29.8*   PLATELETS 10*3/mm3 265 275     BMP:  Results from last 7 days   Lab Units 03/22/21  0550 03/18/21  0530   SODIUM mmol/L 133* 132*   POTASSIUM mmol/L 4.6 4.4   CHLORIDE mmol/L 99 97*   CO2 mmol/L 25.0 24.0   BUN mg/dL 23* 15   CREATININE mg/dL 1.06 1.00   GLUCOSE mg/dL 158* 209*   CALCIUM mg/dL 8.1* 8.4*     Impression:   1.  Group B strep bacteremia-at the end of 4 weeks of antibiotic treatment  2.  Left hip septic arthritis secondary to group B strep-native hip  3.  C. difficile colitis-improving  4.  Diabetes mellitus  5.  Resolved renal insufficiency    Recommendations:   I was going to continue his antibiotic treatment slightly longer.  He will have had just under 4 weeks of antibiotics after tomorrow's dose.    Will be hard to continue antibiotic treatment in the setting of C. difficile.  At this point feel he has completed adequate therapy for the group B strep, feel it is reasonable to focus therapy moving forward on C. difficile.  Recommendations:  Discontinue ceftriaxone after morning dose tomorrow  Remove PICC line after his last dose of ceftriaxone  Continue vancomycin 125 mg orally 4 times daily for 2 weeks at discharge  Lab on Wednesday of next week-CMP, CBC, and CRP  Follow-up with me on Thursday, April 1, 2021 (okay with me for that to be via telehealth)  Patient agreeable with the above plan    Zev George MD

## 2021-03-25 ENCOUNTER — TELEPHONE (OUTPATIENT)
Dept: PRIMARY CARE CLINIC | Age: 51
End: 2021-03-25

## 2021-03-25 LAB
ANION GAP SERPL CALCULATED.3IONS-SCNC: 6 MMOL/L (ref 5–15)
BASOPHILS # BLD AUTO: 0.09 10*3/MM3 (ref 0–0.2)
BASOPHILS NFR BLD AUTO: 0.7 % (ref 0–1.5)
BUN SERPL-MCNC: 18 MG/DL (ref 6–20)
BUN/CREAT SERPL: 21.7 (ref 7–25)
CALCIUM SPEC-SCNC: 8.4 MG/DL (ref 8.6–10.5)
CHLORIDE SERPL-SCNC: 98 MMOL/L (ref 98–107)
CO2 SERPL-SCNC: 28 MMOL/L (ref 22–29)
CREAT SERPL-MCNC: 0.83 MG/DL (ref 0.76–1.27)
CRP SERPL-MCNC: 5.52 MG/DL (ref 0–0.5)
DEPRECATED RDW RBC AUTO: 41.1 FL (ref 37–54)
EOSINOPHIL # BLD AUTO: 0.07 10*3/MM3 (ref 0–0.4)
EOSINOPHIL NFR BLD AUTO: 0.5 % (ref 0.3–6.2)
ERYTHROCYTE [DISTWIDTH] IN BLOOD BY AUTOMATED COUNT: 13 % (ref 12.3–15.4)
ERYTHROCYTE [SEDIMENTATION RATE] IN BLOOD: 53 MM/HR (ref 0–15)
GFR SERPL CREATININE-BSD FRML MDRD: 98 ML/MIN/1.73
GLUCOSE BLDC GLUCOMTR-MCNC: 160 MG/DL (ref 70–130)
GLUCOSE SERPL-MCNC: 265 MG/DL (ref 65–99)
HCT VFR BLD AUTO: 31 % (ref 37.5–51)
HGB BLD-MCNC: 10 G/DL (ref 13–17.7)
IMM GRANULOCYTES # BLD AUTO: 0.54 10*3/MM3 (ref 0–0.05)
IMM GRANULOCYTES NFR BLD AUTO: 4.2 % (ref 0–0.5)
LYMPHOCYTES # BLD AUTO: 0.99 10*3/MM3 (ref 0.7–3.1)
LYMPHOCYTES NFR BLD AUTO: 7.6 % (ref 19.6–45.3)
MCH RBC QN AUTO: 27.5 PG (ref 26.6–33)
MCHC RBC AUTO-ENTMCNC: 32.3 G/DL (ref 31.5–35.7)
MCV RBC AUTO: 85.4 FL (ref 79–97)
MONOCYTES # BLD AUTO: 0.82 10*3/MM3 (ref 0.1–0.9)
MONOCYTES NFR BLD AUTO: 6.3 % (ref 5–12)
NEUTROPHILS NFR BLD AUTO: 10.47 10*3/MM3 (ref 1.7–7)
NEUTROPHILS NFR BLD AUTO: 80.7 % (ref 42.7–76)
NRBC BLD AUTO-RTO: 0 /100 WBC (ref 0–0.2)
PLATELET # BLD AUTO: 309 10*3/MM3 (ref 140–450)
PMV BLD AUTO: 10.3 FL (ref 6–12)
POTASSIUM SERPL-SCNC: 5.3 MMOL/L (ref 3.5–5.2)
RBC # BLD AUTO: 3.63 10*6/MM3 (ref 4.14–5.8)
SODIUM SERPL-SCNC: 132 MMOL/L (ref 136–145)
WBC # BLD AUTO: 12.98 10*3/MM3 (ref 3.4–10.8)

## 2021-03-25 PROCEDURE — 85651 RBC SED RATE NONAUTOMATED: CPT | Performed by: INTERNAL MEDICINE

## 2021-03-25 PROCEDURE — 82962 GLUCOSE BLOOD TEST: CPT

## 2021-03-25 PROCEDURE — 80048 BASIC METABOLIC PNL TOTAL CA: CPT | Performed by: INTERNAL MEDICINE

## 2021-03-25 PROCEDURE — 63710000001 PREDNISONE PER 5 MG: Performed by: INTERNAL MEDICINE

## 2021-03-25 PROCEDURE — 63710000001 INSULIN LISPRO (HUMAN) PER 5 UNITS: Performed by: INTERNAL MEDICINE

## 2021-03-25 PROCEDURE — 85025 COMPLETE CBC W/AUTO DIFF WBC: CPT | Performed by: INTERNAL MEDICINE

## 2021-03-25 PROCEDURE — 25010000002 CEFTRIAXONE: Performed by: INTERNAL MEDICINE

## 2021-03-25 PROCEDURE — 86140 C-REACTIVE PROTEIN: CPT | Performed by: INTERNAL MEDICINE

## 2021-03-25 PROCEDURE — 63710000001 TACROLIMUS PER 1 MG: Performed by: INTERNAL MEDICINE

## 2021-03-25 RX ORDER — OXYCODONE HYDROCHLORIDE 5 MG/1
20 TABLET ORAL EVERY 4 HOURS PRN
Status: DISCONTINUED | OUTPATIENT
Start: 2021-03-25 | End: 2021-03-25 | Stop reason: HOSPADM

## 2021-03-25 NOTE — DISCHARGE SUMMARY
MAVERICK Cazares APRN      Internal Medicine Discharge Summary    Patient ID: Gil Monroy  MRN: [id number]     Acct:  [id number]       Patient's PCP: Angelia Macdonald    Admit Date: 3/5/2021     Discharge Date:    3/25/21    Admitting Physician: Miguel Shaffer MD    Discharge Physician: ANDREW Hernandez     Active Discharge Diagnoses:  Group B strep bacteremia  Septic arthritis left hip  DM2 with hyperglycemia on long term insulin  Chronic immunosuppression  Constipation  Multifactorial anemia  History of cirrhosis/hepatitis C virus  History of liver transplant   9. Thrombocytopenia - resolved  10. C. Diff    Hospital Problems    * No active hospital problems. *   No past medical history on file.    The patient was seen and examined on the day of discharge and this discharge summary is in conjunction with any daily progress note from day of discharge.    Code Status:    There are no questions and answers to display.       Hospital Course: The patient had been in his usual state of health when he developed increased left hip pain. He had been admitted to the hospital earlier in February for strep agalactiae bacteremia. He was treated with IV antibiotics and developed c. Diff. He was discharged to home with oral keflex and oral vancomycin. He developed worsening symptoms with increased fever and chills with increased hip pain. The patient returned to the hospital and was started on IV antibiotics and seen in consultation by ortho. He underwent fluoroscopic guided left hip aspiration and left hip arthrotomy with incision and drainage. He was seen in consultation by ID for antibiotic management. Cultures returned positive for group b strep. He was slow to progress with therapy due to increased pain and required high doses of IV pain medication. Chronic immunosuppressant therapy was resumed with prednisone and tacrolimus. 4-6 IV antibiotics recommended by ID,  but due to issues with patient mobility, he was not felt stable for discharge to home. He transferred to our facility for continued IV antibiotic therapy, wound care, and rehabilitation efforts.   Initially, he was very slow to progress with therapy. We had ongoing issues with pain control - he would typically refuse therapy due to increased pain and then not be able to participate with therapy due to sedation from pain medication. Adjustments to medication regimen were undertaken and patient participation has increased. He had an area of erythema to his abdomen that has been treated by our wound care team. The patient continued with IV antibiotic therapy under the direction of ID. He developed some mild dehiscence of the left hip/groin wound and underwent debridement per our wound care team. He developed diarrhea and stool positive for c. Diff. Oral vancomycin was started and that patient has had some improvement in diarrhea stools. Due to history of recent c. Diff infection, ID has truncated the original antibiotic schedule and antibiotics have been discontinued. The patient is now felt stable for discharge to home with home health for continued wound care and rehabilitation efforts. He will require close outpatient follow up with ID, ortho, and PCP. He will continued oral vancomycin for c. Diff for 2 weeks.     Consults:   Dr. Burris (ID)    Disposition: home health     Physical Exam  Vitals and nursing note reviewed.   Constitutional:       Appearance: Normal appearance. He is well-developed.   HENT:      Head: Normocephalic and atraumatic.      Right Ear: External ear normal.      Left Ear: External ear normal.      Nose: Nose normal.      Mouth/Throat:      Mouth: Mucous membranes are moist.      Pharynx: Oropharynx is clear.   Eyes:      General: Lids are normal.      Conjunctiva/sclera: Conjunctivae normal.      Pupils: Pupils are equal, round, and reactive to light.   Neck:      Trachea: Trachea normal.    Cardiovascular:      Rate and Rhythm: Normal rate and regular rhythm.      Heart sounds: Normal heart sounds.   Pulmonary:      Effort: Pulmonary effort is normal.      Breath sounds: Normal breath sounds.   Abdominal:      General: Bowel sounds are normal.      Palpations: Abdomen is soft.   Musculoskeletal:         General: Tenderness present. Normal range of motion.      Cervical back: Normal range of motion and neck supple.      Comments: Generalized weakness  Decreased ROM left hip   Skin:     General: Skin is warm and dry.      Findings: Bruising and erythema present. No petechiae or rash.      Nails: There is no clubbing.      Comments: Dressing left groin c/d/i  Abrasion to abdomen  Petechiae present to left forearm    Neurological:      Mental Status: He is alert and oriented to person, place, and time. Mental status is at baseline.      Cranial Nerves: No cranial nerve deficit.      Sensory: No sensory deficit.   Psychiatric:         Speech: Speech normal.         Behavior: Behavior normal. Behavior is cooperative.         Thought Content: Thought content normal.         Judgment: Judgment normal.     Discharged Condition: Stable    Follow Up: PCP 1 week  ID 1 week  CBC, CMP, CRP 3/31    Diet: Diet Regular; Consistent Carbohydrate    Discharge Medications:   See computer generated medication reconciliation form    Time Spent on discharge is  32 minutes in patient examination, evaluation, patient/family counseling as well as medication reconciliation, prescriptions for required medications, discharge plan and follow up.     Electronically signed by ANDREW Hernandez on 3/25/2021 at 09:26 CDT     I have discussed the care of Gil Monroy, including pertinent history and exam findings, with the nurse practitioner.    I have seen and examined the patient and the key elements of all parts of the encounter have been performed by me.  I agree with the assessment, plan and orders as documented by Lolita  ANDREW Crooks, after I modified the exam findings and the plan of treatments and the final version is my approved version of the assessment.        Electronically signed by Miguel Shaffer MD on 3/28/2021 at 21:40 CDT

## 2021-03-25 NOTE — TELEPHONE ENCOUNTER
[facility] Transitions Initial Follow Up Call    Outreach made within 2 business days of discharge: Yes    Patient: [address]   Patient : 1970   MRN: [id number]    Reason for Admission: Left hip septic arthritis secondary to group B strep-native hip      Discharge Date: 3/5/21       Spoke with: Patient    Discharge department/facility: [facility]    TCM Interactive Patient Contact:  Was patient able to fill all prescriptions: Yes  Was patient instructed to bring all medications to the follow-up visit: Yes  Is patient taking all medications as directed in the discharge summary? Yes  Does patient understand their discharge instructions: Yes  Does patient have questions or concerns that need addressed prior to 7-14 day follow up office visit: no    Spoke with patient. He returned home today. He is still in a lot of pain and was given #20 OxyContin at discharge. Appetite is good, bowel and bladder function are normal. No fever, chills or signs of infection. appt with PCP is schedule for 3/31/21.      Scheduled appointment with PCP within 7-14 days    Follow Up  Future Appointments   Date Time Provider Logan Perry   3/31/2021 11:30 AM DINORAH Marquez NP [id number]   2021  9:30 AM DINORAH Marquez  [address] & [facility]

## 2021-03-28 DIAGNOSIS — Z79.4 TYPE 2 DIABETES MELLITUS WITH OTHER SKIN ULCER, WITH LONG-TERM CURRENT USE OF INSULIN (HCC): Chronic | ICD-10-CM

## 2021-03-28 DIAGNOSIS — E11.622 TYPE 2 DIABETES MELLITUS WITH OTHER SKIN ULCER, WITH LONG-TERM CURRENT USE OF INSULIN (HCC): Chronic | ICD-10-CM

## 2021-03-29 ENCOUNTER — TELEPHONE (OUTPATIENT)
Dept: INTERNAL MEDICINE CLINIC | Age: 51
End: 2021-03-29

## 2021-03-29 RX ORDER — INSULIN GLARGINE 100 [IU]/ML
12 INJECTION, SOLUTION SUBCUTANEOUS NIGHTLY
Qty: 5 PEN | Refills: 1 | Status: SHIPPED | OUTPATIENT
Start: 2021-03-29

## 2021-03-29 NOTE — TELEPHONE ENCOUNTER
[address] [address]  PT eval completed and per PT Patient has pain with mobility. Independent with ambulation, steps, bed mobility and transfers. Pain is a limiting factor, though. Independent with ADL's including bathing and dressing. Patient reports increased pain with activity and ex. PT Recommending :  No further PT intervention indicated at this time.     Patient declines OT intervention    jairo LICONA [facility] Nurse will be seeing pt )

## 2021-03-30 NOTE — TELEPHONE ENCOUNTER
Per Aleta Gonsalves she will NOT refill this medication.  Patient will need to return to Dr. David Collier for this medication

## 2021-03-31 ENCOUNTER — TELEPHONE (OUTPATIENT)
Dept: INTERNAL MEDICINE CLINIC | Age: 51
End: 2021-03-31

## 2021-03-31 ENCOUNTER — VIRTUAL VISIT (OUTPATIENT)
Dept: PRIMARY CARE CLINIC | Age: 51
End: 2021-03-31
Payer: MEDICARE

## 2021-03-31 DIAGNOSIS — Z79.4 TYPE 2 DIABETES MELLITUS WITH OTHER SKIN ULCER, WITH LONG-TERM CURRENT USE OF INSULIN (HCC): ICD-10-CM

## 2021-03-31 DIAGNOSIS — R53.83 FATIGUE, UNSPECIFIED TYPE: ICD-10-CM

## 2021-03-31 DIAGNOSIS — F33.41 RECURRENT MAJOR DEPRESSIVE DISORDER, IN PARTIAL REMISSION (HCC): ICD-10-CM

## 2021-03-31 DIAGNOSIS — E11.622 TYPE 2 DIABETES MELLITUS WITH OTHER SKIN ULCER, WITH LONG-TERM CURRENT USE OF INSULIN (HCC): ICD-10-CM

## 2021-03-31 DIAGNOSIS — E83.42 HYPOMAGNESEMIA: ICD-10-CM

## 2021-03-31 DIAGNOSIS — Z09 HOSPITAL DISCHARGE FOLLOW-UP: Primary | ICD-10-CM

## 2021-03-31 DIAGNOSIS — Z94.4 HISTORY OF LIVER TRANSPLANT (HCC): ICD-10-CM

## 2021-03-31 DIAGNOSIS — E87.6 HYPOKALEMIA: ICD-10-CM

## 2021-03-31 PROCEDURE — 1111F DSCHRG MED/CURRENT MED MERGE: CPT | Performed by: NURSE PRACTITIONER

## 2021-03-31 PROCEDURE — 99496 TRANSJ CARE MGMT HIGH F2F 7D: CPT | Performed by: NURSE PRACTITIONER

## 2021-03-31 RX ORDER — PANTOPRAZOLE SODIUM 40 MG/1
40 TABLET, DELAYED RELEASE ORAL DAILY
Qty: 30 TABLET | Refills: 3 | Status: SHIPPED | OUTPATIENT
Start: 2021-03-31 | End: 2021-04-29 | Stop reason: SDUPTHER

## 2021-03-31 RX ORDER — PANTOPRAZOLE SODIUM 40 MG/1
1 TABLET, DELAYED RELEASE ORAL DAILY
COMMUNITY
Start: 2021-03-25 | End: 2021-03-31 | Stop reason: SDUPTHER

## 2021-03-31 RX ORDER — SACCHAROMYCES BOULARDII 250 MG
1 CAPSULE ORAL DAILY
COMMUNITY
Start: 2021-03-25 | End: 2021-03-31 | Stop reason: SDUPTHER

## 2021-03-31 RX ORDER — BISMUTH TRIBROMOPH/PETROLATUM 5"X9"
2 BANDAGE TOPICAL DAILY
Qty: 50 EACH | Refills: 0 | Status: SHIPPED | OUTPATIENT
Start: 2021-03-31

## 2021-03-31 RX ORDER — POTASSIUM CHLORIDE 20 MEQ/1
20 TABLET, EXTENDED RELEASE ORAL DAILY
Qty: 30 TABLET | Refills: 0 | Status: SHIPPED | OUTPATIENT
Start: 2021-03-31 | End: 2021-04-29 | Stop reason: SDUPTHER

## 2021-03-31 RX ORDER — VANCOMYCIN HYDROCHLORIDE 125 MG/1
1 CAPSULE ORAL 4 TIMES DAILY
COMMUNITY
Start: 2021-03-26 | End: 2021-04-19

## 2021-03-31 RX ORDER — SACCHAROMYCES BOULARDII 250 MG
250 CAPSULE ORAL DAILY
Qty: 30 CAPSULE | Refills: 1 | Status: SHIPPED | OUTPATIENT
Start: 2021-03-31 | End: 2021-04-29 | Stop reason: SDUPTHER

## 2021-03-31 RX ORDER — SENNA AND DOCUSATE SODIUM 50; 8.6 MG/1; MG/1
1 TABLET, FILM COATED ORAL DAILY
COMMUNITY
Start: 2021-03-25 | End: 2021-12-09

## 2021-03-31 ASSESSMENT — ENCOUNTER SYMPTOMS
RESPIRATORY NEGATIVE: 1
EYES NEGATIVE: 1
GASTROINTESTINAL NEGATIVE: 1

## 2021-03-31 NOTE — PROGRESS NOTES
Consent:  Saleem Owens  and/or health care decision maker is aware that that he may receive a bill for this telephone service, depending on his insurance coverage, and has provided verbal consent to proceed: Yes     Post-Discharge Transitional Care Management Services or [address]   YOB: 1970    Date of Office Visit:  3/31/2021  Date of Hospital Admission: 2/27/21  Date of Hospital Discharge: 3/5/21  Readmission Risk Score(high >=14%.  Medium >=10%):Readmission Risk Score: 33      Care management risk score Rising risk (score 2-5) and Complex Care (Scores >=6): 2     Non face to face  following discharge, date last encounter closed (first attempt may have been earlier): 3/25/2021  4:53 PM 3/25/2021  4:53 PM    Call initiated 2 business days of discharge: Yes     Patient Active Problem List   Diagnosis    Pyloric ulcer    Duodenal ulcer, acute    History of GI bleed    Esophageal ulcer    History of hepatitis C virus infection    History of liver transplant ([address]    History of esophageal varices    Major depressive disorder, single episode, moderate ([address]    Narcotic dependency, continuous ([address]    Varicose veins of both lower extremities with pain    Morbidly obese ([address]    Type 2 diabetes mellitus with skin complication, with long-term current use of insulin ([address]    Abdominal wall skin ulcer, with fat layer exposed ([address]    Streptococcal bacteremia    Claudication ([address]    Septic arthritis of hip ([address]    Acute hip pain, left    Hypomagnesemia    Moderate malnutrition ([address]    Cigarette nicotine dependence with nicotine-induced disorder    Tobacco abuse counseling    Personal history of noncompliance with medical treatment and regimen       No Known Allergies    Medications listed as ordered at the time of discharge from hospital   Tahmina BACK   Home Medication Instructions TROY:    Printed on:04/01/21 9043   Medication Information  vancomycin (VANCOCIN) 125 MG capsule Take 1 capsule by mouth 4 times daily      sennosides-docusate sodium (SENOKOT-S) 8.6-50 MG tablet Take 1 tablet by mouth daily      Bismuth Tribromoph-Petrolatum (XEROFORM PETROLATUM GAUZE 5\"X9\") MISC external pads Apply 2 each topically daily 50 each 0    saccharomyces boulardii (FLORASTOR) 250 MG capsule Take 1 capsule by mouth daily 30 capsule 1    pantoprazole (PROTONIX) 40 MG tablet Take 1 tablet by mouth daily 30 tablet 3        Medications patient taking as of now reconciled against medications ordered at time of hospital discharge: Yes    Chief Complaint   Patient presents with    Follow-Up from Hospital       HPI    Inpatient course: Discharge summary reviewed- see chart. Interval history/Current status:     Improving   Reports he called EMS for left hip pain rated at 10/10  Patient reports Dr. Hugo Lopez told him in the ER he had infection  He reports he has difficulty getting out of a car or walking up steps   Patient reports he ran out of pain medication at long-term care   Patient reports he called Dr. Tha Harper and was told to contact our office   Patient was told from our office to contact Dr. Hugo Lopez  Patient was prescribed medication by Dr. Hugo Lopez - oxycodone 10 mg Q4H  Patient reports he has an appointment with Dr. Anna Reardon tomorrow   Reports he is currently taking   Patient reports fasting glucose 110-150. Reports he takes basaglar ever night: 12 units. Patient reports he is taking 5 U of humalog four times a day. Patient reports they are trying to let him heal and then will do physical therapy. Dr. Hugo Lopez follow-up is on 4/20   Patient hasn't been adherent with previous recommendations related to diabetic regimen. Patients glucose was greater than 500 with lab work. Patient reports he only had a sausage biscuit today, but he removed the biscuit. Patient given instructions. Please see telephone encounter.      Review of Systems   Constitutional: Positive for fatigue. HENT: Negative. Eyes: Negative. Respiratory: Negative. Cardiovascular: Negative. Gastrointestinal: Negative. Endocrine: Negative. Genitourinary: Negative. Musculoskeletal: Positive for arthralgias and gait problem (walker). Skin: Negative. Psychiatric/Behavioral: Negative for self-injury and suicidal ideas. There were no vitals filed for this visit. There is no height or weight on file to calculate BMI. Wt Readings from Last 3 Encounters:   03/05/21 288 lb 9 oz (130.9 kg)   02/09/21 260 lb (117.9 kg)   02/08/21 272 lb (123.4 kg)     BP Readings from Last 3 Encounters:   03/05/21 (!) 143/86   02/27/21 (!) 88/49   02/12/21 125/73       PHYSICAL EXAMINATION:    Constitutional: [x] Appears well-developed and well-nourished [x] No apparent distress      [] Abnormal-   Mental status  [x] Alert and awake  [x] Oriented to person/place/time [x]Able to follow commands      Eyes:  EOM    [x]  Normal  [] Abnormal-  Sclera  [x]  Normal  [] Abnormal -         Discharge [x]  None visible  [] Abnormal -    HENT:   [x] Normocephalic, atraumatic. [] Abnormal   [x] Mouth/Throat: Mucous membranes are moist.     External Ears [x] Normal  [] Abnormal-     Neck: [x] No visualized mass     Pulmonary/Chest: [x] Respiratory effort normal.  [x] No visualized signs of difficulty breathing or respiratory distress        [] Abnormal-      Musculoskeletal:   [x] Normal gait with no signs of ataxia         [x] Normal range of motion of neck        [] Abnormal-       Neurological:        [x] No Facial Asymmetry (Cranial nerve 7 motor function) (limited exam to video visit)          [x] No gaze palsy        [] Abnormal-         Skin:        [x] No significant exanthematous lesions or discoloration noted on facial skin         [] Abnormal-            Psychiatric:       [x] Normal Affect [x] No Hallucinations        [] Abnormal-         Assessment/Plan:  1.  Hospital discharge follow-up  - DC DISCHARGE MEDS RECONCILED W/ CURRENT OUTPATIENT MED LIST    2. History of liver transplant ([facility]    3. Recurrent major depressive disorder, in partial remission ([facility]    4. Type 2 diabetes mellitus with other skin ulcer, with long-term current use of insulin (HCC)  - CBC Auto Differential; Future  - Comprehensive Metabolic Panel; Future  - Hemoglobin A1C; Future    5. Fatigue, unspecified type  - TSH without Reflex; Future    6. Hypomagnesemia  - Magnesium; Future    7. Hypokalemia  - potassium chloride (KLOR-CON M) 20 MEQ extended release tablet; Take 1 tablet by mouth daily  Dispense: 30 tablet; Refill: 0        Medical Decision Making: high complexity    Pursuant to the emergency declaration under the [facility] waiver authority and the [employer] Act, this Virtual  Visit was conducted, with patient's consent, to reduce the patient's risk of exposure to COVID-19 and provide continuity of care for an established patient. Services were provided through a video synchronous discussion virtually to substitute for in-person clinic visit.

## 2021-03-31 NOTE — TELEPHONE ENCOUNTER
[facility] nurse reporting vital signs as requested   /72, HR 80, resp 20 temp was 96.9      Labs were obtained today from a prior from Dr Sun Frausto for CBC, CMP and CRP and have been dropped off at Lab

## 2021-04-06 DIAGNOSIS — G89.4 CHRONIC PAIN SYNDROME: ICD-10-CM

## 2021-04-06 RX ORDER — LANOLIN ALCOHOL/MO/W.PET/CERES
400 CREAM (GRAM) TOPICAL 2 TIMES DAILY
Qty: 60 TABLET | Refills: 0 | Status: SHIPPED | OUTPATIENT
Start: 2021-04-06 | End: 2021-04-29 | Stop reason: SDUPTHER

## 2021-04-06 NOTE — TELEPHONE ENCOUNTER
Enrique Chappell called requesting a refill of the below medication which has been pended for you:     Requested Prescriptions     Pending Prescriptions Disp Refills    magnesium oxide (MAG-OX) 400 (240 Mg) MG tablet 60 tablet 0     Sig: Take 1 tablet by mouth 2 times daily       Last Appointment Date: 3/31/2021  Next Appointment Date: 4/14/2021    No Known Allergies

## 2021-04-07 RX ORDER — GABAPENTIN 100 MG/1
100 CAPSULE ORAL 3 TIMES DAILY
Qty: 90 CAPSULE | Refills: 0 | Status: SHIPPED | OUTPATIENT
Start: 2021-04-07 | End: 2021-08-04 | Stop reason: SDUPTHER

## 2021-04-13 ENCOUNTER — TELEPHONE (OUTPATIENT)
Dept: INTERNAL MEDICINE CLINIC | Age: 51
End: 2021-04-13
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
## 2021-04-14 ENCOUNTER — TELEPHONE (OUTPATIENT)
Dept: ADMINISTRATIVE | Age: 51
End: 2021-04-14

## 2021-04-14 NOTE — TELEPHONE ENCOUNTER
He was suppose to reschedule with wound care but hasn't and he also No Showed his appointment in our office today.

## 2021-04-14 NOTE — TELEPHONE ENCOUNTER
Pt spouse called to to see if pt could switch to Dr. Shun Gaona, he previously saw Luzmaria Trejo, please advise if able to accommodate

## 2021-04-19 ENCOUNTER — HOSPITAL ENCOUNTER (OUTPATIENT)
Dept: WOUND CARE | Age: 51
Discharge: HOME | End: 2021-04-19
Payer: MEDICARE

## 2021-04-19 VITALS
TEMPERATURE: 98.1 F | BODY MASS INDEX: 33.13 KG/M2 | WEIGHT: 250 LBS | HEART RATE: 60 BPM | DIASTOLIC BLOOD PRESSURE: 83 MMHG | HEIGHT: 73 IN | SYSTOLIC BLOOD PRESSURE: 147 MMHG | RESPIRATION RATE: 20 BRPM

## 2021-04-19 DIAGNOSIS — E66.01 MORBIDLY OBESE (HCC): ICD-10-CM

## 2021-04-19 DIAGNOSIS — E11.622 TYPE 2 DIABETES MELLITUS WITH OTHER SKIN ULCER, WITH LONG-TERM CURRENT USE OF INSULIN (HCC): Primary | Chronic | ICD-10-CM

## 2021-04-19 DIAGNOSIS — L98.492 SKIN ULCER OF LEFT GROIN WITH FAT LAYER EXPOSED (HCC): ICD-10-CM

## 2021-04-19 DIAGNOSIS — L98.492 ABDOMINAL WALL SKIN ULCER, WITH FAT LAYER EXPOSED (HCC): ICD-10-CM

## 2021-04-19 DIAGNOSIS — F17.219 CIGARETTE NICOTINE DEPENDENCE WITH NICOTINE-INDUCED DISORDER: ICD-10-CM

## 2021-04-19 DIAGNOSIS — Z79.4 TYPE 2 DIABETES MELLITUS WITH OTHER SKIN ULCER, WITH LONG-TERM CURRENT USE OF INSULIN (HCC): Primary | Chronic | ICD-10-CM

## 2021-04-19 PROCEDURE — 97597 DBRDMT OPN WND 1ST 20 CM/<: CPT | Performed by: NURSE PRACTITIONER

## 2021-04-19 PROCEDURE — 11042 DBRDMT SUBQ TIS 1ST 20SQCM/<: CPT

## 2021-04-19 PROCEDURE — 99213 OFFICE O/P EST LOW 20 MIN: CPT

## 2021-04-19 PROCEDURE — 11042 DBRDMT SUBQ TIS 1ST 20SQCM/<: CPT | Performed by: NURSE PRACTITIONER

## 2021-04-19 PROCEDURE — 97598 DBRDMT OPN WND ADDL 20CM/<: CPT | Performed by: NURSE PRACTITIONER

## 2021-04-19 PROCEDURE — 11045 DBRDMT SUBQ TISS EACH ADDL: CPT

## 2021-04-19 RX ORDER — OXYCODONE HYDROCHLORIDE 10 MG/1
1 TABLET ORAL EVERY 4 HOURS PRN
COMMUNITY
Start: 2021-04-06 | End: 2021-12-09

## 2021-04-19 RX ORDER — SODIUM HYPOCHLORITE 1.25 MG/ML
SOLUTION TOPICAL
Qty: 1 BOTTLE | Refills: 1 | Status: SHIPPED | OUTPATIENT
Start: 2021-04-19

## 2021-04-19 RX ORDER — LIDOCAINE HYDROCHLORIDE 20 MG/ML
JELLY TOPICAL PRN
Status: DISCONTINUED | OUTPATIENT
Start: 2021-04-19 | End: 2021-04-21 | Stop reason: HOSPADM

## 2021-04-19 ASSESSMENT — VISUAL ACUITY: OU: 1
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
## 2021-04-23 ENCOUNTER — TELEPHONE (OUTPATIENT)
Dept: INTERNAL MEDICINE CLINIC | Age: 51
End: 2021-04-23

## 2021-04-23 NOTE — TELEPHONE ENCOUNTER
I will let HH know - not sure what to do this the BP and Heart rate and him complaining of \" feeling off\" I told her that pt may need to be seen at urgent care if not feeling well

## 2021-04-23 NOTE — TELEPHONE ENCOUNTER
[address] [id number] nurse reporting   That pt is needing refills on protonix, potassium, gabapentin, prednisone     Also todays BP per [place] nurse  was 140/100  pt states he keeps getting hot and cold and just feels off

## 2021-04-23 NOTE — TELEPHONE ENCOUNTER
He is no long our patient here at [facility] and will be seeing Dr. Raya Terrazas

## 2021-04-28 ENCOUNTER — HOSPITAL ENCOUNTER (OUTPATIENT)
Dept: WOUND CARE | Age: 51
Discharge: HOME | End: 2021-04-28
Payer: MEDICARE

## 2021-04-28 VITALS
SYSTOLIC BLOOD PRESSURE: 151 MMHG | WEIGHT: 250 LBS | TEMPERATURE: 98 F | HEART RATE: 60 BPM | DIASTOLIC BLOOD PRESSURE: 101 MMHG | BODY MASS INDEX: 33.13 KG/M2 | HEIGHT: 73 IN | RESPIRATION RATE: 20 BRPM

## 2021-04-28 DIAGNOSIS — L98.492 SKIN ULCER OF LEFT GROIN WITH FAT LAYER EXPOSED (HCC): ICD-10-CM

## 2021-04-28 DIAGNOSIS — E11.622 TYPE 2 DIABETES MELLITUS WITH OTHER SKIN ULCER, WITH LONG-TERM CURRENT USE OF INSULIN (HCC): Chronic | ICD-10-CM

## 2021-04-28 DIAGNOSIS — L98.492 ABDOMINAL WALL SKIN ULCER, WITH FAT LAYER EXPOSED (HCC): Primary | Chronic | ICD-10-CM

## 2021-04-28 DIAGNOSIS — Z79.4 TYPE 2 DIABETES MELLITUS WITH OTHER SKIN ULCER, WITH LONG-TERM CURRENT USE OF INSULIN (HCC): Chronic | ICD-10-CM

## 2021-04-28 PROCEDURE — 97597 DBRDMT OPN WND 1ST 20 CM/<: CPT

## 2021-04-28 PROCEDURE — 97598 DBRDMT OPN WND ADDL 20CM/<: CPT

## 2021-04-28 PROCEDURE — 97598 DBRDMT OPN WND ADDL 20CM/<: CPT | Performed by: SURGERY

## 2021-04-28 PROCEDURE — 97597 DBRDMT OPN WND 1ST 20 CM/<: CPT | Performed by: SURGERY

## 2021-04-28 RX ORDER — CLOBETASOL PROPIONATE 0.5 MG/G
OINTMENT TOPICAL ONCE
Status: CANCELLED | OUTPATIENT
Start: 2021-04-28 | End: 2021-04-28

## 2021-04-28 RX ORDER — LIDOCAINE HYDROCHLORIDE 40 MG/ML
SOLUTION TOPICAL ONCE
Status: CANCELLED | OUTPATIENT
Start: 2021-04-28 | End: 2021-04-28

## 2021-04-28 RX ORDER — BACITRACIN ZINC AND POLYMYXIN B SULFATE 500; 1000 [USP'U]/G; [USP'U]/G
OINTMENT TOPICAL ONCE
Status: CANCELLED | OUTPATIENT
Start: 2021-04-28 | End: 2021-04-28

## 2021-04-28 RX ORDER — GENTAMICIN SULFATE 1 MG/G
OINTMENT TOPICAL ONCE
Status: CANCELLED | OUTPATIENT
Start: 2021-04-28 | End: 2021-04-28

## 2021-04-28 RX ORDER — LIDOCAINE HYDROCHLORIDE 20 MG/ML
JELLY TOPICAL ONCE
Status: CANCELLED | OUTPATIENT
Start: 2021-04-28 | End: 2021-04-28

## 2021-04-28 RX ORDER — GINSENG 100 MG
CAPSULE ORAL ONCE
Status: CANCELLED | OUTPATIENT
Start: 2021-04-28 | End: 2021-04-28

## 2021-04-28 RX ORDER — LIDOCAINE 40 MG/G
CREAM TOPICAL ONCE
Status: CANCELLED | OUTPATIENT
Start: 2021-04-28 | End: 2021-04-28

## 2021-04-28 RX ORDER — LIDOCAINE 50 MG/G
OINTMENT TOPICAL ONCE
Status: CANCELLED | OUTPATIENT
Start: 2021-04-28 | End: 2021-04-28

## 2021-04-28 RX ORDER — BETAMETHASONE DIPROPIONATE 0.05 %
OINTMENT (GRAM) TOPICAL ONCE
Status: CANCELLED | OUTPATIENT
Start: 2021-04-28 | End: 2021-04-28

## 2021-04-28 RX ORDER — BACITRACIN, NEOMYCIN, POLYMYXIN B 400; 3.5; 5 [USP'U]/G; MG/G; [USP'U]/G
OINTMENT TOPICAL ONCE
Status: CANCELLED | OUTPATIENT
Start: 2021-04-28 | End: 2021-04-28

## 2021-04-28 NOTE — PROGRESS NOTES
Alcohol use: No     Comment: quit x 8 months ago with one relapse, former ETOH    Drug use: No       ALLERGIES    No Known Allergies    MEDICATIONS    Current Outpatient Medications on File Prior to Encounter   Medication Sig Dispense Refill    oxyCODONE HCl (OXY-IR) 10 MG immediate release tablet Take 1 tablet by mouth every 4 hours as needed.  collagenase (SANTYL) 250 UNIT/GM ointment Apply topically daily. Apply nickel thick as directed. Wound size 5.3x2x0. 6 1 Tube 3    sodium hypochlorite (DAKINS) 0.125 % SOLN external solution Apply topically as directed daily. 1 Bottle 1    gabapentin (NEURONTIN) 100 MG capsule Take 1 capsule by mouth 3 times daily for 30 days. 90 capsule 0    magnesium oxide (MAG-OX) 400 (240 Mg) MG tablet Take 1 tablet by mouth 2 times daily 60 tablet 0    potassium chloride (KLOR-CON M) 20 MEQ extended release tablet Take 1 tablet by mouth daily 30 tablet 0    sennosides-docusate sodium (SENOKOT-S) 8.6-50 MG tablet Take 1 tablet by mouth daily      Bismuth Tribromoph-Petrolatum (XEROFORM PETROLATUM GAUZE 5\"X9\") MISC external pads Apply 2 each topically daily 50 each 0    saccharomyces boulardii (FLORASTOR) 250 MG capsule Take 1 capsule by mouth daily 30 capsule 1    pantoprazole (PROTONIX) 40 MG tablet Take 1 tablet by mouth daily 30 tablet 3    BASAGLAR KWIKPEN 100 UNIT/ML injection pen Inject 12 Units into the skin nightly 5 pen 1    tacrolimus (PROGRAF) 1 MG capsule Take 3 capsules by mouth 2 times daily 180 capsule 0    NOVOLOG FLEXPEN 100 UNIT/ML injection pen Inject 1-6 Units into the skin 3 times daily (before meals) 5 pen 1    Insulin Pen Needle (MEIJER PEN NEEDLES) 31G X 6 MM MISC 1 each by Does not apply route daily 100 each 3    Lancets MISC Use to test blood glucose four times a day and as needed for hypoglycemic events  DXE11.622 Z79.4 200 each 3    blood glucose monitor strips Test 4 times a day & as needed for symptoms of irregular blood glucose.  Dispense sufficient amount for indicated testing frequency plus additional to accommodate PRN testing needs. SXK92.294 Z79.4 200 strip 3    predniSONE (DELTASONE) 10 MG tablet Take 10 mg by mouth 2 times daily        No current facility-administered medications on file prior to encounter. REVIEW OF SYSTEMS    A comprehensive review of systems was negative.     Objective:      BP (!) 151/101   Pulse 60   Temp 98 °F (36.7 °C) (Temporal)   Resp 20   Ht 6' 1\" (1.854 m)   Wt 250 lb (113.4 kg)   BMI 32.98 kg/m²     Wt Readings from Last 3 Encounters:   04/28/21 250 lb (113.4 kg)   04/19/21 250 lb (113.4 kg)   03/05/21 288 lb 9 oz (130.9 kg)       PHYSICAL EXAM    General Appearance: alert and oriented to person, place and time, well developed and well- nourished, in no acute distress  Skin: warm and dry, no rash or erythema  Head: normocephalic and atraumatic  Eyes: pupils equal, round, and reactive to light, extraocular eye movements intact, conjunctivae normal  ENT: tympanic membrane, external ear and ear canal normal bilaterally, nose without deformity, nasal mucosa and turbinates normal without polyps, lips teeth and gums normal  Neck: supple and non-tender without mass, no thyromegaly or thyroid nodules, no cervical lymphadenopathy  Pulmonary/Chest: clear to auscultation bilaterally- no wheezes, rales or rhonchi, normal air movement, no respiratory distress  Cardiovascular: normal rate, regular rhythm, normal S1 and S2, no murmurs, rubs, clicks, or gallops, distal pulses intact, no carotid bruits  Abdomen: soft, non-tender, non-distended, normal bowel sounds, no masses or organomegaly  Extremities: no cyanosis, clubbing or edema  Musculoskeletal: normal range of motion, no joint swelling, deformity or tenderness  Neurologic: reflexes normal and symmetric, no cranial nerve deficit, gait, coordination and speech normal, sensation of skin normal      Assessment:      Problem List Items Addressed This Visit     Type 2 diabetes mellitus with skin complication, 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 Performed by: Abigail Glasgow MD    Consent obtained:  Yes    Time out taken:  Yes    Pain Control: Anesthetic  Anesthetic: None       Debridement:Non-excisional Debridement    Using curette the wound(s)/ulcer(s) was/were sharply debrided down through and including the removal of epidermis and dermis.         Devitalized Tissue Debrided:  fibrin, biofilm, slough, necrotic/eschar and exudate      Pre Debridement Measurements:  Are located in the Wound/Ulcer Documentation Flow Sheet    Wound/Ulcer #: 1 and 2    Percent of Wound(s)/Ulcer(s) Debrided: 100%    Total Surface Area Debrided:  86 sq cm       Diabetic/Pressure/Non Pressure Ulcers only:  Ulcer: Diabetic ulcer, fat layer exposed           Post Debridement Measurements:    Wound/Ulcer Descriptions are Pre Debridement --EXCEPT MEASUREMENTS    Wound 02/10/21 Abdomen wound 1- abdominal wall cluster atypical (Active)   Wound Image   04/28/21 1429   Wound Etiology Burn 04/28/21 1429   Dressing Status Old drainage noted 04/28/21 1429   Wound Cleansed Not Cleansed 04/28/21 1429   Dressing/Treatment Xeroform 04/28/21 1438   Offloading for Diabetic Foot Ulcers No offloading required 04/28/21 1429   Wound Length (cm) 8 cm 04/28/21 1429   Wound Width (cm) 10 cm 04/28/21 1429   Wound Depth (cm) 0.1 cm 04/28/21 1429   Wound Surface Area (cm^2) 80 cm^2 04/28/21 1429   Change in Wound Size % (l*w) 25.93 04/28/21 1429   Wound Volume (cm^3) 8 cm^3 04/28/21 1429   Wound Healing % 26 04/28/21 1429   Post-Procedure Length (cm) 8 cm 04/28/21 1438 Post-Procedure Width (cm) 10 cm 04/28/21 1438   Post-Procedure Depth (cm) 0.1 cm 04/28/21 1438   Post-Procedure Surface Area (cm^2) 80 cm^2 04/28/21 1438   Post-Procedure Volume (cm^3) 8 cm^3 04/28/21 1438   Wound Assessment Slough 04/28/21 1429   Drainage Amount Moderate 04/28/21 1429   Drainage Description Serosanguinous 04/28/21 1429   Odor None 04/28/21 1429   Gayla-wound Assessment Intact 04/28/21 1429   Margins Defined edges 04/28/21 1429   Wound Thickness Description not for Pressure Injury Full thickness 04/28/21 1429   Number of days: 76       Wound 04/19/21 Thigh Anterior; Left wound 2- left groin post surgical (Active)   Wound Image   04/28/21 1429   Wound Etiology Non-Healing Surgical 04/28/21 1429   Dressing Status Old drainage noted 04/28/21 1429   Wound Cleansed Not Cleansed 04/28/21 1429   Dressing/Treatment Xeroform 04/28/21 1438   Offloading for Diabetic Foot Ulcers No offloading required 04/28/21 1429   Wound Length (cm) 5 cm 04/28/21 1429   Wound Width (cm) 1.5 cm 04/28/21 1429   Wound Depth (cm) 0.3 cm 04/28/21 1429   Wound Surface Area (cm^2) 7.5 cm^2 04/28/21 1429   Change in Wound Size % (l*w) 29.25 04/28/21 1429   Wound Volume (cm^3) 2.25 cm^3 04/28/21 1429   Wound Healing % 65 04/28/21 1429   Post-Procedure Length (cm) 5 cm 04/28/21 1438   Post-Procedure Width (cm) 1.5 cm 04/28/21 1438   Post-Procedure Depth (cm) 0.3 cm 04/28/21 1438   Post-Procedure Surface Area (cm^2) 7.5 cm^2 04/28/21 1438   Post-Procedure Volume (cm^3) 2.25 cm^3 04/28/21 1438   Wound Assessment Slough 04/28/21 1429   Drainage Amount Moderate 04/28/21 1429   Drainage Description Serosanguinous 04/28/21 1429   Odor None 04/28/21 1429   Gayla-wound Assessment Intact 04/28/21 1429   Margins Defined edges 04/28/21 1429   Wound Thickness Description not for Pressure Injury Full thickness 04/28/21 1429   Number of days: 8             Estimated Blood Loss:  Minimal    Hemostasis Achieved:  by pressure    Procedural Pain:  0  / 10     Post Procedural Pain:  0 / 10     Response to treatment:  Well tolerated by patient. 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 Cont xeroform    Treatment Note please see attached Discharge Instructions    In my professional opinion this patient would benefit from HBO Therapy: No    Written patient dismissal instructions given to patient and signed by patient or POA. Discharge [address] and Hyperbaric Oxygen Therapy   Physician Orders and Discharge Instructions  [address]  Telephone: [phone]    NAME:  Giovanny January:  1970  MEDICAL RECORD NUMBER:  [id number]  DATE:  4/28/2021    Discharge condition: Stable    Discharge to: Home    Left via:Private automobile    Accompanied by: Self    [facility]: [facility]    Dressing Orders:  Left hip wound: Soap and water wash; Santyl to wound bed nickel thick, 1/4 Dakins moistened gauze loosely over the  wound bed, secure with ABD pad, and medipore tape daily. Abdominal wound: Soap and water wash, apply gel to the wound bed, secure with adaptic then ABD pad daily. Treatment Orders:  Protein rich diet  Multivitamin  Keep wounds clean and dry-change if soiled  Keep glucose levels under 200    [facility] follow up visit _____________________________  (Please note your next appointment above and if you are unable to keep, kindly give a 24 hour notice.  Thank you.)    If you experience any of the following, please call the [address] during business hours:    * Increase in Pain  * Temperature over 101  * Increase in drainage from your wound  * Drainage with a foul odor  * Bleeding  * Increase in swelling  * Need for compression bandage changes due to slippage, breakthrough drainage. If you need medical attention outside of the business hours of the [address] [place] [address] Road please contact your PCP or go to the nearest emergency room.         Electronically signed by Hina Galicia MD on 4/28/2021 at 2:41 PM

## 2021-04-29 ENCOUNTER — OFFICE VISIT (OUTPATIENT)
Dept: FAMILY MEDICINE CLINIC | Age: 51
End: 2021-04-29
Payer: MEDICARE

## 2021-04-29 VITALS
HEART RATE: 109 BPM | HEIGHT: 73 IN | BODY MASS INDEX: 33.8 KG/M2 | SYSTOLIC BLOOD PRESSURE: 152 MMHG | TEMPERATURE: 97.9 F | WEIGHT: 255 LBS | DIASTOLIC BLOOD PRESSURE: 88 MMHG | OXYGEN SATURATION: 97 %

## 2021-04-29 DIAGNOSIS — Z12.11 SCREENING FOR COLON CANCER: ICD-10-CM

## 2021-04-29 DIAGNOSIS — E87.6 HYPOKALEMIA: ICD-10-CM

## 2021-04-29 DIAGNOSIS — Z87.19 HISTORY OF GI BLEED: ICD-10-CM

## 2021-04-29 DIAGNOSIS — E11.42 TYPE 2 DIABETES MELLITUS WITH DIABETIC POLYNEUROPATHY, WITH LONG-TERM CURRENT USE OF INSULIN (HCC): ICD-10-CM

## 2021-04-29 DIAGNOSIS — Z79.4 TYPE 2 DIABETES MELLITUS WITH OTHER SKIN ULCER, WITH LONG-TERM CURRENT USE OF INSULIN (HCC): Primary | Chronic | ICD-10-CM

## 2021-04-29 DIAGNOSIS — E11.622 TYPE 2 DIABETES MELLITUS WITH OTHER SKIN ULCER, WITH LONG-TERM CURRENT USE OF INSULIN (HCC): Primary | Chronic | ICD-10-CM

## 2021-04-29 DIAGNOSIS — F11.20 NARCOTIC DEPENDENCY, CONTINUOUS (HCC): ICD-10-CM

## 2021-04-29 DIAGNOSIS — K21.9 GASTROESOPHAGEAL REFLUX DISEASE, UNSPECIFIED WHETHER ESOPHAGITIS PRESENT: ICD-10-CM

## 2021-04-29 DIAGNOSIS — Z79.4 TYPE 2 DIABETES MELLITUS WITH DIABETIC POLYNEUROPATHY, WITH LONG-TERM CURRENT USE OF INSULIN (HCC): ICD-10-CM

## 2021-04-29 DIAGNOSIS — R03.0 ELEVATED BLOOD PRESSURE READING WITHOUT DIAGNOSIS OF HYPERTENSION: ICD-10-CM

## 2021-04-29 PROCEDURE — 4004F PT TOBACCO SCREEN RCVD TLK: CPT | Performed by: FAMILY MEDICINE

## 2021-04-29 PROCEDURE — G8417 CALC BMI ABV UP PARAM F/U: HCPCS | Performed by: FAMILY MEDICINE

## 2021-04-29 PROCEDURE — 2022F DILAT RTA XM EVC RTNOPTHY: CPT | Performed by: FAMILY MEDICINE

## 2021-04-29 PROCEDURE — 3017F COLORECTAL CA SCREEN DOC REV: CPT | Performed by: FAMILY MEDICINE

## 2021-04-29 PROCEDURE — G8427 DOCREV CUR MEDS BY ELIG CLIN: HCPCS | Performed by: FAMILY MEDICINE

## 2021-04-29 PROCEDURE — 3052F HG A1C>EQUAL 8.0%<EQUAL 9.0%: CPT | Performed by: FAMILY MEDICINE

## 2021-04-29 PROCEDURE — 99204 OFFICE O/P NEW MOD 45 MIN: CPT | Performed by: FAMILY MEDICINE

## 2021-04-29 RX ORDER — GLUCOSAMINE HCL/CHONDROITIN SU 500-400 MG
CAPSULE ORAL
Qty: 200 STRIP | Refills: 3 | Status: SHIPPED | OUTPATIENT
Start: 2021-04-29 | End: 2021-05-24

## 2021-04-29 RX ORDER — SACCHAROMYCES BOULARDII 250 MG
250 CAPSULE ORAL DAILY
Qty: 30 CAPSULE | Refills: 1 | Status: SHIPPED | OUTPATIENT
Start: 2021-04-29 | End: 2021-05-18 | Stop reason: SDUPTHER

## 2021-04-29 RX ORDER — LANOLIN ALCOHOL/MO/W.PET/CERES
400 CREAM (GRAM) TOPICAL 2 TIMES DAILY
Qty: 60 TABLET | Refills: 0 | Status: SHIPPED | OUTPATIENT
Start: 2021-04-29 | End: 2021-05-26 | Stop reason: SDUPTHER

## 2021-04-29 RX ORDER — PEN NEEDLE, DIABETIC 31 G X1/4"
1 NEEDLE, DISPOSABLE MISCELLANEOUS DAILY
Qty: 100 EACH | Refills: 3 | Status: SHIPPED | OUTPATIENT
Start: 2021-04-29

## 2021-04-29 RX ORDER — PANTOPRAZOLE SODIUM 40 MG/1
40 TABLET, DELAYED RELEASE ORAL DAILY
Qty: 30 TABLET | Refills: 3 | Status: SHIPPED | OUTPATIENT
Start: 2021-04-29 | End: 2021-07-09 | Stop reason: SDUPTHER

## 2021-04-29 RX ORDER — POTASSIUM CHLORIDE 20 MEQ/1
20 TABLET, EXTENDED RELEASE ORAL DAILY
Qty: 30 TABLET | Refills: 0 | Status: SHIPPED | OUTPATIENT
Start: 2021-04-29 | End: 2021-05-26 | Stop reason: SDUPTHER

## 2021-04-29 ASSESSMENT — PATIENT HEALTH QUESTIONNAIRE - PHQ9
SUM OF ALL RESPONSES TO PHQ9 QUESTIONS 1 & 2: 0
SUM OF ALL RESPONSES TO PHQ QUESTIONS 1-9: 0
1. LITTLE INTEREST OR PLEASURE IN DOING THINGS: 0

## 2021-04-29 NOTE — PROGRESS NOTES
History:   Diagnosis Date    Antral vascular ectasia     Appendicitis 2009    ruptured - no surgery done    [facility]     Diabetes mellitus ([facility]     Duodenal ulcer     Esophageal varices (HCC)     grade 2    Hepatitis C     Hernia     History of GI bleed     Liver disease     Personal history of ascites     Portal hypertensive gastropathy (HCC)     Thrombocytopenia (HCC)      Past Surgical History:   Procedure Laterality Date    HIP SURGERY Left 2021    HIP INCISION AND DRAINAGE performed by Parag Weinstein MD at [facility] LIVER TRANSPLANT      UPPER GASTROINTESTINAL ENDOSCOPY  2013    Dr.Jaiyeola    UPPER GASTROINTESTINAL ENDOSCOPY  13    Dr Robbie Anguiano       Family History   Problem Relation Age of Onset    Breast Cancer Mother 61    Emphysema Father        Social History     Tobacco Use    Smoking status: Current Some Day Smoker     Packs/day: 0.50     Years: 25.00     Pack years: 12.50     Start date: 1994     Last attempt to quit: 2012     Years since quittin.6    Smokeless tobacco: Never Used   Substance Use Topics    Alcohol use: No     Comment: quit x 8 months ago with one relapse, former ETOH      Current Outpatient Medications   Medication Sig Dispense Refill    blood glucose monitor strips Test 4 times a day & as needed for symptoms of irregular blood glucose.  SKF93.524 Z79.4 200 strip 3    Insulin Pen Needle (MEIJER PEN NEEDLES) 31G X 6 MM MISC 1 each by Does not apply route daily 100 each 3    potassium chloride (KLOR-CON M) 20 MEQ extended release tablet Take 1 tablet by mouth daily 30 tablet 0    pantoprazole (PROTONIX) 40 MG tablet Take 1 tablet by mouth daily 30 tablet 3    magnesium oxide (MAG-OX) 400 (240 Mg) MG tablet Take 1 tablet by mouth 2 times daily 60 tablet 0    saccharomyces boulardii (FLORASTOR) 250 MG capsule Take 1 capsule by mouth daily 30 capsule 1    oxyCODONE HCl (OXY-IR) 10 MG immediate release tablet Take 1 tablet by mouth every 4 hours as needed.  collagenase (SANTYL) 250 UNIT/GM ointment Apply topically daily. Apply nickel thick as directed. Wound size 5.3x2x0. 6 1 Tube 3    sodium hypochlorite (DAKINS) 0.125 % SOLN external solution Apply topically as directed daily. 1 Bottle 1    gabapentin (NEURONTIN) 100 MG capsule Take 1 capsule by mouth 3 times daily for 30 days. 90 capsule 0    sennosides-docusate sodium (SENOKOT-S) 8.6-50 MG tablet Take 1 tablet by mouth daily      Bismuth Tribromoph-Petrolatum (XEROFORM PETROLATUM GAUZE 5\"X9\") MISC external pads Apply 2 each topically daily 50 each 0    BASAGLAR KWIKPEN 100 UNIT/ML injection pen Inject 12 Units into the skin nightly 5 pen 1    tacrolimus (PROGRAF) 1 MG capsule Take 3 capsules by mouth 2 times daily 180 capsule 0    NOVOLOG FLEXPEN 100 UNIT/ML injection pen Inject 1-6 Units into the skin 3 times daily (before meals) 5 pen 1    Lancets MISC Use to test blood glucose four times a day and as needed for hypoglycemic events  DXE11.622 Z79.4 200 each 3    predniSONE (DELTASONE) 10 MG tablet Take 10 mg by mouth 2 times daily        No current facility-administered medications for this visit. No Known Allergies    Review of Systems   Constitutional: Negative for chills and fever. HENT: Negative for facial swelling and mouth sores. Eyes: Negative for discharge and itching. Respiratory: Negative for apnea and stridor. Cardiovascular: Negative for chest pain and palpitations. Gastrointestinal: Negative for nausea and vomiting. Endocrine: Negative for cold intolerance and heat intolerance. Genitourinary: Negative for frequency and urgency. Musculoskeletal: Negative for arthralgias and back pain. Skin: Negative for color change and rash. See HPI for visit specific review of symptoms.   All others negative      Objective:   BP (!) 152/88   Pulse 109   Temp 97.9 °F (36.6 °C)   Ht 6' 1\" (1.854 m)   Wt 255 lb (115.7 kg)   SpO2 97%   BMI and regimen 03/05/2021    Hypomagnesemia 03/03/2021    Acute hip pain, left     Septic arthritis of hip ([address] 02/27/2021    Claudication ([address] 02/17/2021    Streptococcal bacteremia 02/09/2021    Abdominal wall skin ulcer, with fat layer exposed ([address] 08/26/2020    Type 2 diabetes mellitus with skin complication, with long-term current use of insulin ([address] 08/17/2020    Morbidly obese ([address] 08/07/2020    Varicose veins of both lower extremities with pain 02/27/2020    Narcotic dependency, continuous ([address] 02/13/2020    History of hepatitis C virus infection 12/09/2019    History of liver transplant ([address] 12/09/2019    History of esophageal varices 12/09/2019    Major depressive disorder, single episode, moderate ([address] 12/09/2019    Pyloric ulcer 05/02/2013    Duodenal ulcer, acute 05/02/2013    History of GI bleed 05/02/2013    Esophageal ulcer 05/02/2013        Anders spencer was seen today for established new doctor. Diagnoses and all orders for this visit:    Type 2 diabetes mellitus with other skin ulcer, with long-term current use of insulin ([address]  -     Microalbumin / Creatinine Urine Ratio; Future  -     Lipid Panel; Future  -     Comprehensive Metabolic Panel; Future  -     CBC Auto Differential; Future  -     Insulin Pen Needle (MEIJER PEN NEEDLES) 31G X 6 MM MISC; 1 each by Does not apply route daily    Elevated blood pressure reading without diagnosis of hypertension    Type 2 diabetes mellitus with diabetic polyneuropathy, with long-term current use of insulin ([facility])  -     blood glucose monitor strips; Test 4 times a day & as needed for symptoms of irregular blood glucose. XIX87.847 Z79.4    Hypokalemia  -     potassium chloride (KLOR-CON M) 20 MEQ extended release tablet; Take 1 tablet by mouth daily  -     magnesium oxide (MAG-OX) 400 (240 Mg) MG tablet;  Take 1 tablet by mouth 2 times daily    Gastroesophageal reflux disease, unspecified whether esophagitis present  -     pantoprazole (PROTONIX) 40 MG tablet; Take 1 tablet by mouth daily    Screening for colon cancer  -     Coljeremy (For External Results Only); Future    Narcotic dependency, continuous (HCC)    History of GI bleed  -     saccharomyces boulardii (FLORASTOR) 250 MG capsule; Take 1 capsule by mouth daily      Over 50% of the total visit time of 45 minutes was spent on counseling and/or coordination of care of -review of medical records, updating of chart, discussion of symptoms and plan-  1. Type 2 diabetes mellitus with other skin ulcer, with long-term current use of insulin ([facility])    2. Elevated blood pressure reading without diagnosis of hypertension    3. Type 2 diabetes mellitus with diabetic polyneuropathy, with long-term current use of insulin ([facility] [address]    4. Hypokalemia    5. Gastroesophageal reflux disease, unspecified whether esophagitis present    6. Screening for colon cancer    7. Narcotic dependency, continuous ([facility] [address]    8. History of GI bleed         Health Maintenance   Topic Date Due    Hepatitis A vaccine (1 of 2 - Risk 2-dose series) Never done    Pneumococcal 0-64 years Vaccine (1 of 3 - PCV13) Never done    Diabetic retinal exam  Never done    Lipid screen  Never done    COVID-19 Vaccine (1) Never done    Diabetic microalbuminuria test  Never done    Hepatitis B vaccine (1 of 3 - Risk 3-dose series) Never done    Annual Wellness Visit (AWV)  Never done    Shingles Vaccine (1 of 2) Never done    Colon cancer screen colonoscopy  Never done    Flu vaccine (Season Ended) 09/01/2021    Diabetic foot exam  09/09/2021    A1C test (Diabetic or Prediabetic)  03/24/2022    DTaP/Tdap/Td vaccine (2 - Td) 04/29/2025    HIV screen  Completed    Hib vaccine  Aged Out    Meningococcal (ACWY) vaccine  Aged Out     Agreeable for [employer]. Return in about 3 months (around 7/29/2021) for bp and DM, AWV 40 minute appt, in office. Discussed use, benefit, and side effects of prescribed medications.    All patient questions answered. Pt voiced understanding. Reviewed health maintenance. Instructed to continue current medications, diet and exercise. Patient agreedwith treatment plan. Follow up as directed. Old records reviewed, where available.     Kulwant Redman MD    Note:  dictated using Dragon software

## 2021-05-01 ASSESSMENT — ENCOUNTER SYMPTOMS
VOMITING: 0
STRIDOR: 0
EYE DISCHARGE: 0
NAUSEA: 0
EYE ITCHING: 0
BACK PAIN: 0
COLOR CHANGE: 0
APNEA: 0
FACIAL SWELLING: 0

## 2021-05-05 ENCOUNTER — HOSPITAL ENCOUNTER (OUTPATIENT)
Dept: WOUND CARE | Age: 51
Discharge: HOME | End: 2021-05-05
Payer: MEDICARE

## 2021-05-05 ENCOUNTER — TELEPHONE (OUTPATIENT)
Dept: INTERNAL MEDICINE CLINIC | Age: 51
End: 2021-05-05

## 2021-05-05 VITALS
DIASTOLIC BLOOD PRESSURE: 72 MMHG | HEIGHT: 73 IN | WEIGHT: 255 LBS | TEMPERATURE: 97.1 F | HEART RATE: 89 BPM | RESPIRATION RATE: 18 BRPM | SYSTOLIC BLOOD PRESSURE: 123 MMHG | BODY MASS INDEX: 33.8 KG/M2

## 2021-05-05 DIAGNOSIS — L98.492 ABDOMINAL WALL SKIN ULCER, WITH FAT LAYER EXPOSED (HCC): ICD-10-CM

## 2021-05-05 DIAGNOSIS — E11.622 TYPE 2 DIABETES MELLITUS WITH OTHER SKIN ULCER, WITH LONG-TERM CURRENT USE OF INSULIN (HCC): Chronic | ICD-10-CM

## 2021-05-05 DIAGNOSIS — Z79.4 TYPE 2 DIABETES MELLITUS WITH OTHER SKIN ULCER, WITH LONG-TERM CURRENT USE OF INSULIN (HCC): Chronic | ICD-10-CM

## 2021-05-05 DIAGNOSIS — L98.492 SKIN ULCER OF LEFT GROIN WITH FAT LAYER EXPOSED (HCC): Primary | Chronic | ICD-10-CM

## 2021-05-05 PROCEDURE — 97597 DBRDMT OPN WND 1ST 20 CM/<: CPT | Performed by: SURGERY

## 2021-05-05 PROCEDURE — 97597 DBRDMT OPN WND 1ST 20 CM/<: CPT

## 2021-05-05 RX ORDER — LIDOCAINE 40 MG/G
CREAM TOPICAL ONCE
Status: CANCELLED | OUTPATIENT
Start: 2021-05-05 | End: 2021-05-05

## 2021-05-05 RX ORDER — GINSENG 100 MG
CAPSULE ORAL ONCE
Status: CANCELLED | OUTPATIENT
Start: 2021-05-05 | End: 2021-05-05

## 2021-05-05 RX ORDER — LIDOCAINE 50 MG/G
OINTMENT TOPICAL ONCE
Status: CANCELLED | OUTPATIENT
Start: 2021-05-05 | End: 2021-05-05

## 2021-05-05 RX ORDER — LIDOCAINE HYDROCHLORIDE 40 MG/ML
SOLUTION TOPICAL ONCE
Status: CANCELLED | OUTPATIENT
Start: 2021-05-05 | End: 2021-05-05

## 2021-05-05 RX ORDER — LIDOCAINE HYDROCHLORIDE 20 MG/ML
JELLY TOPICAL ONCE
Status: CANCELLED | OUTPATIENT
Start: 2021-05-05 | End: 2021-05-05

## 2021-05-05 RX ORDER — BETAMETHASONE DIPROPIONATE 0.05 %
OINTMENT (GRAM) TOPICAL ONCE
Status: CANCELLED | OUTPATIENT
Start: 2021-05-05 | End: 2021-05-05

## 2021-05-05 RX ORDER — BACITRACIN, NEOMYCIN, POLYMYXIN B 400; 3.5; 5 [USP'U]/G; MG/G; [USP'U]/G
OINTMENT TOPICAL ONCE
Status: CANCELLED | OUTPATIENT
Start: 2021-05-05 | End: 2021-05-05

## 2021-05-05 RX ORDER — CLOBETASOL PROPIONATE 0.5 MG/G
OINTMENT TOPICAL ONCE
Status: CANCELLED | OUTPATIENT
Start: 2021-05-05 | End: 2021-05-05

## 2021-05-05 RX ORDER — GENTAMICIN SULFATE 1 MG/G
OINTMENT TOPICAL ONCE
Status: CANCELLED | OUTPATIENT
Start: 2021-05-05 | End: 2021-05-05

## 2021-05-05 RX ORDER — BACITRACIN ZINC AND POLYMYXIN B SULFATE 500; 1000 [USP'U]/G; [USP'U]/G
OINTMENT TOPICAL ONCE
Status: CANCELLED | OUTPATIENT
Start: 2021-05-05 | End: 2021-05-05

## 2021-05-05 NOTE — TELEPHONE ENCOUNTER
[facility] has has pt on c diff precautions since admit because he had it then but no longer has any symptoms and they report normal stools now   Can they dc C diff precautions  ?

## 2021-05-05 NOTE — PROGRESS NOTES
months ago with one relapse, former ETOH    Drug use: No       ALLERGIES    No Known Allergies    MEDICATIONS    Current Outpatient Medications on File Prior to Encounter   Medication Sig Dispense Refill    potassium chloride (KLOR-CON M) 20 MEQ extended release tablet Take 1 tablet by mouth daily 30 tablet 0    pantoprazole (PROTONIX) 40 MG tablet Take 1 tablet by mouth daily 30 tablet 3    magnesium oxide (MAG-OX) 400 (240 Mg) MG tablet Take 1 tablet by mouth 2 times daily 60 tablet 0    saccharomyces boulardii (FLORASTOR) 250 MG capsule Take 1 capsule by mouth daily 30 capsule 1    oxyCODONE HCl (OXY-IR) 10 MG immediate release tablet Take 1 tablet by mouth every 4 hours as needed.  collagenase (SANTYL) 250 UNIT/GM ointment Apply topically daily. Apply nickel thick as directed. Wound size 5.3x2x0. 6 1 Tube 3    sodium hypochlorite (DAKINS) 0.125 % SOLN external solution Apply topically as directed daily. 1 Bottle 1    gabapentin (NEURONTIN) 100 MG capsule Take 1 capsule by mouth 3 times daily for 30 days. 90 capsule 0    sennosides-docusate sodium (SENOKOT-S) 8.6-50 MG tablet Take 1 tablet by mouth daily      Bismuth Tribromoph-Petrolatum (XEROFORM PETROLATUM GAUZE 5\"X9\") MISC external pads Apply 2 each topically daily 50 each 0    BASAGLAR KWIKPEN 100 UNIT/ML injection pen Inject 12 Units into the skin nightly 5 pen 1    NOVOLOG FLEXPEN 100 UNIT/ML injection pen Inject 1-6 Units into the skin 3 times daily (before meals) 5 pen 1    predniSONE (DELTASONE) 10 MG tablet Take 10 mg by mouth 2 times daily       blood glucose monitor strips Test 4 times a day & as needed for symptoms of irregular blood glucose.  QMH21.196 Z79.4 200 strip 3    Insulin Pen Needle (MEIJER PEN NEEDLES) 31G X 6 MM MISC 1 each by Does not apply route daily 100 each 3    tacrolimus (PROGRAF) 1 MG capsule Take 3 capsules by mouth 2 times daily 180 capsule 0    Lancets MISC Use to test blood glucose four times a day and as needed for hypoglycemic events  DXE11.622 Z79.4 200 each 3     No current facility-administered medications on file prior to encounter. REVIEW OF SYSTEMS    A comprehensive review of systems was negative.     Objective:      /72   Pulse 89   Temp 97.1 °F (36.2 °C) (Temporal)   Resp 18   Ht 6' 1\" (1.854 m)   Wt 255 lb (115.7 kg)   BMI 33.64 kg/m²     Wt Readings from Last 3 Encounters:   05/05/21 255 lb (115.7 kg)   04/29/21 255 lb (115.7 kg)   04/28/21 250 lb (113.4 kg)       PHYSICAL EXAM    General Appearance: alert and oriented to person, place and time, well developed and well- nourished, in no acute distress  Skin: warm and dry, no rash or erythema  Head: normocephalic and atraumatic  Eyes: pupils equal, round, and reactive to light, extraocular eye movements intact, conjunctivae normal  ENT: tympanic membrane, external ear and ear canal normal bilaterally, nose without deformity, nasal mucosa and turbinates normal without polyps, lips teeth and gums normal  Neck: supple and non-tender without mass, no thyromegaly or thyroid nodules, no cervical lymphadenopathy  Pulmonary/Chest: clear to auscultation bilaterally- no wheezes, rales or rhonchi, normal air movement, no respiratory distress  Cardiovascular: normal rate, regular rhythm, normal S1 and S2, no murmurs, rubs, clicks, or gallops, distal pulses intact, no carotid bruits  Abdomen: soft, non-tender, non-distended, normal bowel sounds, no masses or organomegaly  Extremities: no cyanosis, clubbing or edema  Musculoskeletal: normal range of motion, no joint swelling, deformity or tenderness  Neurologic: reflexes normal and symmetric, no cranial nerve deficit, gait, coordination and speech normal, sensation of skin normal      Assessment:      Problem List Items Addressed This Visit     Type 2 diabetes mellitus with skin complication, 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)    Relevant Orders    Initiate Outpatient Wound Care 1200   Post-Procedure Length (cm) 8 cm 04/28/21 1438   Post-Procedure Width (cm) 10 cm 04/28/21 1438   Post-Procedure Depth (cm) 0.1 cm 04/28/21 1438   Post-Procedure Surface Area (cm^2) 80 cm^2 04/28/21 1438   Post-Procedure Volume (cm^3) 8 cm^3 04/28/21 1438   Distance Tunneling (cm) 0 cm 05/05/21 1200   Tunneling Position ___ O'Clock 0 05/05/21 1200   Undermining Starts ___ O'Clock 0 05/05/21 1200   Undermining Ends___ O'Clock 0 05/05/21 1200   Undermining Maxium Distance (cm) 0 05/05/21 1200   Wound Assessment Slough 05/05/21 1200   Drainage Amount Moderate 05/05/21 1200   Drainage Description Serosanguinous 05/05/21 1200   Odor None 05/05/21 1200   Gyala-wound Assessment Intact 05/05/21 1200   Margins Defined edges 05/05/21 1200   Wound Thickness Description not for Pressure Injury Full thickness 05/05/21 1200   Number of days: 83       Wound 04/19/21 Thigh Anterior; Left wound 2- left groin post surgical (Active)   Wound Image   05/05/21 1200   Wound Etiology Non-Healing Surgical 05/05/21 1200   Dressing Status Old drainage noted 05/05/21 1200   Wound Cleansed Cleansed with saline 05/05/21 1200   Dressing/Treatment Xeroform 04/28/21 1438   Offloading for Diabetic Foot Ulcers No offloading required 05/05/21 1200   Wound Length (cm) 3.5 cm 05/05/21 1200   Wound Width (cm) 0.7 cm 05/05/21 1200   Wound Depth (cm) 0.1 cm 05/05/21 1200   Wound Surface Area (cm^2) 2.45 cm^2 05/05/21 1200   Change in Wound Size % (l*w) 76.89 05/05/21 1200   Wound Volume (cm^3) 0.24 cm^3 05/05/21 1200   Wound Healing % 96 05/05/21 1200   Post-Procedure Length (cm) 3.5 cm 05/05/21 1205   Post-Procedure Width (cm) 0.7 cm 05/05/21 1205   Post-Procedure Depth (cm) 0.1 cm 05/05/21 1205   Post-Procedure Surface Area (cm^2) 2.45 cm^2 05/05/21 1205   Post-Procedure Volume (cm^3) 0.24 cm^3 05/05/21 1205   Wound Assessment Slough 05/05/21 1200   Drainage Amount Moderate 05/05/21 1200   Drainage Description Serosanguinous 05/05/21 1200   Odor None 21 1200   Gayla-wound Assessment Intact 21 1200   Margins Defined edges 21 1200   Wound Thickness Description not for Pressure Injury Full thickness 21 1200   Number of days: 15             Estimated Blood Loss:  Minimal    Hemostasis Achieved:  by pressure    Procedural Pain:  0  / 10     Post Procedural Pain:  0 / 10     Response to treatment:  Well tolerated by patient. 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)    Relevant Orders    Initiate Outpatient Wound Care Protocol    Abdominal wall skin ulcer, with fat layer exposed ([address] (Chronic)    Relevant Orders    Initiate Outpatient Wound Care Protocol    * (Principal) Skin ulcer of left groin with fat layer exposed ([address] - Primary (Chronic)    Relevant Orders    Initiate Outpatient Wound Care Protocol          Cont santyl and RTO 1 week    Treatment Note please see attached Discharge Instructions    In my professional opinion this patient would benefit from HBO Therapy: No    Written patient dismissal instructions given to patient and signed by patient or POA. Discharge [address] and Hyperbaric Oxygen Therapy   Physician Orders and Discharge Instructions  [address]   Telephone: [phone]    NAME:  Misti Alamo:  1970  MEDICAL RECORD NUMBER:  [id number]  DATE:  2021    Discharge condition: Stable    Discharge to: Home    Left via:Private automobile    Accompanied by: Self     [facility]: [facility]     Dressing Orders: Left hip wound:   Soap and water wash; Santyl to wound bed nickel thick, 1/4 Dakins moistened gauze loosely over the  wound bed, secure with ABD pad, and medipore tape daily.   Abdominal wound: Soap and water wash, apply gel to the wound bed, secure with adaptic then ABD pad daily.     Treatment Orders:  Protein rich diet  Multivitamin  Keep wounds clean and dry-change if soiled  Keep glucose levels under 200    [facility] follow up visit ____________1 week_________________  (Please note your next appointment above and if you are unable to keep, kindly give a 24 hour notice. Thank you.)    If you experience any of the following, please call the [address] [place] [address] during business hours:    * Increase in Pain  * Temperature over 101  * Increase in drainage from your wound  * Drainage with a foul odor  * Bleeding  * Increase in swelling  * Need for compression bandage changes due to slippage, breakthrough drainage. If you need medical attention outside of the business hours of the [address] please contact your PCP or go to the nearest emergency room.         Electronically signed by Castro Street MD on 5/5/2021 at 12:11 PM

## 2021-05-14 ENCOUNTER — PATIENT MESSAGE (OUTPATIENT)
Dept: FAMILY MEDICINE CLINIC | Age: 51
End: 2021-05-14

## 2021-05-14 ENCOUNTER — HOSPITAL ENCOUNTER (OUTPATIENT)
Dept: WOUND CARE | Age: 51
Discharge: HOME | End: 2021-05-14
Payer: MEDICARE

## 2021-05-14 VITALS
RESPIRATION RATE: 18 BRPM | BODY MASS INDEX: 33.8 KG/M2 | DIASTOLIC BLOOD PRESSURE: 88 MMHG | TEMPERATURE: 97 F | HEART RATE: 51 BPM | SYSTOLIC BLOOD PRESSURE: 150 MMHG | WEIGHT: 255 LBS | HEIGHT: 73 IN

## 2021-05-14 DIAGNOSIS — Z79.4 TYPE 2 DIABETES MELLITUS WITH OTHER SKIN COMPLICATION, WITH LONG-TERM CURRENT USE OF INSULIN (HCC): ICD-10-CM

## 2021-05-14 DIAGNOSIS — E11.628 TYPE 2 DIABETES MELLITUS WITH OTHER SKIN COMPLICATION, WITH LONG-TERM CURRENT USE OF INSULIN (HCC): ICD-10-CM

## 2021-05-14 DIAGNOSIS — E11.42 TYPE 2 DIABETES MELLITUS WITH DIABETIC POLYNEUROPATHY, WITH LONG-TERM CURRENT USE OF INSULIN (HCC): ICD-10-CM

## 2021-05-14 DIAGNOSIS — L98.492 SKIN ULCER OF LEFT GROIN WITH FAT LAYER EXPOSED (HCC): Primary | Chronic | ICD-10-CM

## 2021-05-14 DIAGNOSIS — F32.1 MAJOR DEPRESSIVE DISORDER, SINGLE EPISODE, MODERATE (HCC): Primary | ICD-10-CM

## 2021-05-14 DIAGNOSIS — Z79.4 TYPE 2 DIABETES MELLITUS WITH DIABETIC POLYNEUROPATHY, WITH LONG-TERM CURRENT USE OF INSULIN (HCC): ICD-10-CM

## 2021-05-14 DIAGNOSIS — L98.492 ABDOMINAL WALL SKIN ULCER, WITH FAT LAYER EXPOSED (HCC): ICD-10-CM

## 2021-05-14 PROCEDURE — 99212 OFFICE O/P EST SF 10 MIN: CPT | Performed by: SURGERY

## 2021-05-14 PROCEDURE — 99212 OFFICE O/P EST SF 10 MIN: CPT

## 2021-05-14 ASSESSMENT — PAIN DESCRIPTION - ONSET: ONSET: ON-GOING

## 2021-05-14 ASSESSMENT — PAIN DESCRIPTION - DESCRIPTORS: DESCRIPTORS: ACHING

## 2021-05-14 ASSESSMENT — PAIN SCALES - GENERAL: PAINLEVEL_OUTOF10: 5

## 2021-05-14 NOTE — TELEPHONE ENCOUNTER
From: John Savage  To: Zayra Alvarado MD  Sent: 5/14/2021 3:41 AM CDT  Subject: Non-Urgent Medical Question    I began taking a multivitamin and a probiotic last week. I don't know if it's anything related, but I have had very little control over my bladder and my bowel movements changed from regular to diarrhea. My probiotic is 90 billion but I am going to lower it next month. Could this possibly be the problem? Do you think it may be the multivitamin? Everything else has stayed the same and I do not take the Sencot unless I am constipated.

## 2021-05-14 NOTE — PROGRESS NOTES
[address]   Progress Note and Procedure Note      [facility] RECORD NUMBER:  [id number]  AGE: 48 y.o. GENDER: male  : 1970  EPISODE DATE:  2021    Subjective:     Chief Complaint   Patient presents with    Wound Check     Abdomen and Groin wounds; Patient denies increased pain at wound site         HISTORY of [address] GAB[address] Diallo is a 48 y.o. male who presents today for wound/ulcer evaluation.    History of Wound Context: Pt with abdomen wound here for eval/treat  Wound/Ulcer Pain Timing/Severity: none  Quality of pain: N/A  Severity:  0 / 10   Modifying Factors: None  Associated Signs/Symptoms: none    Ulcer Identification:  Ulcer Type: burn  Contributing Factors: shear force and obesity    Wound: Burn        PAST MEDICAL HISTORY        Diagnosis Date    Antral vascular ectasia     Appendicitis 2009    ruptured - no surgery done    Cir[facility]osis ([facility]     Diabetes mellitus (HCC)     Duodenal ulcer     Esophageal varices (HCC)     grade 2    Hepatitis C     Hernia     History of GI bleed     Liver disease     Personal history of ascites     Portal hypertensive gastropathy (HCC)     Thrombocytopenia (HCC)        PAST SURGICAL HISTORY    Past Surgical History:   Procedure Laterality Date    HIP SURGERY Left 2021    HIP INCISION AND DRAINAGE performed by Parag Weinstein MD at [facility] OR    LIVER TRANSPLANT      UPPER GASTROINTESTINAL ENDOSCOPY  2013    Dr.Jaiyeola    UPPER GASTROINTESTINAL ENDOSCOPY  13    Dr Iván Leiva History   Problem Relation Age of Onset    Breast Cancer Mother 61   [facility] Emphysema Father        SOCIAL HISTORY    Social History     Tobacco Use    Smoking status: Current Some Day Smoker     Packs/day: 0.50     Years: 25.00     Pack years: 12.50     Start date: 1994     Last attempt to quit: 2012     Years since quittin.7    Smokeless tobacco: Never Used   Substance Use Topics    Alcohol use: No     Comment: quit x 8 months ago with one relapse, former ETOH    Drug use: No       ALLERGIES    No Known Allergies    MEDICATIONS    Current Outpatient Medications on File Prior to Encounter   Medication Sig Dispense Refill    senna (SENOKOT) 8.6 MG tablet Take 1 tablet by mouth 2 times daily as needed for Constipation 60 tablet 2    blood glucose monitor strips Test 4 times a day & as needed for symptoms of irregular blood glucose. BFG54.972 Z79.4 200 strip 3    Insulin Pen Needle (MEIJER PEN NEEDLES) 31G X 6 MM MISC 1 each by Does not apply route daily 100 each 3    potassium chloride (KLOR-CON M) 20 MEQ extended release tablet Take 1 tablet by mouth daily 30 tablet 0    pantoprazole (PROTONIX) 40 MG tablet Take 1 tablet by mouth daily 30 tablet 3    magnesium oxide (MAG-OX) 400 (240 Mg) MG tablet Take 1 tablet by mouth 2 times daily 60 tablet 0    saccharomyces boulardii (FLORASTOR) 250 MG capsule Take 1 capsule by mouth daily 30 capsule 1    oxyCODONE HCl (OXY-IR) 10 MG immediate release tablet Take 1 tablet by mouth every 4 hours as needed.  collagenase (SANTYL) 250 UNIT/GM ointment Apply topically daily. Apply nickel thick as directed. Wound size 5.3x2x0. 6 1 Tube 3    sodium hypochlorite (DAKINS) 0.125 % SOLN external solution Apply topically as directed daily. 1 Bottle 1    gabapentin (NEURONTIN) 100 MG capsule Take 1 capsule by mouth 3 times daily for 30 days.  90 capsule 0    sennosides-docusate sodium (SENOKOT-S) 8.6-50 MG tablet Take 1 tablet by mouth daily      Bismuth Tribromoph-Petrolatum (XEROFORM PETROLATUM GAUZE 5\"X9\") MISC external pads Apply 2 each topically daily 50 each 0    BASAGLAR KWIKPEN 100 UNIT/ML injection pen Inject 12 Units into the skin nightly 5 pen 1    tacrolimus (PROGRAF) 1 MG capsule Take 3 capsules by mouth 2 times daily 180 capsule 0    NOVOLOG FLEXPEN 100 UNIT/ML injection pen Inject 1-6 Units into the skin 3 times daily (before meals) 5 pen 1    Lancets MISC Use to test blood glucose four times a day and as needed for hypoglycemic events  DXE11.622 Z79.4 200 each 3    predniSONE (DELTASONE) 10 MG tablet Take 10 mg by mouth 2 times daily        No current facility-administered medications on file prior to encounter. REVIEW OF SYSTEMS    A comprehensive review of systems was negative.     Objective:      BP (!) 150/88   Pulse 51   Temp 97 °F (36.1 °C) (Temporal)   Resp 18   Ht 6' 1\" (1.854 m)   Wt 255 lb (115.7 kg)   BMI 33.64 kg/m²     Wt Readings from Last 3 Encounters:   05/14/21 255 lb (115.7 kg)   05/05/21 255 lb (115.7 kg)   04/29/21 255 lb (115.7 kg)       PHYSICAL EXAM    General Appearance: alert and oriented to person, place and time, well developed and well- nourished, in no acute distress  Skin: warm and dry, no rash or erythema  Head: normocephalic and atraumatic  Eyes: pupils equal, round, and reactive to light, extraocular eye movements intact, conjunctivae normal  ENT: tympanic membrane, external ear and ear canal normal bilaterally, nose without deformity, nasal mucosa and turbinates normal without polyps, lips teeth and gums normal  Neck: supple and non-tender without mass, no thyromegaly or thyroid nodules, no cervical lymphadenopathy  Pulmonary/Chest: clear to auscultation bilaterally- no wheezes, rales or rhonchi, normal air movement, no respiratory distress  Cardiovascular: normal rate, regular rhythm, normal S1 and S2, no murmurs, rubs, clicks, or gallops, distal pulses intact, no carotid bruits  Abdomen: soft, non-tender, non-distended, normal bowel sounds, no masses or organomegaly  Extremities: no cyanosis, clubbing or edema  Musculoskeletal: normal range of motion, no joint swelling, deformity or tenderness  Neurologic: reflexes normal and symmetric, no cranial nerve deficit, gait, coordination and speech normal, sensation of skin normal      Assessment:      Patient Active Problem List   Diagnosis Code    Pyloric ulcer K25.9    Duodenal ulcer, acute K26.3    History of GI bleed Z87.19    Esophageal ulcer K22.10    History of hepatitis C virus infection Z86.19    History of liver transplant ([facility] [address] [address] Z94.4    History of esophageal varices Z87.19    Major depressive disorder, single episode, moderate ([facility]) F32.1    Narcotic dependency, continuous ([facility]) F11.20    Varicose veins of both lower extremities with pain I83.813    Morbidly obese ([facility]) E66.01    Type 2 diabetes mellitus with skin complication, with long-term current use of insulin ([facility]) E11.628, Z79.4    Abdominal wall skin ulcer, with fat layer exposed ([facility] [address] L98.492    Streptococcal bacteremia R78.81, B95.5    Claudication ([facility]) I73.9    Septic arthritis of hip ([facility]) M00.9    Acute hip pain, left M25.552    Hypomagnesemia E83.42    Moderate malnutrition ([facility]) E44.0    Cigarette nicotine dependence with nicotine-induced disorder F17.219    Tobacco abuse counseling Z71.6    Personal history of noncompliance with medical treatment and regimen Z91.19    Skin ulcer of left groin with fat layer exposed ([facility] L98.492        Incision 02/27/21 Thigh Left;Lateral (Active)   Number of days: 76       Wound 02/10/21 Abdomen wound 1- abdominal wall cluster atypical (Active)   Wound Image   05/14/21 1146   Wound Etiology Burn 05/14/21 1146   Dressing Status Old drainage noted 05/14/21 1146   Wound Cleansed Soap and water 05/14/21 1146   Dressing/Treatment Xeroform 04/28/21 1438   Offloading for Diabetic Foot Ulcers No offloading required 05/14/21 1146   Wound Length (cm) 8 cm 05/14/21 1146   Wound Width (cm) 10 cm 05/14/21 1146   Wound Depth (cm) 0.1 cm 05/14/21 1146   Wound Surface Area (cm^2) 80 cm^2 05/14/21 1146   Change in Wound Size % (l*w) 25.93 05/14/21 1146   Wound Volume (cm^3) 8 cm^3 05/14/21 1146   Wound Healing % 26 05/14/21 1146   Post-Procedure Length (cm) 8 cm 04/28/21 1438   Post-Procedure Width (cm) 10 cm 04/28/21 1438 Post-Procedure Depth (cm) 0.1 cm 04/28/21 1438   Post-Procedure Surface Area (cm^2) 80 cm^2 04/28/21 1438   Post-Procedure Volume (cm^3) 8 cm^3 04/28/21 1438   Distance Tunneling (cm) 0 cm 05/05/21 1200   Tunneling Position ___ O'Clock 0 05/05/21 1200   Undermining Starts ___ O'Clock 0 05/05/21 1200   Undermining Ends___ O'Clock 0 05/05/21 1200   Undermining Maxium Distance (cm) 0 05/05/21 1200   Wound Assessment Slough;Pink/red 05/14/21 1146   Drainage Amount Moderate 05/14/21 1146   Drainage Description Serosanguinous 05/14/21 1146   Odor None 05/14/21 1146   Gayla-wound Assessment Intact 05/14/21 1146   Margins Defined edges 05/14/21 1146   Wound Thickness Description not for Pressure Injury Full thickness 05/14/21 1146   Number of days: 92       Wound 04/19/21 Thigh Anterior; Left wound 2- left groin post surgical (Active)   Wound Image   05/14/21 1146   Wound Etiology Non-Healing Surgical 05/14/21 1146   Dressing Status Intact;Dry 05/14/21 1146   Wound Cleansed Soap and water 05/14/21 1146   Dressing/Treatment Xeroform 04/28/21 1438   Offloading for Diabetic Foot Ulcers No offloading required 05/14/21 1146   Wound Length (cm) 0 cm 05/14/21 1146   Wound Width (cm) 0 cm 05/14/21 1146   Wound Depth (cm) 0 cm 05/14/21 1146   Wound Surface Area (cm^2) 0 cm^2 05/14/21 1146   Change in Wound Size % (l*w) 100 05/14/21 1146   Wound Volume (cm^3) 0 cm^3 05/14/21 1146   Wound Healing % 100 05/14/21 1146   Post-Procedure Length (cm) 3.5 cm 05/05/21 1205   Post-Procedure Width (cm) 0.7 cm 05/05/21 1205   Post-Procedure Depth (cm) 0.1 cm 05/05/21 1205   Post-Procedure Surface Area (cm^2) 2.45 cm^2 05/05/21 1205   Post-Procedure Volume (cm^3) 0.24 cm^3 05/05/21 1205   Wound Assessment Dry;Epithelialization 05/14/21 1146   Drainage Amount None 05/14/21 1146   Drainage Description Serosanguinous 05/05/21 1200   Odor None 05/14/21 1146   Gayla-wound Assessment Intact 05/14/21 1146   Margins Defined edges 05/14/21 1146   Wound Thickness Description not for Pressure Injury Full thickness 05/14/21 1146   Number of days: 24             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 Tom Decent . Xeroform to abd and no dressing to groin      Treatment Note please see attached Discharge Instructions    In my professional opinion this patient would benefit from HBO Therapy: No    Written patient dismissal instructions given to patient and signed by patient or POA. Discharge [address] and Hyperbaric Oxygen Therapy   Physician Orders and Discharge Instructions  [address]   Telephone: [phone]    NAME:  Bryant Pod:  1970  MEDICAL RECORD NUMBER:  [id number]  DATE:  5/14/2021    Discharge condition: Stable    Discharge to: Home    Left via:Private automobile    Accompanied by: Self     ECF/HHA: D/C [facility]     Dressing Orders: Left hip wound: Healed, Moisturize and Protect    Dressing Orders: Abdominal wound:   Soap and water wash, Xeroform to open areas, Cover with dry gauze, Secure with medipore tape  Change daily     Treatment Orders:  Protein rich diet  Multivitamin  Keep wounds clean and dry-change if soiled  Keep glucose levels under 200    [facility] follow up visit ____________1 week_________________  (Please note your next appointment above and if you are unable to keep, kindly give a 24 hour notice.  Thank you.)    If you experience any of the following, please call the [address] during business hours:    * Increase in Pain  * Temperature over 101  * Increase in drainage from your

## 2021-05-18 DIAGNOSIS — Z87.19 HISTORY OF GI BLEED: ICD-10-CM

## 2021-05-18 RX ORDER — SACCHAROMYCES BOULARDII 250 MG
250 CAPSULE ORAL DAILY
Qty: 30 CAPSULE | Refills: 1 | Status: SHIPPED | OUTPATIENT
Start: 2021-05-18 | End: 2021-07-09 | Stop reason: SDUPTHER

## 2021-05-18 NOTE — TELEPHONE ENCOUNTER
[address] called to request a refill on his medication.       Last office visit : 4/29/2021   Next office visit : 8/4/2021     Requested Prescriptions     Pending Prescriptions Disp Refills    saccharomyces boulardii (FLORASTOR) 250 MG capsule 30 capsule 1     Sig: Take 1 capsule by mouth daily            Eric Bañuelos LPN

## 2021-05-20 RX ORDER — BUPROPION HYDROCHLORIDE 100 MG/1
TABLET, EXTENDED RELEASE ORAL
Qty: 90 TABLET | Refills: 2 | Status: SHIPPED | OUTPATIENT
Start: 2021-05-20 | End: 2021-10-09 | Stop reason: SDUPTHER

## 2021-05-24 RX ORDER — CARVEDILOL 25 MG/1
1 TABLET, FILM COATED ORAL 3 TIMES DAILY
Qty: 300 EACH | Refills: 3 | Status: SHIPPED | OUTPATIENT
Start: 2021-05-24 | End: 2021-06-17 | Stop reason: SDUPTHER

## 2021-05-26 DIAGNOSIS — E87.6 HYPOKALEMIA: ICD-10-CM

## 2021-05-27 RX ORDER — LANOLIN ALCOHOL/MO/W.PET/CERES
400 CREAM (GRAM) TOPICAL 2 TIMES DAILY
Qty: 60 TABLET | Refills: 0 | Status: SHIPPED | OUTPATIENT
Start: 2021-05-27 | End: 2021-07-09 | Stop reason: SDUPTHER

## 2021-05-27 RX ORDER — POTASSIUM CHLORIDE 20 MEQ/1
20 TABLET, EXTENDED RELEASE ORAL DAILY
Qty: 30 TABLET | Refills: 0 | Status: SHIPPED | OUTPATIENT
Start: 2021-05-27 | End: 2021-07-09 | Stop reason: SDUPTHER

## 2021-05-27 NOTE — TELEPHONE ENCOUNTER
[address] called to request a refill on his medication.       Last office visit : 4/29/2021   Next office visit : 8/4/2021     Requested Prescriptions     Signed Prescriptions Disp Refills    potassium chloride (KLOR-CON M) 20 MEQ extended release tablet 30 tablet 0     Sig: Take 1 tablet by mouth daily     Authorizing Provider: OLAYINKA Smith     Ordering User: Mayi Bruno magnesium oxide (MAG-OX) 400 (240 Mg) MG tablet 60 tablet 0     Sig: Take 1 tablet by mouth 2 times daily     Authorizing Provider: Chad Sanchez     Ordering User: Maritza Coreas

## 2021-06-09 DIAGNOSIS — E11.622 TYPE 2 DIABETES MELLITUS WITH OTHER SKIN ULCER, WITH LONG-TERM CURRENT USE OF INSULIN (HCC): Chronic | ICD-10-CM

## 2021-06-09 DIAGNOSIS — Z79.4 TYPE 2 DIABETES MELLITUS WITH OTHER SKIN ULCER, WITH LONG-TERM CURRENT USE OF INSULIN (HCC): Chronic | ICD-10-CM

## 2021-06-09 RX ORDER — INSULIN ASPART 100 [IU]/ML
1-6 INJECTION, SOLUTION INTRAVENOUS; SUBCUTANEOUS
Qty: 5 PEN | Refills: 1 | Status: SHIPPED | OUTPATIENT
Start: 2021-06-09 | End: 2021-09-02 | Stop reason: SDUPTHER

## 2021-06-17 DIAGNOSIS — Z79.4 TYPE 2 DIABETES MELLITUS WITH DIABETIC POLYNEUROPATHY, WITH LONG-TERM CURRENT USE OF INSULIN (HCC): ICD-10-CM

## 2021-06-17 DIAGNOSIS — E11.42 TYPE 2 DIABETES MELLITUS WITH DIABETIC POLYNEUROPATHY, WITH LONG-TERM CURRENT USE OF INSULIN (HCC): ICD-10-CM

## 2021-06-18 RX ORDER — CARVEDILOL 25 MG/1
1 TABLET, FILM COATED ORAL 3 TIMES DAILY
Qty: 300 EACH | Refills: 3 | Status: SHIPPED | OUTPATIENT
Start: 2021-06-18 | End: 2021-08-05 | Stop reason: SDUPTHER

## 2021-06-18 NOTE — TELEPHONE ENCOUNTER
[address] called to request a refill on his medication. Last office visit : 2021   Next office visit : 2021     Requested Prescriptions     Signed Prescriptions Disp Refills    blood glucose test strips (CONTOUR TEST) strip 300 each 3     Si each by In Vitro route 3 times daily As needed.      Authorizing Provider: Keira Mcginnis     Ordering User: Dung Garnica

## 2021-07-09 DIAGNOSIS — Z87.19 HISTORY OF GI BLEED: ICD-10-CM

## 2021-07-09 DIAGNOSIS — E87.6 HYPOKALEMIA: ICD-10-CM

## 2021-07-09 DIAGNOSIS — K21.9 GASTROESOPHAGEAL REFLUX DISEASE, UNSPECIFIED WHETHER ESOPHAGITIS PRESENT: ICD-10-CM

## 2021-07-09 RX ORDER — PANTOPRAZOLE SODIUM 40 MG/1
40 TABLET, DELAYED RELEASE ORAL DAILY
Qty: 30 TABLET | Refills: 3 | Status: SHIPPED | OUTPATIENT
Start: 2021-07-09 | End: 2021-08-05 | Stop reason: SDUPTHER

## 2021-07-09 RX ORDER — POTASSIUM CHLORIDE 20 MEQ/1
20 TABLET, EXTENDED RELEASE ORAL DAILY
Qty: 30 TABLET | Refills: 0 | Status: SHIPPED | OUTPATIENT
Start: 2021-07-09 | End: 2021-09-02 | Stop reason: SDUPTHER

## 2021-07-09 RX ORDER — SACCHAROMYCES BOULARDII 250 MG
250 CAPSULE ORAL DAILY
Qty: 30 CAPSULE | Refills: 1 | Status: SHIPPED | OUTPATIENT
Start: 2021-07-09

## 2021-07-09 RX ORDER — LANOLIN ALCOHOL/MO/W.PET/CERES
400 CREAM (GRAM) TOPICAL 2 TIMES DAILY
Qty: 60 TABLET | Refills: 0 | Status: SHIPPED | OUTPATIENT
Start: 2021-07-09 | End: 2021-08-05 | Stop reason: SDUPTHER

## 2021-07-09 NOTE — TELEPHONE ENCOUNTER
Jax Galan called requesting a refill of the below medication which has been pended for you:     Requested Prescriptions     Pending Prescriptions Disp Refills    pantoprazole (PROTONIX) 40 MG tablet 30 tablet 3     Sig: Take 1 tablet by mouth daily    saccharomyces boulardii (FLORASTOR) 250 MG capsule 30 capsule 1     Sig: Take 1 capsule by mouth daily    potassium chloride (KLOR-CON M) 20 MEQ extended release tablet 30 tablet 0     Sig: Take 1 tablet by mouth daily    magnesium oxide (MAG-OX) 400 (240 Mg) MG tablet 60 tablet 0     Sig: Take 1 tablet by mouth 2 times daily       Last Appointment Date: 4/29/2021  Next Appointment Date: 8/4/2021    No Known Allergies

## 2021-08-03 PROBLEM — Z71.6 TOBACCO ABUSE COUNSELING: Status: RESOLVED | Noted: 2021-03-05 | Resolved: 2021-08-03

## 2021-08-03 PROBLEM — Z86.19 HISTORY OF HEPATITIS C VIRUS INFECTION: Status: RESOLVED | Noted: 2019-12-09 | Resolved: 2021-08-03

## 2021-08-03 PROBLEM — Z91.199 PERSONAL HISTORY OF NONCOMPLIANCE WITH MEDICAL TREATMENT AND REGIMEN: Status: RESOLVED | Noted: 2021-03-05 | Resolved: 2021-08-03

## 2021-08-04 ENCOUNTER — TELEMEDICINE (OUTPATIENT)
Dept: FAMILY MEDICINE CLINIC | Age: 51
End: 2021-08-04
Payer: MEDICARE

## 2021-08-04 DIAGNOSIS — G89.4 CHRONIC PAIN SYNDROME: ICD-10-CM

## 2021-08-04 DIAGNOSIS — Z00.00 ROUTINE GENERAL MEDICAL EXAMINATION AT A HEALTH CARE FACILITY: Primary | ICD-10-CM

## 2021-08-04 DIAGNOSIS — E11.628 TYPE 2 DIABETES MELLITUS WITH OTHER SKIN COMPLICATION, WITH LONG-TERM CURRENT USE OF INSULIN (HCC): Chronic | ICD-10-CM

## 2021-08-04 DIAGNOSIS — M54.50 CHRONIC LOW BACK PAIN WITHOUT SCIATICA, UNSPECIFIED BACK PAIN LATERALITY: ICD-10-CM

## 2021-08-04 DIAGNOSIS — R03.0 ELEVATED BLOOD PRESSURE READING WITHOUT DIAGNOSIS OF HYPERTENSION: ICD-10-CM

## 2021-08-04 DIAGNOSIS — G89.29 CHRONIC LOW BACK PAIN WITHOUT SCIATICA, UNSPECIFIED BACK PAIN LATERALITY: ICD-10-CM

## 2021-08-04 DIAGNOSIS — Z79.4 TYPE 2 DIABETES MELLITUS WITH OTHER SKIN COMPLICATION, WITH LONG-TERM CURRENT USE OF INSULIN (HCC): Chronic | ICD-10-CM

## 2021-08-04 DIAGNOSIS — Z87.891 FORMER SMOKER: ICD-10-CM

## 2021-08-04 PROBLEM — F17.219 CIGARETTE NICOTINE DEPENDENCE WITH NICOTINE-INDUCED DISORDER: Status: RESOLVED | Noted: 2021-03-05 | Resolved: 2021-08-04

## 2021-08-04 PROCEDURE — G0439 PPPS, SUBSEQ VISIT: HCPCS | Performed by: FAMILY MEDICINE

## 2021-08-04 PROCEDURE — 3052F HG A1C>EQUAL 8.0%<EQUAL 9.0%: CPT | Performed by: FAMILY MEDICINE

## 2021-08-04 PROCEDURE — G8427 DOCREV CUR MEDS BY ELIG CLIN: HCPCS | Performed by: FAMILY MEDICINE

## 2021-08-04 PROCEDURE — 3017F COLORECTAL CA SCREEN DOC REV: CPT | Performed by: FAMILY MEDICINE

## 2021-08-04 PROCEDURE — 2022F DILAT RTA XM EVC RTNOPTHY: CPT | Performed by: FAMILY MEDICINE

## 2021-08-04 RX ORDER — ZINC OXIDE 13 %
CREAM (GRAM) TOPICAL
COMMUNITY

## 2021-08-04 RX ORDER — GABAPENTIN 100 MG/1
100 CAPSULE ORAL 3 TIMES DAILY
Qty: 90 CAPSULE | Refills: 2 | Status: SHIPPED | OUTPATIENT
Start: 2021-08-04 | End: 2021-10-11 | Stop reason: SDUPTHER

## 2021-08-04 ASSESSMENT — LIFESTYLE VARIABLES: HOW OFTEN DO YOU HAVE A DRINK CONTAINING ALCOHOL: 0

## 2021-08-04 ASSESSMENT — PATIENT HEALTH QUESTIONNAIRE - PHQ9
SUM OF ALL RESPONSES TO PHQ QUESTIONS 1-9: 0
SUM OF ALL RESPONSES TO PHQ9 QUESTIONS 1 & 2: 0
2. FEELING DOWN, DEPRESSED OR HOPELESS: 0
SUM OF ALL RESPONSES TO PHQ QUESTIONS 1-9: 0
SUM OF ALL RESPONSES TO PHQ QUESTIONS 1-9: 0
1. LITTLE INTEREST OR PLEASURE IN DOING THINGS: 0

## 2021-08-04 NOTE — PROGRESS NOTES
as needed for Constipation  Kit MD Amanda   Insulin Pen Needle (MEIJER PEN NEEDLES) 31G X 6 MM MISC 1 each by Does not apply route daily  Darwin Patel MD   oxyCODONE HCl (OXY-IR) 10 MG immediate release tablet Take 1 tablet by mouth every 4 hours as needed. Derick Pérez MD   collagenase [facility]) 250 UNIT/GM ointment Apply topically daily. Apply nickel thick as directed. Wound size 5.3x2x0. 6  Romina Engel, APRN - CNP   sodium hypochlorite (DAKINS) 0.125 % SOLN external solution Apply topically as directed daily.   Zak Solomon APRN - CNP   sennosides-docusate sodium (SENOKOT-S) 8.6-50 MG tablet Take 1 tablet by mouth daily  Historical Provider, MD Domingomutkiko Tribromoph-Petrolatum (XEROFORM PETROLATUM GAUZE 5\"X9\") MISC external pads Apply 2 each topically daily  Marycarmen Drew APRN - HETAL   Orion Tisha KWIKPEN 100 UNIT/ML injection pen Inject 12 Units into the skin nightly  Marycarmen Drew APRN - NP   tacrolimus (PROGRAF) 1 MG capsule Take 3 capsules by mouth 2 times daily  Sherif Jensen MD   Lancets MISC Use to test blood glucose four times a day and as needed for hypoglycemic events  DXE11.622 Z79.4  Marycarmen Drew APRN - NP   predniSONE (DELTASONE) 10 MG tablet Take 10 mg by mouth 2 times daily   Historical Provider, MD         Past Medical History:   Diagnosis Date    Antral vascular ectasia     Appendicitis 2009    ruptured - no surgery done    Cirrhosis ([address]     Diabetes mellitus ([address]     Duodenal ulcer     Esophageal varices (HCC)     grade 2    Hepatitis C     Hernia     History of GI bleed     Liver disease     Personal history of ascites     Portal hypertensive gastropathy ([address]     Thrombocytopenia ([address]        Past Surgical History:   Procedure Laterality Date    HIP SURGERY Left 2/27/2021    HIP INCISION AND DRAINAGE performed by Derick Pérez MD at [facility] GASTROINTESTINAL ENDOSCOPY  4/16/2013    Dr.Jaiyeola  UPPER GASTROINTESTINAL ENDOSCOPY  5-9-13    Dr Angela Shelby         Family History   Problem Relation Age of Onset    Breast Cancer Mother 61    Emphysema Father        CareTeam (Including outside providers/suppliers regularly involved in providing care):   Patient Care Team:  Nino Tran MD as PCP - General (Family Medicine)  Nino Tran MD as PCP - REHABILITATION [facility] Empaneled Provider  Meghann Garcia DO as Consulting Physician (Vascular Surgery)    Wt Readings from Last 3 Encounters:   05/14/21 255 lb (115.7 kg)   05/05/21 255 lb (115.7 kg)   04/29/21 255 lb (115.7 kg)     No flowsheet data found. There is no height or weight on file to calculate BMI. Based upon direct observation of the patient, evaluation of cognition reveals recent and remote memory intact. \"Biden Trump Obama\"        Patient's complete Health Risk Assessment and screening values have been reviewed and are found in Flowsheets. The following problems were reviewed today and where indicated follow up appointments were made and/or referrals ordered. Positive Risk Factor Screenings with Interventions:     Fall Risk:  2 or more falls in past year?: (!) yes  Fall with injury in past year?: no  Fall Risk Interventions:    · Home safety tips provided    Cognitive: Words recalled: 3 Words Recalled  Clock Drawing Test (CDT) Score: (!) Normal  Total Score Interpretation: Positive Mini-Cog  Cognitive Impairment Interventions:  · N/A     Substance Abuse Interventions:  · N/A    General Health and ACP:  General  In general, how would you say your health is?: Good  In the past 7 days, have you experienced any of the following?  New or Increased Pain, New or Increased Fatigue, Loneliness, Social Isolation, Stress or Anger?: (!) New or Increased Pain  Do you get the social and emotional support that you need?: Yes  Do you have a Living Will?: (!) No  Advance Directives     Power of  Living Will ACP-Advance Directive ACP-Power of     Not on File Filed on 08/25/17 Filed Not on File      General Health Risk Interventions:  · Pain issues: back pain, 1.5 months \"pulled something\". not interested in living will at this time. Health Habits/Nutrition:  Health Habits/Nutrition  Do you exercise for at least 20 minutes 2-3 times per week?: Yes  Have you lost any weight without trying in the past 3 months?: No  Do you eat only one meal per day?: No  Have you seen the dentist within the past year?: Yes     Health Habits/Nutrition Interventions:  · N/A      ADL:  ADLs  In the past 7 days, did you need help from others to perform any of the following everyday activities? Eating, dressing, grooming, bathing, toileting, or walking/balance?: (!) None  In the past 7 days, did you need help from others to take care of any of the following?  Laundry, housekeeping, banking/finances, shopping, telephone use, food preparation, transportation, or taking medications?: None  ADL Interventions:  · N/A    Personalized Preventive Plan   Current Health Maintenance Status  Immunization History   Administered Date(s) Administered    Influenza Virus Vaccine 10/28/2013    Pneumococcal Polysaccharide ([id number]) 10/28/2013    Tdap (Boostrix, Adacel) 10/28/2013, 04/29/2015        Health Maintenance   Topic Date Due    Hepatitis A vaccine (1 of 2 - Risk 2-dose series) Never done    Diabetic retinal exam  Never done    Lipid screen  Never done    COVID-19 Vaccine (1) Never done    Diabetic microalbuminuria test  Never done    Hepatitis B vaccine (1 of 3 - Risk 3-dose series) Never done    Pneumococcal 0-64 years Vaccine (2 of 4 - PCV13) 10/28/2014    Colon cancer screen colonoscopy  Never done    Annual Wellness Visit (AWV)  Never done    Shingles Vaccine (1 of 2) Never done    Flu vaccine (1) 09/01/2021    Diabetic foot exam  09/09/2021    A1C test (Diabetic or Prediabetic)  03/24/2022    DTaP/Tdap/Td vaccine (3 - Td or Tdap) 04/29/2025    Hepatitis C screen  Completed    HIV screen  Completed    Hib vaccine  Aged Out    Meningococcal (ACWY) vaccine  Aged Out     Recommendations for Sekal AS Due: see orders and patient instructions/AVS.  . Recommended screening schedule for the next 5-10 years is provided to the patient in written form: see Patient Instructions/AVS.    Roxanna Kowalski was seen today for 3 month follow-up and medicare awv. Diagnoses and all orders for this visit:    Routine general medical examination at a health care facility    Former smoker    Type 2 diabetes mellitus with other skin complication, with long-term current use of insulin ([address]  -     Hemoglobin A1C; Future    Elevated blood pressure reading without diagnosis of hypertension    Chronic low back pain without sciatica, unspecified back pain laterality    Chronic pain syndrome  -     gabapentin (NEURONTIN) 100 MG capsule; Take 1 capsule by mouth 3 times daily for 91 days. Nevaeh Neely is a 46 y.o. male being evaluated by a Virtual Visit (video and audio) encounter to address concerns as mentioned above. A caregiver was present when appropriate. Due to this being a TeleHealth encounter (During IWEAW-15 public health emergency), evaluation of the following organ systems was limited: Vitals/Constitutional/EENT/Resp/CV/GI//MS/Neuro/Skin/Heme-Lymph-Imm. Pursuant to the emergency declaration under the [address] [place] [address], [address] [place] [address] authority and the [employer] and [employer] Act, this Virtual Visit was conducted with patient's (and/or legal guardian's) consent, to reduce the patient's risk of exposure to COVID-19 and provide necessary medical care. The patient (and/or legal guardian) has also been advised to contact this office for worsening conditions or problems, and seek emergency medical treatment and/or call 911 if deemed necessary.      Patient identification was verified at the start of the visit: Yes    Services were provided through a video synchronous discussion virtually to substitute for in-person clinic visit. Patient and provider were located at their individual homes. --Adrian Soni MD on 8/7/2021 at 4:41 PM    An electronic signature was used to authenticate this note.

## 2021-08-04 NOTE — PATIENT INSTRUCTIONS
Personalized Preventive Plan for [address] [place] [address] - 8/4/2021  Medicare offers a range of preventive health benefits. Some of the tests and screenings are paid in full while other may be subject to a deductible, co-insurance, and/or copay. Some of these benefits include a comprehensive review of your medical history including lifestyle, illnesses that may run in your family, and various assessments and screenings as appropriate. After reviewing your medical record and screening and assessments performed today your provider may have ordered immunizations, labs, imaging, and/or referrals for you. A list of these orders (if applicable) as well as your Preventive Care list are included within your After Visit Summary for your review. Other Preventive Recommendations:    · A preventive eye exam performed by an eye specialist is recommended every 1-2 years to screen for glaucoma; cataracts, macular degeneration, and other eye disorders. · A preventive dental visit is recommended every 6 months. · Try to get at least 150 minutes of exercise per week or 10,000 steps per day on a pedometer . · Order or download the FREE \"Exercise & Physical Activity: Your Everyday Guide\" from [employer]. Call 9-374.748.8008 or search [employer] online. · You need 6079-4760 mg of calcium and 6523-6046 IU of vitamin D per day. It is possible to meet your calcium requirement with diet alone, but a vitamin D supplement is usually necessary to meet this goal.  · When exposed to the sun, use a sunscreen that protects against both UVA and UVB radiation with an SPF of 30 or greater. Reapply every 2 to 3 hours or after sweating, drying off with a towel, or swimming. · Always wear a seat belt when traveling in a car. Always wear a helmet when riding a bicycle or motorcycle.

## 2021-08-05 DIAGNOSIS — E11.42 TYPE 2 DIABETES MELLITUS WITH DIABETIC POLYNEUROPATHY, WITH LONG-TERM CURRENT USE OF INSULIN (HCC): ICD-10-CM

## 2021-08-05 DIAGNOSIS — Z79.4 TYPE 2 DIABETES MELLITUS WITH DIABETIC POLYNEUROPATHY, WITH LONG-TERM CURRENT USE OF INSULIN (HCC): ICD-10-CM

## 2021-08-05 DIAGNOSIS — E87.6 HYPOKALEMIA: ICD-10-CM

## 2021-08-05 DIAGNOSIS — K21.9 GASTROESOPHAGEAL REFLUX DISEASE, UNSPECIFIED WHETHER ESOPHAGITIS PRESENT: ICD-10-CM

## 2021-08-06 RX ORDER — CARVEDILOL 25 MG/1
1 TABLET, FILM COATED ORAL 3 TIMES DAILY
Qty: 300 EACH | Refills: 3 | Status: SHIPPED | OUTPATIENT
Start: 2021-08-06 | End: 2021-10-09 | Stop reason: SDUPTHER

## 2021-08-06 RX ORDER — LANOLIN ALCOHOL/MO/W.PET/CERES
400 CREAM (GRAM) TOPICAL 2 TIMES DAILY
Qty: 60 TABLET | Refills: 0 | Status: SHIPPED | OUTPATIENT
Start: 2021-08-06 | End: 2021-10-09 | Stop reason: SDUPTHER

## 2021-08-06 RX ORDER — PANTOPRAZOLE SODIUM 40 MG/1
40 TABLET, DELAYED RELEASE ORAL DAILY
Qty: 30 TABLET | Refills: 3 | Status: SHIPPED | OUTPATIENT
Start: 2021-08-06 | End: 2021-09-10 | Stop reason: SDUPTHER

## 2021-08-06 NOTE — TELEPHONE ENCOUNTER
Burton Horowitz called requesting a refill of the below medication which has been pended for you:     Requested Prescriptions     Pending Prescriptions Disp Refills    blood glucose test strips (CONTOUR TEST) strip 300 each 3     Si each by In Vitro route 3 times daily As needed.     pantoprazole (PROTONIX) 40 MG tablet 30 tablet 3     Sig: Take 1 tablet by mouth daily    magnesium oxide (MAG-OX) 400 (240 Mg) MG tablet 60 tablet 0     Sig: Take 1 tablet by mouth 2 times daily       Last Appointment Date: 2021  Next Appointment Date: 11/10/2021    No Known Allergies

## 2021-08-07 ASSESSMENT — ENCOUNTER SYMPTOMS
EYE ITCHING: 0
STRIDOR: 0
NAUSEA: 0
COLOR CHANGE: 0
FACIAL SWELLING: 0
VOMITING: 0
APNEA: 0
EYE DISCHARGE: 0
BACK PAIN: 0

## 2021-08-07 NOTE — PROGRESS NOTES
[address] [place] [address] (:  1970) is a 46 y.o. male,Established patient, here for evaluation of the following chief complaint(s): 3 Month Follow-Up and Medicare AW         ASSESSMENT/PLAN:  1. Routine general medical examination at a health care facility  2. Former smoker  3. Type 2 diabetes mellitus with other skin complication, with long-term current use of insulin (HCC)  -     Hemoglobin A1C; Future  4. Elevated blood pressure reading without diagnosis of hypertension  5. Chronic low back pain without sciatica, unspecified back pain laterality  6. Chronic pain syndrome  -     gabapentin (NEURONTIN) 100 MG capsule; Take 1 capsule by mouth 3 times daily for 91 days. , Disp-90 capsule, R-2Normal    The current medical regimen is effective. Continue present plan and medications. UDS and controlled substance contract up to date. No unusual filling on current [facility] report. Tx continues to be medically necessary. (advised would need UDS contract at next appt)    Return in about 3 months (around 2021) for T2DM, controlled substance refill (restarted Nando), lab results, in office. SUBJECTIVE/OBJECTIVE:  HPI  Patient presents for follow-up of type 2 diabetes and chronic lower back pain/neuropathy. He is requesting to restart gabapentin. He was previously taking 100 mg 3 times a day that was prescribed by his previous PCP, last prescribed in April. He thinks he probably needs a higher dose, but has not taken in a number of months. Therefore advised him we will need to restart at his previous dose, and then can titrate upwards. He has not been able check his blood pressure recently denies any urgency symptoms. Regarding his diabetes, blood sugars are from 200-212. The latter value is right after lunch. Diabetes Mellitus  Has been compliant with medications. No side effects of medications since last visit. No polyuria, polydipsia, or vision changes since last visit.    No symptomatic episodes of hypoglycemia. Review of Systems   Constitutional: Negative for chills and fever. HENT: Negative for facial swelling and mouth sores. Eyes: Negative for discharge and itching. Respiratory: Negative for apnea and stridor. Cardiovascular: Negative for chest pain and palpitations. Gastrointestinal: Negative for nausea and vomiting. Endocrine: Negative for cold intolerance and heat intolerance. Genitourinary: Negative for frequency and urgency. Musculoskeletal: Negative for arthralgias and back pain. Skin: Negative for color change and rash. No flowsheet data found.      Physical Exam    [INSTRUCTIONS:  \"[x]\" Indicates a positive item  \"[]\" Indicates a negative item  -- DELETE ALL ITEMS NOT EXAMINED]    Constitutional: [x] Appears well-developed and well-nourished [x] No apparent distress      [] Abnormal -     Mental status: [x] Alert and awake  [x] Oriented to person/place/time [x] Able to follow commands    [] Abnormal -     Eyes:   EOM    [x]  Normal    [] Abnormal -   Sclera  [x]  Normal    [] Abnormal -          Discharge [x]  None visible   [] Abnormal -     HENT: [x] Normocephalic, atraumatic  [] Abnormal -   [x] Mouth/Throat: Mucous membranes are moist    External Ears [x] Normal  [] Abnormal -    Neck: [x] No visualized mass [] Abnormal -     Pulmonary/Chest: [x] Respiratory effort normal   [x] No visualized signs of difficulty breathing or respiratory distress        [] Abnormal -      Musculoskeletal:   [x] Normal gait with no signs of ataxia         [x] Normal range of motion of neck        [] Abnormal -     Neurological:        [x] No Facial Asymmetry (Cranial nerve 7 motor function) (limited exam due to video visit)          [x] No gaze palsy        [] Abnormal -          Skin:        [x] No significant exanthematous lesions or discoloration noted on facial skin         [] Abnormal -            Psychiatric:       [x] Normal Affect [] Abnormal -        [x] No Hallucinations    Other pertinent observable physical exam findings:-                Raza Ayala, was evaluated through a synchronous (real-time) audio-video encounter. The patient (or guardian if applicable) is aware that this is a billable service. Verbal consent to proceed has been obtained within the past 12 months. The visit was conducted pursuant to the emergency declaration under the [address] [place] [address] authority and the [employer] General Act. Patient identification was verified, and a caregiver was present when appropriate. The patient was located in a state where the provider was credentialed to provide care.       An electronic signature was used to authenticate this note.    --Priyank Hdez MD

## 2021-09-02 DIAGNOSIS — E11.622 TYPE 2 DIABETES MELLITUS WITH OTHER SKIN ULCER, WITH LONG-TERM CURRENT USE OF INSULIN (HCC): Chronic | ICD-10-CM

## 2021-09-02 DIAGNOSIS — Z79.4 TYPE 2 DIABETES MELLITUS WITH OTHER SKIN ULCER, WITH LONG-TERM CURRENT USE OF INSULIN (HCC): Chronic | ICD-10-CM

## 2021-09-02 DIAGNOSIS — E87.6 HYPOKALEMIA: ICD-10-CM

## 2021-09-03 RX ORDER — POTASSIUM CHLORIDE 20 MEQ/1
20 TABLET, EXTENDED RELEASE ORAL DAILY
Qty: 30 TABLET | Refills: 0 | Status: SHIPPED | OUTPATIENT
Start: 2021-09-03 | End: 2021-09-10 | Stop reason: SDUPTHER

## 2021-09-03 RX ORDER — INSULIN ASPART 100 [IU]/ML
1-6 INJECTION, SOLUTION INTRAVENOUS; SUBCUTANEOUS
Qty: 5 PEN | Refills: 1 | Status: SHIPPED | OUTPATIENT
Start: 2021-09-03 | End: 2021-09-10 | Stop reason: SDUPTHER

## 2021-09-10 DIAGNOSIS — E11.622 TYPE 2 DIABETES MELLITUS WITH OTHER SKIN ULCER, WITH LONG-TERM CURRENT USE OF INSULIN (HCC): Chronic | ICD-10-CM

## 2021-09-10 DIAGNOSIS — E87.6 HYPOKALEMIA: ICD-10-CM

## 2021-09-10 DIAGNOSIS — K21.9 GASTROESOPHAGEAL REFLUX DISEASE, UNSPECIFIED WHETHER ESOPHAGITIS PRESENT: ICD-10-CM

## 2021-09-10 DIAGNOSIS — Z79.4 TYPE 2 DIABETES MELLITUS WITH OTHER SKIN ULCER, WITH LONG-TERM CURRENT USE OF INSULIN (HCC): Chronic | ICD-10-CM

## 2021-09-10 RX ORDER — INSULIN ASPART 100 [IU]/ML
1-6 INJECTION, SOLUTION INTRAVENOUS; SUBCUTANEOUS
Qty: 5 PEN | Refills: 1 | Status: SHIPPED | OUTPATIENT
Start: 2021-09-10 | End: 2021-10-12 | Stop reason: SDUPTHER

## 2021-09-10 RX ORDER — POTASSIUM CHLORIDE 20 MEQ/1
20 TABLET, EXTENDED RELEASE ORAL DAILY
Qty: 30 TABLET | Refills: 0 | Status: SHIPPED | OUTPATIENT
Start: 2021-09-10 | End: 2021-10-09 | Stop reason: SDUPTHER

## 2021-09-10 RX ORDER — PANTOPRAZOLE SODIUM 40 MG/1
40 TABLET, DELAYED RELEASE ORAL DAILY
Qty: 30 TABLET | Refills: 3 | Status: SHIPPED | OUTPATIENT
Start: 2021-09-10 | End: 2021-10-09 | Stop reason: SDUPTHER

## 2021-10-09 DIAGNOSIS — E11.42 TYPE 2 DIABETES MELLITUS WITH DIABETIC POLYNEUROPATHY, WITH LONG-TERM CURRENT USE OF INSULIN (HCC): ICD-10-CM

## 2021-10-09 DIAGNOSIS — E87.6 HYPOKALEMIA: ICD-10-CM

## 2021-10-09 DIAGNOSIS — G89.4 CHRONIC PAIN SYNDROME: ICD-10-CM

## 2021-10-09 DIAGNOSIS — K21.9 GASTROESOPHAGEAL REFLUX DISEASE, UNSPECIFIED WHETHER ESOPHAGITIS PRESENT: ICD-10-CM

## 2021-10-09 DIAGNOSIS — Z79.4 TYPE 2 DIABETES MELLITUS WITH DIABETIC POLYNEUROPATHY, WITH LONG-TERM CURRENT USE OF INSULIN (HCC): ICD-10-CM

## 2021-10-09 DIAGNOSIS — F32.1 MAJOR DEPRESSIVE DISORDER, SINGLE EPISODE, MODERATE (HCC): ICD-10-CM

## 2021-10-09 RX ORDER — GABAPENTIN 100 MG/1
100 CAPSULE ORAL 3 TIMES DAILY
Qty: 90 CAPSULE | Refills: 2 | Status: CANCELLED | OUTPATIENT
Start: 2021-10-09 | End: 2022-01-08

## 2021-10-11 DIAGNOSIS — G89.4 CHRONIC PAIN SYNDROME: ICD-10-CM

## 2021-10-11 RX ORDER — BUPROPION HYDROCHLORIDE 100 MG/1
TABLET, EXTENDED RELEASE ORAL
Qty: 90 TABLET | Refills: 2 | Status: SHIPPED | OUTPATIENT
Start: 2021-10-11 | End: 2021-10-12 | Stop reason: SDUPTHER

## 2021-10-11 RX ORDER — GABAPENTIN 100 MG/1
100 CAPSULE ORAL 3 TIMES DAILY
Qty: 90 CAPSULE | Refills: 0 | Status: SHIPPED | OUTPATIENT
Start: 2021-10-11 | End: 2022-01-04

## 2021-10-11 RX ORDER — LANOLIN ALCOHOL/MO/W.PET/CERES
400 CREAM (GRAM) TOPICAL 2 TIMES DAILY
Qty: 60 TABLET | Refills: 0 | Status: SHIPPED | OUTPATIENT
Start: 2021-10-11 | End: 2022-06-12 | Stop reason: SDUPTHER

## 2021-10-11 RX ORDER — CARVEDILOL 25 MG/1
1 TABLET, FILM COATED ORAL 3 TIMES DAILY
Qty: 300 EACH | Refills: 3 | Status: SHIPPED | OUTPATIENT
Start: 2021-10-11 | End: 2021-10-12 | Stop reason: SDUPTHER

## 2021-10-11 RX ORDER — POTASSIUM CHLORIDE 20 MEQ/1
20 TABLET, EXTENDED RELEASE ORAL DAILY
Qty: 30 TABLET | Refills: 0 | Status: SHIPPED | OUTPATIENT
Start: 2021-10-11 | End: 2022-01-04

## 2021-10-11 RX ORDER — PANTOPRAZOLE SODIUM 40 MG/1
40 TABLET, DELAYED RELEASE ORAL DAILY
Qty: 30 TABLET | Refills: 3 | Status: SHIPPED | OUTPATIENT
Start: 2021-10-11 | End: 2022-05-05 | Stop reason: SDUPTHER

## 2021-10-11 NOTE — TELEPHONE ENCOUNTER
[address] called to request a refill on his medication. Last office visit : 2021   Next office visit : 11/10/2021     Requested Prescriptions     Pending Prescriptions Disp Refills    buPROPion (WELLBUTRIN SR) 100 MG extended release tablet 90 tablet 2     Sig: Take 1 tablet by mouth 2 times daily for 7 days, THEN 1 tablet 3 times daily for 23 days.  blood glucose test strips (CONTOUR TEST) strip 300 each 3     Si each by In Vitro route 3 times daily As needed.     magnesium oxide (MAG-OX) 400 (240 Mg) MG tablet 60 tablet 0     Sig: Take 1 tablet by mouth 2 times daily    pantoprazole (PROTONIX) 40 MG tablet 30 tablet 3     Sig: Take 1 tablet by mouth daily    potassium chloride (KLOR-CON M) 20 MEQ extended release tablet 30 tablet 0     Sig: Take 1 tablet by mouth daily            Honey Tapia MA

## 2021-10-11 NOTE — TELEPHONE ENCOUNTER
[address] called to request a refill on his medication. Last office visit : 8/4/2021   Next office visit : 11/10/2021     Last UDS: No results found for: Avanell Fearing, LABBENZ, BUPRENUR, COCAIMETSCRU, GABAPENTIN, MDMA, METAMPU, OPIATESCREENURINE, OXTCOSU, PHENCYCLIDINESCREENURINE, PROPOXYPHENE, THCSCREENUR, TRICYUR    Last Liss Platts: 10/11/21  Medication Contract: none noted   Last Fill: 8/4/21    Requested Prescriptions      No prescriptions requested or ordered in this encounter         Please approve or refuse this medication.    [address]

## 2021-10-12 DIAGNOSIS — F32.1 MAJOR DEPRESSIVE DISORDER, SINGLE EPISODE, MODERATE (HCC): ICD-10-CM

## 2021-10-12 DIAGNOSIS — Z79.4 TYPE 2 DIABETES MELLITUS WITH DIABETIC POLYNEUROPATHY, WITH LONG-TERM CURRENT USE OF INSULIN (HCC): ICD-10-CM

## 2021-10-12 DIAGNOSIS — E11.42 TYPE 2 DIABETES MELLITUS WITH DIABETIC POLYNEUROPATHY, WITH LONG-TERM CURRENT USE OF INSULIN (HCC): ICD-10-CM

## 2021-10-12 DIAGNOSIS — Z79.4 TYPE 2 DIABETES MELLITUS WITH OTHER SKIN ULCER, WITH LONG-TERM CURRENT USE OF INSULIN (HCC): Chronic | ICD-10-CM

## 2021-10-12 DIAGNOSIS — E11.622 TYPE 2 DIABETES MELLITUS WITH OTHER SKIN ULCER, WITH LONG-TERM CURRENT USE OF INSULIN (HCC): Chronic | ICD-10-CM

## 2021-10-12 NOTE — TELEPHONE ENCOUNTER
The current medical regimen is effective. Continue present plan and medications. UDS and controlled substance contract up to date. No unusual filling on current [facility] report. Tx continues to be medically necessary.     Brain Hal, MD

## 2021-10-13 DIAGNOSIS — E11.42 TYPE 2 DIABETES MELLITUS WITH DIABETIC POLYNEUROPATHY, WITH LONG-TERM CURRENT USE OF INSULIN (HCC): ICD-10-CM

## 2021-10-13 DIAGNOSIS — Z79.4 TYPE 2 DIABETES MELLITUS WITH DIABETIC POLYNEUROPATHY, WITH LONG-TERM CURRENT USE OF INSULIN (HCC): ICD-10-CM

## 2021-10-13 RX ORDER — INSULIN ASPART 100 [IU]/ML
1-6 INJECTION, SOLUTION INTRAVENOUS; SUBCUTANEOUS
Qty: 5 PEN | Refills: 1 | Status: SHIPPED | OUTPATIENT
Start: 2021-10-13 | End: 2022-03-09 | Stop reason: SDUPTHER

## 2021-10-13 RX ORDER — BUPROPION HYDROCHLORIDE 100 MG/1
TABLET, EXTENDED RELEASE ORAL
Qty: 90 TABLET | Refills: 2 | Status: SHIPPED | OUTPATIENT
Start: 2021-10-13 | End: 2021-12-09

## 2021-10-13 RX ORDER — CARVEDILOL 25 MG/1
1 TABLET, FILM COATED ORAL 3 TIMES DAILY
Qty: 300 EACH | Refills: 3 | Status: SHIPPED | OUTPATIENT
Start: 2021-10-13 | End: 2021-10-13 | Stop reason: SDUPTHER

## 2021-10-13 RX ORDER — CARVEDILOL 25 MG/1
1 TABLET, FILM COATED ORAL 3 TIMES DAILY
Qty: 420 EACH | Refills: 3 | Status: SHIPPED | OUTPATIENT
Start: 2021-10-13 | End: 2022-06-12 | Stop reason: SDUPTHER

## 2021-10-13 NOTE — TELEPHONE ENCOUNTER
Anders spencer called requesting a refill of the below medication which has been pended for you:     Requested Prescriptions     Pending Prescriptions Disp Refills    blood glucose test strips (CONTOUR TEST) strip 420 each 3     Si each by In Vitro route 3 times daily As needed.        Last Appointment Date: 2021  Next Appointment Date: 11/10/2021    No Known Allergies

## 2021-12-09 ENCOUNTER — OFFICE VISIT (OUTPATIENT)
Dept: FAMILY MEDICINE CLINIC | Age: 51
End: 2021-12-09
Payer: MEDICARE

## 2021-12-09 VITALS
SYSTOLIC BLOOD PRESSURE: 120 MMHG | DIASTOLIC BLOOD PRESSURE: 78 MMHG | HEIGHT: 73 IN | TEMPERATURE: 97 F | HEART RATE: 68 BPM | WEIGHT: 258 LBS | OXYGEN SATURATION: 98 % | BODY MASS INDEX: 34.19 KG/M2

## 2021-12-09 DIAGNOSIS — R80.9 MICROALBUMINURIA: ICD-10-CM

## 2021-12-09 DIAGNOSIS — E11.628 TYPE 2 DIABETES MELLITUS WITH OTHER SKIN COMPLICATION, WITH LONG-TERM CURRENT USE OF INSULIN (HCC): Primary | Chronic | ICD-10-CM

## 2021-12-09 DIAGNOSIS — Z87.891 FORMER SMOKER: ICD-10-CM

## 2021-12-09 DIAGNOSIS — E11.622 TYPE 2 DIABETES MELLITUS WITH OTHER SKIN ULCER, WITH LONG-TERM CURRENT USE OF INSULIN (HCC): Chronic | ICD-10-CM

## 2021-12-09 DIAGNOSIS — F33.40 MDD (RECURRENT MAJOR DEPRESSIVE DISORDER) IN REMISSION (HCC): ICD-10-CM

## 2021-12-09 DIAGNOSIS — Z79.4 TYPE 2 DIABETES MELLITUS WITH OTHER SKIN COMPLICATION, WITH LONG-TERM CURRENT USE OF INSULIN (HCC): Chronic | ICD-10-CM

## 2021-12-09 DIAGNOSIS — Z51.81 MEDICATION MONITORING ENCOUNTER: ICD-10-CM

## 2021-12-09 DIAGNOSIS — E66.9 CLASS 2 OBESITY: ICD-10-CM

## 2021-12-09 DIAGNOSIS — E11.628 TYPE 2 DIABETES MELLITUS WITH OTHER SKIN COMPLICATION, WITH LONG-TERM CURRENT USE OF INSULIN (HCC): Chronic | ICD-10-CM

## 2021-12-09 DIAGNOSIS — Z79.4 TYPE 2 DIABETES MELLITUS WITH OTHER SKIN ULCER, WITH LONG-TERM CURRENT USE OF INSULIN (HCC): Chronic | ICD-10-CM

## 2021-12-09 DIAGNOSIS — E11.42 DIABETIC POLYNEUROPATHY ASSOCIATED WITH TYPE 2 DIABETES MELLITUS (HCC): ICD-10-CM

## 2021-12-09 DIAGNOSIS — Z79.4 TYPE 2 DIABETES MELLITUS WITH OTHER SKIN COMPLICATION, WITH LONG-TERM CURRENT USE OF INSULIN (HCC): Primary | Chronic | ICD-10-CM

## 2021-12-09 PROBLEM — E66.812 CLASS 2 OBESITY: Status: ACTIVE | Noted: 2020-08-07

## 2021-12-09 LAB
ALBUMIN SERPL-MCNC: 3.4 G/DL (ref 3.5–5.2)
ALCOHOL URINE: NORMAL
ALP BLD-CCNC: 122 U/L (ref 40–130)
ALT SERPL-CCNC: 11 U/L (ref 5–41)
AMPHETAMINE SCREEN, URINE: NORMAL
ANION GAP SERPL CALCULATED.3IONS-SCNC: 10 MMOL/L (ref 7–19)
AST SERPL-CCNC: 15 U/L (ref 5–40)
BARBITURATE SCREEN, URINE: NORMAL
BASOPHILS ABSOLUTE: 0.1 K/UL (ref 0–0.2)
BASOPHILS RELATIVE PERCENT: 0.9 % (ref 0–1)
BENZODIAZEPINE SCREEN, URINE: NORMAL
BILIRUB SERPL-MCNC: 0.3 MG/DL (ref 0.2–1.2)
BUN BLDV-MCNC: 21 MG/DL (ref 6–20)
BUPRENORPHINE URINE: NORMAL
CALCIUM SERPL-MCNC: 9.3 MG/DL (ref 8.6–10)
CHLORIDE BLD-SCNC: 98 MMOL/L (ref 98–111)
CHOLESTEROL, TOTAL: 205 MG/DL (ref 160–199)
CO2: 28 MMOL/L (ref 22–29)
COCAINE METABOLITE SCREEN URINE: NORMAL
CREAT SERPL-MCNC: 0.8 MG/DL (ref 0.5–1.2)
CREATININE URINE: 211.3 MG/DL (ref 4.2–622)
EOSINOPHILS ABSOLUTE: 0.1 K/UL (ref 0–0.6)
EOSINOPHILS RELATIVE PERCENT: 0.7 % (ref 0–5)
FENTANYL SCREEN, URINE: NORMAL
GABAPENTIN SCREEN, URINE: NORMAL
GFR AFRICAN AMERICAN: >59
GFR NON-AFRICAN AMERICAN: >60
GLUCOSE BLD-MCNC: 235 MG/DL (ref 74–109)
HBA1C MFR BLD: 9.3 % (ref 4–6)
HCT VFR BLD CALC: 45.3 % (ref 42–52)
HDLC SERPL-MCNC: 66 MG/DL (ref 55–121)
HEMOGLOBIN: 14.5 G/DL (ref 14–18)
IMMATURE GRANULOCYTES #: 0.1 K/UL
LDL CHOLESTEROL CALCULATED: 119 MG/DL
LYMPHOCYTES ABSOLUTE: 2 K/UL (ref 1.1–4.5)
LYMPHOCYTES RELATIVE PERCENT: 17.4 % (ref 20–40)
MCH RBC QN AUTO: 29.3 PG (ref 27–31)
MCHC RBC AUTO-ENTMCNC: 32 G/DL (ref 33–37)
MCV RBC AUTO: 91.5 FL (ref 80–94)
MDMA URINE: NORMAL
METHADONE SCREEN, URINE: NORMAL
METHAMPHETAMINE, URINE: NORMAL
MICROALBUMIN UR-MCNC: 17.2 MG/DL (ref 0–19)
MICROALBUMIN/CREAT UR-RTO: 81.4 MG/G
MONOCYTES ABSOLUTE: 0.8 K/UL (ref 0–0.9)
MONOCYTES RELATIVE PERCENT: 6.9 % (ref 0–10)
NEUTROPHILS ABSOLUTE: 8.4 K/UL (ref 1.5–7.5)
NEUTROPHILS RELATIVE PERCENT: 73.1 % (ref 50–65)
OPIATE SCREEN URINE: NORMAL
OXYCODONE SCREEN URINE: NORMAL
PDW BLD-RTO: 13.8 % (ref 11.5–14.5)
PHENCYCLIDINE SCREEN URINE: NORMAL
PLATELET # BLD: 289 K/UL (ref 130–400)
PMV BLD AUTO: 10 FL (ref 9.4–12.4)
POTASSIUM SERPL-SCNC: 4.2 MMOL/L (ref 3.5–5)
PROPOXYPHENE SCREEN, URINE: NORMAL
RBC # BLD: 4.95 M/UL (ref 4.7–6.1)
SODIUM BLD-SCNC: 136 MMOL/L (ref 136–145)
SYNTHETIC CANNABINOIDS(K2) SCREEN, URINE: NORMAL
THC SCREEN, URINE: NORMAL
TOTAL PROTEIN: 6.9 G/DL (ref 6.6–8.7)
TRAMADOL SCREEN URINE: NORMAL
TRICYCLIC ANTIDEPRESSANTS, UR: NORMAL
TRIGL SERPL-MCNC: 100 MG/DL (ref 0–149)
WBC # BLD: 11.5 K/UL (ref 4.8–10.8)

## 2021-12-09 PROCEDURE — G8484 FLU IMMUNIZE NO ADMIN: HCPCS | Performed by: FAMILY MEDICINE

## 2021-12-09 PROCEDURE — G8427 DOCREV CUR MEDS BY ELIG CLIN: HCPCS | Performed by: FAMILY MEDICINE

## 2021-12-09 PROCEDURE — 1036F TOBACCO NON-USER: CPT | Performed by: FAMILY MEDICINE

## 2021-12-09 PROCEDURE — 80305 DRUG TEST PRSMV DIR OPT OBS: CPT | Performed by: FAMILY MEDICINE

## 2021-12-09 PROCEDURE — 99214 OFFICE O/P EST MOD 30 MIN: CPT | Performed by: FAMILY MEDICINE

## 2021-12-09 PROCEDURE — 3046F HEMOGLOBIN A1C LEVEL >9.0%: CPT | Performed by: FAMILY MEDICINE

## 2021-12-09 PROCEDURE — G8417 CALC BMI ABV UP PARAM F/U: HCPCS | Performed by: FAMILY MEDICINE

## 2021-12-09 PROCEDURE — 2022F DILAT RTA XM EVC RTNOPTHY: CPT | Performed by: FAMILY MEDICINE

## 2021-12-09 PROCEDURE — 3017F COLORECTAL CA SCREEN DOC REV: CPT | Performed by: FAMILY MEDICINE

## 2021-12-09 RX ORDER — GABAPENTIN 300 MG/1
300 CAPSULE ORAL 2 TIMES DAILY PRN
Qty: 60 CAPSULE | Refills: 0 | Status: SHIPPED | OUTPATIENT
Start: 2021-12-09 | End: 2022-01-04 | Stop reason: SDUPTHER

## 2021-12-09 RX ORDER — GABAPENTIN 100 MG/1
100 CAPSULE ORAL 3 TIMES DAILY
Qty: 90 CAPSULE | Refills: 0 | Status: CANCELLED | OUTPATIENT
Start: 2021-12-09 | End: 2022-01-09

## 2021-12-09 NOTE — PROGRESS NOTES
[facility] PHYSICIAN SERVICES  [facility]  [address] [facility] [address]  [address] [address]  Dept: [phone]  Dept Fax: [phone]: [phone]    Subjective:     Catrachita Boykin is a 46 y.o. male who presents today for his medical conditions/complaints as noted below. Catrachita Boykin is c/o of Follow-up        HPI:   Patient presents for follow-up of type 2 diabetes and neuropathy. He did labs immediately prior to his appointment, results are pending at the time of his appointment. He is doing okay overall. He was not sure if the gabapentin was helping, but then he forgot to take it a few days and noticed a significant difference. He would like to increase the dose if possible. Does not need any other medications refilled at this time. Diabetes Mellitus  Has been compliant with medications. No side effects of medications since last visit. No polyuria, polydipsia, or vision changes since last visit. No symptomatic episodes of hypoglycemia. PHQ Scores 8/4/2021 4/29/2021 1/24/2021 2/13/2020 12/9/2019 6/3/2014   PHQ2 Score 0 0 2 6 4 0   PHQ9 Score 0 0 2 19 16 0     Interpretation of Total Score Depression Severity: 1-4 = Minimal depression, 5-9 = Mild depression, 10-14 = Moderate depression, 15-19 = Moderately severe depression, 20-27 = Severe depression     No flowsheet data found. Interpretation of PRASHANTH-7 score: 5-9 = mild anxiety, 10-14 = moderate anxiety, 15+ = severe anxiety. Recommend referral to behavioral health for scores 10 or greater.      Past Medical History:   Diagnosis Date    Antral vascular ectasia     Appendicitis 2009    ruptured - no surgery done    [facility]     Diabetes mellitus ([facility]     Duodenal ulcer     Esophageal varices (HCC)     grade 2    Hepatitis C     Hernia     History of GI bleed     Liver disease     Personal history of ascites     Portal hypertensive gastropathy (HCC)     Thrombocytopenia (HCC)      Past Surgical History:   Procedure Laterality Date    HIP SURGERY Left 2021    HIP INCISION AND DRAINAGE performed by Catrina Sim MD at [address] LIVER TRANSPLANT      UPPER GASTROINTESTINAL ENDOSCOPY  2013    Dr.Jaiyeola    UPPER GASTROINTESTINAL ENDOSCOPY  13    Dr Christine Garner       Family History   Problem Relation Age of Onset    Breast Cancer Mother 61    Emphysema Father        Social History     Tobacco Use    Smoking status: Former Smoker     Packs/day: 0.50     Years: 25.00     Pack years: 12.50     Start date: 1994     Quit date:      Years since quittin.9    Smokeless tobacco: Never Used   Substance Use Topics    Alcohol use: No     Comment: quit x 8 months ago with one relapse, former ETOH      Current Outpatient Medications   Medication Sig Dispense Refill    gabapentin (NEURONTIN) 300 MG capsule Take 1 capsule by mouth 2 times daily as needed (pain/numbness) for up to 30 days. 60 capsule 0    NOVOLOG FLEXPEN 100 UNIT/ML injection pen Inject 1-6 Units into the skin 3 times daily (before meals) 5 pen 1    blood glucose test strips (CONTOUR TEST) strip 1 each by In Vitro route 3 times daily As needed. 420 each 3    pantoprazole (PROTONIX) 40 MG tablet Take 1 tablet by mouth daily 30 tablet 3    potassium chloride (KLOR-CON M) 20 MEQ extended release tablet Take 1 tablet by mouth daily 30 tablet 0    Nutritional Supplements (ADULT NUTRITIONAL SUPPLEMENT + PO) Take by mouth sucontral d      Probiotic Product (PROBIOTIC DAILY) CAPS Take by mouth Pre and pro-biotic      Tacrolimus ER 1 MG TB24 3 tab(s) orally once a day      saccharomyces boulardii (FLORASTOR) 250 MG capsule Take 1 capsule by mouth daily 30 capsule 1    Insulin Pen Needle (MEIJER PEN NEEDLES) 31G X 6 MM MISC 1 each by Does not apply route daily 100 each 3    collagenase (SANTYL) 250 UNIT/GM ointment Apply topically daily. Apply nickel thick as directed. Wound size 5.3x2x0. 6 1 Tube 3    sodium hypochlorite (DAKINS) 0.125 % SOLN external solution Apply topically as directed daily. 1 Bottle 1    Bismuth Tribromoph-Petrolatum (XEROFORM PETROLATUM GAUZE 5\"X9\") MISC external pads Apply 2 each topically daily 50 each 0    BASAGLAR KWIKPEN 100 UNIT/ML injection pen Inject 12 Units into the skin nightly 5 pen 1    tacrolimus (PROGRAF) 1 MG capsule Take 3 capsules by mouth 2 times daily 180 capsule 0    Lancets MISC Use to test blood glucose four times a day and as needed for hypoglycemic events  DXE11.622 Z79.4 200 each 3    predniSONE (DELTASONE) 10 MG tablet Take 10 mg by mouth 2 times daily       magnesium oxide (MAG-OX) 400 (240 Mg) MG tablet Take 1 tablet by mouth 2 times daily 60 tablet 0    gabapentin (NEURONTIN) 100 MG capsule Take 1 capsule by mouth 3 times daily for 31 days. 90 capsule 0     No current facility-administered medications for this visit. No Known Allergies    Review of Systems   Constitutional: Negative for chills and fever. HENT: Negative for facial swelling and mouth sores. Eyes: Negative for discharge and itching. Respiratory: Negative for apnea and stridor. Cardiovascular: Negative for chest pain and palpitations. Gastrointestinal: Negative for nausea and vomiting. Endocrine: Negative for cold intolerance and heat intolerance. Genitourinary: Negative for frequency and urgency. Musculoskeletal: Negative for arthralgias and back pain. Skin: Negative for color change and rash. Neurological: Positive for numbness. See HPI for visit specific review of symptoms. All others negative      Objective:   /78   Pulse 68   Temp 97 °F (36.1 °C)   Ht 6' 1\" (1.854 m)   Wt 258 lb (117 kg)   SpO2 98%   BMI 34.04 kg/m²   Physical Exam  Constitutional:       Appearance: He is well-developed. He is obese. HENT:      Head: Normocephalic and atraumatic.       Right Ear: External ear normal.      Left Ear: External ear normal.   Eyes:      Conjunctiva/sclera: Conjunctivae normal.      Pupils: Pupils are equal, round, and reactive to light. Cardiovascular:      Rate and Rhythm: Normal rate and regular rhythm. Heart sounds: Normal heart sounds. Pulmonary:      Effort: Pulmonary effort is normal. No respiratory distress. Breath sounds: Normal breath sounds. Abdominal:      General: Bowel sounds are normal. There is no distension. Palpations: Abdomen is soft. Tenderness: There is no abdominal tenderness. Musculoskeletal:         General: Normal range of motion. Cervical back: Normal range of motion and neck supple. Skin:     General: Skin is warm. Capillary Refill: Capillary refill takes less than 2 seconds. Findings: No rash. Neurological:      Mental Status: He is alert and oriented to person, place, and time. Cranial Nerves: No cranial nerve deficit. Psychiatric:         Thought Content:  Thought content normal.           Lab Review   Recent Results (from the past 672 hour(s))   POCT Rapid Drug Screen    Collection Time: 12/09/21 12:00 AM   Result Value Ref Range    Alcohol, Urine neg     Amphetamine Screen, Urine neg     Barbiturate Screen, Urine neg     Benzodiazepine Screen, Urine neg     Buprenorphine Urine neg     Cocaine Metabolite Screen, Urine neg     FENTANYL SCREEN, URINE neg     Gabapentin Screen, Urine neg     MDMA, Urine neg     Methadone Screen, Urine neg     Methamphetamine, Urine neg     Opiate Scrn, Ur neg     Oxycodone Screen, Ur neg     PCP Screen, Urine neg     Propoxyphene Screen, Urine neg     Synthetic Cannabinoids (K2) Screen, Urine neg     THC Screen, Urine neg     Tramadol Scrn, Ur neg     Tricyclic Antidepressants, Urine neg    Microalbumin / Creatinine Urine Ratio    Collection Time: 12/09/21 12:08 PM   Result Value Ref Range    Microalbumin, Random Urine 17.20 0.00 - 19.00 mg/dL    Creatinine, Ur 211.3 4.2 - 622.0 mg/dL    Microalbumin Creatinine Ratio 81.4 mg/g   Lipid Panel    Collection Time: 12/09/21 12:08 PM   Result Value Ref Range    Cholesterol, Total 205 (H) 160 - 199 mg/dL    Triglycerides 100 0 - 149 mg/dL    HDL 66 55 - 121 mg/dL    LDL Calculated 119 <100 mg/dL   Comprehensive Metabolic Panel    Collection Time: 12/09/21 12:08 PM   Result Value Ref Range    Sodium 136 136 - 145 mmol/L    Potassium 4.2 3.5 - 5.0 mmol/L    Chloride 98 98 - 111 mmol/L    CO2 28 22 - 29 mmol/L    Anion Gap 10 7 - 19 mmol/L    Glucose 235 (H) 74 - 109 mg/dL    BUN 21 (H) 6 - 20 mg/dL    CREATININE 0.8 0.5 - 1.2 mg/dL    GFR Non-African American >60 >60    GFR African American >59 >59    Calcium 9.3 8.6 - 10.0 mg/dL    Total Protein 6.9 6.6 - 8.7 g/dL    Albumin 3.4 (L) 3.5 - 5.2 g/dL    Total Bilirubin 0.3 0.2 - 1.2 mg/dL    Alkaline Phosphatase 122 40 - 130 U/L    ALT 11 5 - 41 U/L    AST 15 5 - 40 U/L   CBC Auto Differential    Collection Time: 12/09/21 12:08 PM   Result Value Ref Range    WBC 11.5 (H) 4.8 - 10.8 K/uL    RBC 4.95 4.70 - 6.10 M/uL    Hemoglobin 14.5 14.0 - 18.0 g/dL    Hematocrit 45.3 42.0 - 52.0 %    MCV 91.5 80.0 - 94.0 fL    MCH 29.3 27.0 - 31.0 pg    MCHC 32.0 (L) 33.0 - 37.0 g/dL    RDW 13.8 11.5 - 14.5 %    Platelets 243 188 - 269 K/uL    MPV 10.0 9.4 - 12.4 fL    Neutrophils % 73.1 (H) 50.0 - 65.0 %    Lymphocytes % 17.4 (L) 20.0 - 40.0 %    Monocytes % 6.9 0.0 - 10.0 %    Eosinophils % 0.7 0.0 - 5.0 %    Basophils % 0.9 0.0 - 1.0 %    Neutrophils Absolute 8.4 (H) 1.5 - 7.5 K/uL    Immature Granulocytes # 0.1 K/uL    Lymphocytes Absolute 2.0 1.1 - 4.5 K/uL    Monocytes Absolute 0.80 0.00 - 0.90 K/uL    Eosinophils Absolute 0.10 0.00 - 0.60 K/uL    Basophils Absolute 0.10 0.00 - 0.20 K/uL   Hemoglobin A1C    Collection Time: 12/09/21 12:08 PM   Result Value Ref Range    Hemoglobin A1C 9.3 (H) 4.0 - 6.0 %               Assessment & Plan:      The following diagnoses and conditions are stable with no further orders unless indicated:    Patient Active Problem List    Diagnosis Date Noted  Diabetic polyneuropathy associated with type 2 diabetes mellitus ([address] 12/11/2021     Overview Note:     Uncontrolled, will increase gabapentin from 100 mg 3 times a day to 300 mg twice a day. The current medical regimen is effective. Continue present plan and medications. UDS and controlled substance contract up to date. No unusual filling on current [facility] report. Tx continues to be medically necessary.  Former smoker 08/04/2021    Skin ulcer of left groin with fat layer exposed ([address] 04/19/2021    Moderate malnutrition ([address] 03/05/2021    Hypomagnesemia 03/03/2021    Acute hip pain, left     Septic arthritis of hip ([address] 02/27/2021    Claudication ([address] 02/17/2021    Streptococcal bacteremia 02/09/2021    Abdominal wall skin ulcer, with fat layer exposed ([address] 08/26/2020    Type 2 diabetes mellitus with skin complication, with long-term current use of insulin ([address] 08/17/2020    Class 2 obesity 08/07/2020    Varicose veins of both lower extremities with pain 02/27/2020    Narcotic dependency, continuous ([address] 02/13/2020    History of liver transplant ([address] 12/09/2019    History of esophageal varices 12/09/2019    MDD (recurrent major depressive disorder) in remission ([address] 12/09/2019    Pyloric ulcer 05/02/2013    Duodenal ulcer, acute 05/02/2013    History of GI bleed 05/02/2013    Esophageal ulcer 05/02/2013        Des Burr was seen today for follow-up. Diagnoses and all orders for this visit:    Type 2 diabetes mellitus with other skin complication, with long-term current use of insulin ([address]  -     Comprehensive Metabolic Panel; Future  -     Microalbumin / Creatinine Urine Ratio; Future  -     Hemoglobin A1C; Future  -     CBC Auto Differential; Future    Medication monitoring encounter  -     POCT Rapid Drug Screen  -     Cancel: Urine Drug Screen; Future    Diabetic polyneuropathy associated with type 2 diabetes mellitus (HCC)  -     gabapentin (NEURONTIN) 300 MG capsule;  Take 1 capsule by mouth 2 times daily as needed (pain/numbness) for up to 30 days. MDD (recurrent major depressive disorder) in remission [facility])    Former smoker    Class 2 obesity    Microalbuminuria  -     CBC Auto Differential; Future        Health Maintenance   Topic Date Due    Hepatitis A vaccine (1 of 2 - Risk 2-dose series) Never done    Diabetic retinal exam  Never done    Hepatitis B vaccine (1 of 3 - Risk 3-dose series) Never done    Pneumococcal 0-64 years Vaccine (2 of 4 - PCV13) 10/28/2014    Colon cancer screen colonoscopy  Never done    Shingles Vaccine (1 of 2) Never done    Diabetic foot exam  09/09/2021    COVID-19 Vaccine (1) 06/07/2022 (Originally 7/25/1982)    Flu vaccine (1) 12/09/2022 (Originally 9/1/2021)    A1C test (Diabetic or Prediabetic)  03/09/2022    Annual Wellness Visit (AWV)  08/05/2022    Diabetic microalbuminuria test  12/09/2022    Lipid screen  12/09/2022    DTaP/Tdap/Td vaccine (3 - Td or Tdap) 04/29/2025    Hepatitis C screen  Completed    HIV screen  Completed    Hib vaccine  Aged Out    Meningococcal (ACWY) vaccine  Aged Out     Discussed vaccine, he is immunocompromised due to history of transplant, states he would consider vaccine if it was available onsite, but not at a pharmacy. Return in about 3 months (around 3/9/2022) for controlled substance refill and DM, labs, office or virtual visit, per patient preference. Discussed use, benefit, and side effects of prescribed medications. All patient questions answered. Pt voiced understanding. Reviewed health maintenance. Instructed to continue current medications, diet and exercise. Patient agreedwith treatment plan. Follow up as directed. Old records reviewed, where available.     Ari Hauser MD    Note:  dictated using Dragon software

## 2021-12-11 PROBLEM — E11.42 DIABETIC POLYNEUROPATHY ASSOCIATED WITH TYPE 2 DIABETES MELLITUS (HCC): Status: ACTIVE | Noted: 2021-12-11

## 2021-12-11 PROBLEM — G89.4 CHRONIC PAIN SYNDROME: Status: ACTIVE | Noted: 2021-12-11

## 2021-12-11 ASSESSMENT — ENCOUNTER SYMPTOMS
COLOR CHANGE: 0
EYE ITCHING: 0
BACK PAIN: 0
STRIDOR: 0
VOMITING: 0
FACIAL SWELLING: 0
EYE DISCHARGE: 0
NAUSEA: 0
APNEA: 0

## 2022-01-04 DIAGNOSIS — G89.4 CHRONIC PAIN SYNDROME: ICD-10-CM

## 2022-01-04 DIAGNOSIS — E87.6 HYPOKALEMIA: ICD-10-CM

## 2022-01-04 RX ORDER — GABAPENTIN 300 MG/1
300 CAPSULE ORAL 2 TIMES DAILY PRN
Qty: 60 CAPSULE | Refills: 0 | Status: SHIPPED | OUTPATIENT
Start: 2022-01-08 | End: 2022-05-06

## 2022-01-04 RX ORDER — POTASSIUM CHLORIDE 20 MEQ/1
TABLET, EXTENDED RELEASE ORAL
Qty: 30 TABLET | Refills: 0 | Status: SHIPPED | OUTPATIENT
Start: 2022-01-04 | End: 2022-02-07

## 2022-01-04 NOTE — TELEPHONE ENCOUNTER
The current medical regimen is effective. Continue present plan and medications. UDS and controlled substance contract up to date. No unusual filling on current [facility] report. Tx continues to be medically necessary.     Dorene Car MD

## 2022-01-04 NOTE — TELEPHONE ENCOUNTER
[address] called to request a refill on his medication. Last office visit : 12/9/2021   Next office visit : 3/9/2022     Last UDS:   Amphetamine Screen, Urine   Date Value Ref Range Status   12/09/2021 neg  Final     Barbiturate Screen, Urine   Date Value Ref Range Status   12/09/2021 neg  Final     Benzodiazepine Screen, Urine   Date Value Ref Range Status   12/09/2021 neg  Final     Buprenorphine Urine   Date Value Ref Range Status   12/09/2021 neg  Final     Cocaine Metabolite Screen, Urine   Date Value Ref Range Status   12/09/2021 neg  Final     Gabapentin Screen, Urine   Date Value Ref Range Status   12/09/2021 neg  Final     MDMA, Urine   Date Value Ref Range Status   12/09/2021 neg  Final     Methamphetamine, Urine   Date Value Ref Range Status   12/09/2021 neg  Final     Opiate Scrn, Ur   Date Value Ref Range Status   12/09/2021 neg  Final     Oxycodone Screen, Ur   Date Value Ref Range Status   12/09/2021 neg  Final     PCP Screen, Urine   Date Value Ref Range Status   12/09/2021 neg  Final     Propoxyphene Screen, Urine   Date Value Ref Range Status   12/09/2021 neg  Final     THC Screen, Urine   Date Value Ref Range Status   12/09/2021 neg  Final     Tricyclic Antidepressants, Urine   Date Value Ref Range Status   12/09/2021 neg  Final       Last Fuentes Giraldo: 10/11/21  Medication Contract: 12/16/21   Last Fill: 12/9/21    Requested Prescriptions     Pending Prescriptions Disp Refills    gabapentin (NEURONTIN) 100 MG capsule [Pharmacy Med Name: GABAPENTIN 100 MG CAPSULE] 90 capsule 0     Sig: TAKE ONE CAPSULE BY MOUTH 3 TIMES A DAY    potassium chloride (KLOR-CON M) 20 MEQ extended release tablet [Pharmacy Med Name: POTASSIUM CL ER 20 MEQ TAB*JOSSY] 30 tablet 0     Sig: TAKE ONE TABLET BY MOUTH EVERY DAY         Please approve or refuse this medication.    Samra Gotti MA

## 2022-02-07 DIAGNOSIS — E87.6 HYPOKALEMIA: ICD-10-CM

## 2022-02-07 DIAGNOSIS — E11.42 DIABETIC POLYNEUROPATHY ASSOCIATED WITH TYPE 2 DIABETES MELLITUS (HCC): Primary | ICD-10-CM

## 2022-02-07 RX ORDER — GABAPENTIN 300 MG/1
300 CAPSULE ORAL 2 TIMES DAILY
Qty: 60 CAPSULE | Refills: 0 | Status: SHIPPED | OUTPATIENT
Start: 2022-02-07 | End: 2022-03-09 | Stop reason: SDUPTHER

## 2022-02-07 RX ORDER — POTASSIUM CHLORIDE 20 MEQ/1
TABLET, EXTENDED RELEASE ORAL
Qty: 30 TABLET | Refills: 0 | Status: SHIPPED | OUTPATIENT
Start: 2022-02-07 | End: 2022-06-12 | Stop reason: SDUPTHER

## 2022-02-07 NOTE — TELEPHONE ENCOUNTER
[address] called to request a refill on his medication. Last office visit : 12/9/2021   Next office visit : 3/9/2022     Last UDS:   Amphetamine Screen, Urine   Date Value Ref Range Status   12/09/2021 neg  Final     Barbiturate Screen, Urine   Date Value Ref Range Status   12/09/2021 neg  Final     Benzodiazepine Screen, Urine   Date Value Ref Range Status   12/09/2021 neg  Final     Buprenorphine Urine   Date Value Ref Range Status   12/09/2021 neg  Final     Cocaine Metabolite Screen, Urine   Date Value Ref Range Status   12/09/2021 neg  Final     Gabapentin Screen, Urine   Date Value Ref Range Status   12/09/2021 neg  Final     MDMA, Urine   Date Value Ref Range Status   12/09/2021 neg  Final     Methamphetamine, Urine   Date Value Ref Range Status   12/09/2021 neg  Final     Opiate Scrn, Ur   Date Value Ref Range Status   12/09/2021 neg  Final     Oxycodone Screen, Ur   Date Value Ref Range Status   12/09/2021 neg  Final     PCP Screen, Urine   Date Value Ref Range Status   12/09/2021 neg  Final     Propoxyphene Screen, Urine   Date Value Ref Range Status   12/09/2021 neg  Final     THC Screen, Urine   Date Value Ref Range Status   12/09/2021 neg  Final     Tricyclic Antidepressants, Urine   Date Value Ref Range Status   12/09/2021 neg  Final       Last Lenora Drier: 2/7/22  Medication Contract: 12/16/21   Last Fill: 1/8/22    Requested Prescriptions     Pending Prescriptions Disp Refills    potassium chloride (KLOR-CON M) 20 MEQ extended release tablet [Pharmacy Med Name: POTASSIUM CL ER 20 MEQ TAB*JOSSY] 30 tablet 0     Sig: TAKE ONE TABLET BY MOUTH EVERY DAY    gabapentin (NEURONTIN) 100 MG capsule [Pharmacy Med Name: GABAPENTIN 100 MG CAPSULE] 90 capsule 0     Sig: TAKE ONE CAPSULE BY MOUTH 3 TIMES A DAY         Please approve or refuse this medication.    Niru Dobbins MA

## 2022-02-08 NOTE — TELEPHONE ENCOUNTER
The current medical regimen is effective. Continue present plan and medications. UDS and controlled substance contract up to date. No unusual filling on current [facility] report. Tx continues to be medically necessary.     Emy Deutsch MD

## 2022-03-09 DIAGNOSIS — E11.622 TYPE 2 DIABETES MELLITUS WITH OTHER SKIN ULCER, WITH LONG-TERM CURRENT USE OF INSULIN (HCC): Chronic | ICD-10-CM

## 2022-03-09 DIAGNOSIS — E11.42 DIABETIC POLYNEUROPATHY ASSOCIATED WITH TYPE 2 DIABETES MELLITUS (HCC): ICD-10-CM

## 2022-03-09 DIAGNOSIS — Z79.4 TYPE 2 DIABETES MELLITUS WITH OTHER SKIN ULCER, WITH LONG-TERM CURRENT USE OF INSULIN (HCC): Chronic | ICD-10-CM

## 2022-03-10 RX ORDER — GABAPENTIN 300 MG/1
300 CAPSULE ORAL 2 TIMES DAILY
Qty: 6 CAPSULE | Refills: 0 | Status: SHIPPED | OUTPATIENT
Start: 2022-03-10 | End: 2022-05-05 | Stop reason: SDUPTHER

## 2022-03-10 RX ORDER — INSULIN ASPART 100 [IU]/ML
1-6 INJECTION, SOLUTION INTRAVENOUS; SUBCUTANEOUS
Qty: 5 PEN | Refills: 1 | Status: SHIPPED | OUTPATIENT
Start: 2022-03-10 | End: 2022-06-12 | Stop reason: SDUPTHER

## 2022-03-10 NOTE — TELEPHONE ENCOUNTER
The current medical regimen is effective. Continue present plan and medications. UDS and controlled substance contract up to date. No unusual filling on current [facility] report. Tx continues to be medically necessary.     Mayco Jones MD

## 2022-03-10 NOTE — TELEPHONE ENCOUNTER
[address] called to request a refill on his medication. Last office visit : 12/9/2021   Next office visit : 3/11/2022     Last UDS:   Amphetamine Screen, Urine   Date Value Ref Range Status   12/09/2021 neg  Final     Barbiturate Screen, Urine   Date Value Ref Range Status   12/09/2021 neg  Final     Benzodiazepine Screen, Urine   Date Value Ref Range Status   12/09/2021 neg  Final     Buprenorphine Urine   Date Value Ref Range Status   12/09/2021 neg  Final     Cocaine Metabolite Screen, Urine   Date Value Ref Range Status   12/09/2021 neg  Final     Gabapentin Screen, Urine   Date Value Ref Range Status   12/09/2021 neg  Final     MDMA, Urine   Date Value Ref Range Status   12/09/2021 neg  Final     Methamphetamine, Urine   Date Value Ref Range Status   12/09/2021 neg  Final     Opiate Scrn, Ur   Date Value Ref Range Status   12/09/2021 neg  Final     Oxycodone Screen, Ur   Date Value Ref Range Status   12/09/2021 neg  Final     PCP Screen, Urine   Date Value Ref Range Status   12/09/2021 neg  Final     Propoxyphene Screen, Urine   Date Value Ref Range Status   12/09/2021 neg  Final     THC Screen, Urine   Date Value Ref Range Status   12/09/2021 neg  Final     Tricyclic Antidepressants, Urine   Date Value Ref Range Status   12/09/2021 neg  Final       Last Jalloh Michle: 2/8/22  Medication Contract: 12/16/21   Last Fill: 2/7/22    Requested Prescriptions     Pending Prescriptions Disp Refills    gabapentin (NEURONTIN) 300 MG capsule 60 capsule 0     Sig: Take 1 capsule by mouth 2 times daily for 30 days. Please approve or refuse this medication.    Jenni Hightower MA

## 2022-05-02 DIAGNOSIS — E11.42 TYPE 2 DIABETES MELLITUS WITH DIABETIC POLYNEUROPATHY, WITH LONG-TERM CURRENT USE OF INSULIN (HCC): ICD-10-CM

## 2022-05-02 DIAGNOSIS — Z79.4 TYPE 2 DIABETES MELLITUS WITH DIABETIC POLYNEUROPATHY, WITH LONG-TERM CURRENT USE OF INSULIN (HCC): ICD-10-CM

## 2022-05-02 DIAGNOSIS — E87.6 HYPOKALEMIA: ICD-10-CM

## 2022-05-02 RX ORDER — POTASSIUM CHLORIDE 20 MEQ/1
TABLET, EXTENDED RELEASE ORAL
Qty: 30 TABLET | Refills: 0 | OUTPATIENT
Start: 2022-05-02

## 2022-05-02 RX ORDER — CARVEDILOL 25 MG/1
1 TABLET, FILM COATED ORAL 3 TIMES DAILY
Qty: 420 EACH | Refills: 3 | OUTPATIENT
Start: 2022-05-02

## 2022-05-05 DIAGNOSIS — K21.9 GASTROESOPHAGEAL REFLUX DISEASE, UNSPECIFIED WHETHER ESOPHAGITIS PRESENT: ICD-10-CM

## 2022-05-05 DIAGNOSIS — E11.42 DIABETIC POLYNEUROPATHY ASSOCIATED WITH TYPE 2 DIABETES MELLITUS (HCC): ICD-10-CM

## 2022-05-06 RX ORDER — GABAPENTIN 300 MG/1
300 CAPSULE ORAL 2 TIMES DAILY
Qty: 60 CAPSULE | Refills: 0 | Status: SHIPPED | OUTPATIENT
Start: 2022-05-06 | End: 2022-06-12 | Stop reason: SDUPTHER

## 2022-05-06 RX ORDER — PANTOPRAZOLE SODIUM 40 MG/1
40 TABLET, DELAYED RELEASE ORAL DAILY
Qty: 30 TABLET | Refills: 3 | Status: SHIPPED | OUTPATIENT
Start: 2022-05-06 | End: 2022-09-11 | Stop reason: SDUPTHER

## 2022-05-06 NOTE — TELEPHONE ENCOUNTER
[address] called to request a refill on his medication. Last office visit : 12/9/2021   Next office visit : Visit date not found     Last UDS:   Amphetamine Screen, Urine   Date Value Ref Range Status   12/09/2021 neg  Final     Barbiturate Screen, Urine   Date Value Ref Range Status   12/09/2021 neg  Final     Benzodiazepine Screen, Urine   Date Value Ref Range Status   12/09/2021 neg  Final     Buprenorphine Urine   Date Value Ref Range Status   12/09/2021 neg  Final     Cocaine Metabolite Screen, Urine   Date Value Ref Range Status   12/09/2021 neg  Final     Gabapentin Screen, Urine   Date Value Ref Range Status   12/09/2021 neg  Final     MDMA, Urine   Date Value Ref Range Status   12/09/2021 neg  Final     Methamphetamine, Urine   Date Value Ref Range Status   12/09/2021 neg  Final     Opiate Scrn, Ur   Date Value Ref Range Status   12/09/2021 neg  Final     Oxycodone Screen, Ur   Date Value Ref Range Status   12/09/2021 neg  Final     PCP Screen, Urine   Date Value Ref Range Status   12/09/2021 neg  Final     Propoxyphene Screen, Urine   Date Value Ref Range Status   12/09/2021 neg  Final     THC Screen, Urine   Date Value Ref Range Status   12/09/2021 neg  Final     Tricyclic Antidepressants, Urine   Date Value Ref Range Status   12/09/2021 neg  Final       Last Court : 2/8/22  Medication Contract: 12/16/21   Last Fill: 3/10/22    Requested Prescriptions     Pending Prescriptions Disp Refills    pantoprazole (PROTONIX) 40 MG tablet 30 tablet 3     Sig: Take 1 tablet by mouth daily    gabapentin (NEURONTIN) 300 MG capsule 6 capsule 0     Sig: Take 1 capsule by mouth 2 times daily for 3 days. Please approve or refuse this medication.    Hugo Saab MA

## 2022-05-13 DIAGNOSIS — E87.6 HYPOKALEMIA: ICD-10-CM

## 2022-05-13 RX ORDER — POTASSIUM CHLORIDE 20 MEQ/1
TABLET, EXTENDED RELEASE ORAL
Qty: 30 TABLET | Refills: 0 | OUTPATIENT
Start: 2022-05-13

## 2022-05-16 DIAGNOSIS — E87.6 HYPOKALEMIA: ICD-10-CM

## 2022-05-16 RX ORDER — POTASSIUM CHLORIDE 20 MEQ/1
TABLET, EXTENDED RELEASE ORAL
Qty: 30 TABLET | Refills: 0 | OUTPATIENT
Start: 2022-05-16

## 2022-05-31 ENCOUNTER — TELEPHONE (OUTPATIENT)
Dept: FAMILY MEDICINE CLINIC | Age: 52
End: 2022-05-31

## 2022-05-31 NOTE — TELEPHONE ENCOUNTER
Patient hasn't taken a covid test yet. He cancelled his appointment because he doesn't want to expose people just in case. Patient will reschedule once he's feeling better.

## 2022-06-07 PROBLEM — E44.0 MODERATE MALNUTRITION (HCC): Status: RESOLVED | Noted: 2021-03-05 | Resolved: 2022-06-07

## 2022-06-07 PROBLEM — F11.20 NARCOTIC DEPENDENCY, CONTINUOUS (HCC): Status: RESOLVED | Noted: 2020-02-13 | Resolved: 2022-06-07

## 2022-06-08 ENCOUNTER — OFFICE VISIT (OUTPATIENT)
Dept: FAMILY MEDICINE CLINIC | Age: 52
End: 2022-06-08
Payer: MEDICARE

## 2022-06-08 VITALS
OXYGEN SATURATION: 96 % | SYSTOLIC BLOOD PRESSURE: 132 MMHG | DIASTOLIC BLOOD PRESSURE: 80 MMHG | HEART RATE: 80 BPM | HEIGHT: 73 IN | TEMPERATURE: 97 F | BODY MASS INDEX: 35.52 KG/M2 | WEIGHT: 268 LBS

## 2022-06-08 DIAGNOSIS — I73.9 CLAUDICATION (HCC): ICD-10-CM

## 2022-06-08 DIAGNOSIS — Z09 ENCOUNTER FOR FOLLOW-UP EXAMINATION AFTER COMPLETED TREATMENT FOR CONDITIONS OTHER THAN MALIGNANT NEOPLASM: ICD-10-CM

## 2022-06-08 DIAGNOSIS — R68.82 DECREASED LIBIDO: ICD-10-CM

## 2022-06-08 DIAGNOSIS — Z94.4 HISTORY OF LIVER TRANSPLANT (HCC): ICD-10-CM

## 2022-06-08 DIAGNOSIS — E66.01 SEVERE OBESITY (BMI 35.0-39.9) WITH COMORBIDITY (HCC): ICD-10-CM

## 2022-06-08 DIAGNOSIS — F33.40 MDD (RECURRENT MAJOR DEPRESSIVE DISORDER) IN REMISSION (HCC): ICD-10-CM

## 2022-06-08 DIAGNOSIS — Z79.4 TYPE 2 DIABETES MELLITUS WITH OTHER SKIN COMPLICATION, WITH LONG-TERM CURRENT USE OF INSULIN (HCC): ICD-10-CM

## 2022-06-08 DIAGNOSIS — E11.42 DIABETIC POLYNEUROPATHY ASSOCIATED WITH TYPE 2 DIABETES MELLITUS (HCC): Primary | ICD-10-CM

## 2022-06-08 DIAGNOSIS — E11.628 TYPE 2 DIABETES MELLITUS WITH OTHER SKIN COMPLICATION, WITH LONG-TERM CURRENT USE OF INSULIN (HCC): ICD-10-CM

## 2022-06-08 DIAGNOSIS — L98.492 ABDOMINAL WALL SKIN ULCER, WITH FAT LAYER EXPOSED (HCC): ICD-10-CM

## 2022-06-08 PROCEDURE — 3046F HEMOGLOBIN A1C LEVEL >9.0%: CPT | Performed by: FAMILY MEDICINE

## 2022-06-08 PROCEDURE — 2022F DILAT RTA XM EVC RTNOPTHY: CPT | Performed by: FAMILY MEDICINE

## 2022-06-08 PROCEDURE — 99214 OFFICE O/P EST MOD 30 MIN: CPT | Performed by: FAMILY MEDICINE

## 2022-06-08 PROCEDURE — G8417 CALC BMI ABV UP PARAM F/U: HCPCS | Performed by: FAMILY MEDICINE

## 2022-06-08 PROCEDURE — 3017F COLORECTAL CA SCREEN DOC REV: CPT | Performed by: FAMILY MEDICINE

## 2022-06-08 PROCEDURE — 1036F TOBACCO NON-USER: CPT | Performed by: FAMILY MEDICINE

## 2022-06-08 PROCEDURE — G8427 DOCREV CUR MEDS BY ELIG CLIN: HCPCS | Performed by: FAMILY MEDICINE

## 2022-06-08 ASSESSMENT — ENCOUNTER SYMPTOMS
EYE ITCHING: 0
FACIAL SWELLING: 0
COLOR CHANGE: 0
STRIDOR: 0
EYE DISCHARGE: 0
APNEA: 0
VOMITING: 0
NAUSEA: 0
BACK PAIN: 0

## 2022-06-08 ASSESSMENT — PATIENT HEALTH QUESTIONNAIRE - PHQ9
7. TROUBLE CONCENTRATING ON THINGS, SUCH AS READING THE NEWSPAPER OR WATCHING TELEVISION: 0
SUM OF ALL RESPONSES TO PHQ QUESTIONS 1-9: 0
SUM OF ALL RESPONSES TO PHQ QUESTIONS 1-9: 0
10. IF YOU CHECKED OFF ANY PROBLEMS, HOW DIFFICULT HAVE THESE PROBLEMS MADE IT FOR YOU TO DO YOUR WORK, TAKE CARE OF THINGS AT HOME, OR GET ALONG WITH OTHER PEOPLE: 0
SUM OF ALL RESPONSES TO PHQ9 QUESTIONS 1 & 2: 0
4. FEELING TIRED OR HAVING LITTLE ENERGY: 0
SUM OF ALL RESPONSES TO PHQ QUESTIONS 1-9: 0
3. TROUBLE FALLING OR STAYING ASLEEP: 0
9. THOUGHTS THAT YOU WOULD BE BETTER OFF DEAD, OR OF HURTING YOURSELF: 0
8. MOVING OR SPEAKING SO SLOWLY THAT OTHER PEOPLE COULD HAVE NOTICED. OR THE OPPOSITE, BEING SO FIGETY OR RESTLESS THAT YOU HAVE BEEN MOVING AROUND A LOT MORE THAN USUAL: 0
5. POOR APPETITE OR OVEREATING: 0
2. FEELING DOWN, DEPRESSED OR HOPELESS: 0
SUM OF ALL RESPONSES TO PHQ QUESTIONS 1-9: 0
1. LITTLE INTEREST OR PLEASURE IN DOING THINGS: 0
6. FEELING BAD ABOUT YOURSELF - OR THAT YOU ARE A FAILURE OR HAVE LET YOURSELF OR YOUR FAMILY DOWN: 0

## 2022-06-08 NOTE — PROGRESS NOTES
[facility] PHYSICIAN SERVICES  [facility] FAMILY MEDICINE  [id number] [facility] [address] [address]  Dept: [phone]  Dept Fax: [phone]: [phone]    Subjective:     Robert Wade is a 46 y.o. male who presents today for his medical conditions/complaints as noted below. Robert Wade is c/o of 3 Month Follow-Up, Fatigue, and Other (want to discuss meds & have wound of abd looked at as well. Will send order for supplies today)        HPI:   Presents with multiple concerns, up of type 2 diabetes, neuropathy, and abdominal wall skin ulcer. He had labs done at [facility] with his transplant doctor in April, his hemoglobin A1c was 10.1 at that time. Chart review shows his prednisone was decreased to 10 mg daily, and he was advised to follow-up with myself. Patient states that if his blood sugars are less than 185 he \"feels terrible. \"  His significant other states that he typically does not eat breakfast, usually eats first meal at 2-3 in the afternoon. I had increased his gabapentin, but has also been prescribed by pain management in the last few months, has upcoming appointment there, advised to clarify. He does need refills of his supplies for his abdominal skin ulcer. He would like me to look at it as well. He would like to have his testosterone checked, has decreased libido and some ED. He would like me to take over his transplant medications, specifically the tacrolimus. He states [facility] said this would be fine, and he has lab orders that they typically order as far as monitoring, his tacrolimus level was normal in April. [facility] had also strongly suggested he do colon cancer screening, CT was ordered by them but was not able to be completed there, and DEXA scan since he is on prednisone. He is agreeable to do all, has new Cologuard test.    Diabetes Mellitus  Has been compliant with medications. No side effects of medications since last visit.    No polyuria, polydipsia, or vision changes since last visit. No symptomatic episodes of hypoglycemia. PHQ Scores 2022 2021 2021 2021 2020 2019 6/3/2014   PHQ2 Score 0 0 0 2 6 4 0   PHQ9 Score 0 0 0 2 19 16 0     Interpretation of Total Score Depression Severity: 1-4 = Minimal depression, 5-9 = Mild depression, 10-14 = Moderate depression, 15-19 = Moderately severe depression, 20-27 = Severe depression     No flowsheet data found. Interpretation of PRASHANTH-7 score: 5-9 = mild anxiety, 10-14 = moderate anxiety, 15+ = severe anxiety. Recommend referral to behavioral health for scores 10 or greater.      Past Medical History:   Diagnosis Date    Antral vascular ectasia     Appendicitis 2009    ruptured - no surgery done    [facility]     Diabetes mellitus ([facility]     Duodenal ulcer     Esophageal varices (HCC)     grade 2    Hepatitis C     Hernia     History of GI bleed     Liver disease     Personal history of ascites     Portal hypertensive gastropathy (HCC)     Thrombocytopenia (HCC)      Past Surgical History:   Procedure Laterality Date    HIP SURGERY Left 2021    HIP INCISION AND DRAINAGE performed by Kina Hurst MD at [address] [place] [address] LIVER TRANSPLANT      UPPER GASTROINTESTINAL ENDOSCOPY  2013    Dr.Jaiyeola    UPPER GASTROINTESTINAL ENDOSCOPY  13    Dr Shelley Salazar       Family History   Problem Relation Age of Onset    Breast Cancer Mother 61    Emphysema Father        Social History     Tobacco Use    Smoking status: Former Smoker     Packs/day: 0.50     Years: 25.00     Pack years: 12.50     Start date: 1994     Quit date: 2016     Years since quittin.4    Smokeless tobacco: Never Used   Substance Use Topics    Alcohol use: No     Comment: quit x 8 months ago with one relapse, former ETOH      Current Outpatient Medications   Medication Sig Dispense Refill    pantoprazole (PROTONIX) 40 MG tablet Take 1 tablet by mouth daily 30 tablet 3    NOVOLOG FLEXPEN 100 UNIT/ML injection pen Inject 1-6 Units into the skin 3 times daily (before meals) 5 pen 1    oxyCODONE HCl (OXY-IR) 10 MG immediate release tablet       buPROPion (WELLBUTRIN SR) 100 MG extended release tablet       potassium chloride (KLOR-CON M) 20 MEQ extended release tablet TAKE ONE TABLET BY MOUTH EVERY DAY 30 tablet 0    blood glucose test strips (CONTOUR TEST) strip 1 each by In Vitro route 3 times daily As needed. 420 each 3    Nutritional Supplements (ADULT NUTRITIONAL SUPPLEMENT + PO) Take by mouth sucontral d      Probiotic Product (PROBIOTIC DAILY) CAPS Take by mouth Pre and pro-biotic      Tacrolimus ER 1 MG TB24 3 tab(s) orally once a day      saccharomyces boulardii (FLORASTOR) 250 MG capsule Take 1 capsule by mouth daily 30 capsule 1    Insulin Pen Needle (MEIJER PEN NEEDLES) 31G X 6 MM MISC 1 each by Does not apply route daily 100 each 3    collagenase (SANTYL) 250 UNIT/GM ointment Apply topically daily. Apply nickel thick as directed. Wound size 5.3x2x0. 6 1 Tube 3    sodium hypochlorite (DAKINS) 0.125 % SOLN external solution Apply topically as directed daily. 1 Bottle 1    Bismuth Tribromoph-Petrolatum (XEROFORM PETROLATUM GAUZE 5\"X9\") MISC external pads Apply 2 each topically daily 50 each 0    BASAGLAR KWIKPEN 100 UNIT/ML injection pen Inject 12 Units into the skin nightly 5 pen 1    Lancets MISC Use to test blood glucose four times a day and as needed for hypoglycemic events  DXE11.622 Z79.4 200 each 3    predniSONE (DELTASONE) 10 MG tablet Take 5 mg by mouth 2 times daily      gabapentin (NEURONTIN) 300 MG capsule Take 1 capsule by mouth 2 times daily for 30 days. 60 capsule 0    magnesium oxide (MAG-OX) 400 (240 Mg) MG tablet Take 1 tablet by mouth 2 times daily 60 tablet 0    tacrolimus (PROGRAF) 1 MG capsule Take 3 capsules by mouth 2 times daily 180 capsule 0     No current facility-administered medications for this visit.      No Known Allergies    Review of Systems   Constitutional: Negative for chills and fever. HENT: Negative for facial swelling and mouth sores. Eyes: Negative for discharge and itching. Respiratory: Negative for apnea and stridor. Cardiovascular: Negative for chest pain and palpitations. Gastrointestinal: Negative for nausea and vomiting. Endocrine: Negative for cold intolerance and heat intolerance. Genitourinary: Negative for frequency and urgency. Musculoskeletal: Negative for arthralgias and back pain. Skin: Positive for wound. Negative for color change and rash. See HPI for visit specific review of symptoms. All others negative      Objective:   /80   Pulse 80   Temp 97 °F (36.1 °C)   Ht 6' 1\" (1.854 m)   Wt 268 lb (121.6 kg)   SpO2 96%   BMI 35.36 kg/m²   Physical Exam  Constitutional:       Appearance: He is well-developed. He is obese. HENT:      Head: Normocephalic and atraumatic. Right Ear: External ear normal.      Left Ear: External ear normal.   Eyes:      Conjunctiva/sclera: Conjunctivae normal.      Pupils: Pupils are equal, round, and reactive to light. Cardiovascular:      Rate and Rhythm: Normal rate and regular rhythm. Heart sounds: Normal heart sounds. Pulmonary:      Effort: Pulmonary effort is normal. No respiratory distress. Breath sounds: Normal breath sounds. Abdominal:      General: Bowel sounds are normal. There is no distension. Palpations: Abdomen is soft. Tenderness: There is no abdominal tenderness. Musculoskeletal:         General: Normal range of motion. Cervical back: Normal range of motion and neck supple. Skin:     General: Skin is warm. Capillary Refill: Capillary refill takes less than 2 seconds. Findings: No rash. Neurological:      Mental Status: He is alert and oriented to person, place, and time. Cranial Nerves: No cranial nerve deficit. Psychiatric:         Thought Content:  Thought content normal.           Lab Review   No results found for this or any previous visit (from the past 672 hour(s)). Assessment & Plan: The following diagnoses and conditions are stable with no further orders unless indicated:    Patient Active Problem List    Diagnosis Date Noted    Diabetic polyneuropathy associated with type 2 diabetes mellitus ([address] 12/11/2021     Overview Note:     Uncontrolled, will increase gabapentin from 100 mg 3 times a day to 300 mg twice a day. The current medical regimen is effective. Continue present plan and medications. UDS and controlled substance contract up to date. No unusual filling on current [facility] report. Tx continues to be medically necessary.  Former smoker 08/04/2021    Skin ulcer of left groin with fat layer exposed ([address] 04/19/2021    Hypomagnesemia 03/03/2021    Acute hip pain, left     Septic arthritis of hip ([address] 02/27/2021    Claudication ([address] 02/17/2021    Streptococcal bacteremia 02/09/2021    Abdominal wall skin ulcer, with fat layer exposed ([address] 08/26/2020    Type 2 diabetes mellitus with skin complication, with long-term current use of insulin ([address] 08/17/2020    Class 2 obesity 08/07/2020    Varicose veins of both lower extremities with pain 02/27/2020    History of liver transplant ([address] 12/09/2019    History of esophageal varices 12/09/2019    MDD (recurrent major depressive disorder) in remission ([address] 12/09/2019    Pyloric ulcer 05/02/2013    Duodenal ulcer, acute 05/02/2013    History of GI bleed 05/02/2013    Esophageal ulcer 05/02/2013        Yuki May was seen today for 3 month follow-up, fatigue and other. Diagnoses and all orders for this visit:    Diabetic polyneuropathy associated with type 2 diabetes mellitus ([address]    History of liver transplant ([address]  -     CT ABDOMEN PELVIS W IV CONTRAST Additional Contrast? None; Future  -     DEXA BONE DENSITY 2 SITES;  Future    Abdominal wall skin ulcer, with fat layer exposed ([address]    MDD (recurrent major depressive disorder) in remission ([facility]    [facility]    Type 2 diabetes mellitus with other skin complication, with long-term current use of insulin (HCC)  -     CBC with Auto Differential; Future  -     Comprehensive Metabolic Panel; Future  -     Hemoglobin A1C; Future    Decreased libido  -     Testosterone; Future  -     Follicle Stimulating Hormone; Future  -     Luteinizing Hormone; Future  -     Prolactin; Future    Severe obesity (BMI 35.0-39. 9) with comorbidity ([facility]    Encounter for follow-up examination after completed treatment for conditions other than malignant neoplasm   -     DEXA BONE DENSITY 2 SITES; Future        Health Maintenance   Topic Date Due    COVID-19 Vaccine (1) Never done    Diabetic retinal exam  Never done    Hepatitis B vaccine (1 of 3 - Risk 3-dose series) Never done    Pneumococcal 0-64 years Vaccine (2 - PCV) 10/28/2014    Colorectal Cancer Screen  Never done    Shingles vaccine (1 of 2) Never done    Diabetic foot exam  09/09/2021    Flu vaccine (Season Ended) 12/09/2022 (Originally 9/1/2022)    Annual Wellness Visit (AWV)  08/05/2022    Diabetic microalbuminuria test  12/09/2022    A1C test (Diabetic or Prediabetic)  04/20/2023    Lipids  04/20/2023    Depression Monitoring  06/08/2023    DTaP/Tdap/Td vaccine (3 - Td or Tdap) 04/29/2025    Hepatitis C screen  Completed    HIV screen  Completed    Hepatitis A vaccine  Aged Out    Hib vaccine  Aged Out    Meningococcal (ACWY) vaccine  Aged Out         Return in about 2 months (around 8/8/2022) for T2DM, lab results, office visit, 40 minute appt. Discussed use, benefit, and side effects of prescribed medications. All patient questions answered. Pt voiced understanding. Reviewed health maintenance. Instructed to continue current medications, diet and exercise. Patient agreedwith treatment plan. Follow up as directed. Old records reviewed, where available.     Yessica Christensen MD    Note: dictated using [address] [place]

## 2022-06-09 DIAGNOSIS — L98.492 ABDOMINAL WALL SKIN ULCER, WITH FAT LAYER EXPOSED (HCC): Primary | ICD-10-CM

## 2022-06-09 RX ORDER — BISMUTH TRIBROMOPH/PETROLATUM 5"X9"
1 BANDAGE TOPICAL PRN
Qty: 30 EACH | Refills: 0 | Status: SHIPPED | OUTPATIENT
Start: 2022-06-09

## 2022-06-11 DIAGNOSIS — E11.42 DIABETIC POLYNEUROPATHY ASSOCIATED WITH TYPE 2 DIABETES MELLITUS (HCC): ICD-10-CM

## 2022-06-11 DIAGNOSIS — E87.6 HYPOKALEMIA: ICD-10-CM

## 2022-06-12 DIAGNOSIS — E11.42 TYPE 2 DIABETES MELLITUS WITH DIABETIC POLYNEUROPATHY, WITH LONG-TERM CURRENT USE OF INSULIN (HCC): ICD-10-CM

## 2022-06-12 DIAGNOSIS — E87.6 HYPOKALEMIA: ICD-10-CM

## 2022-06-12 DIAGNOSIS — E11.42 DIABETIC POLYNEUROPATHY ASSOCIATED WITH TYPE 2 DIABETES MELLITUS (HCC): ICD-10-CM

## 2022-06-12 DIAGNOSIS — Z79.4 TYPE 2 DIABETES MELLITUS WITH DIABETIC POLYNEUROPATHY, WITH LONG-TERM CURRENT USE OF INSULIN (HCC): ICD-10-CM

## 2022-06-12 DIAGNOSIS — E11.622 TYPE 2 DIABETES MELLITUS WITH OTHER SKIN ULCER, WITH LONG-TERM CURRENT USE OF INSULIN (HCC): Chronic | ICD-10-CM

## 2022-06-12 DIAGNOSIS — Z79.4 TYPE 2 DIABETES MELLITUS WITH OTHER SKIN ULCER, WITH LONG-TERM CURRENT USE OF INSULIN (HCC): Chronic | ICD-10-CM

## 2022-06-13 ENCOUNTER — TELEPHONE (OUTPATIENT)
Dept: WOUND CARE | Age: 52
End: 2022-06-13

## 2022-06-13 RX ORDER — LANOLIN ALCOHOL/MO/W.PET/CERES
400 CREAM (GRAM) TOPICAL 2 TIMES DAILY
Qty: 60 TABLET | Refills: 0 | Status: SHIPPED | OUTPATIENT
Start: 2022-06-13 | End: 2022-09-11 | Stop reason: SDUPTHER

## 2022-06-13 RX ORDER — CARVEDILOL 25 MG/1
1 TABLET, FILM COATED ORAL 3 TIMES DAILY
Qty: 420 EACH | Refills: 3 | Status: SHIPPED | OUTPATIENT
Start: 2022-06-13 | End: 2022-07-25 | Stop reason: SDUPTHER

## 2022-06-13 RX ORDER — POTASSIUM CHLORIDE 20 MEQ/1
TABLET, EXTENDED RELEASE ORAL
Qty: 30 TABLET | Refills: 1 | Status: SHIPPED | OUTPATIENT
Start: 2022-06-13 | End: 2022-09-11 | Stop reason: SDUPTHER

## 2022-06-13 RX ORDER — GABAPENTIN 300 MG/1
CAPSULE ORAL
Qty: 60 CAPSULE | Refills: 1 | OUTPATIENT
Start: 2022-06-13

## 2022-06-13 RX ORDER — GABAPENTIN 300 MG/1
300 CAPSULE ORAL 2 TIMES DAILY
Qty: 60 CAPSULE | Refills: 1 | Status: SHIPPED | OUTPATIENT
Start: 2022-06-13 | End: 2022-09-11 | Stop reason: SDUPTHER

## 2022-06-13 RX ORDER — POTASSIUM CHLORIDE 20 MEQ/1
TABLET, EXTENDED RELEASE ORAL
Qty: 30 TABLET | Refills: 1 | OUTPATIENT
Start: 2022-06-13

## 2022-06-13 RX ORDER — INSULIN ASPART 100 [IU]/ML
1-6 INJECTION, SOLUTION INTRAVENOUS; SUBCUTANEOUS
Qty: 5 PEN | Refills: 1 | Status: SHIPPED | OUTPATIENT
Start: 2022-06-13 | End: 2022-09-06 | Stop reason: SDUPTHER

## 2022-06-13 NOTE — PROGRESS NOTES
Received call from Ike Kayser from [employer]  Requesting wound measurements from 6/9/22. Informed we do not have wound measurements for that date. Stated form faxed to them on 6/9/22,  Stated she would review data she has to see where the measurements should come form.

## 2022-06-13 NOTE — TELEPHONE ENCOUNTER
Received call from Julien Tay 6019, re: wound measurements for 6/9, informed we do not have measurements for that date. Stated she would review her data to see where measurements should come from.

## 2022-06-13 NOTE — TELEPHONE ENCOUNTER
[address] called to request a refill on his medication. Last office visit : 2022   Next office visit : 8/10/2022     Last UDS:   Amphetamine Screen, Urine   Date Value Ref Range Status   2021 neg  Final     Barbiturate Screen, Urine   Date Value Ref Range Status   2021 neg  Final     Benzodiazepine Screen, Urine   Date Value Ref Range Status   2021 neg  Final     Buprenorphine Urine   Date Value Ref Range Status   2021 neg  Final     Cocaine Metabolite Screen, Urine   Date Value Ref Range Status   2021 neg  Final     Gabapentin Screen, Urine   Date Value Ref Range Status   2021 neg  Final     MDMA, Urine   Date Value Ref Range Status   2021 neg  Final     Methamphetamine, Urine   Date Value Ref Range Status   2021 neg  Final     Opiate Scrn, Ur   Date Value Ref Range Status   2021 neg  Final     Oxycodone Screen, Ur   Date Value Ref Range Status   2021 neg  Final     PCP Screen, Urine   Date Value Ref Range Status   2021 neg  Final     Propoxyphene Screen, Urine   Date Value Ref Range Status   2021 neg  Final     THC Screen, Urine   Date Value Ref Range Status   2021 neg  Final     Tricyclic Antidepressants, Urine   Date Value Ref Range Status   2021 neg  Final       Last Ariel Hernandez: 22  Medication Contract: needs   Last Fill: 22    Requested Prescriptions     Pending Prescriptions Disp Refills    magnesium oxide (MAG-OX) 400 (240 Mg) MG tablet 60 tablet 0     Sig: Take 1 tablet by mouth 2 times daily    blood glucose test strips (CONTOUR TEST) strip 420 each 3     Si each by In Vitro route 3 times daily As needed.  potassium chloride (KLOR-CON M) 20 MEQ extended release tablet 30 tablet 0    gabapentin (NEURONTIN) 300 MG capsule 60 capsule 0     Sig: Take 1 capsule by mouth 2 times daily for 30 days. Please approve or refuse this medication.    Inna Young MA

## 2022-06-13 NOTE — TELEPHONE ENCOUNTER
The current medical regimen is effective. Continue present plan and medications. UDS and controlled substance contract up to date. No unusual filling on current [facility] report. Tx continues to be medically necessary.     Maulik Liu MD

## 2022-06-17 DIAGNOSIS — Z94.4 HISTORY OF LIVER TRANSPLANT (HCC): ICD-10-CM

## 2022-06-17 RX ORDER — PREDNISONE 1 MG/1
TABLET ORAL
Qty: 180 TABLET | Refills: 1 | OUTPATIENT
Start: 2022-06-17

## 2022-07-25 DIAGNOSIS — E11.42 TYPE 2 DIABETES MELLITUS WITH DIABETIC POLYNEUROPATHY, WITH LONG-TERM CURRENT USE OF INSULIN (HCC): ICD-10-CM

## 2022-07-25 DIAGNOSIS — Z79.4 TYPE 2 DIABETES MELLITUS WITH DIABETIC POLYNEUROPATHY, WITH LONG-TERM CURRENT USE OF INSULIN (HCC): ICD-10-CM

## 2022-07-25 RX ORDER — CARVEDILOL 25 MG/1
1 TABLET, FILM COATED ORAL 3 TIMES DAILY
Qty: 420 EACH | Refills: 3 | Status: CANCELLED | OUTPATIENT
Start: 2022-07-25

## 2022-07-25 NOTE — TELEPHONE ENCOUNTER
Leidy Costa called requesting a refill of the below medication which has been pended for you:     Requested Prescriptions     Pending Prescriptions Disp Refills    blood glucose test strips (CONTOUR TEST) strip 420 each 3     Si each by In Vitro route 3 times daily As needed.        Last Appointment Date: 2022  Next Appointment Date: 8/10/2022    No Known Allergies

## 2022-07-29 ENCOUNTER — TELEPHONE (OUTPATIENT)
Dept: FAMILY MEDICINE CLINIC | Age: 52
End: 2022-07-29

## 2022-07-29 NOTE — TELEPHONE ENCOUNTER
Pharmacy called about patient's Envarsus prescription and said they didn't received it. Gave verbal over the phone.

## 2022-08-23 ENCOUNTER — HOSPITAL ENCOUNTER (OUTPATIENT)
Dept: CT IMAGING | Age: 52
Discharge: HOME | End: 2022-08-23
Payer: MEDICARE

## 2022-08-23 ENCOUNTER — OFFICE VISIT (OUTPATIENT)
Dept: FAMILY MEDICINE CLINIC | Age: 52
End: 2022-08-23
Payer: MEDICARE

## 2022-08-23 VITALS
WEIGHT: 277 LBS | DIASTOLIC BLOOD PRESSURE: 74 MMHG | BODY MASS INDEX: 36.71 KG/M2 | RESPIRATION RATE: 18 BRPM | OXYGEN SATURATION: 97 % | HEART RATE: 78 BPM | SYSTOLIC BLOOD PRESSURE: 136 MMHG | HEIGHT: 73 IN | TEMPERATURE: 98.2 F

## 2022-08-23 DIAGNOSIS — E11.628 TYPE 2 DIABETES MELLITUS WITH OTHER SKIN COMPLICATION, WITH LONG-TERM CURRENT USE OF INSULIN (HCC): ICD-10-CM

## 2022-08-23 DIAGNOSIS — Z79.4 TYPE 2 DIABETES MELLITUS WITH OTHER SKIN COMPLICATION, WITH LONG-TERM CURRENT USE OF INSULIN (HCC): ICD-10-CM

## 2022-08-23 DIAGNOSIS — Z00.00 MEDICARE ANNUAL WELLNESS VISIT, SUBSEQUENT: Primary | ICD-10-CM

## 2022-08-23 DIAGNOSIS — R68.82 DECREASED LIBIDO: ICD-10-CM

## 2022-08-23 DIAGNOSIS — Z94.4 LIVER REPLACED BY TRANSPLANT (HCC): ICD-10-CM

## 2022-08-23 DIAGNOSIS — Z13.31 POSITIVE DEPRESSION SCREENING: ICD-10-CM

## 2022-08-23 DIAGNOSIS — R80.9 MICROALBUMINURIA: ICD-10-CM

## 2022-08-23 LAB
ALBUMIN SERPL-MCNC: 2.8 G/DL (ref 3.5–5.2)
ALP BLD-CCNC: 172 U/L (ref 40–130)
ALT SERPL-CCNC: 14 U/L (ref 5–41)
ANION GAP SERPL CALCULATED.3IONS-SCNC: 14 MMOL/L (ref 7–19)
AST SERPL-CCNC: 30 U/L (ref 5–40)
BASOPHILS ABSOLUTE: 0.1 K/UL (ref 0–0.2)
BASOPHILS RELATIVE PERCENT: 0.7 % (ref 0–1)
BILIRUB SERPL-MCNC: <0.2 MG/DL (ref 0.2–1.2)
BUN BLDV-MCNC: 14 MG/DL (ref 6–20)
CALCIUM SERPL-MCNC: 8.6 MG/DL (ref 8.6–10)
CHLORIDE BLD-SCNC: 102 MMOL/L (ref 98–111)
CO2: 21 MMOL/L (ref 22–29)
CREAT SERPL-MCNC: 0.8 MG/DL (ref 0.5–1.2)
CREATININE URINE: 151.5 MG/DL (ref 4.2–622)
EOSINOPHILS ABSOLUTE: 0.1 K/UL (ref 0–0.6)
EOSINOPHILS RELATIVE PERCENT: 1.5 % (ref 0–5)
FOLLICLE STIMULATING HORMONE: 20.4 MIU/ML
GFR AFRICAN AMERICAN: >59
GFR NON-AFRICAN AMERICAN: >60
GLUCOSE BLD-MCNC: 248 MG/DL (ref 74–109)
HBA1C MFR BLD: 8.3 % (ref 4–6)
HCT VFR BLD CALC: 35.9 % (ref 42–52)
HEMOGLOBIN: 11.4 G/DL (ref 14–18)
IMMATURE GRANULOCYTES #: 0.1 K/UL
LUTEINIZING HORMONE: 17 MIU/ML
LYMPHOCYTES ABSOLUTE: 1.4 K/UL (ref 1.1–4.5)
LYMPHOCYTES RELATIVE PERCENT: 15.4 % (ref 20–40)
MCH RBC QN AUTO: 28.9 PG (ref 27–31)
MCHC RBC AUTO-ENTMCNC: 31.8 G/DL (ref 33–37)
MCV RBC AUTO: 91.1 FL (ref 80–94)
MICROALBUMIN UR-MCNC: 8.1 MG/DL (ref 0–19)
MICROALBUMIN/CREAT UR-RTO: 53.5 MG/G
MONOCYTES ABSOLUTE: 0.6 K/UL (ref 0–0.9)
MONOCYTES RELATIVE PERCENT: 6.3 % (ref 0–10)
NEUTROPHILS ABSOLUTE: 6.7 K/UL (ref 1.5–7.5)
NEUTROPHILS RELATIVE PERCENT: 74.6 % (ref 50–65)
PDW BLD-RTO: 13.3 % (ref 11.5–14.5)
PLATELET # BLD: 318 K/UL (ref 130–400)
PMV BLD AUTO: 10 FL (ref 9.4–12.4)
POTASSIUM SERPL-SCNC: 4.2 MMOL/L (ref 3.5–5)
PROLACTIN: 23.4 NG/ML (ref 4.04–15.2)
RBC # BLD: 3.94 M/UL (ref 4.7–6.1)
SODIUM BLD-SCNC: 137 MMOL/L (ref 136–145)
TESTOSTERONE TOTAL: 182.4 NG/DL (ref 193–740)
TOTAL PROTEIN: 6.4 G/DL (ref 6.6–8.7)
WBC # BLD: 9 K/UL (ref 4.8–10.8)

## 2022-08-23 PROCEDURE — 3017F COLORECTAL CA SCREEN DOC REV: CPT | Performed by: FAMILY MEDICINE

## 2022-08-23 PROCEDURE — 74160 CT ABDOMEN W/CONTRAST: CPT

## 2022-08-23 PROCEDURE — 74160 CT ABDOMEN W/CONTRAST: CPT | Performed by: RADIOLOGY

## 2022-08-23 PROCEDURE — 6360000004 HC RX CONTRAST MEDICATION: Performed by: TRANSPLANT SURGERY

## 2022-08-23 PROCEDURE — G0439 PPPS, SUBSEQ VISIT: HCPCS | Performed by: FAMILY MEDICINE

## 2022-08-23 RX ORDER — BUPROPION HYDROCHLORIDE 100 MG/1
100 TABLET, EXTENDED RELEASE ORAL 2 TIMES DAILY
Qty: 60 TABLET | Refills: 2 | Status: SHIPPED | OUTPATIENT
Start: 2022-08-23

## 2022-08-23 RX ADMIN — IOPAMIDOL 75 ML: 755 INJECTION, SOLUTION INTRAVENOUS at 10:05

## 2022-08-23 ASSESSMENT — PATIENT HEALTH QUESTIONNAIRE - PHQ9
10. IF YOU CHECKED OFF ANY PROBLEMS, HOW DIFFICULT HAVE THESE PROBLEMS MADE IT FOR YOU TO DO YOUR WORK, TAKE CARE OF THINGS AT HOME, OR GET ALONG WITH OTHER PEOPLE: 2
8. MOVING OR SPEAKING SO SLOWLY THAT OTHER PEOPLE COULD HAVE NOTICED. OR THE OPPOSITE, BEING SO FIGETY OR RESTLESS THAT YOU HAVE BEEN MOVING AROUND A LOT MORE THAN USUAL: 0
SUM OF ALL RESPONSES TO PHQ QUESTIONS 1-9: 16
SUM OF ALL RESPONSES TO PHQ9 QUESTIONS 1 & 2: 6
SUM OF ALL RESPONSES TO PHQ QUESTIONS 1-9: 16
7. TROUBLE CONCENTRATING ON THINGS, SUCH AS READING THE NEWSPAPER OR WATCHING TELEVISION: 3
SUM OF ALL RESPONSES TO PHQ QUESTIONS 1-9: 16
SUM OF ALL RESPONSES TO PHQ QUESTIONS 1-9: 16
9. THOUGHTS THAT YOU WOULD BE BETTER OFF DEAD, OR OF HURTING YOURSELF: 0
3. TROUBLE FALLING OR STAYING ASLEEP: 1
1. LITTLE INTEREST OR PLEASURE IN DOING THINGS: 3
5. POOR APPETITE OR OVEREATING: 0
4. FEELING TIRED OR HAVING LITTLE ENERGY: 3
2. FEELING DOWN, DEPRESSED OR HOPELESS: 3
6. FEELING BAD ABOUT YOURSELF - OR THAT YOU ARE A FAILURE OR HAVE LET YOURSELF OR YOUR FAMILY DOWN: 3

## 2022-08-23 ASSESSMENT — LIFESTYLE VARIABLES: HOW OFTEN DO YOU HAVE A DRINK CONTAINING ALCOHOL: NEVER

## 2022-08-23 NOTE — PATIENT INSTRUCTIONS
Personalized Preventive Plan for [address] [address]Graciela [place] - 8/23/2022  Medicare offers a range of preventive health benefits. Some of the tests and screenings are paid in full while other may be subject to a deductible, co-insurance, and/or copay. Some of these benefits include a comprehensive review of your medical history including lifestyle, illnesses that may run in your family, and various assessments and screenings as appropriate. After reviewing your medical record and screening and assessments performed today your provider may have ordered immunizations, labs, imaging, and/or referrals for you. A list of these orders (if applicable) as well as your Preventive Care list are included within your After Visit Summary for your review. Other Preventive Recommendations:    A preventive eye exam performed by an eye specialist is recommended every 1-2 years to screen for glaucoma; cataracts, macular degeneration, and other eye disorders. A preventive dental visit is recommended every 6 months. Try to get at least 150 minutes of exercise per week or 10,000 steps per day on a pedometer . Order or download the FREE \"Exercise & Physical Activity: Your Everyday Guide\" from [employer]. Call 1-545.819.6612 or search [employer] online. You need 6504-1395 mg of calcium and 5980-2696 IU of vitamin D per day. It is possible to meet your calcium requirement with diet alone, but a vitamin D supplement is usually necessary to meet this goal.  When exposed to the sun, use a sunscreen that protects against both UVA and UVB radiation with an SPF of 30 or greater. Reapply every 2 to 3 hours or after sweating, drying off with a towel, or swimming. Always wear a seat belt when traveling in a car. Always wear a helmet when riding a bicycle or motorcycle.

## 2022-08-23 NOTE — PROGRESS NOTES
Medicare Annual Wellness Visit    Tisha Mitchell is here for Medicare AWV    Assessment & Plan   Medicare annual wellness visit, subsequent    Recommendations for Preventive Services Due: see orders and patient instructions/AVS.  Recommended screening schedule for the next 5-10 years is provided to the patient in written form: see Patient Instructions/AVS.     No follow-ups on file. Subjective   The following acute and/or chronic problems were also addressed today:  Patient comes in today after CT scan and c/o neuropathy and not able to take medication this morning before CT scan    Patient's complete Health Risk Assessment and screening values have been reviewed and are found in Flowsheets. The following problems were reviewed today and where indicated follow up appointments were made and/or referrals ordered.     Positive Risk Factor Screenings with Interventions:    Fall Risk:  Do you feel unsteady or are you worried about falling? : (!) yes (due to neuropathy)  2 or more falls in past year?: (!) yes  Fall with injury in past year?: (!) yes (fell in bathroom hurt ribs)   Fall Risk Interventions:    Home safety tips provided  Home exercises provided to promote strength and balance     Depression:  PHQ-2 Score: 6  PHQ-9 Total Score: 16    Severity:1-4 = minimal depression, 5-9 = mild depression, 10-14 = moderate depression, 15-19 = moderately severe depression, 20-27 = severe depression  Depression Interventions:  LPN INTERVENTION GUIDE: SCORE 15 OR ABOVE = MODERATE TO SEVERE DEPRESSION: PCP CONSULTED DURING VISIT  Regular exercise recommended- 3-5 times per week, 30-45 minutes per session    Tobacco Use:  Tobacco Use: Medium Risk    Smoking Tobacco Use: Former    Smokeless Tobacco Use: Never     E-cigarette/Vaping       Questions Responses    E-cigarette/Vaping Use Current Every Day User    Start Date     Passive Exposure No    Quit Date     Counseling Given Yes    Comments           Substance Use - Tobacco Interventions:  patient is not ready to work toward tobacco cessation at this time         [address] [facility] [address] and ACP:  General  In general, how would you say your health is?: (!) Poor  In the past 7 days, have you experienced any of the following: New or Increased Pain, New or Increased Fatigue, Loneliness, Social Isolation, Stress or Anger?: (!) Yes  Select all that apply: (!) New or Increased Pain (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)  Do you get the social and emotional support that you need?: Yes  Do you have a Living Will?: (!) No    Advance Directives       Power of [facility] Will ACP-Advance Directive ACP-Power of     Not on File Filed on 08/25/17 Filed Not on File          General Health Risk Interventions:  Pain issues: home exercises provided    Health Habits/Nutrition:  Physical Activity: Inactive    Days of Exercise per Week: 0 days    Minutes of Exercise per Session: 0 min     Have you lost any weight without trying in the past 3 months?: No  Body mass index: (!) 36.54  Have you seen the dentist within the past year?: Appointment is scheduled  Health Habits/Nutrition Interventions:  Inadequate physical activity:  educational materials provided to promote increased physical activity      ADLs:  In the past 7 days, did you need help from others to perform any of the following everyday activities: Eating, dressing, grooming, bathing, toileting, or walking/balance?: No  In the past 7 days, did you need help from others to take care of any of the following: Laundry, housekeeping, banking/finances, shopping, telephone use, food preparation, transportation, or taking medications?: (!) Yes  Select all that apply: [employer], Housekeeping, Food Preparation  ADL Interventions:  Patient declines any further evaluation/treatment for this issue          Objective   Vitals:    08/23/22 1016   BP: 136/74   Pulse: 78   Resp: 18   Temp: 98.2 °F (36.8 °C)   TempSrc: Temporal   SpO2: 97% Weight: 277 lb (125.6 kg)   Height: 6' 1\" (1.854 m)      Body mass index is 36.55 kg/m². No Known Allergies  Prior to Visit Medications    Medication Sig Taking? Authorizing Provider   Tacrolimus ER (ENVARSUS XR) 1 MG TB24 Take 3 mg by mouth daily Yes Isiah Broderick MD   blood glucose test strips (CONTOUR TEST) strip 1 each by In Vitro route 3 times daily As needed. Yes Isiah Broderick MD   predniSONE (DELTASONE) 5 MG tablet Take 1 tablet by mouth 2 times daily Yes Isiah Broderick MD   NOVOLOG FLEXPEN 100 UNIT/ML injection pen Inject 1-6 Units into the skin 3 times daily (before meals) Yes Isiah Broderick MD   magnesium oxide (MAG-OX) 400 (240 Mg) MG tablet Take 1 tablet by mouth 2 times daily Yes Isiah Broderick MD   potassium chloride (KLOR-CON M) 20 MEQ extended release tablet Susannah Primes Yes Isiah Broderick MD   gabapentin (NEURONTIN) 300 MG capsule Take 1 capsule by mouth 2 times daily for 60 days.  Yes Isiah Broderick MD   Bismuth Tribromoph-Petrolatum (XEROFORM PETROLATUM GAUZE 5\"X9\") MISC external pads Apply 1 each topically as needed (acute wound) Yes Isiah Broderick MD   Adhesive Tape (CLOTH ADHESIVE SURG 2\"X10YD) TAPE Use to apply wound care as needed Yes Isiah Broderick MD   Elastic Bandages & Supports (CLEVER CHOICE COMFORT GLOVES) MISC Use daily when changing bandages Yes Isiah Broderick MD   pantoprazole (PROTONIX) 40 MG tablet Take 1 tablet by mouth daily Yes Isiah Broderick MD   oxyCODONE HCl (OXY-IR) 10 MG immediate release tablet  Yes Ruthie Faith MD   Nutritional Supplements (ADULT NUTRITIONAL SUPPLEMENT + PO) Take by mouth sucontral d Yes Historical Provider, MD   Probiotic Product (PROBIOTIC DAILY) CAPS Take by mouth Pre and pro-biotic Yes Historical Provider, MD   saccharomyces boulardii (FLORASTOR) 250 MG capsule Take 1 capsule by mouth daily Yes Isiah Broderick MD   Insulin Pen Needle (MEIJER PEN NEEDLES) 31G X 6 MM MISC 1 each by Does not apply route daily Yes Nehal Ayala MD   collagenase (SANTYL) 250 UNIT/GM ointment Apply topically daily. Apply nickel thick as directed. Wound size 5.3x2x0. 6 Yes DINORAH Cuello - CNP   sodium hypochlorite (DAKINS) 0.125 % SOLN external solution Apply topically as directed daily. Yes DINORAH Cuello - CNP   Bismuth Tribromoph-Petrolatum (XEROFORM PETROLATUM GAUZE 5\"X9\") MISC external pads Apply 2 each topically daily Yes DINORAH Hart NP   Paxton Ades 100 UNIT/ML injection pen Inject 12 Units into the skin nightly Yes DINORAH Hart NP   Lancets MISC Use to test blood glucose four times a day and as needed for hypoglycemic events  DXE11.622 Z79.4 Yes DINORAH Hart NP   buPROPion [facility] SR) 100 MG extended release tablet Take 1 tablet by mouth 2 times daily  Patient not taking: Reported on 8/23/2022  Nehal Ayala MD       [facility] (Including outside providers/suppliers regularly involved in providing care):   Patient Care Team:  Nehal Ayala MD as PCP - General (Family Medicine)  Nehal Ayala MD as PCP - [facility] Provider  Mook Dick DO as Consulting Physician (Vascular Surgery)     Reviewed and updated this visit:  Allergies  Meds             I, Lian Ballard LPN, 0/89/0315, performed the documented evaluation under the direct supervision of the attending physician.

## 2022-08-23 NOTE — TELEPHONE ENCOUNTER
Anders spencer called requesting a refill of the below medication which has been pended for you:     Requested Prescriptions     Pending Prescriptions Disp Refills    buPROPion [facility] SR) 100 MG extended release tablet 60 tablet        Last Appointment Date: 6/8/2022  Next Appointment Date: 8/23/2022    No Known Allergies

## 2022-08-23 NOTE — PROGRESS NOTES
I have reviewed the documentation and agree with the plan. PHQ-9 score today: (PHQ-9 Total Score: 16), additional evaluation and assessment performed, follow-up plan includes but not limited to: Medication management and Referral to /Specialist  for evaluation and management. Wellbutrin?     Brain HalMD harjeet

## 2022-09-06 DIAGNOSIS — Z94.4 HISTORY OF LIVER TRANSPLANT (HCC): ICD-10-CM

## 2022-09-06 DIAGNOSIS — Z79.4 TYPE 2 DIABETES MELLITUS WITH OTHER SKIN ULCER, WITH LONG-TERM CURRENT USE OF INSULIN (HCC): Chronic | ICD-10-CM

## 2022-09-06 DIAGNOSIS — E11.622 TYPE 2 DIABETES MELLITUS WITH OTHER SKIN ULCER, WITH LONG-TERM CURRENT USE OF INSULIN (HCC): Chronic | ICD-10-CM

## 2022-09-06 RX ORDER — TACROLIMUS 1 MG/1
3 TABLET, EXTENDED RELEASE ORAL DAILY
Qty: 90 TABLET | Refills: 2 | Status: SHIPPED | OUTPATIENT
Start: 2022-09-06

## 2022-09-06 RX ORDER — INSULIN ASPART 100 [IU]/ML
1-6 INJECTION, SOLUTION INTRAVENOUS; SUBCUTANEOUS
Qty: 5 ADJUSTABLE DOSE PRE-FILLED PEN SYRINGE | Refills: 2 | Status: SHIPPED | OUTPATIENT
Start: 2022-09-06

## 2022-09-06 NOTE — TELEPHONE ENCOUNTER
Lee Kaba called requesting a refill of the below medication which has been pended for you:     Requested Prescriptions     Pending Prescriptions Disp Refills    Tacrolimus ER (ENVARSUS XR) 1 MG TB24 90 tablet 2     Sig: Take 3 mg by mouth daily    NOVOLOG FLEXPEN 100 UNIT/ML injection pen       Sig: Inject 1-6 Units into the skin 3 times daily (before meals)       Last Appointment Date: 8/23/2022  Next Appointment Date: 9/28/2022    No Known Allergies

## 2022-09-12 DIAGNOSIS — Z94.4 HISTORY OF LIVER TRANSPLANT (HCC): ICD-10-CM

## 2022-09-12 DIAGNOSIS — K21.9 GASTROESOPHAGEAL REFLUX DISEASE, UNSPECIFIED WHETHER ESOPHAGITIS PRESENT: ICD-10-CM

## 2022-09-12 DIAGNOSIS — E87.6 HYPOKALEMIA: ICD-10-CM

## 2022-09-12 DIAGNOSIS — E11.42 DIABETIC POLYNEUROPATHY ASSOCIATED WITH TYPE 2 DIABETES MELLITUS (HCC): ICD-10-CM

## 2022-09-12 RX ORDER — GABAPENTIN 300 MG/1
300 CAPSULE ORAL 2 TIMES DAILY
Qty: 60 CAPSULE | Refills: 0 | Status: SHIPPED | OUTPATIENT
Start: 2022-09-12 | End: 2022-10-12

## 2022-09-12 RX ORDER — PREDNISONE 1 MG/1
5 TABLET ORAL 2 TIMES DAILY
Qty: 180 TABLET | Refills: 0 | Status: SHIPPED | OUTPATIENT
Start: 2022-09-12

## 2022-09-12 RX ORDER — POTASSIUM CHLORIDE 20 MEQ/1
TABLET, EXTENDED RELEASE ORAL
Qty: 30 TABLET | Refills: 1 | Status: SHIPPED | OUTPATIENT
Start: 2022-09-12

## 2022-09-12 RX ORDER — POTASSIUM CHLORIDE 20 MEQ/1
TABLET, EXTENDED RELEASE ORAL
Qty: 30 TABLET | Refills: 0 | OUTPATIENT
Start: 2022-09-12

## 2022-09-12 RX ORDER — LANOLIN ALCOHOL/MO/W.PET/CERES
400 CREAM (GRAM) TOPICAL 2 TIMES DAILY
Qty: 60 TABLET | Refills: 0 | Status: SHIPPED | OUTPATIENT
Start: 2022-09-12 | End: 2022-10-17

## 2022-09-12 RX ORDER — PREDNISONE 1 MG/1
TABLET ORAL
Qty: 180 TABLET | Refills: 0 | OUTPATIENT
Start: 2022-09-12

## 2022-09-12 RX ORDER — PANTOPRAZOLE SODIUM 40 MG/1
40 TABLET, DELAYED RELEASE ORAL DAILY
Qty: 30 TABLET | Refills: 3 | Status: SHIPPED | OUTPATIENT
Start: 2022-09-12

## 2022-09-12 NOTE — TELEPHONE ENCOUNTER
The current medical regimen is effective. Continue present plan and medications. UDS and controlled substance contract up to date. No unusual filling on current QUINTIN report. Tx continues to be medically necessary.     Ari Hauser MD

## 2022-09-12 NOTE — TELEPHONE ENCOUNTER
Tony Calix called requesting a refill of the below medication which has been pended for you:     Requested Prescriptions     Pending Prescriptions Disp Refills    potassium chloride (KLOR-CON M) 20 MEQ extended release tablet [Pharmacy Med Name: POTASSIUM CL ER 20 MEQ TAB*JOSSY] 30 tablet 0     Sig: TAKE ONE TABLET BY MOUTH EVERY DAY    predniSONE (DELTASONE) 5 MG tablet [Pharmacy Med Name: PREDNISONE 5 MG TABLET] 180 tablet 0     Sig: TAKE ONE TABLET BY MOUTH 2 TIMES A DAY       Last Appointment Date: 8/23/2022  Next Appointment Date: 9/28/2022    No Known Allergies

## 2022-09-12 NOTE — TELEPHONE ENCOUNTER
[address] called to request a refill on his medication. Last office visit : 8/23/2022   Next office visit : 9/28/2022     Last UDS:   Amphetamine Screen, Urine   Date Value Ref Range Status   12/09/2021 neg  Final     Barbiturate Screen, Urine   Date Value Ref Range Status   12/09/2021 neg  Final     Benzodiazepine Screen, Urine   Date Value Ref Range Status   12/09/2021 neg  Final     Buprenorphine Urine   Date Value Ref Range Status   12/09/2021 neg  Final     Cocaine Metabolite Screen, Urine   Date Value Ref Range Status   12/09/2021 neg  Final     Gabapentin Screen, Urine   Date Value Ref Range Status   12/09/2021 neg  Final     MDMA, Urine   Date Value Ref Range Status   12/09/2021 neg  Final     Methamphetamine, Urine   Date Value Ref Range Status   12/09/2021 neg  Final     Opiate Scrn, Ur   Date Value Ref Range Status   12/09/2021 neg  Final     Oxycodone Screen, Ur   Date Value Ref Range Status   12/09/2021 neg  Final     PCP Screen, Urine   Date Value Ref Range Status   12/09/2021 neg  Final     Propoxyphene Screen, Urine   Date Value Ref Range Status   12/09/2021 neg  Final     THC Screen, Urine   Date Value Ref Range Status   12/09/2021 neg  Final     Tricyclic Antidepressants, Urine   Date Value Ref Range Status   12/09/2021 neg  Final       Last Dion Atkinsonan: 9/12/22  Medication Contract: 6/13/22   Last Fill: 7/13/22    Requested Prescriptions     Pending Prescriptions Disp Refills    pantoprazole (PROTONIX) 40 MG tablet 30 tablet 3     Sig: Take 1 tablet by mouth daily    magnesium oxide (MAG-OX) 400 (240 Mg) MG tablet 60 tablet 0     Sig: Take 1 tablet by mouth 2 times daily    potassium chloride (KLOR-CON M) 20 MEQ extended release tablet 30 tablet 1     Sig: TAKE ONE TABLET BY MOUTH EVERY DAY    gabapentin (NEURONTIN) 300 MG capsule 60 capsule 1     Sig: Take 1 capsule by mouth 2 times daily for 60 days.     predniSONE (DELTASONE) 5 MG tablet 180 tablet 0     Sig: Take 1 tablet by mouth 2 times daily         Please approve or refuse this medication.    Nelsy Elaine MA

## 2022-10-14 DIAGNOSIS — E87.6 HYPOKALEMIA: ICD-10-CM

## 2022-10-17 RX ORDER — LANOLIN ALCOHOL/MO/W.PET/CERES
CREAM (GRAM) TOPICAL
Qty: 60 TABLET | Refills: 11 | Status: SHIPPED | OUTPATIENT
Start: 2022-10-17

## 2022-10-17 NOTE — TELEPHONE ENCOUNTER
[address] called to request a refill on his medication.       Last office visit : 8/23/2022   Next office visit : 12/6/2022     Requested Prescriptions     Pending Prescriptions Disp Refills    magnesium oxide (MAG-OX) 400 (240 Mg) MG tablet [Pharmacy Med Name: MAGNESIUM OXIDE 400 MG TABLET] 60 tablet 11     Sig: TAKE ONE TABLET BY MOUTH 2 TIMES A DAY            Fredo, [address] [place] [address]

## 2022-12-02 ENCOUNTER — TELEPHONE (OUTPATIENT)
Dept: FAMILY MEDICINE CLINIC | Age: 52
End: 2022-12-02

## 2022-12-03 DIAGNOSIS — Z94.4 HISTORY OF LIVER TRANSPLANT (HCC): ICD-10-CM

## 2022-12-03 DIAGNOSIS — E87.6 HYPOKALEMIA: ICD-10-CM

## 2022-12-05 RX ORDER — POTASSIUM CHLORIDE 20 MEQ/1
TABLET, EXTENDED RELEASE ORAL
Qty: 30 TABLET | Refills: 0 | Status: SHIPPED | OUTPATIENT
Start: 2022-12-05

## 2022-12-05 RX ORDER — PREDNISONE 1 MG/1
TABLET ORAL
Qty: 180 TABLET | Refills: 0 | Status: SHIPPED | OUTPATIENT
Start: 2022-12-05

## 2022-12-05 NOTE — TELEPHONE ENCOUNTER
[address] called to request a refill on his medication.       Last office visit : 8/23/2022   Next office visit : Visit date not found     Requested Prescriptions     Pending Prescriptions Disp Refills    predniSONE (DELTASONE) 5 MG tablet [Pharmacy Med Name: PREDNISONE 5 MG TABLET] 180 tablet 11     Sig: TAKE ONE TABLET BY MOUTH 2 TIMES A DAY    potassium chloride (KLOR-CON M) 20 MEQ extended release tablet [Pharmacy Med Name: POTASSIUM CL ER 20 MEQ TAB*JOSSY] 30 tablet 11     Sig: TAKE ONE TABLET BY MOUTH EVERY DAY            [address] Matos MA

## 2022-12-14 ENCOUNTER — TELEPHONE (OUTPATIENT)
Dept: FAMILY MEDICINE CLINIC | Age: 52
End: 2022-12-14

## 2022-12-14 NOTE — TELEPHONE ENCOUNTER
Tried to call patient to let him know is appointment has been canceled for 12/15/22 bd will need to call our office to r/s

## 2023-02-06 DIAGNOSIS — K21.9 GASTROESOPHAGEAL REFLUX DISEASE, UNSPECIFIED WHETHER ESOPHAGITIS PRESENT: ICD-10-CM

## 2023-02-06 DIAGNOSIS — E87.6 HYPOKALEMIA: ICD-10-CM

## 2023-02-06 RX ORDER — POTASSIUM CHLORIDE 20 MEQ/1
TABLET, EXTENDED RELEASE ORAL
Qty: 30 TABLET | Refills: 11 | Status: SHIPPED | OUTPATIENT
Start: 2023-02-06

## 2023-02-06 RX ORDER — PANTOPRAZOLE SODIUM 40 MG/1
TABLET, DELAYED RELEASE ORAL
Qty: 30 TABLET | Refills: 11 | Status: SHIPPED | OUTPATIENT
Start: 2023-02-06

## 2023-02-15 DIAGNOSIS — Z79.4 TYPE 2 DIABETES MELLITUS WITH OTHER SKIN COMPLICATION, WITH LONG-TERM CURRENT USE OF INSULIN (HCC): ICD-10-CM

## 2023-02-15 DIAGNOSIS — Z51.81 MEDICATION MONITORING ENCOUNTER: ICD-10-CM

## 2023-02-15 DIAGNOSIS — E11.628 TYPE 2 DIABETES MELLITUS WITH OTHER SKIN COMPLICATION, WITH LONG-TERM CURRENT USE OF INSULIN (HCC): ICD-10-CM

## 2023-02-15 DIAGNOSIS — R79.89 ELEVATED PROLACTIN LEVEL: ICD-10-CM

## 2023-02-15 LAB
ALBUMIN SERPL-MCNC: 3 G/DL (ref 3.5–5.2)
ALP BLD-CCNC: 278 U/L (ref 40–130)
ALT SERPL-CCNC: 51 U/L (ref 5–41)
ANION GAP SERPL CALCULATED.3IONS-SCNC: 16 MMOL/L (ref 7–19)
AST SERPL-CCNC: 170 U/L (ref 5–40)
BASOPHILS ABSOLUTE: 0.1 K/UL (ref 0–0.2)
BASOPHILS RELATIVE PERCENT: 0.8 % (ref 0–1)
BILIRUB SERPL-MCNC: <0.2 MG/DL (ref 0.2–1.2)
BUN BLDV-MCNC: 15 MG/DL (ref 6–20)
CALCIUM SERPL-MCNC: 8.4 MG/DL (ref 8.6–10)
CHLORIDE BLD-SCNC: 100 MMOL/L (ref 98–111)
CO2: 22 MMOL/L (ref 22–29)
CREAT SERPL-MCNC: 0.8 MG/DL (ref 0.5–1.2)
EOSINOPHILS ABSOLUTE: 0.1 K/UL (ref 0–0.6)
EOSINOPHILS RELATIVE PERCENT: 0.8 % (ref 0–5)
GAMMA GLUTAMYL TRANSFERASE: 600 U/L (ref 7–54)
GFR SERPL CREATININE-BSD FRML MDRD: >60 ML/MIN/{1.73_M2}
GLUCOSE BLD-MCNC: 147 MG/DL (ref 74–109)
HBA1C MFR BLD: 6.8 % (ref 4–6)
HCT VFR BLD CALC: 37.9 % (ref 42–52)
HEMOGLOBIN: 12.1 G/DL (ref 14–18)
IMMATURE GRANULOCYTES #: 0.1 K/UL
LYMPHOCYTES ABSOLUTE: 1.7 K/UL (ref 1.1–4.5)
LYMPHOCYTES RELATIVE PERCENT: 19.7 % (ref 20–40)
MAGNESIUM: 1.5 MG/DL (ref 1.6–2.6)
MCH RBC QN AUTO: 28.6 PG (ref 27–31)
MCHC RBC AUTO-ENTMCNC: 31.9 G/DL (ref 33–37)
MCV RBC AUTO: 89.6 FL (ref 80–94)
MONOCYTES ABSOLUTE: 0.6 K/UL (ref 0–0.9)
MONOCYTES RELATIVE PERCENT: 6.9 % (ref 0–10)
NEUTROPHILS ABSOLUTE: 6 K/UL (ref 1.5–7.5)
NEUTROPHILS RELATIVE PERCENT: 71 % (ref 50–65)
PDW BLD-RTO: 16.6 % (ref 11.5–14.5)
PLATELET # BLD: 329 K/UL (ref 130–400)
PMV BLD AUTO: 10.4 FL (ref 9.4–12.4)
POTASSIUM SERPL-SCNC: 4.4 MMOL/L (ref 3.5–5)
PROLACTIN: 26.14 NG/ML (ref 4.04–15.2)
RBC # BLD: 4.23 M/UL (ref 4.7–6.1)
SODIUM BLD-SCNC: 138 MMOL/L (ref 136–145)
TOTAL PROTEIN: 6.6 G/DL (ref 6.6–8.7)
WBC # BLD: 8.4 K/UL (ref 4.8–10.8)

## 2023-02-22 DIAGNOSIS — Z87.19 HISTORY OF GI BLEED: ICD-10-CM

## 2023-02-22 DIAGNOSIS — L98.492 ABDOMINAL WALL SKIN ULCER, WITH FAT LAYER EXPOSED (HCC): ICD-10-CM

## 2023-02-22 DIAGNOSIS — K21.9 GASTROESOPHAGEAL REFLUX DISEASE, UNSPECIFIED WHETHER ESOPHAGITIS PRESENT: ICD-10-CM

## 2023-02-27 RX ORDER — PANTOPRAZOLE SODIUM 40 MG/1
40 TABLET, DELAYED RELEASE ORAL DAILY
Qty: 30 TABLET | Refills: 5 | Status: SHIPPED | OUTPATIENT
Start: 2023-02-27

## 2023-02-27 RX ORDER — SACCHAROMYCES BOULARDII 250 MG
250 CAPSULE ORAL DAILY
Qty: 30 CAPSULE | Refills: 1 | Status: SHIPPED | OUTPATIENT
Start: 2023-02-27

## 2023-03-08 NOTE — PLAN OF CARE
Problem: Wound:  Goal: Will show signs of wound healing; wound closure and no evidence of infection  Description: Will show signs of wound healing; wound closure and no evidence of infection  Outcome: Ongoing     Problem: Weight control:  Goal: Ability to maintain an optimal weight for height and age will be supported  Description: Ability to maintain an optimal weight for height and age will be supported  Outcome: Ongoing     Problem: Blood Glucose:  Goal: Ability to maintain appropriate glucose levels will improve  Description: Ability to maintain appropriate glucose levels will improve  Outcome: Ongoing dr mir

## 2023-03-22 DIAGNOSIS — Z79.4 TYPE 2 DIABETES MELLITUS WITH OTHER SKIN COMPLICATION, WITH LONG-TERM CURRENT USE OF INSULIN (HCC): ICD-10-CM

## 2023-03-22 DIAGNOSIS — Z51.81 MEDICATION MONITORING ENCOUNTER: ICD-10-CM

## 2023-03-22 DIAGNOSIS — E11.628 TYPE 2 DIABETES MELLITUS WITH OTHER SKIN COMPLICATION, WITH LONG-TERM CURRENT USE OF INSULIN (HCC): ICD-10-CM

## 2023-03-22 LAB
ALBUMIN SERPL-MCNC: 2.9 G/DL (ref 3.5–5.2)
ALP SERPL-CCNC: 186 U/L (ref 40–130)
ALT SERPL-CCNC: 11 U/L (ref 5–41)
ANION GAP SERPL CALCULATED.3IONS-SCNC: 12 MMOL/L (ref 7–19)
AST SERPL-CCNC: 30 U/L (ref 5–40)
BASOPHILS # BLD: 0.2 K/UL (ref 0–0.2)
BASOPHILS NFR BLD: 1 % (ref 0–1)
BILIRUB SERPL-MCNC: 0.4 MG/DL (ref 0.2–1.2)
BUN SERPL-MCNC: 12 MG/DL (ref 6–20)
CALCIUM SERPL-MCNC: 9.1 MG/DL (ref 8.6–10)
CHLORIDE SERPL-SCNC: 100 MMOL/L (ref 98–111)
CO2 SERPL-SCNC: 24 MMOL/L (ref 22–29)
CREAT SERPL-MCNC: 0.9 MG/DL (ref 0.5–1.2)
EOSINOPHIL # BLD: 0.3 K/UL (ref 0–0.6)
EOSINOPHIL NFR BLD: 1.9 % (ref 0–5)
ERYTHROCYTE [DISTWIDTH] IN BLOOD BY AUTOMATED COUNT: 15.8 % (ref 11.5–14.5)
GAMMA GLUTAMYL TRANSFERASE: 282 U/L (ref 7–54)
GLUCOSE SERPL-MCNC: 132 MG/DL (ref 74–109)
HBA1C MFR BLD: 6.2 % (ref 4–6)
HCT VFR BLD AUTO: 40.6 % (ref 42–52)
HGB BLD-MCNC: 12.7 G/DL (ref 14–18)
IMM GRANULOCYTES # BLD: 0.1 K/UL
LYMPHOCYTES # BLD: 2.1 K/UL (ref 1.1–4.5)
LYMPHOCYTES NFR BLD: 12.7 % (ref 20–40)
MAGNESIUM SERPL-MCNC: 1.5 MG/DL (ref 1.6–2.6)
MCH RBC QN AUTO: 28.5 PG (ref 27–31)
MCHC RBC AUTO-ENTMCNC: 31.3 G/DL (ref 33–37)
MCV RBC AUTO: 91 FL (ref 80–94)
MONOCYTES # BLD: 0.9 K/UL (ref 0–0.9)
MONOCYTES NFR BLD: 5.5 % (ref 0–10)
NEUTROPHILS # BLD: 12.6 K/UL (ref 1.5–7.5)
NEUTS SEG NFR BLD: 78 % (ref 50–65)
PLATELET # BLD AUTO: 445 K/UL (ref 130–400)
PMV BLD AUTO: 10.9 FL (ref 9.4–12.4)
POTASSIUM SERPL-SCNC: 4.1 MMOL/L (ref 3.5–5)
PROT SERPL-MCNC: 6.7 G/DL (ref 6.6–8.7)
RBC # BLD AUTO: 4.46 M/UL (ref 4.7–6.1)
SODIUM SERPL-SCNC: 136 MMOL/L (ref 136–145)
WBC # BLD AUTO: 16.1 K/UL (ref 4.8–10.8)

## 2023-03-23 ENCOUNTER — OFFICE VISIT (OUTPATIENT)
Dept: PRIMARY CARE CLINIC | Age: 53
End: 2023-03-23

## 2023-03-23 VITALS
WEIGHT: 277 LBS | OXYGEN SATURATION: 98 % | DIASTOLIC BLOOD PRESSURE: 60 MMHG | SYSTOLIC BLOOD PRESSURE: 100 MMHG | HEART RATE: 109 BPM | BODY MASS INDEX: 36.55 KG/M2

## 2023-03-23 DIAGNOSIS — E11.628 TYPE 2 DIABETES MELLITUS WITH OTHER SKIN COMPLICATION, WITH LONG-TERM CURRENT USE OF INSULIN (HCC): ICD-10-CM

## 2023-03-23 DIAGNOSIS — R00.0 TACHYCARDIA: ICD-10-CM

## 2023-03-23 DIAGNOSIS — L98.492 ABDOMINAL WALL SKIN ULCER, WITH FAT LAYER EXPOSED (HCC): ICD-10-CM

## 2023-03-23 DIAGNOSIS — F33.40 MDD (RECURRENT MAJOR DEPRESSIVE DISORDER) IN REMISSION (HCC): ICD-10-CM

## 2023-03-23 DIAGNOSIS — I73.9 CLAUDICATION (HCC): ICD-10-CM

## 2023-03-23 DIAGNOSIS — E11.42 DIABETIC POLYNEUROPATHY ASSOCIATED WITH TYPE 2 DIABETES MELLITUS (HCC): Primary | ICD-10-CM

## 2023-03-23 DIAGNOSIS — N52.9 ERECTILE DYSFUNCTION, UNSPECIFIED ERECTILE DYSFUNCTION TYPE: ICD-10-CM

## 2023-03-23 DIAGNOSIS — Z79.4 TYPE 2 DIABETES MELLITUS WITH OTHER SKIN COMPLICATION, WITH LONG-TERM CURRENT USE OF INSULIN (HCC): ICD-10-CM

## 2023-03-23 DIAGNOSIS — Z94.4 HISTORY OF LIVER TRANSPLANT (HCC): ICD-10-CM

## 2023-03-23 NOTE — PROGRESS NOTES
symptoms. All others negative      Objective:   /60 (Site: Right Upper Arm, Position: Supine)   Pulse (!) 109   Wt 277 lb (125.6 kg)   SpO2 98%   BMI 36.55 kg/m²   Physical Exam  Constitutional:       General: He is in acute distress (laying in bed, appears uncomfortable and slightly increased WOB). Appearance: He is well-developed. HENT:      Head: Normocephalic and atraumatic. Right Ear: External ear normal.      Left Ear: External ear normal.   Eyes:      Conjunctiva/sclera: Conjunctivae normal.      Pupils: Pupils are equal, round, and reactive to light. Cardiovascular:      Rate and Rhythm: Normal rate and regular rhythm. Heart sounds: Normal heart sounds. Pulmonary:      Effort: Pulmonary effort is normal. No respiratory distress. Breath sounds: Normal breath sounds. Abdominal:      General: Bowel sounds are normal. There is no distension. Palpations: Abdomen is soft. Tenderness: There is no abdominal tenderness. Musculoskeletal:         General: Normal range of motion. Cervical back: Normal range of motion and neck supple. Skin:     General: Skin is warm. Capillary Refill: Capillary refill takes less than 2 seconds. Findings: No rash. Neurological:      Mental Status: He is alert and oriented to person, place, and time. Cranial Nerves: No cranial nerve deficit. Psychiatric:         Thought Content:  Thought content normal.         Lab Review   Recent Results (from the past 672 hour(s))   Hemoglobin A1C    Collection Time: 03/22/23  5:02 PM   Result Value Ref Range    Hemoglobin A1C 6.2 (H) 4.0 - 6.0 %   Gamma GT    Collection Time: 03/22/23  5:02 PM   Result Value Ref Range     (H) 7 - 54 U/L   Magnesium    Collection Time: 03/22/23  5:02 PM   Result Value Ref Range    Magnesium 1.5 (L) 1.6 - 2.6 mg/dL   Comprehensive Metabolic Panel    Collection Time: 03/22/23  5:02 PM   Result Value Ref Range    Sodium 136 136 - 145

## 2023-03-26 ENCOUNTER — APPOINTMENT (OUTPATIENT)
Dept: CT IMAGING | Age: 53
DRG: 204 | End: 2023-03-26
Payer: MEDICARE

## 2023-03-26 ENCOUNTER — HOSPITAL ENCOUNTER (INPATIENT)
Age: 53
LOS: 1 days | Discharge: HOME | DRG: 204 | End: 2023-03-28
Attending: EMERGENCY MEDICINE | Admitting: HOSPITALIST
Payer: MEDICARE

## 2023-03-26 ENCOUNTER — APPOINTMENT (OUTPATIENT)
Dept: GENERAL RADIOLOGY | Age: 53
DRG: 204 | End: 2023-03-26
Payer: MEDICARE

## 2023-03-26 DIAGNOSIS — D72.829 LEUKOCYTOSIS, UNSPECIFIED TYPE: ICD-10-CM

## 2023-03-26 DIAGNOSIS — R06.09 EXERTIONAL DYSPNEA: Primary | ICD-10-CM

## 2023-03-26 DIAGNOSIS — R91.1 PULMONARY NODULE: ICD-10-CM

## 2023-03-26 DIAGNOSIS — R00.0 TACHYCARDIA: ICD-10-CM

## 2023-03-26 DIAGNOSIS — I49.9 CARDIAC ARRHYTHMIA, UNSPECIFIED CARDIAC ARRHYTHMIA TYPE: ICD-10-CM

## 2023-03-26 DIAGNOSIS — R93.89 ABNORMAL CHEST CT: ICD-10-CM

## 2023-03-26 DIAGNOSIS — T30.0 BURN: ICD-10-CM

## 2023-03-26 PROBLEM — R65.10 SIRS (SYSTEMIC INFLAMMATORY RESPONSE SYNDROME) (HCC): Status: ACTIVE | Noted: 2023-03-26

## 2023-03-26 PROBLEM — R06.00 DYSPNEA: Status: ACTIVE | Noted: 2023-03-26

## 2023-03-26 LAB
25(OH)D3 SERPL-MCNC: 23 NG/ML
ALBUMIN SERPL-MCNC: 3 G/DL (ref 3.5–5.2)
ALP SERPL-CCNC: 208 U/L (ref 40–130)
ALT SERPL-CCNC: 12 U/L (ref 5–41)
ANION GAP SERPL CALCULATED.3IONS-SCNC: 10 MMOL/L (ref 7–19)
AST SERPL-CCNC: 35 U/L (ref 5–40)
B PARAP IS1001 DNA NPH QL NAA+NON-PROBE: NOT DETECTED
B PERT.PT PRMT NPH QL NAA+NON-PROBE: NOT DETECTED
BILIRUB SERPL-MCNC: 0.4 MG/DL (ref 0.2–1.2)
BNP BLD-MCNC: 317 PG/ML (ref 0–900)
BUN SERPL-MCNC: 12 MG/DL (ref 6–20)
C PNEUM DNA NPH QL NAA+NON-PROBE: NOT DETECTED
CALCIUM SERPL-MCNC: 8.9 MG/DL (ref 8.6–10)
CHLORIDE SERPL-SCNC: 100 MMOL/L (ref 98–111)
CO2 SERPL-SCNC: 25 MMOL/L (ref 22–29)
CREAT SERPL-MCNC: 0.9 MG/DL (ref 0.5–1.2)
D DIMER PPP FEU-MCNC: 0.7 UG/ML FEU (ref 0–0.48)
ERYTHROCYTE [DISTWIDTH] IN BLOOD BY AUTOMATED COUNT: 15.4 % (ref 11.5–14.5)
FERRITIN SERPL-MCNC: 70.8 NG/ML (ref 30–400)
FLUAV RNA NPH QL NAA+NON-PROBE: NOT DETECTED
FLUBV RNA NPH QL NAA+NON-PROBE: NOT DETECTED
FOLATE SERPL-MCNC: 10 NG/ML (ref 4.5–32.2)
GAMMA GLUTAMYL TRANSFERASE: 268 U/L (ref 7–54)
GLUCOSE BLD-MCNC: 126 MG/DL (ref 70–99)
GLUCOSE SERPL-MCNC: 165 MG/DL (ref 74–109)
HADV DNA NPH QL NAA+NON-PROBE: NOT DETECTED
HCOV 229E RNA NPH QL NAA+NON-PROBE: NOT DETECTED
HCOV HKU1 RNA NPH QL NAA+NON-PROBE: NOT DETECTED
HCOV NL63 RNA NPH QL NAA+NON-PROBE: NOT DETECTED
HCOV OC43 RNA NPH QL NAA+NON-PROBE: NOT DETECTED
HCT VFR BLD AUTO: 41.2 % (ref 42–52)
HGB BLD-MCNC: 13.2 G/DL (ref 14–18)
HMPV RNA NPH QL NAA+NON-PROBE: NOT DETECTED
HPIV1 RNA NPH QL NAA+NON-PROBE: NOT DETECTED
HPIV2 RNA NPH QL NAA+NON-PROBE: NOT DETECTED
HPIV3 RNA NPH QL NAA+NON-PROBE: NOT DETECTED
HPIV4 RNA NPH QL NAA+NON-PROBE: NOT DETECTED
INR PPP: 1.1 (ref 0.88–1.18)
IRON SATN MFR SERPL: 8 % (ref 14–50)
IRON SERPL-MCNC: 20 UG/DL (ref 59–158)
LIPASE SERPL-CCNC: 5 U/L (ref 13–60)
M PNEUMO DNA NPH QL NAA+NON-PROBE: NOT DETECTED
MAGNESIUM SERPL-MCNC: 1.4 MG/DL (ref 1.6–2.6)
MCH RBC QN AUTO: 28.8 PG (ref 27–31)
MCHC RBC AUTO-ENTMCNC: 32 G/DL (ref 33–37)
MCV RBC AUTO: 89.8 FL (ref 80–94)
PERFORMED ON: ABNORMAL
PLATELET # BLD AUTO: 420 K/UL (ref 130–400)
PMV BLD AUTO: 11.3 FL (ref 9.4–12.4)
POTASSIUM SERPL-SCNC: 4.3 MMOL/L (ref 3.5–5)
PROCALCITONIN: 0.22 NG/ML (ref 0–0.09)
PROT SERPL-MCNC: 7.2 G/DL (ref 6.6–8.7)
PROTHROMBIN TIME: 14.2 SEC (ref 12–14.6)
RBC # BLD AUTO: 4.59 M/UL (ref 4.7–6.1)
RSV RNA NPH QL NAA+NON-PROBE: NOT DETECTED
RV+EV RNA NPH QL NAA+NON-PROBE: NOT DETECTED
SARS-COV-2 RDRP RESP QL NAA+PROBE: NOT DETECTED
SARS-COV-2 RNA NPH QL NAA+NON-PROBE: NOT DETECTED
SODIUM SERPL-SCNC: 135 MMOL/L (ref 136–145)
T4 FREE SERPL-MCNC: 1.56 NG/DL (ref 0.93–1.7)
TIBC SERPL-MCNC: 246 UG/DL (ref 250–400)
TROPONIN T SERPL-MCNC: <0.01 NG/ML (ref 0–0.03)
TSH SERPL DL<=0.005 MIU/L-ACNC: 4.67 UIU/ML (ref 0.35–5.5)
VIT B12 SERPL-MCNC: 1523 PG/ML (ref 211–946)
WBC # BLD AUTO: 17.9 K/UL (ref 4.8–10.8)

## 2023-03-26 PROCEDURE — 84145 PROCALCITONIN (PCT): CPT

## 2023-03-26 PROCEDURE — 96372 THER/PROPH/DIAG INJ SC/IM: CPT

## 2023-03-26 PROCEDURE — 0202U NFCT DS 22 TRGT SARS-COV-2: CPT

## 2023-03-26 PROCEDURE — 6360000002 HC RX W HCPCS: Performed by: EMERGENCY MEDICINE

## 2023-03-26 PROCEDURE — 85610 PROTHROMBIN TIME: CPT

## 2023-03-26 PROCEDURE — 6370000000 HC RX 637 (ALT 250 FOR IP): Performed by: EMERGENCY MEDICINE

## 2023-03-26 PROCEDURE — 36415 COLL VENOUS BLD VENIPUNCTURE: CPT

## 2023-03-26 PROCEDURE — 82607 VITAMIN B-12: CPT

## 2023-03-26 PROCEDURE — 71275 CT ANGIOGRAPHY CHEST: CPT

## 2023-03-26 PROCEDURE — 85027 COMPLETE CBC AUTOMATED: CPT

## 2023-03-26 PROCEDURE — 83690 ASSAY OF LIPASE: CPT

## 2023-03-26 PROCEDURE — G0378 HOSPITAL OBSERVATION PER HR: HCPCS

## 2023-03-26 PROCEDURE — 80053 COMPREHEN METABOLIC PANEL: CPT

## 2023-03-26 PROCEDURE — 84443 ASSAY THYROID STIM HORMONE: CPT

## 2023-03-26 PROCEDURE — 90715 TDAP VACCINE 7 YRS/> IM: CPT | Performed by: EMERGENCY MEDICINE

## 2023-03-26 PROCEDURE — 82306 VITAMIN D 25 HYDROXY: CPT

## 2023-03-26 PROCEDURE — 99285 EMERGENCY DEPT VISIT HI MDM: CPT

## 2023-03-26 PROCEDURE — 83735 ASSAY OF MAGNESIUM: CPT

## 2023-03-26 PROCEDURE — 82977 ASSAY OF GGT: CPT

## 2023-03-26 PROCEDURE — 82746 ASSAY OF FOLIC ACID SERUM: CPT

## 2023-03-26 PROCEDURE — 71045 X-RAY EXAM CHEST 1 VIEW: CPT

## 2023-03-26 PROCEDURE — 85379 FIBRIN DEGRADATION QUANT: CPT

## 2023-03-26 PROCEDURE — 82728 ASSAY OF FERRITIN: CPT

## 2023-03-26 PROCEDURE — 6360000004 HC RX CONTRAST MEDICATION: Performed by: EMERGENCY MEDICINE

## 2023-03-26 PROCEDURE — 83880 ASSAY OF NATRIURETIC PEPTIDE: CPT

## 2023-03-26 PROCEDURE — 87635 SARS-COV-2 COVID-19 AMP PRB: CPT

## 2023-03-26 PROCEDURE — 84484 ASSAY OF TROPONIN QUANT: CPT

## 2023-03-26 PROCEDURE — 83550 IRON BINDING TEST: CPT

## 2023-03-26 PROCEDURE — 84439 ASSAY OF FREE THYROXINE: CPT

## 2023-03-26 PROCEDURE — 90471 IMMUNIZATION ADMIN: CPT | Performed by: EMERGENCY MEDICINE

## 2023-03-26 PROCEDURE — 83540 ASSAY OF IRON: CPT

## 2023-03-26 RX ORDER — ATORVASTATIN CALCIUM 40 MG/1
40 TABLET, FILM COATED ORAL NIGHTLY
Status: DISCONTINUED | OUTPATIENT
Start: 2023-03-26 | End: 2023-03-26

## 2023-03-26 RX ORDER — ONDANSETRON 2 MG/ML
4 INJECTION INTRAMUSCULAR; INTRAVENOUS EVERY 6 HOURS PRN
Status: DISCONTINUED | OUTPATIENT
Start: 2023-03-26 | End: 2023-03-29 | Stop reason: HOSPADM

## 2023-03-26 RX ORDER — ACETAMINOPHEN 325 MG/1
650 TABLET ORAL EVERY 6 HOURS PRN
Status: DISCONTINUED | OUTPATIENT
Start: 2023-03-26 | End: 2023-03-26

## 2023-03-26 RX ORDER — OXYCODONE HYDROCHLORIDE 5 MG/1
10 TABLET ORAL EVERY 8 HOURS PRN
Status: DISCONTINUED | OUTPATIENT
Start: 2023-03-26 | End: 2023-03-27

## 2023-03-26 RX ORDER — NITROGLYCERIN 0.4 MG/1
0.4 TABLET SUBLINGUAL EVERY 5 MIN PRN
Status: DISCONTINUED | OUTPATIENT
Start: 2023-03-26 | End: 2023-03-29 | Stop reason: HOSPADM

## 2023-03-26 RX ORDER — MAGNESIUM SULFATE IN WATER 40 MG/ML
2000 INJECTION, SOLUTION INTRAVENOUS ONCE
Status: COMPLETED | OUTPATIENT
Start: 2023-03-27 | End: 2023-03-27

## 2023-03-26 RX ORDER — ONDANSETRON 4 MG/1
4 TABLET, ORALLY DISINTEGRATING ORAL EVERY 8 HOURS PRN
Status: DISCONTINUED | OUTPATIENT
Start: 2023-03-26 | End: 2023-03-29 | Stop reason: HOSPADM

## 2023-03-26 RX ORDER — ATORVASTATIN CALCIUM 10 MG/1
10 TABLET, FILM COATED ORAL NIGHTLY
Status: DISCONTINUED | OUTPATIENT
Start: 2023-03-26 | End: 2023-03-29 | Stop reason: HOSPADM

## 2023-03-26 RX ORDER — SACCHAROMYCES BOULARDII 250 MG
250 CAPSULE ORAL DAILY
Status: DISCONTINUED | OUTPATIENT
Start: 2023-03-26 | End: 2023-03-29 | Stop reason: HOSPADM

## 2023-03-26 RX ORDER — ASPIRIN 81 MG/1
81 TABLET, CHEWABLE ORAL DAILY
Status: DISCONTINUED | OUTPATIENT
Start: 2023-03-27 | End: 2023-03-29 | Stop reason: HOSPADM

## 2023-03-26 RX ORDER — PREDNISONE 1 MG/1
5 TABLET ORAL 2 TIMES DAILY
Status: DISCONTINUED | OUTPATIENT
Start: 2023-03-26 | End: 2023-03-29 | Stop reason: HOSPADM

## 2023-03-26 RX ORDER — ACETAMINOPHEN 650 MG/1
650 SUPPOSITORY RECTAL EVERY 6 HOURS PRN
Status: DISCONTINUED | OUTPATIENT
Start: 2023-03-26 | End: 2023-03-26

## 2023-03-26 RX ORDER — GABAPENTIN 300 MG/1
300 CAPSULE ORAL 2 TIMES DAILY
Status: DISCONTINUED | OUTPATIENT
Start: 2023-03-26 | End: 2023-03-29 | Stop reason: HOSPADM

## 2023-03-26 RX ORDER — ENOXAPARIN SODIUM 100 MG/ML
30 INJECTION SUBCUTANEOUS 2 TIMES DAILY
Status: DISCONTINUED | OUTPATIENT
Start: 2023-03-27 | End: 2023-03-29 | Stop reason: HOSPADM

## 2023-03-26 RX ORDER — LANOLIN ALCOHOL/MO/W.PET/CERES
400 CREAM (GRAM) TOPICAL 2 TIMES DAILY
Status: DISCONTINUED | OUTPATIENT
Start: 2023-03-26 | End: 2023-03-26

## 2023-03-26 RX ORDER — ERGOCALCIFEROL 1.25 MG/1
50000 CAPSULE ORAL WEEKLY
Status: DISCONTINUED | OUTPATIENT
Start: 2023-03-26 | End: 2023-03-29 | Stop reason: HOSPADM

## 2023-03-26 RX ORDER — MAGNESIUM SULFATE IN WATER 40 MG/ML
2000 INJECTION, SOLUTION INTRAVENOUS PRN
Status: DISCONTINUED | OUTPATIENT
Start: 2023-03-26 | End: 2023-03-29 | Stop reason: HOSPADM

## 2023-03-26 RX ORDER — ADENOSINE 3 MG/ML
INJECTION, SOLUTION INTRAVENOUS
Status: DISCONTINUED
Start: 2023-03-26 | End: 2023-03-26 | Stop reason: WASHOUT

## 2023-03-26 RX ORDER — 0.9 % SODIUM CHLORIDE 0.9 %
30 INTRAVENOUS SOLUTION INTRAVENOUS ONCE
Status: COMPLETED | OUTPATIENT
Start: 2023-03-26 | End: 2023-03-27

## 2023-03-26 RX ORDER — PANTOPRAZOLE SODIUM 40 MG/1
40 TABLET, DELAYED RELEASE ORAL
Status: DISCONTINUED | OUTPATIENT
Start: 2023-03-27 | End: 2023-03-27

## 2023-03-26 RX ORDER — ENOXAPARIN SODIUM 100 MG/ML
40 INJECTION SUBCUTANEOUS DAILY
Status: DISCONTINUED | OUTPATIENT
Start: 2023-03-26 | End: 2023-03-26

## 2023-03-26 RX ADMIN — ASPIRIN 325 MG: 325 TABLET, COATED ORAL at 19:48

## 2023-03-26 RX ADMIN — TETANUS TOXOID, REDUCED DIPHTHERIA TOXOID AND ACELLULAR PERTUSSIS VACCINE, ADSORBED 0.5 ML: 5; 2.5; 8; 8; 2.5 SUSPENSION INTRAMUSCULAR at 17:48

## 2023-03-26 RX ADMIN — IOPAMIDOL 90 ML: 755 INJECTION, SOLUTION INTRAVENOUS at 18:26

## 2023-03-26 ASSESSMENT — ENCOUNTER SYMPTOMS
COLOR CHANGE: 0
CHEST TIGHTNESS: 0
VOMITING: 0
ABDOMINAL DISTENTION: 0
EYE PAIN: 0
DIARRHEA: 0
WHEEZING: 0
CONSTIPATION: 0
NAUSEA: 0
COUGH: 0
ABDOMINAL PAIN: 0
SHORTNESS OF BREATH: 1

## 2023-03-26 ASSESSMENT — PAIN - FUNCTIONAL ASSESSMENT: PAIN_FUNCTIONAL_ASSESSMENT: NONE - DENIES PAIN
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
## 2023-03-27 PROBLEM — S31.109A CHRONIC ABDOMINAL WOUND INFECTION, INITIAL ENCOUNTER: Status: ACTIVE | Noted: 2023-03-27

## 2023-03-27 PROBLEM — E55.9 VITAMIN D DEFICIENCY: Status: ACTIVE | Noted: 2023-03-27

## 2023-03-27 PROBLEM — L08.9 CHRONIC ABDOMINAL WOUND INFECTION, INITIAL ENCOUNTER: Status: ACTIVE | Noted: 2023-03-27

## 2023-03-27 LAB
ALBUMIN SERPL-MCNC: 2.2 G/DL (ref 3.5–5.2)
ALP SERPL-CCNC: 149 U/L (ref 40–130)
ALT SERPL-CCNC: 9 U/L (ref 5–41)
ANION GAP SERPL CALCULATED.3IONS-SCNC: 10 MMOL/L (ref 7–19)
AST SERPL-CCNC: 29 U/L (ref 5–40)
BACTERIA URNS QL MICRO: NEGATIVE /HPF
BASOPHILS # BLD: 0.1 K/UL (ref 0–0.2)
BASOPHILS NFR BLD: 0.7 % (ref 0–1)
BILIRUB SERPL-MCNC: <0.2 MG/DL (ref 0.2–1.2)
BILIRUB UR QL STRIP: NEGATIVE
BUN SERPL-MCNC: 10 MG/DL (ref 6–20)
CALCIUM SERPL-MCNC: 7.6 MG/DL (ref 8.6–10)
CHLORIDE SERPL-SCNC: 110 MMOL/L (ref 98–111)
CHOLEST SERPL-MCNC: 156 MG/DL (ref 160–199)
CLARITY UR: CLEAR
CO2 SERPL-SCNC: 19 MMOL/L (ref 22–29)
COLOR UR: YELLOW
CREAT SERPL-MCNC: 0.7 MG/DL (ref 0.5–1.2)
CRYSTALS URNS MICRO: ABNORMAL /HPF
EOSINOPHIL # BLD: 0.4 K/UL (ref 0–0.6)
EOSINOPHIL NFR BLD: 3.8 % (ref 0–5)
EPI CELLS #/AREA URNS AUTO: 1 /HPF (ref 0–5)
ERYTHROCYTE [DISTWIDTH] IN BLOOD BY AUTOMATED COUNT: 15.4 % (ref 11.5–14.5)
GLUCOSE BLD-MCNC: 161 MG/DL (ref 70–99)
GLUCOSE BLD-MCNC: 200 MG/DL (ref 70–99)
GLUCOSE BLD-MCNC: 220 MG/DL (ref 70–99)
GLUCOSE BLD-MCNC: 323 MG/DL (ref 70–99)
GLUCOSE SERPL-MCNC: 150 MG/DL (ref 74–109)
GLUCOSE UR STRIP.AUTO-MCNC: NEGATIVE MG/DL
HBA1C MFR BLD: 5.8 % (ref 4–6)
HCT VFR BLD AUTO: 35.2 % (ref 42–52)
HDLC SERPL-MCNC: 19 MG/DL (ref 55–121)
HGB BLD-MCNC: 11 G/DL (ref 14–18)
HGB UR STRIP.AUTO-MCNC: ABNORMAL MG/L
HYALINE CASTS #/AREA URNS AUTO: 5 /HPF (ref 0–8)
IMM GRANULOCYTES # BLD: 0.1 K/UL
KETONES UR STRIP.AUTO-MCNC: ABNORMAL MG/DL
LACTATE BLDV-SCNC: 1 MMOL/L (ref 0.5–1.9)
LDLC SERPL CALC-MCNC: 108 MG/DL
LEUKOCYTE ESTERASE UR QL STRIP.AUTO: NEGATIVE
LV EF: 58 %
LVEF MODALITY: NORMAL
LYMPHOCYTES # BLD: 0.8 K/UL (ref 1.1–4.5)
LYMPHOCYTES NFR BLD: 8.6 % (ref 20–40)
MAGNESIUM SERPL-MCNC: 1.8 MG/DL (ref 1.6–2.6)
MCH RBC QN AUTO: 28.5 PG (ref 27–31)
MCHC RBC AUTO-ENTMCNC: 31.3 G/DL (ref 33–37)
MCV RBC AUTO: 91.2 FL (ref 80–94)
MONOCYTES # BLD: 0.5 K/UL (ref 0–0.9)
MONOCYTES NFR BLD: 5.4 % (ref 0–10)
NEUTROPHILS # BLD: 7.8 K/UL (ref 1.5–7.5)
NEUTS SEG NFR BLD: 80.7 % (ref 50–65)
NITRITE UR QL STRIP.AUTO: NEGATIVE
PERFORMED ON: ABNORMAL
PH UR STRIP.AUTO: 6.5 [PH] (ref 5–8)
PLATELET # BLD AUTO: 258 K/UL (ref 130–400)
PMV BLD AUTO: 10.7 FL (ref 9.4–12.4)
POTASSIUM SERPL-SCNC: 3.5 MMOL/L (ref 3.5–5)
PROT SERPL-MCNC: 5.3 G/DL (ref 6.6–8.7)
PROT UR STRIP.AUTO-MCNC: ABNORMAL MG/DL
RBC # BLD AUTO: 3.86 M/UL (ref 4.7–6.1)
RBC #/AREA URNS AUTO: 33 /HPF (ref 0–4)
SODIUM SERPL-SCNC: 139 MMOL/L (ref 136–145)
SP GR UR STRIP.AUTO: >=1.045 (ref 1–1.03)
TRIGL SERPL-MCNC: 145 MG/DL (ref 0–149)
TROPONIN T SERPL-MCNC: <0.01 NG/ML (ref 0–0.03)
TROPONIN T SERPL-MCNC: <0.01 NG/ML (ref 0–0.03)
UROBILINOGEN UR STRIP.AUTO-MCNC: 1 E.U./DL
WBC # BLD AUTO: 9.7 K/UL (ref 4.8–10.8)
WBC #/AREA URNS AUTO: 2 /HPF (ref 0–5)

## 2023-03-27 PROCEDURE — 83605 ASSAY OF LACTIC ACID: CPT

## 2023-03-27 PROCEDURE — 99223 1ST HOSP IP/OBS HIGH 75: CPT | Performed by: INTERNAL MEDICINE

## 2023-03-27 PROCEDURE — 96365 THER/PROPH/DIAG IV INF INIT: CPT

## 2023-03-27 PROCEDURE — 96368 THER/DIAG CONCURRENT INF: CPT

## 2023-03-27 PROCEDURE — 6370000000 HC RX 637 (ALT 250 FOR IP)

## 2023-03-27 PROCEDURE — 96372 THER/PROPH/DIAG INJ SC/IM: CPT

## 2023-03-27 PROCEDURE — G0378 HOSPITAL OBSERVATION PER HR: HCPCS

## 2023-03-27 PROCEDURE — C9113 INJ PANTOPRAZOLE SODIUM, VIA: HCPCS | Performed by: HOSPITALIST

## 2023-03-27 PROCEDURE — 93306 TTE W/DOPPLER COMPLETE: CPT

## 2023-03-27 PROCEDURE — 6360000002 HC RX W HCPCS

## 2023-03-27 PROCEDURE — 80053 COMPREHEN METABOLIC PANEL: CPT

## 2023-03-27 PROCEDURE — 6360000002 HC RX W HCPCS: Performed by: HOSPITALIST

## 2023-03-27 PROCEDURE — 96367 TX/PROPH/DG ADDL SEQ IV INF: CPT

## 2023-03-27 PROCEDURE — 96375 TX/PRO/DX INJ NEW DRUG ADDON: CPT

## 2023-03-27 PROCEDURE — 6370000000 HC RX 637 (ALT 250 FOR IP): Performed by: HOSPITALIST

## 2023-03-27 PROCEDURE — 80197 ASSAY OF TACROLIMUS: CPT

## 2023-03-27 PROCEDURE — 96366 THER/PROPH/DIAG IV INF ADDON: CPT

## 2023-03-27 PROCEDURE — 83036 HEMOGLOBIN GLYCOSYLATED A1C: CPT

## 2023-03-27 PROCEDURE — 2580000003 HC RX 258

## 2023-03-27 PROCEDURE — 6370000000 HC RX 637 (ALT 250 FOR IP): Performed by: NURSE PRACTITIONER

## 2023-03-27 PROCEDURE — 85025 COMPLETE CBC W/AUTO DIFF WBC: CPT

## 2023-03-27 PROCEDURE — 80061 LIPID PANEL: CPT

## 2023-03-27 PROCEDURE — 87040 BLOOD CULTURE FOR BACTERIA: CPT

## 2023-03-27 PROCEDURE — 82962 GLUCOSE BLOOD TEST: CPT

## 2023-03-27 PROCEDURE — 84484 ASSAY OF TROPONIN QUANT: CPT

## 2023-03-27 PROCEDURE — 2580000003 HC RX 258: Performed by: HOSPITALIST

## 2023-03-27 PROCEDURE — 94760 N-INVAS EAR/PLS OXIMETRY 1: CPT

## 2023-03-27 PROCEDURE — 81001 URINALYSIS AUTO W/SCOPE: CPT

## 2023-03-27 PROCEDURE — 36415 COLL VENOUS BLD VENIPUNCTURE: CPT

## 2023-03-27 PROCEDURE — 83735 ASSAY OF MAGNESIUM: CPT

## 2023-03-27 RX ORDER — VITAMIN B COMPLEX
2000 TABLET ORAL DAILY
Status: DISCONTINUED | OUTPATIENT
Start: 2023-03-27 | End: 2023-03-29 | Stop reason: HOSPADM

## 2023-03-27 RX ORDER — OXYCODONE HYDROCHLORIDE 5 MG/1
TABLET ORAL
Status: COMPLETED
Start: 2023-03-27 | End: 2023-03-27

## 2023-03-27 RX ORDER — PANTOPRAZOLE SODIUM 40 MG/1
40 TABLET, DELAYED RELEASE ORAL
Status: DISCONTINUED | OUTPATIENT
Start: 2023-03-27 | End: 2023-03-29 | Stop reason: HOSPADM

## 2023-03-27 RX ORDER — LACTOBACILLUS RHAMNOSUS GG 10B CELL
1 CAPSULE ORAL DAILY
Status: DISCONTINUED | OUTPATIENT
Start: 2023-03-27 | End: 2023-03-29 | Stop reason: HOSPADM

## 2023-03-27 RX ORDER — OXYCODONE HYDROCHLORIDE 5 MG/1
10 TABLET ORAL EVERY 4 HOURS PRN
Status: DISCONTINUED | OUTPATIENT
Start: 2023-03-27 | End: 2023-03-29 | Stop reason: HOSPADM

## 2023-03-27 RX ORDER — INSULIN GLARGINE 100 [IU]/ML
10 INJECTION, SOLUTION SUBCUTANEOUS 2 TIMES DAILY
Status: DISCONTINUED | OUTPATIENT
Start: 2023-03-27 | End: 2023-03-29 | Stop reason: HOSPADM

## 2023-03-27 RX ORDER — INSULIN LISPRO 100 [IU]/ML
4 INJECTION, SOLUTION INTRAVENOUS; SUBCUTANEOUS
Status: DISCONTINUED | OUTPATIENT
Start: 2023-03-27 | End: 2023-03-29 | Stop reason: HOSPADM

## 2023-03-27 RX ORDER — INSULIN LISPRO 100 [IU]/ML
0-4 INJECTION, SOLUTION INTRAVENOUS; SUBCUTANEOUS NIGHTLY
Status: DISCONTINUED | OUTPATIENT
Start: 2023-03-27 | End: 2023-03-29 | Stop reason: HOSPADM

## 2023-03-27 RX ORDER — SODIUM BICARBONATE 650 MG/1
650 TABLET ORAL 4 TIMES DAILY
Status: DISCONTINUED | OUTPATIENT
Start: 2023-03-27 | End: 2023-03-29 | Stop reason: HOSPADM

## 2023-03-27 RX ORDER — INSULIN LISPRO 100 [IU]/ML
0-8 INJECTION, SOLUTION INTRAVENOUS; SUBCUTANEOUS
Status: DISCONTINUED | OUTPATIENT
Start: 2023-03-27 | End: 2023-03-29 | Stop reason: HOSPADM

## 2023-03-27 RX ORDER — DEXTROSE MONOHYDRATE 100 MG/ML
INJECTION, SOLUTION INTRAVENOUS CONTINUOUS PRN
Status: DISCONTINUED | OUTPATIENT
Start: 2023-03-27 | End: 2023-03-29 | Stop reason: HOSPADM

## 2023-03-27 RX ADMIN — PANTOPRAZOLE SODIUM 40 MG: 40 TABLET, DELAYED RELEASE ORAL at 16:54

## 2023-03-27 RX ADMIN — ENOXAPARIN SODIUM 30 MG: 100 INJECTION SUBCUTANEOUS at 22:26

## 2023-03-27 RX ADMIN — SODIUM BICARBONATE 650 MG: 650 TABLET ORAL at 22:12

## 2023-03-27 RX ADMIN — SODIUM BICARBONATE 650 MG: 650 TABLET ORAL at 12:12

## 2023-03-27 RX ADMIN — PREDNISONE 5 MG: 5 TABLET ORAL at 00:42

## 2023-03-27 RX ADMIN — SODIUM BICARBONATE 650 MG: 650 TABLET ORAL at 08:02

## 2023-03-27 RX ADMIN — PREDNISONE 5 MG: 5 TABLET ORAL at 22:12

## 2023-03-27 RX ADMIN — ATORVASTATIN CALCIUM 10 MG: 10 TABLET, FILM COATED ORAL at 22:12

## 2023-03-27 RX ADMIN — MAGNESIUM SULFATE HEPTAHYDRATE 2000 MG: 40 INJECTION, SOLUTION INTRAVENOUS at 02:18

## 2023-03-27 RX ADMIN — OXYCODONE HYDROCHLORIDE 10 MG: 5 TABLET ORAL at 00:38

## 2023-03-27 RX ADMIN — Medication 1500 MG: at 22:26

## 2023-03-27 RX ADMIN — ENOXAPARIN SODIUM 30 MG: 100 INJECTION SUBCUTANEOUS at 08:02

## 2023-03-27 RX ADMIN — OXYCODONE HYDROCHLORIDE 10 MG: 5 TABLET ORAL at 16:58

## 2023-03-27 RX ADMIN — CEFEPIME 2000 MG: 2 INJECTION, POWDER, FOR SOLUTION INTRAVENOUS at 16:52

## 2023-03-27 RX ADMIN — SODIUM CHLORIDE, PRESERVATIVE FREE 40 MG: 5 INJECTION INTRAVENOUS at 06:00

## 2023-03-27 RX ADMIN — GABAPENTIN 300 MG: 300 CAPSULE ORAL at 00:42

## 2023-03-27 RX ADMIN — VANCOMYCIN HYDROCHLORIDE 2500 MG: 5 INJECTION, POWDER, LYOPHILIZED, FOR SOLUTION INTRAVENOUS at 00:43

## 2023-03-27 RX ADMIN — Medication 1 CAPSULE: at 08:01

## 2023-03-27 RX ADMIN — INSULIN LISPRO 6 UNITS: 100 INJECTION, SOLUTION INTRAVENOUS; SUBCUTANEOUS at 11:42

## 2023-03-27 RX ADMIN — ATORVASTATIN CALCIUM 10 MG: 10 TABLET, FILM COATED ORAL at 00:41

## 2023-03-27 RX ADMIN — GABAPENTIN 300 MG: 300 CAPSULE ORAL at 08:01

## 2023-03-27 RX ADMIN — Medication 2000 UNITS: at 18:00

## 2023-03-27 RX ADMIN — GABAPENTIN 300 MG: 300 CAPSULE ORAL at 22:12

## 2023-03-27 RX ADMIN — INSULIN LISPRO 2 UNITS: 100 INJECTION, SOLUTION INTRAVENOUS; SUBCUTANEOUS at 16:54

## 2023-03-27 RX ADMIN — CEFEPIME 2000 MG: 2 INJECTION, POWDER, FOR SOLUTION INTRAVENOUS at 08:07

## 2023-03-27 RX ADMIN — SODIUM BICARBONATE 650 MG: 650 TABLET ORAL at 16:54

## 2023-03-27 RX ADMIN — INSULIN GLARGINE 10 UNITS: 100 INJECTION, SOLUTION SUBCUTANEOUS at 22:11

## 2023-03-27 RX ADMIN — INSULIN LISPRO 4 UNITS: 100 INJECTION, SOLUTION INTRAVENOUS; SUBCUTANEOUS at 16:56

## 2023-03-27 RX ADMIN — OXYCODONE HYDROCHLORIDE 10 MG: 5 TABLET ORAL at 11:41

## 2023-03-27 RX ADMIN — SODIUM CHLORIDE 3760 ML: 9 INJECTION, SOLUTION INTRAVENOUS at 00:32

## 2023-03-27 RX ADMIN — INSULIN GLARGINE 10 UNITS: 100 INJECTION, SOLUTION SUBCUTANEOUS at 11:42

## 2023-03-27 RX ADMIN — PREDNISONE 5 MG: 5 TABLET ORAL at 08:02

## 2023-03-27 RX ADMIN — CEFEPIME 2000 MG: 2 INJECTION, POWDER, FOR SOLUTION INTRAMUSCULAR; INTRAVENOUS at 00:48

## 2023-03-27 RX ADMIN — Medication 1500 MG: at 12:14

## 2023-03-27 RX ADMIN — IRON SUCROSE 200 MG: 20 INJECTION, SOLUTION INTRAVENOUS at 06:00

## 2023-03-27 RX ADMIN — ASPIRIN 81 MG 81 MG: 81 TABLET ORAL at 08:01

## 2023-03-27 RX ADMIN — OXYCODONE HYDROCHLORIDE 10 MG: 5 TABLET ORAL at 06:40

## 2023-03-27 RX ADMIN — OXYCODONE HYDROCHLORIDE 10 MG: 5 TABLET ORAL at 22:00

## 2023-03-27 RX ADMIN — OXYCODONE: 5 TABLET ORAL at 01:20

## 2023-03-27 ASSESSMENT — PAIN DESCRIPTION - LOCATION
LOCATION: BACK;GENERALIZED
LOCATION: LEG
LOCATION: LEG
LOCATION: FOOT
LOCATION: LEG

## 2023-03-27 ASSESSMENT — ENCOUNTER SYMPTOMS
RESPIRATORY NEGATIVE: 1
ALLERGIC/IMMUNOLOGIC NEGATIVE: 1
EYES NEGATIVE: 1
GASTROINTESTINAL NEGATIVE: 1

## 2023-03-27 ASSESSMENT — PAIN - FUNCTIONAL ASSESSMENT: PAIN_FUNCTIONAL_ASSESSMENT: PREVENTS OR INTERFERES SOME ACTIVE ACTIVITIES AND ADLS

## 2023-03-27 ASSESSMENT — PAIN DESCRIPTION - ORIENTATION
ORIENTATION: LOWER
ORIENTATION: LEFT
ORIENTATION: RIGHT;LEFT
ORIENTATION: LOWER

## 2023-03-27 ASSESSMENT — PAIN DESCRIPTION - DESCRIPTORS
DESCRIPTORS: ACHING
DESCRIPTORS: ACHING
DESCRIPTORS: ACHING;SHARP;THROBBING

## 2023-03-27 ASSESSMENT — PAIN SCALES - GENERAL
PAINLEVEL_OUTOF10: 7
PAINLEVEL_OUTOF10: 2
PAINLEVEL_OUTOF10: 4
PAINLEVEL_OUTOF10: 2
PAINLEVEL_OUTOF10: 8
PAINLEVEL_OUTOF10: 1
PAINLEVEL_OUTOF10: 7
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
## 2023-03-27 NOTE — PROGRESS NOTES
[facility] Pharmacokinetic Monitoring Service - Vancomycin     Pasquale Olvera is a 46 y.o. male starting on vancomycin therapy for sepsis of unknown etiology. Pharmacy consulted by DINORAH Julien, for monitoring and adjustment. Target Concentration: Goal AUC/KASSANDRA 400-600 mg*hr/L    Additional Antimicrobials: Cefepime    Pertinent Laboratory Values: Wt Readings from Last 1 Encounters:   03/26/23 277 lb (125.6 kg)     Temp Readings from Last 1 Encounters:   03/26/23 96.9 °F (36.1 °C)     Estimated Creatinine Clearance: 133 mL/min (based on SCr of 0.9 mg/dL). Recent Labs     03/26/23  1709   CREATININE 0.9   WBC 17.9*     Procalcitonin: 0.22 on 03/26/23    Pertinent Cultures:  Culture Date Source Results   03/26/23 Blood x 2  Sent    MRSA Nasal Swab: N/A. Non-respiratory infection.     Plan:  Dosing recommendations based on Bayesian software  Start vancomycin 2500 mg IV x 1 dose, followed by 1500 mg IV every 12 hours  Anticipated AUC of 550 and trough concentration of 17.8 at steady state  Renal labs as indicated   Vancomycin concentration ordered for 03/28 @ 1100   Pharmacy will continue to monitor patient and adjust therapy as indicated    Thank you for the consult,  DEMAR Damon [facility]  3/26/2023 11:22 PM

## 2023-03-27 NOTE — PLAN OF CARE
Nutrition Problem #1: Altered nutrition-related lab values  Intervention: Food and/or Nutrient Delivery: Modify Current Diet

## 2023-03-27 NOTE — H&P
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 That vessel is small sized. . Thoracic aorta: Thoracic aorta is normal in caliber and contour without evidence of aneurysm or dissection flap. Heart: The heart is normal in size. No pericardial effusion is visualized. Small coronary calcifications are visualized. Lung parenchyma, pleural, and airways: No focal consolidation or effusion visualized. Subpleural linear scarring is visualized in the right lower lobe. Lobulated pulmonary nodules visualized in the right lower lobe medially measuring 1.3 x 1.4 x 1.4 cm in maximal AP, transverse, and craniocaudal dimension. Another nodule is visualized in the lateral right lower lobe measuring 8 mm (series 3, image 90). There is no evidence of pneumothorax. Central airways are patent. Mediastinum: No evidence of mediastinal lymphadenopathy. Thyroid: Peripherally calcified left thyroid lobe nodule measures 2.9 cm. Calcified nodule in the isthmus measures 5 mm. Osseous structures: No aggressive osseous lesions are appreciated. No acute fracture visualized. Upper abdomen: Liver demonstrates heterogeneous attenuation with scattered poorly defined areas of hypoattenuation compatible with hepatic steatosis. Sutures around the IVC suggest postsurgical changes of liver transplant. Large lower esophageal varices are visualized, not opacified on this PE protocol contrast CTA. No acute pulmonary embolism evident. No evidence of thoracic aortic aneurysm or dissection. 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 Right lower lobe pulmonary nodule measures 1.3 x 1.4 x 1.4 cm. Cannot exclude neoplastic process. Consider short-term follow-up CT in 1 to 3 months or correlation with PET/CT. 8 mm nodule is visualized in the lateral right lower lobe. Postsurgical changes compatible with liver transplant. Liver demonstrates heterogeneous attenuation suggestive of patchy fatty infiltration.  Large distal esophageal

## 2023-03-27 NOTE — PLAN OF CARE
Problem: Discharge Planning  Goal: Discharge to home or other facility with appropriate resources  3/27/2023 1437 by Bishop Kalina RN  Outcome: Progressing  3/27/2023 1437 by Bishop Kalina RN  Outcome: Progressing     Problem: Safety - Adult  Goal: Free from fall injury  3/27/2023 1437 by Bishop Kalina RN  Outcome: Progressing  3/27/2023 1437 by Bishop Kalina RN  Outcome: Progressing     Problem: Chronic Conditions and Co-morbidities  Goal: Patient's chronic conditions and co-morbidity symptoms are monitored and maintained or improved  3/27/2023 1437 by Bishop Kalina RN  Outcome: Progressing  3/27/2023 1437 by Bishop Kalina RN  Outcome: Progressing     Problem: Nutrition Deficit:  Goal: Optimize nutritional status  3/27/2023 1437 by Bishop Kalina RN  Outcome: Progressing  3/27/2023 1437 by Bishop Kalina RN  Outcome: Progressing

## 2023-03-27 NOTE — PROGRESS NOTES
Pharmacy Adjustment per [facility] protocol    Rakel Garcia is a 46 y.o. male. Pharmacy has adjusted medications per [facility] protocol. Recent Labs     03/26/23  1709   BUN 12       Recent Labs     03/26/23  1709   CREATININE 0.9       Estimated Creatinine Clearance: 133 mL/min (based on SCr of 0.9 mg/dL). Height:   Ht Readings from Last 1 Encounters:   03/26/23 6' 1\" (1.854 m)     Weight:  Wt Readings from Last 1 Encounters:   03/26/23 277 lb (125.6 kg)         Plan: Adjust the following medications based on [facility] protocol:           Cefepime to 2 g IV x 1 dose over 30 minutes, followed by 2 g IV every 8 hours extended infusion.     Electronically signed by Simon Pérez [facility] on 3/26/2023 at 11:54 PM

## 2023-03-27 NOTE — CONSULTS
My radiograph interpretation/independent review of imaging: Reviewed CT of the chest.  Right lower lobe nonspecific appearing nodule. Problem list generated by [facility] Problems             Last Modified POA    * (Principal) Dyspnea 3/26/2023 Yes    History of liver transplant ([facility] 3/26/2023 Yes    Overview Signed 3/24/2023  8:00 AM by Brandi Fagan MD     Management as per Ela Singh. Class 2 obesity 3/26/2023 Yes    Type 2 diabetes mellitus with skin complication, with long-term current use of insulin ([facility] [address] (Chronic) 3/26/2023 Yes    Overview Signed 3/24/2023  8:00 AM by Brandi Fagan MD     Improved, continue current meds but stressed importance of eating. Lab Results   Component Value Date    LABA1C 6.2 (H) 03/22/2023    LABA1C 6.8 (H) 02/15/2023    LABA1C 8.3 (H) 08/23/2022     Lab Results   Component Value Date    LABMICR 8.10 08/23/2022    LDLCALC 119 12/09/2021    CREATININE 0.9 03/22/2023            Hypomagnesemia 3/26/2023 Yes    Former smoker 3/26/2023 Yes    SIRS (systemic inflammatory response syndrome) ([facility] [address] 3/26/2023 Yes    Right lower lobe pulmonary nodule 3/26/2023 Yes    Vitamin D deficiency 3/27/2023 Yes          Pulmonary Assessment/plan:    Right lower lobe nodule likely inflammatory. Discussed options with the patient. Elected to repeat CT of the chest in 3 months. We will order the CT and follow-up after the CT. Chronic appearing obstruction of the right lower lobe subsegmental pulmonary artery. Etiology not clear. Will obtain lower extremity ultrasound to rule out DVT. Dyspnea and tachycardia of unclear etiology. Cardiac work-up in progress. History of liver transplant. Per your care. Loi Love MD, [facility], [facility]    The above note was generated using voice recognition software. Inadvertent typographical errors in transcription may have occurred.     Electronically signed by Loi Love MD on 03/27/23 at 3:14 PM  ]

## 2023-03-27 NOTE — PROGRESS NOTES
Comprehensive Nutrition Assessment    Type and Reason for Visit:  Initial, Positive Nutrition Screen    Nutrition Recommendations/Plan:   Modify diet to CHO 5. Order HgbA1C. Start double protein portions to aid in healing. Malnutrition Assessment:  Malnutrition Status:  No malnutrition (03/27/23 1312)    Context:  Acute Illness     Findings of the 6 clinical characteristics of malnutrition:  Energy Intake:  No significant decrease in energy intake  Weight Loss:  No significant weight loss     Body Fat Loss:  No significant body fat loss     Muscle Mass Loss:  No significant muscle mass loss    Fluid Accumulation:  No significant fluid accumulation     Strength:  Not Performed    Nutrition Assessment:    Pt is nutritionally compromised with blood glucose levels ranging from 126-323 and a h/o DM. Pt is currently on Prednisone, which could be contributing to elevated blood sugars. Will modify diet to CHO 5 and order a HgbA1C. Current PO intake of meals is good and meets nutritional needs. Nutrition Related Findings:      Wound Type: [place]       Current Nutrition Intake & Therapies:    Average Meal Intake: %  ADULT DIET; Regular;  No Caffeine    Anthropometric Measures:  Height: 6' 1\" (185.4 cm)  Ideal Body Weight (IBW): 184 lbs (84 kg)    Current Body Weight: 277 lb (125.6 kg)  Current BMI (kg/m2): 36.6  BMI Categories: Obese Class 2 (BMI 35.0 -39.9)    Estimated Daily Nutrient Needs:  Energy Requirements Based On: Kcal/kg  Weight Used for Energy Requirements: Current  Energy (kcal/day): 5972-7350 kcals/day  Weight Used for Protein Requirements: Ideal  Protein (g/day): 100-167 g/protein/day  Method Used for Fluid Requirements: 1 ml/kcal  Fluid (ml/day): 4397-3925 mL/day    Nutrition Diagnosis:   Altered nutrition-related lab values related to endocrine dysfuntion as evidenced by lab values    Nutrition Interventions:   Food and/or Nutrient Delivery: Modify Current Diet  Coordination of Nutrition Care:

## 2023-03-27 NOTE — PROGRESS NOTES
Pharmacy Intravenous to Oral Protocol    Medication changed per [facility] IV to PO protocol: Pantoprazole    Patient meets the following inclusion criteria and none of the exclusion criteria:    Inclusion criteria:  - IV therapy > 24 hours (antibiotics only)  - Tolerating diet more advanced than clear liquids  - Tolerating PO medications  - No vasopressor blood pressure support (ie no signs of shock)  - Patient hasn't had a seizure for 72 hrs (antiepileptic medications only)    Exclusion criteria:  - Infections requiring IV therapy (ie meningitis, endocarditis, osteomyelitis, pancreatitis)   - Nausea and/or vomiting or severe diarrhea within past 24 hours   - Has gastrectomy, ileus, gastric outlet or bowel obstruction, or malabsorption syndromes   - Has significant painful oral ulceration   - TPN with an NPO order   - Active GI bleed   - Unable to swallow   - NPO   - Febrile in the last 24 hours (antibiotics only)   - Clinical deteriorating or unstable (antibiotics only)   - Pediatric patients and patients who are not euthyroid (not on oral levothyroxine/not stabilized on oral levothyroxine)- Levothyroxine only     Electronically signed by DEMAR Wilks [facility] on 3/27/2023 at 1:21 PM

## 2023-03-28 VITALS
TEMPERATURE: 97.3 F | DIASTOLIC BLOOD PRESSURE: 90 MMHG | BODY MASS INDEX: 34.27 KG/M2 | HEIGHT: 73 IN | RESPIRATION RATE: 18 BRPM | HEART RATE: 75 BPM | OXYGEN SATURATION: 97 % | WEIGHT: 258.6 LBS | SYSTOLIC BLOOD PRESSURE: 134 MMHG

## 2023-03-28 DIAGNOSIS — R91.1 LUNG NODULE: Primary | ICD-10-CM

## 2023-03-28 LAB
ALBUMIN SERPL-MCNC: 2.1 G/DL (ref 3.5–5.2)
ALP SERPL-CCNC: 154 U/L (ref 40–130)
ALT SERPL-CCNC: 8 U/L (ref 5–41)
ANION GAP SERPL CALCULATED.3IONS-SCNC: 7 MMOL/L (ref 7–19)
AST SERPL-CCNC: 22 U/L (ref 5–40)
BASOPHILS # BLD: 0.1 K/UL (ref 0–0.2)
BASOPHILS NFR BLD: 0.8 % (ref 0–1)
BILIRUB SERPL-MCNC: <0.2 MG/DL (ref 0.2–1.2)
BUN SERPL-MCNC: 10 MG/DL (ref 6–20)
CALCIUM SERPL-MCNC: 7.8 MG/DL (ref 8.6–10)
CHLORIDE SERPL-SCNC: 111 MMOL/L (ref 98–111)
CO2 SERPL-SCNC: 21 MMOL/L (ref 22–29)
CREAT SERPL-MCNC: 0.7 MG/DL (ref 0.5–1.2)
EOSINOPHIL # BLD: 0.4 K/UL (ref 0–0.6)
EOSINOPHIL NFR BLD: 4.3 % (ref 0–5)
ERYTHROCYTE [DISTWIDTH] IN BLOOD BY AUTOMATED COUNT: 15.1 % (ref 11.5–14.5)
GLUCOSE BLD-MCNC: 133 MG/DL (ref 70–99)
GLUCOSE BLD-MCNC: 143 MG/DL (ref 70–99)
GLUCOSE BLD-MCNC: 179 MG/DL (ref 70–99)
GLUCOSE SERPL-MCNC: 164 MG/DL (ref 74–109)
HCT VFR BLD AUTO: 33.6 % (ref 42–52)
HGB BLD-MCNC: 10.3 G/DL (ref 14–18)
IMM GRANULOCYTES # BLD: 0.1 K/UL
LYMPHOCYTES # BLD: 0.7 K/UL (ref 1.1–4.5)
LYMPHOCYTES NFR BLD: 7.8 % (ref 20–40)
MAGNESIUM SERPL-MCNC: 1.4 MG/DL (ref 1.6–2.6)
MCH RBC QN AUTO: 28.6 PG (ref 27–31)
MCHC RBC AUTO-ENTMCNC: 30.7 G/DL (ref 33–37)
MCV RBC AUTO: 93.3 FL (ref 80–94)
MONOCYTES # BLD: 0.5 K/UL (ref 0–0.9)
MONOCYTES NFR BLD: 5.5 % (ref 0–10)
NEUTROPHILS # BLD: 7.6 K/UL (ref 1.5–7.5)
NEUTS SEG NFR BLD: 80.8 % (ref 50–65)
PERFORMED ON: ABNORMAL
PLATELET # BLD AUTO: 225 K/UL (ref 130–400)
PMV BLD AUTO: 11.1 FL (ref 9.4–12.4)
POTASSIUM SERPL-SCNC: 4.2 MMOL/L (ref 3.5–5)
PROT SERPL-MCNC: 5.2 G/DL (ref 6.6–8.7)
RBC # BLD AUTO: 3.6 M/UL (ref 4.7–6.1)
SODIUM SERPL-SCNC: 139 MMOL/L (ref 136–145)
WBC # BLD AUTO: 9.4 K/UL (ref 4.8–10.8)

## 2023-03-28 PROCEDURE — 6370000000 HC RX 637 (ALT 250 FOR IP): Performed by: NURSE PRACTITIONER

## 2023-03-28 PROCEDURE — 96372 THER/PROPH/DIAG INJ SC/IM: CPT

## 2023-03-28 PROCEDURE — 36415 COLL VENOUS BLD VENIPUNCTURE: CPT

## 2023-03-28 PROCEDURE — 96374 THER/PROPH/DIAG INJ IV PUSH: CPT

## 2023-03-28 PROCEDURE — 94761 N-INVAS EAR/PLS OXIMETRY MLT: CPT

## 2023-03-28 PROCEDURE — G0378 HOSPITAL OBSERVATION PER HR: HCPCS

## 2023-03-28 PROCEDURE — 96366 THER/PROPH/DIAG IV INF ADDON: CPT

## 2023-03-28 PROCEDURE — 6360000002 HC RX W HCPCS

## 2023-03-28 PROCEDURE — 99233 SBSQ HOSP IP/OBS HIGH 50: CPT | Performed by: INTERNAL MEDICINE

## 2023-03-28 PROCEDURE — 2580000003 HC RX 258

## 2023-03-28 PROCEDURE — 80053 COMPREHEN METABOLIC PANEL: CPT

## 2023-03-28 PROCEDURE — 93970 EXTREMITY STUDY: CPT

## 2023-03-28 PROCEDURE — 6370000000 HC RX 637 (ALT 250 FOR IP): Performed by: HOSPITALIST

## 2023-03-28 PROCEDURE — 2140000000 HC CCU INTERMEDIATE R&B

## 2023-03-28 PROCEDURE — 83735 ASSAY OF MAGNESIUM: CPT

## 2023-03-28 PROCEDURE — 6360000002 HC RX W HCPCS: Performed by: HOSPITALIST

## 2023-03-28 PROCEDURE — 82962 GLUCOSE BLOOD TEST: CPT

## 2023-03-28 PROCEDURE — 94618 PULMONARY STRESS TESTING: CPT

## 2023-03-28 PROCEDURE — 85025 COMPLETE CBC W/AUTO DIFF WBC: CPT

## 2023-03-28 RX ORDER — ERGOCALCIFEROL 1.25 MG/1
50000 CAPSULE ORAL WEEKLY
Qty: 5 CAPSULE | Refills: 0 | Status: SHIPPED | OUTPATIENT
Start: 2023-04-02 | End: 2023-05-02

## 2023-03-28 RX ORDER — ASPIRIN 81 MG/1
81 TABLET, CHEWABLE ORAL DAILY
Qty: 30 TABLET | Refills: 3 | Status: SHIPPED | OUTPATIENT
Start: 2023-03-29

## 2023-03-28 RX ORDER — ATORVASTATIN CALCIUM 10 MG/1
10 TABLET, FILM COATED ORAL NIGHTLY
Qty: 30 TABLET | Refills: 3 | Status: SHIPPED | OUTPATIENT
Start: 2023-03-28

## 2023-03-28 RX ADMIN — CEFEPIME 2000 MG: 2 INJECTION, POWDER, FOR SOLUTION INTRAVENOUS at 01:14

## 2023-03-28 RX ADMIN — PREDNISONE 5 MG: 5 TABLET ORAL at 08:13

## 2023-03-28 RX ADMIN — INSULIN LISPRO 4 UNITS: 100 INJECTION, SOLUTION INTRAVENOUS; SUBCUTANEOUS at 08:21

## 2023-03-28 RX ADMIN — ENOXAPARIN SODIUM 30 MG: 100 INJECTION SUBCUTANEOUS at 19:43

## 2023-03-28 RX ADMIN — SODIUM BICARBONATE 650 MG: 650 TABLET ORAL at 19:45

## 2023-03-28 RX ADMIN — Medication 2000 UNITS: at 08:13

## 2023-03-28 RX ADMIN — OXYCODONE HYDROCHLORIDE 10 MG: 5 TABLET ORAL at 04:45

## 2023-03-28 RX ADMIN — INSULIN GLARGINE 10 UNITS: 100 INJECTION, SOLUTION SUBCUTANEOUS at 19:46

## 2023-03-28 RX ADMIN — ASPIRIN 81 MG 81 MG: 81 TABLET ORAL at 08:13

## 2023-03-28 RX ADMIN — GABAPENTIN 300 MG: 300 CAPSULE ORAL at 19:45

## 2023-03-28 RX ADMIN — INSULIN GLARGINE 10 UNITS: 100 INJECTION, SOLUTION SUBCUTANEOUS at 08:19

## 2023-03-28 RX ADMIN — GABAPENTIN 300 MG: 300 CAPSULE ORAL at 08:14

## 2023-03-28 RX ADMIN — OXYCODONE HYDROCHLORIDE 10 MG: 5 TABLET ORAL at 19:44

## 2023-03-28 RX ADMIN — IRON SUCROSE 200 MG: 20 INJECTION, SOLUTION INTRAVENOUS at 06:25

## 2023-03-28 RX ADMIN — ATORVASTATIN CALCIUM 10 MG: 10 TABLET, FILM COATED ORAL at 19:45

## 2023-03-28 RX ADMIN — SODIUM BICARBONATE 650 MG: 650 TABLET ORAL at 08:13

## 2023-03-28 RX ADMIN — ENOXAPARIN SODIUM 30 MG: 100 INJECTION SUBCUTANEOUS at 08:13

## 2023-03-28 RX ADMIN — Medication 1 CAPSULE: at 08:13

## 2023-03-28 RX ADMIN — PREDNISONE 5 MG: 5 TABLET ORAL at 19:46

## 2023-03-28 RX ADMIN — PANTOPRAZOLE SODIUM 40 MG: 40 TABLET, DELAYED RELEASE ORAL at 06:26

## 2023-03-28 RX ADMIN — OXYCODONE HYDROCHLORIDE 10 MG: 5 TABLET ORAL at 10:23

## 2023-03-28 ASSESSMENT — PAIN DESCRIPTION - LOCATION
LOCATION: BACK;ABDOMEN
LOCATION: LEG
LOCATION: GENERALIZED

## 2023-03-28 ASSESSMENT — PAIN SCALES - GENERAL
PAINLEVEL_OUTOF10: 3
PAINLEVEL_OUTOF10: 0
PAINLEVEL_OUTOF10: 7
PAINLEVEL_OUTOF10: 7
PAINLEVEL_OUTOF10: 9

## 2023-03-28 ASSESSMENT — PAIN DESCRIPTION - DESCRIPTORS: DESCRIPTORS: ACHING

## 2023-03-28 ASSESSMENT — PAIN DESCRIPTION - ORIENTATION
ORIENTATION: LOWER
ORIENTATION: LOWER
ORIENTATION: RIGHT;LEFT

## 2023-03-28 ASSESSMENT — PAIN - FUNCTIONAL ASSESSMENT: PAIN_FUNCTIONAL_ASSESSMENT: ACTIVITIES ARE NOT PREVENTED

## 2023-03-28 NOTE — PROGRESS NOTES
Vascular Lab Preliminary   Bilateral lower extremity venous duplex completed. No evidence for DVT,SVT, or Reflux. Final Report Pending.

## 2023-03-28 NOTE — PROGRESS NOTES
Pulmonary and Critical Care Progress note. [address] [place] [address]    MRN# [id number]    Acct# [de-identified]  3/28/2023   3:35 PM CDT    Referring Sherrie Frey MD      Chief Complaint: Lung nodule    HPI: Patient denies any respiratory complaints. He says his shortness of breath has subsided.     Medications    lactobacillus, 1 capsule, Oral, Daily    sodium bicarbonate, 650 mg, Oral, 4x Daily    insulin glargine, 10 Units, SubCUTAneous, BID    insulin lispro, 0-8 Units, SubCUTAneous, TID WC    insulin lispro, 0-4 Units, SubCUTAneous, Nightly    insulin lispro, 4 Units, SubCUTAneous, TID WC    pantoprazole, 40 mg, Oral, BID AC    Vitamin D, 2,000 Units, Oral, Daily    aspirin, 81 mg, Oral, Daily    enoxaparin, 30 mg, SubCUTAneous, BID    atorvastatin, 10 mg, Oral, Nightly    gabapentin, 300 mg, Oral, BID    predniSONE, 5 mg, Oral, BID    saccharomyces boulardii, 250 mg, Oral, Daily    tacrolimus ER, 3 mg, Oral, Daily    vitamin D, 50,000 Units, Oral, Weekly    iron sucrose, 200 mg, IntraVENous, Q24H     Review of Systems:  RESPIRATORY:  positive for  dyspnea  CARDIOVASCULAR:  positive for  dyspnea      Physical Exam:  BP (!) 134/90   Pulse 75   Temp 97.3 °F (36.3 °C) (Temporal)   Resp 16   Ht 6' 1\" (1.854 m)   Wt 258 lb 9.6 oz (117.3 kg)   SpO2 97%   BMI 34.12 kg/m²   Intake/Output Summary (Last 24 hours) at 3/28/2023 1535  Last data filed at 3/28/2023 1431  Gross per 24 hour   Intake 600 ml   Output 450 ml   Net 150 ml       General appearance: Middle-aged male in no distress   HEENT:normocephalic atraumatic  YUNET:A4K0 no murmurs  Lungs:diminished bilaterally no rubs or tenderness or dullness to percussion  Abdomen:Soft non tender no organomegaly  Extremities:no clubbing cyanosis or edema  Neuro:no focal findings  Skin:intact    Recent Labs     03/26/23  1709 03/27/23  0247 03/28/23  0129   WBC 17.9* 9.7 9.4   RBC 4.59* 3.86* 3.60*   HGB 13.2* 11.0* 10.3*   HCT 41.2* 35.2* 33.6*

## 2023-03-28 NOTE — DISCHARGE SUMMARY
nickel thick as directed. Wound size 5.3x2x0. 6     Contour Test strip  Generic drug: blood glucose test strips  1 each by In Vitro route 3 times daily As needed. Envarsus XR 1 MG Tb24 tablet  Generic drug: tacrolimus ER  Take 3 mg by mouth daily     gabapentin 300 MG capsule  Commonly known as: NEURONTIN  Take 1 capsule by mouth 2 times daily for 30 days. Lancets Misc  Use to test blood glucose four times a day and as needed for hypoglycemic events  DXE11.622 Z79.4     magnesium oxide 400 (240 Mg) MG tablet  Commonly known as: MAG-OX  TAKE ONE TABLET BY MOUTH 2 TIMES A DAY     [employer] Pen Needles 31G X 6 MM Misc  Generic drug: Insulin Pen Needle  1 each by Does not apply route daily     NovoLOG FlexPen 100 UNIT/ML injection pen  Generic drug: insulin aspart  Inject 1-6 Units into the skin 3 times daily (before meals)     oxyCODONE HCl 10 MG immediate release tablet  Commonly known as: OXY-IR     pantoprazole 40 MG tablet  Commonly known as: PROTONIX  Take 1 tablet by mouth daily TAKE ONE TABLET BY MOUTH EVERY DAY     potassium chloride 20 MEQ extended release tablet  Commonly known as: KLOR-CON M  TAKE ONE TABLET BY MOUTH EVERY DAY     predniSONE 5 MG tablet  Commonly known as: DELTASONE  TAKE ONE TABLET BY MOUTH 2 TIMES A DAY     Probiotic Daily Caps     saccharomyces boulardii 250 MG capsule  Commonly known as: Florastor  Take 1 capsule by mouth daily     sodium hypochlorite 0.125 % Soln external solution  Commonly known as: DAKINS  Apply topically as directed daily. * xeroform petrolatum gauze 5\"X9\" Misc external pads  Apply 2 each topically daily     * xeroform petrolatum gauze 5\"X9\" Misc external pads  Apply 1 each topically as needed (acute wound)           * This list has 2 medication(s) that are the same as other medications prescribed for you. Read the directions carefully, and ask your doctor or other care provider to review them with you.                    Where to Get Your Medications

## 2023-03-28 NOTE — DISCHARGE INSTRUCTIONS
Wound care:    Consider following up with plastic surgery outpatient to see if they can assist with full closure of abdominal wound. ABDOMINAL WOUND: Clean with soap and water. Pat dry. Apply medical honey gauze to the open areas then cover with Mepilex Border Sacrum, ref [id number], positioned to cover open areas with foam portion of dressing. Change every 4 days or if dressing becomes soiled, saturated, or dislodged.     Rx as directed  FU with PCP  FU with Pulm in 3 months
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
## 2023-03-28 NOTE — CARE COORDINATION
03/28/23 1010   IMM Letter   IMM Letter given to Patient/Family/Significant other/Guardian/POA/by: given to pt by Davis Cueto RN   IMM Letter date given: 03/28/23   IMM Letter time given: 1     First IMM given to patient and explained with patient verbalizing understanding. All questions and concerns addressed. Patient upgraded from observation to inpatient.  Electronically signed by Davis Cueto RN on 3/28/2023 at 11:05 AM

## 2023-03-29 ENCOUNTER — CARE COORDINATION (OUTPATIENT)
Dept: CASE MANAGEMENT | Age: 53
End: 2023-03-29

## 2023-03-29 DIAGNOSIS — R06.00 DYSPNEA, UNSPECIFIED TYPE: Primary | ICD-10-CM

## 2023-03-29 LAB — TACROLIMUS BLD-MCNC: 3.2 NG/ML

## 2023-03-29 PROCEDURE — 1111F DSCHRG MED/CURRENT MED MERGE: CPT | Performed by: FAMILY MEDICINE

## 2023-03-29 SDOH — ECONOMIC STABILITY: FOOD INSECURITY: WITHIN THE PAST 12 MONTHS, THE FOOD YOU BOUGHT JUST DIDN'T LAST AND YOU DIDN'T HAVE MONEY TO GET MORE.: SOMETIMES TRUE

## 2023-03-29 SDOH — ECONOMIC STABILITY: FOOD INSECURITY: WITHIN THE PAST 12 MONTHS, YOU WORRIED THAT YOUR FOOD WOULD RUN OUT BEFORE YOU GOT MONEY TO BUY MORE.: SOMETIMES TRUE

## 2023-03-29 SDOH — ECONOMIC STABILITY: INCOME INSECURITY: HOW HARD IS IT FOR YOU TO PAY FOR THE VERY BASICS LIKE FOOD, HOUSING, MEDICAL CARE, AND HEATING?: NOT VERY HARD

## 2023-03-29 SDOH — ECONOMIC STABILITY: TRANSPORTATION INSECURITY
IN THE PAST 12 MONTHS, HAS LACK OF TRANSPORTATION KEPT YOU FROM MEETINGS, WORK, OR FROM GETTING THINGS NEEDED FOR DAILY LIVING?: NO

## 2023-03-29 SDOH — ECONOMIC STABILITY: HOUSING INSECURITY
IN THE LAST 12 MONTHS, WAS THERE A TIME WHEN YOU DID NOT HAVE A STEADY PLACE TO SLEEP OR SLEPT IN A SHELTER (INCLUDING NOW)?: NO

## 2023-03-29 NOTE — PROGRESS NOTES
Late entry: Pt. Left ambulatory with spouse for home At 2115 per orders the pt. Spoke with him at home states he is at home and feeling ok. IV and Tele were Dc'd earlier on day shift and dc instruction were given at that time as well, pt, has no further questions and states he understood all dc instructions OK .  Anthony CARLISLE

## 2023-03-29 NOTE — CARE COORDINATION
[facility] Care Transitions Initial Follow Up Call    Call within 2 business days of discharge: Yes    Patient Current Location:  [facility] Transition Nurse contacted the patient by telephone to perform post hospital discharge assessment. Verified name and  with patient as identifiers. Provided introduction to self, and explanation of the Care Transition Nurse role. Patient: Rigoberto Izaguirre Patient : 1970   MRN: [id number]  Reason for Admission: Dyspnea  Discharge Date: 3/28/23 RARS: Readmission Risk Score: 14.7      Last Discharge [address]       Date Complaint Diagnosis Description Type Department Provider    3/26/23 Dizziness; Hypotension Exertional dyspnea . .. ED to Hosp-Admission (Discharged) (ADMITTED) L JOSE ELIAS Steward MD; Eliezer Mackenzie .. Was this an external facility discharge? No     Challenges to be reviewed by the provider   Additional needs identified to be addressed with provider: No  none         Method of communication with provider: none. Care Transition Nurse reviewed discharge instructions, medical action plan, and red flags with patient who verbalized understanding. The patient was given an opportunity to ask questions and does not have any further questions or concerns at this time. Were discharge instructions available to patient? Yes. Reviewed appropriate site of care based on symptoms and resources available to patient including:   PCP  Specialist  Urgent care clinics  [facility]  When to call [address] The patient agrees to contact the PCP office for questions related to their healthcare. Advance Care Planning:   Does patient have an Advance Directive: not on file; education provided. Advance Care Planning   Healthcare Decision Maker:  Patient does not have a current health care decision maker listed and is not able to name one at this time.       Medication reconciliation was performed with patient, who verbalizes understanding of

## 2023-03-31 ENCOUNTER — OFFICE VISIT (OUTPATIENT)
Dept: PRIMARY CARE CLINIC | Age: 53
End: 2023-03-31

## 2023-03-31 VITALS
WEIGHT: 252.6 LBS | HEIGHT: 73 IN | HEART RATE: 86 BPM | DIASTOLIC BLOOD PRESSURE: 52 MMHG | TEMPERATURE: 98.1 F | SYSTOLIC BLOOD PRESSURE: 110 MMHG | BODY MASS INDEX: 33.48 KG/M2 | OXYGEN SATURATION: 99 %

## 2023-03-31 DIAGNOSIS — R55 PRE-SYNCOPE: ICD-10-CM

## 2023-03-31 DIAGNOSIS — L98.492 ABDOMINAL WALL SKIN ULCER, WITH FAT LAYER EXPOSED (HCC): ICD-10-CM

## 2023-03-31 DIAGNOSIS — E11.42 DIABETIC POLYNEUROPATHY ASSOCIATED WITH TYPE 2 DIABETES MELLITUS (HCC): ICD-10-CM

## 2023-03-31 DIAGNOSIS — Z09 HOSPITAL DISCHARGE FOLLOW-UP: Primary | ICD-10-CM

## 2023-03-31 DIAGNOSIS — F41.1 GAD (GENERALIZED ANXIETY DISORDER): ICD-10-CM

## 2023-03-31 RX ORDER — ZINC GLUCONATE 50 MG
50 TABLET ORAL DAILY
COMMUNITY

## 2023-03-31 RX ORDER — BUSPIRONE HYDROCHLORIDE 7.5 MG/1
7.5 TABLET ORAL 3 TIMES DAILY PRN
Qty: 90 TABLET | Refills: 1 | Status: SHIPPED | OUTPATIENT
Start: 2023-03-31

## 2023-03-31 RX ORDER — BUSPIRONE HYDROCHLORIDE 7.5 MG/1
7.5 TABLET ORAL 3 TIMES DAILY PRN
Qty: 90 TABLET | Refills: 0 | Status: SHIPPED | OUTPATIENT
Start: 2023-03-31 | End: 2023-03-31

## 2023-03-31 ASSESSMENT — PATIENT HEALTH QUESTIONNAIRE - PHQ9
9. THOUGHTS THAT YOU WOULD BE BETTER OFF DEAD, OR OF HURTING YOURSELF: 0
7. TROUBLE CONCENTRATING ON THINGS, SUCH AS READING THE NEWSPAPER OR WATCHING TELEVISION: 1
SUM OF ALL RESPONSES TO PHQ QUESTIONS 1-9: 4
SUM OF ALL RESPONSES TO PHQ9 QUESTIONS 1 & 2: 0
3. TROUBLE FALLING OR STAYING ASLEEP: 0
SUM OF ALL RESPONSES TO PHQ QUESTIONS 1-9: 4
8. MOVING OR SPEAKING SO SLOWLY THAT OTHER PEOPLE COULD HAVE NOTICED. OR THE OPPOSITE, BEING SO FIGETY OR RESTLESS THAT YOU HAVE BEEN MOVING AROUND A LOT MORE THAN USUAL: 0
2. FEELING DOWN, DEPRESSED OR HOPELESS: 0
6. FEELING BAD ABOUT YOURSELF - OR THAT YOU ARE A FAILURE OR HAVE LET YOURSELF OR YOUR FAMILY DOWN: 0
5. POOR APPETITE OR OVEREATING: 0
SUM OF ALL RESPONSES TO PHQ QUESTIONS 1-9: 4
1. LITTLE INTEREST OR PLEASURE IN DOING THINGS: 0
10. IF YOU CHECKED OFF ANY PROBLEMS, HOW DIFFICULT HAVE THESE PROBLEMS MADE IT FOR YOU TO DO YOUR WORK, TAKE CARE OF THINGS AT HOME, OR GET ALONG WITH OTHER PEOPLE: 0
4. FEELING TIRED OR HAVING LITTLE ENERGY: 3
SUM OF ALL RESPONSES TO PHQ QUESTIONS 1-9: 4

## 2023-03-31 NOTE — PROGRESS NOTES
feeling better. He does still feel like he might pass out, had presyncope this morning. He was told to get a carotid ultrasound done. He also would like to see cardiology. Is also having severe anxiety, and would like to start a medication for the same. Advised him that would not recommend benzodiazepines along with oxycodone. He is agreeable to do BuSpar. Patient Active Problem List   Diagnosis    Pyloric ulcer    Duodenal ulcer, acute    History of GI bleed    Esophageal ulcer    History of liver transplant ([address]    History of esophageal varices    MDD (recurrent major depressive disorder) in remission ([address]    Varicose veins of both lower extremities with pain    Class 2 obesity    Type 2 diabetes mellitus with skin complication, with long-term current use of insulin (HCC)    Abdominal wall skin ulcer, with fat layer exposed ([address]    Streptococcal bacteremia    Claudication (HCC)    Septic arthritis of hip (HCC)    Acute hip pain, left    Hypomagnesemia    Skin ulcer of left groin with fat layer exposed ([address]    Former smoker    Diabetic polyneuropathy associated with type 2 diabetes mellitus (HCC)    Dyspnea    SIRS (systemic inflammatory response syndrome) ([address]    Right lower lobe pulmonary nodule    Vitamin D deficiency    Chronic abdominal wound infection, initial encounter       Medications listed as ordered at the time of discharge from hospital     Medication List            Accurate as of March 31, 2023  6:18 PM. If you have any questions, ask your nurse or doctor.                 START taking these medications      busPIRone 7.5 MG tablet  Commonly known as: BUSPAR  Take 1 tablet by mouth 3 times daily as needed (anxiety)  Started by: Renetta Key MD            CONTINUE taking these medications      ADULT NUTRITIONAL SUPPLEMENT + PO     aspirin 81 MG chewable tablet  Take 1 tablet by mouth daily     atorvastatin 10 MG tablet  Commonly known as: LIPITOR  Take 1 tablet by mouth nightly

## 2023-04-01 LAB
BACTERIA BLD CULT ORG #2: NORMAL
BACTERIA BLD CULT: NORMAL

## 2023-04-04 ENCOUNTER — CARE COORDINATION (OUTPATIENT)
Dept: CASE MANAGEMENT | Age: 53
End: 2023-04-04

## 2023-04-04 LAB
EKG P AXIS: NORMAL DEGREES
EKG P-R INTERVAL: NORMAL MS
EKG Q-T INTERVAL: 356 MS
EKG QRS DURATION: 100 MS
EKG QTC CALCULATION (BAZETT): 438 MS
EKG T AXIS: 95 DEGREES

## 2023-04-04 NOTE — CARE COORDINATION
discussed stroke symptoms, that he needed to go to the ED to be checked out. I told her that if he seeks treatment right away and it is indeed a stroke, they can do something about it. She said she had him do several things that she knew to do to see if he was having symptoms and he did not appear to be. She said she ended up massaging his neck and putting icy hot on it and he went to sleep eventually. She is not aware of any other issues at this time. I did tell her that if he still has symptoms when he wakes up, to have him go to the ED for evaluation. Will follow up as indicated.       De Hair, RN  Care Transitions Nurse  [phone]

## 2023-04-05 ENCOUNTER — CARE COORDINATION (OUTPATIENT)
Dept: CASE MANAGEMENT | Age: 53
End: 2023-04-05

## 2023-04-05 NOTE — CARE COORDINATION
[facility] Care Transitions Follow Up Call    Patient Current Location:  [place]    Attempted to make contact with patient/caregiver for a routine Care Transitions Coordination follow up call without success. Left a HIPAA compliant message regarding intent of call and call back information. Patient: Trip Sandoval  Patient : 1970   MRN: [id number]    Discharge Date: 3/28/23 RARS: Readmission Risk Score: 14.7    Follow Up  Future Appointments   Date Time Provider Logan Perry   5/3/2023  3:00 PM [facility] VASCULAR ROOM [facility]   5/10/2023  2:00 PM MD [facility] [place] [facility]   2023 10:15 AM SCHEDULE, [facility]     Plan for next call: Re-attempt at follow up call.     Yolanda Stevenson RN

## 2023-04-07 ENCOUNTER — CARE COORDINATION (OUTPATIENT)
Dept: CASE MANAGEMENT | Age: 53
End: 2023-04-07

## 2023-04-07 NOTE — CARE COORDINATION
[facility] Care Transitions Follow Up Call    Patient Current Location:  [address] [place], [place] [address]    Attempted to make contact with patient/caregiver for a routine Care Transitions Coordination follow up call without success. Unable to leave a HIPAA compliant message regarding intent of call and call back information. Call was forwarded to an unidentifiable voice messaging system. CTN will try again at another time. Patient: James Solitario  Patient : 1970   MRN: [id number]    Discharge Date: 3/28/23 RARS: Readmission Risk Score: 14.7    Follow Up  Future Appointments   Date Time Provider oLgan Perry   5/3/2023  3:00 PM [facility] VASCULAR ROOM [facility]   5/10/2023  2:00 PM Karuna Bedolla MD [facility]   2023 10:15 AM SCHEDULE, [facility]     Plan for next call: Re-attempt at ongoing follow up.     Rada Boxer, RN  Care Transitions Nurse  [phone]

## 2023-04-20 NOTE — PROGRESS NOTES
----- Message from Jesusita Donaldson RN sent at 4/17/2023  4:59 PM CDT -----  Needs 1 year follow up     and/or Nutrient Delivery:  Continue Current Diet, Continue Oral Nutrition Supplement  Nutrition Education/Counseling:  No recommendation at this time   Coordination of Nutrition Care:  Continue to monitor while inpatient    Goals:  PO intake 50% or greater.        Nutrition Monitoring and Evaluation:   Behavioral-Environmental Outcomes:  None Identified   Food/Nutrient Intake Outcomes:  Food and Nutrient Intake  Physical Signs/Symptoms Outcomes:  Biochemical Data     Discharge Planning:    Continue current diet     Electronically signed by Dave Vallejo MS, RD, LD on 3/4/21 at 12:56 PM CST    Contact: [phone]

## 2023-04-24 DIAGNOSIS — Z94.4 HISTORY OF LIVER TRANSPLANT (HCC): ICD-10-CM

## 2023-04-24 RX ORDER — TACROLIMUS 1 MG/1
TABLET, EXTENDED RELEASE ORAL
Qty: 90 TABLET | Refills: 0 | OUTPATIENT
Start: 2023-04-24

## 2023-04-24 NOTE — TELEPHONE ENCOUNTER
[address] called to request a refill on his medication.       Last office visit : 3/31/2023   Next office visit : 5/10/2023     Requested Prescriptions     Pending Prescriptions Disp Refills    ENVARSUS XR 1 MG TB24 tablet [Pharmacy Med Name: ENVARSUS XR 1 MG TABLET] 90 tablet 0     Sig: TAKE 3 TABLETS BY MOUTH EVERY DAY            Rena Hernandez, FANTASMAN

## 2023-05-03 ENCOUNTER — HOSPITAL ENCOUNTER (OUTPATIENT)
Dept: NON INVASIVE DIAGNOSTICS | Age: 53
Discharge: HOME | End: 2023-05-03
Payer: MEDICARE

## 2023-05-03 DIAGNOSIS — R55 PRE-SYNCOPE: ICD-10-CM

## 2023-05-03 PROCEDURE — 93880 EXTRACRANIAL BILAT STUDY: CPT

## 2023-05-24 DIAGNOSIS — Z94.4 HISTORY OF LIVER TRANSPLANT (HCC): ICD-10-CM

## 2023-05-25 RX ORDER — PREDNISONE 1 MG/1
TABLET ORAL
Qty: 180 TABLET | Refills: 11 | Status: SHIPPED | OUTPATIENT
Start: 2023-05-25

## 2023-05-25 NOTE — TELEPHONE ENCOUNTER
[address] called to request a refill on his medication.       Last office visit : 3/31/2023   Next office visit : 8/30/2023     Requested Prescriptions     Pending Prescriptions Disp Refills    predniSONE (DELTASONE) 5 MG tablet [Pharmacy Med Name: PREDNISONE 5 MG TABLET] 180 tablet 11     Sig: TAKE ONE TABLET BY MOUTH 2 TIMES A DAY            Melany Ramires LPN

## 2023-06-01 ENCOUNTER — PATIENT MESSAGE (OUTPATIENT)
Dept: PRIMARY CARE CLINIC | Age: 53
End: 2023-06-01

## 2023-06-01 RX ORDER — ATORVASTATIN CALCIUM 10 MG/1
10 TABLET, FILM COATED ORAL NIGHTLY
Qty: 30 TABLET | Refills: 3 | Status: SHIPPED | OUTPATIENT
Start: 2023-06-01

## 2023-06-01 NOTE — TELEPHONE ENCOUNTER
From: Dontrell Winston  To: Dr. Johnnie Issa: 6/1/2023 4:11 PM CDT  Subject: Atorvastatin    This message is being sent by Kingsley Arndt on behalf of Dontrell Winston. Please refill Atorvastatin at [facility]. Thank you in advance!

## 2023-06-01 NOTE — TELEPHONE ENCOUNTER
[address] called to request a refill on his medication.       Last office visit : 3/31/2023   Next office visit : 8/30/2023     Requested Prescriptions     Pending Prescriptions Disp Refills    atorvastatin (LIPITOR) 10 MG tablet 30 tablet 3     Sig: Take 1 tablet by mouth nightly            Pepper Hackett MA

## 2023-06-02 DIAGNOSIS — F41.1 GAD (GENERALIZED ANXIETY DISORDER): ICD-10-CM

## 2023-06-02 DIAGNOSIS — Z94.4 HISTORY OF LIVER TRANSPLANT (HCC): ICD-10-CM

## 2023-06-02 RX ORDER — BUSPIRONE HYDROCHLORIDE 7.5 MG/1
TABLET ORAL
Qty: 90 TABLET | Refills: 11 | Status: SHIPPED | OUTPATIENT
Start: 2023-06-02

## 2023-06-02 RX ORDER — TACROLIMUS 1 MG/1
TABLET, EXTENDED RELEASE ORAL
Qty: 90 TABLET | Refills: 11 | Status: SHIPPED | OUTPATIENT
Start: 2023-06-02

## 2023-06-20 ENCOUNTER — HOSPITAL ENCOUNTER (OUTPATIENT)
Dept: CT IMAGING | Age: 53
Discharge: HOME | End: 2023-06-20
Payer: MEDICARE

## 2023-06-20 DIAGNOSIS — R91.1 LUNG NODULE: ICD-10-CM

## 2023-06-20 PROCEDURE — 71250 CT THORAX DX C-: CPT

## 2023-08-02 DIAGNOSIS — F41.1 GAD (GENERALIZED ANXIETY DISORDER): ICD-10-CM

## 2023-08-02 DIAGNOSIS — E11.622 TYPE 2 DIABETES MELLITUS WITH OTHER SKIN ULCER, WITH LONG-TERM CURRENT USE OF INSULIN (HCC): Chronic | ICD-10-CM

## 2023-08-02 DIAGNOSIS — Z94.4 HISTORY OF LIVER TRANSPLANT (HCC): ICD-10-CM

## 2023-08-02 DIAGNOSIS — E87.6 HYPOKALEMIA: ICD-10-CM

## 2023-08-02 DIAGNOSIS — Z79.4 TYPE 2 DIABETES MELLITUS WITH OTHER SKIN ULCER, WITH LONG-TERM CURRENT USE OF INSULIN (HCC): Chronic | ICD-10-CM

## 2023-08-02 NOTE — TELEPHONE ENCOUNTER
[address] [facility] called to request a refill on his medication.       Last office visit : 3/31/2023   Next office visit : 8/29/2023     Requested Prescriptions     Pending Prescriptions Disp Refills    atorvastatin (LIPITOR) 10 MG tablet 90 tablet 3     Sig: Take 1 tablet by mouth nightly    aspirin 81 MG chewable tablet 90 tablet 3     Sig: Take 1 tablet by mouth daily    busPIRone (BUSPAR) 7.5 MG tablet 270 tablet 3     Sig: TAKE ONE TABLET BY MOUTH 3 TIMES A DAY AS NEEDEDFOR ANXIETY    tacrolimus ER (ENVARSUS XR) 1 MG TB24 tablet 270 tablet 3     Sig: TAKE 3 TABLETS BY MOUTH EVERY DAY    magnesium oxide (MAG-OX) 400 (240 Mg) MG tablet 120 tablet 3     Sig: Take 1 tablet by mouth 2 times daily    NOVOLOG FLEXPEN 100 UNIT/ML injection pen 5 Adjustable Dose Pre-filled Pen Syringe 2     Sig: Inject 1-6 Units into the skin 3 times daily (before meals)    potassium chloride (KLOR-CON M) 20 MEQ extended release tablet 90 tablet 3     Sig: Take 1 tablet by mouth daily    pantoprazole (PROTONIX) 40 MG tablet 90 tablet 3     Sig: Take 1 tablet by mouth daily    predniSONE (DELTASONE) 5 MG tablet 180 tablet 3     Sig: Take 1 tablet by mouth 2 times daily    Vitamin D, Ergocalciferol, 61679 units CAPS 12 capsule 3     Sig: Take 50,000 Units by mouth once a week            Garland Martínez MA

## 2023-08-03 RX ORDER — INSULIN ASPART 100 [IU]/ML
1-6 INJECTION, SOLUTION INTRAVENOUS; SUBCUTANEOUS
Qty: 5 ADJUSTABLE DOSE PRE-FILLED PEN SYRINGE | Refills: 2 | Status: SHIPPED | OUTPATIENT
Start: 2023-08-03

## 2023-08-03 RX ORDER — PANTOPRAZOLE SODIUM 40 MG/1
40 TABLET, DELAYED RELEASE ORAL DAILY
Qty: 90 TABLET | Refills: 3 | Status: SHIPPED | OUTPATIENT
Start: 2023-08-03

## 2023-08-03 RX ORDER — BUSPIRONE HYDROCHLORIDE 7.5 MG/1
TABLET ORAL
Qty: 270 TABLET | Refills: 3 | Status: SHIPPED | OUTPATIENT
Start: 2023-08-03

## 2023-08-03 RX ORDER — POTASSIUM CHLORIDE 20 MEQ/1
20 TABLET, EXTENDED RELEASE ORAL DAILY
Qty: 90 TABLET | Refills: 3 | Status: SHIPPED | OUTPATIENT
Start: 2023-08-03

## 2023-08-03 RX ORDER — LANOLIN ALCOHOL/MO/W.PET/CERES
400 CREAM (GRAM) TOPICAL 2 TIMES DAILY
Qty: 120 TABLET | Refills: 3 | Status: SHIPPED | OUTPATIENT
Start: 2023-08-03

## 2023-08-03 RX ORDER — ERGOCALCIFEROL 1.25 MG/1
50000 CAPSULE ORAL WEEKLY
Qty: 12 CAPSULE | Refills: 3 | Status: SHIPPED | OUTPATIENT
Start: 2023-08-03 | End: 2023-09-02

## 2023-08-03 RX ORDER — ASPIRIN 81 MG/1
81 TABLET, CHEWABLE ORAL DAILY
Qty: 90 TABLET | Refills: 3 | Status: SHIPPED | OUTPATIENT
Start: 2023-08-03

## 2023-08-03 RX ORDER — PREDNISONE 5 MG/1
5 TABLET ORAL 2 TIMES DAILY
Qty: 180 TABLET | Refills: 3 | Status: SHIPPED | OUTPATIENT
Start: 2023-08-03

## 2023-08-03 RX ORDER — ATORVASTATIN CALCIUM 10 MG/1
10 TABLET, FILM COATED ORAL NIGHTLY
Qty: 90 TABLET | Refills: 3 | Status: SHIPPED | OUTPATIENT
Start: 2023-08-03

## 2023-08-29 ENCOUNTER — OFFICE VISIT (OUTPATIENT)
Dept: PRIMARY CARE CLINIC | Age: 53
End: 2023-08-29
Payer: MEDICARE

## 2023-08-29 VITALS
TEMPERATURE: 98.2 F | HEART RATE: 93 BPM | HEIGHT: 73 IN | WEIGHT: 269 LBS | DIASTOLIC BLOOD PRESSURE: 78 MMHG | OXYGEN SATURATION: 98 % | BODY MASS INDEX: 35.65 KG/M2 | SYSTOLIC BLOOD PRESSURE: 130 MMHG

## 2023-08-29 DIAGNOSIS — I83.93 VARICOSE VEINS OF BOTH LOWER EXTREMITIES WITHOUT ULCER OR INFLAMMATION: ICD-10-CM

## 2023-08-29 DIAGNOSIS — E66.01 SEVERE OBESITY (BMI 35.0-39.9) WITH COMORBIDITY (HCC): ICD-10-CM

## 2023-08-29 DIAGNOSIS — Z94.4 LIVER TRANSPLANT STATUS (HCC): ICD-10-CM

## 2023-08-29 DIAGNOSIS — E11.42 DIABETIC POLYNEUROPATHY ASSOCIATED WITH TYPE 2 DIABETES MELLITUS (HCC): ICD-10-CM

## 2023-08-29 DIAGNOSIS — E11.628 TYPE 2 DIABETES MELLITUS WITH OTHER SKIN COMPLICATION, WITH LONG-TERM CURRENT USE OF INSULIN (HCC): Primary | Chronic | ICD-10-CM

## 2023-08-29 DIAGNOSIS — Z79.4 TYPE 2 DIABETES MELLITUS WITH OTHER SKIN COMPLICATION, WITH LONG-TERM CURRENT USE OF INSULIN (HCC): Primary | Chronic | ICD-10-CM

## 2023-08-29 PROCEDURE — 99214 OFFICE O/P EST MOD 30 MIN: CPT | Performed by: FAMILY MEDICINE

## 2023-08-29 PROCEDURE — G8427 DOCREV CUR MEDS BY ELIG CLIN: HCPCS | Performed by: FAMILY MEDICINE

## 2023-08-29 PROCEDURE — 3017F COLORECTAL CA SCREEN DOC REV: CPT | Performed by: FAMILY MEDICINE

## 2023-08-29 PROCEDURE — G8417 CALC BMI ABV UP PARAM F/U: HCPCS | Performed by: FAMILY MEDICINE

## 2023-08-29 PROCEDURE — 2022F DILAT RTA XM EVC RTNOPTHY: CPT | Performed by: FAMILY MEDICINE

## 2023-08-29 PROCEDURE — 1036F TOBACCO NON-USER: CPT | Performed by: FAMILY MEDICINE

## 2023-08-29 PROCEDURE — 3044F HG A1C LEVEL LT 7.0%: CPT | Performed by: FAMILY MEDICINE

## 2023-08-29 RX ORDER — NALOXONE HYDROCHLORIDE 4 MG/.1ML
SPRAY NASAL
COMMUNITY
Start: 2023-06-01

## 2023-08-29 RX ORDER — PREGABALIN 75 MG/1
CAPSULE ORAL
COMMUNITY
Start: 2023-08-02

## 2023-08-29 NOTE — PROGRESS NOTES
sounds: Normal breath sounds. No wheezing. Chest:      Chest wall: No tenderness. Abdominal:      General: Abdomen is flat. Bowel sounds are normal.      Tenderness: There is no abdominal tenderness. Musculoskeletal:      Comments: He has varicose veins of his bilateral lower extremities. These are tender to palpation. Skin:     General: Skin is warm and dry. Neurological:      Mental Status: He is alert. Psychiatric:         Mood and Affect: Mood normal.         Behavior: Behavior normal.         Thought Content: Thought content normal.         No results found for this visit on 08/29/23.           Carmen Ramos MD

## 2023-09-05 ASSESSMENT — ENCOUNTER SYMPTOMS
COUGH: 0
SHORTNESS OF BREATH: 0
CONSTIPATION: 0
ABDOMINAL PAIN: 0
NAUSEA: 0
TROUBLE SWALLOWING: 0
VOMITING: 0
DIARRHEA: 0

## 2023-10-08 DIAGNOSIS — L98.492 ABDOMINAL WALL SKIN ULCER, WITH FAT LAYER EXPOSED (HCC): ICD-10-CM

## 2023-10-08 DIAGNOSIS — Z94.4 HISTORY OF LIVER TRANSPLANT (HCC): ICD-10-CM

## 2023-10-09 RX ORDER — PREDNISONE 5 MG/1
5 TABLET ORAL 2 TIMES DAILY
Qty: 180 TABLET | Refills: 3 | Status: SHIPPED | OUTPATIENT
Start: 2023-10-09

## 2023-10-09 RX ORDER — ASPIRIN 81 MG/1
81 TABLET, CHEWABLE ORAL DAILY
Qty: 90 TABLET | Refills: 3 | Status: SHIPPED | OUTPATIENT
Start: 2023-10-09

## 2023-10-09 RX ORDER — ERGOCALCIFEROL 1.25 MG/1
50000 CAPSULE ORAL WEEKLY
Qty: 12 CAPSULE | Refills: 3 | Status: SHIPPED | OUTPATIENT
Start: 2023-10-09 | End: 2023-11-08

## 2023-10-09 RX ORDER — BISMUTH TRIBROMOPH/PETROLATUM 5"X9"
1 BANDAGE TOPICAL PRN
Qty: 30 EACH | Refills: 0 | Status: SHIPPED | OUTPATIENT
Start: 2023-10-09

## 2023-10-09 RX ORDER — ATORVASTATIN CALCIUM 10 MG/1
10 TABLET, FILM COATED ORAL NIGHTLY
Qty: 90 TABLET | Refills: 3 | Status: SHIPPED | OUTPATIENT
Start: 2023-10-09

## 2023-10-09 RX ORDER — SODIUM HYPOCHLORITE 1.25 MG/ML
SOLUTION TOPICAL
Qty: 1 EACH | Refills: 1 | Status: SHIPPED | OUTPATIENT
Start: 2023-10-09

## 2023-10-27 ENCOUNTER — TELEPHONE (OUTPATIENT)
Dept: PRIMARY CARE CLINIC | Age: 53
End: 2023-10-27

## 2023-10-27 NOTE — TELEPHONE ENCOUNTER
Wife called stating Dr. Griffin Mars has not received referral from our office. Referral was sent to Dr. Sally Shaver. Please advise.

## 2023-10-27 NOTE — TELEPHONE ENCOUNTER
They are in the same office, on the referral it does state pt can see anyone in the office. We have to put a name to send the referral. When they call him for an appointment he just has to let them know he wants to see Dr. Salvador Garcia.

## 2023-10-30 NOTE — TELEPHONE ENCOUNTER
Dr. Georgina Wilder is at [address] not at [facility]. Patient had called Dr. Georgina Wilder office and they had not received a referral on the patient. Please advise. HISTORY OF PRESENT ILLNESS   Williams Pack is a 59  y.o. male. HPI   F/u for DM 2 after last visit in Sept 2019     He has been doing well   Stays active   He had 61 tick bits and he found 31 embedded - pcp follows up   Gained 2 lbs   He has pain in genital area worried about taking \"jardiance\"        Old history     He got diagnosed with [facility]GRANBY fever   He got antibiotics by pcp   Gained 1 lb of weight   He had some issues with PA on invokana  And   Janumet xr   Compliant with meds, otherwise   C/o price on janumet xr   Happy man   Agrees to better compliance         Review of Systems   Constitutional: Negative. HENT: Negative. Eyes: Negative for pain and redness. Respiratory: Negative. Cardiovascular: Negative for chest pain, palpitations and leg swelling. Gastrointestinal: Negative. Negative for constipation. Genitourinary: Negative. Musculoskeletal: Negative for myalgias. Skin: Negative. Neurological: Negative. Endo/Heme/Allergies: Negative. Psychiatric/Behavioral: Negative for depression and memory loss. The patient does not have insomnia. Physical Exam   Constitutional: He is oriented to person, place, and time. He appears well-developed and well-nourished. HENT:   Head: Normocephalic. Eyes: Conjunctivae and extraocular motions are normal. Pupils are equal, round, and reactive to light. Neck: Normal range of motion. Neck supple. No JVD present. No tracheal deviation present. No thyromegaly present. Cardiovascular: Normal rate, regular rhythm and normal heart sounds. Pulmonary/Chest: Effort normal and breath sounds normal.   Abdominal: Soft. Bowel sounds are normal.   Musculoskeletal: Normal range of motion. Lymphadenopathy:   He has no cervical adenopathy. Neurological: He is alert and oriented to person, place, and time. He has normal reflexes. Skin: Skin is warm. Psychiatric: He has a normal mood and affect.          Lab Results   Component Value Date/Time    Hemoglobin A1c 5.7 (H) 09/01/2020 08:19 AM    Hemoglobin A1c 5.9 (H) 09/10/2019 08:08 AM    Hemoglobin A1c 5.6 09/19/2018 08:13 AM    Microalbumin/Creat ratio (mg/g creat) 24 09/01/2020 08:19 AM    Microalbumin,urine random 2.86 09/01/2020 08:19 AM    LDL, calculated 76.4 09/01/2020 08:19 AM    Creatinine 0.94 09/01/2020 08:19 AM      Lab Results   Component Value Date/Time    Cholesterol, total 155 09/01/2020 08:19 AM    HDL Cholesterol 53 09/01/2020 08:19 AM    LDL, calculated 76.4 09/01/2020 08:19 AM    Triglyceride 128 09/01/2020 08:19 AM    CHOL/HDL Ratio 2.9 09/01/2020 08:19 AM     Lab Results   Component Value Date/Time    Alk.  phosphatase 58 09/01/2020 08:19 AM     Lab Results   Component Value Date/Time    GFR est AA >60 09/01/2020 08:19 AM    GFR est non-AA >60 09/01/2020 08:19 AM    Creatinine 0.94 09/01/2020 08:19 AM    BUN 13 09/01/2020 08:19 AM    Sodium 141 09/01/2020 08:19 AM    Potassium 3.9 09/01/2020 08:19 AM    Chloride 108 09/01/2020 08:19 AM    CO2 25 09/01/2020 08:19 AM            ASSESSMENT and PLAN     1. DM 2 uncontrolled- A1c is 5.7 %       From   Sept 2020     compared to   5.9 %     From sept 2019    Compared  to   5.6 %     From   Sept 2018     Compared to  5.6 %   From sept 2017    Compared to   6 %      From march 2017   compared to 5.9 %   From sept 2016  Compared  to    5.8 %    From march 2016  Compared to    5.6 %     From sept 2015 compared to  5.7 %   From March 2015 compared to  5.8 %   From sept 2014 compared to  6.4 % from feb 2014 Sept 2020   Improved control   On janumet xr  Once a day    and jardiance         Sept 2019     He has Not been experiencing low blood sugars and he eats consistently   He  changed his  behaviour in good way   Maintaining glycemic control   Reviewed log from book - no lows   and blood sugars are good   TLC emphasized   Continue on janumet xr 50/1 gm bid ,   Stopped  kombiglyze  to 2.5/1000 bid by formulary   He is taking it only once a day, he would like to try that way   Changed from   Invokana 300 mg a day  To jardiance 25 by formulary   Reassured him that he will never have low sugars on current regimen        2. HPL : lipids at goal-TG is better,continue on pravachol       3. HTN: well controlled, on cozaar and norvasc , no microalb noted       4. Neuropathy - he stopped   Gabapentin  B-complex to be continued   Off vitacort       5. Elevated liver enzymes- resolved   ASA 81 mg daily       6.  Exposure to Ticks -Followed by pcp         F/u in 12  months

## 2023-11-09 DIAGNOSIS — E87.6 HYPOKALEMIA: ICD-10-CM

## 2023-11-09 RX ORDER — LANOLIN ALCOHOL/MO/W.PET/CERES
400 CREAM (GRAM) TOPICAL 2 TIMES DAILY
Qty: 60 TABLET | Refills: 11 | Status: SHIPPED | OUTPATIENT
Start: 2023-11-09

## 2024-01-02 DIAGNOSIS — Z79.4 TYPE 2 DIABETES MELLITUS WITH OTHER SKIN COMPLICATION, WITH LONG-TERM CURRENT USE OF INSULIN (HCC): ICD-10-CM

## 2024-01-02 DIAGNOSIS — N52.9 ERECTILE DYSFUNCTION, UNSPECIFIED ERECTILE DYSFUNCTION TYPE: ICD-10-CM

## 2024-01-02 DIAGNOSIS — E11.628 TYPE 2 DIABETES MELLITUS WITH OTHER SKIN COMPLICATION, WITH LONG-TERM CURRENT USE OF INSULIN (HCC): ICD-10-CM

## 2024-01-02 DIAGNOSIS — Z94.4 HISTORY OF LIVER TRANSPLANT (HCC): Primary | ICD-10-CM

## 2024-01-04 ENCOUNTER — TELEPHONE (OUTPATIENT)
Dept: PRIMARY CARE CLINIC | Age: 54
End: 2024-01-04

## 2024-01-04 NOTE — TELEPHONE ENCOUNTER
Patient called stating he is needing a PA on Envarsus. He is requesting a call back. Please advise.

## 2024-01-10 ENCOUNTER — HOSPITAL ENCOUNTER (EMERGENCY)
Age: 54
Discharge: HOME | End: 2024-01-10
Payer: MEDICARE

## 2024-01-10 VITALS
DIASTOLIC BLOOD PRESSURE: 78 MMHG | TEMPERATURE: 97.4 F | BODY MASS INDEX: 34.3 KG/M2 | HEART RATE: 81 BPM | WEIGHT: 260 LBS | SYSTOLIC BLOOD PRESSURE: 120 MMHG | OXYGEN SATURATION: 100 % | RESPIRATION RATE: 20 BRPM

## 2024-01-10 DIAGNOSIS — L03.119 CELLULITIS AND ABSCESS OF LEG: Primary | ICD-10-CM

## 2024-01-10 DIAGNOSIS — L02.419 CELLULITIS AND ABSCESS OF LEG: Primary | ICD-10-CM

## 2024-01-10 LAB
ALBUMIN SERPL-MCNC: 3 G/DL (ref 3.5–5.2)
ALP SERPL-CCNC: 231 U/L (ref 40–130)
ALT SERPL-CCNC: 39 U/L (ref 5–41)
ANION GAP SERPL CALCULATED.3IONS-SCNC: 15 MMOL/L (ref 7–19)
AST SERPL-CCNC: 82 U/L (ref 5–40)
BASOPHILS # BLD: 0.1 K/UL (ref 0–0.2)
BASOPHILS NFR BLD: 0.4 % (ref 0–1)
BILIRUB SERPL-MCNC: 0.6 MG/DL (ref 0.2–1.2)
BUN SERPL-MCNC: 17 MG/DL (ref 6–20)
CALCIUM SERPL-MCNC: 8.8 MG/DL (ref 8.6–10)
CHLORIDE SERPL-SCNC: 100 MMOL/L (ref 98–111)
CO2 SERPL-SCNC: 18 MMOL/L (ref 22–29)
CREAT SERPL-MCNC: 1 MG/DL (ref 0.5–1.2)
EOSINOPHIL # BLD: 0 K/UL (ref 0–0.6)
EOSINOPHIL NFR BLD: 0.1 % (ref 0–5)
ERYTHROCYTE [DISTWIDTH] IN BLOOD BY AUTOMATED COUNT: 14 % (ref 11.5–14.5)
GLUCOSE BLD-MCNC: 246 MG/DL (ref 70–99)
GLUCOSE SERPL-MCNC: 254 MG/DL (ref 74–109)
HCT VFR BLD AUTO: 37.9 % (ref 42–52)
HGB BLD-MCNC: 13 G/DL (ref 14–18)
IMM GRANULOCYTES # BLD: 0.2 K/UL
LACTATE BLDV-SCNC: 2.1 MG/DL (ref 0.5–1.9)
LYMPHOCYTES # BLD: 0.6 K/UL (ref 1.1–4.5)
LYMPHOCYTES NFR BLD: 3.6 % (ref 20–40)
MCH RBC QN AUTO: 33.6 PG (ref 27–31)
MCHC RBC AUTO-ENTMCNC: 34.3 G/DL (ref 33–37)
MCV RBC AUTO: 97.9 FL (ref 80–94)
MONOCYTES # BLD: 1.7 K/UL (ref 0–0.9)
MONOCYTES NFR BLD: 10.7 % (ref 0–10)
NEUTROPHILS # BLD: 13.5 K/UL (ref 1.5–7.5)
NEUTS SEG NFR BLD: 84.3 % (ref 50–65)
PERFORMED ON: ABNORMAL
PLATELET # BLD AUTO: 233 K/UL (ref 130–400)
PMV BLD AUTO: 9.5 FL (ref 9.4–12.4)
POTASSIUM SERPL-SCNC: 4.4 MMOL/L (ref 3.5–5)
PROT SERPL-MCNC: 6.6 G/DL (ref 6.6–8.7)
RBC # BLD AUTO: 3.87 M/UL (ref 4.7–6.1)
SODIUM SERPL-SCNC: 133 MMOL/L (ref 136–145)
WBC # BLD AUTO: 16.1 K/UL (ref 4.8–10.8)

## 2024-01-10 PROCEDURE — 85025 COMPLETE CBC W/AUTO DIFF WBC: CPT

## 2024-01-10 PROCEDURE — 87186 SC STD MICRODIL/AGAR DIL: CPT

## 2024-01-10 PROCEDURE — 6370000000 HC RX 637 (ALT 250 FOR IP): Performed by: NURSE PRACTITIONER

## 2024-01-10 PROCEDURE — 87040 BLOOD CULTURE FOR BACTERIA: CPT

## 2024-01-10 PROCEDURE — 99284 EMERGENCY DEPT VISIT MOD MDM: CPT

## 2024-01-10 PROCEDURE — 87070 CULTURE OTHR SPECIMN AEROBIC: CPT

## 2024-01-10 PROCEDURE — 82962 GLUCOSE BLOOD TEST: CPT

## 2024-01-10 PROCEDURE — 80053 COMPREHEN METABOLIC PANEL: CPT

## 2024-01-10 PROCEDURE — 2580000003 HC RX 258: Performed by: NURSE PRACTITIONER

## 2024-01-10 PROCEDURE — 83605 ASSAY OF LACTIC ACID: CPT

## 2024-01-10 PROCEDURE — 87075 CULTR BACTERIA EXCEPT BLOOD: CPT

## 2024-01-10 PROCEDURE — 36415 COLL VENOUS BLD VENIPUNCTURE: CPT

## 2024-01-10 PROCEDURE — 87205 SMEAR GRAM STAIN: CPT

## 2024-01-10 RX ORDER — SULFAMETHOXAZOLE AND TRIMETHOPRIM 800; 160 MG/1; MG/1
1 TABLET ORAL 2 TIMES DAILY
Qty: 20 TABLET | Refills: 0 | Status: SHIPPED | OUTPATIENT
Start: 2024-01-10 | End: 2024-01-20

## 2024-01-10 RX ORDER — SULFAMETHOXAZOLE AND TRIMETHOPRIM 800; 160 MG/1; MG/1
1 TABLET ORAL ONCE
Status: COMPLETED | OUTPATIENT
Start: 2024-01-10 | End: 2024-01-10

## 2024-01-10 RX ORDER — SODIUM CHLORIDE, SODIUM LACTATE, POTASSIUM CHLORIDE, AND CALCIUM CHLORIDE .6; .31; .03; .02 G/100ML; G/100ML; G/100ML; G/100ML
1000 INJECTION, SOLUTION INTRAVENOUS ONCE
Status: COMPLETED | OUTPATIENT
Start: 2024-01-10 | End: 2024-01-10

## 2024-01-10 RX ORDER — CEPHALEXIN 500 MG/1
500 CAPSULE ORAL 2 TIMES DAILY
Qty: 20 CAPSULE | Refills: 0 | Status: SHIPPED | OUTPATIENT
Start: 2024-01-10 | End: 2024-01-20

## 2024-01-10 RX ORDER — CEPHALEXIN 500 MG/1
500 CAPSULE ORAL ONCE
Status: COMPLETED | OUTPATIENT
Start: 2024-01-10 | End: 2024-01-10

## 2024-01-10 RX ADMIN — SODIUM CHLORIDE, POTASSIUM CHLORIDE, SODIUM LACTATE AND CALCIUM CHLORIDE 1000 ML: 600; 310; 30; 20 INJECTION, SOLUTION INTRAVENOUS at 17:11

## 2024-01-10 RX ADMIN — CEPHALEXIN 500 MG: 500 CAPSULE ORAL at 15:56

## 2024-01-10 RX ADMIN — SULFAMETHOXAZOLE AND TRIMETHOPRIM 1 TABLET: 800; 160 TABLET ORAL at 15:57

## 2024-01-10 ASSESSMENT — ENCOUNTER SYMPTOMS
SHORTNESS OF BREATH: 0
NAUSEA: 0
DIARRHEA: 0
COUGH: 0
VOMITING: 0
ABDOMINAL PAIN: 0
BACK PAIN: 0

## 2024-01-10 NOTE — ED PROVIDER NOTES
Normal breath sounds.   Abdominal:      General: Bowel sounds are normal. There is no distension.      Palpations: Abdomen is soft.      Tenderness: There is no abdominal tenderness. There is no right CVA tenderness, left CVA tenderness or guarding.   Musculoskeletal:         General: Normal range of motion.      Cervical back: Neck supple.        Legs:       Comments: Purulent drainage with surrounding erythema. Denies that it involves testicles/scrotum, but will not let me evaluate.   Skin:     General: Skin is warm and dry.      Capillary Refill: Capillary refill takes less than 2 seconds.   Neurological:      General: No focal deficit present.      Mental Status: He is alert and oriented to person, place, and time.         DIAGNOSTIC RESULTS     EKG: All EKG's are interpreted by the Emergency Department Physician who either signs or Co-signs this chart in the absence of a cardiologist.        RADIOLOGY:   Non-plain film images such as CT, Ultrasound and MRI are read by the radiologist. Plain radiographic images are visualized and preliminarily interpreted by the emergency physician with the below findings:        Interpretation per the Radiologist below, if available at the time of this note:    No orders to display         ED BEDSIDE ULTRASOUND:   Performed by ED Physician - none    LABS:  Labs Reviewed   COMPREHENSIVE METABOLIC PANEL - Abnormal; Notable for the following components:       Result Value    Sodium 133 (*)     CO2 18 (*)     Glucose 254 (*)     Albumin 3.0 (*)     Alkaline Phosphatase 231 (*)     AST 82 (*)     All other components within normal limits   CBC WITH AUTO DIFFERENTIAL - Abnormal; Notable for the following components:    WBC 16.1 (*)     RBC 3.87 (*)     Hemoglobin 13.0 (*)     Hematocrit 37.9 (*)     MCV 97.9 (*)     MCH 33.6 (*)     Neutrophils % 84.3 (*)     Lymphocytes % 3.6 (*)     Monocytes % 10.7 (*)     Neutrophils Absolute 13.5 (*)     Lymphocytes Absolute 0.6 (*)

## 2024-01-11 ENCOUNTER — CARE COORDINATION (OUTPATIENT)
Dept: CARE COORDINATION | Age: 54
End: 2024-01-11

## 2024-01-11 LAB
BACTERIA BLD CULT ORG #2: NORMAL
BACTERIA BLD CULT: NORMAL

## 2024-01-11 NOTE — CARE COORDINATION
Spoke to patient.  He was in ED yesterday for abscess of upper right leg. Pt stated abscess is draining and he has dressing supplies to change it. Patient started both Keflex and Bactrim po. He denied any concerning side effects from meds. He denied worsening redness or swelling at site. Denied fever, stated he is eating ok. Urged pt to stay well hydrated, push fluids 2/2 infection and on 2 antibiotics.  Patient is mainly concerned about his Envarsus XR, stated his local pharmacy can fill it as usual but his copay is over $100. Cost has in past been very low or no copay and pt cannot afford new cost. Patient has talked to his pharmacist at [facility], was told it's something to do with his insurance. Pt asking that PCP contact insurance about it.  - Encouraged pt to push fluids 2/2 to infection and on two antibiotics. Monitor for GI upset as well and reminded pt both factors may cause increased blood sugar. Patient relayed he uses a finger glucometer, does not use his CGM.   - Reminded pt he is due for A1c lab and can get that while at PCP on Monday for follow up. Encouraged that pt log his BS between now and appt date as well. Pt voiced understanding.  - Patient agreed to f/u on med from [facility] and to further support call next week.  - Contacted [facility], spoke to PharmD. Pharm D confirmed he contacted patient's [facility] Medicare about copay on Tacrolimus. Ins stated med should be fully covered and unable to tell PharmD why it isn't. PharmD unable to do more than run med and give pt the copay that continues to show up. PharmD stated he transferred prescription to [facility] on [address] last October, does not know outcome of that.  - Attempted to contact [facility] at [address] On hold 30 minutes and had to disconnect due to long wait. Will try again.  Electronically signed by Yoly Mcnally RN on 1/11/2024 at 5:32 PM

## 2024-01-12 ENCOUNTER — CARE COORDINATION (OUTPATIENT)
Dept: CARE COORDINATION | Age: 54
End: 2024-01-12

## 2024-01-12 NOTE — CARE COORDINATION
Unable to speak with pharmD at [facility] about prescription for Tacrolimus. Contacted [facility] Medicare, spoke to Jacy. Per Jacy (call ref #[id number]), unable to pursue conversation with insurance about patient's Tacrolimus coverage at this time.  Electronically signed by Yoly Mcnally RN on 1/12/2024 at 1:03 PM

## 2024-01-14 LAB
BACTERIA SPEC ANAEROBE CULT: ABNORMAL
BACTERIA SPEC ANAEROBE CULT: ABNORMAL
BACTERIA SPEC ANAEROBE+AEROBE CULT: ABNORMAL
GRAM STN SPEC: ABNORMAL
ORGANISM: ABNORMAL

## 2024-01-14 ASSESSMENT — PATIENT HEALTH QUESTIONNAIRE - PHQ9
3. TROUBLE FALLING OR STAYING ASLEEP: NOT AT ALL
1. LITTLE INTEREST OR PLEASURE IN DOING THINGS: NOT AT ALL
SUM OF ALL RESPONSES TO PHQ9 QUESTIONS 1 & 2: 0
6. FEELING BAD ABOUT YOURSELF - OR THAT YOU ARE A FAILURE OR HAVE LET YOURSELF OR YOUR FAMILY DOWN: NOT AT ALL
4. FEELING TIRED OR HAVING LITTLE ENERGY: NOT AT ALL
SUM OF ALL RESPONSES TO PHQ QUESTIONS 1-9: 0
7. TROUBLE CONCENTRATING ON THINGS, SUCH AS READING THE NEWSPAPER OR WATCHING TELEVISION: NOT AT ALL
SUM OF ALL RESPONSES TO PHQ QUESTIONS 1-9: 0
7. TROUBLE CONCENTRATING ON THINGS, SUCH AS READING THE NEWSPAPER OR WATCHING TELEVISION: 0
9. THOUGHTS THAT YOU WOULD BE BETTER OFF DEAD, OR OF HURTING YOURSELF: NOT AT ALL
10. IF YOU CHECKED OFF ANY PROBLEMS, HOW DIFFICULT HAVE THESE PROBLEMS MADE IT FOR YOU TO DO YOUR WORK, TAKE CARE OF THINGS AT HOME, OR GET ALONG WITH OTHER PEOPLE: NOT DIFFICULT AT ALL
10. IF YOU CHECKED OFF ANY PROBLEMS, HOW DIFFICULT HAVE THESE PROBLEMS MADE IT FOR YOU TO DO YOUR WORK, TAKE CARE OF THINGS AT HOME, OR GET ALONG WITH OTHER PEOPLE: 0
3. TROUBLE FALLING OR STAYING ASLEEP: 0
SUM OF ALL RESPONSES TO PHQ QUESTIONS 1-9: 0
SUM OF ALL RESPONSES TO PHQ QUESTIONS 1-9: 0
8. MOVING OR SPEAKING SO SLOWLY THAT OTHER PEOPLE COULD HAVE NOTICED. OR THE OPPOSITE, BEING SO FIGETY OR RESTLESS THAT YOU HAVE BEEN MOVING AROUND A LOT MORE THAN USUAL: 0
1. LITTLE INTEREST OR PLEASURE IN DOING THINGS: 0
5. POOR APPETITE OR OVEREATING: NOT AT ALL
8. MOVING OR SPEAKING SO SLOWLY THAT OTHER PEOPLE COULD HAVE NOTICED. OR THE OPPOSITE - BEING SO FIDGETY OR RESTLESS THAT YOU HAVE BEEN MOVING AROUND A LOT MORE THAN USUAL: NOT AT ALL
9. THOUGHTS THAT YOU WOULD BE BETTER OFF DEAD, OR OF HURTING YOURSELF: 0
SUM OF ALL RESPONSES TO PHQ QUESTIONS 1-9: 0
4. FEELING TIRED OR HAVING LITTLE ENERGY: 0
5. POOR APPETITE OR OVEREATING: 0
6. FEELING BAD ABOUT YOURSELF - OR THAT YOU ARE A FAILURE OR HAVE LET YOURSELF OR YOUR FAMILY DOWN: 0
2. FEELING DOWN, DEPRESSED OR HOPELESS: 0
2. FEELING DOWN, DEPRESSED OR HOPELESS: NOT AT ALL

## 2024-01-15 ENCOUNTER — ANESTHESIA EVENT (OUTPATIENT)
Dept: PERIOP | Facility: HOSPITAL | Age: 54
End: 2024-01-15
Payer: MEDICARE

## 2024-01-15 ENCOUNTER — APPOINTMENT (OUTPATIENT)
Dept: ULTRASOUND IMAGING | Facility: HOSPITAL | Age: 54
End: 2024-01-15
Payer: MEDICARE

## 2024-01-15 ENCOUNTER — ANESTHESIA (OUTPATIENT)
Dept: PERIOP | Facility: HOSPITAL | Age: 54
End: 2024-01-15
Payer: MEDICARE

## 2024-01-15 ENCOUNTER — OFFICE VISIT (OUTPATIENT)
Dept: PRIMARY CARE CLINIC | Age: 54
End: 2024-01-15
Payer: MEDICARE

## 2024-01-15 ENCOUNTER — HOSPITAL ENCOUNTER (INPATIENT)
Facility: HOSPITAL | Age: 54
LOS: 4 days | Discharge: HOME HEALTH | End: 2024-01-19
Attending: INTERNAL MEDICINE | Admitting: INTERNAL MEDICINE
Payer: MEDICARE

## 2024-01-15 VITALS
DIASTOLIC BLOOD PRESSURE: 62 MMHG | OXYGEN SATURATION: 98 % | HEIGHT: 73 IN | SYSTOLIC BLOOD PRESSURE: 112 MMHG | HEART RATE: 108 BPM | BODY MASS INDEX: 37.77 KG/M2 | WEIGHT: 285 LBS | TEMPERATURE: 98.4 F

## 2024-01-15 DIAGNOSIS — L02.91 ABSCESS: Primary | ICD-10-CM

## 2024-01-15 DIAGNOSIS — N52.9 ERECTILE DYSFUNCTION, UNSPECIFIED ERECTILE DYSFUNCTION TYPE: ICD-10-CM

## 2024-01-15 DIAGNOSIS — Z94.4 HISTORY OF LIVER TRANSPLANT (HCC): ICD-10-CM

## 2024-01-15 DIAGNOSIS — Z79.4 TYPE 2 DIABETES MELLITUS WITH OTHER SKIN COMPLICATION, WITH LONG-TERM CURRENT USE OF INSULIN (HCC): ICD-10-CM

## 2024-01-15 DIAGNOSIS — E11.628 TYPE 2 DIABETES MELLITUS WITH OTHER SKIN COMPLICATION, WITH LONG-TERM CURRENT USE OF INSULIN (HCC): ICD-10-CM

## 2024-01-15 DIAGNOSIS — Z94.4 HX OF LIVER TRANSPLANT: ICD-10-CM

## 2024-01-15 DIAGNOSIS — L02.214 ABSCESS OF GROIN, RIGHT: ICD-10-CM

## 2024-01-15 DIAGNOSIS — R52 ACUTE PAIN: ICD-10-CM

## 2024-01-15 DIAGNOSIS — L02.419 LEG ABSCESS: ICD-10-CM

## 2024-01-15 DIAGNOSIS — Z86.39 HX OF DIABETES MELLITUS: ICD-10-CM

## 2024-01-15 PROBLEM — E87.1 HYPONATREMIA: Status: ACTIVE | Noted: 2024-01-15

## 2024-01-15 PROBLEM — R74.01 TRANSAMINITIS: Status: ACTIVE | Noted: 2024-01-15

## 2024-01-15 PROBLEM — E11.9 INSULIN DEPENDENT TYPE 2 DIABETES MELLITUS: Status: ACTIVE | Noted: 2024-01-15

## 2024-01-15 LAB
ALBUMIN SERPL-MCNC: 3.2 G/DL (ref 3.5–5.2)
ALBUMIN SERPL-MCNC: 3.3 G/DL (ref 3.5–5.2)
ALBUMIN/GLOB SERPL: 0.8 G/DL
ALP SERPL-CCNC: 328 U/L (ref 39–117)
ALP SERPL-CCNC: 337 U/L (ref 40–130)
ALT SERPL W P-5'-P-CCNC: 54 U/L (ref 1–41)
ALT SERPL-CCNC: 52 U/L (ref 5–41)
ANION GAP SERPL CALCULATED.3IONS-SCNC: 10 MMOL/L (ref 7–19)
ANION GAP SERPL CALCULATED.3IONS-SCNC: 13 MMOL/L (ref 5–15)
AST SERPL-CCNC: 100 U/L (ref 5–40)
AST SERPL-CCNC: 98 U/L (ref 1–40)
BACTERIA BLD CULT ORG #2: NORMAL
BACTERIA BLD CULT: NORMAL
BASOPHILS # BLD AUTO: 0.14 10*3/MM3 (ref 0–0.2)
BASOPHILS # BLD: 0.1 K/UL (ref 0–0.2)
BASOPHILS NFR BLD AUTO: 0.9 % (ref 0–1.5)
BASOPHILS NFR BLD: 0.9 % (ref 0–1)
BILIRUB SERPL-MCNC: 0.4 MG/DL (ref 0.2–1.2)
BILIRUB SERPL-MCNC: 0.6 MG/DL (ref 0–1.2)
BUN SERPL-MCNC: 16 MG/DL (ref 6–20)
BUN SERPL-MCNC: 18 MG/DL (ref 6–20)
BUN/CREAT SERPL: 19.8 (ref 7–25)
CALCIUM SERPL-MCNC: 9.3 MG/DL (ref 8.6–10)
CALCIUM SPEC-SCNC: 9.1 MG/DL (ref 8.6–10.5)
CHLORIDE SERPL-SCNC: 94 MMOL/L (ref 98–107)
CHLORIDE SERPL-SCNC: 95 MMOL/L (ref 98–111)
CO2 SERPL-SCNC: 24 MMOL/L (ref 22–29)
CO2 SERPL-SCNC: 27 MMOL/L (ref 22–29)
CREAT SERPL-MCNC: 0.8 MG/DL (ref 0.5–1.2)
CREAT SERPL-MCNC: 0.91 MG/DL (ref 0.76–1.27)
CRP SERPL-MCNC: 3.87 MG/DL (ref 0–0.5)
DEPRECATED RDW RBC AUTO: 53.1 FL (ref 37–54)
EGFRCR SERPLBLD CKD-EPI 2021: 100.8 ML/MIN/1.73
EOSINOPHIL # BLD AUTO: 0.08 10*3/MM3 (ref 0–0.4)
EOSINOPHIL # BLD: 0.1 K/UL (ref 0–0.6)
EOSINOPHIL NFR BLD AUTO: 0.5 % (ref 0.3–6.2)
EOSINOPHIL NFR BLD: 0.9 % (ref 0–5)
ERYTHROCYTE [DISTWIDTH] IN BLOOD BY AUTOMATED COUNT: 14.5 % (ref 11.5–14.5)
ERYTHROCYTE [DISTWIDTH] IN BLOOD BY AUTOMATED COUNT: 14.5 % (ref 12.3–15.4)
GAMMA GLUTAMYL TRANSFERASE: 775 U/L (ref 7–54)
GLOBULIN UR ELPH-MCNC: 4 GM/DL
GLUCOSE BLDC GLUCOMTR-MCNC: 121 MG/DL (ref 70–130)
GLUCOSE BLDC GLUCOMTR-MCNC: 125 MG/DL (ref 70–130)
GLUCOSE SERPL-MCNC: 168 MG/DL (ref 65–99)
GLUCOSE SERPL-MCNC: 190 MG/DL (ref 74–109)
HBA1C MFR BLD: 6 % (ref 4–6)
HCT VFR BLD AUTO: 40.2 % (ref 37.5–51)
HCT VFR BLD AUTO: 42.5 % (ref 42–52)
HGB BLD-MCNC: 13 G/DL (ref 13–17.7)
HGB BLD-MCNC: 13.7 G/DL (ref 14–18)
IMM GRANULOCYTES # BLD AUTO: 0.39 10*3/MM3 (ref 0–0.05)
IMM GRANULOCYTES # BLD: 0.4 K/UL
IMM GRANULOCYTES NFR BLD AUTO: 2.5 % (ref 0–0.5)
LYMPHOCYTES # BLD AUTO: 1.92 10*3/MM3 (ref 0.7–3.1)
LYMPHOCYTES # BLD: 2 K/UL (ref 1.1–4.5)
LYMPHOCYTES NFR BLD AUTO: 12.3 % (ref 19.6–45.3)
LYMPHOCYTES NFR BLD: 16.2 % (ref 20–40)
MCH RBC QN AUTO: 32.3 PG (ref 26.6–33)
MCH RBC QN AUTO: 32.6 PG (ref 27–31)
MCHC RBC AUTO-ENTMCNC: 32.2 G/DL (ref 33–37)
MCHC RBC AUTO-ENTMCNC: 32.3 G/DL (ref 31.5–35.7)
MCV RBC AUTO: 101.2 FL (ref 80–94)
MCV RBC AUTO: 99.8 FL (ref 79–97)
MONOCYTES # BLD AUTO: 1.06 10*3/MM3 (ref 0.1–0.9)
MONOCYTES # BLD: 0.7 K/UL (ref 0–0.9)
MONOCYTES NFR BLD AUTO: 6.8 % (ref 5–12)
MONOCYTES NFR BLD: 5.9 % (ref 0–10)
NEUTROPHILS # BLD: 9.1 K/UL (ref 1.5–7.5)
NEUTROPHILS NFR BLD AUTO: 11.99 10*3/MM3 (ref 1.7–7)
NEUTROPHILS NFR BLD AUTO: 77 % (ref 42.7–76)
NEUTS SEG NFR BLD: 72.9 % (ref 50–65)
NRBC BLD AUTO-RTO: 0 /100 WBC (ref 0–0.2)
PLATELET # BLD AUTO: 388 10*3/MM3 (ref 140–450)
PLATELET # BLD AUTO: 404 K/UL (ref 130–400)
PMV BLD AUTO: 9.6 FL (ref 6–12)
PMV BLD AUTO: 9.9 FL (ref 9.4–12.4)
POTASSIUM SERPL-SCNC: 5 MMOL/L (ref 3.5–5.2)
POTASSIUM SERPL-SCNC: 5.2 MMOL/L (ref 3.5–5)
PROCALCITONIN SERPL-MCNC: 0.33 NG/ML (ref 0–0.25)
PROT SERPL-MCNC: 7 G/DL (ref 6.6–8.7)
PROT SERPL-MCNC: 7.2 G/DL (ref 6–8.5)
RBC # BLD AUTO: 4.03 10*6/MM3 (ref 4.14–5.8)
RBC # BLD AUTO: 4.2 M/UL (ref 4.7–6.1)
SODIUM SERPL-SCNC: 131 MMOL/L (ref 136–145)
SODIUM SERPL-SCNC: 132 MMOL/L (ref 136–145)
WBC # BLD AUTO: 12.5 K/UL (ref 4.8–10.8)
WBC NRBC COR # BLD AUTO: 15.58 10*3/MM3 (ref 3.4–10.8)

## 2024-01-15 PROCEDURE — 82948 REAGENT STRIP/BLOOD GLUCOSE: CPT

## 2024-01-15 PROCEDURE — 11045 DBRDMT SUBQ TISS EACH ADDL: CPT | Performed by: GENERAL PRACTICE

## 2024-01-15 PROCEDURE — 0 HYDROMORPHONE 1 MG/ML SOLUTION: Performed by: NURSE PRACTITIONER

## 2024-01-15 PROCEDURE — 25010000002 VANCOMYCIN 10 G RECONSTITUTED SOLUTION: Performed by: NURSE PRACTITIONER

## 2024-01-15 PROCEDURE — 86140 C-REACTIVE PROTEIN: CPT | Performed by: NURSE PRACTITIONER

## 2024-01-15 PROCEDURE — 84145 PROCALCITONIN (PCT): CPT | Performed by: NURSE PRACTITIONER

## 2024-01-15 PROCEDURE — 25010000002 PIPERACILLIN SOD-TAZOBACTAM PER 1 G: Performed by: GENERAL PRACTICE

## 2024-01-15 PROCEDURE — 80053 COMPREHEN METABOLIC PANEL: CPT | Performed by: NURSE PRACTITIONER

## 2024-01-15 PROCEDURE — 25810000003 SODIUM CHLORIDE 0.9 % SOLUTION: Performed by: NURSE PRACTITIONER

## 2024-01-15 PROCEDURE — 94761 N-INVAS EAR/PLS OXIMETRY MLT: CPT

## 2024-01-15 PROCEDURE — 25010000002 PROPOFOL 10 MG/ML EMULSION: Performed by: NURSE ANESTHETIST, CERTIFIED REGISTERED

## 2024-01-15 PROCEDURE — 87070 CULTURE OTHR SPECIMN AEROBIC: CPT | Performed by: NURSE PRACTITIONER

## 2024-01-15 PROCEDURE — G0378 HOSPITAL OBSERVATION PER HR: HCPCS

## 2024-01-15 PROCEDURE — 25010000002 ONDANSETRON PER 1 MG: Performed by: NURSE ANESTHETIST, CERTIFIED REGISTERED

## 2024-01-15 PROCEDURE — 1036F TOBACCO NON-USER: CPT | Performed by: FAMILY MEDICINE

## 2024-01-15 PROCEDURE — 0JDL0ZZ EXTRACTION OF RIGHT UPPER LEG SUBCUTANEOUS TISSUE AND FASCIA, OPEN APPROACH: ICD-10-PCS | Performed by: GENERAL PRACTICE

## 2024-01-15 PROCEDURE — 25010000002 HYDROMORPHONE PER 4 MG: Performed by: NURSE ANESTHETIST, CERTIFIED REGISTERED

## 2024-01-15 PROCEDURE — 99214 OFFICE O/P EST MOD 30 MIN: CPT | Performed by: FAMILY MEDICINE

## 2024-01-15 PROCEDURE — 25810000003 LACTATED RINGERS PER 1000 ML: Performed by: NURSE ANESTHETIST, CERTIFIED REGISTERED

## 2024-01-15 PROCEDURE — 76881 US COMPL JOINT R-T W/IMG: CPT

## 2024-01-15 PROCEDURE — G8417 CALC BMI ABV UP PARAM F/U: HCPCS | Performed by: FAMILY MEDICINE

## 2024-01-15 PROCEDURE — G8428 CUR MEDS NOT DOCUMENT: HCPCS | Performed by: FAMILY MEDICINE

## 2024-01-15 PROCEDURE — 25010000002 ONDANSETRON PER 1 MG: Performed by: NURSE PRACTITIONER

## 2024-01-15 PROCEDURE — 11042 DBRDMT SUBQ TIS 1ST 20SQCM/<: CPT | Performed by: GENERAL PRACTICE

## 2024-01-15 PROCEDURE — 87205 SMEAR GRAM STAIN: CPT | Performed by: NURSE PRACTITIONER

## 2024-01-15 PROCEDURE — 87075 CULTR BACTERIA EXCEPT BLOOD: CPT | Performed by: GENERAL PRACTICE

## 2024-01-15 PROCEDURE — G8484 FLU IMMUNIZE NO ADMIN: HCPCS | Performed by: FAMILY MEDICINE

## 2024-01-15 PROCEDURE — 25010000002 PIPERACILLIN SOD-TAZOBACTAM PER 1 G: Performed by: NURSE ANESTHETIST, CERTIFIED REGISTERED

## 2024-01-15 PROCEDURE — 99285 EMERGENCY DEPT VISIT HI MDM: CPT

## 2024-01-15 PROCEDURE — 25010000002 DROPERIDOL PER 5 MG: Performed by: NURSE ANESTHETIST, CERTIFIED REGISTERED

## 2024-01-15 PROCEDURE — 99222 1ST HOSP IP/OBS MODERATE 55: CPT | Performed by: GENERAL PRACTICE

## 2024-01-15 PROCEDURE — 94799 UNLISTED PULMONARY SVC/PX: CPT

## 2024-01-15 PROCEDURE — 87176 TISSUE HOMOGENIZATION CULTR: CPT | Performed by: GENERAL PRACTICE

## 2024-01-15 PROCEDURE — 97606 NEG PRS WND THER DME>50 SQCM: CPT | Performed by: GENERAL PRACTICE

## 2024-01-15 PROCEDURE — 3017F COLORECTAL CA SCREEN DOC REV: CPT | Performed by: FAMILY MEDICINE

## 2024-01-15 PROCEDURE — 87076 CULTURE ANAEROBE IDENT EACH: CPT | Performed by: GENERAL PRACTICE

## 2024-01-15 PROCEDURE — 25010000002 VANCOMYCIN 10 G RECONSTITUTED SOLUTION: Performed by: GENERAL PRACTICE

## 2024-01-15 PROCEDURE — 87205 SMEAR GRAM STAIN: CPT | Performed by: GENERAL PRACTICE

## 2024-01-15 PROCEDURE — 87040 BLOOD CULTURE FOR BACTERIA: CPT | Performed by: NURSE PRACTITIONER

## 2024-01-15 PROCEDURE — 85025 COMPLETE CBC W/AUTO DIFF WBC: CPT | Performed by: NURSE PRACTITIONER

## 2024-01-15 PROCEDURE — 36415 COLL VENOUS BLD VENIPUNCTURE: CPT

## 2024-01-15 PROCEDURE — 25810000003 SODIUM CHLORIDE 0.9 % SOLUTION: Performed by: GENERAL PRACTICE

## 2024-01-15 PROCEDURE — 25010000002 PIPERACILLIN SOD-TAZOBACTAM PER 1 G: Performed by: NURSE PRACTITIONER

## 2024-01-15 PROCEDURE — 25010000002 FENTANYL CITRATE (PF) 100 MCG/2ML SOLUTION: Performed by: NURSE ANESTHETIST, CERTIFIED REGISTERED

## 2024-01-15 PROCEDURE — 87070 CULTURE OTHR SPECIMN AEROBIC: CPT | Performed by: GENERAL PRACTICE

## 2024-01-15 PROCEDURE — 25010000002 FENTANYL CITRATE (PF) 50 MCG/ML SOLUTION: Performed by: NURSE ANESTHETIST, CERTIFIED REGISTERED

## 2024-01-15 DEVICE — CLIPAPPLR M/ ENDO LIGACLIP9 3/8IN MD: Type: IMPLANTABLE DEVICE | Site: GROIN | Status: FUNCTIONAL

## 2024-01-15 RX ORDER — ATORVASTATIN CALCIUM 10 MG/1
10 TABLET, FILM COATED ORAL DAILY
COMMUNITY

## 2024-01-15 RX ORDER — NICOTINE POLACRILEX 4 MG
15 LOZENGE BUCCAL
Status: DISCONTINUED | OUTPATIENT
Start: 2024-01-15 | End: 2024-01-19 | Stop reason: HOSPADM

## 2024-01-15 RX ORDER — PREDNISONE 5 MG/1
5 TABLET ORAL 2 TIMES DAILY
Status: DISCONTINUED | OUTPATIENT
Start: 2024-01-15 | End: 2024-01-19 | Stop reason: HOSPADM

## 2024-01-15 RX ORDER — CEPHALEXIN 500 MG/1
500 CAPSULE ORAL 2 TIMES DAILY
COMMUNITY
End: 2024-01-19 | Stop reason: HOSPADM

## 2024-01-15 RX ORDER — ATORVASTATIN CALCIUM 10 MG/1
10 TABLET, FILM COATED ORAL DAILY
Status: CANCELLED | OUTPATIENT
Start: 2024-01-16

## 2024-01-15 RX ORDER — FLUMAZENIL 0.1 MG/ML
0.2 INJECTION INTRAVENOUS AS NEEDED
Status: DISCONTINUED | OUTPATIENT
Start: 2024-01-15 | End: 2024-01-15 | Stop reason: HOSPADM

## 2024-01-15 RX ORDER — FENTANYL CITRATE 50 UG/ML
25 INJECTION, SOLUTION INTRAMUSCULAR; INTRAVENOUS
Status: DISCONTINUED | OUTPATIENT
Start: 2024-01-15 | End: 2024-01-15 | Stop reason: HOSPADM

## 2024-01-15 RX ORDER — LANOLIN ALCOHOL/MO/W.PET/CERES
400 CREAM (GRAM) TOPICAL 2 TIMES DAILY
COMMUNITY

## 2024-01-15 RX ORDER — SODIUM CHLORIDE 0.9 % (FLUSH) 0.9 %
10 SYRINGE (ML) INJECTION AS NEEDED
Status: DISCONTINUED | OUTPATIENT
Start: 2024-01-15 | End: 2024-01-19 | Stop reason: HOSPADM

## 2024-01-15 RX ORDER — GABAPENTIN 300 MG/1
300 CAPSULE ORAL 2 TIMES DAILY
Status: DISCONTINUED | OUTPATIENT
Start: 2024-01-15 | End: 2024-01-16

## 2024-01-15 RX ORDER — BISACODYL 10 MG
10 SUPPOSITORY, RECTAL RECTAL DAILY PRN
Status: DISCONTINUED | OUTPATIENT
Start: 2024-01-15 | End: 2024-01-19 | Stop reason: HOSPADM

## 2024-01-15 RX ORDER — ONDANSETRON 4 MG/1
4 TABLET, ORALLY DISINTEGRATING ORAL EVERY 6 HOURS PRN
Status: DISCONTINUED | OUTPATIENT
Start: 2024-01-15 | End: 2024-01-19 | Stop reason: HOSPADM

## 2024-01-15 RX ORDER — POLYETHYLENE GLYCOL 3350 17 G/17G
17 POWDER, FOR SOLUTION ORAL DAILY PRN
Status: DISCONTINUED | OUTPATIENT
Start: 2024-01-15 | End: 2024-01-19 | Stop reason: HOSPADM

## 2024-01-15 RX ORDER — SUCCINYLCHOLINE/SOD CL,ISO/PF 200MG/10ML
SYRINGE (ML) INTRAVENOUS AS NEEDED
Status: DISCONTINUED | OUTPATIENT
Start: 2024-01-15 | End: 2024-01-15 | Stop reason: SURG

## 2024-01-15 RX ORDER — TACROLIMUS 1 MG/1
3 CAPSULE ORAL EVERY 8 HOURS SCHEDULED
Status: DISCONTINUED | OUTPATIENT
Start: 2024-01-16 | End: 2024-01-16

## 2024-01-15 RX ORDER — OXYCODONE AND ACETAMINOPHEN 10; 325 MG/1; MG/1
1 TABLET ORAL ONCE AS NEEDED
Status: COMPLETED | OUTPATIENT
Start: 2024-01-15 | End: 2024-01-15

## 2024-01-15 RX ORDER — MAGNESIUM HYDROXIDE 1200 MG/15ML
LIQUID ORAL AS NEEDED
Status: DISCONTINUED | OUTPATIENT
Start: 2024-01-15 | End: 2024-01-15 | Stop reason: HOSPADM

## 2024-01-15 RX ORDER — SODIUM CHLORIDE 9 MG/ML
40 INJECTION, SOLUTION INTRAVENOUS AS NEEDED
Status: DISCONTINUED | OUTPATIENT
Start: 2024-01-15 | End: 2024-01-19 | Stop reason: HOSPADM

## 2024-01-15 RX ORDER — VANCOMYCIN 2 GRAM/500 ML IN 0.9 % SODIUM CHLORIDE INTRAVENOUS
2000 ONCE
Status: COMPLETED | OUTPATIENT
Start: 2024-01-15 | End: 2024-01-15

## 2024-01-15 RX ORDER — LIDOCAINE HYDROCHLORIDE 20 MG/ML
INJECTION, SOLUTION EPIDURAL; INFILTRATION; INTRACAUDAL; PERINEURAL AS NEEDED
Status: DISCONTINUED | OUTPATIENT
Start: 2024-01-15 | End: 2024-01-15 | Stop reason: SURG

## 2024-01-15 RX ORDER — OXYCODONE HYDROCHLORIDE 10 MG/1
10 TABLET ORAL EVERY 6 HOURS PRN
COMMUNITY

## 2024-01-15 RX ORDER — IBUPROFEN 600 MG/1
600 TABLET ORAL ONCE AS NEEDED
Status: DISCONTINUED | OUTPATIENT
Start: 2024-01-15 | End: 2024-01-15 | Stop reason: HOSPADM

## 2024-01-15 RX ORDER — VANCOMYCIN/0.9 % SOD CHLORIDE 1.5G/250ML
1500 PLASTIC BAG, INJECTION (ML) INTRAVENOUS EVERY 12 HOURS
Status: DISCONTINUED | OUTPATIENT
Start: 2024-01-16 | End: 2024-01-17 | Stop reason: SDUPTHER

## 2024-01-15 RX ORDER — PROPOFOL 10 MG/ML
VIAL (ML) INTRAVENOUS AS NEEDED
Status: DISCONTINUED | OUTPATIENT
Start: 2024-01-15 | End: 2024-01-15 | Stop reason: SURG

## 2024-01-15 RX ORDER — ONDANSETRON 2 MG/ML
4 INJECTION INTRAMUSCULAR; INTRAVENOUS
Status: DISCONTINUED | OUTPATIENT
Start: 2024-01-15 | End: 2024-01-15 | Stop reason: HOSPADM

## 2024-01-15 RX ORDER — ERGOCALCIFEROL 1.25 MG/1
50000 CAPSULE ORAL WEEKLY
Status: ON HOLD | COMMUNITY
End: 2024-01-16

## 2024-01-15 RX ORDER — GABAPENTIN 300 MG/1
300 CAPSULE ORAL 2 TIMES DAILY
Status: ON HOLD | COMMUNITY
End: 2024-01-16

## 2024-01-15 RX ORDER — BUSPIRONE HYDROCHLORIDE 5 MG/1
7.5 TABLET ORAL 3 TIMES DAILY
Status: ON HOLD | COMMUNITY
End: 2024-01-16

## 2024-01-15 RX ORDER — NALOXONE HCL 0.4 MG/ML
0.4 VIAL (ML) INJECTION
Status: DISCONTINUED | OUTPATIENT
Start: 2024-01-15 | End: 2024-01-19 | Stop reason: HOSPADM

## 2024-01-15 RX ORDER — ACETAMINOPHEN 160 MG/5ML
650 SOLUTION ORAL EVERY 4 HOURS PRN
Status: DISCONTINUED | OUTPATIENT
Start: 2024-01-15 | End: 2024-01-16

## 2024-01-15 RX ORDER — ONDANSETRON 2 MG/ML
INJECTION INTRAMUSCULAR; INTRAVENOUS AS NEEDED
Status: DISCONTINUED | OUTPATIENT
Start: 2024-01-15 | End: 2024-01-15 | Stop reason: SURG

## 2024-01-15 RX ORDER — PANTOPRAZOLE SODIUM 40 MG/1
40 TABLET, DELAYED RELEASE ORAL DAILY
COMMUNITY

## 2024-01-15 RX ORDER — SODIUM CHLORIDE 0.9 % (FLUSH) 0.9 %
10 SYRINGE (ML) INJECTION EVERY 12 HOURS SCHEDULED
Status: DISCONTINUED | OUTPATIENT
Start: 2024-01-15 | End: 2024-01-19 | Stop reason: HOSPADM

## 2024-01-15 RX ORDER — PANTOPRAZOLE SODIUM 40 MG/1
40 TABLET, DELAYED RELEASE ORAL DAILY
Status: DISCONTINUED | OUTPATIENT
Start: 2024-01-16 | End: 2024-01-19 | Stop reason: HOSPADM

## 2024-01-15 RX ORDER — FENTANYL CITRATE 50 UG/ML
INJECTION, SOLUTION INTRAMUSCULAR; INTRAVENOUS AS NEEDED
Status: DISCONTINUED | OUTPATIENT
Start: 2024-01-15 | End: 2024-01-15 | Stop reason: SURG

## 2024-01-15 RX ORDER — PREDNISONE 5 MG/1
5 TABLET ORAL 2 TIMES DAILY
COMMUNITY

## 2024-01-15 RX ORDER — BISACODYL 5 MG/1
5 TABLET, DELAYED RELEASE ORAL DAILY PRN
Status: DISCONTINUED | OUTPATIENT
Start: 2024-01-15 | End: 2024-01-19 | Stop reason: HOSPADM

## 2024-01-15 RX ORDER — OXYCODONE HYDROCHLORIDE AND ACETAMINOPHEN 5; 325 MG/1; MG/1
1 TABLET ORAL EVERY 4 HOURS PRN
Status: DISCONTINUED | OUTPATIENT
Start: 2024-01-15 | End: 2024-01-16 | Stop reason: ALTCHOICE

## 2024-01-15 RX ORDER — DEXTROSE MONOHYDRATE 25 G/50ML
25 INJECTION, SOLUTION INTRAVENOUS
Status: DISCONTINUED | OUTPATIENT
Start: 2024-01-15 | End: 2024-01-19 | Stop reason: HOSPADM

## 2024-01-15 RX ORDER — BUSPIRONE HYDROCHLORIDE 5 MG/1
7.5 TABLET ORAL 3 TIMES DAILY
Status: DISCONTINUED | OUTPATIENT
Start: 2024-01-15 | End: 2024-01-16

## 2024-01-15 RX ORDER — ASPIRIN 81 MG/1
81 TABLET ORAL DAILY
Status: DISCONTINUED | OUTPATIENT
Start: 2024-01-16 | End: 2024-01-16

## 2024-01-15 RX ORDER — OXYCODONE HYDROCHLORIDE 5 MG/1
15 TABLET ORAL EVERY 6 HOURS PRN
Status: DISCONTINUED | OUTPATIENT
Start: 2024-01-15 | End: 2024-01-16

## 2024-01-15 RX ORDER — HYDROMORPHONE HYDROCHLORIDE 1 MG/ML
0.5 INJECTION, SOLUTION INTRAMUSCULAR; INTRAVENOUS; SUBCUTANEOUS
Status: DISCONTINUED | OUTPATIENT
Start: 2024-01-15 | End: 2024-01-15 | Stop reason: HOSPADM

## 2024-01-15 RX ORDER — POTASSIUM CHLORIDE 1500 MG/1
20 TABLET, EXTENDED RELEASE ORAL DAILY
COMMUNITY

## 2024-01-15 RX ORDER — GINSENG 100 MG
50 CAPSULE ORAL DAILY
COMMUNITY

## 2024-01-15 RX ORDER — PREGABALIN 75 MG/1
75 CAPSULE ORAL AS NEEDED
COMMUNITY
End: 2024-01-15

## 2024-01-15 RX ORDER — ASPIRIN 81 MG/1
81 TABLET ORAL DAILY
Status: ON HOLD | COMMUNITY
End: 2024-01-16

## 2024-01-15 RX ORDER — AMOXICILLIN 250 MG
1 CAPSULE ORAL NIGHTLY
Status: DISCONTINUED | OUTPATIENT
Start: 2024-01-15 | End: 2024-01-19 | Stop reason: HOSPADM

## 2024-01-15 RX ORDER — DROPERIDOL 2.5 MG/ML
0.62 INJECTION, SOLUTION INTRAMUSCULAR; INTRAVENOUS ONCE AS NEEDED
Status: COMPLETED | OUTPATIENT
Start: 2024-01-15 | End: 2024-01-15

## 2024-01-15 RX ORDER — HYDROMORPHONE HYDROCHLORIDE 1 MG/ML
0.5 INJECTION, SOLUTION INTRAMUSCULAR; INTRAVENOUS; SUBCUTANEOUS
Status: DISCONTINUED | OUTPATIENT
Start: 2024-01-15 | End: 2024-01-17

## 2024-01-15 RX ORDER — LABETALOL HYDROCHLORIDE 5 MG/ML
5 INJECTION, SOLUTION INTRAVENOUS
Status: DISCONTINUED | OUTPATIENT
Start: 2024-01-15 | End: 2024-01-15 | Stop reason: HOSPADM

## 2024-01-15 RX ORDER — ONDANSETRON 2 MG/ML
4 INJECTION INTRAMUSCULAR; INTRAVENOUS ONCE
Status: COMPLETED | OUTPATIENT
Start: 2024-01-15 | End: 2024-01-15

## 2024-01-15 RX ORDER — SODIUM CHLORIDE, SODIUM LACTATE, POTASSIUM CHLORIDE, CALCIUM CHLORIDE 600; 310; 30; 20 MG/100ML; MG/100ML; MG/100ML; MG/100ML
INJECTION, SOLUTION INTRAVENOUS CONTINUOUS PRN
Status: DISCONTINUED | OUTPATIENT
Start: 2024-01-15 | End: 2024-01-15 | Stop reason: SURG

## 2024-01-15 RX ORDER — SODIUM CHLORIDE 9 MG/ML
50 INJECTION, SOLUTION INTRAVENOUS CONTINUOUS
Status: DISCONTINUED | OUTPATIENT
Start: 2024-01-15 | End: 2024-01-19 | Stop reason: HOSPADM

## 2024-01-15 RX ORDER — ONDANSETRON 2 MG/ML
4 INJECTION INTRAMUSCULAR; INTRAVENOUS EVERY 6 HOURS PRN
Status: DISCONTINUED | OUTPATIENT
Start: 2024-01-15 | End: 2024-01-19 | Stop reason: HOSPADM

## 2024-01-15 RX ORDER — INSULIN LISPRO 100 [IU]/ML
2-9 INJECTION, SOLUTION INTRAVENOUS; SUBCUTANEOUS
Status: DISCONTINUED | OUTPATIENT
Start: 2024-01-15 | End: 2024-01-19 | Stop reason: HOSPADM

## 2024-01-15 RX ORDER — IBUPROFEN 600 MG/1
1 TABLET ORAL
Status: DISCONTINUED | OUTPATIENT
Start: 2024-01-15 | End: 2024-01-19 | Stop reason: HOSPADM

## 2024-01-15 RX ORDER — OXYCODONE HCL 10 MG/1
10 TABLET, FILM COATED, EXTENDED RELEASE ORAL EVERY 6 HOURS PRN
COMMUNITY
End: 2024-01-15

## 2024-01-15 RX ORDER — ROCURONIUM BROMIDE 10 MG/ML
INJECTION, SOLUTION INTRAVENOUS AS NEEDED
Status: DISCONTINUED | OUTPATIENT
Start: 2024-01-15 | End: 2024-01-15 | Stop reason: SURG

## 2024-01-15 RX ORDER — ACETAMINOPHEN 325 MG/1
650 TABLET ORAL EVERY 4 HOURS PRN
Status: DISCONTINUED | OUTPATIENT
Start: 2024-01-15 | End: 2024-01-16

## 2024-01-15 RX ORDER — ACETAMINOPHEN 650 MG/1
650 SUPPOSITORY RECTAL EVERY 4 HOURS PRN
Status: DISCONTINUED | OUTPATIENT
Start: 2024-01-15 | End: 2024-01-16

## 2024-01-15 RX ORDER — NALOXONE HCL 0.4 MG/ML
0.04 VIAL (ML) INJECTION AS NEEDED
Status: DISCONTINUED | OUTPATIENT
Start: 2024-01-15 | End: 2024-01-15 | Stop reason: HOSPADM

## 2024-01-15 RX ADMIN — HYDROMORPHONE HYDROCHLORIDE 1 MG: 1 INJECTION, SOLUTION INTRAMUSCULAR; INTRAVENOUS; SUBCUTANEOUS at 19:02

## 2024-01-15 RX ADMIN — ONDANSETRON 4 MG: 2 INJECTION INTRAMUSCULAR; INTRAVENOUS at 16:50

## 2024-01-15 RX ADMIN — TAZOBACTAM SODIUM AND PIPERACILLIN SODIUM 3.38 G: 375; 3 INJECTION, SOLUTION INTRAVENOUS at 20:46

## 2024-01-15 RX ADMIN — PROPOFOL INJECTABLE EMULSION 200 MG: 10 INJECTION, EMULSION INTRAVENOUS at 20:39

## 2024-01-15 RX ADMIN — PROPOFOL INJECTABLE EMULSION 200 MG: 10 INJECTION, EMULSION INTRAVENOUS at 20:20

## 2024-01-15 RX ADMIN — FENTANYL CITRATE 25 MCG: 50 INJECTION, SOLUTION INTRAMUSCULAR; INTRAVENOUS at 21:31

## 2024-01-15 RX ADMIN — ROCURONIUM BROMIDE 10 MG: 10 INJECTION, SOLUTION INTRAVENOUS at 20:20

## 2024-01-15 RX ADMIN — HYDROMORPHONE HYDROCHLORIDE 0.5 MG: 1 INJECTION, SOLUTION INTRAMUSCULAR; INTRAVENOUS; SUBCUTANEOUS at 21:24

## 2024-01-15 RX ADMIN — LIDOCAINE HYDROCHLORIDE 100 MG: 20 INJECTION, SOLUTION EPIDURAL; INFILTRATION; INTRACAUDAL; PERINEURAL at 21:11

## 2024-01-15 RX ADMIN — SODIUM CHLORIDE, POTASSIUM CHLORIDE, SODIUM LACTATE AND CALCIUM CHLORIDE: 600; 310; 30; 20 INJECTION, SOLUTION INTRAVENOUS at 20:14

## 2024-01-15 RX ADMIN — FENTANYL CITRATE 100 MCG: 50 INJECTION INTRAMUSCULAR; INTRAVENOUS at 20:32

## 2024-01-15 RX ADMIN — PIPERACILLIN SODIUM AND TAZOBACTAM SODIUM 3.38 G: 3; .375 INJECTION, POWDER, LYOPHILIZED, FOR SOLUTION INTRAVENOUS at 23:47

## 2024-01-15 RX ADMIN — FENTANYL CITRATE 100 MCG: 50 INJECTION INTRAMUSCULAR; INTRAVENOUS at 20:20

## 2024-01-15 RX ADMIN — HYDROMORPHONE HYDROCHLORIDE 1 MG: 1 INJECTION, SOLUTION INTRAMUSCULAR; INTRAVENOUS; SUBCUTANEOUS at 16:49

## 2024-01-15 RX ADMIN — OXYCODONE AND ACETAMINOPHEN 1 TABLET: 325; 10 TABLET ORAL at 21:52

## 2024-01-15 RX ADMIN — VANCOMYCIN HYDROCHLORIDE 2000 MG: 10 INJECTION, POWDER, LYOPHILIZED, FOR SOLUTION INTRAVENOUS at 17:39

## 2024-01-15 RX ADMIN — FENTANYL CITRATE 25 MCG: 50 INJECTION, SOLUTION INTRAMUSCULAR; INTRAVENOUS at 21:26

## 2024-01-15 RX ADMIN — PIPERACILLIN SODIUM AND TAZOBACTAM SODIUM 4.5 G: 4; .5 INJECTION, POWDER, LYOPHILIZED, FOR SOLUTION INTRAVENOUS at 16:50

## 2024-01-15 RX ADMIN — ONDANSETRON 4 MG: 2 INJECTION INTRAMUSCULAR; INTRAVENOUS at 20:46

## 2024-01-15 RX ADMIN — Medication 120 MG: at 20:20

## 2024-01-15 RX ADMIN — DROPERIDOL 0.62 MG: 2.5 INJECTION, SOLUTION INTRAMUSCULAR; INTRAVENOUS at 21:24

## 2024-01-15 RX ADMIN — LIDOCAINE HYDROCHLORIDE 100 MG: 20 INJECTION, SOLUTION EPIDURAL; INFILTRATION; INTRACAUDAL; PERINEURAL at 20:20

## 2024-01-15 NOTE — PROGRESS NOTES
Reason For Visit:   The patient is here for an acute visit for abscess.      Combined HPI and A/P:      Diagnosis Orders   1. Abscess            1.) Abscess/ Infected Inclusion Cyst: He has had a cyst of his right upper leg since he was a child. This recently quadruple in size. This is located on his right upper leg. He was seen in the ED on 1/10/24. This self expressed while standing in the line to go into the ED. He was started on Bactrim DS and Keflex. He is still taking both of these. The patient is s/p liver transplant. He takes Tacrolimus ER, but has been out of this for more than one week. He is having worsening of drainage and pain. The area is warm. He has pain with walking. The patient was lying on the exam table during examination. He is sweating while lying on the table. He a large open area on the right upper thigh. This has purulent drainage. He has tachycardia. His BP is 11/62. His BP is usually more elevated. I discussed with the patient and his wife that he would need IV antibiotics and further evaluation of the wound. The wife will take him to the ED for evaluation. I called the ED to give a heads up that he was coming from our office.       Return if symptoms worsen or fail to improve.    We discussed use, benefit, and side effects of prescribed medications.  All patient questions answered.   Patient agreed with treatment plan.   I reviewed available records in our system and care everywhere. In cases where records are not available, the records have been requested and will be reviewed when available.     Subjective      Past Surgical History:   Procedure Laterality Date    HIP SURGERY Left 2/27/2021    HIP INCISION AND DRAINAGE performed by Paco Henriquez MD at [facility] OR    LIVER TRANSPLANT      UPPER GASTROINTESTINAL ENDOSCOPY  4/16/2013    Dr.Jaiyeola    UPPER GASTROINTESTINAL ENDOSCOPY  5-9-13    Dr Flores     Family History   Problem Relation Age of Onset    Breast Cancer Mother 60

## 2024-01-15 NOTE — ED PROVIDER NOTES
Subjective   History of Present Illness  Patient is a 53-year-old male presents the emergency department with abscess to the right groin area for the past 5 days.  He states he has had a raised area there since he was 16 however the area started getting larger within the last 5 days.  He was seen at [facility] on January 10 and was given Bactrim and Keflex.  He states he saw his family doctor for follow-up today and was advised to come to the emergency department.  The area has gotten larger it is draining.  He does have a history of diabetes.  He states he is in more pain today.  He does have a history of liver transplant as well.  He denies any fever.  No nausea or vomiting.  He states he came in today because the area was larger and he was having more pain.    History provided by:  Patient   used: No        Review of Systems   Constitutional: Negative.    HENT: Negative.     Eyes: Negative.    Respiratory: Negative.     Cardiovascular: Negative.    Gastrointestinal: Negative.    Endocrine: Negative.    Genitourinary: Negative.    Musculoskeletal: Negative.    Skin:         Patient is a 53-year-old male presents the emergency department with abscess to the right groin area for the past 5 days.  He states he has had a raised area there since he was 16 however the area started getting larger within the last 5 days.  He was seen at [facility] on January 10 and was given Bactrim and Keflex.  He states he saw his family doctor for follow-up today and was advised to come to the emergency department.  The area has gotten larger it is draining.  He does have a history of diabetes.  He states he is in more pain today.  He does have a history of liver transplant as well.  He denies any fever.  No nausea or vomiting.  He states he came in today because the area was larger and he was having more pain.     Allergic/Immunologic: Negative.    Neurological: Negative.    Hematological: Negative.   "  Psychiatric/Behavioral: Negative.     All other systems reviewed and are negative.      Past Medical History:   Diagnosis Date    Cirrhosis of liver     Diabetes mellitus     Hep C w/o coma, chronic     Thrombocytopenia     Varices, esophageal        No Known Allergies    Past Surgical History:   Procedure Laterality Date    LIVER TRANSPLANTATION  2016       History reviewed. No pertinent family history.    Social History     Socioeconomic History    Marital status: Unknown   Tobacco Use    Smoking status: Former     Packs/day: 0.50     Years: 25.00     Additional pack years: 0.00     Total pack years: 12.50     Types: Cigarettes     Quit date:      Years since quittin.0       Prior to Admission medications    Not on File       /73   Pulse 91   Temp 97.5 °F (36.4 °C)   Resp 16   Ht 185.4 cm (73\")   Wt 131 kg (288 lb)   SpO2 98%   BMI 38.00 kg/m²     Objective   Physical Exam  Vitals and nursing note reviewed.   Constitutional:       Appearance: He is well-developed.      Comments: Non toxic appearing. No acute distress   HENT:      Head: Normocephalic and atraumatic.   Eyes:      Conjunctiva/sclera: Conjunctivae normal.      Pupils: Pupils are equal, round, and reactive to light.   Cardiovascular:      Rate and Rhythm: Normal rate and regular rhythm.      Heart sounds: Normal heart sounds.   Pulmonary:      Effort: Pulmonary effort is normal.      Breath sounds: Normal breath sounds.   Abdominal:      General: Bowel sounds are normal.      Palpations: Abdomen is soft.   Musculoskeletal:      Cervical back: Normal range of motion and neck supple.      Comments: Right groin: there is approx 11cm in diameter abscess noted to right groin. Surrounding induration. There are 2 openings to the wound. No drainage at present. Abscess does not involve the scrotum. See photos in chart. Very eryth and firm.    Skin:     General: Skin is warm and dry.   Neurological:      Mental Status: He is alert and " oriented to person, place, and time.      Deep Tendon Reflexes: Reflexes are normal and symmetric.   Psychiatric:         Behavior: Behavior normal.         Thought Content: Thought content normal.         Judgment: Judgment normal.         Procedures         Lab Results (last 24 hours)       Procedure Component Value Units Date/Time    Blood Culture With MALAIKA - Blood, Wrist, Right [[id number]] Collected: 01/15/24 1600    Specimen: Blood from Wrist, Right Updated: 01/15/24 1628    Blood Culture With MALAIKA - Blood, Arm, Left [[id number]] Collected: 01/15/24 1604    Specimen: Blood from Arm, Left Updated: 01/15/24 1629    CBC & Differential [[id number]]  (Abnormal) Collected: 01/15/24 1615    Specimen: Blood Updated: 01/15/24 1628    Narrative:      The following orders were created for panel order CBC & Differential.  Procedure                               Abnormality         Status                     ---------                               -----------         ------                     CBC Auto Differential[[id number]]        Abnormal            Final result                 Please view results for these tests on the individual orders.    Comprehensive Metabolic Panel [[id number]]  (Abnormal) Collected: 01/15/24 1615    Specimen: Blood Updated: 01/15/24 1652     Glucose 168 mg/dL      BUN 18 mg/dL      Creatinine 0.91 mg/dL      Sodium 131 mmol/L      Potassium 5.0 mmol/L      Comment: Slight hemolysis detected by analyzer. Result may be falsely elevated.        Chloride 94 mmol/L      CO2 24.0 mmol/L      Calcium 9.1 mg/dL      Total Protein 7.2 g/dL      Albumin 3.2 g/dL      ALT (SGPT) 54 U/L      AST (SGOT) 98 U/L      Alkaline Phosphatase 328 U/L      Total Bilirubin 0.6 mg/dL      Globulin 4.0 gm/dL      A/G Ratio 0.8 g/dL      BUN/Creatinine Ratio 19.8     Anion Gap 13.0 mmol/L      eGFR 100.8 mL/min/1.73     Narrative:      GFR Normal >60  Chronic Kidney Disease <60  Kidney Failure <15      C-reactive Protein  "[[id number]]  (Abnormal) Collected: 01/15/24 1615    Specimen: Blood Updated: 01/15/24 1653     C-Reactive Protein 3.87 mg/dL     Procalcitonin [[id number]]  (Abnormal) Collected: 01/15/24 1615    Specimen: Blood Updated: 01/15/24 1658     Procalcitonin 0.33 ng/mL     Narrative:      As a Marker for Sepsis (Non-Neonates):    1. <0.5 ng/mL represents a low risk of severe sepsis and/or septic shock.  2. >2 ng/mL represents a high risk of severe sepsis and/or septic shock.    As a Marker for Lower Respiratory Tract Infections that require antibiotic therapy:    PCT on Admission    Antibiotic Therapy       6-12 Hrs later    >0.5                Strongly Recommended  >0.25 - <0.5        Recommended   0.1 - 0.25          Discouraged              Remeasure/reassess PCT  <0.1                Strongly Discouraged     Remeasure/reassess PCT    As 28 day mortality risk marker: \"Change in Procalcitonin Result\" (>80% or <=80%) if Day 0 (or Day 1) and Day 4 values are available. Refer to http://www.[employer]-pct-calculator.com    Change in PCT <=80%  A decrease of PCT levels below or equal to 80% defines a positive change in PCT test result representing a higher risk for 28-day all-cause mortality of patients diagnosed with severe sepsis for septic shock.    Change in PCT >80%  A decrease of PCT levels of more than 80% defines a negative change in PCT result representing a lower risk for 28-day all-cause mortality of patients diagnosed with severe sepsis or septic shock.       CBC Auto Differential [[id number]]  (Abnormal) Collected: 01/15/24 1615    Specimen: Blood Updated: 01/15/24 1628     WBC 15.58 10*3/mm3      RBC 4.03 10*6/mm3      Hemoglobin 13.0 g/dL      Hematocrit 40.2 %      MCV 99.8 fL      MCH 32.3 pg      MCHC 32.3 g/dL      RDW 14.5 %      RDW-SD 53.1 fl      MPV 9.6 fL      Platelets 388 10*3/mm3      Neutrophil % 77.0 %      Lymphocyte % 12.3 %      Monocyte % 6.8 %      Eosinophil % 0.5 %      Basophil % 0.9 %      " Immature Grans % 2.5 %      Neutrophils, Absolute 11.99 10*3/mm3      Lymphocytes, Absolute 1.92 10*3/mm3      Monocytes, Absolute 1.06 10*3/mm3      Eosinophils, Absolute 0.08 10*3/mm3      Basophils, Absolute 0.14 10*3/mm3      Immature Grans, Absolute 0.39 10*3/mm3      nRBC 0.0 /100 WBC     Wound Culture - Wound, Leg, Right [[id number]] Collected: 01/15/24 1654    Specimen: Wound from Leg, Right Updated: 01/15/24 1700             Nonvascular Extremity Complete   Final Result   1. At the medial RIGHT thigh area of interest there is a superficial   subcutaneous collection measuring approximately 4 x 2 x 6 cm.   2. Given the history this is compatible with an abscess though this   collection does have a relatively solid appearance and may represent   phlegmon rather than drainable fluid.       This report was signed and finalized on 1/15/2024 5:36 PM by Dr. Alexander Onofre MD.              ED Course  ED Course as of 01/15/24 1843   Mon Julio 15, 2024   1819 IMPRESSION:  1. At the medial RIGHT thigh area of interest there is a superficial  subcutaneous collection measuring approximately 4 x 2 x 6 cm.  2. Given the history this is compatible with an abscess though this  collection does have a relatively solid appearance and may represent  phlegmon rather than drainable fluid.   [CW]   1820 Call placed to general surgery at this time for further  [CW]   1821 Had advised the pt to not eat or drink and he has had a sip of soda while he has been in the er. The last time he ate was last night  [CW]   1825 Spoke with Dr. Manas Fernandes with general surgery. Advised to admit to hospitalist with surgery to consult. Call placed to hospitalist at this time for further.  [CW]   1832 Spoke with Dr. Rios with hospitalist medicine- has accepted for admission. Reviewed care plan with pt and in agreement with care plan.  [CW]      ED Course User Index  [CW] Wendy Cox APRN        Medical Decision Making  Patient is a  53-year-old male presents the emergency department with abscess to the right groin area for the past 5 days.  He states he has had a raised area there since he was 16 however the area started getting larger within the last 5 days.  He was seen at [facility] on January 10 and was given Bactrim and Keflex.  He states he saw his family doctor for follow-up today and was advised to come to the emergency department.  The area has gotten larger it is draining.  He does have a history of diabetes.  He states he is in more pain today.  He does have a history of liver transplant as well.  He denies any fever.  No nausea or vomiting.  He states he came in today because the area was larger and he was having more pain.  Course of treatment in the er: non toxic appearing 52yo male presents with abscess to right groin.  He was seen 5 days ago and started on Bactrim and Keflex.  He states the area has gotten larger and more painful.  He was seen as his doctor's appointment today and he was advised to come to the emergency department for further evaluation and treatment.  Patient does have a history of diabetes and history of liver transplant as well.  There is a very large abscess noted to the right groin measuring approximately 11 cm in diameter.  There is 2 openings to the center of the wound.  There is surrounding induration.  There is no drainage present.  The abscess does not involve the scrotum. Labs, iv atbs, pain medication, and ultrasound have been ordered.   Differential dx: groin abscess; sepsis ; phlegmon; cellulitis   US Nonvascular Extremity Complete   Final Result    1. At the medial RIGHT thigh area of interest there is a superficial    subcutaneous collection measuring approximately 4 x 2 x 6 cm.    2. Given the history this is compatible with an abscess though this    collection does have a relatively solid appearance and may represent    phlegmon rather than drainable fluid.         This report was signed  "and finalized on 1/15/2024 5:36 PM by Dr. Alexander Onofre MD.          Labs Reviewed  COMPREHENSIVE METABOLIC PANEL - Abnormal; Notable for the following components:     Glucose                       168 (*)                Sodium                        131 (*)                Chloride                      94 (*)                 Albumin                       3.2 (*)                ALT (SGPT)                    54 (*)                 AST (SGOT)                    98 (*)                 Alkaline Phosphatase          328 (*)             All other components within normal limits         Narrative: GFR Normal >60                  Chronic Kidney Disease <60                  Kidney Failure <15                    C-REACTIVE PROTEIN - Abnormal; Notable for the following components:     C-Reactive Protein            3.87 (*)            All other components within normal limits  PROCALCITONIN - Abnormal; Notable for the following components:     Procalcitonin                 0.33 (*)            All other components within normal limits         Narrative: As a Marker for Sepsis (Non-Neonates):                                    1. <0.5 ng/mL represents a low risk of severe sepsis and/or septic shock.                  2. >2 ng/mL represents a high risk of severe sepsis and/or septic shock.                                    As a Marker for Lower Respiratory Tract Infections that require antibiotic therapy:                                    PCT on Admission    Antibiotic Therapy       6-12 Hrs later                                    >0.5                Strongly Recommended                  >0.25 - <0.5        Recommended                   0.1 - 0.25          Discouraged              Remeasure/reassess PCT                  <0.1                Strongly Discouraged     Remeasure/reassess PCT                                    As 28 day mortality risk marker: \"Change in Procalcitonin Result\" (>80% or <=80%) if Day 0 (or Day 1) and " Day 4 values are available. Refer to http://www.[facility]-pct-calculator.com                                    Change in PCT <=80%                  A decrease of PCT levels below or equal to 80% defines a positive change in PCT test result representing a higher risk for 28-day all-cause mortality of patients diagnosed with severe sepsis for septic shock.                                    Change in PCT >80%                  A decrease of PCT levels of more than 80% defines a negative change in PCT result representing a lower risk for 28-day all-cause mortality of patients diagnosed with severe sepsis or septic shock.                     CBC WITH AUTO DIFFERENTIAL - Abnormal; Notable for the following components:     WBC                           15.58 (*)               RBC                           4.03 (*)               MCV                           99.8 (*)               Neutrophil %                  77.0 (*)               Lymphocyte %                  12.3 (*)               Immature Grans %              2.5 (*)                Neutrophils, Absolute         11.99 (*)               Monocytes, Absolute           1.06 (*)               Immature Grans, Absolute      0.39 (*)            All other components within normal limits  BLOOD CULTURE WITH MALAIKA  BLOOD CULTURE WITH MALAIKA  WOUND CULTURE  CBC AND DIFFERENTIAL  Labs reviewed patient has a white count of 15.  He has a slightly elevated CRP and Pro-Kamaljit is 1.33.  Ultrasound shows a 4 x 2 x 6 cm phlegmon versus abscess to the right groin.  Spoke with Dr. Fernandes with surgery and advised to admit to hospitalist services.  Patient states he had not eaten since last night however after being advised not to eat or drink anything here he was sipping on soda while he was in the emergency department. Dr fernandes advised that he would speak to anesthesia to see if he would take to operating room tonight or wait until the am. Reviewed care plan with patient and pt in agreement with  care plan. Advised the pt to remain NPO at this time     Problems Addressed:  Abscess: complicated acute illness or injury  Hx of diabetes mellitus: complicated acute illness or injury  Hx of liver transplant: complicated acute illness or injury    Amount and/or Complexity of Data Reviewed  Labs: ordered. Decision-making details documented in ED Course.  Radiology: ordered. Decision-making details documented in ED Course.    Risk  Prescription drug management.  Decision regarding hospitalization.         Final diagnoses:   Abscess   Hx of diabetes mellitus   Hx of liver transplant          Wendy Cox, APRN  01/15/24 1882

## 2024-01-16 ENCOUNTER — APPOINTMENT (OUTPATIENT)
Dept: GENERAL RADIOLOGY | Facility: HOSPITAL | Age: 54
End: 2024-01-16
Payer: MEDICARE

## 2024-01-16 PROBLEM — L02.214 ABSCESS OF GROIN, RIGHT: Status: ACTIVE | Noted: 2024-01-15

## 2024-01-16 PROBLEM — L02.419 LEG ABSCESS: Status: ACTIVE | Noted: 2024-01-16

## 2024-01-16 PROBLEM — L02.91 ABSCESS: Status: RESOLVED | Noted: 2024-01-15 | Resolved: 2024-01-16

## 2024-01-16 LAB
ALBUMIN SERPL-MCNC: 2.7 G/DL (ref 3.5–5.2)
ALBUMIN/GLOB SERPL: 0.8 G/DL
ALP SERPL-CCNC: 347 U/L (ref 39–117)
ALT SERPL W P-5'-P-CCNC: 62 U/L (ref 1–41)
ANION GAP SERPL CALCULATED.3IONS-SCNC: 9 MMOL/L (ref 5–15)
AST SERPL-CCNC: 163 U/L (ref 1–40)
BASOPHILS # BLD AUTO: 0.08 10*3/MM3 (ref 0–0.2)
BASOPHILS NFR BLD AUTO: 0.8 % (ref 0–1.5)
BILIRUB SERPL-MCNC: 0.8 MG/DL (ref 0–1.2)
BUN SERPL-MCNC: 18 MG/DL (ref 6–20)
BUN/CREAT SERPL: 23.7 (ref 7–25)
CALCIUM SPEC-SCNC: 8.3 MG/DL (ref 8.6–10.5)
CHLORIDE SERPL-SCNC: 98 MMOL/L (ref 98–107)
CHOLEST SERPL-MCNC: 154 MG/DL (ref 0–200)
CO2 SERPL-SCNC: 27 MMOL/L (ref 22–29)
CREAT SERPL-MCNC: 0.76 MG/DL (ref 0.76–1.27)
CRP SERPL-MCNC: 3.55 MG/DL (ref 0–0.5)
DEPRECATED RDW RBC AUTO: 53.6 FL (ref 37–54)
EGFRCR SERPLBLD CKD-EPI 2021: 107.5 ML/MIN/1.73
EOSINOPHIL # BLD AUTO: 0.07 10*3/MM3 (ref 0–0.4)
EOSINOPHIL NFR BLD AUTO: 0.7 % (ref 0.3–6.2)
ERYTHROCYTE [DISTWIDTH] IN BLOOD BY AUTOMATED COUNT: 14.6 % (ref 12.3–15.4)
GLOBULIN UR ELPH-MCNC: 3.2 GM/DL
GLUCOSE BLDC GLUCOMTR-MCNC: 115 MG/DL (ref 70–130)
GLUCOSE BLDC GLUCOMTR-MCNC: 136 MG/DL (ref 70–130)
GLUCOSE BLDC GLUCOMTR-MCNC: 93 MG/DL (ref 70–130)
GLUCOSE SERPL-MCNC: 108 MG/DL (ref 65–99)
HBA1C MFR BLD: 6.2 % (ref 4.8–5.6)
HCT VFR BLD AUTO: 35 % (ref 37.5–51)
HDLC SERPL-MCNC: 62 MG/DL (ref 40–60)
HGB BLD-MCNC: 11.3 G/DL (ref 13–17.7)
IMM GRANULOCYTES # BLD AUTO: 0.22 10*3/MM3 (ref 0–0.05)
IMM GRANULOCYTES NFR BLD AUTO: 2.2 % (ref 0–0.5)
LDLC SERPL CALC-MCNC: 76 MG/DL (ref 0–100)
LDLC/HDLC SERPL: 1.22 {RATIO}
LYMPHOCYTES # BLD AUTO: 1.03 10*3/MM3 (ref 0.7–3.1)
LYMPHOCYTES NFR BLD AUTO: 10.3 % (ref 19.6–45.3)
MCH RBC QN AUTO: 32.2 PG (ref 26.6–33)
MCHC RBC AUTO-ENTMCNC: 32.3 G/DL (ref 31.5–35.7)
MCV RBC AUTO: 99.7 FL (ref 79–97)
MONOCYTES # BLD AUTO: 0.71 10*3/MM3 (ref 0.1–0.9)
MONOCYTES NFR BLD AUTO: 7.1 % (ref 5–12)
MRSA DNA SPEC QL NAA+PROBE: NORMAL
NEUTROPHILS NFR BLD AUTO: 7.85 10*3/MM3 (ref 1.7–7)
NEUTROPHILS NFR BLD AUTO: 78.9 % (ref 42.7–76)
NRBC BLD AUTO-RTO: 0 /100 WBC (ref 0–0.2)
PLATELET # BLD AUTO: 280 10*3/MM3 (ref 140–450)
PMV BLD AUTO: 9.7 FL (ref 6–12)
POTASSIUM SERPL-SCNC: 4.7 MMOL/L (ref 3.5–5.2)
PROT SERPL-MCNC: 5.9 G/DL (ref 6–8.5)
RBC # BLD AUTO: 3.51 10*6/MM3 (ref 4.14–5.8)
SODIUM SERPL-SCNC: 134 MMOL/L (ref 136–145)
TRIGL SERPL-MCNC: 82 MG/DL (ref 0–150)
VLDLC SERPL-MCNC: 16 MG/DL (ref 5–40)
WBC NRBC COR # BLD AUTO: 9.96 10*3/MM3 (ref 3.4–10.8)

## 2024-01-16 PROCEDURE — 25010000002 HYDROMORPHONE PER 4 MG: Performed by: GENERAL PRACTICE

## 2024-01-16 PROCEDURE — 86140 C-REACTIVE PROTEIN: CPT | Performed by: GENERAL PRACTICE

## 2024-01-16 PROCEDURE — 85025 COMPLETE CBC W/AUTO DIFF WBC: CPT | Performed by: GENERAL PRACTICE

## 2024-01-16 PROCEDURE — 25010000002 ENOXAPARIN PER 10 MG: Performed by: NURSE PRACTITIONER

## 2024-01-16 PROCEDURE — 80061 LIPID PANEL: CPT | Performed by: GENERAL PRACTICE

## 2024-01-16 PROCEDURE — 87641 MR-STAPH DNA AMP PROBE: CPT | Performed by: GENERAL PRACTICE

## 2024-01-16 PROCEDURE — 25010000002 VANCOMYCIN 10 G RECONSTITUTED SOLUTION: Performed by: GENERAL PRACTICE

## 2024-01-16 PROCEDURE — 99024 POSTOP FOLLOW-UP VISIT: CPT | Performed by: GENERAL PRACTICE

## 2024-01-16 PROCEDURE — 63710000001 PREDNISONE PER 5 MG: Performed by: NURSE PRACTITIONER

## 2024-01-16 PROCEDURE — 36415 COLL VENOUS BLD VENIPUNCTURE: CPT | Performed by: GENERAL PRACTICE

## 2024-01-16 PROCEDURE — 83036 HEMOGLOBIN GLYCOSYLATED A1C: CPT | Performed by: GENERAL PRACTICE

## 2024-01-16 PROCEDURE — 25010000002 PIPERACILLIN SOD-TAZOBACTAM PER 1 G: Performed by: GENERAL PRACTICE

## 2024-01-16 PROCEDURE — 25810000003 SODIUM CHLORIDE 0.9 % SOLUTION: Performed by: GENERAL PRACTICE

## 2024-01-16 PROCEDURE — 63710000001 TACROLIMUS PER 1 MG: Performed by: NURSE PRACTITIONER

## 2024-01-16 PROCEDURE — 80053 COMPREHEN METABOLIC PANEL: CPT | Performed by: GENERAL PRACTICE

## 2024-01-16 PROCEDURE — 82948 REAGENT STRIP/BLOOD GLUCOSE: CPT

## 2024-01-16 RX ORDER — OXYCODONE HYDROCHLORIDE 5 MG/1
10 TABLET ORAL EVERY 6 HOURS PRN
Status: DISCONTINUED | OUTPATIENT
Start: 2024-01-16 | End: 2024-01-19 | Stop reason: HOSPADM

## 2024-01-16 RX ORDER — PREGABALIN 75 MG/1
75 CAPSULE ORAL 2 TIMES DAILY
COMMUNITY

## 2024-01-16 RX ORDER — ACETAMINOPHEN 325 MG/1
325 TABLET ORAL EVERY 4 HOURS PRN
Status: DISCONTINUED | OUTPATIENT
Start: 2024-01-16 | End: 2024-01-19 | Stop reason: HOSPADM

## 2024-01-16 RX ORDER — ENOXAPARIN SODIUM 100 MG/ML
40 INJECTION SUBCUTANEOUS EVERY 24 HOURS
Status: DISCONTINUED | OUTPATIENT
Start: 2024-01-16 | End: 2024-01-19 | Stop reason: HOSPADM

## 2024-01-16 RX ORDER — OXYCODONE HYDROCHLORIDE 5 MG/1
5 TABLET ORAL EVERY 6 HOURS PRN
Status: DISCONTINUED | OUTPATIENT
Start: 2024-01-16 | End: 2024-01-19 | Stop reason: HOSPADM

## 2024-01-16 RX ORDER — SULFAMETHOXAZOLE AND TRIMETHOPRIM 800; 160 MG/1; MG/1
1 TABLET ORAL 2 TIMES DAILY
COMMUNITY
End: 2024-01-19 | Stop reason: HOSPADM

## 2024-01-16 RX ORDER — ACETAMINOPHEN 650 MG/1
325 SUPPOSITORY RECTAL EVERY 4 HOURS PRN
Status: DISCONTINUED | OUTPATIENT
Start: 2024-01-16 | End: 2024-01-19 | Stop reason: HOSPADM

## 2024-01-16 RX ORDER — ACETAMINOPHEN 160 MG/5ML
325 SOLUTION ORAL EVERY 4 HOURS PRN
Status: DISCONTINUED | OUTPATIENT
Start: 2024-01-16 | End: 2024-01-19 | Stop reason: HOSPADM

## 2024-01-16 RX ORDER — TACROLIMUS 1 MG/1
1 CAPSULE ORAL EVERY 8 HOURS SCHEDULED
Status: DISCONTINUED | OUTPATIENT
Start: 2024-01-16 | End: 2024-01-19 | Stop reason: HOSPADM

## 2024-01-16 RX ADMIN — HYDROMORPHONE HYDROCHLORIDE 0.5 MG: 1 INJECTION, SOLUTION INTRAMUSCULAR; INTRAVENOUS; SUBCUTANEOUS at 02:45

## 2024-01-16 RX ADMIN — SODIUM CHLORIDE 75 ML/HR: 9 INJECTION, SOLUTION INTRAVENOUS at 00:05

## 2024-01-16 RX ADMIN — TACROLIMUS 1 MG: 1 CAPSULE ORAL at 06:13

## 2024-01-16 RX ADMIN — HYDROMORPHONE HYDROCHLORIDE 0.5 MG: 1 INJECTION, SOLUTION INTRAMUSCULAR; INTRAVENOUS; SUBCUTANEOUS at 19:44

## 2024-01-16 RX ADMIN — HYDROMORPHONE HYDROCHLORIDE 0.5 MG: 1 INJECTION, SOLUTION INTRAMUSCULAR; INTRAVENOUS; SUBCUTANEOUS at 06:13

## 2024-01-16 RX ADMIN — PIPERACILLIN SODIUM AND TAZOBACTAM SODIUM 3.38 G: 3; .375 INJECTION, POWDER, LYOPHILIZED, FOR SOLUTION INTRAVENOUS at 14:32

## 2024-01-16 RX ADMIN — HYDROMORPHONE HYDROCHLORIDE 0.5 MG: 1 INJECTION, SOLUTION INTRAMUSCULAR; INTRAVENOUS; SUBCUTANEOUS at 16:42

## 2024-01-16 RX ADMIN — VANCOMYCIN HYDROCHLORIDE 1500 MG: 10 INJECTION, POWDER, LYOPHILIZED, FOR SOLUTION INTRAVENOUS at 06:13

## 2024-01-16 RX ADMIN — ENOXAPARIN SODIUM 40 MG: 100 INJECTION SUBCUTANEOUS at 17:34

## 2024-01-16 RX ADMIN — HYDROMORPHONE HYDROCHLORIDE 0.5 MG: 1 INJECTION, SOLUTION INTRAMUSCULAR; INTRAVENOUS; SUBCUTANEOUS at 12:58

## 2024-01-16 RX ADMIN — HYDROMORPHONE HYDROCHLORIDE 0.5 MG: 1 INJECTION, SOLUTION INTRAMUSCULAR; INTRAVENOUS; SUBCUTANEOUS at 00:05

## 2024-01-16 RX ADMIN — HYDROMORPHONE HYDROCHLORIDE 0.5 MG: 1 INJECTION, SOLUTION INTRAMUSCULAR; INTRAVENOUS; SUBCUTANEOUS at 08:50

## 2024-01-16 RX ADMIN — PREDNISONE 5 MG: 5 TABLET ORAL at 22:08

## 2024-01-16 RX ADMIN — PIPERACILLIN SODIUM AND TAZOBACTAM SODIUM 3.38 G: 3; .375 INJECTION, POWDER, LYOPHILIZED, FOR SOLUTION INTRAVENOUS at 22:08

## 2024-01-16 RX ADMIN — PREDNISONE 5 MG: 5 TABLET ORAL at 00:04

## 2024-01-16 RX ADMIN — BUSPIRONE HYDROCHLORIDE 7.5 MG: 5 TABLET ORAL at 08:50

## 2024-01-16 RX ADMIN — GABAPENTIN 300 MG: 300 CAPSULE ORAL at 08:50

## 2024-01-16 RX ADMIN — HYDROMORPHONE HYDROCHLORIDE 0.5 MG: 1 INJECTION, SOLUTION INTRAMUSCULAR; INTRAVENOUS; SUBCUTANEOUS at 22:37

## 2024-01-16 RX ADMIN — BUSPIRONE HYDROCHLORIDE 7.5 MG: 5 TABLET ORAL at 00:04

## 2024-01-16 RX ADMIN — PREDNISONE 5 MG: 5 TABLET ORAL at 08:50

## 2024-01-16 RX ADMIN — TACROLIMUS 1 MG: 1 CAPSULE ORAL at 22:08

## 2024-01-16 RX ADMIN — VANCOMYCIN HYDROCHLORIDE 1500 MG: 10 INJECTION, POWDER, LYOPHILIZED, FOR SOLUTION INTRAVENOUS at 18:13

## 2024-01-16 RX ADMIN — PANTOPRAZOLE SODIUM 40 MG: 40 TABLET, DELAYED RELEASE ORAL at 08:50

## 2024-01-16 RX ADMIN — ASPIRIN 81 MG: 81 TABLET, COATED ORAL at 08:50

## 2024-01-16 RX ADMIN — PIPERACILLIN SODIUM AND TAZOBACTAM SODIUM 3.38 G: 3; .375 INJECTION, POWDER, LYOPHILIZED, FOR SOLUTION INTRAVENOUS at 08:53

## 2024-01-16 RX ADMIN — TACROLIMUS 1 MG: 1 CAPSULE ORAL at 13:32

## 2024-01-16 RX ADMIN — Medication 10 ML: at 22:09

## 2024-01-16 NOTE — PLAN OF CARE
Goal Outcome Evaluation:   Pain well controlled with current interventions. Consent for I&D right groin signed. VSS. Wound vac therapy continued. Safety maintained

## 2024-01-16 NOTE — ANESTHESIA PROCEDURE NOTES
Airway  Urgency: elective    Date/Time: 1/15/2024 8:21 PM  Airway not difficult    General Information and Staff    Patient location during procedure: OR  CRNA/CAA: Bert Meza CRNA    Indications and Patient Condition  Indications for airway management: airway protection    Preoxygenated: yes  Mask difficulty assessment: 0 - not attempted    Final Airway Details  Final airway type: endotracheal airway      Successful airway: ETT  Cuffed: yes   Successful intubation technique: direct laryngoscopy  Endotracheal tube insertion site: oral  Blade: Venegas  Blade size: 4  Cormack-Lehane Classification: grade I - full view of glottis  Placement verified by: capnometry   Measured from: lips  Number of attempts at approach: 1  Assessment: lips, teeth, and gum same as pre-op and atraumatic intubation    Additional Comments  Dentition remains as preop...

## 2024-01-16 NOTE — ANESTHESIA POSTPROCEDURE EVALUATION
"Patient: Gil Monroy    Procedure Summary       Date: 01/15/24 Room / Location:  [facility] OR  /  [facility] OR    Anesthesia Start: 2018 Anesthesia Stop: 2119    Procedure: GROIN ABSCESS INCISION AND DRAINAGE WITH APPLICATION OF WOUND VAC (Right: Groin) Diagnosis:     Surgeons: Manas Fernandes MD Provider: Bert Meza CRNA    Anesthesia Type: general ASA Status: 3            Anesthesia Type: general    Vitals  Vitals Value Taken Time   /73 01/15/24 2201   Temp 97.8 °F (36.6 °C) 01/15/24 2201   Pulse 90 01/15/24 2201   Resp 15 01/15/24 2201   SpO2 98 % 01/15/24 2201           Post Anesthesia Care and Evaluation    Patient location during evaluation: PACU  Patient participation: complete - patient participated  Level of consciousness: awake and alert  Pain management: adequate    Airway patency: patent  Anesthetic complications: No anesthetic complications    Cardiovascular status: acceptable  Respiratory status: acceptable  Hydration status: acceptable    Comments: Blood pressure 154/70, pulse 82, temperature 98.3 °F (36.8 °C), temperature source Oral, resp. rate 16, height 185.4 cm (73\"), weight 131 kg (289 lb 4.8 oz), SpO2 96%.    Pt discharged from PACU based on ila score >8    "

## 2024-01-16 NOTE — PAYOR COMM NOTE
"1/16/24 [facility] [phone]  [phone]      ER ADMIT ON 1/15/24 OBSERVATION ORDER.    1/16/24 MD CHANGED STATUS TO INPATIENT STARTING ON 1/16/24.    FAXING FOR  INPATIENT REVIEW.            Gil Farrell (53 y.o. Male)       Date of Birth   1970    Social Security Number       Address   [address] [place] [address] [address] [facility] [address]    Home Phone   [phone]    MRN   [id number]       Restorationist   Buddhist    Marital Status   Unknown                            Admission Date   1/15/24    Admission Type   Emergency    Admitting Provider   Eamon Seaman MD    Attending Provider   Eamon Seaman MD    Department, Room/Bed   [facility] 3C, 392/1       Discharge Date       Discharge Disposition       Discharge Destination                                 Attending Provider: Eamon Seaman MD    Allergies: No Known Allergies    Isolation: None   Infection: C.difficile (03/19/21)   Code Status: Not on file    Ht: 185.4 cm (73\")   Wt: 131 kg (289 lb 4.8 oz)    Admission Cmt: None   Principal Problem: Abscess [L02.91]                   Active Insurance as of 1/15/2024       Primary Coverage       Payor Plan Insurance Group Employer/Plan Group    [facility] MEDICARE REPLACEMENT [facility] MED ADV SNP HMO [id number]       Payor Plan Address Payor Plan Phone Number Payor Plan Fax Number Effective Dates    [address] [address]   1/1/2024 - None Entered    [facility] [id number]         Subscriber Name Subscriber Birth Date Member ID       GIL FARRELL 1970 [id number]                     Emergency Contacts        (Rel.) Home Phone Work Phone Mobile Phone    CATHERINE LOPES (Spouse) [phone] -- [phone]             [facility] Encounter Date/Time: 1/15/2024 1548   Hospital Account: [id number]    MRN: [id number]   Patient:  Gil Farrell   Contact Serial #: [id number]   SSN:        "   ENCOUNTER             Patient Class: Inpatient   Unit: [address] Service: Surgery     Bed: 392/1   Admitting Provider: Eamon Seaman*   Referring Physician: Baltazar Flores   Attending Provider: Eamon Seaman*   Adm Diagnosis: Abscess [L02.91]               PATIENT          Name: Janet Farrell : 1970 (53 yrs)   Address: [address] [place] [address]  Sex: Male   City: [place] [address]   County: [facility]   Marital Status: UNKNOWN Ethnicity: NOT                                                                              Race: WHITE   Primary Care Provider: Feliberto Blount, * Patients Phone: Home Phone: [phone]     Mobile Phone: [phone]   EMERGENCY CONTACT   Contact Name Legal Guardian? Relationship to Patient Home Phone Work Phone   1. CATHERINE LOPES  2. *No Contact Specified* No    Spouse    [phone]              GUARANTOR            Guarantor: Janet Farrell     : 1970   Address: [address] [place] [address]  Sex: [place] [address]     Relation to Patient: Self       Home Phone: [phone]   Guarantor ID: [id number]       Work Phone:     GUARANTOR EMPLOYER   Employer:           Status: NOT EMPLO*   COVERAGE          PRIMARY INSURANCE   Payor: [employer] MEDICARE REPLACEMENT Plan: [employer] MED ADV [facility] HMO   Group Number: [id number] Insurance Type: INDEMNITY   Subscriber Name: JANET FARRELL Subscriber : 1970   Subscriber ID: [id number] Coverage Address: [address] [address]   Pat. Rel. to Subscriber: Self Coverage Phone:     SECONDARY INSURANCE   Payor: N/A Plan: N/A   Group Number:   Insurance Type:     Subscriber Name:   Subscriber :     Subscriber ID:   Coverage Address:     Pat. Rel. to Subscriber:   Coverage Phone:        Contact Serial # ([id number])         2024    Chart ID ([id number])         0730 98.1 (36.7) 79 16 153/88 Lying room air 97   24 0247 98.3  "(36.8) 82 16 154/70 Lying room air 96   01/15/24 2347 -- 89 14 -- -- room air 95   01/15/24 2259 98.5 (36.9) 83 14 137/70 Lying room air 94   01/15/24 2231 97.7 (36.5) 82 15 135/82 Lying room air 94   01/15/24 2201 97.8 (36.6) 90 15 123/73 Lying room air 98     Cuca Teague APRN   Nurse Practitioner  Hospitalist     H&P      Cosign Needed     Date of Service: 01/15/24 1837  Creation Time: 01/15/24 1837     Cosign Needed       Expand All Collapse All         [facility] Medicine Services  HISTORY AND PHYSICAL     Date of Admission: 1/15/2024  Primary Care Physician: Feliberto Blount MD     Subjective   Primary Historian: Patient     Chief Complaint: Right groin pain and swelling     History of Present Illness  Moshe Monroy is a 53-year-old male with a past medical history of insulin-dependent diabetes, liver transplant, chronic lower extremity lymphedema, please see below for complete list.  Patient presents to [facility] emergency department for evaluation after being seen by PCP earlier today.  He reports developing a \"knot\" in the right groin thigh region about a month ago.  He states it kept getting bigger and more painful.  He did seek evaluation at [facility] emergency department on 1/10/2024, discharged on Bactrim and Keflex.  He states he is not having any improvement.  It did rupture while he was at [facility] which improved the pain however pain returned as did additional swelling and redness.  ER workup reveals abscess.  Dr. Fernandes, general surgery, consulted and may take patient for intervention tonight.  Currently he is scoring his pain 8 on a scale of 1-10.  We will keep n.p.o. until decision regarding surgery is been determined.  Patient has no other complaints.  He is admitted for further evaluation treatment.     Review of Systems   Otherwise complete ROS reviewed and negative except as mentioned in the HPI.     Past Medical History:   Medical History "        Past Medical History:   Diagnosis Date    Cirrhosis of liver      Diabetes mellitus      Hep C w/o coma, chronic      Thrombocytopenia      Varices, esophageal        Bilateral lower extremity lymphedema     Past Surgical History:  Surgical History         Past Surgical History:   Procedure Laterality Date    LIVER TRANSPLANTATION   2016         Social History:  reports that he quit smoking about 8 years ago. His smoking use included cigarettes. He has a 12.50 pack-year smoking history. He does not have any smokeless tobacco history on file.     Family History: family history includes Breast cancer in his mother; Diabetes in his father.        Allergies:  No Known Allergies     Medications:  No current facility-administered medications on file prior to encounter.             Current Outpatient Medications on File Prior to Encounter   Medication Sig Dispense Refill    atorvastatin (LIPITOR) 10 MG tablet Take 1 tablet by mouth Daily.        busPIRone (BUSPAR) 5 MG tablet Take 1.5 tablets by mouth 3 (Three) Times a Day.        cephalexin (KEFLEX) 500 MG capsule Take 1 capsule by mouth 2 (Two) Times a Day.        Magnesium Oxide -Mg Supplement 400 (240 Mg) MG tablet Take 1 tablet by mouth 2 (Two) Times a Day.        oxyCODONE (ROXICODONE) 15 MG immediate release tablet Take 1 tablet by mouth Every 6 (Six) Hours As Needed for Moderate Pain.        pantoprazole (PROTONIX) 20 MG EC tablet Take 2 tablets by mouth Daily.        predniSONE (DELTASONE) 5 MG tablet Take 1 tablet by mouth 2 (Two) Times a Day.        aspirin 81 MG EC tablet Take 1 tablet by mouth Daily.        gabapentin (NEURONTIN) 300 MG capsule Take 1 capsule by mouth 2 (Two) Times a Day.        Insulin Glargine (BASAGLAR KWIKPEN SC) Inject 12 Units under the skin into the appropriate area as directed Every Night.        potassium chloride ER (K-TAB) 20 MEQ tablet controlled-release ER tablet Take 1 tablet by mouth Daily.         "Sulfamethoxazole-Trimethoprim (BACTRIM PO) Take 800 mg by mouth 2 (Two) Times a Day. For 10 days        Tacrolimus ER 1 MG tablet sustained-release 24 hour Take 3 tablets by mouth Daily.        vitamin D (ERGOCALCIFEROL) 1.25 MG (09822 UT) capsule capsule Take 1 capsule by mouth 1 (One) Time Per Week.        Zinc 50 MG tablet Take 1 tablet by mouth Daily.             I have utilized all available immediate resources to obtain, update, or review the patient's current medications (including all prescriptions, over-the-counter products, herbals, cannabis/cannabidiol products, and vitamin/mineral/dietary (nutritional) supplements).     Objective      Vital Signs: /87   Pulse 89   Temp 97.5 °F (36.4 °C)   Resp 16   Ht 185.4 cm (73\")   Wt 131 kg (288 lb)   SpO2 95%   BMI 38.00 kg/m²   Physical Exam  Vitals reviewed.   Constitutional:       Appearance: Normal appearance.   HENT:      Head: Normocephalic and atraumatic.      Mouth/Throat:      Mouth: Mucous membranes are moist.      Pharynx: Oropharynx is clear.   Eyes:      Extraocular Movements: Extraocular movements intact.      Conjunctiva/sclera: Conjunctivae normal.      Comments: Wears sunglasses in room as they are prescription and he is watching television   Cardiovascular:      Rate and Rhythm: Normal rate and regular rhythm.   Pulmonary:      Effort: Pulmonary effort is normal.      Breath sounds: Normal breath sounds.   Abdominal:      General: There is no distension.      Palpations: Abdomen is soft.   Musculoskeletal:      Cervical back: Normal range of motion and neck supple.      Right lower leg: No edema.      Left lower leg: No edema.      Comments: Pain with movement- Right lower extremity   Skin:     Findings: Erythema (Medial right thigh and groin region) present.      Comments: Purulent drainage noted from abscess site right groin/thigh region   Neurological:      General: No focal deficit present.      Mental Status: He is alert and " oriented to person, place, and time.   Psychiatric:         Mood and Affect: Mood normal.         Behavior: Behavior normal.         Results Reviewed:  Lab Results (last 24 hours)         Procedure Component Value Units Date/Time     Wound Culture - Wound, Leg, Right [[id number]] Collected: 01/15/24 1654     Specimen: Wound from Leg, Right Updated: 01/15/24 1700     Procalcitonin [[id number]]  (Abnormal) Collected: 01/15/24 1615     Specimen: Blood Updated: 01/15/24 1658       Procalcitonin 0.33 ng/mL       C-reactive Protein [[id number]]  (Abnormal) Collected: 01/15/24 1615     Specimen: Blood Updated: 01/15/24 1653       C-Reactive Protein 3.87 mg/dL       Comprehensive Metabolic Panel [[id number]]  (Abnormal) Collected: 01/15/24 1615     Specimen: Blood Updated: 01/15/24 1652       Glucose 168 mg/dL         BUN 18 mg/dL         Creatinine 0.91 mg/dL         Sodium 131 mmol/L         Potassium 5.0 mmol/L         Comment: Slight hemolysis detected by analyzer. Result may be falsely elevated.          Chloride 94 mmol/L         CO2 24.0 mmol/L         Calcium 9.1 mg/dL         Total Protein 7.2 g/dL         Albumin 3.2 g/dL         ALT (SGPT) 54 U/L         AST (SGOT) 98 U/L         Alkaline Phosphatase 328 U/L         Total Bilirubin 0.6 mg/dL         Globulin 4.0 gm/dL         A/G Ratio 0.8 g/dL         BUN/Creatinine Ratio 19.8       Anion Gap 13.0 mmol/L         eGFR 100.8 mL/min/1.73       Blood Culture With MALAIKA - Blood, Arm, Left [[id number]] Collected: 01/15/24 1604     Specimen: Blood from Arm, Left Updated: 01/15/24 1629     Blood Culture With MALAIKA - Blood, Wrist, Right [[id number]] Collected: 01/15/24 1600     Specimen: Blood from Wrist, Right Updated: 01/15/24 1628     CBC Auto Differential [[id number]]  (Abnormal) Collected: 01/15/24 1615     Specimen: Blood Updated: 01/15/24 1628       WBC 15.58 10*3/mm3         RBC 4.03 10*6/mm3         Hemoglobin 13.0 g/dL         Hematocrit 40.2 %         MCV 99.8 fL          MCH 32.3 pg         MCHC 32.3 g/dL         RDW 14.5 %         RDW-SD 53.1 fl         MPV 9.6 fL         Platelets 388 10*3/mm3         Neutrophil % 77.0 %         Lymphocyte % 12.3 %         Monocyte % 6.8 %         Eosinophil % 0.5 %         Basophil % 0.9 %         Immature Grans % 2.5 %         Neutrophils, Absolute 11.99 10*3/mm3         Lymphocytes, Absolute 1.92 10*3/mm3         Monocytes, Absolute 1.06 10*3/mm3         Eosinophils, Absolute 0.08 10*3/mm3         Basophils, Absolute 0.14 10*3/mm3         Immature Grans, Absolute 0.39 10*3/mm3         nRBC 0.0 /100 WBC               Imaging Results (Last 24 Hours)         Procedure Component Value Units Date/Time     US Nonvascular Extremity Complete [[id number]] Collected: 01/15/24 1733       Updated: 01/15/24 1739     Narrative:       EXAMINATION: US NONVASCULAR EXTREMITY COMPLETE-     1/15/2024 4:03 PM     HISTORY: abscess right groin     COMPARISON:  None.     Targeted grayscale and color flow ultrasound evaluation of the medial  aspect of the upper RIGHT thigh.  The area of interest is a red and swollen focus with drainage.     Ultrasound evaluation shows a superficial subcutaneous focus of  heterogeneous echogenicity.     This area measures 39 x 20 x 59 mm and is compatible with a subcutaneous  abscess.  An open tract to the skin surface is noted though most of this  collection has a heterogeneous and more solid than liquid appearance and  this should be evaluated as to the need for surgical debridement.        Impression:       1. At the medial RIGHT thigh area of interest there is a superficial  subcutaneous collection measuring approximately 4 x 2 x 6 cm.  2. Given the history this is compatible with an abscess though this  collection does have a relatively solid appearance and may represent  phlegmon rather than drainable fluid.     This report was signed and finalized on 1/15/2024 5:36 PM by Dr. Alexander Onofre MD.                   Assessment / Plan    Assessment:        Active Hospital Problems     Diagnosis      **Abscess      Insulin dependent type 2 diabetes mellitus      History of liver transplant      Acute pain      Transaminitis      Hyponatremia           Treatment Plan  1.  The patient will be admitted to Dr. Flores's service here at [facility].   2.  General surgery Dr. Fernandes following  3.  Pain management  4.  Home medications reviewed and restarted as appropriate  5.  DVT prophylaxis with SCDs  6.  Monitor glucose ACHS with regular insulin sliding scale coverage  7.  Continue vancomycin and Zosyn  8.  Normal saline 75 MLS/hour  9.  Start bowel regimen  10.  Supplemental oxygen as needed, incentive spirometry, continuous pulse oximetry  11.  Labs in a.m.     Medical Decision Making  Number and Complexity of problems: 6  Differential Diagnosis: None     Conditions and Status        Condition is unchanged.     [facility] Data  External documents reviewed: [facility] records  Cardiac tracing (EKG, telemetry) interpretation: Reviewed  Radiology interpretation: Reviewed  Labs reviewed: Yes  Any tests that were considered but not ordered: No     Decision rules/scores evaluated (example [id number], Wells, etc): No     Discussed with: Patient and Dr. Flores     Care Planning  Shared decision making: Patient and Dr. Flores  Code status and discussions: Full     Disposition  Social Determinants of Health that impact treatment or disposition: None  Estimated length of stay is 1-2 days.      I confirmed that the patient's advanced care plan is present, code status is documented, and a surrogate decision maker is listed in the patient's medical record.      The patient's surrogate decision maker is wife Mariya.      The patient was seen and examined by me on 1/15/2024 at 6:37 PM.     Electronically signed by ANDREW Conley, 01/15/24, 19:55 CST.                  Manas Fernandes MD   Physician  Surgery     Consults     Signed     Date of  Service: 01/15/24 2011  Creation Time: 01/15/24 2011  Consult Orders   Inpatient General Surgery Consult [[id number]] ordered by Cuca Teague APRN at 01/15/24 1911          Signed       Expand All Collapse All     Patient: Gil Monroy     YOB: 1970     Date: 01/15/2024     Primary Care Provider: Feliberto Blount MD         Chief Complaint   Patient presents with    Abscess         History of present illness:  Mr. Monroy is a 53 y.o. with past medical history of hep C, cirrhosis, liver transplant, diabetes who presents with right inner thigh abscess.  Patient states he first noticed it approximately 1 month ago when he noticed a knot in that area.  He states over the past month it continued to get larger.  He presented to [facility] on 10 January and was discharged on Bactrim and Keflex.  He states that it has spontaneously drained multiple times with purulent output.  He has had similar abscesses before which needed to be drained.  None of them or this large or causing this much problems.     Patient denies any other symptoms.  Denies nausea, vomiting, constipation, fatigue, fevers, night sweats     Review of Systems   Constitutional: Negative.    HENT: Negative.     Eyes: Negative.    Respiratory: Negative.     Cardiovascular: Negative.    Gastrointestinal: Negative.    Endocrine: Negative.    Genitourinary: Negative.    Musculoskeletal: Negative.    Skin:  Positive for wound.        Right inner thigh with multiple draining cavities.   Allergic/Immunologic: Negative.    Neurological: Negative.    Hematological: Negative.    Psychiatric/Behavioral: Negative.           History:  Medical History        Past Medical History:   Diagnosis Date    Cirrhosis of liver      Diabetes mellitus      Hep C w/o coma, chronic      Thrombocytopenia      Varices, esophageal                 Surgical History         Past Surgical History:   Procedure Laterality Date    LIVER TRANSPLANTATION   2016                   Family History   Problem Relation Age of Onset    Breast cancer Mother      Diabetes Father           Social History            Tobacco Use    Smoking status: Former       Packs/day: 0.50       Years: 25.00       Additional pack years: 0.00       Total pack years: 12.50       Types: Cigarettes       Quit date:        Years since quittin.0         Allergies:  No Known Allergies     Medications:      Current Facility-Administered Medications:     acetaminophen (TYLENOL) tablet 650 mg, 650 mg, Oral, Q4H PRN **OR** acetaminophen (TYLENOL) 160 MG/5ML oral solution 650 mg, 650 mg, Oral, Q4H PRN **OR** acetaminophen (TYLENOL) suppository 650 mg, 650 mg, Rectal, Q4H PRN, Cuca Teague, ANDREW    sennosides-docusate (PERICOLACE) 8.6-50 MG per tablet 1 tablet, 1 tablet, Oral, Nightly **AND** polyethylene glycol (MIRALAX) packet 17 g, 17 g, Oral, Daily PRN **AND** bisacodyl (DULCOLAX) EC tablet 5 mg, 5 mg, Oral, Daily PRN **AND** bisacodyl (DULCOLAX) suppository 10 mg, 10 mg, Rectal, Daily PRN, Cuca Teague APRN    dextrose (D50W) (25 g/50 mL) IV injection 25 g, 25 g, Intravenous, Q15 Min PRN, Cuca Teague APRN    dextrose (GLUTOSE) oral gel 15 g, 15 g, Oral, Q15 Min PRN, Cuca Teague, ANDREW    glucagon (GLUCAGEN) injection 1 mg, 1 mg, Intramuscular, Q15 Min PRN, Cuca Teague, ANDREW    HYDROmorphone (DILAUDID) injection 0.5 mg, 0.5 mg, Intravenous, Q2H PRN **AND** naloxone (NARCAN) injection 0.4 mg, 0.4 mg, Intravenous, Q5 Min PRN, Cuca Teague, ANDREW    Insulin Lispro (humaLOG) injection 2-9 Units, 2-9 Units, Subcutaneous, 4x Daily AC & at Bedtime, Cuca Teague, ANDREW    ondansetron ODT (ZOFRAN-ODT) disintegrating tablet 4 mg, 4 mg, Oral, Q6H PRN **OR** ondansetron (ZOFRAN) injection 4 mg, 4 mg, Intravenous, Q6H PRN, Cuca Teague, APRN    oxyCODONE-acetaminophen (PERCOCET) 5-325 MG per tablet 1 tablet, 1 tablet, Oral, Q4H PRN, Cuca Teague, APRN     piperacillin-tazobactam (ZOSYN) IVPB 3.375 g in 100 mL NS (CD), 3.375 g, Intravenous, Q8H, Cuca Teague, APRRO    [COMPLETED] Insert Peripheral IV, , , Once **AND** sodium chloride 0.9 % flush 10 mL, 10 mL, Intravenous, PRN, BrookeWendy Bozena, APRN    sodium chloride 0.9 % flush 10 mL, 10 mL, Intravenous, Q12H, Cuca Teague, APRRO    sodium chloride 0.9 % flush 10 mL, 10 mL, Intravenous, PRN, Cuca Teague, APRN    sodium chloride 0.9 % infusion 40 mL, 40 mL, Intravenous, PRN, Cuca Teague, APRRO    sodium chloride 0.9 % infusion, 75 mL/hr, Intravenous, Continuous, Cuca Teague, APRN    [START ON 1/16/2024] vancomycin IVPB 1500 mg in 0.9% NaCl (Premix) 500 mL, 1,500 mg, Intravenous, Q12H, Cuca Teague, APRN     Current Outpatient Medications:     atorvastatin (LIPITOR) 10 MG tablet, Take 1 tablet by mouth Daily., Disp: , Rfl:     busPIRone (BUSPAR) 5 MG tablet, Take 1.5 tablets by mouth 3 (Three) Times a Day., Disp: , Rfl:     cephalexin (KEFLEX) 500 MG capsule, Take 1 capsule by mouth 2 (Two) Times a Day., Disp: , Rfl:     Magnesium Oxide -Mg Supplement 400 (240 Mg) MG tablet, Take 1 tablet by mouth 2 (Two) Times a Day., Disp: , Rfl:     oxyCODONE (ROXICODONE) 15 MG immediate release tablet, Take 1 tablet by mouth Every 6 (Six) Hours As Needed for Moderate Pain., Disp: , Rfl:     pantoprazole (PROTONIX) 20 MG EC tablet, Take 2 tablets by mouth Daily., Disp: , Rfl:     predniSONE (DELTASONE) 5 MG tablet, Take 1 tablet by mouth 2 (Two) Times a Day., Disp: , Rfl:     aspirin 81 MG EC tablet, Take 1 tablet by mouth Daily., Disp: , Rfl:     gabapentin (NEURONTIN) 300 MG capsule, Take 1 capsule by mouth 2 (Two) Times a Day., Disp: , Rfl:     Insulin Glargine (BASAGLAR KWIKPEN SC), Inject 12 Units under the skin into the appropriate area as directed Every Night., Disp: , Rfl:     potassium chloride ER (K-TAB) 20 MEQ tablet controlled-release ER tablet, Take 1 tablet by mouth Daily., Disp: ,  Rfl:     Sulfamethoxazole-Trimethoprim (BACTRIM PO), Take 800 mg by mouth 2 (Two) Times a Day. For 10 days, Disp: , Rfl:     Tacrolimus ER 1 MG tablet sustained-release 24 hour, Take 3 tablets by mouth Daily., Disp: , Rfl:     vitamin D (ERGOCALCIFEROL) 1.25 MG (68671 UT) capsule capsule, Take 1 capsule by mouth 1 (One) Time Per Week., Disp: , Rfl:     Zinc 50 MG tablet, Take 1 tablet by mouth Daily., Disp: , Rfl:      Vital Signs:   Vitals          Vitals:     01/15/24 1746 01/15/24 1801 01/15/24 1816 01/15/24 1847   BP: 167/90 150/84 140/73 136/87   BP Location:           Pulse: 86 101 91 89   Resp:           Temp:           SpO2: 95% 96% 98% 95%   Weight:           Height:                    Physical Exam:                General Appearance:    Alert, cooperative, in no acute distress   Head:    Normocephalic, without obvious abnormality, atraumatic   Eyes:          PERRLA, EOMI   Ears:    Ears appear intact with no abnormalities noted   Throat:   No oral lesions, oral mucosa moist, poor dentition   Neck:   No adenopathy, supple, trachea midline   Back:     No skin lesions, erythema or scars, no tenderness palpation   Lungs:     B/L chest rise, no increase work of breathing     Heart:    Regular rhythm and normal rate   Chest Wall:    No abnormalities observed, no skin lesions   Abdomen:     Soft, ND, NT, no masses, no guarding, no rebound                tenderness   Rectal:     Deferred   Extremities:   Moves all extremities well, no edema   Pulses:   Pulses palpable and equal bilaterally   Skin:   No bleeding, bruising or rash.  13 by approximately 16 erythematous wound on inside of right thigh.  There are multiple areas of drainage.  Purulent output present.   Lymph nodes:   No palpable adenopathy   Neurologic:   Cranial nerves 2 - 12 grossly intact, sensation intact         Results Review:                      Results from last 7 days   Lab Units 01/15/24  1615 01/10/24  1603   WBC 10*3/mm3 15.58* 16.1*    HEMOGLOBIN g/dL 13.0 13.0*   HEMATOCRIT % 40.2 37.9*   PLATELETS 10*3/mm3 388 233              Results from last 7 days   Lab Units 01/15/24  1615   SODIUM mmol/L 131*   POTASSIUM mmol/L 5.0   CHLORIDE mmol/L 94*   CO2 mmol/L 24.0   BUN mg/dL 18   CREATININE mg/dL 0.91   CALCIUM mg/dL 9.1   BILIRUBIN mg/dL 0.6   ALK PHOS U/L 328*   ALT (SGPT) U/L 54*   AST (SGOT) U/L 98*   GLUCOSE mg/dL 168*      Ultrasound reviewed found to have 4 x 2 x 6 cm fluid collection/phlegmon.     Assessment / Plan:     Mr. Monroy is a 53 y.o. with past medical history of hep C, cirrhosis, liver transplant, diabetes who presents with right groin wound.  On examination it appears to be draining however could benefit from additional debridement and appropriate packing.  Based on my exam it does not appear to be necrotizing soft tissue infection.     I recommended he undergo debridement in the OR.I had a detailed and extensive discussion with the patient about the diagnosis and reviewed his recent workup.       Risk, benefits, alternatives of the procedure were discussed with the patient.  Risk include bleeding, infection, damage to surrounding structures (femoral vessels), poor cosmesis, continued/chronic pain, and need for further operation. The patient stated understanding.  Will plan to proceed with incision and drainage tonight.  Plan for wound VAC versus wet-to-dry dressings after the OR.     Electronically signed by Manas Fernandes MD  01/15/24  20:11 CST                        Manas Fernandes MD   Physician  Surgery     Op Note     Signed     Date of Service: 01/15/24 2038  Creation Time: 01/15/24 2144  Case Time: Procedures: Surgeons:   01/15/24 2038 GROIN ABSCESS INCISION AND DRAINAGE WITH APPLICATION OF WOUND VAC    Manas Fernandes MD               Signed         GROIN ABSCESS INCISION AND DRAINAGE  Procedure Note     Gil Monroy  1/15/2024     Pre-op Diagnosis:   * No pre-op diagnosis entered *     Post-op Diagnosis:     * No  Diagnosis Codes entered *     Procedure/CPT® Codes:        Procedure(s):  GROIN ABSCESS INCISION AND DRAINAGE WITH APPLICATION OF WOUND VAC     Surgeon(s):  Manas Fernandes MD           Anesthesia: General     Staff:   Specimens         ID Source Type Tests Collected By Collected At Frozen?     1 Groin, right Tissue ANAEROBIC CULTURE  GRAM STAIN - NO CULTURE  TISSUE / BONE CULTURE    Manas Fernandes MD 1/15/24 2048       Description: Right Groin                Estimated Blood Loss: <500ml     Specimens:                ID Type Source Tests Collected by Time   1 : Right Groin Tissue Groin, right ANAEROBIC CULTURE, GRAM STAIN - NO CULTURE, TISSUE / BONE CULTURE Manas Fernandes MD 1/15/2024 2048          Drains: * No LDAs found *     Indications: 53-year-old male with history of diabetes who presents with 10-day right inner thigh abscess/cellulitis.     Findings: 14 cm x 11 cm necrotizing soft tissue infection extending towards the groin and lower abdominal wall.     Complications: None     Procedure: The patient was met in the preoperative holding area who where he was again explained risk benefits and alternatives of the procedure.  The patient stated his understanding agreed to proceed.  Consent was signed.  The patient was then met by the anesthesia care team and taken to the operating room where he was placed supine on the operating table with proper padding of all extremities.  He underwent general endotracheal anesthesia and a timeout was performed to confirm correct patient, procedure and site.     The skin overlying the abscess cavity was opened.  At that time the patient was noted to have a large abscess cavity which had previously drained.  Upon further inspection he was noted to have tracking of this cavity superiorly towards his groin area approximately 5 cm.  And superior laterally towards his lower abdominal wall for approximately 5 cm.  The subcutaneous tissue in this area along with a section of overlying  necrotic skin was debrided.  Hemostasis was obtained.  The wound was irrigated with 3 L of saline.  A wound VAC was then placed with 2 tongues oriented in the area that the wound tract.  Suction was then applied.  A good seal was noted.  The patient awoke from general anesthesia in stable condition.  He was transferred to the PACU.  There were no immediate complications.  Sponge needle instrument counts were correct at the completion of the case.              Manas Fernandes MD      Date: 1/15/2024  Time: 21:44 CST     Part of this note may be an electronic transcription/translation of spoken language to printed text using the Dragon Dictation System.                   Manas Fernandes MD   Physician  Surgery     Progress Notes     Signed     Date of Service: 01/16/24 0749  Creation Time: 01/16/24 0749     Signed          LOS: 0 days   Patient Care Team:  Feliberto Blount MD as PCP - General (Family Medicine)     Subjective  OR yesterday for incision and drainage of right proximal medial thigh wound.  Wound was noted to track medially towards his perineum and superiorly towards his abdominal wall.  Wound VAC was put in place.  Patient states pain has been tolerable overnight with pain medication.  He has otherwise been afebrile with improving vitals and labs.     Objective:   Vital Signs  Temp:  [97.5 °F (36.4 °C)-98.5 °F (36.9 °C)] 98.1 °F (36.7 °C)  Heart Rate:  [] 79  Resp:  [14-20] 16  BP: (113-168)/() 153/88     Intake & Output (last 3 days)           01/13 0701  01/14 0700 01/14 0701  01/15 0700 01/15 0701  01/16 0700 01/16 0701 01/17 0700     IV Piggyback     100       Total Intake(mL/kg)     100 (0.8)       Urine (mL/kg/hr)     300 200 (1.9)     Total Output     300 200     Net     -200 -200                              Physical Exam:                General Appearance:    Alert, cooperative, in no acute distress   HEENT:   Normocephalic, atraumatic, EOMI, MMM   Lungs:     Equal chest rise  bilaterally.  No increased work of breathing    Heart:    Regular rhythm and normal rate   Abdomen:    Normal bowel sounds, no masses, no organomegaly, soft nontender, nondistended   Extremities:     Moves all extremities spontaneously, no peripheral edema.  Groin wound with wound VAC in place erythema significantly improved from yesterday.   Results Review:                I reviewed the patient's new clinical results. Significant labs:   I reviewed the patient's new imaging results and agree with the interpretation.            Results from last 7 days   Lab Units 01/16/24  0300 01/15/24  1615 01/10/24  1603   WBC 10*3/mm3 9.96 15.58* 16.1*   HEMOGLOBIN g/dL 11.3* 13.0 13.0*   HEMATOCRIT % 35.0* 40.2 37.9*   PLATELETS 10*3/mm3 280 388 233               Results from last 7 days   Lab Units 01/16/24  0300 01/15/24  1615   SODIUM mmol/L 134* 131*   POTASSIUM mmol/L 4.7 5.0   CHLORIDE mmol/L 98 94*   CO2 mmol/L 27.0 24.0   BUN mg/dL 18 18   CREATININE mg/dL 0.76 0.91   CALCIUM mg/dL 8.3* 9.1   BILIRUBIN mg/dL 0.8 0.6   ALK PHOS U/L 347* 328*   ALT (SGPT) U/L 62* 54*   AST (SGOT) U/L 163* 98*   GLUCOSE mg/dL 108* 168*         Assessment and plan:           Mr. Monroy is a 53 y.o. male with history of diabetes who presented with right inner thigh abscess found to be necrotizing soft tissue infection.  Patient taken to the OR 16 January for debridement.  The wound was noted to track superiorly anterior abdominal wall immediately towards his groin.     Gram stain with both gram-positive cocci and gram-negative bacilli.  Please continue Zosyn and vanc until speciation  Patient will need return to the OR on Wednesday for wound VAC replacement  Please hold therapeutic anticoagulation.  Okay for Lovenox prophylaxis  Home health for wound care and wound VAC DME     Manas Fernandes MD  01/16/24  07:49 CST     Part of this note may be an electronic transcription/translation of spoken language to printed text using the Dragon  Dictation System.                     tudy Result    Narrative & Impression   EXAMINATION: US NONVASCULAR EXTREMITY COMPLETE-     1/15/2024 4:03 PM     HISTORY: abscess right groin     COMPARISON:  None.     Targeted grayscale and color flow ultrasound evaluation of the medial  aspect of the upper RIGHT thigh.  The area of interest is a red and swollen focus with drainage.     Ultrasound evaluation shows a superficial subcutaneous focus of  heterogeneous echogenicity.     This area measures 39 x 20 x 59 mm and is compatible with a subcutaneous  abscess.  An open tract to the skin surface is noted though most of this  collection has a heterogeneous and more solid than liquid appearance and  this should be evaluated as to the need for surgical debridement.     IMPRESSION:  1. At the medial RIGHT thigh area of interest there is a superficial  subcutaneous collection measuring approximately 4 x 2 x 6 cm.  2. Given the history this is compatible with an abscess though this  collection does have a relatively solid appearance and may represent  phlegmon rather than drainable fluid.     This report was signed and finalized on 1/15/2024 5:36 PM by Dr. Alexander Onofre MD.        tudy Result    Narrative & Impression   EXAMINATION: US NONVASCULAR EXTREMITY COMPLETE-     1/15/2024 4:03 PM     HISTORY: abscess right groin     COMPARISON:  None.     Targeted grayscale and color flow ultrasound evaluation of the medial  aspect of the upper RIGHT thigh.  The area of interest is a red and swollen focus with drainage.     Ultrasound evaluation shows a superficial subcutaneous focus of  heterogeneous echogenicity.     This area measures 39 x 20 x 59 mm and is compatible with a subcutaneous  abscess.  An open tract to the skin surface is noted though most of this  collection has a heterogeneous and more solid than liquid appearance and  this should be evaluated as to the need for surgical debridement.     IMPRESSION:  1. At the medial  RIGHT thigh area of interest there is a superficial  subcutaneous collection measuring approximately 4 x 2 x 6 cm.  2. Given the history this is compatible with an abscess though this  collection does have a relatively solid appearance and may represent  phlegmon rather than drainable fluid.     This report was signed and finalized on 1/15/2024 5:36 PM by Dr. Alexander Onofre MD.                    issue / Bone Culture - Tissue, Groin, right  Order: [id number]  Status: Preliminary result       Visible to patient: No (not released)       Next appt: None       Dx: Abscess of groin, right    Specimen Information: Groin, right; Tissue   0 Result Notes  Gram Stain Moderate (3+) WBCs per low power field      Moderate (3+) Gram positive cocci      Few (2+) Gram negative bacilli             tudy Result                Narrative & Impression   EXAMINATION: US NONVASCULAR EXTREMITY COMPLETE-     1/15/2024 4:03 PM     HISTORY: abscess right groin     COMPARISON:  None.     Targeted grayscale and color flow ultrasound evaluation of the medial  aspect of the upper RIGHT thigh.  The area of interest is a red and swollen focus with drainage.     Ultrasound evaluation shows a superficial subcutaneous focus of  heterogeneous echogenicity.     This area measures 39 x 20 x 59 mm and is compatible with a subcutaneous  abscess.  An open tract to the skin surface is noted though most of this  collection has a heterogeneous and more solid than liquid appearance and  this should be evaluated as to the need for surgical debridement.     IMPRESSION:  1. At the medial RIGHT thigh area of interest there is a superficial  subcutaneous collection measuring approximately 4 x 2 x 6 cm.  2. Given the history this is compatible with an abscess though this  collection does have a relatively solid appearance and may represent  phlegmon rather than drainable fluid.     This report was signed and finalized on 1/15/2024 5:36 PM by Dr. Alexander Onofre MD.         tudy Result    Narrative & Impression   EXAMINATION: US NONVASCULAR EXTREMITY COMPLETE-     1/15/2024 4:03 PM     HISTORY: abscess right groin     COMPARISON:  None.     Targeted grayscale and color flow ultrasound evaluation of the medial  aspect of the upper RIGHT thigh.  The area of interest is a red and swollen focus with drainage.     Ultrasound evaluation shows a superficial subcutaneous focus of  heterogeneous echogenicity.     This area measures 39 x 20 x 59 mm and is compatible with a subcutaneous  abscess.  An open tract to the skin surface is noted though most of this  collection has a heterogeneous and more solid than liquid appearance and  this should be evaluated as to the need for surgical debridement.     IMPRESSION:  1. At the medial RIGHT thigh area of interest there is a superficial  subcutaneous collection measuring approximately 4 x 2 x 6 cm.  2. Given the history this is compatible with an abscess though this  collection does have a relatively solid appearance and may represent  phlegmon rather than drainable fluid.     This report was signed and finalized on 1/15/2024 5:36 PM by Dr. Alexander Onofre MD.              Current Facility-Administered Medications   Medication Dose Route Frequency Provider Last Rate Last Admin    acetaminophen (TYLENOL) tablet 325 mg  325 mg Oral Q4H PRN Manas Fernandes MD        Or    acetaminophen (TYLENOL) 160 MG/5ML oral solution 325 mg  325 mg Oral Q4H PRN Manas Fernandes MD        Or    acetaminophen (TYLENOL) suppository 325 mg  325 mg Rectal Q4H PRN Manas Fernandes MD        sennosides-docusate (PERICOLACE) 8.6-50 MG per tablet 1 tablet  1 tablet Oral Nightly Manas Fernandes MD        And    polyethylene glycol (MIRALAX) packet 17 g  17 g Oral Daily PRN Manas Fernandes MD        And    bisacodyl (DULCOLAX) EC tablet 5 mg  5 mg Oral Daily PRN Manas Fernandes MD        And    bisacodyl (DULCOLAX) suppository 10 mg  10 mg Rectal Daily PRN Manas Fernandes MD         dextrose (D50W) (25 g/50 mL) IV injection 25 g  25 g Intravenous Q15 Min PRN Manas Fernandes MD        dextrose (GLUTOSE) oral gel 15 g  15 g Oral Q15 Min PRN Manas Fernandes MD        glucagon (GLUCAGEN) injection 1 mg  1 mg Intramuscular Q15 Min PRN Manas Fernandes MD        HYDROmorphone (DILAUDID) injection 0.5 mg  0.5 mg Intravenous Q2H PRN Manas Fernandes MD   0.5 mg at 01/16/24 0850    And    naloxone (NARCAN) injection 0.4 mg  0.4 mg Intravenous Q5 Min PRN Manas Fernandes MD        Insulin Lispro (humaLOG) injection 2-9 Units  2-9 Units Subcutaneous 4x Daily AC & at Bedtime Manas Fernandes MD        ondansetron ODT (ZOFRAN-ODT) disintegrating tablet 4 mg  4 mg Oral Q6H PRN Manas Fernandes MD        Or    ondansetron (ZOFRAN) injection 4 mg  4 mg Intravenous Q6H PRN Manas Fernandes MD        oxyCODONE (ROXICODONE) immediate release tablet 15 mg  15 mg Oral Q6H PRN Cuca Teague, APRN        oxyCODONE (ROXICODONE) immediate release tablet 5 mg  5 mg Oral Q6H PRN Manas Fernandes MD        pantoprazole (PROTONIX) EC tablet 40 mg  40 mg Oral Daily Cuca Teague, APRN   40 mg at 01/16/24 0850    piperacillin-tazobactam (ZOSYN) IVPB 3.375 g in 100 mL NS (CD)  3.375 g Intravenous Q8H Manas Fernandes MD   3.375 g at 01/16/24 0853    predniSONE (DELTASONE) tablet 5 mg  5 mg Oral BID Cuca Teague, APRN   5 mg at 01/16/24 0850    sodium chloride 0.9 % flush 10 mL  10 mL Intravenous PRN Manas Fernandes MD        sodium chloride 0.9 % flush 10 mL  10 mL Intravenous Q12H Manas Fernandes MD        sodium chloride 0.9 % flush 10 mL  10 mL Intravenous PRN Manas Fernandes MD        sodium chloride 0.9 % infusion 40 mL  40 mL Intravenous PRN Stigall, Manas, MD        sodium chloride 0.9 % infusion  75 mL/hr Intravenous Continuous Manas Fernandes MD 75 mL/hr at 01/16/24 0005 75 mL/hr at 01/16/24 0005    tacrolimus (PROGRAF) capsule 1 mg  1 mg Oral Q8H Cuca Teague APRN   1 mg at 01/16/24 0613    vancomycin IVPB 1500 mg in  0.9% NaCl (Premix) 500 mL  1,500 mg Intravenous Q12H Manas Fernandes .3 mL/hr at 01/16/24 0613 1,500 mg at 01/16/24 0613

## 2024-01-16 NOTE — CASE MANAGEMENT/SOCIAL WORK
Discharge Planning Assessment  [facility]     Patient Name: Gil Monroy  MRN: [id number]  Today's Date: 1/16/2024    Admit Date: 1/15/2024        Discharge Needs Assessment       Row Name 01/16/24 1446       Living Environment    People in Home significant other    Current Living Arrangements home    Potentially Unsafe Housing Conditions none    Primary Care Provided by self    Provides Primary Care For no one    Family Caregiver if Needed significant other    Quality of Family Relationships helpful;involved;supportive    Able to Return to Prior Arrangements yes       Resource/Environmental Concerns    Resource/Environmental Concerns none       Transition Planning    Patient/Family Anticipates Transition to home with family;home with help/services    Patient/Family Anticipated Services at Transition home health care    Transportation Anticipated family or friend will provide       Discharge Needs Assessment    Readmission Within the Last 30 Days no previous admission in last 30 days    Equipment Currently Used at Home glucometer    Concerns to be Addressed care coordination/care conferences;discharge planning    Anticipated Changes Related to Illness none    Equipment Needed After Discharge negative pressure wound therapy device    Outpatient/Agency/Support Group Needs homecare agency    Discharge Facility/Level of Care Needs home with home health    Discharge Coordination/Progress Pt lives at home and will return home at d/c. He will need a wound vac for home use. Order completed in [employer]. Form on chart for MD to sign. Pt states he will need home health at d/c. Did receive a referral that pt is not able to pay for his anti-rejection meds. Spoke with pt about this and he has been in contact with his transplant doctor and they are trying to help him figure this out.                   Discharge Plan    No documentation.                 Continued Care and Services - Admitted Since 1/15/2024    Coordination has  not been started for this encounter.       Expected Discharge Date and Time       Expected Discharge Date Expected Discharge Time    Jan 19, 2024            Demographic Summary    No documentation.                  Functional Status    No documentation.                  Psychosocial    No documentation.                  Abuse/Neglect    No documentation.                  Legal    No documentation.                  Substance Abuse    No documentation.                  Patient Forms    No documentation.                     CALEB Baltazar

## 2024-01-16 NOTE — PROGRESS NOTES
"    [facility] Medicine Services  INPATIENT PROGRESS NOTE    Patient Name: Gil Monroy  Date of Admission: 1/15/2024  Today's Date: 01/16/24  Length of Stay: 1  Primary Care Physician: Feliberto Blount MD    Subjective   Chief Complaint: Right groin pain and swelling  HPI   Mr. Monroy is a 53-year-old male with a past medical history of insulin-dependent diabetes, liver transplant, chronic lower extremity lymphedema.  He presented to [facility] emergency room 1/5/2015 with \"knot\" right groin and thigh for the past month.  Patient reported area kept getting larger and more painful.  He was evaluated at [facility] emergency room 1/10/2024 and discharged on Bactrim and Keflex.  Patient noted that area did rupture while he was at [facility] and improved the pain; however, pain returned with additional swelling and redness.  He noted that the area has spontaneously drained on several occasions with purulent output.  Patient reported similar abscesses before needed draining.  However, no previous abscess is larger causing as much pain as this area.  CRP 3.87, procalcitonin 0.33, WBC 15.58.  Ultrasound showed medial right thigh area with superficial subcutaneous collection measuring 4 x 2 x 6 cm compatible with abscess.  Vancomycin and Zosyn given in ER.  Patient was seen by Dr. Manas Fernandes in ER with recommendations for debridement and possible wound VAC.    Today  Was taken to surgery 1/15 by Dr. Fernandes for incision and drainage wound abscess and wound VAC application.  Wound opened and noted tracking superior toward the groin perineum and laterally toward the abdominal wall.  Wound irrigated and wound VAC applied.  Discussed with Dr. Manas Fernandes today and he is to return to surgery tomorrow for wound VAC replacement.  Hold anticoagulations.  Okay for prophylaxis Lovenox.  Plans for antibiotics IV for 5 days with day 1 being today.      Review of Systems "   Constitutional:  Positive for activity change and fatigue.   HENT:  Negative for congestion and trouble swallowing.    Eyes:  Negative for photophobia and visual disturbance.   Respiratory:  Negative for cough, shortness of breath and wheezing.    Cardiovascular:  Positive for leg swelling (Right upper thigh). Negative for chest pain.   Gastrointestinal:  Negative for constipation, diarrhea, nausea and vomiting.   Endocrine: Negative for cold intolerance, heat intolerance and polyuria.   Genitourinary:  Negative for dysuria, frequency and urgency.   Musculoskeletal:  Negative for gait problem.   Skin:  Positive for color change (Reddened, erythema right upper inner thigh) and wound (Right thigh).   Allergic/Immunologic: Negative for immunocompromised state.   Neurological:  Positive for weakness. Negative for light-headedness.   Hematological:  Negative for adenopathy. Does not bruise/bleed easily.   Psychiatric/Behavioral:  Negative for agitation, behavioral problems and confusion.       All pertinent negatives and positives are as above. All other systems have been reviewed and are negative unless otherwise stated.     Objective    Temp:  [97.5 °F (36.4 °C)-98.5 °F (36.9 °C)] 97.9 °F (36.6 °C)  Heart Rate:  [] 72  Resp:  [14-20] 16  BP: (113-168)/() 144/77  Physical Exam  Vitals and nursing note reviewed.   Constitutional:       Appearance: He is obese.      Comments: Sitting up in bed.  No oxygen use.  No visitors in room.   HENT:      Head: Normocephalic and atraumatic.      Nose: No congestion.      Mouth/Throat:      Pharynx: Oropharynx is clear. No oropharyngeal exudate or posterior oropharyngeal erythema.   Eyes:      Extraocular Movements: Extraocular movements intact.      Pupils: Pupils are equal, round, and reactive to light.   Cardiovascular:      Rate and Rhythm: Normal rate and regular rhythm.      Heart sounds: No murmur heard.  Pulmonary:      Breath sounds: No wheezing, rhonchi or  rales.   Abdominal:      Palpations: Abdomen is soft.      Tenderness: There is no abdominal tenderness.   Genitourinary:     Comments: Voiding.  Musculoskeletal:         General: Tenderness (Right upper inner thigh) present.      Cervical back: Normal range of motion and neck supple.   Skin:     Comments: Right upper inner thigh with wound VAC.  Some firmness and erythema posterior to wound VAC.   Neurological:      General: No focal deficit present.      Mental Status: He is alert and oriented to person, place, and time.   Psychiatric:         Mood and Affect: Mood normal.         Behavior: Behavior normal.         Thought Content: Thought content normal.         Judgment: Judgment normal.       Results Review:  I have reviewed the labs, radiology results, and diagnostic studies.    Laboratory Data:   Results from last 7 days   Lab Units 01/16/24  0300 01/15/24  1615 01/10/24  1603   WBC 10*3/mm3 9.96 15.58* 16.1*   HEMOGLOBIN g/dL 11.3* 13.0 13.0*   HEMATOCRIT % 35.0* 40.2 37.9*   PLATELETS 10*3/mm3 280 388 233     Results from last 7 days   Lab Units 01/16/24  0300 01/15/24  1615   SODIUM mmol/L 134* 131*   POTASSIUM mmol/L 4.7 5.0   CHLORIDE mmol/L 98 94*   CO2 mmol/L 27.0 24.0   BUN mg/dL 18 18   CREATININE mg/dL 0.76 0.91   CALCIUM mg/dL 8.3* 9.1   BILIRUBIN mg/dL 0.8 0.6   ALK PHOS U/L 347* 328*   ALT (SGPT) U/L 62* 54*   AST (SGOT) U/L 163* 98*   GLUCOSE mg/dL 108* 168*       Culture Data:   Blood Culture   Date Value Ref Range Status   01/15/2024 No growth at less than 24 hours  Preliminary   01/15/2024 No growth at less than 24 hours  Preliminary       Radiology Data:   Imaging Results (Last 24 Hours)       Procedure Component Value Units Date/Time    US Nonvascular Extremity Complete [[id number]] Collected: 01/15/24 1733     Updated: 01/15/24 1739    Narrative:      EXAMINATION: US NONVASCULAR EXTREMITY COMPLETE-     1/15/2024 4:03 PM     HISTORY: abscess right groin     COMPARISON:  None.     Targeted  grayscale and color flow ultrasound evaluation of the medial  aspect of the upper RIGHT thigh.  The area of interest is a red and swollen focus with drainage.     Ultrasound evaluation shows a superficial subcutaneous focus of  heterogeneous echogenicity.     This area measures 39 x 20 x 59 mm and is compatible with a subcutaneous  abscess.  An open tract to the skin surface is noted though most of this  collection has a heterogeneous and more solid than liquid appearance and  this should be evaluated as to the need for surgical debridement.       Impression:      1. At the medial RIGHT thigh area of interest there is a superficial  subcutaneous collection measuring approximately 4 x 2 x 6 cm.  2. Given the history this is compatible with an abscess though this  collection does have a relatively solid appearance and may represent  phlegmon rather than drainable fluid.     This report was signed and finalized on 1/15/2024 5:36 PM by Dr. Alexander Onofre MD.             Scheduled medications:  enoxaparin, 40 mg, Subcutaneous, Q24H  insulin lispro, 2-9 Units, Subcutaneous, 4x Daily AC & at Bedtime  pantoprazole, 40 mg, Oral, Daily  piperacillin-tazobactam, 3.375 g, Intravenous, Q8H  predniSONE, 5 mg, Oral, BID  senna-docusate sodium, 1 tablet, Oral, Nightly  sodium chloride, 10 mL, Intravenous, Q12H  tacrolimus, 1 mg, Oral, Q8H  vancomycin, 1,500 mg, Intravenous, Q12H       I have reviewed the patient's current medications.     Assessment/Plan   Assessment  Active Hospital Problems    Diagnosis     **Abscess of groin, right     Leg abscess     Insulin dependent type 2 diabetes mellitus     History of liver transplant     Acute pain     Transaminitis     Hyponatremia        Treatment Plan    1.  Abscess right upper thigh groin.  Presented to ER with right upper thigh pain, swelling, not ongoing for the past month.  Evaluated at [facility] 1/10 and discharged on Bactrim and Keflex.  Area has opened and drained  spontaneously on several occasions.  Ultrasound showed subcutaneous collection 4 x 2 x 6 cm.  Dr. Manas Fernandes took him to surgery for incision and drainage groin abscess with wound VAC placement 1/16.  Findings necrotizing soft tissue infection.  Wound debrided and noted tracking anterior abdominal wall towards groin.  Plans for return to OR 1/17 for wound VAC replacement.    Wound culture gram-positive cocci, gram-negative bacilli.  Await final culture and sensitivity.  Continue vancomycin and Zosyn.  WBC 15.8 on admission down to 9.6 today.  Afebrile.  Cultures no growth less than 24 hours    Discussed with Dr. Fernandes today and Dr. Fernandes hold therapeutic anticoagulation.  Okay for prophylaxis with Lovenox.    2.  Diabetes mellitus type 2 with insulin.  Hemoglobin A1c 6.2.  Accu-Cheks with sliding scale insulin coverage.  Glucoses 93, 108, 121.  Patient uses NovoLog with meals sliding scale coverage.    3.  Acute right thigh pain.  Dilaudid IV for severe pain, oxycodone for moderate and severe pain, Tylenol for mild pain.    4.  Mild transaminitis.  AST 98, 163.  ALT 54, 62.  Repeat CMP in AM.    Medical Decision Making  Number and Complexity of problems: 4  Abscess right upper thigh groin: Acute, high complexity poses threat to life and bodily function  Diabetes mellitus type 2 with insulin: Chronic, high complexity poses threat to life and bodily function  Acute right thigh pain: Acute, moderate complexity, not at baseline  Mild transaminitis: Acute, moderate complexity, not at baseline    Differential Diagnosis: Necrotizing soft tissue infection    Conditions and Status        Condition is improving.     [facility] Data  External documents reviewed: Reviewed [facility].  PCP office visit 1/15/2024  Cardiac tracing (EKG, telemetry) interpretation: None  Radiology interpretation: Radiology interpretation ultrasound right upper extremity  Labs reviewed:   CMP 1/16/2024.  Repeat CMP in AM.  CBC 1/16/2024.  Repeat CBC in  AM.  Hemoglobin A1c, lipid panel  CRP 3.55  Surgical wound culture reviewed.  Pending.  Blood culture no growth less than 24 hours    Any tests that were considered but not ordered: None     Decision rules/scores evaluated (example [id number], Wells, etc): None     Discussed with: Dr. Seaman, Dr. Manas Fernandes general surgery, and patient.     Care Planning  Shared decision making: Dr. Seaman, Dr. Manas Fernandes, general surgery, and patient.  Patient agrees to wound VAC change tomorrow, IV antibiotics, await culture results, pain control.  Accu-Cheks with sliding scale insulin  Code status and discussions: Full code  Patient surrogate decision maker is Lise Gagnon  Social Determinants of Health that impact treatment or disposition: Wound VAC  I expect the patient to be discharged to home in 3-4 days.     Electronically signed by ANDREW Mason, 01/16/24, 12:36 CST.     The above documentation resulted from a face-to-face encounter by me Nuris MORALES, [facility].

## 2024-01-16 NOTE — PROGRESS NOTES
"Pharmacy Dosing Service  Pharmacokinetics  Vancomycin Initial Evaluation  Assessment/Action/Plan:  Loading dose?: 2000 mg IVPB once  Current Order: Vancomycin 1500 mg IVPB every 12 hours  Current end date:01/22/24  Levels: Obtain Vancomycin trough prior to dose on 1/17/24 at 1700  Additional antimicrobial agent(s): Zosyn  MRSA Nasal PCR ordered: Yes (Vancomycin indication is pneumonia, bone/joint, SSTI or IAI)    Vancomycin dosage initiated based on population pharmacokinetic parameters. Pharmacy will continue to follow daily and adjust dose accordingly.     Subjective:  Gil Monroy is a 53 y.o. male with a Vancomycin \"Pharmacy to Dose\" consult for the treatment of SSTI , day 1 of 7 of treatment.    AUC Model Data:  Loading dose: 2000 mg IVPB once  Regimen: 1500 mg IV every 12 hours.  Start time: 06:00on 01/16/2024  Exposure target: AUC24 (range) 400-600 mg/L.hr   AUC24,ss: 547 mg/L.hr  PAUC*: 81 %  Ctrough,ss: 17.8 mg/L  Pconc*: 40 %  Tox.: 14 %    Objective:  Ht: 185.4 cm (73\"); Wt: 131 kg (288 lb)  Estimated Creatinine Clearance: 132.8 mL/min (by C-G formula based on SCr of 0.91 mg/dL).   Creatinine   Date Value Ref Range Status   01/15/2024 0.91 0.76 - 1.27 mg/dL Final   08/23/2022 0.8 0.5 - 1.2 mg/dL Final   04/20/2022 0.90 0.80 - 1.30 mg/dL Final   12/09/2021 0.8 0.5 - 1.2 mg/dL Final   03/25/2021 0.83 0.76 - 1.27 mg/dL Final   03/24/2021 0.99 0.76 - 1.27 mg/dL Final   02/09/2021 0.8 0.5 - 1.2 mg/dL Final      Lab Results   Component Value Date    WBC 15.58 (H) 01/15/2024    WBC 16.1 (H) 01/10/2024    WBC 9.4 03/28/2023      Baseline culture results:  Microbiology Results (last 10 days)       ** No results found for the last 240 hours. **            Sparkle Charlton, PharmD  01/15/24 19:59 CST    "

## 2024-01-16 NOTE — ANESTHESIA PREPROCEDURE EVALUATION
Anesthesia Evaluation     Patient summary reviewed   no history of anesthetic complications:   NPO Solid Status: > 8 hours  NPO Liquid Status: > 2 hours           Airway   Mallampati: II  TM distance: >3 FB  Neck ROM: full  No difficulty expected  Dental    (+) poor dentition        Pulmonary    (+) a smoker Former, vape,  (-) asthma  Cardiovascular - negative cardio ROS and normal exam        Neuro/Psych- negative ROS  (-) seizures, CVA  GI/Hepatic/Renal/Endo    (+) obesity, GERD well controlled, liver disease, diabetes mellitus type 2 well controlled    ROS Comment: Liver 2016    Musculoskeletal     Abdominal    Substance History   (-) drug use     OB/GYN          Other   ,chronic steroid use      ROS/Med Hx Other: Prednisone .5mg bid                    Anesthesia Plan    ASA 3     general       Anesthetic plan, risks, benefits, and alternatives have been provided, discussed and informed consent has been obtained with: patient.    Use of blood products discussed with  Consented to blood products.    Plan discussed with CRNA.        CODE STATUS:         
English

## 2024-01-16 NOTE — PROGRESS NOTES
LOS: 0 days   Patient Care Team:  Feliberto Blonut MD as PCP - General (Family Medicine)    Subjective  OR yesterday for incision and drainage of right proximal medial thigh wound.  Wound was noted to track medially towards his perineum and superiorly towards his abdominal wall.  Wound VAC was put in place.  Patient states pain has been tolerable overnight with pain medication.  He has otherwise been afebrile with improving vitals and labs.    Objective:   Vital Signs  Temp:  [97.5 °F (36.4 °C)-98.5 °F (36.9 °C)] 98.1 °F (36.7 °C)  Heart Rate:  [] 79  Resp:  [14-20] 16  BP: (113-168)/() 153/88    Intake & Output (last 3 days)         01/13 0701  01/14 0700 01/14 0701  01/15 0700 01/15 0701  01/16 0700 01/16 0701 01/17 0700    IV Piggyback   100     Total Intake(mL/kg)   100 (0.8)     Urine (mL/kg/hr)   300 200 (1.9)    Total Output   300 200    Net   -200 -200                    Physical Exam:     General Appearance:    Alert, cooperative, in no acute distress   HEENT:   Normocephalic, atraumatic, EOMI, MMM   Lungs:     Equal chest rise bilaterally.  No increased work of breathing    Heart:    Regular rhythm and normal rate   Abdomen:    Normal bowel sounds, no masses, no organomegaly, soft nontender, nondistended   Extremities:     Moves all extremities spontaneously, no peripheral edema.  Groin wound with wound VAC in place erythema significantly improved from yesterday.   Results Review:     I reviewed the patient's new clinical results. Significant labs:   I reviewed the patient's new imaging results and agree with the interpretation.    Results from last 7 days   Lab Units 01/16/24  0300 01/15/24  1615 01/10/24  1603   WBC 10*3/mm3 9.96 15.58* 16.1*   HEMOGLOBIN g/dL 11.3* 13.0 13.0*   HEMATOCRIT % 35.0* 40.2 37.9*   PLATELETS 10*3/mm3 280 388 233        Results from last 7 days   Lab Units 01/16/24  0300 01/15/24  1615   SODIUM mmol/L 134* 131*   POTASSIUM mmol/L 4.7 5.0   CHLORIDE mmol/L  98 94*   CO2 mmol/L 27.0 24.0   BUN mg/dL 18 18   CREATININE mg/dL 0.76 0.91   CALCIUM mg/dL 8.3* 9.1   BILIRUBIN mg/dL 0.8 0.6   ALK PHOS U/L 347* 328*   ALT (SGPT) U/L 62* 54*   AST (SGOT) U/L 163* 98*   GLUCOSE mg/dL 108* 168*       Assessment and plan:        Mr. Monroy is a 53 y.o. male with history of diabetes who presented with right inner thigh abscess found to be necrotizing soft tissue infection.  Patient taken to the OR 16 January for debridement.  The wound was noted to track superiorly anterior abdominal wall immediately towards his groin.    Gram stain with both gram-positive cocci and gram-negative bacilli.  Please continue Zosyn and vanc until speciation  Patient will need return to the OR on Wednesday for wound VAC replacement  Please hold therapeutic anticoagulation.  Okay for Lovenox prophylaxis  Home health for wound care and wound VAC DME    Manas Fernandes MD  01/16/24  07:49 CST    Part of this note may be an electronic transcription/translation of spoken language to printed text using the Dragon Dictation System.

## 2024-01-16 NOTE — CONSULTS
Patient: Gil Monroy    YOB: 1970    Date: 01/15/2024    Primary Care Provider: Feliberto Blount MD    Chief Complaint   Patient presents with    Abscess       History of present illness:  Mr. Monroy is a 53 y.o. with past medical history of hep C, cirrhosis, liver transplant, diabetes who presents with right inner thigh abscess.  Patient states he first noticed it approximately 1 month ago when he noticed a knot in that area.  He states over the past month it continued to get larger.  He presented to [facility] on 10 January and was discharged on Bactrim and Keflex.  He states that it has spontaneously drained multiple times with purulent output.  He has had similar abscesses before which needed to be drained.  None of them or this large or causing this much problems.    Patient denies any other symptoms.  Denies nausea, vomiting, constipation, fatigue, fevers, night sweats    Review of Systems   Constitutional: Negative.    HENT: Negative.     Eyes: Negative.    Respiratory: Negative.     Cardiovascular: Negative.    Gastrointestinal: Negative.    Endocrine: Negative.    Genitourinary: Negative.    Musculoskeletal: Negative.    Skin:  Positive for wound.        Right inner thigh with multiple draining cavities.   Allergic/Immunologic: Negative.    Neurological: Negative.    Hematological: Negative.    Psychiatric/Behavioral: Negative.         History:  Past Medical History:   Diagnosis Date    Cirrhosis of liver     Diabetes mellitus     Hep C w/o coma, chronic     Thrombocytopenia     Varices, esophageal           Past Surgical History:   Procedure Laterality Date    LIVER TRANSPLANTATION  2016       Family History   Problem Relation Age of Onset    Breast cancer Mother     Diabetes Father        Social History     Tobacco Use    Smoking status: Former     Packs/day: 0.50     Years: 25.00     Additional pack years: 0.00     Total pack years: 12.50     Types: Cigarettes     Quit date:  2016     Years since quittin.0       Allergies:  No Known Allergies    Medications:     Current Facility-Administered Medications:     acetaminophen (TYLENOL) tablet 650 mg, 650 mg, Oral, Q4H PRN **OR** acetaminophen (TYLENOL) 160 MG/5ML oral solution 650 mg, 650 mg, Oral, Q4H PRN **OR** acetaminophen (TYLENOL) suppository 650 mg, 650 mg, Rectal, Q4H PRN, Cuca Teague, ANDREW    sennosides-docusate (PERICOLACE) 8.6-50 MG per tablet 1 tablet, 1 tablet, Oral, Nightly **AND** polyethylene glycol (MIRALAX) packet 17 g, 17 g, Oral, Daily PRN **AND** bisacodyl (DULCOLAX) EC tablet 5 mg, 5 mg, Oral, Daily PRN **AND** bisacodyl (DULCOLAX) suppository 10 mg, 10 mg, Rectal, Daily PRN, Cuca Teague, ANDREW    dextrose (D50W) (25 g/50 mL) IV injection 25 g, 25 g, Intravenous, Q15 Min PRN, Cuca Teague, ANDREW    dextrose (GLUTOSE) oral gel 15 g, 15 g, Oral, Q15 Min PRN, Cuca Teague, ANDREW    glucagon (GLUCAGEN) injection 1 mg, 1 mg, Intramuscular, Q15 Min PRN, Cuca Teague, ANDREW    HYDROmorphone (DILAUDID) injection 0.5 mg, 0.5 mg, Intravenous, Q2H PRN **AND** naloxone (NARCAN) injection 0.4 mg, 0.4 mg, Intravenous, Q5 Min PRN, Cuca Teague, ANDREW    Insulin Lispro (humaLOG) injection 2-9 Units, 2-9 Units, Subcutaneous, 4x Daily AC & at Bedtime, Cuca Teague, ANDREW    ondansetron ODT (ZOFRAN-ODT) disintegrating tablet 4 mg, 4 mg, Oral, Q6H PRN **OR** ondansetron (ZOFRAN) injection 4 mg, 4 mg, Intravenous, Q6H PRN, Cuca Teague, ANDREW    oxyCODONE-acetaminophen (PERCOCET) 5-325 MG per tablet 1 tablet, 1 tablet, Oral, Q4H PRN, Cuca Teague, APRN    piperacillin-tazobactam (ZOSYN) IVPB 3.375 g in 100 mL NS (CD), 3.375 g, Intravenous, Q8H, Cuca Teague, ANDREW    [COMPLETED] Insert Peripheral IV, , , Once **AND** sodium chloride 0.9 % flush 10 mL, 10 mL, Intravenous, PRN, Wendy Cox, ANDREW    sodium chloride 0.9 % flush 10 mL, 10 mL, Intravenous, Q12H, Cuca Teague, APRN     sodium chloride 0.9 % flush 10 mL, 10 mL, Intravenous, PRN, Cuca Teague, APRN    sodium chloride 0.9 % infusion 40 mL, 40 mL, Intravenous, PRN, Cuca Teague, APRN    sodium chloride 0.9 % infusion, 75 mL/hr, Intravenous, Continuous, Cuca Teague, APRN    [START ON 1/16/2024] vancomycin IVPB 1500 mg in 0.9% NaCl (Premix) 500 mL, 1,500 mg, Intravenous, Q12H, Cuca Teague, APRN    Current Outpatient Medications:     atorvastatin (LIPITOR) 10 MG tablet, Take 1 tablet by mouth Daily., Disp: , Rfl:     busPIRone (BUSPAR) 5 MG tablet, Take 1.5 tablets by mouth 3 (Three) Times a Day., Disp: , Rfl:     cephalexin (KEFLEX) 500 MG capsule, Take 1 capsule by mouth 2 (Two) Times a Day., Disp: , Rfl:     Magnesium Oxide -Mg Supplement 400 (240 Mg) MG tablet, Take 1 tablet by mouth 2 (Two) Times a Day., Disp: , Rfl:     oxyCODONE (ROXICODONE) 15 MG immediate release tablet, Take 1 tablet by mouth Every 6 (Six) Hours As Needed for Moderate Pain., Disp: , Rfl:     pantoprazole (PROTONIX) 20 MG EC tablet, Take 2 tablets by mouth Daily., Disp: , Rfl:     predniSONE (DELTASONE) 5 MG tablet, Take 1 tablet by mouth 2 (Two) Times a Day., Disp: , Rfl:     aspirin 81 MG EC tablet, Take 1 tablet by mouth Daily., Disp: , Rfl:     gabapentin (NEURONTIN) 300 MG capsule, Take 1 capsule by mouth 2 (Two) Times a Day., Disp: , Rfl:     Insulin Glargine (BASAGLAR KWIKPEN SC), Inject 12 Units under the skin into the appropriate area as directed Every Night., Disp: , Rfl:     potassium chloride ER (K-TAB) 20 MEQ tablet controlled-release ER tablet, Take 1 tablet by mouth Daily., Disp: , Rfl:     Sulfamethoxazole-Trimethoprim (BACTRIM PO), Take 800 mg by mouth 2 (Two) Times a Day. For 10 days, Disp: , Rfl:     Tacrolimus ER 1 MG tablet sustained-release 24 hour, Take 3 tablets by mouth Daily., Disp: , Rfl:     vitamin D (ERGOCALCIFEROL) 1.25 MG (93803 UT) capsule capsule, Take 1 capsule by mouth 1 (One) Time Per Week., Disp: , Rfl:      Zinc 50 MG tablet, Take 1 tablet by mouth Daily., Disp: , Rfl:     Vital Signs:   Vitals:    01/15/24 1746 01/15/24 1801 01/15/24 1816 01/15/24 1847   BP: 167/90 150/84 140/73 136/87   BP Location:       Pulse: 86 101 91 89   Resp:       Temp:       SpO2: 95% 96% 98% 95%   Weight:       Height:           Physical Exam:     General Appearance:    Alert, cooperative, in no acute distress   Head:    Normocephalic, without obvious abnormality, atraumatic   Eyes:          PERRLA, EOMI   Ears:    Ears appear intact with no abnormalities noted   Throat:   No oral lesions, oral mucosa moist, poor dentition   Neck:   No adenopathy, supple, trachea midline   Back:     No skin lesions, erythema or scars, no tenderness palpation   Lungs:     B/L chest rise, no increase work of breathing     Heart:    Regular rhythm and normal rate   Chest Wall:    No abnormalities observed, no skin lesions   Abdomen:     Soft, ND, NT, no masses, no guarding, no rebound                tenderness   Rectal:     Deferred   Extremities:   Moves all extremities well, no edema   Pulses:   Pulses palpable and equal bilaterally   Skin:   No bleeding, bruising or rash.  13 by approximately 16 erythematous wound on inside of right thigh.  There are multiple areas of drainage.  Purulent output present.   Lymph nodes:   No palpable adenopathy   Neurologic:   Cranial nerves 2 - 12 grossly intact, sensation intact       Results Review:     Results from last 7 days   Lab Units 01/15/24  1615 01/10/24  1603   WBC 10*3/mm3 15.58* 16.1*   HEMOGLOBIN g/dL 13.0 13.0*   HEMATOCRIT % 40.2 37.9*   PLATELETS 10*3/mm3 388 233        Results from last 7 days   Lab Units 01/15/24  1615   SODIUM mmol/L 131*   POTASSIUM mmol/L 5.0   CHLORIDE mmol/L 94*   CO2 mmol/L 24.0   BUN mg/dL 18   CREATININE mg/dL 0.91   CALCIUM mg/dL 9.1   BILIRUBIN mg/dL 0.6   ALK PHOS U/L 328*   ALT (SGPT) U/L 54*   AST (SGOT) U/L 98*   GLUCOSE mg/dL 168*     Ultrasound reviewed found to have 4  x 2 x 6 cm fluid collection/phlegmon.    Assessment / Plan:    Mr. Monroy is a 53 y.o. with past medical history of hep C, cirrhosis, liver transplant, diabetes who presents with right groin wound.  On examination it appears to be draining however could benefit from additional debridement and appropriate packing.  Based on my exam it does not appear to be necrotizing soft tissue infection.    I recommended he undergo debridement in the OR.I had a detailed and extensive discussion with the patient about the diagnosis and reviewed his recent workup.      Risk, benefits, alternatives of the procedure were discussed with the patient.  Risk include bleeding, infection, damage to surrounding structures (femoral vessels), poor cosmesis, continued/chronic pain, and need for further operation. The patient stated understanding.  Will plan to proceed with incision and drainage tonight.  Plan for wound VAC versus wet-to-dry dressings after the OR.    Electronically signed by Manas Fernandes MD  01/15/24  20:11 CST

## 2024-01-16 NOTE — OP NOTE
GROIN ABSCESS INCISION AND DRAINAGE  Procedure Note    Gil Monroy  1/15/2024    Pre-op Diagnosis:   * No pre-op diagnosis entered *    Post-op Diagnosis:     * No Diagnosis Codes entered *    Procedure/CPT® Codes:      Procedure(s):  GROIN ABSCESS INCISION AND DRAINAGE WITH APPLICATION OF WOUND VAC    Surgeon(s):  Manas Fernandes MD        Anesthesia: General    Staff:   Specimens       ID Source Type Tests Collected By Collected At Frozen?    1 Groin, right Tissue ANAEROBIC CULTURE  GRAM STAIN - NO CULTURE  TISSUE / BONE CULTURE   Manas Fernandes MD 1/15/24 2048     Description: Right Groin            Estimated Blood Loss: <500ml    Specimens:                ID Type Source Tests Collected by Time   1 : Right Groin Tissue Groin, right ANAEROBIC CULTURE, GRAM STAIN - NO CULTURE, TISSUE / BONE CULTURE Manas Fernandes MD 1/15/2024 2048         Drains: * No LDAs found *    Indications: 53-year-old male with history of diabetes who presents with 10-day right inner thigh abscess/cellulitis.    Findings: 14 cm x 11 cm necrotizing soft tissue infection extending towards the groin and lower abdominal wall.    Complications: None    Procedure: The patient was met in the preoperative holding area who where he was again explained risk benefits and alternatives of the procedure.  The patient stated his understanding agreed to proceed.  Consent was signed.  The patient was then met by the anesthesia care team and taken to the operating room where he was placed supine on the operating table with proper padding of all extremities.  He underwent general endotracheal anesthesia and a timeout was performed to confirm correct patient, procedure and site.    The skin overlying the abscess cavity was opened.  At that time the patient was noted to have a large abscess cavity which had previously drained.  Upon further inspection he was noted to have tracking of this cavity superiorly towards his groin area approximately 5 cm.  And  superior laterally towards his lower abdominal wall for approximately 5 cm.  The subcutaneous tissue in this area along with a section of overlying necrotic skin was debrided.  Hemostasis was obtained.  The wound was irrigated with 3 L of saline.  A wound VAC was then placed with 2 tongues oriented in the area that the wound tract.  Suction was then applied.  A good seal was noted.  The patient awoke from general anesthesia in stable condition.  He was transferred to the PACU.  There were no immediate complications.  Sponge needle instrument counts were correct at the completion of the case.          Manas Fernandes MD     Date: 1/15/2024  Time: 21:44 CST    Part of this note may be an electronic transcription/translation of spoken language to printed text using the Dragon Dictation System.

## 2024-01-16 NOTE — H&P
"    [facility] Medicine Services  HISTORY AND PHYSICAL    Date of Admission: 1/15/2024  Primary Care Physician: Feliberto Blount MD    Subjective   Primary Historian: Patient    Chief Complaint: Right groin pain and swelling    History of Present Illness  Moshe Monroy is a 53-year-old male with a past medical history of insulin-dependent diabetes, liver transplant, chronic lower extremity lymphedema, please see below for complete list.  Patient presents to [facility] emergency department for evaluation after being seen by PCP earlier today.  He reports developing a \"knot\" in the right groin thigh region about a month ago.  He states it kept getting bigger and more painful.  He did seek evaluation at [facility] emergency department on 1/10/2024, discharged on Bactrim and Keflex.  He states he is not having any improvement.  It did rupture while he was at [place] which improved the pain however pain returned as did additional swelling and redness.  ER workup reveals abscess.  Dr. Fernandes, general surgery, consulted and may take patient for intervention tonight.  Currently he is scoring his pain 8 on a scale of 1-10.  We will keep n.p.o. until decision regarding surgery is been determined.  Patient has no other complaints.  He is admitted for further evaluation treatment.    Review of Systems   Otherwise complete ROS reviewed and negative except as mentioned in the HPI.    Past Medical History:   Past Medical History:   Diagnosis Date    Cirrhosis of liver     Diabetes mellitus     Hep C w/o coma, chronic     Thrombocytopenia     Varices, esophageal    Bilateral lower extremity lymphedema    Past Surgical History:  Past Surgical History:   Procedure Laterality Date    LIVER TRANSPLANTATION  2016     Social History:  reports that he quit smoking about 8 years ago. His smoking use included cigarettes. He has a 12.50 pack-year smoking history. He does not have any smokeless " tobacco history on file.    Family History: family history includes Breast cancer in his mother; Diabetes in his father.       Allergies:  No Known Allergies    Medications:  No current facility-administered medications on file prior to encounter.     Current Outpatient Medications on File Prior to Encounter   Medication Sig Dispense Refill    atorvastatin (LIPITOR) 10 MG tablet Take 1 tablet by mouth Daily.      busPIRone (BUSPAR) 5 MG tablet Take 1.5 tablets by mouth 3 (Three) Times a Day.      cephalexin (KEFLEX) 500 MG capsule Take 1 capsule by mouth 2 (Two) Times a Day.      Magnesium Oxide -Mg Supplement 400 (240 Mg) MG tablet Take 1 tablet by mouth 2 (Two) Times a Day.      oxyCODONE (ROXICODONE) 15 MG immediate release tablet Take 1 tablet by mouth Every 6 (Six) Hours As Needed for Moderate Pain.      pantoprazole (PROTONIX) 20 MG EC tablet Take 2 tablets by mouth Daily.      predniSONE (DELTASONE) 5 MG tablet Take 1 tablet by mouth 2 (Two) Times a Day.      aspirin 81 MG EC tablet Take 1 tablet by mouth Daily.      gabapentin (NEURONTIN) 300 MG capsule Take 1 capsule by mouth 2 (Two) Times a Day.      Insulin Glargine (BASAGLAR KWIKPEN SC) Inject 12 Units under the skin into the appropriate area as directed Every Night.      potassium chloride ER (K-TAB) 20 MEQ tablet controlled-release ER tablet Take 1 tablet by mouth Daily.      Sulfamethoxazole-Trimethoprim (BACTRIM PO) Take 800 mg by mouth 2 (Two) Times a Day. For 10 days      Tacrolimus ER 1 MG tablet sustained-release 24 hour Take 3 tablets by mouth Daily.      vitamin D (ERGOCALCIFEROL) 1.25 MG (40884 UT) capsule capsule Take 1 capsule by mouth 1 (One) Time Per Week.      Zinc 50 MG tablet Take 1 tablet by mouth Daily.          I have utilized all available immediate resources to obtain, update, or review the patient's current medications (including all prescriptions, over-the-counter products, herbals, cannabis/cannabidiol products, and  "vitamin/mineral/dietary (nutritional) supplements).    Objective     Vital Signs: /87   Pulse 89   Temp 97.5 °F (36.4 °C)   Resp 16   Ht 185.4 cm (73\")   Wt 131 kg (288 lb)   SpO2 95%   BMI 38.00 kg/m²   Physical Exam  Vitals reviewed.   Constitutional:       Appearance: Normal appearance.   HENT:      Head: Normocephalic and atraumatic.      Mouth/Throat:      Mouth: Mucous membranes are moist.      Pharynx: Oropharynx is clear.   Eyes:      Extraocular Movements: Extraocular movements intact.      Conjunctiva/sclera: Conjunctivae normal.      Comments: Wears sunglasses in room as they are prescription and he is watching television   Cardiovascular:      Rate and Rhythm: Normal rate and regular rhythm.   Pulmonary:      Effort: Pulmonary effort is normal.      Breath sounds: Normal breath sounds.   Abdominal:      General: There is no distension.      Palpations: Abdomen is soft.   Musculoskeletal:      Cervical back: Normal range of motion and neck supple.      Right lower leg: No edema.      Left lower leg: No edema.      Comments: Pain with movement- Right lower extremity   Skin:     Findings: Erythema (Medial right thigh and groin region) present.      Comments: Purulent drainage noted from abscess site right groin/thigh region   Neurological:      General: No focal deficit present.      Mental Status: He is alert and oriented to person, place, and time.   Psychiatric:         Mood and Affect: Mood normal.         Behavior: Behavior normal.        Results Reviewed:  Lab Results (last 24 hours)       Procedure Component Value Units Date/Time    Wound Culture - Wound, Leg, Right [[id number]] Collected: 01/15/24 1654    Specimen: Wound from Leg, Right Updated: 01/15/24 1700    Procalcitonin [[id number]]  (Abnormal) Collected: 01/15/24 1615    Specimen: Blood Updated: 01/15/24 1658     Procalcitonin 0.33 ng/mL     C-reactive Protein [[id number]]  (Abnormal) Collected: 01/15/24 1615    Specimen: Blood " Updated: 01/15/24 1653     C-Reactive Protein 3.87 mg/dL     Comprehensive Metabolic Panel [[id number]]  (Abnormal) Collected: 01/15/24 1615    Specimen: Blood Updated: 01/15/24 1652     Glucose 168 mg/dL      BUN 18 mg/dL      Creatinine 0.91 mg/dL      Sodium 131 mmol/L      Potassium 5.0 mmol/L      Comment: Slight hemolysis detected by analyzer. Result may be falsely elevated.        Chloride 94 mmol/L      CO2 24.0 mmol/L      Calcium 9.1 mg/dL      Total Protein 7.2 g/dL      Albumin 3.2 g/dL      ALT (SGPT) 54 U/L      AST (SGOT) 98 U/L      Alkaline Phosphatase 328 U/L      Total Bilirubin 0.6 mg/dL      Globulin 4.0 gm/dL      A/G Ratio 0.8 g/dL      BUN/Creatinine Ratio 19.8     Anion Gap 13.0 mmol/L      eGFR 100.8 mL/min/1.73     Blood Culture With MALAIKA - Blood, Arm, Left [[id number]] Collected: 01/15/24 1604    Specimen: Blood from Arm, Left Updated: 01/15/24 1629    Blood Culture With MALAIKA - Blood, Wrist, Right [[id number]] Collected: 01/15/24 1600    Specimen: Blood from Wrist, Right Updated: 01/15/24 1628    CBC Auto Differential [[id number]]  (Abnormal) Collected: 01/15/24 1615    Specimen: Blood Updated: 01/15/24 1628     WBC 15.58 10*3/mm3      RBC 4.03 10*6/mm3      Hemoglobin 13.0 g/dL      Hematocrit 40.2 %      MCV 99.8 fL      MCH 32.3 pg      MCHC 32.3 g/dL      RDW 14.5 %      RDW-SD 53.1 fl      MPV 9.6 fL      Platelets 388 10*3/mm3      Neutrophil % 77.0 %      Lymphocyte % 12.3 %      Monocyte % 6.8 %      Eosinophil % 0.5 %      Basophil % 0.9 %      Immature Grans % 2.5 %      Neutrophils, Absolute 11.99 10*3/mm3      Lymphocytes, Absolute 1.92 10*3/mm3      Monocytes, Absolute 1.06 10*3/mm3      Eosinophils, Absolute 0.08 10*3/mm3      Basophils, Absolute 0.14 10*3/mm3      Immature Grans, Absolute 0.39 10*3/mm3      nRBC 0.0 /100 WBC           Imaging Results (Last 24 Hours)       Procedure Component Value Units Date/Time    US Nonvascular Extremity Complete [[id number]] Collected:  01/15/24 1733     Updated: 01/15/24 1739    Narrative:      EXAMINATION: US NONVASCULAR EXTREMITY COMPLETE-     1/15/2024 4:03 PM     HISTORY: abscess right groin     COMPARISON:  None.     Targeted grayscale and color flow ultrasound evaluation of the medial  aspect of the upper RIGHT thigh.  The area of interest is a red and swollen focus with drainage.     Ultrasound evaluation shows a superficial subcutaneous focus of  heterogeneous echogenicity.     This area measures 39 x 20 x 59 mm and is compatible with a subcutaneous  abscess.  An open tract to the skin surface is noted though most of this  collection has a heterogeneous and more solid than liquid appearance and  this should be evaluated as to the need for surgical debridement.       Impression:      1. At the medial RIGHT thigh area of interest there is a superficial  subcutaneous collection measuring approximately 4 x 2 x 6 cm.  2. Given the history this is compatible with an abscess though this  collection does have a relatively solid appearance and may represent  phlegmon rather than drainable fluid.     This report was signed and finalized on 1/15/2024 5:36 PM by Dr. Alexander Onofre MD.                Assessment / Plan   Assessment:   Active Hospital Problems    Diagnosis     **Abscess     Insulin dependent type 2 diabetes mellitus     History of liver transplant     Acute pain     Transaminitis     Hyponatremia        Treatment Plan  1.  The patient will be admitted to Dr. Flores's service here at [facility].   2.  General surgery Dr. Dom salas  3.  Pain management  4.  Home medications reviewed and restarted as appropriate  5.  DVT prophylaxis with SCDs  6.  Monitor glucose ACHS with regular insulin sliding scale coverage  7.  Continue vancomycin and Zosyn  8.  Normal saline 75 MLS/hour  9.  Start bowel regimen  10.  Supplemental oxygen as needed, incentive spirometry, continuous pulse oximetry  11.  Labs in a.m.    Medical  Decision Making  Number and Complexity of problems: 6  Differential Diagnosis: None    Conditions and Status        Condition is unchanged.     [facility] Data  External documents reviewed: [facility] health records  Cardiac tracing (EKG, telemetry) interpretation: Reviewed  Radiology interpretation: Reviewed  Labs reviewed: Yes  Any tests that were considered but not ordered: No     Decision rules/scores evaluated (example [id number], Wells, etc): No     Discussed with: Patient and Dr. Flores     Care Planning  Shared decision making: Patient and Dr. Flores  Code status and discussions: Full    Disposition  Social Determinants of Health that impact treatment or disposition: None  Estimated length of stay is 1-2 days.     I confirmed that the patient's advanced care plan is present, code status is documented, and a surrogate decision maker is listed in the patient's medical record.     The patient's surrogate decision maker is wife Mariya.     The patient was seen and examined by me on 1/15/2024 at 6:37 PM.    Electronically signed by ANDREW Conley, 01/15/24, 19:55 CST.

## 2024-01-17 ENCOUNTER — ANESTHESIA EVENT (OUTPATIENT)
Dept: PERIOP | Facility: HOSPITAL | Age: 54
End: 2024-01-17
Payer: MEDICARE

## 2024-01-17 ENCOUNTER — ANESTHESIA (OUTPATIENT)
Dept: PERIOP | Facility: HOSPITAL | Age: 54
End: 2024-01-17
Payer: MEDICARE

## 2024-01-17 LAB
ALBUMIN SERPL-MCNC: 2.8 G/DL (ref 3.5–5.2)
ALBUMIN/GLOB SERPL: 0.8 G/DL
ALP SERPL-CCNC: 287 U/L (ref 39–117)
ALT SERPL W P-5'-P-CCNC: 43 U/L (ref 1–41)
ANION GAP SERPL CALCULATED.3IONS-SCNC: 8 MMOL/L (ref 5–15)
AST SERPL-CCNC: 78 U/L (ref 1–40)
BASOPHILS # BLD AUTO: 0.1 10*3/MM3 (ref 0–0.2)
BASOPHILS NFR BLD AUTO: 1.3 % (ref 0–1.5)
BILIRUB SERPL-MCNC: 0.5 MG/DL (ref 0–1.2)
BUN SERPL-MCNC: 14 MG/DL (ref 6–20)
BUN/CREAT SERPL: 17.3 (ref 7–25)
CALCIUM SPEC-SCNC: 8.3 MG/DL (ref 8.6–10.5)
CHLORIDE SERPL-SCNC: 100 MMOL/L (ref 98–107)
CO2 SERPL-SCNC: 27 MMOL/L (ref 22–29)
CREAT SERPL-MCNC: 0.81 MG/DL (ref 0.76–1.27)
DEPRECATED RDW RBC AUTO: 52.8 FL (ref 37–54)
EGFRCR SERPLBLD CKD-EPI 2021: 105.4 ML/MIN/1.73
EOSINOPHIL # BLD AUTO: 0.1 10*3/MM3 (ref 0–0.4)
EOSINOPHIL NFR BLD AUTO: 1.3 % (ref 0.3–6.2)
ERYTHROCYTE [DISTWIDTH] IN BLOOD BY AUTOMATED COUNT: 14.3 % (ref 12.3–15.4)
GLOBULIN UR ELPH-MCNC: 3.3 GM/DL
GLUCOSE BLDC GLUCOMTR-MCNC: 107 MG/DL (ref 70–130)
GLUCOSE BLDC GLUCOMTR-MCNC: 120 MG/DL (ref 70–130)
GLUCOSE BLDC GLUCOMTR-MCNC: 141 MG/DL (ref 70–130)
GLUCOSE BLDC GLUCOMTR-MCNC: 163 MG/DL (ref 70–130)
GLUCOSE SERPL-MCNC: 135 MG/DL (ref 65–99)
HCT VFR BLD AUTO: 35.8 % (ref 37.5–51)
HGB BLD-MCNC: 11.5 G/DL (ref 13–17.7)
IMM GRANULOCYTES # BLD AUTO: 0.18 10*3/MM3 (ref 0–0.05)
IMM GRANULOCYTES NFR BLD AUTO: 2.3 % (ref 0–0.5)
LYMPHOCYTES # BLD AUTO: 0.86 10*3/MM3 (ref 0.7–3.1)
LYMPHOCYTES NFR BLD AUTO: 11.1 % (ref 19.6–45.3)
MCH RBC QN AUTO: 32.4 PG (ref 26.6–33)
MCHC RBC AUTO-ENTMCNC: 32.1 G/DL (ref 31.5–35.7)
MCV RBC AUTO: 100.8 FL (ref 79–97)
MONOCYTES # BLD AUTO: 0.41 10*3/MM3 (ref 0.1–0.9)
MONOCYTES NFR BLD AUTO: 5.3 % (ref 5–12)
NEUTROPHILS NFR BLD AUTO: 6.09 10*3/MM3 (ref 1.7–7)
NEUTROPHILS NFR BLD AUTO: 78.7 % (ref 42.7–76)
NRBC BLD AUTO-RTO: 0 /100 WBC (ref 0–0.2)
PLATELET # BLD AUTO: 275 10*3/MM3 (ref 140–450)
PMV BLD AUTO: 9.7 FL (ref 6–12)
POTASSIUM SERPL-SCNC: 4.5 MMOL/L (ref 3.5–5.2)
PROT SERPL-MCNC: 6.1 G/DL (ref 6–8.5)
RBC # BLD AUTO: 3.55 10*6/MM3 (ref 4.14–5.8)
SODIUM SERPL-SCNC: 135 MMOL/L (ref 136–145)
VANCOMYCIN TROUGH SERPL-MCNC: 22.7 MCG/ML (ref 5–20)
WBC NRBC COR # BLD AUTO: 7.74 10*3/MM3 (ref 3.4–10.8)

## 2024-01-17 PROCEDURE — 25010000002 GLYCOPYRROLATE 0.4 MG/2ML SOLUTION: Performed by: NURSE ANESTHETIST, CERTIFIED REGISTERED

## 2024-01-17 PROCEDURE — 85025 COMPLETE CBC W/AUTO DIFF WBC: CPT | Performed by: NURSE PRACTITIONER

## 2024-01-17 PROCEDURE — 82948 REAGENT STRIP/BLOOD GLUCOSE: CPT

## 2024-01-17 PROCEDURE — 25010000002 ENOXAPARIN PER 10 MG: Performed by: GENERAL PRACTICE

## 2024-01-17 PROCEDURE — 63710000001 TACROLIMUS PER 1 MG: Performed by: NURSE PRACTITIONER

## 2024-01-17 PROCEDURE — 25810000003 SODIUM CHLORIDE 0.9 % SOLUTION 250 ML FLEX CONT: Performed by: INTERNAL MEDICINE

## 2024-01-17 PROCEDURE — 25010000002 HYDROMORPHONE PER 4 MG: Performed by: GENERAL PRACTICE

## 2024-01-17 PROCEDURE — 63710000001 TACROLIMUS PER 1 MG: Performed by: GENERAL PRACTICE

## 2024-01-17 PROCEDURE — 25010000002 PROPOFOL 10 MG/ML EMULSION: Performed by: NURSE ANESTHETIST, CERTIFIED REGISTERED

## 2024-01-17 PROCEDURE — 2W0LX6Z CHANGE PRESSURE DRESSING ON RIGHT LOWER EXTREMITY: ICD-10-PCS | Performed by: GENERAL PRACTICE

## 2024-01-17 PROCEDURE — 25010000002 FENTANYL CITRATE (PF) 100 MCG/2ML SOLUTION: Performed by: NURSE ANESTHETIST, CERTIFIED REGISTERED

## 2024-01-17 PROCEDURE — 25010000002 FENTANYL CITRATE (PF) 50 MCG/ML SOLUTION: Performed by: ANESTHESIOLOGY

## 2024-01-17 PROCEDURE — 99232 SBSQ HOSP IP/OBS MODERATE 35: CPT | Performed by: GENERAL PRACTICE

## 2024-01-17 PROCEDURE — 63710000001 PREDNISONE PER 5 MG: Performed by: GENERAL PRACTICE

## 2024-01-17 PROCEDURE — 63710000001 INSULIN LISPRO (HUMAN) PER 5 UNITS: Performed by: GENERAL PRACTICE

## 2024-01-17 PROCEDURE — 97606 NEG PRS WND THER DME>50 SQCM: CPT | Performed by: GENERAL PRACTICE

## 2024-01-17 PROCEDURE — 25010000002 VANCOMYCIN 1 G RECONSTITUTED SOLUTION 1 EACH VIAL: Performed by: INTERNAL MEDICINE

## 2024-01-17 PROCEDURE — 80053 COMPREHEN METABOLIC PANEL: CPT | Performed by: NURSE PRACTITIONER

## 2024-01-17 PROCEDURE — 25810000003 LACTATED RINGERS PER 1000 ML: Performed by: ANESTHESIOLOGY

## 2024-01-17 PROCEDURE — 25010000002 PIPERACILLIN SOD-TAZOBACTAM PER 1 G: Performed by: INTERNAL MEDICINE

## 2024-01-17 PROCEDURE — 36415 COLL VENOUS BLD VENIPUNCTURE: CPT | Performed by: GENERAL PRACTICE

## 2024-01-17 PROCEDURE — 25810000003 SODIUM CHLORIDE 0.9 % SOLUTION: Performed by: GENERAL PRACTICE

## 2024-01-17 PROCEDURE — 80202 ASSAY OF VANCOMYCIN: CPT | Performed by: GENERAL PRACTICE

## 2024-01-17 PROCEDURE — 25010000002 VANCOMYCIN 10 G RECONSTITUTED SOLUTION: Performed by: GENERAL PRACTICE

## 2024-01-17 RX ORDER — NALOXONE HCL 0.4 MG/ML
0.04 VIAL (ML) INJECTION AS NEEDED
Status: DISCONTINUED | OUTPATIENT
Start: 2024-01-17 | End: 2024-01-17 | Stop reason: HOSPADM

## 2024-01-17 RX ORDER — SODIUM CHLORIDE 9 MG/ML
40 INJECTION, SOLUTION INTRAVENOUS AS NEEDED
Status: DISCONTINUED | OUTPATIENT
Start: 2024-01-17 | End: 2024-01-17 | Stop reason: HOSPADM

## 2024-01-17 RX ORDER — HYDROMORPHONE HYDROCHLORIDE 1 MG/ML
0.5 INJECTION, SOLUTION INTRAMUSCULAR; INTRAVENOUS; SUBCUTANEOUS
Status: DISCONTINUED | OUTPATIENT
Start: 2024-01-17 | End: 2024-01-17 | Stop reason: HOSPADM

## 2024-01-17 RX ORDER — MIDAZOLAM HYDROCHLORIDE 1 MG/ML
1 INJECTION INTRAMUSCULAR; INTRAVENOUS
Status: DISCONTINUED | OUTPATIENT
Start: 2024-01-17 | End: 2024-01-17 | Stop reason: HOSPADM

## 2024-01-17 RX ORDER — FENTANYL CITRATE 50 UG/ML
INJECTION, SOLUTION INTRAMUSCULAR; INTRAVENOUS AS NEEDED
Status: DISCONTINUED | OUTPATIENT
Start: 2024-01-17 | End: 2024-01-17 | Stop reason: SURG

## 2024-01-17 RX ORDER — OXYCODONE AND ACETAMINOPHEN 10; 325 MG/1; MG/1
1 TABLET ORAL ONCE AS NEEDED
Status: DISCONTINUED | OUTPATIENT
Start: 2024-01-17 | End: 2024-01-17 | Stop reason: HOSPADM

## 2024-01-17 RX ORDER — DROPERIDOL 2.5 MG/ML
0.62 INJECTION, SOLUTION INTRAMUSCULAR; INTRAVENOUS ONCE AS NEEDED
Status: DISCONTINUED | OUTPATIENT
Start: 2024-01-17 | End: 2024-01-17 | Stop reason: HOSPADM

## 2024-01-17 RX ORDER — MIDAZOLAM HYDROCHLORIDE 1 MG/ML
2 INJECTION INTRAMUSCULAR; INTRAVENOUS ONCE
Status: DISCONTINUED | OUTPATIENT
Start: 2024-01-17 | End: 2024-01-17 | Stop reason: HOSPADM

## 2024-01-17 RX ORDER — SODIUM CHLORIDE 0.9 % (FLUSH) 0.9 %
3 SYRINGE (ML) INJECTION EVERY 12 HOURS SCHEDULED
Status: DISCONTINUED | OUTPATIENT
Start: 2024-01-17 | End: 2024-01-17 | Stop reason: HOSPADM

## 2024-01-17 RX ORDER — FLUMAZENIL 0.1 MG/ML
0.2 INJECTION INTRAVENOUS AS NEEDED
Status: DISCONTINUED | OUTPATIENT
Start: 2024-01-17 | End: 2024-01-17 | Stop reason: HOSPADM

## 2024-01-17 RX ORDER — SODIUM CHLORIDE, SODIUM LACTATE, POTASSIUM CHLORIDE, CALCIUM CHLORIDE 600; 310; 30; 20 MG/100ML; MG/100ML; MG/100ML; MG/100ML
100 INJECTION, SOLUTION INTRAVENOUS CONTINUOUS
Status: DISCONTINUED | OUTPATIENT
Start: 2024-01-17 | End: 2024-01-17

## 2024-01-17 RX ORDER — FENTANYL CITRATE 50 UG/ML
25 INJECTION, SOLUTION INTRAMUSCULAR; INTRAVENOUS
Status: DISCONTINUED | OUTPATIENT
Start: 2024-01-17 | End: 2024-01-17 | Stop reason: HOSPADM

## 2024-01-17 RX ORDER — ONDANSETRON 2 MG/ML
4 INJECTION INTRAMUSCULAR; INTRAVENOUS
Status: DISCONTINUED | OUTPATIENT
Start: 2024-01-17 | End: 2024-01-17 | Stop reason: HOSPADM

## 2024-01-17 RX ORDER — ROCURONIUM BROMIDE 10 MG/ML
INJECTION, SOLUTION INTRAVENOUS AS NEEDED
Status: DISCONTINUED | OUTPATIENT
Start: 2024-01-17 | End: 2024-01-17 | Stop reason: SURG

## 2024-01-17 RX ORDER — PROPOFOL 10 MG/ML
VIAL (ML) INTRAVENOUS AS NEEDED
Status: DISCONTINUED | OUTPATIENT
Start: 2024-01-17 | End: 2024-01-17 | Stop reason: SURG

## 2024-01-17 RX ORDER — FENTANYL CITRATE 50 UG/ML
50 INJECTION, SOLUTION INTRAMUSCULAR; INTRAVENOUS ONCE
Status: COMPLETED | OUTPATIENT
Start: 2024-01-17 | End: 2024-01-17

## 2024-01-17 RX ORDER — SODIUM CHLORIDE 0.9 % (FLUSH) 0.9 %
3-10 SYRINGE (ML) INJECTION AS NEEDED
Status: DISCONTINUED | OUTPATIENT
Start: 2024-01-17 | End: 2024-01-17 | Stop reason: HOSPADM

## 2024-01-17 RX ORDER — NEOSTIGMINE METHYLSULFATE 5 MG/5 ML
SYRINGE (ML) INTRAVENOUS AS NEEDED
Status: DISCONTINUED | OUTPATIENT
Start: 2024-01-17 | End: 2024-01-17 | Stop reason: SURG

## 2024-01-17 RX ORDER — LABETALOL HYDROCHLORIDE 5 MG/ML
5 INJECTION, SOLUTION INTRAVENOUS
Status: DISCONTINUED | OUTPATIENT
Start: 2024-01-17 | End: 2024-01-17 | Stop reason: HOSPADM

## 2024-01-17 RX ORDER — HYDROMORPHONE HYDROCHLORIDE 1 MG/ML
0.5 INJECTION, SOLUTION INTRAMUSCULAR; INTRAVENOUS; SUBCUTANEOUS
Status: DISCONTINUED | OUTPATIENT
Start: 2024-01-17 | End: 2024-01-18

## 2024-01-17 RX ADMIN — INSULIN LISPRO 2 UNITS: 100 INJECTION, SOLUTION INTRAVENOUS; SUBCUTANEOUS at 20:59

## 2024-01-17 RX ADMIN — HYDROMORPHONE HYDROCHLORIDE 0.5 MG: 1 INJECTION, SOLUTION INTRAMUSCULAR; INTRAVENOUS; SUBCUTANEOUS at 10:49

## 2024-01-17 RX ADMIN — HYDROMORPHONE HYDROCHLORIDE 0.5 MG: 1 INJECTION, SOLUTION INTRAMUSCULAR; INTRAVENOUS; SUBCUTANEOUS at 01:52

## 2024-01-17 RX ADMIN — FENTANYL CITRATE 100 MCG: 50 INJECTION, SOLUTION INTRAMUSCULAR; INTRAVENOUS at 09:20

## 2024-01-17 RX ADMIN — PIPERACILLIN SODIUM AND TAZOBACTAM SODIUM 3.38 G: 3; .375 INJECTION, POWDER, LYOPHILIZED, FOR SOLUTION INTRAVENOUS at 09:41

## 2024-01-17 RX ADMIN — HYDROMORPHONE HYDROCHLORIDE 0.5 MG: 1 INJECTION, SOLUTION INTRAMUSCULAR; INTRAVENOUS; SUBCUTANEOUS at 15:54

## 2024-01-17 RX ADMIN — SODIUM CHLORIDE 50 ML/HR: 9 INJECTION, SOLUTION INTRAVENOUS at 18:34

## 2024-01-17 RX ADMIN — PREDNISONE 5 MG: 5 TABLET ORAL at 20:59

## 2024-01-17 RX ADMIN — Medication 3 MG: at 09:59

## 2024-01-17 RX ADMIN — FENTANYL CITRATE 50 MCG: 50 INJECTION, SOLUTION INTRAMUSCULAR; INTRAVENOUS at 08:37

## 2024-01-17 RX ADMIN — HYDROMORPHONE HYDROCHLORIDE 0.5 MG: 1 INJECTION, SOLUTION INTRAMUSCULAR; INTRAVENOUS; SUBCUTANEOUS at 20:58

## 2024-01-17 RX ADMIN — Medication 10 ML: at 21:02

## 2024-01-17 RX ADMIN — ENOXAPARIN SODIUM 40 MG: 100 INJECTION SUBCUTANEOUS at 18:34

## 2024-01-17 RX ADMIN — HYDROMORPHONE HYDROCHLORIDE 0.5 MG: 1 INJECTION, SOLUTION INTRAMUSCULAR; INTRAVENOUS; SUBCUTANEOUS at 17:49

## 2024-01-17 RX ADMIN — ACETAMINOPHEN 325 MG: 325 TABLET, FILM COATED ORAL at 15:22

## 2024-01-17 RX ADMIN — OXYCODONE HYDROCHLORIDE 10 MG: 5 TABLET ORAL at 15:22

## 2024-01-17 RX ADMIN — FENTANYL CITRATE 100 MCG: 50 INJECTION, SOLUTION INTRAMUSCULAR; INTRAVENOUS at 09:37

## 2024-01-17 RX ADMIN — SODIUM CHLORIDE, POTASSIUM CHLORIDE, SODIUM LACTATE AND CALCIUM CHLORIDE 100 ML/HR: 600; 310; 30; 20 INJECTION, SOLUTION INTRAVENOUS at 08:30

## 2024-01-17 RX ADMIN — HYDROMORPHONE HYDROCHLORIDE 0.5 MG: 1 INJECTION, SOLUTION INTRAMUSCULAR; INTRAVENOUS; SUBCUTANEOUS at 04:35

## 2024-01-17 RX ADMIN — OXYCODONE HYDROCHLORIDE 10 MG: 5 TABLET ORAL at 22:01

## 2024-01-17 RX ADMIN — TACROLIMUS 1 MG: 1 CAPSULE ORAL at 06:06

## 2024-01-17 RX ADMIN — VANCOMYCIN HYDROCHLORIDE 1500 MG: 10 INJECTION, POWDER, LYOPHILIZED, FOR SOLUTION INTRAVENOUS at 06:06

## 2024-01-17 RX ADMIN — PIPERACILLIN SODIUM AND TAZOBACTAM SODIUM 3.38 G: 3; .375 INJECTION, POWDER, LYOPHILIZED, FOR SOLUTION INTRAVENOUS at 16:08

## 2024-01-17 RX ADMIN — PROPOFOL 200 MG: 10 INJECTION, EMULSION INTRAVENOUS at 09:22

## 2024-01-17 RX ADMIN — GLYCOPYRROLATE 0.4 MG: 0.2 INJECTION INTRAMUSCULAR; INTRAVENOUS at 09:59

## 2024-01-17 RX ADMIN — HYDROMORPHONE HYDROCHLORIDE 0.5 MG: 1 INJECTION, SOLUTION INTRAMUSCULAR; INTRAVENOUS; SUBCUTANEOUS at 07:51

## 2024-01-17 RX ADMIN — TACROLIMUS 1 MG: 1 CAPSULE ORAL at 21:03

## 2024-01-17 RX ADMIN — VANCOMYCIN HYDROCHLORIDE 1000 MG: 1 INJECTION, POWDER, LYOPHILIZED, FOR SOLUTION INTRAVENOUS at 20:30

## 2024-01-17 RX ADMIN — HYDROMORPHONE HYDROCHLORIDE 0.5 MG: 1 INJECTION, SOLUTION INTRAMUSCULAR; INTRAVENOUS; SUBCUTANEOUS at 13:07

## 2024-01-17 RX ADMIN — PIPERACILLIN SODIUM AND TAZOBACTAM SODIUM 3.38 G: 3; .375 INJECTION, POWDER, LYOPHILIZED, FOR SOLUTION INTRAVENOUS at 23:01

## 2024-01-17 RX ADMIN — ROCURONIUM BROMIDE 35 MG: 10 INJECTION, SOLUTION INTRAVENOUS at 09:22

## 2024-01-17 NOTE — ANESTHESIA POSTPROCEDURE EVALUATION
"Patient: Gil Monroy    Procedure Summary       Date: 01/17/24 Room / Location: [facility] OR  /  [facility] OR    Anesthesia Start: 0916 Anesthesia Stop: 1010    Procedure: wound vac change (Right: Groin) Diagnosis:       Abscess of groin, right      (Abscess of groin, right [L02.214])    Surgeons: Manas Fernandes MD Provider: Moshe Lambert CRNA    Anesthesia Type: general ASA Status: 3            Anesthesia Type: general    Vitals  Vitals Value Taken Time   /73 01/17/24 1037   Temp     Pulse 77 01/17/24 1038   Resp     SpO2 98 % 01/17/24 1036   Vitals shown include unfiled device data.        Post Anesthesia Care and Evaluation    Patient location during evaluation: PACU  Patient participation: complete - patient participated  Level of consciousness: awake and awake and alert  Pain score: 0  Pain management: adequate    Airway patency: patent  Anesthetic complications: No anesthetic complications  PONV Status: none  Cardiovascular status: acceptable  Respiratory status: acceptable  Hydration status: acceptable    Comments: Patient discharged according to acceptable Nelly score per RN assessment. See nursing records for further information.     Blood pressure 108/60, pulse 76, temperature 97.6 °F (36.4 °C), temperature source Oral, resp. rate 16, height 185.4 cm (73\"), weight 131 kg (289 lb 4.8 oz), SpO2 97%.      "

## 2024-01-17 NOTE — PLAN OF CARE
Goal Outcome Evaluation:  Plan of Care Reviewed With: patient        Progress: no change     Pt A&O X4. Wound vac in place. IVF and abx infusing per orders. NPO since midnight for procedure. Sepsis screen +. RA. VSS safety maintained.

## 2024-01-17 NOTE — CASE MANAGEMENT/SOCIAL WORK
Continued Stay Note  JEFF Junior     Patient Name: Gil Monroy  MRN: [id number]  Today's Date: 1/17/2024    Admit Date: 1/15/2024    Plan: Home   Discharge Plan       Row Name 01/17/24 1240       Plan    Plan Home    Plan Comments Signed rx form and supporting information sent to [facility] at [phone]. Waiting for approval.                   Discharge Codes    No documentation.                 Expected Discharge Date and Time       Expected Discharge Date Expected Discharge Time    Jan 19, 2024               CALEB Baltazar

## 2024-01-17 NOTE — ANESTHESIA PREPROCEDURE EVALUATION
Anesthesia Evaluation     Patient summary reviewed   no history of anesthetic complications:   NPO Solid Status: > 8 hours  NPO Liquid Status: > 2 hours           Airway   Mallampati: II  TM distance: >3 FB  Neck ROM: full  No difficulty expected  Dental    (+) poor dentition        Pulmonary    (+) a smoker Former, vape,  (-) asthma  Cardiovascular - negative cardio ROS and normal exam        Neuro/Psych- negative ROS  (-) seizures, CVA  GI/Hepatic/Renal/Endo    (+) obesity, GERD well controlled, liver disease, diabetes mellitus type 2 well controlled    ROS Comment: Liver 2016    Musculoskeletal     Abdominal    Substance History   (-) drug use     OB/GYN          Other   ,chronic steroid use      ROS/Med Hx Other: Prednisone .5mg bid                    Anesthesia Plan    ASA 3     general       Anesthetic plan, risks, benefits, and alternatives have been provided, discussed and informed consent has been obtained with: patient.    Use of blood products discussed with  Consented to blood products.        CODE STATUS:    Level Of Support Discussed With: Patient  Code Status (Patient has no pulse and is not breathing): CPR (Attempt to Resuscitate)  Medical Interventions (Patient has pulse or is breathing): Full Support

## 2024-01-17 NOTE — OP NOTE
GROIN ABSCESS INCISION AND DRAINAGE  Procedure Note    Gil Monroy  1/17/2024    Pre-op Diagnosis:   Abscess of groin, right [L02.214]    Post-op Diagnosis:     Post-Op Diagnosis Codes:     * Abscess of groin, right [L02.214]    Procedure/CPT® Codes:      Procedure(s):  wound vac change    Surgeon(s):  Manas Fernandes MD     was responsible for performing the following activities: Retraction, Suction, and Irrigation and their skilled assistance was necessary for the success of this case.    Anesthesia: General    Staff:   None    Estimated Blood Loss: none    Specimens:                None      Drains: * No LDAs found *    Indications: 53-year-old male with history of hep C cirrhosis status post liver transplant who presents with right groin abscess taking to the OR on 15 January for incision and drainage found to have polymicrobial necrotizing soft tissue infection.      Findings: Source control obtained.  No further debridement required.  Wound bed with good granulation tissue.  Wound approximately 16 cm x 6 cm.  Okay to restart therapeutic anticoagulation.  Will attempt bedside wound VAC change on Friday    Complications: None    Procedure: The patient was met in the preoperative holding area where he was again explained risk benefits and alternatives of the procedure.  The patient stated his understanding and agreed to proceed.  Consent was signed.  The patient was met by the anesthesia care team and taken to the operating room where he was placed supine on the operating table with proper padding of all extremities.  He underwent general endotracheal anesthesia.  His leg was prepped and draped with Betadine solution after removal of the prior wound VAC.  A timeout was confirmed to confirm correct patient, procedure, site.    The wound was interrogated.  There is noted to be no additional need for debridement.  The wound bed appeared healthy.  The wound bed tract medially towards the groin approximately 5 cm and  superiorly towards the anterior abdominal wall approximately 5 cm.  The wound in total was 16 cm x 6 cm.  The wound was irrigated with 2 L of saline.  A wound VAC was placed with 2 tongues of sponge superiorly and medially.  The wound VAC had a good seal good seal.  The patient was awoken from general anesthesia in stable condition.  There were no immediate complications during the case.  The patient was transferred to PACU.  Sponge needle instrument counts were correct at the conclusion of the case.    Manas Fernandes MD     Date: 1/17/2024  Time: 10:19 CST    Part of this note may be an electronic transcription/translation of spoken language to printed text using the Dragon Dictation System.

## 2024-01-17 NOTE — PROGRESS NOTES
LOS: 2 days   Patient Care Team:  Feliberto Blount MD as PCP - General (Family Medicine)    Subjective  N.p.o. this morning for OR.  I again reviewed the plan with him which she was agreeable to.  Continues to have pain at site of the wound VAC however all markers of infection are improving.    Objective:   Vital Signs  Temp:  [97.6 °F (36.4 °C)-98.6 °F (37 °C)] 97.6 °F (36.4 °C)  Heart Rate:  [76-92] 76  Resp:  [14-18] 16  BP: (107-161)/(60-87) 108/60    Intake & Output (last 3 days)         01/14 0701  01/15 0700 01/15 0701  01/16 0700 01/16 0701 01/17 0700 01/17 0701 01/18 0700    P.O.   720     I.V. (mL/kg)    400 (3.1)    IV Piggyback  100      Total Intake(mL/kg)  100 (0.8) 720 (5.5) 400 (3.1)    Urine (mL/kg/hr)  300 1700 (0.5) 300 (0.4)    Stool   0     Total Output  300 1700 300    Net  -200 -980 +100            Stool Unmeasured Occurrence   1 x             Physical Exam:     General Appearance:    Alert, cooperative, in no acute distress   HEENT:   Normocephalic, atraumatic, EOMI, MMM   Lungs:     Equal chest rise bilaterally.  No increased work of breathing    Heart:    Regular rhythm and normal rate   Abdomen:    Normal bowel sounds, no masses, no organomegaly, soft nontender, nondistended, wound on abdomen with well-healing scar tissue.   Extremities:   Right groin wound with wound VAC in place   Results Review:     I reviewed the patient's new clinical results. Significant labs:   I reviewed the patient's new imaging results and agree with the interpretation.    Results from last 7 days   Lab Units 01/17/24  0359 01/16/24  0300 01/15/24  1615   WBC 10*3/mm3 7.74 9.96 15.58*   HEMOGLOBIN g/dL 11.5* 11.3* 13.0   HEMATOCRIT % 35.8* 35.0* 40.2   PLATELETS 10*3/mm3 275 280 388        Results from last 7 days   Lab Units 01/17/24  0359 01/16/24  0300 01/15/24  1615   SODIUM mmol/L 135* 134* 131*   POTASSIUM mmol/L 4.5 4.7 5.0   CHLORIDE mmol/L 100 98 94*   CO2 mmol/L 27.0 27.0 24.0   BUN mg/dL 14 18  18   CREATININE mg/dL 0.81 0.76 0.91   CALCIUM mg/dL 8.3* 8.3* 9.1   BILIRUBIN mg/dL 0.5 0.8 0.6   ALK PHOS U/L 287* 347* 328*   ALT (SGPT) U/L 43* 62* 54*   AST (SGOT) U/L 78* 163* 98*   GLUCOSE mg/dL 135* 108* 168*       Assessment and plan:    Mr. Monroy is a 53 y.o. male with history of diabetes who presented with right inner thigh abscess found to be polymicrobial necrotizing soft tissue infection.  Patient taken to the OR 15 January for debridement.  The wound was noted to track superiorly anterior abdominal wall immediately towards his groin.     Continue antibiotics until 20 January   return to the OR today 17 January  [facility] wound VAC request completed today  [facility] for wound VAC supplies/care  Okay to restart anticoagulation  Will attempt bedside wound VAC change on Friday    Manas Fernandes MD  01/17/24  12:17 CST    Part of this note may be an electronic transcription/translation of spoken language to printed text using the Dragon Dictation System.

## 2024-01-17 NOTE — ANESTHESIA PROCEDURE NOTES
Airway  Urgency: elective    Date/Time: 1/17/2024 9:26 AM  Airway not difficult    General Information and Staff    Patient location during procedure: OR  CRNA/CAA: Moshe Lambert CRNA    Indications and Patient Condition  Indications for airway management: airway protection    Preoxygenated: yes  MILS maintained throughout  Mask difficulty assessment: 1 - vent by mask    Final Airway Details  Final airway type: endotracheal airway      Successful airway: ETT  Cuffed: yes   Successful intubation technique: direct laryngoscopy  Endotracheal tube insertion site: oral  Blade: Montano  Blade size: 2  ETT size (mm): 7.5  Cormack-Lehane Classification: grade IIa - partial view of glottis  Placement verified by: chest auscultation and capnometry   Cuff volume (mL): 5  Measured from: gums  ETT/EBT to gums (cm): 22  Number of attempts at approach: 1  Assessment: lips, teeth, and gum same as pre-op and atraumatic intubation

## 2024-01-17 NOTE — PROGRESS NOTES
"    [facility] Medicine Services  INPATIENT PROGRESS NOTE    Patient Name: Gil Monroy  Date of Admission: 1/15/2024  Today's Date: 01/17/24  Length of Stay: 2  Primary Care Physician: Feliberto Blount MD    Subjective   Chief Complaint: Right groin pain and swelling  HPI   Mr. Monroy is a 53-year-old male with a past medical history of insulin-dependent diabetes, liver transplant, chronic lower extremity lymphedema.  He presented to  emergency room 1/5/2015 with \"knot\" right groin and thigh for the past month.  Patient reported area kept getting larger and more painful.  He was evaluated at [facility] emergency room 1/10/2024 and discharged on Bactrim and Keflex.  Patient noted that area did rupture while he was at [facility] and improved the pain; however, pain returned with additional swelling and redness.  He noted that the area has spontaneously drained on several occasions with purulent output.  Patient reported similar abscesses before needed draining.  However, no previous abscess is larger causing as much pain as this area.  CRP 3.87, procalcitonin 0.33, WBC 15.58.  Ultrasound showed medial right thigh area with superficial subcutaneous collection measuring 4 x 2 x 6 cm compatible with abscess.  Vancomycin and Zosyn given in ER.  Patient was seen by Dr. Manas Fernandes in ER with recommendations for debridement and possible wound VAC.    Today  Discussed with Dr. Manas Fernandes.  Patient was taken back to surgery today for wound VAC change.  Dr. Fernandes wants to continue antibiotics until 1/20.  Okay to restart anticoagulation.  Patient denies nausea, vomiting or abdominal pain.  No complaints of chest pain palpitations or shortness of breath.  Social service working on a    Review of Systems   Constitutional:  Positive for activity change and fatigue.   HENT:  Negative for congestion and trouble swallowing.    Eyes:  Negative for photophobia and visual " disturbance.   Respiratory:  Negative for cough, shortness of breath and wheezing.    Cardiovascular:  Positive for leg swelling (Right upper thigh). Negative for chest pain.   Gastrointestinal:  Negative for constipation, diarrhea, nausea and vomiting.   Endocrine: Negative for cold intolerance, heat intolerance and polyuria.   Genitourinary:  Negative for dysuria, frequency and urgency.   Musculoskeletal:  Negative for gait problem.   Skin:  Positive for color change (Reddened, erythema right upper inner thigh) and wound (Right thigh).   Allergic/Immunologic: Negative for immunocompromised state.   Neurological:  Positive for weakness. Negative for light-headedness.   Hematological:  Negative for adenopathy. Does not bruise/bleed easily.   Psychiatric/Behavioral:  Negative for agitation, behavioral problems and confusion.       All pertinent negatives and positives are as above. All other systems have been reviewed and are negative unless otherwise stated.     Objective    Temp:  [97.6 °F (36.4 °C)-98.6 °F (37 °C)] 97.6 °F (36.4 °C)  Heart Rate:  [76-92] 76  Resp:  [14-18] 16  BP: (107-161)/(60-87) 108/60  Physical Exam  Vitals and nursing note reviewed.   Constitutional:       Appearance: He is obese.      Comments: Sitting up in bed.  No oxygen use.  No visitors in room.   HENT:      Head: Normocephalic and atraumatic.      Nose: No congestion.      Mouth/Throat:      Pharynx: Oropharynx is clear. No oropharyngeal exudate or posterior oropharyngeal erythema.   Eyes:      Extraocular Movements: Extraocular movements intact.      Pupils: Pupils are equal, round, and reactive to light.   Cardiovascular:      Rate and Rhythm: Normal rate and regular rhythm.      Heart sounds: No murmur heard.  Pulmonary:      Breath sounds: No wheezing, rhonchi or rales.   Abdominal:      Palpations: Abdomen is soft.      Tenderness: There is no abdominal tenderness.   Genitourinary:     Comments: Voiding.  Musculoskeletal:          General: Tenderness (Right upper inner thigh) present.      Cervical back: Normal range of motion and neck supple.   Skin:     Comments: Right upper inner thigh with wound VAC.  Some firmness and erythema posterior to wound VAC.   Neurological:      General: No focal deficit present.      Mental Status: He is alert and oriented to person, place, and time.   Psychiatric:         Mood and Affect: Mood normal.         Behavior: Behavior normal.         Thought Content: Thought content normal.         Judgment: Judgment normal.       Results Review:  I have reviewed the labs, radiology results, and diagnostic studies.    Laboratory Data:   Results from last 7 days   Lab Units 01/17/24  0359 01/16/24  0300 01/15/24  1615   WBC 10*3/mm3 7.74 9.96 15.58*   HEMOGLOBIN g/dL 11.5* 11.3* 13.0   HEMATOCRIT % 35.8* 35.0* 40.2   PLATELETS 10*3/mm3 275 280 388     Results from last 7 days   Lab Units 01/17/24  0359 01/16/24  0300 01/15/24  1615   SODIUM mmol/L 135* 134* 131*   POTASSIUM mmol/L 4.5 4.7 5.0   CHLORIDE mmol/L 100 98 94*   CO2 mmol/L 27.0 27.0 24.0   BUN mg/dL 14 18 18   CREATININE mg/dL 0.81 0.76 0.91   CALCIUM mg/dL 8.3* 8.3* 9.1   BILIRUBIN mg/dL 0.5 0.8 0.6   ALK PHOS U/L 287* 347* 328*   ALT (SGPT) U/L 43* 62* 54*   AST (SGOT) U/L 78* 163* 98*   GLUCOSE mg/dL 135* 108* 168*       Culture Data:   Blood Culture   Date Value Ref Range Status   01/15/2024 No growth at 24 hours  Preliminary   01/15/2024 No growth at  24 hours  Preliminary     Imaging Results (All)       Procedure Component Value Units Date/Time    US Nonvascular Extremity Complete [[id number]] Collected: 01/15/24 1733     Updated: 01/15/24 1739    Narrative:      EXAMINATION: US NONVASCULAR EXTREMITY COMPLETE-     1/15/2024 4:03 PM     HISTORY: abscess right groin     COMPARISON:  None.     Targeted grayscale and color flow ultrasound evaluation of the medial  aspect of the upper RIGHT thigh.  The area of interest is a red and swollen focus with  drainage.     Ultrasound evaluation shows a superficial subcutaneous focus of  heterogeneous echogenicity.     This area measures 39 x 20 x 59 mm and is compatible with a subcutaneous  abscess.  An open tract to the skin surface is noted though most of this  collection has a heterogeneous and more solid than liquid appearance and  this should be evaluated as to the need for surgical debridement.       Impression:      1. At the medial RIGHT thigh area of interest there is a superficial  subcutaneous collection measuring approximately 4 x 2 x 6 cm.  2. Given the history this is compatible with an abscess though this  collection does have a relatively solid appearance and may represent  phlegmon rather than drainable fluid.     This report was signed and finalized on 1/15/2024 5:36 PM by Dr. Alexander Onofre MD.                Scheduled medications:  enoxaparin, 40 mg, Subcutaneous, Q24H  insulin lispro, 2-9 Units, Subcutaneous, 4x Daily AC & at Bedtime  pantoprazole, 40 mg, Oral, Daily  piperacillin-tazobactam, 3.375 g, Intravenous, Q8H  predniSONE, 5 mg, Oral, BID  senna-docusate sodium, 1 tablet, Oral, Nightly  sodium chloride, 10 mL, Intravenous, Q12H  tacrolimus, 1 mg, Oral, Q8H  vancomycin, 1,500 mg, Intravenous, Q12H       I have reviewed the patient's current medications.     Assessment/Plan   Assessment  Active Hospital Problems    Diagnosis     **Abscess of groin, right     Leg abscess     Insulin dependent type 2 diabetes mellitus     History of liver transplant     Acute pain     Transaminitis     Hyponatremia        Treatment Plan    1.  Abscess right upper thigh groin.  Presented to ER with right upper thigh pain, swelling, not ongoing for the past month.  Evaluated at [facility] 1/10 and discharged on Bactrim and Keflex.  Area has opened and drained spontaneously on several occasions.  Ultrasound showed subcutaneous collection 4 x 2 x 6 cm.  Dr. Manas Fernandes took him to surgery for incision and drainage  groin abscess with wound VAC placement 1/16.  Findings necrotizing soft tissue infection.  Wound debrided and noted tracking anterior abdominal wall towards groin.  Patient back to OR 1/17 for wound VAC placement.  Will attempt to change wound VAC at bedside on Friday 1/19.    Wound culture gram-positive cocci, gram-negative bacilli.  Await final culture and sensitivity.  Continue vancomycin and Zosyn.  Dr. Fernandes recommends continuing antibiotics until 1/20/2024.  WBC 15.8 on admission down to 7.74 today.  Check CBC in AM.  Cultures no growth at 24 hours.    Discussed with Dr. Fernandes and mati to resume anticoagulation.    2.  Diabetes mellitus type 2 with insulin.  Hemoglobin A1c 6.2.  Accu-Cheks with sliding scale insulin coverage.  Glucoses 141, 107, 135.  Patient uses NovoLog with meals sliding scale coverage.    3.  Acute right thigh pain.  Dilaudid IV for severe pain, oxycodone for moderate and severe pain, Tylenol for mild pain.    4.  Mild transaminitis.  AST 98, 163, 78.  ALT 54, 62 to 43.  Improving.  Repeat CMP in AM.    Medical Decision Making  Number and Complexity of problems: 4  Abscess right upper thigh groin: Acute, high complexity poses threat to life and bodily function  Diabetes mellitus type 2 with insulin: Chronic, high complexity poses threat to life and bodily function  Acute right thigh pain: Acute, moderate complexity, not at baseline  Mild transaminitis: Acute, moderate complexity, not at baseline    Differential Diagnosis: Necrotizing soft tissue infection    Conditions and Status        Condition is improving.     [facility] Data  External documents reviewed: Reviewed [facility].  PCP office visit 1/15/2024  Cardiac tracing (EKG, telemetry) interpretation: None  Radiology interpretation: Radiology interpretation ultrasound right upper extremity  Labs reviewed:   CMP 1/17/2024.  Repeat CMP in AM.  CBC 1/17/2024.  Repeat CBC in AM.  Hemoglobin A1c, lipid panel  CRP 3.55  Surgical wound culture  reviewed.  Gram-positive cocci, gram-negative bacilli  Blood culture no growth at 24 hours    Any tests that were considered but not ordered: None     Decision rules/scores evaluated (example [id number], Wells, etc): None     Discussed with: Dr. Seaman, Dr. Manas Fernandes general surgery, and patient.     Care Planning  Shared decision making: Dr. Seaman, Dr. Manas Fernandes, general surgery, and patient.  Patient agrees to wound VAC change today and on 1/19.  Continue IV antibiotics until 1/20.  Home with wound VAC.  Accu-Cheks with sliding scale insulin coverage.  Code status and discussions: Full code  Patient surrogate decision maker is Lise Gagnon  Social Determinants of Health that impact treatment or disposition: Wound VAC  I expect the patient to be discharged to home in 3 days.     Electronically signed by ANDREW Mason, 01/17/24, 12:38 CST.     The above documentation resulted from a face-to-face encounter by me Nuris MORALES, [facility].

## 2024-01-18 ENCOUNTER — CARE COORDINATION (OUTPATIENT)
Dept: CARE COORDINATION | Age: 54
End: 2024-01-18

## 2024-01-18 LAB
ALBUMIN SERPL-MCNC: 2.9 G/DL (ref 3.5–5.2)
ALBUMIN/GLOB SERPL: 0.9 G/DL
ALP SERPL-CCNC: 321 U/L (ref 39–117)
ALT SERPL W P-5'-P-CCNC: 62 U/L (ref 1–41)
ANION GAP SERPL CALCULATED.3IONS-SCNC: 9 MMOL/L (ref 5–15)
AST SERPL-CCNC: 114 U/L (ref 1–40)
BACTERIA SPEC AEROBE CULT: NORMAL
BACTERIA SPEC AEROBE CULT: NORMAL
BASOPHILS # BLD AUTO: 0.1 10*3/MM3 (ref 0–0.2)
BASOPHILS NFR BLD AUTO: 1.2 % (ref 0–1.5)
BILIRUB SERPL-MCNC: 0.5 MG/DL (ref 0–1.2)
BUN SERPL-MCNC: 10 MG/DL (ref 6–20)
BUN/CREAT SERPL: 11.8 (ref 7–25)
CALCIUM SPEC-SCNC: 8.3 MG/DL (ref 8.6–10.5)
CHLORIDE SERPL-SCNC: 99 MMOL/L (ref 98–107)
CO2 SERPL-SCNC: 27 MMOL/L (ref 22–29)
CREAT SERPL-MCNC: 0.85 MG/DL (ref 0.76–1.27)
DEPRECATED RDW RBC AUTO: 54.2 FL (ref 37–54)
EGFRCR SERPLBLD CKD-EPI 2021: 103.9 ML/MIN/1.73
EOSINOPHIL # BLD AUTO: 0.23 10*3/MM3 (ref 0–0.4)
EOSINOPHIL NFR BLD AUTO: 2.9 % (ref 0.3–6.2)
ERYTHROCYTE [DISTWIDTH] IN BLOOD BY AUTOMATED COUNT: 14.6 % (ref 12.3–15.4)
GLOBULIN UR ELPH-MCNC: 3.1 GM/DL
GLUCOSE BLDC GLUCOMTR-MCNC: 113 MG/DL (ref 70–130)
GLUCOSE BLDC GLUCOMTR-MCNC: 142 MG/DL (ref 70–130)
GLUCOSE BLDC GLUCOMTR-MCNC: 155 MG/DL (ref 70–130)
GLUCOSE BLDC GLUCOMTR-MCNC: 167 MG/DL (ref 70–130)
GLUCOSE SERPL-MCNC: 135 MG/DL (ref 65–99)
GRAM STN SPEC: NORMAL
HCT VFR BLD AUTO: 35.3 % (ref 37.5–51)
HGB BLD-MCNC: 11.3 G/DL (ref 13–17.7)
IMM GRANULOCYTES # BLD AUTO: 0.17 10*3/MM3 (ref 0–0.05)
IMM GRANULOCYTES NFR BLD AUTO: 2.1 % (ref 0–0.5)
LYMPHOCYTES # BLD AUTO: 0.84 10*3/MM3 (ref 0.7–3.1)
LYMPHOCYTES NFR BLD AUTO: 10.4 % (ref 19.6–45.3)
MCH RBC QN AUTO: 32.7 PG (ref 26.6–33)
MCHC RBC AUTO-ENTMCNC: 32 G/DL (ref 31.5–35.7)
MCV RBC AUTO: 102 FL (ref 79–97)
MONOCYTES # BLD AUTO: 0.47 10*3/MM3 (ref 0.1–0.9)
MONOCYTES NFR BLD AUTO: 5.8 % (ref 5–12)
NEUTROPHILS NFR BLD AUTO: 6.25 10*3/MM3 (ref 1.7–7)
NEUTROPHILS NFR BLD AUTO: 77.6 % (ref 42.7–76)
NRBC BLD AUTO-RTO: 0 /100 WBC (ref 0–0.2)
PLATELET # BLD AUTO: 294 10*3/MM3 (ref 140–450)
PMV BLD AUTO: 9.9 FL (ref 6–12)
POTASSIUM SERPL-SCNC: 4.3 MMOL/L (ref 3.5–5.2)
PROT SERPL-MCNC: 6 G/DL (ref 6–8.5)
RBC # BLD AUTO: 3.46 10*6/MM3 (ref 4.14–5.8)
SHBG SERPL-SCNC: 5.7 PG/ML (ref 47–244)
SHBG SERPL-SCNC: 51 NMOL/L (ref 11–80)
SODIUM SERPL-SCNC: 135 MMOL/L (ref 136–145)
TESTOST SERPL-MCNC: 42 NG/DL (ref 220–1000)
WBC NRBC COR # BLD AUTO: 8.06 10*3/MM3 (ref 3.4–10.8)

## 2024-01-18 PROCEDURE — 25010000002 ENOXAPARIN PER 10 MG: Performed by: GENERAL PRACTICE

## 2024-01-18 PROCEDURE — 63710000001 INSULIN LISPRO (HUMAN) PER 5 UNITS: Performed by: GENERAL PRACTICE

## 2024-01-18 PROCEDURE — 25010000002 VANCOMYCIN 1 G RECONSTITUTED SOLUTION 1 EACH VIAL: Performed by: INTERNAL MEDICINE

## 2024-01-18 PROCEDURE — 63710000001 PREDNISONE PER 5 MG: Performed by: GENERAL PRACTICE

## 2024-01-18 PROCEDURE — 99232 SBSQ HOSP IP/OBS MODERATE 35: CPT | Performed by: GENERAL PRACTICE

## 2024-01-18 PROCEDURE — 85025 COMPLETE CBC W/AUTO DIFF WBC: CPT | Performed by: GENERAL PRACTICE

## 2024-01-18 PROCEDURE — 80053 COMPREHEN METABOLIC PANEL: CPT | Performed by: GENERAL PRACTICE

## 2024-01-18 PROCEDURE — 25810000003 SODIUM CHLORIDE 0.9 % SOLUTION: Performed by: GENERAL PRACTICE

## 2024-01-18 PROCEDURE — 25810000003 SODIUM CHLORIDE 0.9 % SOLUTION 250 ML FLEX CONT: Performed by: INTERNAL MEDICINE

## 2024-01-18 PROCEDURE — 63710000001 TACROLIMUS PER 1 MG: Performed by: GENERAL PRACTICE

## 2024-01-18 PROCEDURE — 25010000002 PIPERACILLIN SOD-TAZOBACTAM PER 1 G: Performed by: INTERNAL MEDICINE

## 2024-01-18 PROCEDURE — 25010000002 HYDROMORPHONE PER 4 MG: Performed by: GENERAL PRACTICE

## 2024-01-18 PROCEDURE — 82948 REAGENT STRIP/BLOOD GLUCOSE: CPT

## 2024-01-18 RX ORDER — LANOLIN ALCOHOL/MO/W.PET/CERES
6 CREAM (GRAM) TOPICAL NIGHTLY
Status: DISCONTINUED | OUTPATIENT
Start: 2024-01-18 | End: 2024-01-19 | Stop reason: HOSPADM

## 2024-01-18 RX ORDER — ATORVASTATIN CALCIUM 10 MG/1
10 TABLET, FILM COATED ORAL NIGHTLY
Status: DISCONTINUED | OUTPATIENT
Start: 2024-01-18 | End: 2024-01-18

## 2024-01-18 RX ORDER — ASPIRIN 81 MG/1
81 TABLET, CHEWABLE ORAL DAILY
Status: DISCONTINUED | OUTPATIENT
Start: 2024-01-18 | End: 2024-01-19 | Stop reason: HOSPADM

## 2024-01-18 RX ADMIN — HYDROMORPHONE HYDROCHLORIDE 0.5 MG: 1 INJECTION, SOLUTION INTRAMUSCULAR; INTRAVENOUS; SUBCUTANEOUS at 07:22

## 2024-01-18 RX ADMIN — OXYCODONE HYDROCHLORIDE 10 MG: 5 TABLET ORAL at 17:13

## 2024-01-18 RX ADMIN — VANCOMYCIN HYDROCHLORIDE 1000 MG: 1 INJECTION, POWDER, LYOPHILIZED, FOR SOLUTION INTRAVENOUS at 20:11

## 2024-01-18 RX ADMIN — HYDROMORPHONE HYDROCHLORIDE 0.5 MG: 1 INJECTION, SOLUTION INTRAMUSCULAR; INTRAVENOUS; SUBCUTANEOUS at 00:56

## 2024-01-18 RX ADMIN — OXYCODONE HYDROCHLORIDE 10 MG: 5 TABLET ORAL at 23:29

## 2024-01-18 RX ADMIN — OXYCODONE HYDROCHLORIDE 10 MG: 5 TABLET ORAL at 10:20

## 2024-01-18 RX ADMIN — ASPIRIN 81 MG: 81 TABLET, CHEWABLE ORAL at 13:41

## 2024-01-18 RX ADMIN — PREDNISONE 5 MG: 5 TABLET ORAL at 20:11

## 2024-01-18 RX ADMIN — TACROLIMUS 1 MG: 1 CAPSULE ORAL at 06:06

## 2024-01-18 RX ADMIN — INSULIN LISPRO 2 UNITS: 100 INJECTION, SOLUTION INTRAVENOUS; SUBCUTANEOUS at 11:12

## 2024-01-18 RX ADMIN — SODIUM CHLORIDE 50 ML/HR: 9 INJECTION, SOLUTION INTRAVENOUS at 15:09

## 2024-01-18 RX ADMIN — PIPERACILLIN SODIUM AND TAZOBACTAM SODIUM 3.38 G: 3; .375 INJECTION, POWDER, LYOPHILIZED, FOR SOLUTION INTRAVENOUS at 22:32

## 2024-01-18 RX ADMIN — HYDROMORPHONE HYDROCHLORIDE 0.5 MG: 1 INJECTION, SOLUTION INTRAMUSCULAR; INTRAVENOUS; SUBCUTANEOUS at 03:12

## 2024-01-18 RX ADMIN — OXYCODONE HYDROCHLORIDE 10 MG: 5 TABLET ORAL at 04:20

## 2024-01-18 RX ADMIN — ENOXAPARIN SODIUM 40 MG: 100 INJECTION SUBCUTANEOUS at 17:13

## 2024-01-18 RX ADMIN — PIPERACILLIN SODIUM AND TAZOBACTAM SODIUM 3.38 G: 3; .375 INJECTION, POWDER, LYOPHILIZED, FOR SOLUTION INTRAVENOUS at 15:09

## 2024-01-18 RX ADMIN — PANTOPRAZOLE SODIUM 40 MG: 40 TABLET, DELAYED RELEASE ORAL at 08:07

## 2024-01-18 RX ADMIN — PIPERACILLIN SODIUM AND TAZOBACTAM SODIUM 3.38 G: 3; .375 INJECTION, POWDER, LYOPHILIZED, FOR SOLUTION INTRAVENOUS at 06:06

## 2024-01-18 RX ADMIN — PREDNISONE 5 MG: 5 TABLET ORAL at 08:07

## 2024-01-18 RX ADMIN — INSULIN LISPRO 2 UNITS: 100 INJECTION, SOLUTION INTRAVENOUS; SUBCUTANEOUS at 20:11

## 2024-01-18 RX ADMIN — VANCOMYCIN HYDROCHLORIDE 1000 MG: 1 INJECTION, POWDER, LYOPHILIZED, FOR SOLUTION INTRAVENOUS at 08:08

## 2024-01-18 RX ADMIN — TACROLIMUS 1 MG: 1 CAPSULE ORAL at 22:32

## 2024-01-18 RX ADMIN — TACROLIMUS 1 MG: 1 CAPSULE ORAL at 13:41

## 2024-01-18 RX ADMIN — Medication 6 MG: at 20:11

## 2024-01-18 RX ADMIN — Medication 10 ML: at 08:09

## 2024-01-18 NOTE — PROGRESS NOTES
LOS: 3 days   Patient Care Team:  Feliberto Blount MD as PCP - General (Family Medicine)    Subjective  Wound VAC change in the OR yesterday.  No additional debridement required.  Patient states he still has pain with movement of that area and any sort of palpation.  He is requiring pain medication at every available opportunity.  He states he has a chronic pain doctor for which she follows to prescribe opioid medications as an outpatient.    Objective:   Vital Signs  Temp:  [97.4 °F (36.3 °C)-97.8 °F (36.6 °C)] 97.8 °F (36.6 °C)  Heart Rate:  [70-92] 78  Resp:  [14-16] 16  BP: (107-143)/(60-86) 143/86    Intake & Output (last 3 days)         01/15 0701  01/16 0700 01/16 0701  01/17 0700 01/17 0701  01/18 0700 01/18 0701 01/19 0700    P.O.  720 240     I.V. (mL/kg)   400 (3.1)     IV Piggyback 100       Total Intake(mL/kg) 100 (0.8) 720 (5.5) 640 (4.9)     Urine (mL/kg/hr) 300 1700 (0.5) 2500 (0.8) 350 (1)    Stool  0      Total Output 300 1700 2500 350    Net -200 -980 -1860 -350            Stool Unmeasured Occurrence  1 x              Physical Exam:     General Appearance:    Alert, cooperative, in no acute distress   HEENT:   Normocephalic, atraumatic, EOMI, MMM   Lungs:     Equal chest rise bilaterally.  No increased work of breathing    Heart:    Regular rhythm and normal rate   Abdomen:    Normal bowel sounds, no masses, no organomegaly, soft nontender, nondistended, abdominal wall wound with significant scarring no surrounding erythema.   Extremities:   Right inner groin wound with wound VAC in place erythema improved.   Results Review:     I reviewed the patient's new clinical results. Significant labs:   I reviewed the patient's new imaging results and agree with the interpretation.    Results from last 7 days   Lab Units 01/18/24  0332 01/17/24  0359 01/16/24  0300   WBC 10*3/mm3 8.06 7.74 9.96   HEMOGLOBIN g/dL 11.3* 11.5* 11.3*   HEMATOCRIT % 35.3* 35.8* 35.0*   PLATELETS 10*3/mm3 294 282 280         Results from last 7 days   Lab Units 01/18/24  0332 01/17/24  0359 01/16/24  0300   SODIUM mmol/L 135* 135* 134*   POTASSIUM mmol/L 4.3 4.5 4.7   CHLORIDE mmol/L 99 100 98   CO2 mmol/L 27.0 27.0 27.0   BUN mg/dL 10 14 18   CREATININE mg/dL 0.85 0.81 0.76   CALCIUM mg/dL 8.3* 8.3* 8.3*   BILIRUBIN mg/dL 0.5 0.5 0.8   ALK PHOS U/L 321* 287* 347*   ALT (SGPT) U/L 62* 43* 62*   AST (SGOT) U/L 114* 78* 163*   GLUCOSE mg/dL 135* 135* 108*       Assessment and plan:    Mr. Monroy is a 53 y.o. male with history of diabetes who presented with right inner thigh abscess found to be polymicrobial necrotizing soft tissue infection.  Patient taken to the OR 15 January for debridement.  And again on 17 January.  The wound was noted to track superiorly anterior abdominal wall immediately towards his groin.      Continue antibiotics until 20 January   return to the OR today 17 January  [facility] wound VAC request completed  Home health for wound VAC supplies/care  PT for wound VAC help  Okay to restart anticoagulation    *Plan for bedside wound VAC change tomorrow at approximately 800 to evaluate if changes can be done as an outpatient.    Manas Fernandes MD  01/18/24  09:45 CST    Part of this note may be an electronic transcription/translation of spoken language to printed text using the Dragon Dictation System.

## 2024-01-18 NOTE — PLAN OF CARE
Goal Outcome Evaluation:  Plan of Care Reviewed With: patient        Progress: no change     Pt A&O X4. C/o pain- see MAR. Pt says pain is better controlled. Ambulating with SBA. Wound vac in place to R. Groin area. RA; cont pulse ox sats WNL. SCDs and lovenox for VTE. Pt resting comfortably between care. VSS safety maintained.

## 2024-01-18 NOTE — PROGRESS NOTES
"Pharmacy Dosing Service  Pharmacokinetics  Vancomycin Follow-up Evaluation    Assessment/Action/Plan:  Current Order: Vancomycin 1000 mg IVPB every 12 hours  Current end date: 1/22/24  Levels: Trough = 22.7 yesterday   Additional antimicrobial agent(s): Zosyn    Vancomycin trough evaluated/ dose adjusted last PM. SCr=0.85 (stable). Ordered a follow up trough prior to the AM dose tomorrow. Pharmacy will continue to follow daily and adjust dose accordingly.     Subjective:  Gil Monroy is a 53 y.o. male currently on Vancomycin 1000 mg IV every 12 hours for the treatment of SSTI/ abscess, day 3 of 7 of treatment.    AUC Model Data:  Regimen: 1000 mg IV every 12 hours.  Exposure target: AUC24 (range) 400-600 mg/L.hr   AUC24,ss: 506 mg/L.hr  PAUC*: 100 %  Ctrough,ss: 15.1 mg/L  Pconc*: 3 %  Tox.: 10 %    Objective:  Ht: 185.4 cm (73\"); Wt: 131 kg (289 lb 4.8 oz)  Estimated Creatinine Clearance: 142.2 mL/min (by C-G formula based on SCr of 0.85 mg/dL).     Creatinine   Date Value Ref Range Status   01/18/2024 0.85 0.76 - 1.27 mg/dL Final   01/17/2024 0.81 0.76 - 1.27 mg/dL Final   01/16/2024 0.76 0.76 - 1.27 mg/dL Final   08/23/2022 0.8 0.5 - 1.2 mg/dL Final   04/20/2022 0.90 0.80 - 1.30 mg/dL Final   12/09/2021 0.8 0.5 - 1.2 mg/dL Final   02/09/2021 0.8 0.5 - 1.2 mg/dL Final      Lab Results   Component Value Date    WBC 8.06 01/18/2024    WBC 7.74 01/17/2024    WBC 9.96 01/16/2024         Lab Results   Component Value Date    VANCOTROUGH 22.70 (H) 01/17/2024       Culture Results:  Microbiology Results (last 10 days)       Procedure Component Value - Date/Time    MRSA Screen, PCR (Inpatient) - Swab, Nares [[id number]]  (Normal) Collected: 01/16/24 0349    Lab Status: Final result Specimen: Swab from Nares Updated: 01/16/24 0516     MRSA PCR No MRSA Detected    Narrative:      The negative predictive value of this diagnostic test is high and should only be used to consider de-escalating anti-MRSA therapy. A positive " result may indicate colonization with MRSA and must be correlated clinically.    Tissue / Bone Culture - Tissue, Groin, right [[id number]] Collected: 01/15/24 2048    Lab Status: Final result Specimen: Tissue from Groin, right Updated: 01/18/24 0622     Tissue Culture Scant growth (1+) Normal Skin Zari     Gram Stain Moderate (3+) WBCs per low power field      Moderate (3+) Gram positive cocci      Few (2+) Gram negative bacilli    Wound Culture - Wound, Leg, Right [[id number]] Collected: 01/15/24 1654    Lab Status: Final result Specimen: Wound from Leg, Right Updated: 01/18/24 0626     Wound Culture Scant growth (1+) Normal Skin Zari     Gram Stain Moderate (3+) WBCs per low power field      Moderate (3+) Gram positive cocci      Moderate (3+) Gram negative bacilli    Blood Culture With MALAIKA - Blood, Arm, Left [[id number]]  (Normal) Collected: 01/15/24 1604    Lab Status: Preliminary result Specimen: Blood from Arm, Left Updated: 01/17/24 1631     Blood Culture No growth at 2 days    Blood Culture With MALAIKA - Blood, Wrist, Right [[id number]]  (Normal) Collected: 01/15/24 1600    Lab Status: Preliminary result Specimen: Blood from Wrist, Right Updated: 01/17/24 1631     Blood Culture No growth at 2 days            Augustine Richards, PharmD   01/18/24 15:55 CST

## 2024-01-18 NOTE — CASE MANAGEMENT/SOCIAL WORK
Continued Stay Note  JEFF Junior     Patient Name: Gil Monroy  MRN: [id number]  Today's Date: 1/18/2024    Admit Date: 1/15/2024    Plan: Home Health   Discharge Plan       Row Name 01/18/24 1610       Plan    Plan Home Health    Patient/Family in Agreement with Plan yes    Plan Comments Wound vac has been approved and will be delivered to pt's room on day of d/c. Pt says he will need home health because he is homebound.                    Discharge Codes    No documentation.                 Expected Discharge Date and Time       Expected Discharge Date Expected Discharge Time    Jan 19, 2024               CALEB Baltazar

## 2024-01-18 NOTE — PROGRESS NOTES
"    [facility] Medicine Services  INPATIENT PROGRESS NOTE    Patient Name: Gil Monroy  Date of Admission: 1/15/2024  Today's Date: 01/18/24  Length of Stay: 3  Primary Care Physician: Feliberto Blount MD    Subjective   Chief Complaint: Right groin pain and swelling  HPI   Mr. Monroy is a 53-year-old male with a past medical history of insulin-dependent diabetes, liver transplant, chronic lower extremity lymphedema.  He presented to [facility] emergency room 1/5/2015 with \"knot\" right groin and thigh for the past month.  Patient reported area kept getting larger and more painful.  He was evaluated at [facility] emergency room 1/10/2024 and discharged on Bactrim and Keflex.  Patient noted that area did rupture while he was at [facility] and improved the pain; however, pain returned with additional swelling and redness.  He noted that the area has spontaneously drained on several occasions with purulent output.  Patient reported similar abscesses before needed draining.  However, no previous abscess is larger causing as much pain as this area.  CRP 3.87, procalcitonin 0.33, WBC 15.58.  Ultrasound showed medial right thigh area with superficial subcutaneous collection measuring 4 x 2 x 6 cm compatible with abscess.  Vancomycin and Zosyn given in ER.  Patient was seen by Dr. Manas Fernandes in ER with recommendations for debridement and possible wound VAC.    Today  Patient examined sitting up in bed on room air in no acute distress.  He denies chest pain or shortness of breath.  No visitors at bedside.  He complete needs to complain of right groin pain.  He denies any other type of pain.  Continue antibiotics until 1/20.  Dr. Fernandes with general surgery plans for wound VAC change tomorrow.  Further orders depending on this assessment.     ROS:  All pertinent negatives and positives are as above. All other systems have been reviewed and are negative unless otherwise " stated.     Objective    Temp:  [97.4 °F (36.3 °C)-97.8 °F (36.6 °C)] 97.8 °F (36.6 °C)  Heart Rate:  [70-80] 78  Resp:  [16] 16  BP: (112-143)/(63-86) 143/86  Physical Exam  Vitals and nursing note reviewed.   Constitutional:       Appearance: He is obese.      Comments: Up in bed, room air, no acute distress, no visitors at bedside   HENT:      Head: Normocephalic and atraumatic.      Nose: No congestion.      Mouth/Throat:      Pharynx: Oropharynx is clear. No oropharyngeal exudate or posterior oropharyngeal erythema.   Eyes:      Extraocular Movements: Extraocular movements intact.      Pupils: Pupils are equal, round, and reactive to light.   Cardiovascular:      Rate and Rhythm: Normal rate and regular rhythm.      Heart sounds: No murmur heard.  Pulmonary:      Breath sounds: No wheezing, rhonchi or rales.   Abdominal:      Palpations: Abdomen is soft.      Tenderness: There is no abdominal tenderness.   Genitourinary:     Comments: Voiding.  Musculoskeletal:         General: Tenderness (Right upper inner thigh) present.      Cervical back: Normal range of motion and neck supple.   Skin:     Comments: Right upper inner thigh with wound VAC in place.  Serosanguineous drainage in wound VAC noted   Neurological:      General: No focal deficit present.      Mental Status: He is alert and oriented to person, place, and time.   Psychiatric:         Mood and Affect: Mood normal.         Behavior: Behavior normal.         Thought Content: Thought content normal.         Judgment: Judgment normal.       Results Review:  I have reviewed the labs, radiology results, and diagnostic studies.    Laboratory Data:   Results from last 7 days   Lab Units 01/18/24  0332 01/17/24  0359 01/16/24  0300   WBC 10*3/mm3 8.06 7.74 9.96   HEMOGLOBIN g/dL 11.3* 11.5* 11.3*   HEMATOCRIT % 35.3* 35.8* 35.0*   PLATELETS 10*3/mm3 294 275 280     Results from last 7 days   Lab Units 01/18/24  0332 01/17/24  0359 01/16/24  0300   SODIUM  mmol/L 135* 135* 134*   POTASSIUM mmol/L 4.3 4.5 4.7   CHLORIDE mmol/L 99 100 98   CO2 mmol/L 27.0 27.0 27.0   BUN mg/dL 10 14 18   CREATININE mg/dL 0.85 0.81 0.76   CALCIUM mg/dL 8.3* 8.3* 8.3*   BILIRUBIN mg/dL 0.5 0.5 0.8   ALK PHOS U/L 321* 287* 347*   ALT (SGPT) U/L 62* 43* 62*   AST (SGOT) U/L 114* 78* 163*   GLUCOSE mg/dL 135* 135* 108*       Culture Data:   Blood Culture   Date Value Ref Range Status   01/15/2024 No growth at 24 hours  Preliminary   01/15/2024 No growth at  24 hours  Preliminary     Imaging Results (All)       Procedure Component Value Units Date/Time    US Nonvascular Extremity Complete [[id number]] Collected: 01/15/24 1733     Updated: 01/15/24 1739    Narrative:      EXAMINATION: US NONVASCULAR EXTREMITY COMPLETE-     1/15/2024 4:03 PM     HISTORY: abscess right groin     COMPARISON:  None.     Targeted grayscale and color flow ultrasound evaluation of the medial  aspect of the upper RIGHT thigh.  The area of interest is a red and swollen focus with drainage.     Ultrasound evaluation shows a superficial subcutaneous focus of  heterogeneous echogenicity.     This area measures 39 x 20 x 59 mm and is compatible with a subcutaneous  abscess.  An open tract to the skin surface is noted though most of this  collection has a heterogeneous and more solid than liquid appearance and  this should be evaluated as to the need for surgical debridement.       Impression:      1. At the medial RIGHT thigh area of interest there is a superficial  subcutaneous collection measuring approximately 4 x 2 x 6 cm.  2. Given the history this is compatible with an abscess though this  collection does have a relatively solid appearance and may represent  phlegmon rather than drainable fluid.     This report was signed and finalized on 1/15/2024 5:36 PM by Dr. Alexander Onofre MD.              I have reviewed the patient's current medications.     Assessment/Plan   Assessment  Active Hospital Problems    Diagnosis      **Abscess of groin, right     Leg abscess     Insulin dependent type 2 diabetes mellitus     History of liver transplant     Acute pain     Transaminitis     Hyponatremia        Treatment Plan  Abscess right upper thigh groin-  Ultrasound showed subcutaneous collection 4 x 2 x 6 cm.    Dr. Manas Fernandes performed incision and drainage groin abscess with wound VAC placement 1/16.  Findings necrotizing soft tissue infection.  Wound debrided and noted tracking anterior abdominal wall towards groin.  Dr. Fernandes took patient back to the OR on 1/17 with wound VAC exchange.  Plan to change wound VAC at bedside at 0800 tomorrow.  Continue IV Zosyn, IV vancomycin    Wound culture gram-positive cocci, gram-negative bacilli as final result.  Continue vancomycin and Zosyn.  Dr. Fernandes recommends continuing antibiotics until 1/20/2024.  WBC 15.8 on admission down to 8 today.  Blood culture x 2 no growth at 2 days  Anaerobic wound culture from 1/15 in process.  CBC in a.m.    Diabetes mellitus type 2 with insulin-  Hemoglobin A1c 6.2.  Continue sliding scale insulin with Accu-Cheks.    Acute right thigh pain-  Dilaudid IV for severe pain, oxycodone for moderate and severe pain, Tylenol for mild pain.    History of liver transplant with mild transaminitis-  Continue prednisone, tacrolimus  Continue to monitor.  CMP in AM.    Lovenox for DVT prophylaxis    Medical Decision Making  Number and Complexity of problems: 4  Abscess right upper thigh groin: Acute, high complexity poses threat to life and bodily function  Diabetes mellitus type 2 with insulin: Chronic, high complexity poses threat to life and bodily function  Acute right thigh pain: Acute, moderate complexity, not at baseline  Mild transaminitis: Acute, moderate complexity, not at baseline    Differential Diagnosis: Necrotizing soft tissue infection    Conditions and Status        Condition is improving.     MDM Data  External documents reviewed: Reviewed epic.  PCP  office visit 1/15/2024  Cardiac tracing (EKG, telemetry) interpretation: None  Radiology interpretation: Radiology interpretation ultrasound right upper extremity  Labs reviewed:   CBC, BMP, blood culture, wound culture    Any tests that were considered but not ordered: None     Decision rules/scores evaluated (example [id number], Wells, etc): None     Discussed with: Dr. Seaman, patient     Care Planning  Shared decision making: Discussed with above, patient is agreeable to his plan of care  Code status and discussions: Full code  Patient surrogate decision maker is Lise Gagnon  Social Determinants of Health that impact treatment or disposition: Wound VAC  I expect the patient to be discharged to home in 3 days.     Electronically signed by ANDREW Adler, 01/18/24, 12:05 CST.

## 2024-01-18 NOTE — PROGRESS NOTES
"Pharmacy Dosing Service  Pharmacokinetics  Vancomycin Follow-up Evaluation    Assessment/Action/Plan:  Current Order: Vancomycin 1500 mg IVPB every 12 hours  Current end date: 01/22/24  Levels: Level obtained on 1/17/24 at 1651 (10 hours 45 minutes post dose) which was 22.7. Vancomycin dose adjusted to Vancomycin 1000 mg IV every 12 hours x 10 doses.  Additional antimicrobial agent(s): [facility]     Pharmacy will continue to follow daily and adjust dose accordingly.     Subjective:  Gil Monroy is a 53 y.o. male currently on Vancomycin 1500 mg IV every 12 hours for the treatment of SSTI, day 2 of 5 of treatment.    AUC Model Data:    Loading dose: N/A  Regimen: 1000 mg IV every 12 hours.  Start time: 19:00 on 01/17/2024  Exposure target: AUC24 (range) 400-600 mg/L.hr   AUC24,ss: 488 mg/L.hr  PAUC*: 100 %  Ctrough,ss: 14.4 mg/L  Pconc*: 2 %  Objective:  Ht: 185.4 cm (73\"); Wt: 131 kg (289 lb 4.8 oz)  Estimated Creatinine Clearance: 149.2 mL/min (by C-G formula based on SCr of 0.81 mg/dL).   Creatinine   Date Value Ref Range Status   01/17/2024 0.81 0.76 - 1.27 mg/dL Final   01/16/2024 0.76 0.76 - 1.27 mg/dL Final   01/15/2024 0.91 0.76 - 1.27 mg/dL Final   08/23/2022 0.8 0.5 - 1.2 mg/dL Final   04/20/2022 0.90 0.80 - 1.30 mg/dL Final   12/09/2021 0.8 0.5 - 1.2 mg/dL Final   02/09/2021 0.8 0.5 - 1.2 mg/dL Final      Lab Results   Component Value Date    WBC 7.74 01/17/2024    WBC 9.96 01/16/2024    WBC 15.58 (H) 01/15/2024         Lab Results   Component Value Date    VANCOTROUGH 22.70 (H) 01/17/2024       Culture Results:  Microbiology Results (last 10 days)       Procedure Component Value - Date/Time    MRSA Screen, PCR (Inpatient) - Swab, Nares [[id number]]  (Normal) Collected: 01/16/24 0349    Lab Status: Final result Specimen: Swab from Nares Updated: 01/16/24 0516     MRSA PCR No MRSA Detected    Narrative:      The negative predictive value of this diagnostic test is high and should only be used to consider " de-escalating anti-MRSA therapy. A positive result may indicate colonization with MRSA and must be correlated clinically.    Tissue / Bone Culture - Tissue, Groin, right [[id number]] Collected: 01/15/24 2048    Lab Status: Preliminary result Specimen: Tissue from Groin, right Updated: 01/17/24 0619     Tissue Culture Culture in progress     Gram Stain Moderate (3+) WBCs per low power field      Moderate (3+) Gram positive cocci      Few (2+) Gram negative bacilli    Wound Culture - Wound, Leg, Right [[id number]] Collected: 01/15/24 1654    Lab Status: Preliminary result Specimen: Wound from Leg, Right Updated: 01/17/24 0617     Wound Culture Scant growth (1+) Normal Skin Zari     Gram Stain Moderate (3+) WBCs per low power field      Moderate (3+) Gram positive cocci      Moderate (3+) Gram negative bacilli    Blood Culture With MALAIKA - Blood, Arm, Left [[id number]]  (Normal) Collected: 01/15/24 1604    Lab Status: Preliminary result Specimen: Blood from Arm, Left Updated: 01/17/24 1631     Blood Culture No growth at 2 days    Blood Culture With MALAIKA - Blood, Wrist, Right [[id number]]  (Normal) Collected: 01/15/24 1600    Lab Status: Preliminary result Specimen: Blood from Wrist, Right Updated: 01/17/24 1631     Blood Culture No growth at 2 days            Sparkle Charlton, PharmD   01/17/24 18:14 CST

## 2024-01-19 ENCOUNTER — HOME HEALTH ADMISSION (OUTPATIENT)
Dept: HOME HEALTH SERVICES | Facility: HOME HEALTHCARE | Age: 54
End: 2024-01-19
Payer: MEDICARE

## 2024-01-19 ENCOUNTER — READMISSION MANAGEMENT (OUTPATIENT)
Dept: CALL CENTER | Facility: HOSPITAL | Age: 54
End: 2024-01-19
Payer: MEDICARE

## 2024-01-19 VITALS
SYSTOLIC BLOOD PRESSURE: 154 MMHG | WEIGHT: 289.3 LBS | DIASTOLIC BLOOD PRESSURE: 81 MMHG | TEMPERATURE: 97.7 F | RESPIRATION RATE: 16 BRPM | HEART RATE: 83 BPM | OXYGEN SATURATION: 98 % | HEIGHT: 73 IN | BODY MASS INDEX: 38.34 KG/M2

## 2024-01-19 LAB
ALBUMIN SERPL-MCNC: 2.8 G/DL (ref 3.5–5.2)
ALBUMIN/GLOB SERPL: 0.8 G/DL
ALP SERPL-CCNC: 269 U/L (ref 39–117)
ALT SERPL W P-5'-P-CCNC: 51 U/L (ref 1–41)
ANION GAP SERPL CALCULATED.3IONS-SCNC: 10 MMOL/L (ref 5–15)
AST SERPL-CCNC: 78 U/L (ref 1–40)
BASOPHILS # BLD AUTO: 0.07 10*3/MM3 (ref 0–0.2)
BASOPHILS NFR BLD AUTO: 0.8 % (ref 0–1.5)
BILIRUB SERPL-MCNC: 0.4 MG/DL (ref 0–1.2)
BUN SERPL-MCNC: 10 MG/DL (ref 6–20)
BUN/CREAT SERPL: 13 (ref 7–25)
CALCIUM SPEC-SCNC: 8.7 MG/DL (ref 8.6–10.5)
CHLORIDE SERPL-SCNC: 100 MMOL/L (ref 98–107)
CO2 SERPL-SCNC: 24 MMOL/L (ref 22–29)
CREAT SERPL-MCNC: 0.77 MG/DL (ref 0.76–1.27)
DEPRECATED RDW RBC AUTO: 51.3 FL (ref 37–54)
EGFRCR SERPLBLD CKD-EPI 2021: 107.1 ML/MIN/1.73
EOSINOPHIL # BLD AUTO: 0.17 10*3/MM3 (ref 0–0.4)
EOSINOPHIL NFR BLD AUTO: 2.1 % (ref 0.3–6.2)
ERYTHROCYTE [DISTWIDTH] IN BLOOD BY AUTOMATED COUNT: 14.1 % (ref 12.3–15.4)
GLOBULIN UR ELPH-MCNC: 3.3 GM/DL
GLUCOSE BLDC GLUCOMTR-MCNC: 111 MG/DL (ref 70–130)
GLUCOSE SERPL-MCNC: 145 MG/DL (ref 65–99)
HCT VFR BLD AUTO: 36.9 % (ref 37.5–51)
HGB BLD-MCNC: 11.8 G/DL (ref 13–17.7)
IMM GRANULOCYTES # BLD AUTO: 0.11 10*3/MM3 (ref 0–0.05)
IMM GRANULOCYTES NFR BLD AUTO: 1.3 % (ref 0–0.5)
LYMPHOCYTES # BLD AUTO: 1.03 10*3/MM3 (ref 0.7–3.1)
LYMPHOCYTES NFR BLD AUTO: 12.5 % (ref 19.6–45.3)
MCH RBC QN AUTO: 31.7 PG (ref 26.6–33)
MCHC RBC AUTO-ENTMCNC: 32 G/DL (ref 31.5–35.7)
MCV RBC AUTO: 99.2 FL (ref 79–97)
MONOCYTES # BLD AUTO: 0.48 10*3/MM3 (ref 0.1–0.9)
MONOCYTES NFR BLD AUTO: 5.8 % (ref 5–12)
NEUTROPHILS NFR BLD AUTO: 6.4 10*3/MM3 (ref 1.7–7)
NEUTROPHILS NFR BLD AUTO: 77.5 % (ref 42.7–76)
NRBC BLD AUTO-RTO: 0 /100 WBC (ref 0–0.2)
PLATELET # BLD AUTO: 288 10*3/MM3 (ref 140–450)
PMV BLD AUTO: 9.7 FL (ref 6–12)
POTASSIUM SERPL-SCNC: 4.4 MMOL/L (ref 3.5–5.2)
PROT SERPL-MCNC: 6.1 G/DL (ref 6–8.5)
RBC # BLD AUTO: 3.72 10*6/MM3 (ref 4.14–5.8)
SODIUM SERPL-SCNC: 134 MMOL/L (ref 136–145)
WBC NRBC COR # BLD AUTO: 8.26 10*3/MM3 (ref 3.4–10.8)

## 2024-01-19 PROCEDURE — 99221 1ST HOSP IP/OBS SF/LOW 40: CPT | Performed by: NURSE PRACTITIONER

## 2024-01-19 PROCEDURE — 25010000002 VANCOMYCIN 1 G RECONSTITUTED SOLUTION 1 EACH VIAL: Performed by: INTERNAL MEDICINE

## 2024-01-19 PROCEDURE — 25010000002 PIPERACILLIN SOD-TAZOBACTAM PER 1 G: Performed by: INTERNAL MEDICINE

## 2024-01-19 PROCEDURE — 82948 REAGENT STRIP/BLOOD GLUCOSE: CPT

## 2024-01-19 PROCEDURE — 63710000001 PREDNISONE PER 5 MG: Performed by: GENERAL PRACTICE

## 2024-01-19 PROCEDURE — 85025 COMPLETE CBC W/AUTO DIFF WBC: CPT | Performed by: GENERAL PRACTICE

## 2024-01-19 PROCEDURE — 80053 COMPREHEN METABOLIC PANEL: CPT | Performed by: GENERAL PRACTICE

## 2024-01-19 PROCEDURE — 25810000003 SODIUM CHLORIDE 0.9 % SOLUTION 250 ML FLEX CONT: Performed by: INTERNAL MEDICINE

## 2024-01-19 PROCEDURE — 97605 NEG PRS WND THER DME<=50SQCM: CPT

## 2024-01-19 PROCEDURE — 63710000001 TACROLIMUS PER 1 MG: Performed by: GENERAL PRACTICE

## 2024-01-19 PROCEDURE — 97161 PT EVAL LOW COMPLEX 20 MIN: CPT

## 2024-01-19 RX ADMIN — PANTOPRAZOLE SODIUM 40 MG: 40 TABLET, DELAYED RELEASE ORAL at 08:44

## 2024-01-19 RX ADMIN — PIPERACILLIN SODIUM AND TAZOBACTAM SODIUM 3.38 G: 3; .375 INJECTION, POWDER, LYOPHILIZED, FOR SOLUTION INTRAVENOUS at 06:38

## 2024-01-19 RX ADMIN — OXYCODONE HYDROCHLORIDE 10 MG: 5 TABLET ORAL at 05:27

## 2024-01-19 RX ADMIN — TACROLIMUS 1 MG: 1 CAPSULE ORAL at 06:38

## 2024-01-19 RX ADMIN — VANCOMYCIN HYDROCHLORIDE 1000 MG: 1 INJECTION, POWDER, LYOPHILIZED, FOR SOLUTION INTRAVENOUS at 08:44

## 2024-01-19 RX ADMIN — PREDNISONE 5 MG: 5 TABLET ORAL at 08:44

## 2024-01-19 RX ADMIN — ASPIRIN 81 MG: 81 TABLET, CHEWABLE ORAL at 08:44

## 2024-01-19 RX ADMIN — OXYCODONE HYDROCHLORIDE 10 MG: 5 TABLET ORAL at 11:19

## 2024-01-19 RX ADMIN — Medication 10 ML: at 08:44

## 2024-01-19 NOTE — CASE MANAGEMENT/SOCIAL WORK
Continued Stay Note   Sandi     Patient Name: Gil Monroy  MRN: [id number]  Today's Date: 1/19/2024    Admit Date: 1/15/2024    Plan: [facility]    Discharge Plan       Row Name 01/19/24 1435       Plan    Plan [facility]     Patient/Family in Agreement with Plan yes    Provided Post Acute Provider List? Yes    Post Acute Provider List Home Health    Provided Post Acute Provider Quality & Resource List? Yes    Post Acute Provider Quality and Resource List Home Health    Delivered To Patient    Method of Delivery In person    Final Discharge Disposition Code 06 - home with home health care    Final Note Pt's wound vac approved and delivered to pt in room, staff aware and placed before he left.  Pt prefers [facility] for Home Health so informed Nidhi Solorzano of referral.    1550- Per Nidhi Solorzano, pt's insurance is out of network with [facility].  Nidhi contacted [facility]  which can take referral so faxed to them.                    Discharge Codes    No documentation.                 Expected Discharge Date and Time       Expected Discharge Date Expected Discharge Time    Jan 19, 2024               [facility]

## 2024-01-19 NOTE — THERAPY DISCHARGE NOTE
Acute Care - Physical Therapy Discharge Summary  [facility]       Patient Name: Gil Monroy  : 1970  MRN: [id number]    Today's Date: 2024                 Admit Date: 1/15/2024      PT Recommendation and Plan    Visit Dx:    ICD-10-CM ICD-9-CM   1. Abscess  L02.91 682.9   2. Hx of diabetes mellitus  Z86.39 V12.29   3. Hx of liver transplant  Z94.4 V42.7   4. Abscess of groin, right  L02.214 682.2   5. Leg abscess  L02.419 682.6   6. Acute pain  R52 338.19            PT Charges       Row Name 24 0800             Time Calculation    Start Time 0845  -      Stop Time 0945  -      Time Calculation (min) 60 min  -      PT Received On 24  -      PT Goal Re-Cert Due Date 24  -         Untimed Charges    PT Eval/Re-eval Minutes 30  -      Wound Care 92961 Neg Press (DME) wound TO 50 sqcm  -      39832-Kty Pressure wound to 50 sqcm 30  -         Total Minutes    Untimed Charges Total Minutes 60  -LH       Total Minutes 60  -LH                User Key  (r) = Recorded By, (t) = Taken By, (c) = Cosigned By      Initials Name Provider Type     Ethan Rogers, PT Physical Therapist                     PT Rehab Goals       Row Name 24 1300 24 0800          Wound Care Goal 1 (PT)    Wound Care Goal 1 (PT) Pt to follow up with OP wound care  -AB Pt to follow up with OP wound care  -     Time Frame (Wound Care Goal 1, PT) 10 days  -AB 10 days  -     Progress/Outcome (Wound Care Goal 1, PT) goal partially met  -AB new goal  -               User Key  (r) = Recorded By, (t) = Taken By, (c) = Cosigned By      Initials Name Provider Type Discipline    Louisa Maravilla, PTA Physical Therapist Assistant PT     Ethan Rogers, PT Physical Therapist PT                        PT Discharge Summary  Anticipated Discharge Disposition (PT): home with outpatient therapy services  Reason for Discharge: Discharge from facility  Outcomes Achieved: Refer to plan of care for updates  on goals achieved  Discharge Destination: Home with assist      Louisa Eagle, PTA   1/19/2024

## 2024-01-19 NOTE — PLAN OF CARE
Problem: Adult Inpatient Plan of Care  Goal: Plan of Care Review  Recent Flowsheet Documentation  Taken 1/19/2024 0800 by Ethan Rogers, PT  Plan of Care Reviewed With: patient  Outcome Evaluation: PT IE complete.  A&Ox4.  C/o 10/10 pain with dressing change.  Previous dressing removed.  Wound measures 10.5cm x 3cm x 3cm.  Tunneling at 10am is 6cm.  Tunneling at 2pm is 3cm.  New dressing completed per Dr. Manas Fernandes.  Photo not obtained.  PT to follow to manage [place] until dc.  Pt I with mobility and encouraged to walk mutliple times per day until dc.  Recommend follow up with OP wound care at [place].  Thank you for referral.   Goal Outcome Evaluation:  Plan of Care Reviewed With: patient           Outcome Evaluation: PT IE complete.  A&Ox4.  C/o 10/10 pain with dressing change.  Previous dressing removed.  Wound measures 10.5cm x 3cm x 3cm.  Tunneling at 10am is 6cm.  Tunneling at 2pm is 3cm.  New dressing completed per Dr. Manas Fernandes.  Photo not obtained.  PT to follow to manage [place] until dc.  Pt I with mobility and encouraged to walk mutliple times per day until dc.  Recommend follow up with OP wound care at [place].  Thank you for referral.      Anticipated Discharge Disposition (PT): home with outpatient therapy services (OP wound care)

## 2024-01-19 NOTE — PLAN OF CARE
Goal Outcome Evaluation:   Patient discharged home with spouse

## 2024-01-19 NOTE — DISCHARGE PLACEMENT REQUEST
"ATTN: JUANITO ELBA   INDIO TAYLOR  [phone]  Janet Farrell (53 y.o. Male)       Date of Birth   1970    Social Security Number       Address   [address] [place] [address] [address] [place] [address]    Home Phone   [phone]    MRN   [id number]       [employer]   [employer]    Marital Status   Unknown                            Admission Date   1/15/24    Admission Type   Emergency    Admitting Provider   Eamon Seaman MD    Attending Provider       Department, Room/Bed   [address], 392/1       Discharge Date   1/19/2024    Discharge Disposition   Home-Health Care Sv    Discharge Destination                                 Attending Provider: (none)   Allergies: No Known Allergies    Isolation: None   Infection: C.difficile (03/19/21)   Code Status: Prior    Ht: 185.4 cm (73\")   Wt: 131 kg (289 lb 4.8 oz)    Admission Cmt: None   Principal Problem: Abscess of groin, right [L02.214]                   Active Insurance as of 1/15/2024       Primary Coverage       Payor Plan Insurance Group Employer/Plan Group    [facility] MEDICARE REPLACEMENT [facility] MED ADV SNP HMO [id number]       Payor Plan Address Payor Plan Phone Number Payor Plan Fax Number Effective Dates    [address] [address]   1/1/2024 - None Entered    [facility] [id number]         Subscriber Name Subscriber Birth Date Member ID       JANET FARRELL 1970 [id number]                     Emergency Contacts        (Rel.) Home Phone Work Phone Mobile Phone    CATHERINE LOPES (Spouse) [phone] -- [phone]          mbulatory Referral to Home Health [[id number]] (Order [id number])  Order  Date: 1/19/2024 Department: [address] Ordering/Authorizing: Abel Garcia APRN     Order History  Outpatient  Date/Time Action Taken User Additional Information   01/19/24 1011 Sign Abel Garcia APRN Modify from Order:[id number]     Order Details    Frequency Duration Priority Order Class "   None None Routine Internal Referral     Start Date/Time    Start Date   01/19/24     Order Information    Order Date Service Start Date Start Time   01/19/24 Surgery 01/19/24      Comments    Wound vac dressing changes every MWF            Reference Links    Associated Reports External References   View Encounter Current Health Referral Information     Order Questions    Question Answer Comment   Face to Face Visit Date: 1/19/2024    Follow-up provider for Plan of Care? I will be treating the patient on an ongoing basis.  Please send me the Plan of Care for signature.    Follow-up provider: JOSE SLOAN    Reason/Clinical Findings Abscess of groin, right    Describe mobility limitations that make leaving home difficult: Wound complications, limited ambulation    Nursing/Therapeutic Services Requested Skilled Nursing    Skilled nursing orders: Wound vac application and instruction Monday, Wednesday, Friday   Frequency: 1 Week 1                               Clinical Indications     ICD-10-CM ICD-9-CM   Abscess  - Primary    L02.91 682.9                           Reprint Order Requisition    Ambulatory Referral to Home Health (Order #[id number]) on 1/19/24         Encounter    View Encounter                Order Provider Info        Office phone Pager E-mail   Ordering User  Abel Garcia APRN  [phone] -- --   Authorizing Provider  Abel Garcia APRN  [phone] -- --   Attending Provider When Ordered  Eamon Seaman MD  [phone] -- --     Tracking Reports    Cosign Tracking Order Transmittal Tracking     Authorized by:  ANDREW Adler  (NPI: [id number])                Lab Component SmartPhrase Guide    Ambulatory Referral to Home Health (Order #[id number]) on 1/19/24          History & Physical        Cuca Teague APRN at 01/15/24 1837       Attestation signed by Baltazar Flores MD at 01/17/24 1907    I have examined the patient, reviewed the documentation, discussed with the  "APC, and agree with the plan as outlined.    HPI: Patient complains of pain and an abscess in his right groin    Physical exam  General: Alert, oriented x 3, not in distress  Musculoskeletal: Abscess and surrounding cellulitis in right groin region    Plan  Admit to [facility].  General surgery has been consulted with plans for I&D.  Keep n.p.o. for now.  Start IV antibiotics.                    [facility] Medicine Services  HISTORY AND PHYSICAL    Date of Admission: 1/15/2024  Primary Care Physician: Feliberto Blount MD    Subjective   Primary Historian: Patient    Chief Complaint: Right groin pain and swelling    History of Present Illness  Moshe Monroy is a 53-year-old male with a past medical history of insulin-dependent diabetes, liver transplant, chronic lower extremity lymphedema, please see below for complete list.  Patient presents to [facility] emergency department for evaluation after being seen by PCP earlier today.  He reports developing a \"knot\" in the right groin thigh region about a month ago.  He states it kept getting bigger and more painful.  He did seek evaluation at [facility] emergency department on 1/10/2024, discharged on Bactrim and Keflex.  He states he is not having any improvement.  It did rupture while he was at [facility] which improved the pain however pain returned as did additional swelling and redness.  ER workup reveals abscess.  Dr. Fernandes, general surgery, consulted and may take patient for intervention tonight.  Currently he is scoring his pain 8 on a scale of 1-10.  We will keep n.p.o. until decision regarding surgery is been determined.  Patient has no other complaints.  He is admitted for further evaluation treatment.    Review of Systems   Otherwise complete ROS reviewed and negative except as mentioned in the HPI.    Past Medical History:   Past Medical History:   Diagnosis Date    Cirrhosis of liver     Diabetes mellitus     Hep C " w/o coma, chronic     Thrombocytopenia     Varices, esophageal    Bilateral lower extremity lymphedema    Past Surgical History:  Past Surgical History:   Procedure Laterality Date    LIVER TRANSPLANTATION  2016     Social History:  reports that he quit smoking about 8 years ago. His smoking use included cigarettes. He has a 12.50 pack-year smoking history. He does not have any smokeless tobacco history on file.    Family History: family history includes Breast cancer in his mother; Diabetes in his father.       Allergies:  No Known Allergies    Medications:  No current facility-administered medications on file prior to encounter.     Current Outpatient Medications on File Prior to Encounter   Medication Sig Dispense Refill    atorvastatin (LIPITOR) 10 MG tablet Take 1 tablet by mouth Daily.      busPIRone (BUSPAR) 5 MG tablet Take 1.5 tablets by mouth 3 (Three) Times a Day.      cephalexin (KEFLEX) 500 MG capsule Take 1 capsule by mouth 2 (Two) Times a Day.      Magnesium Oxide -Mg Supplement 400 (240 Mg) MG tablet Take 1 tablet by mouth 2 (Two) Times a Day.      oxyCODONE (ROXICODONE) 15 MG immediate release tablet Take 1 tablet by mouth Every 6 (Six) Hours As Needed for Moderate Pain.      pantoprazole (PROTONIX) 20 MG EC tablet Take 2 tablets by mouth Daily.      predniSONE (DELTASONE) 5 MG tablet Take 1 tablet by mouth 2 (Two) Times a Day.      aspirin 81 MG EC tablet Take 1 tablet by mouth Daily.      gabapentin (NEURONTIN) 300 MG capsule Take 1 capsule by mouth 2 (Two) Times a Day.      Insulin Glargine (BASAGLAR KWIKPEN SC) Inject 12 Units under the skin into the appropriate area as directed Every Night.      potassium chloride ER (K-TAB) 20 MEQ tablet controlled-release ER tablet Take 1 tablet by mouth Daily.      Sulfamethoxazole-Trimethoprim (BACTRIM PO) Take 800 mg by mouth 2 (Two) Times a Day. For 10 days      Tacrolimus ER 1 MG tablet sustained-release 24 hour Take 3 tablets by mouth Daily.       "vitamin D (ERGOCALCIFEROL) 1.25 MG (05297 UT) capsule capsule Take 1 capsule by mouth 1 (One) Time Per Week.      Zinc 50 MG tablet Take 1 tablet by mouth Daily.          I have utilized all available immediate resources to obtain, update, or review the patient's current medications (including all prescriptions, over-the-counter products, herbals, cannabis/cannabidiol products, and vitamin/mineral/dietary (nutritional) supplements).    Objective     Vital Signs: /87   Pulse 89   Temp 97.5 °F (36.4 °C)   Resp 16   Ht 185.4 cm (73\")   Wt 131 kg (288 lb)   SpO2 95%   BMI 38.00 kg/m²   Physical Exam  Vitals reviewed.   Constitutional:       Appearance: Normal appearance.   HENT:      Head: Normocephalic and atraumatic.      Mouth/Throat:      Mouth: Mucous membranes are moist.      Pharynx: Oropharynx is clear.   Eyes:      Extraocular Movements: Extraocular movements intact.      Conjunctiva/sclera: Conjunctivae normal.      Comments: Wears sunglasses in room as they are prescription and he is watching television   Cardiovascular:      Rate and Rhythm: Normal rate and regular rhythm.   Pulmonary:      Effort: Pulmonary effort is normal.      Breath sounds: Normal breath sounds.   Abdominal:      General: There is no distension.      Palpations: Abdomen is soft.   Musculoskeletal:      Cervical back: Normal range of motion and neck supple.      Right lower leg: No edema.      Left lower leg: No edema.      Comments: Pain with movement- Right lower extremity   Skin:     Findings: Erythema (Medial right thigh and groin region) present.      Comments: Purulent drainage noted from abscess site right groin/thigh region   Neurological:      General: No focal deficit present.      Mental Status: He is alert and oriented to person, place, and time.   Psychiatric:         Mood and Affect: Mood normal.         Behavior: Behavior normal.        Results Reviewed:  Lab Results (last 24 hours)       Procedure Component " Value Units Date/Time    Wound Culture - Wound, Leg, Right [[id number]] Collected: 01/15/24 1654    Specimen: Wound from Leg, Right Updated: 01/15/24 1700    Procalcitonin [[id number]]  (Abnormal) Collected: 01/15/24 1615    Specimen: Blood Updated: 01/15/24 1658     Procalcitonin 0.33 ng/mL     C-reactive Protein [[id number]]  (Abnormal) Collected: 01/15/24 1615    Specimen: Blood Updated: 01/15/24 1653     C-Reactive Protein 3.87 mg/dL     Comprehensive Metabolic Panel [[id number]]  (Abnormal) Collected: 01/15/24 1615    Specimen: Blood Updated: 01/15/24 1652     Glucose 168 mg/dL      BUN 18 mg/dL      Creatinine 0.91 mg/dL      Sodium 131 mmol/L      Potassium 5.0 mmol/L      Comment: Slight hemolysis detected by analyzer. Result may be falsely elevated.        Chloride 94 mmol/L      CO2 24.0 mmol/L      Calcium 9.1 mg/dL      Total Protein 7.2 g/dL      Albumin 3.2 g/dL      ALT (SGPT) 54 U/L      AST (SGOT) 98 U/L      Alkaline Phosphatase 328 U/L      Total Bilirubin 0.6 mg/dL      Globulin 4.0 gm/dL      A/G Ratio 0.8 g/dL      BUN/Creatinine Ratio 19.8     Anion Gap 13.0 mmol/L      eGFR 100.8 mL/min/1.73     Blood Culture With MALAIKA - Blood, Arm, Left [[id number]] Collected: 01/15/24 1604    Specimen: Blood from Arm, Left Updated: 01/15/24 1629    Blood Culture With MALAIKA - Blood, Wrist, Right [[id number]] Collected: 01/15/24 1600    Specimen: Blood from Wrist, Right Updated: 01/15/24 1628    CBC Auto Differential [[id number]]  (Abnormal) Collected: 01/15/24 1615    Specimen: Blood Updated: 01/15/24 1628     WBC 15.58 10*3/mm3      RBC 4.03 10*6/mm3      Hemoglobin 13.0 g/dL      Hematocrit 40.2 %      MCV 99.8 fL      MCH 32.3 pg      MCHC 32.3 g/dL      RDW 14.5 %      RDW-SD 53.1 fl      MPV 9.6 fL      Platelets 388 10*3/mm3      Neutrophil % 77.0 %      Lymphocyte % 12.3 %      Monocyte % 6.8 %      Eosinophil % 0.5 %      Basophil % 0.9 %      Immature Grans % 2.5 %      Neutrophils, Absolute 11.99 10*3/mm3       Lymphocytes, Absolute 1.92 10*3/mm3      Monocytes, Absolute 1.06 10*3/mm3      Eosinophils, Absolute 0.08 10*3/mm3      Basophils, Absolute 0.14 10*3/mm3      Immature Grans, Absolute 0.39 10*3/mm3      nRBC 0.0 /100 WBC           Imaging Results (Last 24 Hours)       Procedure Component Value Units Date/Time    US Nonvascular Extremity Complete [[id number]] Collected: 01/15/24 1733     Updated: 01/15/24 1739    Narrative:      EXAMINATION: US NONVASCULAR EXTREMITY COMPLETE-     1/15/2024 4:03 PM     HISTORY: abscess right groin     COMPARISON:  None.     Targeted grayscale and color flow ultrasound evaluation of the medial  aspect of the upper RIGHT thigh.  The area of interest is a red and swollen focus with drainage.     Ultrasound evaluation shows a superficial subcutaneous focus of  heterogeneous echogenicity.     This area measures 39 x 20 x 59 mm and is compatible with a subcutaneous  abscess.  An open tract to the skin surface is noted though most of this  collection has a heterogeneous and more solid than liquid appearance and  this should be evaluated as to the need for surgical debridement.       Impression:      1. At the medial RIGHT thigh area of interest there is a superficial  subcutaneous collection measuring approximately 4 x 2 x 6 cm.  2. Given the history this is compatible with an abscess though this  collection does have a relatively solid appearance and may represent  phlegmon rather than drainable fluid.     This report was signed and finalized on 1/15/2024 5:36 PM by Dr. Alexander Onofre MD.                Assessment / Plan   Assessment:   Active Hospital Problems    Diagnosis     **Abscess     Insulin dependent type 2 diabetes mellitus     History of liver transplant     Acute pain     Transaminitis     Hyponatremia        Treatment Plan  1.  The patient will be admitted to Dr. Flores's service here at [facility].   2.  General surgery Dr. Fernandes following  3.  Pain  management  4.  Home medications reviewed and restarted as appropriate  5.  DVT prophylaxis with SCDs  6.  Monitor glucose ACHS with regular insulin sliding scale coverage  7.  Continue vancomycin and Zosyn  8.  Normal saline 75 MLS/hour  9.  Start bowel regimen  10.  Supplemental oxygen as needed, incentive spirometry, continuous pulse oximetry  11.  Labs in a.m.    Medical Decision Making  Number and Complexity of problems: 6  Differential Diagnosis: None    Conditions and Status        Condition is unchanged.     [facility] Data  External documents reviewed: [facility] records  Cardiac tracing (EKG, telemetry) interpretation: Reviewed  Radiology interpretation: Reviewed  Labs reviewed: Yes  Any tests that were considered but not ordered: No     Decision rules/scores evaluated (example [id number], Wells, etc): No     Discussed with: Patient and Dr. Flores     Care Planning  Shared decision making: Patient and Dr. Flores  Code status and discussions: Full    Disposition  Social Determinants of Health that impact treatment or disposition: None  Estimated length of stay is 1-2 days.     I confirmed that the patient's advanced care plan is present, code status is documented, and a surrogate decision maker is listed in the patient's medical record.     The patient's surrogate decision maker is wife Mariya.     The patient was seen and examined by me on 1/15/2024 at 6:37 PM.    Electronically signed by ANDREW Conley, 01/15/24, 19:55 CST.               Electronically signed by Baltazar Flores MD at 01/17/24 1907          Discharge Summary        Abel Garcia APRN at 01/19/24 1001                [facility] Medicine Services  DISCHARGE SUMMARY       Date of Admission: 1/15/2024  Date of Discharge:  1/19/2024  Primary Care Physician: Feliberto Blount MD    Presenting Problem/History of Present Illness:  Abscess of right groin    Final Discharge Diagnoses:  Active  Hospital Problems    Diagnosis     **Abscess of groin, right     Leg abscess     Insulin dependent type 2 diabetes mellitus     History of liver transplant     Acute pain     Transaminitis     Hyponatremia        Consults: General surgery, Dr. Manas Fernandes    Procedures Performed: Incision and drainage of groin abscess    Pertinent Test Results:       Imaging Results (All)       Procedure Component Value Units Date/Time    US Nonvascular Extremity Complete [[id number]] Collected: 01/15/24 1733     Updated: 01/15/24 1739    Narrative:      EXAMINATION: US NONVASCULAR EXTREMITY COMPLETE-     1/15/2024 4:03 PM     HISTORY: abscess right groin     COMPARISON:  None.     Targeted grayscale and color flow ultrasound evaluation of the medial  aspect of the upper RIGHT thigh.  The area of interest is a red and swollen focus with drainage.     Ultrasound evaluation shows a superficial subcutaneous focus of  heterogeneous echogenicity.     This area measures 39 x 20 x 59 mm and is compatible with a subcutaneous  abscess.  An open tract to the skin surface is noted though most of this  collection has a heterogeneous and more solid than liquid appearance and  this should be evaluated as to the need for surgical debridement.       Impression:      1. At the medial RIGHT thigh area of interest there is a superficial  subcutaneous collection measuring approximately 4 x 2 x 6 cm.  2. Given the history this is compatible with an abscess though this  collection does have a relatively solid appearance and may represent  phlegmon rather than drainable fluid.     This report was signed and finalized on 1/15/2024 5:36 PM by Dr. Alexander Onofre MD.             LAB RESULTS:      Lab 01/19/24  0408 01/18/24  0332 01/17/24  0359 01/16/24  0300 01/15/24  1615   WBC 8.26 8.06 7.74 9.96 15.58*   HEMOGLOBIN 11.8* 11.3* 11.5* 11.3* 13.0   HEMATOCRIT 36.9* 35.3* 35.8* 35.0* 40.2   PLATELETS 288 294 275 280 388   NEUTROS ABS 6.40 6.25 6.09 7.85*  11.99*   IMMATURE GRANS (ABS) 0.11* 0.17* 0.18* 0.22* 0.39*   LYMPHS ABS 1.03 0.84 0.86 1.03 1.92   MONOS ABS 0.48 0.47 0.41 0.71 1.06*   EOS ABS 0.17 0.23 0.10 0.07 0.08   MCV 99.2* 102.0* 100.8* 99.7* 99.8*   CRP  --   --   --  3.55* 3.87*   PROCALCITONIN  --   --   --   --  0.33*         Lab 01/19/24  0408 01/18/24  0332 01/17/24  0359 01/16/24  0300 01/15/24  1615   SODIUM 134* 135* 135* 134* 131*   POTASSIUM 4.4 4.3 4.5 4.7 5.0   CHLORIDE 100 99 100 98 94*   CO2 24.0 27.0 27.0 27.0 24.0   ANION GAP 10.0 9.0 8.0 9.0 13.0   BUN 10 10 14 18 18   CREATININE 0.77 0.85 0.81 0.76 0.91   EGFR 107.1 103.9 105.4 107.5 100.8   GLUCOSE 145* 135* 135* 108* 168*   CALCIUM 8.7 8.3* 8.3* 8.3* 9.1   HEMOGLOBIN A1C  --   --   --  6.20*  --          Lab 01/19/24  0408 01/18/24  0332 01/17/24  0359 01/16/24  0300 01/15/24  1615   TOTAL PROTEIN 6.1 6.0 6.1 5.9* 7.2   ALBUMIN 2.8* 2.9* 2.8* 2.7* 3.2*   GLOBULIN 3.3 3.1 3.3 3.2 4.0   ALT (SGPT) 51* 62* 43* 62* 54*   AST (SGOT) 78* 114* 78* 163* 98*   BILIRUBIN 0.4 0.5 0.5 0.8 0.6   ALK PHOS 269* 321* 287* 347* 328*             Lab 01/16/24  0300   CHOLESTEROL 154   LDL CHOL 76   HDL CHOL 62*   TRIGLYCERIDES 82             Brief Urine Lab Results  (Last result in the past 365 days)        Color   Clarity   Blood   Leuk Est   Nitrite   Protein   CREAT   Urine HCG        03/27/23 0615 YELLOW   Clear   MODERATE   Negative  Comment: Culture Urine under CMS guidelines and criteria will auto reflex on a sole  criteria that is based on manual microscopic count of more than 10/hpf for  WBC. If Culture Urine is warranted aside from this criteria, place a  requisition or order within 24 hours of collection.   Negative                   Microbiology Results (last 10 days)       Procedure Component Value - Date/Time    MRSA Screen, PCR (Inpatient) - Swab, Nares [[id number]]  (Normal) Collected: 01/16/24 0349    Lab Status: Final result Specimen: Swab from Nares Updated: 01/16/24 0516     MRSA PCR  "No MRSA Detected    Narrative:      The negative predictive value of this diagnostic test is high and should only be used to consider de-escalating anti-MRSA therapy. A positive result may indicate colonization with MRSA and must be correlated clinically.    Anaerobic Culture - Tissue, Groin, right [[id number]]  (Normal) Collected: 01/15/24 2048    Lab Status: Preliminary result Specimen: Tissue from Groin, right Updated: 01/19/24 0552     Anaerobic Culture Screening for Anaerobes    Tissue / Bone Culture - Tissue, Groin, right [[id number]] Collected: 01/15/24 2048    Lab Status: Final result Specimen: Tissue from Groin, right Updated: 01/18/24 0622     Tissue Culture Scant growth (1+) Normal Skin Zari     Gram Stain Moderate (3+) WBCs per low power field      Moderate (3+) Gram positive cocci      Few (2+) Gram negative bacilli    Wound Culture - Wound, Leg, Right [[id number]] Collected: 01/15/24 1654    Lab Status: Final result Specimen: Wound from Leg, Right Updated: 01/18/24 0626     Wound Culture Scant growth (1+) Normal Skin Zari     Gram Stain Moderate (3+) WBCs per low power field      Moderate (3+) Gram positive cocci      Moderate (3+) Gram negative bacilli    Blood Culture With MALAIKA - Blood, Arm, Left [[id number]]  (Normal) Collected: 01/15/24 1604    Lab Status: Preliminary result Specimen: Blood from Arm, Left Updated: 01/18/24 1631     Blood Culture No growth at 3 days    Blood Culture With MALAIKA - Blood, Wrist, Right [[id number]]  (Normal) Collected: 01/15/24 1600    Lab Status: Preliminary result Specimen: Blood from Wrist, Right Updated: 01/18/24 1631     Blood Culture No growth at 3 days            Hospital Course:   Mr. Monroy is a 53-year-old male with a past medical history of insulin-dependent diabetes, liver transplant, chronic lower extremity lymphedema.  He presented to [facility] emergency room 1/5/2015 with \"knot\" right groin and thigh for the past month.  Patient reported area kept " getting larger and more painful.  He was evaluated at [facility] emergency room 1/10/2024 and discharged on Bactrim and Keflex.  Patient noted that area did rupture while he was at [facility] and improved the pain; however, pain returned with additional swelling and redness.  He noted that the area has spontaneously drained on several occasions with purulent output.  Patient reported similar abscesses before needed draining.  However, no previous abscess is larger causing as much pain as this area.  CRP 3.87, procalcitonin 0.33, WBC 15.58.  Ultrasound showed medial right thigh area with superficial subcutaneous collection measuring 4 x 2 x 6 cm compatible with abscess.  Vancomycin and Zosyn given in ER.  Patient was seen by Dr. Manas Fernandes in ER with recommendations for debridement and possible wound VAC.     Abscess right upper thigh groin-  Ultrasound showed subcutaneous collection 4 x 2 x 6 cm.    Dr. Manas Fernandes performed incision and drainage groin abscess with wound VAC placement 1/16.  Findings necrotizing soft tissue infection.  Wound debrided and noted tracking anterior abdominal wall towards groin.  Dr. Fernandes took patient back to the OR on 1/17 with wound VAC exchange.  Wound VAC changed at bedside day of discharge per Dr. Fernandes.  He believes wound bed appears healthy and does not recommend further antibiotic therapy at this time.  Recommends continued outpatient wound care with Monday, Wednesday, Friday wound VAC changes.  Home health for wound care at discharge  Follow-up with Dr. Fernandes in 1 to 2 weeks     Patient previously treated with vancomycin and Zosyn.  Per Dr. Fernandes, no further antibiotic therapy at this time.  Blood culture x 2 no growth at 3 days.  MRSA screen negative.     History of liver transplant with mild transaminitis-  Continue prednisone, tacrolimus  Patient noted to have mild transaminitis during hospitalization.  Day of discharge, AST 78, ALT 51 which is slightly improved from  "the day prior.  Recommend CMP at soonest outpatient follow-up.    Day of discharge, patient is on room air in no acute distress and denies chest pain or shortness of breath.  His wound VAC has been changed per Dr. Fernandes today.  He is prepared to discharge from a surgical standpoint.  Continue wound VAC at home and Monday, Wednesday, Friday wound VAC changes.  Patient has no further concerns or questions at this time and is eager to discharge home.  He tells me he has all needed DME at home.  He has reached maximum benefit of hospitalization.    Follow-up with PCP in 1 week  CMP at soonest outpatient follow-up  Follow-up with Dr. Manas Fernandes in 1 to 2 weeks  Home health for wound VAC changes    Physical Exam on Discharge:  /81 (BP Location: Left arm, Patient Position: Lying)   Pulse 83   Temp 97.7 °F (36.5 °C) (Oral)   Resp 16   Ht 185.4 cm (73\")   Wt 131 kg (289 lb 4.8 oz)   SpO2 98%   BMI 38.17 kg/m²   Physical Exam  Vitals and nursing note reviewed.   Constitutional:       Appearance: He is obese.      Comments: Up in bed, room air, no acute distress, fiancé at bedside  HENT:      Head: Normocephalic and atraumatic.      Nose: No congestion.      Mouth/Throat:      Pharynx: Oropharynx is clear. No oropharyngeal exudate or posterior oropharyngeal erythema.   Eyes:      Extraocular Movements: Extraocular movements intact.      Pupils: Pupils are equal, round, and reactive to light.   Cardiovascular:      Rate and Rhythm: Normal rate and regular rhythm.      Heart sounds: No murmur heard.  Pulmonary:      Breath sounds: No wheezing, rhonchi or rales.   Abdominal:      Palpations: Abdomen is soft.      Tenderness: There is no abdominal tenderness.   Genitourinary:     Comments: Voiding.  Musculoskeletal:         General: Tenderness (Right upper inner thigh) present.      Cervical back: Normal range of motion and neck supple.   Skin:     Comments: Right upper inner thigh with wound VAC removed.  Wound " bed with healthy tissue, no necrotic tissue or drainage noted.  Neurological:      General: No focal deficit present.      Mental Status: He is alert and oriented to person, place, and time.   Psychiatric:         Mood and Affect: Mood normal.         Behavior: Behavior normal.         Thought Content: Thought content normal.         Judgment: Judgment normal.     Condition on Discharge: Stable    Discharge Disposition:  Home-Health Care [facility]    Discharge Medications:     Discharge Medications        Continue These Medications        Instructions Start Date   atorvastatin 10 MG tablet  Commonly known as: LIPITOR   10 mg, Oral, Daily      insulin aspart 100 UNIT/ML solution pen-injector sc pen  Commonly known as: novoLOG FLEXPEN   2-10 Units, Subcutaneous, 3 Times Daily With Meals,        Magnesium Oxide -Mg Supplement 400 (240 Mg) MG tablet   400 mg, Oral, 2 Times Daily      oxyCODONE 10 MG tablet  Commonly known as: ROXICODONE   10 mg, Oral, Every 6 Hours PRN      pantoprazole 40 MG EC tablet  Commonly known as: PROTONIX   40 mg, Oral, Daily      potassium chloride ER 20 MEQ tablet controlled-release ER tablet  Commonly known as: K-TAB   20 mEq, Oral, Daily      predniSONE 5 MG tablet  Commonly known as: DELTASONE   5 mg, Oral, 2 Times Daily      pregabalin 75 MG capsule  Commonly known as: LYRICA   75 mg, Oral, 2 Times Daily      Tacrolimus ER 1 MG tablet sustained-release 24 hour   3 tablets, Oral, Daily      Zinc 50 MG tablet   50 mg, Oral, Daily             Stop These Medications      cephalexin 500 MG capsule  Commonly known as: KEFLEX     sulfamethoxazole-trimethoprim 800-160 MG per tablet  Commonly known as: BACTRIM DS,SEPTRA DS            Discharge Diet:   Diet Instructions       Diet: Diabetic Diets, Cardiac Diets; Healthy Heart (2-3 Na+); Regular Texture (IDDSI 7); Thin (IDDSI 0); Consistent Carbohydrate      Discharge Diet:  Diabetic Diets  Cardiac Diets       Cardiac Diet: Healthy Heart (2-3 Na+)     Texture: Regular Texture (IDDSI 7)    Fluid Consistency: Thin (IDDSI 0)    Diabetic Diet: Consistent Carbohydrate            Activity at Discharge:   Activity Instructions       Up WIth Assist              Follow-up Appointments:  Home health to initiate on Monday for wound VAC change  PCP 1 week  General surgery 1 to 2 weeks  No future appointments.    Test Results Pending at Discharge:  Blood culture x 2 no growth at 3 days, follow to completion    Electronically signed by ANDREW Adler, 01/19/24, 10:11 CST.    Time: 35 minutes.          Electronically signed by Abel Garcia APRN at 01/19/24 1011

## 2024-01-19 NOTE — DISCHARGE SUMMARY
[facility] Medicine Services  DISCHARGE SUMMARY       Date of Admission: 1/15/2024  Date of Discharge:  1/19/2024  Primary Care Physician: Feliberto Blount MD    Presenting Problem/History of Present Illness:  Abscess of right groin    Final Discharge Diagnoses:  Active Hospital Problems    Diagnosis     **Abscess of groin, right     Leg abscess     Insulin dependent type 2 diabetes mellitus     History of liver transplant     Acute pain     Transaminitis     Hyponatremia        Consults: General surgery, Dr. Manas Fernandes    Procedures Performed: Incision and drainage of groin abscess    Pertinent Test Results:       Imaging Results (All)       Procedure Component Value Units Date/Time    US Nonvascular Extremity Complete [[id number]] Collected: 01/15/24 1733     Updated: 01/15/24 1739    Narrative:      EXAMINATION: US NONVASCULAR EXTREMITY COMPLETE-     1/15/2024 4:03 PM     HISTORY: abscess right groin     COMPARISON:  None.     Targeted grayscale and color flow ultrasound evaluation of the medial  aspect of the upper RIGHT thigh.  The area of interest is a red and swollen focus with drainage.     Ultrasound evaluation shows a superficial subcutaneous focus of  heterogeneous echogenicity.     This area measures 39 x 20 x 59 mm and is compatible with a subcutaneous  abscess.  An open tract to the skin surface is noted though most of this  collection has a heterogeneous and more solid than liquid appearance and  this should be evaluated as to the need for surgical debridement.       Impression:      1. At the medial RIGHT thigh area of interest there is a superficial  subcutaneous collection measuring approximately 4 x 2 x 6 cm.  2. Given the history this is compatible with an abscess though this  collection does have a relatively solid appearance and may represent  phlegmon rather than drainable fluid.     This report was signed and finalized on 1/15/2024 5:36 PM by   Alexander Onofre MD.             LAB RESULTS:      Lab 01/19/24 0408 01/18/24  0332 01/17/24  0359 01/16/24  0300 01/15/24  1615   WBC 8.26 8.06 7.74 9.96 15.58*   HEMOGLOBIN 11.8* 11.3* 11.5* 11.3* 13.0   HEMATOCRIT 36.9* 35.3* 35.8* 35.0* 40.2   PLATELETS 288 294 275 280 388   NEUTROS ABS 6.40 6.25 6.09 7.85* 11.99*   IMMATURE GRANS (ABS) 0.11* 0.17* 0.18* 0.22* 0.39*   LYMPHS ABS 1.03 0.84 0.86 1.03 1.92   MONOS ABS 0.48 0.47 0.41 0.71 1.06*   EOS ABS 0.17 0.23 0.10 0.07 0.08   MCV 99.2* 102.0* 100.8* 99.7* 99.8*   CRP  --   --   --  3.55* 3.87*   PROCALCITONIN  --   --   --   --  0.33*         Lab 01/19/24  0408 01/18/24  0332 01/17/24  0359 01/16/24  0300 01/15/24  1615   SODIUM 134* 135* 135* 134* 131*   POTASSIUM 4.4 4.3 4.5 4.7 5.0   CHLORIDE 100 99 100 98 94*   CO2 24.0 27.0 27.0 27.0 24.0   ANION GAP 10.0 9.0 8.0 9.0 13.0   BUN 10 10 14 18 18   CREATININE 0.77 0.85 0.81 0.76 0.91   EGFR 107.1 103.9 105.4 107.5 100.8   GLUCOSE 145* 135* 135* 108* 168*   CALCIUM 8.7 8.3* 8.3* 8.3* 9.1   HEMOGLOBIN A1C  --   --   --  6.20*  --          Lab 01/19/24  0408 01/18/24 0332 01/17/24  0359 01/16/24  0300 01/15/24  1615   TOTAL PROTEIN 6.1 6.0 6.1 5.9* 7.2   ALBUMIN 2.8* 2.9* 2.8* 2.7* 3.2*   GLOBULIN 3.3 3.1 3.3 3.2 4.0   ALT (SGPT) 51* 62* 43* 62* 54*   AST (SGOT) 78* 114* 78* 163* 98*   BILIRUBIN 0.4 0.5 0.5 0.8 0.6   ALK PHOS 269* 321* 287* 347* 328*             Lab 01/16/24  0300   CHOLESTEROL 154   LDL CHOL 76   HDL CHOL 62*   TRIGLYCERIDES 82             Brief Urine Lab Results  (Last result in the past 365 days)        Color   Clarity   Blood   Leuk Est   Nitrite   Protein   CREAT   Urine HCG        03/27/23 0615 YELLOW   Clear   MODERATE   Negative  Comment: Culture Urine under CMS guidelines and criteria will auto reflex on a sole  criteria that is based on manual microscopic count of more than 10/hpf for  WBC. If Culture Urine is warranted aside from this criteria, place a  requisition or order within 24  hours of collection.   Negative                   Microbiology Results (last 10 days)       Procedure Component Value - Date/Time    MRSA Screen, PCR (Inpatient) - Swab, Nares [[id number]]  (Normal) Collected: 01/16/24 0349    Lab Status: Final result Specimen: Swab from Nares Updated: 01/16/24 0516     MRSA PCR No MRSA Detected    Narrative:      The negative predictive value of this diagnostic test is high and should only be used to consider de-escalating anti-MRSA therapy. A positive result may indicate colonization with MRSA and must be correlated clinically.    Anaerobic Culture - Tissue, Groin, right [[id number]]  (Normal) Collected: 01/15/24 2048    Lab Status: Preliminary result Specimen: Tissue from Groin, right Updated: 01/19/24 0552     Anaerobic Culture Screening for Anaerobes    Tissue / Bone Culture - Tissue, Groin, right [[id number]] Collected: 01/15/24 2048    Lab Status: Final result Specimen: Tissue from Groin, right Updated: 01/18/24 0622     Tissue Culture Scant growth (1+) Normal Skin Zari     Gram Stain Moderate (3+) WBCs per low power field      Moderate (3+) Gram positive cocci      Few (2+) Gram negative bacilli    Wound Culture - Wound, Leg, Right [[id number]] Collected: 01/15/24 1654    Lab Status: Final result Specimen: Wound from Leg, Right Updated: 01/18/24 0626     Wound Culture Scant growth (1+) Normal Skin Zari     Gram Stain Moderate (3+) WBCs per low power field      Moderate (3+) Gram positive cocci      Moderate (3+) Gram negative bacilli    Blood Culture With MALAIKA - Blood, Arm, Left [[id number]]  (Normal) Collected: 01/15/24 1604    Lab Status: Preliminary result Specimen: Blood from Arm, Left Updated: 01/18/24 1631     Blood Culture No growth at 3 days    Blood Culture With MALAIKA - Blood, Wrist, Right [[id number]]  (Normal) Collected: 01/15/24 1600    Lab Status: Preliminary result Specimen: Blood from Wrist, Right Updated: 01/18/24 1631     Blood Culture No growth at 3 days       "      Hospital Course:   Mr. Monroy is a 53-year-old male with a past medical history of insulin-dependent diabetes, liver transplant, chronic lower extremity lymphedema.  He presented to [facility] emergency room 1/5/2015 with \"knot\" right groin and thigh for the past month.  Patient reported area kept getting larger and more painful.  He was evaluated at [facility] emergency room 1/10/2024 and discharged on Bactrim and Keflex.  Patient noted that area did rupture while he was at [facility] and improved the pain; however, pain returned with additional swelling and redness.  He noted that the area has spontaneously drained on several occasions with purulent output.  Patient reported similar abscesses before needed draining.  However, no previous abscess is larger causing as much pain as this area.  CRP 3.87, procalcitonin 0.33, WBC 15.58.  Ultrasound showed medial right thigh area with superficial subcutaneous collection measuring 4 x 2 x 6 cm compatible with abscess.  Vancomycin and Zosyn given in ER.  Patient was seen by Dr. Manas Fernandes in ER with recommendations for debridement and possible wound VAC.     Abscess right upper thigh groin-  Ultrasound showed subcutaneous collection 4 x 2 x 6 cm.    Dr. Manas Fernandes performed incision and drainage groin abscess with wound VAC placement 1/16.  Findings necrotizing soft tissue infection.  Wound debrided and noted tracking anterior abdominal wall towards groin.  Dr. Fernandes took patient back to the OR on 1/17 with wound VAC exchange.  Wound VAC changed at bedside day of discharge per Dr. Fernandes.  He believes wound bed appears healthy and does not recommend further antibiotic therapy at this time.  Recommends continued outpatient wound care with Monday, Wednesday, Friday wound VAC changes.  Home health for wound care at discharge  Follow-up with Dr. Fernandes in 1 to 2 weeks     Patient previously treated with vancomycin and Zosyn.  Per Dr. Fernandes, no further " "antibiotic therapy at this time.  Blood culture x 2 no growth at 3 days.  MRSA screen negative.     History of liver transplant with mild transaminitis-  Continue prednisone, tacrolimus  Patient noted to have mild transaminitis during hospitalization.  Day of discharge, AST 78, ALT 51 which is slightly improved from the day prior.  Recommend CMP at soonest outpatient follow-up.    Day of discharge, patient is on room air in no acute distress and denies chest pain or shortness of breath.  His wound VAC has been changed per Dr. Fernandes today.  He is prepared to discharge from a surgical standpoint.  Continue wound VAC at home and Monday, Wednesday, Friday wound VAC changes.  Patient has no further concerns or questions at this time and is eager to discharge home.  He tells me he has all needed DME at home.  He has reached maximum benefit of hospitalization.    Follow-up with PCP in 1 week  CMP at soonest outpatient follow-up  Follow-up with Dr. Manas Fernandes in 1 to 2 weeks  Home health for wound VAC changes    Physical Exam on Discharge:  /81 (BP Location: Left arm, Patient Position: Lying)   Pulse 83   Temp 97.7 °F (36.5 °C) (Oral)   Resp 16   Ht 185.4 cm (73\")   Wt 131 kg (289 lb 4.8 oz)   SpO2 98%   BMI 38.17 kg/m²   Physical Exam  Vitals and nursing note reviewed.   Constitutional:       Appearance: He is obese.      Comments: Up in bed, room air, no acute distress, fiancé at bedside  HENT:      Head: Normocephalic and atraumatic.      Nose: No congestion.      Mouth/Throat:      Pharynx: Oropharynx is clear. No oropharyngeal exudate or posterior oropharyngeal erythema.   Eyes:      Extraocular Movements: Extraocular movements intact.      Pupils: Pupils are equal, round, and reactive to light.   Cardiovascular:      Rate and Rhythm: Normal rate and regular rhythm.      Heart sounds: No murmur heard.  Pulmonary:      Breath sounds: No wheezing, rhonchi or rales.   Abdominal:      Palpations: Abdomen " is soft.      Tenderness: There is no abdominal tenderness.   Genitourinary:     Comments: Voiding.  Musculoskeletal:         General: Tenderness (Right upper inner thigh) present.      Cervical back: Normal range of motion and neck supple.   Skin:     Comments: Right upper inner thigh with wound VAC removed.  Wound bed with healthy tissue, no necrotic tissue or drainage noted.  Neurological:      General: No focal deficit present.      Mental Status: He is alert and oriented to person, place, and time.   Psychiatric:         Mood and Affect: Mood normal.         Behavior: Behavior normal.         Thought Content: Thought content normal.         Judgment: Judgment normal.     Condition on Discharge: Stable    Discharge Disposition:  Home-Health Care [facility]    Discharge Medications:     Discharge Medications        Continue These Medications        Instructions Start Date   atorvastatin 10 MG tablet  Commonly known as: LIPITOR   10 mg, Oral, Daily      insulin aspart 100 UNIT/ML solution pen-injector sc pen  Commonly known as: novoLOG FLEXPEN   2-10 Units, Subcutaneous, 3 Times Daily With Meals,        Magnesium Oxide -Mg Supplement 400 (240 Mg) MG tablet   400 mg, Oral, 2 Times Daily      oxyCODONE 10 MG tablet  Commonly known as: ROXICODONE   10 mg, Oral, Every 6 Hours PRN      pantoprazole 40 MG EC tablet  Commonly known as: PROTONIX   40 mg, Oral, Daily      potassium chloride ER 20 MEQ tablet controlled-release ER tablet  Commonly known as: K-TAB   20 mEq, Oral, Daily      predniSONE 5 MG tablet  Commonly known as: DELTASONE   5 mg, Oral, 2 Times Daily      pregabalin 75 MG capsule  Commonly known as: LYRICA   75 mg, Oral, 2 Times Daily      Tacrolimus ER 1 MG tablet sustained-release 24 hour   3 tablets, Oral, Daily      Zinc 50 MG tablet   50 mg, Oral, Daily             Stop These Medications      cephalexin 500 MG capsule  Commonly known as: KEFLEX     sulfamethoxazole-trimethoprim 800-160 MG per  tablet  Commonly known as: BACTRIM DS,SEPTRA DS            Discharge Diet:   Diet Instructions       Diet: Diabetic Diets, Cardiac Diets; Healthy Heart (2-3 Na+); Regular Texture (IDDSI 7); Thin (IDDSI 0); Consistent Carbohydrate      Discharge Diet:  Diabetic Diets  Cardiac Diets       Cardiac Diet: Healthy Heart (2-3 Na+)    Texture: Regular Texture (IDDSI 7)    Fluid Consistency: Thin (IDDSI 0)    Diabetic Diet: Consistent Carbohydrate            Activity at Discharge:   Activity Instructions       Up WIth Assist              Follow-up Appointments:  Home health to initiate on Monday for wound VAC change  PCP 1 week  General surgery 1 to 2 weeks  No future appointments.    Test Results Pending at Discharge:  Blood culture x 2 no growth at 3 days, follow to completion    Electronically signed by ANDREW Adler, 01/19/24, 10:11 CST.    Time: 35 minutes.

## 2024-01-19 NOTE — THERAPY EVALUATION
Patient Name: Gil Monroy  : 1970    MRN: [id number]                              Today's Date: 2024       Admit Date: 1/15/2024    Visit Dx:     ICD-10-CM ICD-9-CM   1. Abscess  L02.91 682.9   2. Hx of diabetes mellitus  Z86.39 V12.29   3. Hx of liver transplant  Z94.4 V42.7   4. Abscess of groin, right  L02.214 682.2   5. Leg abscess  L02.419 682.6   6. Acute pain  R52 338.19     Patient Active Problem List   Diagnosis    Insulin dependent type 2 diabetes mellitus    History of liver transplant    Acute pain    Transaminitis    Hyponatremia    Abscess of groin, right    Leg abscess     Past Medical History:   Diagnosis Date    Cirrhosis of liver     Diabetes mellitus     Hep C w/o coma, chronic     Thrombocytopenia     Varices, esophageal      Past Surgical History:   Procedure Laterality Date    GROIN ABSCESS INCISION AND DRAINAGE Right 1/15/2024    Procedure: GROIN ABSCESS INCISION AND DRAINAGE WITH APPLICATION OF WOUND VAC;  Surgeon: Manas Fernandes MD;  Location:  [facility] OR;  Service: General;  Laterality: Right;    GROIN ABSCESS INCISION AND DRAINAGE Right 2024    Procedure: wound vac change;  Surgeon: Manas Fernandes MD;  Location:  [facility] OR;  Service: General;  Laterality: Right;    LIVER TRANSPLANTATION        General Information       Row Name 24 [facility] [place]          Physical Therapy Time and Intention    Document Type evaluation;wound care  -     Mode of Treatment physical therapy  -       Row Name 24 [facility] [place]          General Information    Patient Profile Reviewed yes  -     Prior Level of Function independent:;community mobility;ADL's;driving  -     Existing Precautions/Restrictions no known precautions/restrictions  -     Barriers to Rehab none identified  -       Row Name 24 [facility] [place]          Living Environment    People in Home significant other  -       Row Name 24 [facility] [place]          Home Main Entrance    Number of Stairs, Main Entrance three  -     Stair  Railings, Main Entrance railings on both sides of stairs  -       Row Name 01/19/24 0800          Cognition    Orientation Status (Cognition) oriented x 4  -       Row Name 01/19/24 0800          Safety Issues, Functional Mobility    Safety Issues Affecting Function (Mobility) ability to follow commands  -               User Key  (r) = Recorded By, (t) = Taken By, (c) = Cosigned By      Initials Name Provider Type     Ethan Rogers, PT Physical Therapist                   Mobility       Row Name 01/19/24 0800          Bed Mobility    Bed Mobility bed mobility (all) activities  -     All Activities, [place] (Bed Mobility) independent  -       Row Name 01/19/24 0800          Sit-Stand Transfer    Sit-Stand [place] (Transfers) independent  -       Row Name 01/19/24 0800          Gait/Stairs (Locomotion)    [place] Level (Gait) independent  -     Distance in Feet (Gait) 300  -               User Key  (r) = Recorded By, (t) = Taken By, (c) = Cosigned By      Initials Name Provider Type     Ethan Rogers, PT Physical Therapist                   Obj/Interventions       Row Name 01/19/24 0800          Range of Motion Comprehensive    General Range of Motion bilateral upper extremity ROM WFL;bilateral lower extremity ROM WFL  -       Row Name 01/19/24 0800          Strength Comprehensive (MMT)    General Manual Muscle Testing (MMT) Assessment no strength deficits identified  -               User Key  (r) = Recorded By, (t) = Taken By, (c) = Cosigned By      Initials Name Provider Type     Ethan Rogers, PT Physical Therapist                   Goals/Plan       Row Name 01/19/24 0800          Therapy Assessment/Plan (PT)    Planned Therapy Interventions (PT) patient/family education;wound care  -               User Key  (r) = Recorded By, (t) = Taken By, (c) = Cosigned By      Initials Name Provider Type     Ethan Rogers, PT Physical Therapist                   Clinical Impression        Row Name 01/19/24 0800          Pain    Pretreatment Pain Rating 0/10 - no pain  -     Posttreatment Pain Rating 10/10  -     Pain Location - Side/Orientation Right  -     Pain Location lower  -     Pain Location - extremity  -     Pain Intervention(s) Medication (See MAR);Repositioned;Ambulation/increased activity  -       Row Name 01/19/24 0800          Plan of Care Review    Plan of Care Reviewed With patient  -     Outcome Evaluation PT IE complete.  A&Ox4.  C/o 10/10 pain with dressing change.  Previous dressing removed.  Wound measures 10.5cm x 3cm x 3cm.  Tunneling at 10am is 6cm.  Tunneling at 2pm is 3cm.  New dressing completed per Dr. Manas Fernandes.  Photo not obtained.  PT to follow to manage [place] until dc.  Pt I with mobility and encouraged to walk mutliple times per day until dc.  Recommend follow up with OP wound care at [place].  Thank you for referral.  -       Row Name 01/19/24 0800          Therapy Assessment/Plan (PT)    Patient/Family Therapy Goals Statement (PT) healed wound  -     Rehab Potential (PT) good, to achieve stated therapy goals  -     Criteria for Skilled Interventions Met (PT) yes;skilled treatment is necessary  -     Therapy Frequency (PT) daily  -     Predicted Duration of Therapy Intervention (PT) until dc  -[facility] Name 01/19/24 0800          Positioning and Restraints    Pre-Treatment Position in bed  -     Post Treatment Position bed  -     In Bed fowlers;call light within reach;encouraged to call for assist;side rails up x2;exit alarm on;with family/caregiver  -               User Key  (r) = Recorded By, (t) = Taken By, (c) = Cosigned By      Initials Name Provider Type     Ethan Rogers, PT Physical Therapist                   Outcome Measures       Row Name 01/19/24 0800          How much help from another person do you currently need...    Turning from your back to your side while in flat bed without using bedrails? 4  -     Moving from  lying on back to sitting on the side of a flat bed without bedrails? 4  -LH     Moving to and from a bed to a chair (including a wheelchair)? 4  -LH     Standing up from a chair using your arms (e.g., wheelchair, bedside chair)? 4  -LH     Climbing 3-5 steps with a railing? 4  -LH     To walk in hospital room? 4  -     AM-PAC 6 Clicks Score (PT) 24  -     Highest Level of Mobility Goal 8 --> Walked 250 feet or more  -       Row Name 01/19/24 0800          Functional Assessment    Outcome Measure Options AM-PAC 6 Clicks Basic Mobility (PT)  -               User Key  (r) = Recorded By, (t) = Taken By, (c) = Cosigned By      Initials Name Provider Type     Ethan Rogers, PT Physical Therapist                        PT Assessment (last 12 hours)       PT Evaluation and Treatment       Row Name 01/19/24 0800          Wound 01/15/24 1609 Right anterior greater trochanter    Wound - Properties Group Placement Date: 01/15/24  -MB Placement Time: 1609 -MB Side: Right  -MB Orientation: anterior  -MB Location: greater trochanter  -MB    Wound Length (cm) 10.5 cm  -     Wound Width (cm) 3 cm  -     Wound Depth (cm) 3 cm  -     Wound Surface Area (cm^2) 31.5 cm^2  -     Wound Volume (cm^3) 94.5 cm^3  -     Tunneling [Depth (cm)/Location] 6cm/10am; 3cm/2pm  -     Drainage Amount small  -     Care, Wound negative pressure wound therapy  -     Wound Output (mL) 100  -LH     Retired Wound - Properties Group Placement Date: 01/15/24  -MB Placement Time: 1609 -MB Side: Right  -MB Orientation: anterior  -MB Location: greater trochanter  -MB    Retired Wound - Properties Group Date first assessed: 01/15/24  -MB Time first assessed: 1609  -MB Side: Right  -MB Location: greater trochanter  -MB      Row Name 01/19/24 0800          NPWT (Negative Pressure Wound Therapy) 01/15/24 Right Upper Thigh    NPWT (Negative Pressure Wound Therapy) - Properties Group Placement Date: 01/15/24  -CW Location: Right Upper Thigh   -CW    Therapy Setting continuous therapy  -     Dressing foam, black  -     Pressure Setting 125 mmHg  -     Sponges Inserted 1  -     Sponges Removed 1  -     Finger sweep complete Yes  -     Retired NPWT (Negative Pressure Wound Therapy) - Properties Group Placement Date: 01/15/24  -CW Location: Right Upper Thigh  -CW    Retired NPWT (Negative Pressure Wound Therapy) - Properties Group Placement Date: 01/15/24  -CW Location: Right Upper Thigh  -CW      Row Name 01/19/24 0800          Physical Therapy Goals    Wound Care Goal Selection (PT) wound care, PT goal 1  -       Row Name 01/19/24 0800          Wound Care Goal 1 (PT)    Wound Care Goal 1 (PT) Pt to follow up with OP wound care  -     Time Frame (Wound Care Goal 1, PT) 10 days  -     Progress/Outcome (Wound Care Goal 1, PT) new goal  -               User Key  (r) = Recorded By, (t) = Taken By, (c) = Cosigned By      Initials Name Provider Type     Ethan Rogers, PT Physical Therapist    Emma Michael, RN Registered Nurse    CW Juju Juarez, RN Registered Nurse                            Physical Therapy Education       Title: PT OT SLP Therapies (Done)       Topic: Physical Therapy (Done)       Point: Mobility training (Done)       Learning Progress Summary             Patient Acceptance, E,D, VU,DU by  at 1/19/2024 0831    Comment: benefits of mobility, notify RN to call PT if wound vac alarming                         Point: Precautions (Done)       Learning Progress Summary             Patient Acceptance, E,D, VU,DU by  at 1/19/2024 0831    Comment: benefits of mobility, notify RN to call PT if wound vac alarming                                         User Key       Initials Effective Dates Name Provider Type [facility] 02/03/23 -  Ethan Rogers, PT Physical Therapist PT                  PT Recommendation and Plan  Planned Therapy Interventions (PT): patient/family education, wound care  Plan of Care Reviewed  With: patient  Outcome Evaluation: PT IE complete.  A&Ox4.  C/o 10/10 pain with dressing change.  Previous dressing removed.  Wound measures 10.5cm x 3cm x 3cm.  Tunneling at 10am is 6cm.  Tunneling at 2pm is 3cm.  New dressing completed per Dr. Manas Fernandes.  Photo not obtained.  PT to follow to manage [place] until dc.  Pt I with mobility and encouraged to walk mutliple times per day until dc.  Recommend follow up with OP wound care at [place].  Thank you for referral.     Time Calculation:         PT Charges       Row Name 01/19/24 0800             Time Calculation    Start Time 0845  -      Stop Time 0945  -      Time Calculation (min) 60 min  -LH      PT Received On 01/19/24  -      PT Goal Re-Cert Due Date 01/29/24  -         Untimed Charges    PT Eval/Re-eval Minutes 30  -LH      Wound Care 52016 Neg Press (DME) wound TO 50 sqcm  -LH      34917-Ipt Pressure wound to 50 sqcm 30  -LH         Total Minutes    Untimed Charges Total Minutes 60  -LH       Total Minutes 60  -LH                User Key  (r) = Recorded By, (t) = Taken By, (c) = Cosigned By      Initials Name Provider Type     Ethan Rogers, PT Physical Therapist                  Therapy Charges for Today       Code Description Service Date Service Provider Modifiers Qty    24706378145 HC PT EVAL LOW COMPLEXITY 2 1/19/2024 Ethan Rogers, PT GP 1    74808375573 HC PT NEG PRESS WOUND TO 50SQCM DME2 1/19/2024 Ethan Rogers, PT GP 1            PT G-Codes  Outcome Measure Options: AM-PAC 6 Clicks Basic Mobility (PT)  AM-PAC 6 Clicks Score (PT): 24  PT Discharge Summary  Anticipated Discharge Disposition (PT): home with outpatient therapy services (OP wound care)    Ethan Rogers PT  1/19/2024

## 2024-01-19 NOTE — CONSULTS
[facility]  INPATIENT WOUND & OSTOMY CONSULTATION    Today's Date: 24    Patient Name: Gil Monroy  MRN: [id number]  [facility]: [id number]  PCP: Feliberto Blount MD  Referring Provider:   Consulting Provider (From admission, onward)      None           Attending Provider: Eamon Seaman*  Length of Stay: 4    SUBJECTIVE   Chief Complaint: ***    HPI: Gil Monroy, a 53 y.o.male, presents with a past medical history of ***.  A full past medical history as listed below.  ***    Inpatient wound care consulted due to ***      Visit Dx:    ICD-10-CM ICD-9-CM   1. Abscess  L02.91 682.9   2. Hx of diabetes mellitus  Z86.39 V12.29   3. Hx of liver transplant  Z94.4 V42.7   4. Abscess of groin, right  L02.214 682.2       Hospital Problem List:     Abscess of groin, right    Insulin dependent type 2 diabetes mellitus    History of liver transplant    Acute pain    Transaminitis    Hyponatremia    Leg abscess      History:   Past Medical History:   Diagnosis Date    Cirrhosis of liver     Diabetes mellitus     Hep C w/o coma, chronic     Thrombocytopenia     Varices, esophageal      Past Surgical History:   Procedure Laterality Date    GROIN ABSCESS INCISION AND DRAINAGE Right 1/15/2024    Procedure: GROIN ABSCESS INCISION AND DRAINAGE WITH APPLICATION OF WOUND VAC;  Surgeon: Manas Fernandes MD;  Location: [facility] OR;  Service: General;  Laterality: Right;    GROIN ABSCESS INCISION AND DRAINAGE Right 2024    Procedure: wound vac change;  Surgeon: Manas Fernandes MD;  Location:  [facility] OR;  Service: General;  Laterality: Right;    LIVER TRANSPLANTATION       Social History     Socioeconomic History    Marital status: Unknown   Tobacco Use    Smoking status: Former     Packs/day: 0.50     Years: 25.00     Additional pack years: 0.00     Total pack years: 12.50     Types: Cigarettes     Quit date:      Years since quittin.0   Vaping Use    Vaping Use: Some days   Substance and  Sexual Activity    Drug use: Never     Family History   Problem Relation Age of Onset    Breast cancer Mother     Diabetes Father        Allergies:  No Known Allergies    Medications:    Current Facility-Administered Medications:     acetaminophen (TYLENOL) tablet 325 mg, 325 mg, Oral, Q4H PRN, 325 mg at 01/17/24 1522 **OR** acetaminophen (TYLENOL) 160 MG/5ML oral solution 325 mg, 325 mg, Oral, Q4H PRN **OR** acetaminophen (TYLENOL) suppository 325 mg, 325 mg, Rectal, Q4H PRN, Manas Fernandes MD    aspirin chewable tablet 81 mg, 81 mg, Oral, Daily, Abel Garcia, APRN, 81 mg at 01/19/24 0844    sennosides-docusate (PERICOLACE) 8.6-50 MG per tablet 1 tablet, 1 tablet, Oral, Nightly **AND** polyethylene glycol (MIRALAX) packet 17 g, 17 g, Oral, Daily PRN **AND** bisacodyl (DULCOLAX) EC tablet 5 mg, 5 mg, Oral, Daily PRN **AND** bisacodyl (DULCOLAX) suppository 10 mg, 10 mg, Rectal, Daily PRN, Manas Fernandes MD    dextrose (D50W) (25 g/50 mL) IV injection 25 g, 25 g, Intravenous, Q15 Min PRN, Manas Fernandes MD    dextrose (GLUTOSE) oral gel 15 g, 15 g, Oral, Q15 Min PRN, Manas Fernandes MD    Enoxaparin Sodium (LOVENOX) syringe 40 mg, 40 mg, Subcutaneous, Q24H, Manas Fernandes MD, 40 mg at 01/18/24 1713    glucagon (GLUCAGEN) injection 1 mg, 1 mg, Intramuscular, Q15 Min PRN, Manas Fernandes MD    Insulin Lispro (humaLOG) injection 2-9 Units, 2-9 Units, Subcutaneous, 4x Daily AC & at Bedtime, Manas Fernandes MD, 2 Units at 01/18/24 2011    melatonin tablet 6 mg, 6 mg, Oral, Nightly, Abel Garcia, APRN, 6 mg at 01/18/24 2011    [DISCONTINUED] HYDROmorphone (DILAUDID) injection 0.5 mg, 0.5 mg, Intravenous, Q2H PRN, 0.5 mg at 01/17/24 0435 **AND** naloxone (NARCAN) injection 0.4 mg, 0.4 mg, Intravenous, Q5 Min PRN, Manas Fernandes MD    ondansetron ODT (ZOFRAN-ODT) disintegrating tablet 4 mg, 4 mg, Oral, Q6H PRN **OR** ondansetron (ZOFRAN) injection 4 mg, 4 mg, Intravenous, Q6H PRN, Manas Fernandes MD    oxyCODONE (ROXICODONE)  immediate release tablet 10 mg, 10 mg, Oral, Q6H PRN, Manas Fernandes MD, 10 mg at 01/19/24 0527    oxyCODONE (ROXICODONE) immediate release tablet 5 mg, 5 mg, Oral, Q6H PRN, Manas Fernandes MD    pantoprazole (PROTONIX) EC tablet 40 mg, 40 mg, Oral, Daily, Manas Fernandes MD, 40 mg at 01/19/24 0844    piperacillin-tazobactam (ZOSYN) IVPB 3.375 g in 100 mL NS (CD), 3.375 g, Intravenous, Q8H, Eamon Seaman MD, 3.375 g at 01/19/24 0638    predniSONE (DELTASONE) tablet 5 mg, 5 mg, Oral, BID, Manas Fernandes MD, 5 mg at 01/19/24 0844    [COMPLETED] Insert Peripheral IV, , , Once **AND** sodium chloride 0.9 % flush 10 mL, 10 mL, Intravenous, PRN, Manas Fernandes MD    sodium chloride 0.9 % flush 10 mL, 10 mL, Intravenous, Q12H, Manas Fernandes MD, 10 mL at 01/19/24 0844    sodium chloride 0.9 % flush 10 mL, 10 mL, Intravenous, PRN, Manas Fernandes MD    sodium chloride 0.9 % infusion 40 mL, 40 mL, Intravenous, PRN, Manas Fernandes MD    sodium chloride 0.9 % infusion, 50 mL/hr, Intravenous, Continuous, Manas Fernandes MD, Last Rate: 50 mL/hr at 01/18/24 1509, 50 mL/hr at 01/18/24 1509    tacrolimus (PROGRAF) capsule 1 mg, 1 mg, Oral, Q8H, aMnas Fernandes MD, 1 mg at 01/19/24 0638    vancomycin (VANCOCIN) 1,000 mg in sodium chloride 0.9 % 250 mL IVPB-VTB, 1,000 mg, Intravenous, Q12H, Eamon Seaman MD, Last Rate: 250 mL/hr at 01/19/24 0844, 1,000 mg at 01/19/24 0844    Review of Systems: ***  Review of Systems      OBJECTIVE     Vitals:    01/19/24 0841   BP: 154/81   Pulse: 83   Resp: 16   Temp: 97.7 °F (36.5 °C)   SpO2: 98%       PHYSICAL EXAM: ***  Physical Exam       Results Review:  Lab Results (last 48 hours)       Procedure Component Value Units Date/Time    POC Glucose Once [[id number]]  (Normal) Collected: 01/19/24 0835    Specimen: Blood Updated: 01/19/24 0846     Glucose 111 mg/dL      Comment: : [id number] Richardmela ClarkNeftali ID: [id number]       Comprehensive Metabolic Panel [[id number]]  (Abnormal)  Collected: 01/19/24 0408    Specimen: Blood Updated: 01/19/24 0647     Glucose 145 mg/dL      BUN 10 mg/dL      Creatinine 0.77 mg/dL      Sodium 134 mmol/L      Potassium 4.4 mmol/L      Chloride 100 mmol/L      CO2 24.0 mmol/L      Calcium 8.7 mg/dL      Total Protein 6.1 g/dL      Albumin 2.8 g/dL      ALT (SGPT) 51 U/L      AST (SGOT) 78 U/L      Alkaline Phosphatase 269 U/L      Total Bilirubin 0.4 mg/dL      Globulin 3.3 gm/dL      A/G Ratio 0.8 g/dL      BUN/Creatinine Ratio 13.0     Anion Gap 10.0 mmol/L      eGFR 107.1 mL/min/1.73     Narrative:      GFR Normal >60  Chronic Kidney Disease <60  Kidney Failure <15      Anaerobic Culture - Tissue, Groin, right [[id number]]  (Normal) Collected: 01/15/24 2048    Specimen: Tissue from Groin, right Updated: 01/19/24 0552     Anaerobic Culture Screening for Anaerobes    CBC & Differential [[id number]]  (Abnormal) Collected: 01/19/24 0408    Specimen: Blood Updated: 01/19/24 0549    Narrative:      The following orders were created for panel order CBC & Differential.  Procedure                               Abnormality         Status                     ---------                               -----------         ------                     CBC Auto Differential[[id number]]        Abnormal            Final result                 Please view results for these tests on the individual orders.    CBC Auto Differential [[id number]]  (Abnormal) Collected: 01/19/24 0408    Specimen: Blood Updated: 01/19/24 0549     WBC 8.26 10*3/mm3      RBC 3.72 10*6/mm3      Hemoglobin 11.8 g/dL      Hematocrit 36.9 %      MCV 99.2 fL      MCH 31.7 pg      MCHC 32.0 g/dL      RDW 14.1 %      RDW-SD 51.3 fl      MPV 9.7 fL      Platelets 288 10*3/mm3      Neutrophil % 77.5 %      Lymphocyte % 12.5 %      Monocyte % 5.8 %      Eosinophil % 2.1 %      Basophil % 0.8 %      Immature Grans % 1.3 %      Neutrophils, Absolute 6.40 10*3/mm3      Lymphocytes, Absolute 1.03 10*3/mm3      Monocytes,  Absolute 0.48 10*3/mm3      Eosinophils, Absolute 0.17 10*3/mm3      Basophils, Absolute 0.07 10*3/mm3      Immature Grans, Absolute 0.11 10*3/mm3      nRBC 0.0 /100 WBC     POC Glucose Once [[id number]]  (Abnormal) Collected: 01/18/24 2005    Specimen: Blood Updated: 01/18/24 2016     Glucose 167 mg/dL      Comment: : [id number] Keena KimbroMeter ID: [id number]       POC Glucose Once [[id number]]  (Abnormal) Collected: 01/18/24 1709    Specimen: Blood Updated: 01/18/24 1720     Glucose 142 mg/dL      Comment: : [id number] Atkins HaleyMeter ID: [id number]       Blood Culture With MALAIKA - Blood, Wrist, Right [[id number]]  (Normal) Collected: 01/15/24 1600    Specimen: Blood from Wrist, Right Updated: 01/18/24 1631     Blood Culture No growth at 3 days    Blood Culture With MALAIKA - Blood, Arm, Left [[id number]]  (Normal) Collected: 01/15/24 1604    Specimen: Blood from Arm, Left Updated: 01/18/24 1631     Blood Culture No growth at 3 days    POC Glucose Once [[id number]]  (Abnormal) Collected: 01/18/24 1105    Specimen: Blood Updated: 01/18/24 1117     Glucose 155 mg/dL      Comment: : [id number] Atkins HaleyMeter ID: [id number]       POC Glucose Once [[id number]]  (Normal) Collected: 01/18/24 0757    Specimen: Blood Updated: 01/18/24 0807     Glucose 113 mg/dL      Comment: : [id number] Atkins HaleyMeter ID: [id number]       Wound Culture - Wound, Leg, Right [[id number]] Collected: 01/15/24 1654    Specimen: Wound from Leg, Right Updated: 01/18/24 0626     Wound Culture Scant growth (1+) Normal Skin Zari     Gram Stain Moderate (3+) WBCs per low power field      Moderate (3+) Gram positive cocci      Moderate (3+) Gram negative bacilli    Tissue / Bone Culture - Tissue, Groin, right [[id number]] Collected: 01/15/24 2048    Specimen: Tissue from Groin, right Updated: 01/18/24 0622     Tissue Culture Scant growth (1+) Normal Skin Zari     Gram Stain Moderate (3+) WBCs per low power field      Moderate (3+)  Gram positive cocci      Few (2+) Gram negative bacilli    Comprehensive Metabolic Panel [[id number]]  (Abnormal) Collected: 01/18/24 0332    Specimen: Blood Updated: 01/18/24 0520     Glucose 135 mg/dL      BUN 10 mg/dL      Creatinine 0.85 mg/dL      Sodium 135 mmol/L      Potassium 4.3 mmol/L      Chloride 99 mmol/L      CO2 27.0 mmol/L      Calcium 8.3 mg/dL      Total Protein 6.0 g/dL      Albumin 2.9 g/dL      ALT (SGPT) 62 U/L      AST (SGOT) 114 U/L      Alkaline Phosphatase 321 U/L      Total Bilirubin 0.5 mg/dL      Globulin 3.1 gm/dL      A/G Ratio 0.9 g/dL      BUN/Creatinine Ratio 11.8     Anion Gap 9.0 mmol/L      eGFR 103.9 mL/min/1.73     Narrative:      GFR Normal >60  Chronic Kidney Disease <60  Kidney Failure <15      CBC & Differential [[id number]]  (Abnormal) Collected: 01/18/24 0332    Specimen: Blood Updated: 01/18/24 0500    Narrative:      The following orders were created for panel order CBC & Differential.  Procedure                               Abnormality         Status                     ---------                               -----------         ------                     CBC Auto Differential[[id number]]        Abnormal            Final result                 Please view results for these tests on the individual orders.    CBC Auto Differential [[id number]]  (Abnormal) Collected: 01/18/24 0332    Specimen: Blood Updated: 01/18/24 0500     WBC 8.06 10*3/mm3      RBC 3.46 10*6/mm3      Hemoglobin 11.3 g/dL      Hematocrit 35.3 %      .0 fL      MCH 32.7 pg      MCHC 32.0 g/dL      RDW 14.6 %      RDW-SD 54.2 fl      MPV 9.9 fL      Platelets 294 10*3/mm3      Neutrophil % 77.6 %      Lymphocyte % 10.4 %      Monocyte % 5.8 %      Eosinophil % 2.9 %      Basophil % 1.2 %      Immature Grans % 2.1 %      Neutrophils, Absolute 6.25 10*3/mm3      Lymphocytes, Absolute 0.84 10*3/mm3      Monocytes, Absolute 0.47 10*3/mm3      Eosinophils, Absolute 0.23 10*3/mm3      Basophils, Absolute  0.10 10*3/mm3      Immature Grans, Absolute 0.17 10*3/mm3      nRBC 0.0 /100 WBC     POC Glucose Once [[id number]]  (Abnormal) Collected: 01/17/24 2027    Specimen: Blood Updated: 01/17/24 2037     Glucose 163 mg/dL      Comment: : [id number] Cristian De La VegaynMeter ID: [id number]       POC Glucose Once [[id number]]  (Normal) Collected: 01/17/24 1751    Specimen: Blood Updated: 01/17/24 1803     Glucose 120 mg/dL      Comment: : [id number] Des TimothyMeter ID: [id number]       Vancomycin, Trough [[id number]]  (Abnormal) Collected: 01/17/24 1651    Specimen: Blood Updated: 01/17/24 1727     Vancomycin Trough 22.70 mcg/mL     POC Glucose Once [[id number]]  (Abnormal) Collected: 01/17/24 1211    Specimen: Blood Updated: 01/17/24 1221     Glucose 141 mg/dL      Comment: : [id number] Des TimothyMeter ID: [id number]       POC Glucose Once [[id number]]  (Normal) Collected: 01/17/24 1011    Specimen: Blood Updated: 01/17/24 1026     Glucose 107 mg/dL      Comment: : [id number] Philipayala DaoMeter ID: [id number]             Imaging Results (Last 72 Hours)       ** No results found for the last 72 hours. **               ASSESSMENT/PLAN       Examination and evaluation of wound(s) was performed.    DIAGNOSIS:     Abscess of groin, right    Insulin dependent type 2 diabetes mellitus    History of liver transplant    Acute pain    Transaminitis    Hyponatremia    Leg abscess       PLAN:   ***    Discussed findings and treatment plan including risks, benefits, and treatment options with *** in detail. Patient agreed with treatment plan.      This document has been electronically signed by ANDREW Mckeon on 1/19/2024 09:06 CST     Basophils, Absolute 0.10 10*3/mm3      Immature Grans, Absolute 0.17 10*3/mm3      nRBC 0.0 /100 WBC     POC Glucose Once [[id number]]  (Abnormal) Collected: 01/17/24 2027    Specimen: Blood Updated: 01/17/24 2037     Glucose 163 mg/dL      Comment: : [id number] Cristian De La VegaynMeter ID: [id number]       POC Glucose Once [[id number]]  (Normal) Collected: 01/17/24 1751    Specimen: Blood Updated: 01/17/24 1803     Glucose 120 mg/dL      Comment: : [id number] Des TimothyMeter ID: [id number]       Vancomycin, Trough [[id number]]  (Abnormal) Collected: 01/17/24 1651    Specimen: Blood Updated: 01/17/24 1727     Vancomycin Trough 22.70 mcg/mL     POC Glucose Once [[id number]]  (Abnormal) Collected: 01/17/24 1211    Specimen: Blood Updated: 01/17/24 1221     Glucose 141 mg/dL      Comment: : [id number] Des TimothyMeter ID: [id number]       POC Glucose Once [[id number]]  (Normal) Collected: 01/17/24 1011    Specimen: Blood Updated: 01/17/24 1026     Glucose 107 mg/dL      Comment: : [id number] Philip JeffersonuaMeter ID: [id number]             Imaging Results (Last 72 Hours)       ** No results found for the last 72 hours. **               ASSESSMENT/PLAN       Examination and evaluation of wound(s) was performed.    DIAGNOSIS:     Abscess of groin, right    Insulin dependent type 2 diabetes mellitus    History of liver transplant    Acute pain    Transaminitis    Hyponatremia    Leg abscess       PLAN:   Wound vac change started by PT and completed by Dr. Fernandes in the room.  Plans for patient's discharge with Home Health and outpatient wound care for wound vac management.     Discussed findings and treatment plan including risks, benefits, and treatment options with patient in detail. Patient agreed with treatment plan.      This document has been electronically signed by ANDREW Mckeon on 1/19/2024 09:06 CST

## 2024-01-19 NOTE — OUTREACH NOTE
Prep Survey      Flowsheet Row Responses   [facility] facility patient discharged from? [place]   Is LACE score < 7 ? No   Eligibility Readm Mgmt   Discharge diagnosis Abscess of groin, right       Leg abscess Incision and drainage of groin abscess   Does the patient have one of the following disease processes/diagnoses(primary or secondary)? General Surgery   Does the patient have Home health ordered? Yes   What is the Home health agency?  [facility]   Is there a DME ordered? Yes   What DME was ordered? Wound vac   Prep survey completed? Yes            MICHOACANO MONCADA - Registered Nurse

## 2024-01-19 NOTE — PLAN OF CARE
Goal Outcome Evaluation:  Plan of Care Reviewed With: patient        Progress: no change       Pt with complaints of pain throughout shift; see MAR. IVF and abx infusing per order. Up with assist. Accu check. Wound vac to right thigh; wound vac to be changed at bedside today. Voiding per urinal. Tele on. SCDs and Lovenox for VTE prevention. VSS. Safety maintained. Significant other at beside.

## 2024-01-20 LAB
BACTERIA SPEC AEROBE CULT: NORMAL
BACTERIA SPEC AEROBE CULT: NORMAL

## 2024-01-21 LAB — BACTERIA SPEC ANAEROBE CULT: ABNORMAL

## 2024-01-22 ENCOUNTER — TELEPHONE (OUTPATIENT)
Dept: INTERNAL MEDICINE | Age: 54
End: 2024-01-22

## 2024-01-22 ENCOUNTER — TELEPHONE (OUTPATIENT)
Dept: INTERNAL MEDICINE CLINIC | Age: 54
End: 2024-01-22

## 2024-01-22 ENCOUNTER — READMISSION MANAGEMENT (OUTPATIENT)
Dept: CALL CENTER | Facility: HOSPITAL | Age: 54
End: 2024-01-22
Payer: MEDICARE

## 2024-01-22 NOTE — OUTREACH NOTE
General Surgery Week 1 Survey      Flowsheet Row Responses   [facility] patient discharged from? [place]   Does the patient have one of the following disease processes/diagnoses(primary or secondary)? General Surgery   Week 1 attempt successful? Yes   Call start time 1244   Call end time 1249   Discharge diagnosis Abscess of groin, right       Leg abscess Incision and drainage of groin abscess   Meds reviewed with patient/caregiver? Yes   Is the patient having any side effects they believe may be caused by any medication additions or changes? No   Does the patient have all medications related to this admission filled (includes all antibiotics, pain medications, etc.) N/A   Is the patient taking all medications as directed (includes completed medication regime)? Yes   Does the patient have a follow up appointment scheduled with their surgeon? Yes   What is the [facility] health agency?  [facility]   Has home health visited the patient within 72 hours of discharge? Yes   What DME was ordered? Wound vac   Comments Pt reports HH has been to do wound care today, pt was told that the wound in R. groin is improving. Appetite improved, pt denies N/V/D, no issues voiding. Nurse encouraged cough/deep breathing and ambulation frequently, pt v/u.Pt reports improved since DC.   Did the patient receive a copy of their discharge instructions? Yes   Nursing interventions Reviewed instructions with patient   What is the patient's perception of their health status since discharge? Improving   Nursing interventions Nurse provided patient education   Is the patient /caregiver able to teach back basic post-op care? Continue use of incentive spirometry at least 1 week post discharge, Practice 'cough and deep breath', Keep incision areas clean,dry and protected, Lifting as instructed by MD in discharge instructions   Is the patient/caregiver able to teach back signs and symptoms of incisional infection? Increased redness,  swelling or pain at the incisonal site, Fever, Pus or odor from incision, Increased drainage or bleeding   Is the patient/caregiver able to teach back steps to recovery at home? Set small, achievable goals for return to baseline health, Rest and rebuild strength, gradually increase activity   If the patient is a current smoker, are they able to teach back resources for cessation? Not a smoker   Is the patient/caregiver able to teach back the hierarchy of who to call/visit for symptoms/problems? PCP, Specialist, Home health nurse, Urgent Care, ED, 911 Yes   Week 1 call completed? Yes   Revoked No further contact(revokes)-requires comment   Is the patient interested in additional calls from an ambulatory ? No   Would this patient benefit from a Referral to [facility] Social Work? No   Call end time 4224            Serena FRANKLIN - Registered Nurse

## 2024-01-22 NOTE — CARE COORDINATION
Ambulatory Care Coordination Note  2024    Patient Current Location:  [place]     ACM contacted the patient by telephone. Verified name and  with patient as identifiers. Provided introduction to self, and explanation of the ACM role.     Challenges to be reviewed by the provider   Additional needs identified to be addressed with provider: Yes  labs-Follow up with  Transplant Center         Method of communication with provider: phone.    ACM: Yoly Mcnally RN    Spoke to patient.  He has been out of the Envarsus prescription, stated last dose was while at [facility] on 24. Patient does not understand why his insurance won't pay for his medication at local pharmacy. Patient has not been back to  Transplant Center for follow up appt or testing/imaging post liver transplant. Pt stated he doesn't know why he has to go back to .  Patient reported a  Nurse came out and changed the dressing for his wound vac to upper inner right leg wound. Pt stated it went ok, tolerated it fair. He denied any concerns with wound vac coming off or leaking.  Reviewed labs, most recent A1c of 6.2. Pt does not routinely check his blood sugar at home.  Patient denied any SDOH insecurities other than his Envarsus prescription cost.  - Discussed with patient that specific protocols have been developed to promote best long term outcome for transplant patients, that  has a protocol in place whose purpose is to support and protect their patients. Urged pt to understand that follow up and testing is required and important for the life of his new liver. Patient agreed to [facility] call to  and discussion about re-scheduling his appt. He stated his fiance may be able to get off work to go with him if he has advance notice.  - Spoke to GABRIEL at  [facility]. GABRIEL reiterated that patient has had thorough education by  support staff about his post-transplant course and the need for appropriate follow up, testing and

## 2024-01-22 NOTE — TELEPHONE ENCOUNTER
[facility] nurse admitted pt today and per  nurse    \"  Pt being seen for admission to home health per Dr. Ramos r/t to I&D and wound vac placement on R groin abscess performed by Dr. Ramos on with pt being taken back to surgery on  for wound vac replacement. Pt at [facility] from 01/15-. \"  \" Pt identify by name and , oriented x2. Pt is a poor historian at today's visit as he is already obviously inebriated. Per pt's SO pt found hidden vodka this AM and drank it. Pt is crying and making moaning noises during entire visit. Pt at one points states he is scared of wound vac dressing change and this is why he drank this AM.   Meds on discharge list, but not in home: Oxycodone, Tacrolimus, and Zinc.   Pt also has an unrelated wound to lower abdomen, which is chronic in nature, and SN feels pt may need setup with a wound care clinic, picture included. Pt's souse states she cleans, applies Xeroform, and covers daily, but is open to air at today's visit. \"     They will see him for wound vac per Dr Velásquez orders ,. I sent a picture of the other wound in Roxie Young and Dr Simmons email

## 2024-01-22 NOTE — TELEPHONE ENCOUNTER
Care Transitions Initial Follow Up Call    Outreach made within 2 business days of discharge: Yes    Patient: Driss Aldrich   Patient : 1970   MRN: [id number]   Reason for Admission: Abscess of right groin  Discharge Date: 24       Spoke with: Driss Aldrich    Discharge department/facility: [facility] Patient Contact:  Was patient able to fill all prescriptions: No: the patient didn't have any medication prescribed.   Was patient instructed to bring all medications to the follow-up visit: Yes  Is patient taking all medications as directed in the discharge summary? Yes  Does patient understand their discharge instructions: Yes  Does patient have questions or concerns that need addressed prior to 7-14 day follow up office visit: no    The patient reported that he just seen wound care and they said his abscess was looking good. Mr. Aldirch reported no redness at this time. His bowel, bladder and appetite are normal.     Scheduled appointment with PCP within 7-14 days    Follow Up  Future Appointments   Date Time Provider Department Center   2024 12:30 PM Gurinder Simmons MD [facility]       Yomaira Tolentino MA

## 2024-01-30 ENCOUNTER — TELEMEDICINE (OUTPATIENT)
Dept: PRIMARY CARE CLINIC | Age: 54
End: 2024-01-30
Payer: MEDICARE

## 2024-01-30 ENCOUNTER — CARE COORDINATION (OUTPATIENT)
Dept: CARE COORDINATION | Age: 54
End: 2024-01-30

## 2024-01-30 DIAGNOSIS — Z09 HOSPITAL DISCHARGE FOLLOW-UP: Primary | ICD-10-CM

## 2024-01-30 PROCEDURE — 3017F COLORECTAL CA SCREEN DOC REV: CPT | Performed by: FAMILY MEDICINE

## 2024-01-30 PROCEDURE — 1111F DSCHRG MED/CURRENT MED MERGE: CPT | Performed by: FAMILY MEDICINE

## 2024-01-30 PROCEDURE — G8417 CALC BMI ABV UP PARAM F/U: HCPCS | Performed by: FAMILY MEDICINE

## 2024-01-30 PROCEDURE — 1036F TOBACCO NON-USER: CPT | Performed by: FAMILY MEDICINE

## 2024-01-30 PROCEDURE — 99214 OFFICE O/P EST MOD 30 MIN: CPT | Performed by: FAMILY MEDICINE

## 2024-01-30 PROCEDURE — G8484 FLU IMMUNIZE NO ADMIN: HCPCS | Performed by: FAMILY MEDICINE

## 2024-01-30 PROCEDURE — G8427 DOCREV CUR MEDS BY ELIG CLIN: HCPCS | Performed by: FAMILY MEDICINE

## 2024-01-30 NOTE — CARE COORDINATION
Ambulatory Care Coordination Note  2024    Patient Current Location:  [place]     ACM contacted the spouse/partner by telephone. Verified name and  with spouse/partner as identifiers. Provided introduction to self, and explanation of the ACM role.     Challenges to be reviewed by the provider   Additional needs identified to be addressed with provider: Yes  medications-Pt needs to contact  [facility] about Tacrolimus refill.  Patient needs f/u with  [facility] for follow up testing, monitoring for liver health post-transplant. Patient needs to discuss abdominal wound with his PCP at [facility] later today.         Method of communication with provider: staff message.    ACM: Yoly Mcnally RN    Spoke to patient's domestic partner, Farida.   Verified Name/Pt /Address with DP. She is on patient's HIPAA form.  Reviewed my last call with patient and instructions to call  [facility] to obtain Tacrolimus(Envarsus). Requested if patient made call to . Per Farida's request of pt (pt was present during call) - pt stated he has talked to many people, could not say definitely that he spoke to [facility].   - Strongly encouraged that DP place call to  Pharmacy and gave her the phone number: [phone], option 3. Advised pt needs to be on this his medication and that pt also needs follow up tests to verify he is tolerating the transplant well.   - Advised DP to also contact patient's  Nurse Navigator, [place]ing [place] - pt needs f/u monitoring tests and appt post liver transplant.   - Advised if patient has a transportation barrier, please notify ACM and can reach out to patient's managed Medicaid for info on any assistance they may offer for his appt to .  DP confirmed pt has an abdominal wound that she is treating.   - ACM advised DP that pt has [facility] today and needs to address this with PCP. DP stated she will assess patient's vitals and take photo of his wound for appt.    Offered patient enrollment

## 2024-01-30 NOTE — PROGRESS NOTES
Post-Discharge Transitional Care  Follow Up      Driss Aldrich   YOB: 1970    Date of Office Visit:  1/30/2024  Date of Hospital Admission: 1/10/24  Date of Hospital Discharge: 1/10/24  Risk of hospital readmission (high >=14%. Medium >=10%) :Readmission Risk Score: 14.7      Care management risk score Rising risk (score 2-5) and Complex Care (Scores >=6): No Risk Score On File     Non face to face  following discharge, date last encounter closed (first attempt may have been earlier): 01/22/2024    Call initiated 2 business days of discharge: Yes    ASSESSMENT/PLAN:   Hospital discharge follow-up  -     NE DISCHARGE MEDS RECONCILED W/ CURRENT OUTPATIENT MED LIST      Medical Decision Making: moderate complexity  No follow-ups on file.           Subjective:     Date of Admission: 1/15/2024  Date of Discharge:  1/19/2024  HPI:  Follow up of Hospital problems/diagnosis(es):   Abscess of groin, right     Leg abscess     Insulin dependent type 2 diabetes mellitus     History of liver transplant     Acute pain     Transaminitis     Hyponatremia           Hospital Course per DC summary:   \"Mr. Aldrich is a 53-year-old male with a past medical history of insulin-dependent diabetes, liver transplant, chronic lower extremity lymphedema.  He presented to [facility] emergency room 1/5/2015 with \"knot\" right groin and thigh for the past month.  Patient reported area kept getting larger and more painful.  He was evaluated at [facility] emergency room 1/10/2024 and discharged on Bactrim and Keflex.  Patient noted that area did rupture while he was at [facility] and improved the pain; however, pain returned with additional swelling and redness.  He noted that the area has spontaneously drained on several occasions with purulent output.  Patient reported similar abscesses before needed draining.  However, no previous abscess is larger causing as much pain as this area.  CRP 3.87, procalcitonin 0.33, WBC

## 2024-01-31 ENCOUNTER — TRANSCRIBE ORDERS (OUTPATIENT)
Dept: ADMINISTRATIVE | Facility: HOSPITAL | Age: 54
End: 2024-01-31
Payer: MEDICARE

## 2024-01-31 DIAGNOSIS — C22.0 HCC (HEPATOCELLULAR CARCINOMA): ICD-10-CM

## 2024-01-31 DIAGNOSIS — Z94.4 S/P LIVER TRANSPLANT: Primary | ICD-10-CM

## 2024-01-31 DIAGNOSIS — R91.1 LUNG NODULE SEEN ON IMAGING STUDY: ICD-10-CM

## 2024-01-31 LAB
ALBUMIN SERPL-MCNC: 3.8 G/DL (ref 3.5–5.2)
ALP SERPL-CCNC: 209 U/L (ref 40–130)
ALT SERPL-CCNC: 28 U/L (ref 5–41)
ANION GAP SERPL CALCULATED.3IONS-SCNC: 14 MMOL/L (ref 7–19)
AST SERPL-CCNC: 70 U/L (ref 5–40)
BILIRUB SERPL-MCNC: 0.4 MG/DL (ref 0.2–1.2)
BUN SERPL-MCNC: 19 MG/DL (ref 6–20)
CALCIUM SERPL-MCNC: 8.9 MG/DL (ref 8.6–10)
CHLORIDE SERPL-SCNC: 100 MMOL/L (ref 98–111)
CO2 SERPL-SCNC: 26 MMOL/L (ref 22–29)
CREAT SERPL-MCNC: 0.7 MG/DL (ref 0.5–1.2)
GLUCOSE SERPL-MCNC: 105 MG/DL (ref 74–109)
POTASSIUM SERPL-SCNC: 3.9 MMOL/L (ref 3.5–5)
PROT SERPL-MCNC: 7.5 G/DL (ref 6.6–8.7)
SODIUM SERPL-SCNC: 140 MMOL/L (ref 136–145)

## 2024-01-31 NOTE — PROGRESS NOTES
"Enter Query Response Below      Query Response:   Instrument used: knife, Bovie, curette  Deepest level: Subcutaneous tissue             If applicable, please update the problem list.   Patient: Gil Monroy        : 1970  Account: [id number]           Admit Date: 1/15/2024        How to Respond to this query:       a. Click New Note     b. Answer query within the yellow box.                c. Update the Problem List, if applicable.      If you have any questions about this query contact me at: [email or web address]  [phone]      Dr. Fernandes:    \"53-year-old male with history of diabetes who presents with 10-day right inner thigh abscess/cellulitis\" with \"GROIN ABSCESS INCISION AND DRAINAGE WITH APPLICATION OF WOUND VAC\" per op note (1/15/24). Findings included, \"14 cm x 11 cm necrotizing soft tissue infection extending towards the groin and lower abdominal wall.\" Procedure details  noted, \"The skin overlying the abscess cavity was opened.  At that time the patient was noted to have a large abscess cavity which had previously drained.  Upon further inspection he was noted to have tracking of this cavity superiorly towards his groin area  approximately 5 cm.  And superior laterally towards his lower abdominal wall for approximately 5 cm.  The subcutaneous tissue in this area along with a section of overlying necrotic skin was debrided.\"    Please clarify procedure performed:    Excisional debridement (Clarify- instrument used ________, deepest level of tissue debrided________)  Non-excisional debridement  Other- specify __________  Unable to determine      Definitions:  Excisional debridement- surgical removal or cutting away of devitalized tissue, necrosis, or slough.  Non-excisional debridement- describes nonoperative brushing, irrigating, scrubbing, or washing away of devitalized tissue, necrosis, or slough (e.g. Versajet, irrigation)      By submitting this query, we are merely seeking further " clarification of documentation to accurately reflect all conditions that you are monitoring, evaluating, treating or that extend the hospitalization or utilize additional resources of care. Please utilize your independent clinical judgment when addressing the question(s) above.     This query and your response, once completed, will be entered into the legal medical record.    Sincerely,    HOLLEY Gutierrez, RN, CCDS  Clinical Documentation Integrity Program

## 2024-02-05 ENCOUNTER — TRANSCRIBE ORDERS (OUTPATIENT)
Dept: ADMINISTRATIVE | Age: 54
End: 2024-02-05

## 2024-02-05 ENCOUNTER — CARE COORDINATION (OUTPATIENT)
Dept: CARE COORDINATION | Age: 54
End: 2024-02-05

## 2024-02-05 DIAGNOSIS — M51.36 DEGENERATION OF LUMBAR INTERVERTEBRAL DISC: Primary | ICD-10-CM

## 2024-02-05 ASSESSMENT — ENCOUNTER SYMPTOMS
VOMITING: 0
COUGH: 0
SHORTNESS OF BREATH: 0
DIARRHEA: 0
WHEEZING: 0
ABDOMINAL PAIN: 0
NAUSEA: 0

## 2024-02-05 NOTE — CARE COORDINATION
telehealth appt for pt if test/image results do not warrant face to face.)  Zone Management Tools: In Process          Goals Addressed                   This Visit's Progress     Self Monitoring   On track     Self-Monitored Blood Glucose - I will check my blood sugar blood sugars ac  I will notify my provider of any trends of increasing or decreasing blood sugars over a 1 month period.  I will notify my provider if I have any blood sugar readings less than 70 more than 2 times a month.    Patient Reported Blood Glucose - see text notes    Barriers: overwhelmed by complexity of regimen and stress  Plan for overcoming my barriers:   Patient able to log and review BS with PCP and ACM, or willing to seek assistance to maintain log.  Patient able to comply with labs ordered to monitor mgmt of DM  Patient able to improve compliance with medication by reducing barriers - cost or SE  Confidence: 6/10  Anticipated Goal Completion Date: 4/11/24                No future appointments.

## 2024-02-07 ENCOUNTER — TELEPHONE (OUTPATIENT)
Dept: SURGERY | Facility: CLINIC | Age: 54
End: 2024-02-07
Payer: MEDICARE

## 2024-02-07 NOTE — TELEPHONE ENCOUNTER
Patient called earlier regarding [facility] HMO non-PAR - Asked patient to contact insurance company to update on his concerns with relation to post-op/follow-up care- Patient also being followed by [facility]- Patient to discuss a Continuity/Continuation of Care letter(s) for [facility]-  Asked patient to have insurance provider reach out to request medical records should they require-TH

## 2024-02-07 NOTE — TELEPHONE ENCOUNTER
[facility] would not accept his medicaid plan. Kaitlynn Hernandez called to request a refill on his medication. Last office visit : 8/21/2020   Next office visit : 9/4/2020     Requested Prescriptions     Signed Prescriptions Disp Refills    blood glucose monitor strips 200 strip 3     Sig: Test 4 times a day & as needed for symptoms of irregular blood glucose. Dispense sufficient amount for indicated testing frequency plus additional to accommodate PRN testing needs.      Authorizing Provider: Ginger Ceja     Ordering User: MITCHEL [address] [place] [address] none

## 2024-02-08 ENCOUNTER — TRANSCRIBE ORDERS (OUTPATIENT)
Dept: ADMINISTRATIVE | Age: 54
End: 2024-02-08

## 2024-02-08 DIAGNOSIS — Z94.4 STATUS POST LIVER TRANSPLANT (HCC): Primary | ICD-10-CM

## 2024-02-09 LAB
ALBUMIN SERPL-MCNC: 3.5 G/DL (ref 3.5–5.2)
ALP SERPL-CCNC: 202 U/L (ref 40–130)
ALT SERPL-CCNC: 32 U/L (ref 5–41)
ANION GAP SERPL CALCULATED.3IONS-SCNC: 10 MMOL/L (ref 7–19)
AST SERPL-CCNC: 76 U/L (ref 5–40)
BASOPHILS # BLD: 0.1 K/UL (ref 0–0.2)
BASOPHILS NFR BLD: 1.3 % (ref 0–1)
BILIRUB SERPL-MCNC: <0.2 MG/DL (ref 0.2–1.2)
BUN SERPL-MCNC: 16 MG/DL (ref 6–20)
CALCIUM SERPL-MCNC: 8.1 MG/DL (ref 8.6–10)
CHLORIDE SERPL-SCNC: 103 MMOL/L (ref 98–111)
CO2 SERPL-SCNC: 27 MMOL/L (ref 22–29)
CREAT SERPL-MCNC: 0.8 MG/DL (ref 0.5–1.2)
EOSINOPHIL # BLD: 0.1 K/UL (ref 0–0.6)
EOSINOPHIL NFR BLD: 2.1 % (ref 0–5)
ERYTHROCYTE [DISTWIDTH] IN BLOOD BY AUTOMATED COUNT: 14.2 % (ref 11.5–14.5)
GAMMA GLUTAMYL TRANSFERASE: 532 U/L (ref 7–54)
GLUCOSE SERPL-MCNC: 147 MG/DL (ref 74–109)
HCT VFR BLD AUTO: 36 % (ref 42–52)
HGB BLD-MCNC: 11.6 G/DL (ref 14–18)
IMM GRANULOCYTES # BLD: 0.1 K/UL
LYMPHOCYTES # BLD: 1.3 K/UL (ref 1.1–4.5)
LYMPHOCYTES NFR BLD: 20.2 % (ref 20–40)
MAGNESIUM SERPL-MCNC: 1.3 MG/DL (ref 1.6–2.6)
MCH RBC QN AUTO: 32.2 PG (ref 27–31)
MCHC RBC AUTO-ENTMCNC: 32.2 G/DL (ref 33–37)
MCV RBC AUTO: 100 FL (ref 80–94)
MONOCYTES # BLD: 0.5 K/UL (ref 0–0.9)
MONOCYTES NFR BLD: 7.6 % (ref 0–10)
NEUTROPHILS # BLD: 4.3 K/UL (ref 1.5–7.5)
NEUTS SEG NFR BLD: 67.7 % (ref 50–65)
PLATELET # BLD AUTO: 236 K/UL (ref 130–400)
PMV BLD AUTO: 9.6 FL (ref 9.4–12.4)
POTASSIUM SERPL-SCNC: 4.2 MMOL/L (ref 3.5–5)
PROT SERPL-MCNC: 6.8 G/DL (ref 6.6–8.7)
RBC # BLD AUTO: 3.6 M/UL (ref 4.7–6.1)
SODIUM SERPL-SCNC: 140 MMOL/L (ref 136–145)
WBC # BLD AUTO: 6.3 K/UL (ref 4.8–10.8)

## 2024-02-12 LAB — TACROLIMUS BLD-MCNC: <2 NG/ML

## 2024-02-16 ENCOUNTER — CARE COORDINATION (OUTPATIENT)
Dept: CARE COORDINATION | Age: 54
End: 2024-02-16

## 2024-02-16 DIAGNOSIS — L02.214 ABSCESS OF GROIN, RIGHT: Primary | ICD-10-CM

## 2024-02-16 NOTE — CARE COORDINATION
Ambulatory Care Coordination Note  2024    Patient Current Location:  [place]     ACM contacted the patient by telephone. Verified name and  with patient as identifiers. Provided introduction to self, and explanation of the ACM role.     Challenges to be reviewed by the provider   Additional needs identified to be addressed with provider: Yes  labs-results faxed to  [facility]         Method of communication with provider:  fax .    ACM: Yoly Mcnally, RN    Spoke to patient, Driss.  Patient he is doing better, his leg wound still has vac in place and HH nurse came for check today(Visits are MWF). Pt stated would is improving, hopes to have wound vac discontinued next week. FBS this morning was 139 per pt. Patient stated he is doing well and has no new or acute needs today.  Patient has imaging scheduled in March, is aware. He has not had contact with  Transplant Center for about 3 weeks. Noted in chart that labs for [facility] were results on 24 but no indication they were faxed as requested.  - ACM attempted to contact  Transplant [facility], left voicemail for patient's  - [place].  - Faxed 2024 lab results to [phone] as noted on the order instructions: CBC, CMP, Magnesium, Tacrolimus, Gamma GT    Offered patient enrollment in the Remote Patient Monitoring (RPM) program for in-home monitoring: Patient is not eligible for RPM program.    Lab Results       None            Care Coordination Interventions    Referral from Primary Care Provider: No  Suggested Interventions and Community Resources  Disease Specific Clinic: Completed (Comment: [facility],  Hepatology)  Pharmacist: In Process (Comment: Pt instructed pt   to call [facility] to renew his prescription for Tacrolimus. Pt given ph #, voiced understanding.)  Specialty Services Referral: Completed (Comment:  [facility] for Liver transplant f/u.  to set up local testing/imaging,

## 2024-02-22 ENCOUNTER — TELEPHONE (OUTPATIENT)
Dept: SURGERY | Facility: CLINIC | Age: 54
End: 2024-02-22
Payer: MEDICARE

## 2024-02-22 NOTE — TELEPHONE ENCOUNTER
Received call from Weslyfrom  Home Health stating that patient refused home health visits and has declined to have a wound vac placed. They  have been trying to reach out to patient again, but has had no response.

## 2024-03-05 ENCOUNTER — HOSPITAL ENCOUNTER (OUTPATIENT)
Dept: MRI IMAGING | Age: 54
Discharge: HOME | End: 2024-03-05
Payer: MEDICARE

## 2024-03-05 DIAGNOSIS — M51.36 DEGENERATION OF LUMBAR INTERVERTEBRAL DISC: ICD-10-CM

## 2024-03-05 PROCEDURE — 72148 MRI LUMBAR SPINE W/O DYE: CPT

## 2024-03-21 DIAGNOSIS — Z79.4 TYPE 2 DIABETES MELLITUS WITH OTHER SKIN ULCER, WITH LONG-TERM CURRENT USE OF INSULIN (HCC): Chronic | ICD-10-CM

## 2024-03-21 DIAGNOSIS — E11.622 TYPE 2 DIABETES MELLITUS WITH OTHER SKIN ULCER, WITH LONG-TERM CURRENT USE OF INSULIN (HCC): Chronic | ICD-10-CM

## 2024-03-21 NOTE — TELEPHONE ENCOUNTER
Driss Aldrich called to request a refill on his medication.      Last office visit : 1/30/2024   Next office visit : Visit date not found     Requested Prescriptions     Pending Prescriptions Disp Refills    NOVOLOG FLEXPEN 100 UNIT/ML injection pen 5 Adjustable Dose Pre-filled Pen Syringe 2     Sig: Inject 1-6 Units into the skin 3 times daily (before meals)            Roxie Young MA

## 2024-03-22 RX ORDER — INSULIN ASPART 100 [IU]/ML
1-6 INJECTION, SOLUTION INTRAVENOUS; SUBCUTANEOUS
Qty: 5 ADJUSTABLE DOSE PRE-FILLED PEN SYRINGE | Refills: 2 | Status: SHIPPED | OUTPATIENT
Start: 2024-03-22

## 2024-03-29 ENCOUNTER — CARE COORDINATION (OUTPATIENT)
Dept: CARE COORDINATION | Age: 54
End: 2024-03-29

## 2024-03-29 NOTE — CARE COORDINATION
Unable to reach patient today, unable to leave voicemail. Left voicemail with caregiver, Farida, requesting call back.  Chart review indicates  [facility] attempted to reach pt about labs due this month. Contacted Ting Huizar RN, with  Transplant Center. RN stated pt needs to get labs and reschedule a telehealth appt he missed on 3/20/24. RN mailed the lab orders to patient's caregiver and also faxed them to local lab with [facility].  - RN agreed to secure email lab orders to [facility].  - [facility] will send MyChart msg today requesting pt call back re his labs and rescheduling with . Will attempt to call again next business day also.  Electronically signed by Yoly Mcnally RN on 3/29/2024 at 3:27 PM

## 2024-04-01 ENCOUNTER — CARE COORDINATION (OUTPATIENT)
Dept: CARE COORDINATION | Age: 54
End: 2024-04-01

## 2024-04-04 ENCOUNTER — CARE COORDINATION (OUTPATIENT)
Dept: CARE COORDINATION | Age: 54
End: 2024-04-04

## 2024-04-04 NOTE — CARE COORDINATION
Unable to reach patient consistently, voicemail is full. VMs left with domestic partner have not been returned.  Left final msg today on DM's phone urging pt to f/u with [facility], to get labs mailed to him and re-schedule his missed March appt. Patient has ACM phone number for call back if needed. No further contact will be attempted at this time. Should patient contact ACM, will provide support/education as appropriate.  Electronically signed by Yoly Mcnally RN on 4/4/2024 at 11:37 AM

## 2024-04-05 PROBLEM — R65.10 SIRS (SYSTEMIC INFLAMMATORY RESPONSE SYNDROME) (HCC): Status: RESOLVED | Noted: 2023-03-26 | Resolved: 2024-04-05

## 2024-04-05 PROBLEM — M00.9 SEPTIC ARTHRITIS OF HIP (HCC): Status: RESOLVED | Noted: 2021-02-27 | Resolved: 2024-04-05

## 2024-04-10 ENCOUNTER — HOSPITAL ENCOUNTER (OUTPATIENT)
Dept: MRI IMAGING | Age: 54
Discharge: HOME | End: 2024-04-10
Payer: MEDICARE

## 2024-04-10 ENCOUNTER — HOSPITAL ENCOUNTER (OUTPATIENT)
Dept: CT IMAGING | Age: 54
Discharge: HOME | End: 2024-04-10
Payer: MEDICARE

## 2024-04-10 DIAGNOSIS — Z94.4 STATUS POST LIVER TRANSPLANT (HCC): ICD-10-CM

## 2024-04-10 PROCEDURE — 71250 CT THORAX DX C-: CPT

## 2024-04-10 PROCEDURE — 6360000004 HC RX CONTRAST MEDICATION: Performed by: INTERNAL MEDICINE

## 2024-04-10 PROCEDURE — A9577 INJ MULTIHANCE: HCPCS | Performed by: INTERNAL MEDICINE

## 2024-04-10 PROCEDURE — 74183 MRI ABD W/O CNTR FLWD CNTR: CPT

## 2024-04-10 RX ADMIN — GADOBENATE DIMEGLUMINE 20 ML: 529 INJECTION, SOLUTION INTRAVENOUS at 15:06

## 2024-07-08 ENCOUNTER — APPOINTMENT (OUTPATIENT)
Dept: CT IMAGING | Facility: HOSPITAL | Age: 54
End: 2024-07-08
Payer: MEDICARE

## 2024-07-08 ENCOUNTER — HOSPITAL ENCOUNTER (INPATIENT)
Facility: HOSPITAL | Age: 54
LOS: 5 days | Discharge: HOME | End: 2024-07-13
Attending: EMERGENCY MEDICINE | Admitting: INTERNAL MEDICINE
Payer: MEDICARE

## 2024-07-08 DIAGNOSIS — E66.01 MORBID OBESITY: ICD-10-CM

## 2024-07-08 DIAGNOSIS — R60.1 ANASARCA: ICD-10-CM

## 2024-07-08 DIAGNOSIS — E87.1 HYPONATREMIA: Primary | ICD-10-CM

## 2024-07-08 DIAGNOSIS — K76.0 HEPATIC STEATOSIS: ICD-10-CM

## 2024-07-08 DIAGNOSIS — Z74.09 IMPAIRED MOBILITY: ICD-10-CM

## 2024-07-08 DIAGNOSIS — R53.1 GENERALIZED WEAKNESS: ICD-10-CM

## 2024-07-08 DIAGNOSIS — S31.109A WOUND OF ABDOMEN: ICD-10-CM

## 2024-07-08 DIAGNOSIS — Z94.4 STATUS POST LIVER TRANSPLANT: ICD-10-CM

## 2024-07-08 LAB
ALBUMIN SERPL-MCNC: 3 G/DL (ref 3.5–5.2)
ALBUMIN/GLOB SERPL: 0.8 G/DL
ALP SERPL-CCNC: 321 U/L (ref 39–117)
ALT SERPL W P-5'-P-CCNC: 58 U/L (ref 1–41)
AMPHET+METHAMPHET UR QL: NEGATIVE
AMPHETAMINES UR QL: NEGATIVE
ANION GAP SERPL CALCULATED.3IONS-SCNC: 7 MMOL/L (ref 5–15)
AST SERPL-CCNC: 135 U/L (ref 1–40)
BARBITURATES UR QL SCN: NEGATIVE
BASOPHILS # BLD AUTO: 0.11 10*3/MM3 (ref 0–0.2)
BASOPHILS NFR BLD AUTO: 0.9 % (ref 0–1.5)
BENZODIAZ UR QL SCN: NEGATIVE
BILIRUB SERPL-MCNC: 0.6 MG/DL (ref 0–1.2)
BUN SERPL-MCNC: 12 MG/DL (ref 6–20)
BUN/CREAT SERPL: 17.9 (ref 7–25)
BUPRENORPHINE SERPL-MCNC: NEGATIVE NG/ML
CALCIUM SPEC-SCNC: 8.6 MG/DL (ref 8.6–10.5)
CANNABINOIDS SERPL QL: NEGATIVE
CHLORIDE SERPL-SCNC: 79 MMOL/L (ref 98–107)
CO2 SERPL-SCNC: 29 MMOL/L (ref 22–29)
COCAINE UR QL: NEGATIVE
CREAT SERPL-MCNC: 0.67 MG/DL (ref 0.76–1.27)
DEPRECATED RDW RBC AUTO: 54.4 FL (ref 37–54)
EGFRCR SERPLBLD CKD-EPI 2021: 111.6 ML/MIN/1.73
EOSINOPHIL # BLD AUTO: 0.16 10*3/MM3 (ref 0–0.4)
EOSINOPHIL NFR BLD AUTO: 1.3 % (ref 0.3–6.2)
ERYTHROCYTE [DISTWIDTH] IN BLOOD BY AUTOMATED COUNT: 18 % (ref 12.3–15.4)
ETHANOL UR QL: <0.01 %
FENTANYL UR-MCNC: NEGATIVE NG/ML
GLOBULIN UR ELPH-MCNC: 3.6 GM/DL
GLUCOSE BLDC GLUCOMTR-MCNC: 181 MG/DL (ref 70–130)
GLUCOSE SERPL-MCNC: 177 MG/DL (ref 65–99)
HCT VFR BLD AUTO: 34.7 % (ref 37.5–51)
HGB BLD-MCNC: 10.8 G/DL (ref 13–17.7)
IMM GRANULOCYTES # BLD AUTO: 0.47 10*3/MM3 (ref 0–0.05)
IMM GRANULOCYTES NFR BLD AUTO: 3.9 % (ref 0–0.5)
INR PPP: 1.01 (ref 0.91–1.09)
LYMPHOCYTES # BLD AUTO: 0.47 10*3/MM3 (ref 0.7–3.1)
LYMPHOCYTES NFR BLD AUTO: 3.9 % (ref 19.6–45.3)
MCH RBC QN AUTO: 26 PG (ref 26.6–33)
MCHC RBC AUTO-ENTMCNC: 31.1 G/DL (ref 31.5–35.7)
MCV RBC AUTO: 83.4 FL (ref 79–97)
METHADONE UR QL SCN: NEGATIVE
MONOCYTES # BLD AUTO: 0.92 10*3/MM3 (ref 0.1–0.9)
MONOCYTES NFR BLD AUTO: 7.7 % (ref 5–12)
NEUTROPHILS NFR BLD AUTO: 82.3 % (ref 42.7–76)
NEUTROPHILS NFR BLD AUTO: 9.8 10*3/MM3 (ref 1.7–7)
NRBC BLD AUTO-RTO: 0 /100 WBC (ref 0–0.2)
NT-PROBNP SERPL-MCNC: 445.2 PG/ML (ref 0–900)
OPIATES UR QL: NEGATIVE
OSMOLALITY SERPL: 248 MOSM/KG (ref 275–295)
OSMOLALITY UR: 345 MOSM/KG (ref 50–1400)
OXYCODONE UR QL SCN: POSITIVE
PCP UR QL SCN: NEGATIVE
PLATELET # BLD AUTO: 266 10*3/MM3 (ref 140–450)
PMV BLD AUTO: 9.2 FL (ref 6–12)
POTASSIUM SERPL-SCNC: 5.4 MMOL/L (ref 3.5–5.2)
PROT SERPL-MCNC: 6.6 G/DL (ref 6–8.5)
PROTHROMBIN TIME: 13.7 SECONDS (ref 11.8–14.8)
RBC # BLD AUTO: 4.16 10*6/MM3 (ref 4.14–5.8)
SODIUM SERPL-SCNC: 115 MMOL/L (ref 136–145)
SODIUM UR-SCNC: <20 MMOL/L
TRICYCLICS UR QL SCN: NEGATIVE
WBC NRBC COR # BLD AUTO: 11.93 10*3/MM3 (ref 3.4–10.8)

## 2024-07-08 PROCEDURE — 25010000002 DEXAMETHASONE PER 1 MG: Performed by: EMERGENCY MEDICINE

## 2024-07-08 PROCEDURE — 83935 ASSAY OF URINE OSMOLALITY: CPT | Performed by: PHYSICIAN ASSISTANT

## 2024-07-08 PROCEDURE — 82077 ASSAY SPEC XCP UR&BREATH IA: CPT | Performed by: EMERGENCY MEDICINE

## 2024-07-08 PROCEDURE — 63710000001 TACROLIMUS PER 1 MG: Performed by: INTERNAL MEDICINE

## 2024-07-08 PROCEDURE — 63710000001 INSULIN LISPRO (HUMAN) PER 5 UNITS: Performed by: PHYSICIAN ASSISTANT

## 2024-07-08 PROCEDURE — 63710000001 PREDNISONE PER 5 MG: Performed by: INTERNAL MEDICINE

## 2024-07-08 PROCEDURE — 84300 ASSAY OF URINE SODIUM: CPT | Performed by: PHYSICIAN ASSISTANT

## 2024-07-08 PROCEDURE — 25810000003 SODIUM CHLORIDE 0.9 % SOLUTION: Performed by: EMERGENCY MEDICINE

## 2024-07-08 PROCEDURE — 82948 REAGENT STRIP/BLOOD GLUCOSE: CPT

## 2024-07-08 PROCEDURE — 84295 ASSAY OF SERUM SODIUM: CPT | Performed by: PHYSICIAN ASSISTANT

## 2024-07-08 PROCEDURE — 25010000002 ENOXAPARIN PER 10 MG: Performed by: PHYSICIAN ASSISTANT

## 2024-07-08 PROCEDURE — 25010000002 ONDANSETRON PER 1 MG: Performed by: PHYSICIAN ASSISTANT

## 2024-07-08 PROCEDURE — 80053 COMPREHEN METABOLIC PANEL: CPT | Performed by: EMERGENCY MEDICINE

## 2024-07-08 PROCEDURE — 80307 DRUG TEST PRSMV CHEM ANLYZR: CPT | Performed by: EMERGENCY MEDICINE

## 2024-07-08 PROCEDURE — 93010 ELECTROCARDIOGRAM REPORT: CPT | Performed by: INTERNAL MEDICINE

## 2024-07-08 PROCEDURE — 25510000001 IOPAMIDOL 61 % SOLUTION: Performed by: EMERGENCY MEDICINE

## 2024-07-08 PROCEDURE — 85025 COMPLETE CBC W/AUTO DIFF WBC: CPT | Performed by: EMERGENCY MEDICINE

## 2024-07-08 PROCEDURE — 25010000002 FUROSEMIDE PER 20 MG: Performed by: PHYSICIAN ASSISTANT

## 2024-07-08 PROCEDURE — 85610 PROTHROMBIN TIME: CPT | Performed by: EMERGENCY MEDICINE

## 2024-07-08 PROCEDURE — 93005 ELECTROCARDIOGRAM TRACING: CPT | Performed by: EMERGENCY MEDICINE

## 2024-07-08 PROCEDURE — 74177 CT ABD & PELVIS W/CONTRAST: CPT

## 2024-07-08 PROCEDURE — 36415 COLL VENOUS BLD VENIPUNCTURE: CPT

## 2024-07-08 PROCEDURE — 99285 EMERGENCY DEPT VISIT HI MDM: CPT

## 2024-07-08 PROCEDURE — 83880 ASSAY OF NATRIURETIC PEPTIDE: CPT | Performed by: EMERGENCY MEDICINE

## 2024-07-08 PROCEDURE — 83930 ASSAY OF BLOOD OSMOLALITY: CPT | Performed by: PHYSICIAN ASSISTANT

## 2024-07-08 RX ORDER — PANTOPRAZOLE SODIUM 40 MG/1
40 TABLET, DELAYED RELEASE ORAL DAILY
Status: DISCONTINUED | OUTPATIENT
Start: 2024-07-09 | End: 2024-07-13 | Stop reason: HOSPADM

## 2024-07-08 RX ORDER — BUSPIRONE HYDROCHLORIDE 7.5 MG/1
7.5 TABLET ORAL 3 TIMES DAILY
COMMUNITY

## 2024-07-08 RX ORDER — PREGABALIN 75 MG/1
75 CAPSULE ORAL 2 TIMES DAILY
Status: DISCONTINUED | OUTPATIENT
Start: 2024-07-08 | End: 2024-07-13 | Stop reason: HOSPADM

## 2024-07-08 RX ORDER — DEXAMETHASONE SODIUM PHOSPHATE 10 MG/ML
10 INJECTION INTRAMUSCULAR; INTRAVENOUS ONCE
Status: COMPLETED | OUTPATIENT
Start: 2024-07-08 | End: 2024-07-08

## 2024-07-08 RX ORDER — ENOXAPARIN SODIUM 100 MG/ML
40 INJECTION SUBCUTANEOUS EVERY 12 HOURS
Status: DISCONTINUED | OUTPATIENT
Start: 2024-07-08 | End: 2024-07-13 | Stop reason: HOSPADM

## 2024-07-08 RX ORDER — TACROLIMUS 1 MG/1
3 CAPSULE ORAL DAILY
Status: DISCONTINUED | OUTPATIENT
Start: 2024-07-08 | End: 2024-07-13 | Stop reason: HOSPADM

## 2024-07-08 RX ORDER — AMOXICILLIN 250 MG
2 CAPSULE ORAL 2 TIMES DAILY
Status: DISCONTINUED | OUTPATIENT
Start: 2024-07-08 | End: 2024-07-13 | Stop reason: HOSPADM

## 2024-07-08 RX ORDER — SODIUM CHLORIDE 9 MG/ML
125 INJECTION, SOLUTION INTRAVENOUS CONTINUOUS
Status: DISCONTINUED | OUTPATIENT
Start: 2024-07-08 | End: 2024-07-08

## 2024-07-08 RX ORDER — LECITHIN 1200 MG
1 CAPSULE ORAL DAILY
Status: ON HOLD | COMMUNITY
End: 2024-07-08

## 2024-07-08 RX ORDER — CHLORHEXIDINE GLUCONATE 500 MG/1
1 CLOTH TOPICAL EVERY 24 HOURS
Status: DISCONTINUED | OUTPATIENT
Start: 2024-07-09 | End: 2024-07-12

## 2024-07-08 RX ORDER — OXYCODONE HYDROCHLORIDE 5 MG/1
10 TABLET ORAL EVERY 6 HOURS PRN
Status: DISCONTINUED | OUTPATIENT
Start: 2024-07-08 | End: 2024-07-13 | Stop reason: HOSPADM

## 2024-07-08 RX ORDER — SODIUM CHLORIDE 0.9 % (FLUSH) 0.9 %
10 SYRINGE (ML) INJECTION AS NEEDED
Status: DISCONTINUED | OUTPATIENT
Start: 2024-07-08 | End: 2024-07-13 | Stop reason: HOSPADM

## 2024-07-08 RX ORDER — NITROGLYCERIN 0.4 MG/1
0.4 TABLET SUBLINGUAL
Status: DISCONTINUED | OUTPATIENT
Start: 2024-07-08 | End: 2024-07-13 | Stop reason: HOSPADM

## 2024-07-08 RX ORDER — BUSPIRONE HYDROCHLORIDE 5 MG/1
7.5 TABLET ORAL 3 TIMES DAILY
Status: DISCONTINUED | OUTPATIENT
Start: 2024-07-08 | End: 2024-07-08

## 2024-07-08 RX ORDER — ONDANSETRON 2 MG/ML
4 INJECTION INTRAMUSCULAR; INTRAVENOUS EVERY 6 HOURS PRN
Status: DISCONTINUED | OUTPATIENT
Start: 2024-07-08 | End: 2024-07-13 | Stop reason: HOSPADM

## 2024-07-08 RX ORDER — CHLORHEXIDINE GLUCONATE 500 MG/1
1 CLOTH TOPICAL ONCE
Status: COMPLETED | OUTPATIENT
Start: 2024-07-08 | End: 2024-07-08

## 2024-07-08 RX ORDER — INSULIN LISPRO 100 [IU]/ML
2-7 INJECTION, SOLUTION INTRAVENOUS; SUBCUTANEOUS
Status: DISCONTINUED | OUTPATIENT
Start: 2024-07-08 | End: 2024-07-13 | Stop reason: HOSPADM

## 2024-07-08 RX ORDER — MULTIPLE VITAMINS W/ MINERALS TAB 9MG-400MCG
1 TAB ORAL DAILY
Status: ON HOLD | COMMUNITY
End: 2024-07-08

## 2024-07-08 RX ORDER — NICOTINE POLACRILEX 4 MG
15 LOZENGE BUCCAL
Status: DISCONTINUED | OUTPATIENT
Start: 2024-07-08 | End: 2024-07-13 | Stop reason: HOSPADM

## 2024-07-08 RX ORDER — INSULIN LISPRO 100 [IU]/ML
1-6 INJECTION, SOLUTION INTRAVENOUS; SUBCUTANEOUS
COMMUNITY

## 2024-07-08 RX ORDER — BISACODYL 10 MG
10 SUPPOSITORY, RECTAL RECTAL DAILY PRN
Status: DISCONTINUED | OUTPATIENT
Start: 2024-07-08 | End: 2024-07-13 | Stop reason: HOSPADM

## 2024-07-08 RX ORDER — SODIUM CHLORIDE 9 MG/ML
40 INJECTION, SOLUTION INTRAVENOUS AS NEEDED
Status: DISCONTINUED | OUTPATIENT
Start: 2024-07-08 | End: 2024-07-13 | Stop reason: HOSPADM

## 2024-07-08 RX ORDER — ATORVASTATIN CALCIUM 10 MG/1
10 TABLET, FILM COATED ORAL DAILY
Status: DISCONTINUED | OUTPATIENT
Start: 2024-07-08 | End: 2024-07-13 | Stop reason: HOSPADM

## 2024-07-08 RX ORDER — DEXTROSE MONOHYDRATE 25 G/50ML
25 INJECTION, SOLUTION INTRAVENOUS
Status: DISCONTINUED | OUTPATIENT
Start: 2024-07-08 | End: 2024-07-13 | Stop reason: HOSPADM

## 2024-07-08 RX ORDER — PREDNISONE 5 MG/1
5 TABLET ORAL 2 TIMES DAILY
Status: DISCONTINUED | OUTPATIENT
Start: 2024-07-08 | End: 2024-07-09

## 2024-07-08 RX ORDER — FUROSEMIDE 10 MG/ML
40 INJECTION INTRAMUSCULAR; INTRAVENOUS EVERY 12 HOURS
Status: DISCONTINUED | OUTPATIENT
Start: 2024-07-08 | End: 2024-07-09

## 2024-07-08 RX ORDER — BISACODYL 5 MG/1
5 TABLET, DELAYED RELEASE ORAL DAILY PRN
Status: DISCONTINUED | OUTPATIENT
Start: 2024-07-08 | End: 2024-07-13 | Stop reason: HOSPADM

## 2024-07-08 RX ORDER — POLYETHYLENE GLYCOL 3350 17 G/17G
17 POWDER, FOR SOLUTION ORAL DAILY PRN
Status: DISCONTINUED | OUTPATIENT
Start: 2024-07-08 | End: 2024-07-13 | Stop reason: HOSPADM

## 2024-07-08 RX ORDER — MILK THISTLE 500 MG
1 CAPSULE ORAL DAILY
Status: ON HOLD | COMMUNITY
End: 2024-07-08

## 2024-07-08 RX ORDER — SODIUM CHLORIDE 0.9 % (FLUSH) 0.9 %
10 SYRINGE (ML) INJECTION EVERY 12 HOURS SCHEDULED
Status: DISCONTINUED | OUTPATIENT
Start: 2024-07-08 | End: 2024-07-13 | Stop reason: HOSPADM

## 2024-07-08 RX ORDER — INSULIN LISPRO 100 [IU]/ML
2-10 INJECTION, SOLUTION INTRAVENOUS; SUBCUTANEOUS
Status: DISCONTINUED | OUTPATIENT
Start: 2024-07-08 | End: 2024-07-08

## 2024-07-08 RX ADMIN — ATORVASTATIN CALCIUM 10 MG: 10 TABLET, FILM COATED ORAL at 18:25

## 2024-07-08 RX ADMIN — ONDANSETRON 4 MG: 2 INJECTION INTRAMUSCULAR; INTRAVENOUS at 18:36

## 2024-07-08 RX ADMIN — FUROSEMIDE 40 MG: 10 INJECTION, SOLUTION INTRAMUSCULAR; INTRAVENOUS at 20:17

## 2024-07-08 RX ADMIN — SODIUM CHLORIDE 125 ML/HR: 9 INJECTION, SOLUTION INTRAVENOUS at 15:33

## 2024-07-08 RX ADMIN — DEXAMETHASONE SODIUM PHOSPHATE 10 MG: 10 INJECTION INTRAMUSCULAR; INTRAVENOUS at 16:32

## 2024-07-08 RX ADMIN — INSULIN LISPRO 2 UNITS: 100 INJECTION, SOLUTION INTRAVENOUS; SUBCUTANEOUS at 20:16

## 2024-07-08 RX ADMIN — Medication 1 APPLICATION: at 18:25

## 2024-07-08 RX ADMIN — Medication 10 ML: at 20:32

## 2024-07-08 RX ADMIN — CHLORHEXIDINE GLUCONATE 1 APPLICATION: 500 CLOTH TOPICAL at 18:07

## 2024-07-08 RX ADMIN — ENOXAPARIN SODIUM 40 MG: 40 INJECTION SUBCUTANEOUS at 18:25

## 2024-07-08 RX ADMIN — MAGNESIUM GLUCONATE 500 MG ORAL TABLET 400 MG: 500 TABLET ORAL at 20:16

## 2024-07-08 RX ADMIN — TACROLIMUS 3 MG: 1 CAPSULE ORAL at 20:30

## 2024-07-08 RX ADMIN — PREGABALIN 75 MG: 75 CAPSULE ORAL at 20:17

## 2024-07-08 RX ADMIN — IOPAMIDOL 100 ML: 612 INJECTION, SOLUTION INTRAVENOUS at 15:07

## 2024-07-08 RX ADMIN — PREDNISONE 5 MG: 5 TABLET ORAL at 20:16

## 2024-07-08 RX ADMIN — OXYCODONE HYDROCHLORIDE 10 MG: 5 TABLET ORAL at 18:25

## 2024-07-08 NOTE — H&P
[facility] Intensivist Services  HISTORY AND PHYSICAL    Date of Admission: 7/8/2024  Primary Care Physician: Feliberto Blount MD    Subjective   Primary Historian: Patient, note from the ER doctor    Chief Complaint: Edema, generalized weakness, and shortness of breath    History of Present Illness  53-year-old male with a history of liver transplant in 2015, diabetes mellitus, esophageal varices, and thrombocytopenia admitted after presenting to the emergency department with complaints of edema, generalized weakness, and shortness of breath which has progressively worsened over the last few days.  He denies any pain at this time.  He tells me he has not taken his steroid medication in some time, but he is unsure how long this has been.  It is unclear if he is taking his other medication at this time.  Workup in the ER revealed a sodium of 115, potassium 5.4, chloride 79, , ALT 58, WBC 11.93, hemoglobin 10.8, hematocrit 34.7, urine drug screen positive for oxycodone, and CT abdomen/pelvis showing evidence of liver transplant with hepatic steatosis, but no abdominal ascites.  There is fluid and edema surrounding the mid to distal esophagus with suspected esophageal wall thickening.  Mild body anasarca also noted on CT abdomen/pelvis.    Patient was evaluated shortly after arriving to the ICU.  He is in no distress at this time.  He does complain of some shortness of breath and has noticed some edema over the last few days.  He denies any other complaints at this time.  We will continue to monitor his sodium levels closely.    Review of Systems   Otherwise complete ROS reviewed and negative except as mentioned in the HPI.    Past Medical History:   Past Medical History:   Diagnosis Date    Cirrhosis of liver     Diabetes mellitus     Hep C w/o coma, chronic     Thrombocytopenia     Varices, esophageal      Past Surgical History:  Past Surgical History:   Procedure  Laterality Date    GROIN ABSCESS INCISION AND DRAINAGE Right 1/15/2024    Procedure: GROIN ABSCESS INCISION AND DRAINAGE WITH APPLICATION OF WOUND VAC;  Surgeon: Manas Fernandes MD;  Location:  [facility] OR;  Service: General;  Laterality: Right;    GROIN ABSCESS INCISION AND DRAINAGE Right 1/17/2024    Procedure: wound vac change;  Surgeon: Manas Fernandes MD;  Location:  [facility] OR;  Service: General;  Laterality: Right;    LIVER TRANSPLANTATION  2016     Social History:  reports that he quit smoking about 8 years ago. His smoking use included cigarettes. He started smoking about 33 years ago. He has a 12.5 pack-year smoking history. He does not have any smokeless tobacco history on file. He reports that he does not use drugs.    Family History: family history includes Breast cancer in his mother; Diabetes in his father.       Allergies:  No Known Allergies    Medications:  Prior to Admission medications    Medication Sig Start Date End Date Taking? Authorizing Provider   atorvastatin (LIPITOR) 10 MG tablet Take 1 tablet by mouth Daily.   Yes Devora Spencer MD   Magnesium Oxide -Mg Supplement 400 (240 Mg) MG tablet Take 1 tablet by mouth 2 (Two) Times a Day.   Yes Devora Spencer MD   oxyCODONE (ROXICODONE) 10 MG tablet Take 1 tablet by mouth Every 6 (Six) Hours As Needed for Moderate Pain.   Yes Devora Spencer MD   pantoprazole (PROTONIX) 40 MG EC tablet Take 1 tablet by mouth Daily.   Yes Devora Spencer MD   potassium chloride ER (K-TAB) 20 MEQ tablet controlled-release ER tablet Take 1 tablet by mouth Daily.   Yes Devora Spencer MD   pregabalin (LYRICA) 75 MG capsule Take 1 capsule by mouth 2 (Two) Times a Day.   Yes Devora Spencer MD   Tacrolimus ER 1 MG tablet sustained-release 24 hour Take 3 tablets by mouth Daily.   Yes Devora Spencer MD   busPIRone (BUSPAR) 7.5 MG tablet Take 1 tablet by mouth 3 (Three) Times a Day.    Devora Spencer MD   Insulin Lispro  "(humaLOG) 100 UNIT/ML injection Inject 2-10 Units under the skin into the appropriate area as directed 3 (Three) Times a Day Before Meals.    Devora Spencer MD   predniSONE (DELTASONE) 5 MG tablet Take 1 tablet by mouth 2 (Two) Times a Day.    Devora Spencer MD   insulin aspart (novoLOG FLEXPEN) 100 UNIT/ML solution pen-injector sc pen Inject 2-10 Units under the skin into the appropriate area as directed 3 (Three) Times a Day With Meals.     7/8/24  Devora Spencer MD   Zinc 50 MG tablet Take 1 tablet by mouth Daily.  7/8/24  Devora Spencer MD     I have utilized all available immediate resources to obtain, update, or review the patient's current medications (including all prescriptions, over-the-counter products, herbals, cannabis/cannabidiol products, and vitamin/mineral/dietary (nutritional) supplements).    Objective     Vital Signs: /85 (BP Location: Right arm, Patient Position: Lying)   Pulse 86   Temp 97.5 °F (36.4 °C) (Axillary)   Resp 19   Ht 185.4 cm (73\")   Wt (!) 160 kg (353 lb 13.4 oz)   SpO2 100%   BMI 46.68 kg/m²   Physical Exam  Constitutional:       General: He is not in acute distress.     Appearance: He is obese.   HENT:      Head: Normocephalic and atraumatic.      Mouth/Throat:      Mouth: Mucous membranes are dry.   Cardiovascular:      Rate and Rhythm: Normal rate and regular rhythm.      Pulses: Normal pulses.      Heart sounds: Normal heart sounds.   Pulmonary:      Effort: Pulmonary effort is normal.      Breath sounds: Normal breath sounds.   Abdominal:      General: Abdomen is protuberant. Bowel sounds are normal.      Palpations: Abdomen is soft.      Tenderness: There is no abdominal tenderness.   Musculoskeletal:      Cervical back: Normal range of motion and neck supple.   Skin:     General: Skin is warm and dry.      Capillary Refill: Capillary refill takes less than 2 seconds.      Comments: Bilateral lower extremity edema noted "   Neurological:      General: No focal deficit present.      Mental Status: He is alert and oriented to person, place, and time. Mental status is at baseline.      Cranial Nerves: No cranial nerve deficit.   Psychiatric:         Mood and Affect: Mood normal.         Behavior: Behavior normal.           Results Reviewed:    CT Abdomen Pelvis With Contrast    Result Date: 7/8/2024   Status post liver transplant with hepatic steatosis. No abdominal ascites.  There is fluid and edema surrounding the mid to distal esophagus with suspected esophageal wall thickening. Correlate for esophagitis.   Thickening of the appendix tip measuring up to 1.3 cm without adjacent inflammatory changes could represent normal variation of this patient as opposed to early tip appendicitis. Recommend correlation with prior outside imaging if available.  Perigastric and paraesophageal varices.  Mild body anasarca.  Mild cardiomegaly and trace right pleural effusion.   This report was signed and finalized on 7/8/2024 3:35 PM by True Juarez.       Result Review:  I have personally reviewed the results from the time of this admission to 7/8/2024 17:58 CDT and agree with these findings:  [x]  Laboratory list / accordion  []  Microbiology  [x]  Radiology  [x]  EKG/Telemetry   []  Cardiology/Vascular   []  Pathology  []  Old records  []  Other:  Most notable findings include: Sodium 115, potassium 5.4, WBC 11.93, albumin 3.0, alkaline phosphatase 321, , ALT 58      I have personally reviewed and interpreted the radiology studies and ECG obtained at time of admission.     Assessment / Plan   Assessment:   Active Hospital Problems    Diagnosis     **Hyponatremia        Treatment Plan  The patient will be admitted to intensivist service here at [facility].     Hyponatremia   -Na 115   -Urine sodium, urine osmolality, and serum osmolality pending   -Patient was on normal saline at 125 cc/h.  Repeat sodium was still 115.  Given  patient's edema and dyspnea, I am stopping his fluids and starting him on 40 mg Lasix IV twice daily   -No altered mentation noted.  No focal deficits noted.   -Serial sodium labs to be drawn every 4 hours    2.  History of cirrhosis, status post liver transplant   -Patient reports he underwent liver transplant 2015   -?  Compliance with medication as he states he has not taken some of them in quite some time   -Resume tacrolimus and steroids    3.  Diabetes mellitus   -Starting sliding scale insulin with frequent Accu-Cheks   -Continue to monitor closely        Medical Decision Making  Number and Complexity of problems: 3  Differential Diagnosis: Hypovolemic hyponatremia, hypervolemic hyponatremia, cirrhotic liver    Conditions and Status        Condition is unchanged currently as he just arrived from the ER recently.     MDM Data  External documents reviewed: N/A  Cardiac tracing (EKG, telemetry) interpretation: Sinus rhythm  Radiology interpretation: Yes  Labs reviewed: Yes  Any tests that were considered but not ordered: No     Decision rules/scores evaluated (example [id number], Wells, etc): N/A     Discussed with: Dr. Huerta, patient     Care Planning  Shared decision making: Dr. Huerta, patient  Code status and discussions: Full code    Disposition  Social Determinants of Health that impact treatment or disposition: Chronic comorbid medical problems with history of noncompliance  Estimated length of stay is 3 to 5 days.     I confirmed that the patient's advanced care plan is present, code status is documented, and a surrogate decision maker is listed in the patient's medical record.     The patient's surrogate decision maker is his significant other, Mariya.     The patient was seen and examined by me on 7/8/2024 at 6 PM.    I provided 44 minutes of total critical care time. Due to the high probability of clinically significant, life-threatening deterioration, the patient required my direct and personal  management. The critical care time does not include time spent on separately billable procedures.    Electronically signed by Abel Coelho PA-C on 7/8/2024 at 17:58 CDT
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
## 2024-07-08 NOTE — ED NOTES
"Nursing report ED to floor  Gil Monroy  53 y.o.  male    HPI:   Chief Complaint   Patient presents with    Edema    Weakness - Generalized       Admitting doctor:   hSaron Huerta MD    Consulting provider(s):  Consults       No orders found from 6/9/2024 to 7/9/2024.             Admitting diagnosis:   The primary encounter diagnosis was Hyponatremia. Diagnoses of Morbid obesity, Generalized weakness, Anasarca, Status post liver transplant, and Hepatic steatosis were also pertinent to this visit.    Code status:   Current Code Status       Date Active Code Status Order ID Comments User Context       Prior            Allergies:   Patient has no known allergies.    Intake and Output  No intake or output data in the 24 hours ending 07/08/24 1643    Weight:       07/08/24  1242   Weight: (!) 150 kg (330 lb)       Most recent vitals:   Vitals:    07/08/24 1242   BP: 145/85   Pulse: 79   Resp: 18   Temp: 97.4 °F (36.3 °C)   SpO2: 99%   Weight: (!) 150 kg (330 lb)   Height: 185.4 cm (73\")     Oxygen Therapy: .    Active LDAs/IV Access:   Lines, Drains & Airways       Active LDAs       Name Placement date Placement time Site Days    Peripheral IV 01/15/24 1613 Left Antecubital 01/15/24  1613  Antecubital  174    Peripheral IV 07/08/24 Left Antecubital 07/08/24  --  Antecubital  less than 1                    Labs (abnormal labs have a star):   Labs Reviewed   COMPREHENSIVE METABOLIC PANEL - Abnormal; Notable for the following components:       Result Value    Glucose 177 (*)     Creatinine 0.67 (*)     Sodium 115 (*)     Potassium 5.4 (*)     Chloride 79 (*)     Albumin 3.0 (*)     ALT (SGPT) 58 (*)     AST (SGOT) 135 (*)     Alkaline Phosphatase 321 (*)     All other components within normal limits    Narrative:     GFR Normal >60  Chronic Kidney Disease <60  Kidney Failure <15     CBC WITH AUTO DIFFERENTIAL - Abnormal; Notable for the following components:    WBC 11.93 (*)     Hemoglobin 10.8 (*)     Hematocrit 34.7 " (*)     MCH 26.0 (*)     MCHC 31.1 (*)     RDW 18.0 (*)     RDW-SD 54.4 (*)     Neutrophil % 82.3 (*)     Lymphocyte % 3.9 (*)     Immature Grans % 3.9 (*)     Neutrophils, Absolute 9.80 (*)     Lymphocytes, Absolute 0.47 (*)     Monocytes, Absolute 0.92 (*)     Immature Grans, Absolute 0.47 (*)     All other components within normal limits   URINE DRUG SCREEN - Abnormal; Notable for the following components:    Oxycodone Screen, Urine Positive (*)     All other components within normal limits    Narrative:     Cutoff For Drugs Screened:    Amphetamines               500 ng/ml  Barbiturates               200 ng/ml  Benzodiazepines            150 ng/ml  Cocaine                    150 ng/ml  Methadone                  200 ng/ml  Opiates                    100 ng/ml  Phencyclidine               25 ng/ml  THC                         50 ng/ml  Methamphetamine            500 ng/ml  Tricyclic Antidepressants  300 ng/ml  Oxycodone                  100 ng/ml  Buprenorphine               10 ng/ml    The normal value for all drugs tested is negative. This report includes unconfirmed screening results, with the cutoff values listed, to be used for medical treatment purposes only.  Unconfirmed results must not be used for non-medical purposes such as employment or legal testing.  Clinical consideration should be applied to any drug of abuse test, particularly when unconfirmed results are used.     PROTIME-INR - Normal   BNP (IN-HOUSE) - Normal    Narrative:     This assay is used as an aid in the diagnosis of individuals suspected of having heart failure. It can be used as an aid in the diagnosis of acute decompensated heart failure (ADHF) in patients presenting with signs and symptoms of ADHF to the emergency department (ED). In addition, NT-proBNP of <300 pg/mL indicates ADHF is not likely.    Age Range Result Interpretation  NT-proBNP Concentration (pg/mL:      <50             Positive            >450                   Riggs                  300-450                    Negative             <300    50-75           Positive            >900                  Gray                300-900                  Negative            <300      >75             Positive            >1800                  Gray                300-1800                  Negative            <300   FENTANYL, URINE - Normal    Narrative:     Negative Threshold:      Fentanyl 5 ng/mL     The normal value for the drug tested is negative. This report includes final unconfirmed screening results to be used for medical treatment purposes only. Unconfirmed results must not be used for non-medical purposes such as employment or legal testing. Clinical consideration should be applied to any drug of abuse test, particularly when unconfirmed results are used.          ETHANOL    Narrative:     Not for legal purposes. Chain of Custody not followed.    CBC AND DIFFERENTIAL    Narrative:     The following orders were created for panel order CBC & Differential.  Procedure                               Abnormality         Status                     ---------                               -----------         ------                     CBC Auto Differential[[id number]]        Abnormal            Final result                 Please view results for these tests on the individual orders.       Meds given in ED:   Medications   sodium chloride 0.9 % infusion (125 mL/hr Intravenous New Bag 7/8/24 1533)   iopamidol (ISOVUE-300) 61 % injection 100 mL (100 mL Intravenous Given 7/8/24 1507)   dexAMETHasone (DECADRON) injection 10 mg (10 mg Intravenous Given 7/8/24 1632)     sodium chloride, 125 mL/hr, Last Rate: 125 mL/hr (07/08/24 1533)         NIH Stroke Scale:       Isolation/Infection(s):  No active isolations   C.difficile     COVID Testing  Collected .  Resulted .    Nursing report ED to floor:  Mental status: .  Ambulatory status: .  Precautions: .    ED nurse phone extentsion- ..

## 2024-07-08 NOTE — ED NOTES
"This nurse attempted to have patient change into hospital gown prior to going to CCU.   Patient refused to be placed into hospital gown. Stated \"Im good. I'm going to stay in my clothes.\"     This nurse notified nursing staff upon arrival.   "

## 2024-07-08 NOTE — PLAN OF CARE
Problem: Adult Inpatient Plan of Care  Goal: Plan of Care Review  Outcome: Ongoing, Progressing  Goal: Patient-Specific Goal (Individualized)  Outcome: Ongoing, Progressing  Goal: Absence of Hospital-Acquired Illness or Injury  Outcome: Ongoing, Progressing  Intervention: Identify and Manage Fall Risk  Intervention: Prevent Skin Injury  Intervention: Prevent and Manage VTE (Venous Thromboembolism) Risk  Goal: Optimal Comfort and Wellbeing  Outcome: Ongoing, Progressing  Intervention: Monitor Pain and Promote Comfort  Intervention: Provide Person-Centered Care  Goal: Readiness for Transition of Care  Outcome: Ongoing, Progressing  Intervention: Mutually Develop Transition Plan     Problem: Skin Injury Risk Increased  Goal: Skin Health and Integrity  Outcome: Ongoing, Progressing  Intervention: Optimize Skin Protection   Goal Outcome Evaluation:

## 2024-07-09 ENCOUNTER — APPOINTMENT (OUTPATIENT)
Dept: GENERAL RADIOLOGY | Facility: HOSPITAL | Age: 54
End: 2024-07-09
Payer: MEDICARE

## 2024-07-09 LAB
ALBUMIN SERPL-MCNC: 3 G/DL (ref 3.5–5.2)
ALBUMIN/GLOB SERPL: 0.9 G/DL
ALP SERPL-CCNC: 293 U/L (ref 39–117)
ALT SERPL W P-5'-P-CCNC: 50 U/L (ref 1–41)
ANION GAP SERPL CALCULATED.3IONS-SCNC: 10 MMOL/L (ref 5–15)
ANION GAP SERPL CALCULATED.3IONS-SCNC: 7 MMOL/L (ref 5–15)
AST SERPL-CCNC: 95 U/L (ref 1–40)
BILIRUB SERPL-MCNC: 0.5 MG/DL (ref 0–1.2)
BUN SERPL-MCNC: 14 MG/DL (ref 6–20)
BUN SERPL-MCNC: 14 MG/DL (ref 6–20)
BUN/CREAT SERPL: 18.4 (ref 7–25)
BUN/CREAT SERPL: 19.4 (ref 7–25)
CALCIUM SPEC-SCNC: 8.2 MG/DL (ref 8.6–10.5)
CALCIUM SPEC-SCNC: 8.3 MG/DL (ref 8.6–10.5)
CHLORIDE SERPL-SCNC: 78 MMOL/L (ref 98–107)
CHLORIDE SERPL-SCNC: 81 MMOL/L (ref 98–107)
CO2 SERPL-SCNC: 27 MMOL/L (ref 22–29)
CO2 SERPL-SCNC: 29 MMOL/L (ref 22–29)
CREAT SERPL-MCNC: 0.72 MG/DL (ref 0.76–1.27)
CREAT SERPL-MCNC: 0.76 MG/DL (ref 0.76–1.27)
DEPRECATED RDW RBC AUTO: 55.3 FL (ref 37–54)
EGFRCR SERPLBLD CKD-EPI 2021: 107.5 ML/MIN/1.73
EGFRCR SERPLBLD CKD-EPI 2021: 109.2 ML/MIN/1.73
ERYTHROCYTE [DISTWIDTH] IN BLOOD BY AUTOMATED COUNT: 18.4 % (ref 12.3–15.4)
GLOBULIN UR ELPH-MCNC: 3.4 GM/DL
GLUCOSE BLDC GLUCOMTR-MCNC: 169 MG/DL (ref 70–130)
GLUCOSE BLDC GLUCOMTR-MCNC: 175 MG/DL (ref 70–130)
GLUCOSE BLDC GLUCOMTR-MCNC: 181 MG/DL (ref 70–130)
GLUCOSE BLDC GLUCOMTR-MCNC: 184 MG/DL (ref 70–130)
GLUCOSE BLDC GLUCOMTR-MCNC: 214 MG/DL (ref 70–130)
GLUCOSE BLDC GLUCOMTR-MCNC: 223 MG/DL (ref 70–130)
GLUCOSE BLDC GLUCOMTR-MCNC: 241 MG/DL (ref 70–130)
GLUCOSE BLDC GLUCOMTR-MCNC: 301 MG/DL (ref 70–130)
GLUCOSE SERPL-MCNC: 184 MG/DL (ref 65–99)
GLUCOSE SERPL-MCNC: 200 MG/DL (ref 65–99)
HCT VFR BLD AUTO: 33.9 % (ref 37.5–51)
HGB BLD-MCNC: 10.7 G/DL (ref 13–17.7)
MCH RBC QN AUTO: 26.5 PG (ref 26.6–33)
MCHC RBC AUTO-ENTMCNC: 31.6 G/DL (ref 31.5–35.7)
MCV RBC AUTO: 83.9 FL (ref 79–97)
PLATELET # BLD AUTO: 247 10*3/MM3 (ref 140–450)
PMV BLD AUTO: 9.3 FL (ref 6–12)
POTASSIUM SERPL-SCNC: 5.8 MMOL/L (ref 3.5–5.2)
POTASSIUM SERPL-SCNC: 5.9 MMOL/L (ref 3.5–5.2)
POTASSIUM SERPL-SCNC: 6.5 MMOL/L (ref 3.5–5.2)
PROT SERPL-MCNC: 6.4 G/DL (ref 6–8.5)
RBC # BLD AUTO: 4.04 10*6/MM3 (ref 4.14–5.8)
SODIUM SERPL-SCNC: 115 MMOL/L (ref 136–145)
SODIUM SERPL-SCNC: 117 MMOL/L (ref 136–145)
SODIUM SERPL-SCNC: 117 MMOL/L (ref 136–145)
SODIUM SERPL-SCNC: 119 MMOL/L (ref 136–145)
SODIUM SERPL-SCNC: 120 MMOL/L (ref 136–145)
T4 FREE SERPL-MCNC: 1.22 NG/DL (ref 0.93–1.7)
TSH SERPL DL<=0.05 MIU/L-ACNC: 8.58 UIU/ML (ref 0.27–4.2)
WBC NRBC COR # BLD AUTO: 15.41 10*3/MM3 (ref 3.4–10.8)

## 2024-07-09 PROCEDURE — 25010000002 CALCIUM GLUCONATE-NACL 1-0.675 GM/50ML-% SOLUTION: Performed by: PHYSICIAN ASSISTANT

## 2024-07-09 PROCEDURE — 93005 ELECTROCARDIOGRAM TRACING: CPT | Performed by: NURSE PRACTITIONER

## 2024-07-09 PROCEDURE — 80197 ASSAY OF TACROLIMUS: CPT | Performed by: PHYSICIAN ASSISTANT

## 2024-07-09 PROCEDURE — 84481 FREE ASSAY (FT-3): CPT | Performed by: PHYSICIAN ASSISTANT

## 2024-07-09 PROCEDURE — 84295 ASSAY OF SERUM SODIUM: CPT | Performed by: PHYSICIAN ASSISTANT

## 2024-07-09 PROCEDURE — 93010 ELECTROCARDIOGRAM REPORT: CPT | Performed by: INTERNAL MEDICINE

## 2024-07-09 PROCEDURE — 25010000002 CALCIUM GLUCONATE-NACL 1-0.675 GM/50ML-% SOLUTION: Performed by: NURSE PRACTITIONER

## 2024-07-09 PROCEDURE — 25810000003 SODIUM CHLORIDE 0.9 % SOLUTION: Performed by: PHYSICIAN ASSISTANT

## 2024-07-09 PROCEDURE — 82533 TOTAL CORTISOL: CPT | Performed by: INTERNAL MEDICINE

## 2024-07-09 PROCEDURE — 85027 COMPLETE CBC AUTOMATED: CPT | Performed by: PHYSICIAN ASSISTANT

## 2024-07-09 PROCEDURE — 80053 COMPREHEN METABOLIC PANEL: CPT | Performed by: PHYSICIAN ASSISTANT

## 2024-07-09 PROCEDURE — 63710000001 INSULIN LISPRO (HUMAN) PER 5 UNITS: Performed by: PHYSICIAN ASSISTANT

## 2024-07-09 PROCEDURE — 63710000001 INSULIN REGULAR HUMAN PER 5 UNITS: Performed by: PHYSICIAN ASSISTANT

## 2024-07-09 PROCEDURE — 25010000002 ENOXAPARIN PER 10 MG: Performed by: PHYSICIAN ASSISTANT

## 2024-07-09 PROCEDURE — 63710000001 INSULIN REGULAR HUMAN PER 5 UNITS: Performed by: NURSE PRACTITIONER

## 2024-07-09 PROCEDURE — 84443 ASSAY THYROID STIM HORMONE: CPT | Performed by: NURSE PRACTITIONER

## 2024-07-09 PROCEDURE — 84132 ASSAY OF SERUM POTASSIUM: CPT | Performed by: PHYSICIAN ASSISTANT

## 2024-07-09 PROCEDURE — 25010000002 HYDROCORTISONE SOD SUC (PF) 100 MG RECONSTITUTED SOLUTION: Performed by: INTERNAL MEDICINE

## 2024-07-09 PROCEDURE — 71045 X-RAY EXAM CHEST 1 VIEW: CPT

## 2024-07-09 PROCEDURE — 63710000001 PREDNISONE PER 5 MG: Performed by: INTERNAL MEDICINE

## 2024-07-09 PROCEDURE — 63710000001 TACROLIMUS PER 1 MG: Performed by: INTERNAL MEDICINE

## 2024-07-09 PROCEDURE — 82948 REAGENT STRIP/BLOOD GLUCOSE: CPT

## 2024-07-09 PROCEDURE — 84439 ASSAY OF FREE THYROXINE: CPT | Performed by: PHYSICIAN ASSISTANT

## 2024-07-09 RX ORDER — SODIUM CHLORIDE 9 MG/ML
100 INJECTION, SOLUTION INTRAVENOUS CONTINUOUS
Status: DISCONTINUED | OUTPATIENT
Start: 2024-07-09 | End: 2024-07-12

## 2024-07-09 RX ORDER — CALCIUM GLUCONATE 20 MG/ML
1000 INJECTION, SOLUTION INTRAVENOUS ONCE
Status: COMPLETED | OUTPATIENT
Start: 2024-07-09 | End: 2024-07-09

## 2024-07-09 RX ORDER — DEXTROSE MONOHYDRATE 25 G/50ML
25 INJECTION, SOLUTION INTRAVENOUS ONCE
Status: COMPLETED | OUTPATIENT
Start: 2024-07-09 | End: 2024-07-09

## 2024-07-09 RX ADMIN — Medication 1 APPLICATION: at 21:22

## 2024-07-09 RX ADMIN — INSULIN HUMAN 10 UNITS: 100 INJECTION, SOLUTION PARENTERAL at 06:39

## 2024-07-09 RX ADMIN — MAGNESIUM GLUCONATE 500 MG ORAL TABLET 400 MG: 500 TABLET ORAL at 21:22

## 2024-07-09 RX ADMIN — ATORVASTATIN CALCIUM 10 MG: 10 TABLET, FILM COATED ORAL at 10:26

## 2024-07-09 RX ADMIN — HYDROCORTISONE SODIUM SUCCINATE 20 MG: 100 INJECTION, POWDER, FOR SOLUTION INTRAMUSCULAR; INTRAVENOUS at 14:24

## 2024-07-09 RX ADMIN — CALCIUM GLUCONATE 1000 MG: 20 INJECTION, SOLUTION INTRAVENOUS at 06:26

## 2024-07-09 RX ADMIN — INSULIN LISPRO 3 UNITS: 100 INJECTION, SOLUTION INTRAVENOUS; SUBCUTANEOUS at 10:27

## 2024-07-09 RX ADMIN — PANTOPRAZOLE SODIUM 40 MG: 40 TABLET, DELAYED RELEASE ORAL at 10:26

## 2024-07-09 RX ADMIN — DEXTROSE MONOHYDRATE 25 G: 25 INJECTION, SOLUTION INTRAVENOUS at 18:04

## 2024-07-09 RX ADMIN — INSULIN LISPRO 2 UNITS: 100 INJECTION, SOLUTION INTRAVENOUS; SUBCUTANEOUS at 13:54

## 2024-07-09 RX ADMIN — SODIUM ZIRCONIUM CYCLOSILICATE 10 G: 10 POWDER, FOR SUSPENSION ORAL at 10:27

## 2024-07-09 RX ADMIN — DEXTROSE MONOHYDRATE 25 G: 25 INJECTION, SOLUTION INTRAVENOUS at 06:39

## 2024-07-09 RX ADMIN — ENOXAPARIN SODIUM 40 MG: 40 INJECTION SUBCUTANEOUS at 06:36

## 2024-07-09 RX ADMIN — HYDROCORTISONE SODIUM SUCCINATE 10 MG: 100 INJECTION, POWDER, FOR SOLUTION INTRAMUSCULAR; INTRAVENOUS at 21:22

## 2024-07-09 RX ADMIN — MAGNESIUM GLUCONATE 500 MG ORAL TABLET 400 MG: 500 TABLET ORAL at 10:26

## 2024-07-09 RX ADMIN — PREDNISONE 5 MG: 5 TABLET ORAL at 10:26

## 2024-07-09 RX ADMIN — Medication 10 ML: at 21:23

## 2024-07-09 RX ADMIN — INSULIN LISPRO 2 UNITS: 100 INJECTION, SOLUTION INTRAVENOUS; SUBCUTANEOUS at 21:47

## 2024-07-09 RX ADMIN — CALCIUM GLUCONATE 1000 MG: 20 INJECTION, SOLUTION INTRAVENOUS at 18:04

## 2024-07-09 RX ADMIN — OXYCODONE HYDROCHLORIDE 10 MG: 5 TABLET ORAL at 17:07

## 2024-07-09 RX ADMIN — PREGABALIN 75 MG: 75 CAPSULE ORAL at 10:27

## 2024-07-09 RX ADMIN — Medication 10 ML: at 10:27

## 2024-07-09 RX ADMIN — SODIUM CHLORIDE 100 ML/HR: 9 INJECTION, SOLUTION INTRAVENOUS at 21:21

## 2024-07-09 RX ADMIN — SODIUM CHLORIDE 100 ML/HR: 9 INJECTION, SOLUTION INTRAVENOUS at 10:47

## 2024-07-09 RX ADMIN — ENOXAPARIN SODIUM 40 MG: 40 INJECTION SUBCUTANEOUS at 18:03

## 2024-07-09 RX ADMIN — OXYCODONE HYDROCHLORIDE 10 MG: 5 TABLET ORAL at 10:27

## 2024-07-09 RX ADMIN — INSULIN LISPRO 2 UNITS: 100 INJECTION, SOLUTION INTRAVENOUS; SUBCUTANEOUS at 17:09

## 2024-07-09 RX ADMIN — TACROLIMUS 3 MG: 1 CAPSULE ORAL at 10:26

## 2024-07-09 RX ADMIN — PREGABALIN 75 MG: 75 CAPSULE ORAL at 21:22

## 2024-07-09 RX ADMIN — INSULIN HUMAN 10 UNITS: 100 INJECTION, SOLUTION PARENTERAL at 18:04

## 2024-07-09 RX ADMIN — SODIUM ZIRCONIUM CYCLOSILICATE 10 G: 10 POWDER, FOR SUSPENSION ORAL at 18:04

## 2024-07-09 RX ADMIN — Medication 1 APPLICATION: at 10:27

## 2024-07-09 NOTE — NURSING NOTE
[facility]  INPATIENT WOUND & OSTOMY CARE    Today's Date: 07/09/24    Patient Name: Gil Monroy  MRN: [id number]  CSN: [id number]  PCP: Feliberto Blount MD  Attending Provider: Sharon Huerta MD  Length of Stay: 1    I placed pressure injury prevention measure orders per protocol due to patient being at risk for skin breakdown and being admitted to the unit.    Apply silicone foam border dressing per protocol to sacral spine/bilateral heels for protection.  Nursing to change dressing every 3 days and PRN if soiled. Nursing is to peel back dressing with every assessment to assess skin underneath dressing. No barrier cream under dressing.     Patient's nurse Master states patient is refusing silicone foam border dressings and turning.Continue to educate importance of turning and repositioning.     Please reach out to wound care nurse if any skin issues arise.     This document has been electronically signed by Sunitha Tom RN on 7/9/2024 07:29 CDT

## 2024-07-09 NOTE — PROGRESS NOTES
[facility] Intensivist Services  INPATIENT PROGRESS NOTE    Patient Name: Gil Monroy  Date of Admission: 7/8/2024  Today's Date: 07/09/24  Length of Stay: 1  Primary Care Physician: Feliberto Blount MD    Subjective   Chief Complaint: Hyponatremia  HPI   53-year-old male with a history of liver transplant in 2015, diabetes mellitus, esophageal varices, and thrombocytopenia admitted after presenting to the emergency department with complaints of edema, generalized weakness, and shortness of breath which has progressively worsened over the last few days.  He denies any pain at this time.  He tells me he has not taken his steroid medication in some time, but he is unsure how long this has been.  It is unclear if he is taking his other medication at this time.  Workup in the ER revealed a sodium of 115, potassium 5.4, chloride 79, , ALT 58, WBC 11.93, hemoglobin 10.8, hematocrit 34.7, urine drug screen positive for oxycodone, and CT abdomen/pelvis showing evidence of liver transplant with hepatic steatosis, but no abdominal ascites.  There is fluid and edema surrounding the mid to distal esophagus with suspected esophageal wall thickening.  Mild body anasarca also noted on CT abdomen/pelvis.     Patient was evaluated shortly after arriving to the ICU.  He is in no distress at this time.  He does complain of some shortness of breath and has noticed some edema over the last few days.  He denies any other complaints at this time.  We will continue to monitor his sodium levels closely.    Interval history  7/9/2024: Patient does appear little more somnolent today, but he arouses easily.  He denies any symptoms other than swelling to his lower extremities.  Chest x-ray was obtained today and is negative for any acute disease.  Sodium 117 and slightly improved.  TSH is elevated 8.580.  T3 and T4 are now pending.    Review of Systems   All pertinent negatives and positives  are as above. All other systems have been reviewed and are negative unless otherwise stated.     Objective    Temp:  [97.5 °F (36.4 °C)-98 °F (36.7 °C)] 97.8 °F (36.6 °C)  Heart Rate:  [78-86] 79  Resp:  [16-19] 16  BP: (108-172)/() 126/71  Physical Exam  Constitutional:       Appearance: He is obese.      Comments: Patient is lethargic, but he arouses easily   HENT:      Head: Normocephalic and atraumatic.   Eyes:      Extraocular Movements: Extraocular movements intact.   Cardiovascular:      Rate and Rhythm: Normal rate and regular rhythm.      Pulses: Normal pulses.      Heart sounds: Normal heart sounds.   Pulmonary:      Effort: Pulmonary effort is normal. No respiratory distress.      Breath sounds: Normal breath sounds.   Abdominal:      General: Abdomen is protuberant. Bowel sounds are normal.      Palpations: Abdomen is soft.      Tenderness: There is no abdominal tenderness.   Musculoskeletal:         General: Normal range of motion.      Cervical back: Normal range of motion and neck supple.   Skin:     General: Skin is warm and dry.      Capillary Refill: Capillary refill takes less than 2 seconds.   Neurological:      General: No focal deficit present.      Mental Status: Mental status is at baseline. He is lethargic.   Psychiatric:         Behavior: Behavior is cooperative.         Results Review:    XR Chest 1 View    Result Date: 7/9/2024   1. No active disease in the chest.  This report was signed and finalized on 7/9/2024 12:00 PM by Vito Marrero.      CT Abdomen Pelvis With Contrast    Result Date: 7/8/2024   Status post liver transplant with hepatic steatosis. No abdominal ascites.  There is fluid and edema surrounding the mid to distal esophagus with suspected esophageal wall thickening. Correlate for esophagitis.   Thickening of the appendix tip measuring up to 1.3 cm without adjacent inflammatory changes could represent normal variation of this patient as opposed to early tip  "appendicitis. Recommend correlation with prior outside imaging if available.  Perigastric and paraesophageal varices.  Mild body anasarca.  Mild cardiomegaly and trace right pleural effusion.   This report was signed and finalized on 7/8/2024 3:35 PM by True Juarez.       Result Review:  I have personally reviewed the results from the time of this admission to 7/9/2024 13:34 CDT and agree with these findings:  [x]  Laboratory list / accordion  []  Microbiology  [x]  Radiology  [x]  EKG/Telemetry   []  Cardiology/Vascular   []  Pathology  []  Old records  []  Other:  Most notable findings include: Sodium 117, potassium 5.9, glucose 184, TSH 8.580, WBC 15.41, chest x-ray showed no acute disease.  EKG shows normal sinus rhythm with a rate of 75 at time of my exam.      Culture Data:   No results found for: \"BLOODCX\", \"URINECX\", \"WOUNDCX\", \"MRSACX\", \"RESPCX\", \"STOOLCX\"    I have reviewed the patient's current medications.     Assessment/Plan   Assessment  Active Hospital Problems    Diagnosis     **Hyponatremia        Medical Decision Making  Number and Complexity of problems: 5  Differential Diagnosis: Hyponatremia, hyperkalemia, adrenal insufficiency, diabetes mellitus, cirrhosis of liver status post transplant, esophageal varices, leukocytosis    Conditions and Status        Condition is improving.     MDM Data  External documents reviewed: N/A  Cardiac tracing (EKG, telemetry) interpretation: Telemetry shows sinus rhythm with rate of 75  Radiology interpretation: Yes  Labs reviewed: Yes  Any tests that were considered but not ordered: no     Decision rules/scores evaluated (example [id number], Wells, etc): N/A     Discussed with: Dr. Huerta, patient, nurse     Care Planning  Shared decision making: Dr. Huerta, patient  Code status and discussions: Full    Treatment Plan  Hyponatremia   -Sodium 117 and slightly improved   -Urine osmolality 345, serum osmolality 248, urine sodium less than 20   -?  Suspect " possible adrenal insufficiency, cortisol labs pending, patient is hyperkalemic   -Stop Lasix.  Started saline infusion at 100 cc/h   -Continue serial sodium levels every 4 hours   -Patient is being monitored closely in the CCU    2.   Hyperkalemia   -Sodium 5.9, improved from 6.5   -Received Lokelma, calcium gluconate, and insulin   -?Adrenal insufficiency as patient also has severe hyponatremia   -Cortisol lab pending   -Will continue to monitor potassium levels and treat accordingly    3.   History of cirrhosis, status post liver transplant   -?Compliance with home medications.  Patient states he is not sure if he is taking all of his medications   -Restarted tacrolimus      4.   Diabetes mellitus   -Continue sliding scale insulin with frequent Accu-Cheks      5.  Elevated TSH   -Checking T3 and T4 now    Disposition  Social Determinants of Health that impact treatment or disposition: none  I expect the patient to be discharged to home in 3-5 days.       I provided 48 minutes of total critical care time. Due to the high probability of clinically significant, life-threatening deterioration, the patient required my direct and personal management. The critical care time does not include time spent on separately billable procedures.  Electronically signed by Abel Coelho PA-C on 7/9/2024 at 13:34 CDT

## 2024-07-09 NOTE — PLAN OF CARE
Goal Outcome Evaluation:  Plan of Care Reviewed With: patient        Progress: no change  Outcome Evaluation: Initial nutrition assessment secondary to wound care provider consult. Pt is admitted with hyponatremia. He has a hx of liver transplant. He is ordered a regular diet. RN reports her was able to eat ~50% of breakfast this morning. This RD attempted to speak with pt. He was very sleepy and was unable to stay awake to answer questions. He has a wound on his abdomen. At present, he is disoriented. Cont to follow.

## 2024-07-09 NOTE — PLAN OF CARE
Problem: Adult Inpatient Plan of Care  Goal: Plan of Care Review  Outcome: Ongoing, Progressing  Goal: Patient-Specific Goal (Individualized)  Outcome: Ongoing, Progressing  Goal: Absence of Hospital-Acquired Illness or Injury  Outcome: Ongoing, Progressing  Intervention: Identify and Manage Fall Risk  Description: Perform standard risk assessment on admission using a validated tool or comprehensive approach appropriate to the patient; reassess fall risk frequently, with change in status or transfer to another level of care.  Communicate fall injury risk to interprofessional healthcare team.  Determine need for increased observation, equipment and environmental modification, such as low bed, signage and supportive, nonskid footwear.  Adjust safety measures to individual developmental age, stage and identified risk factors.  Reinforce the importance of safety and physical activity with patient and family.  Perform regular intentional rounding to assess need for position change, pain assessment and personal needs, including assistance with toileting.  Recent Flowsheet Documentation  Taken 7/9/2024 1700 by Master Ram RN  Safety Promotion/Fall Prevention: safety round/check completed  Taken 7/9/2024 1600 by Master Ram RN  Safety Promotion/Fall Prevention: safety round/check completed  Taken 7/9/2024 1500 by Master Ram RN  Safety Promotion/Fall Prevention: safety round/check completed  Taken 7/9/2024 1400 by Master Ram RN  Safety Promotion/Fall Prevention: safety round/check completed  Taken 7/9/2024 1300 by Master Ram RN  Safety Promotion/Fall Prevention: safety round/check completed  Taken 7/9/2024 1200 by Master Ram RN  Safety Promotion/Fall Prevention: safety round/check completed  Taken 7/9/2024 1100 by Master Ram, RN  Safety Promotion/Fall Prevention: safety round/check completed  Taken 7/9/2024 1000 by Master Rma, RN  Safety Promotion/Fall Prevention: safety round/check  completed  Taken 7/9/2024 0900 by Master Ram RN  Safety Promotion/Fall Prevention: safety round/check completed  Taken 7/9/2024 0800 by Master Ram RN  Safety Promotion/Fall Prevention: safety round/check completed  Taken 7/9/2024 0700 by Master Ram RN  Safety Promotion/Fall Prevention: safety round/check completed  Intervention: Prevent Skin Injury  Description: Perform a screening for skin injury risk, such as pressure or moisture associated skin damage on admission and at regular intervals throughout hospital stay.  Keep all areas of skin (especially folds) clean and dry.  Maintain adequate skin hydration.  Relieve and redistribute pressure and protect bony prominences; implement measures based on patient-specific risk factors.  Match turning and repositioning schedule to clinical condition.  Encourage weight shift frequently; assist with reposition if unable to complete independently.  Float heels off bed; avoid pressure on the Achilles tendon.  Keep skin free from extended contact with medical devices.  Encourage functional activity and mobility, as early as tolerated.  Use aids (e.g., slide boards, mechanical lift) during transfer.  Recent Flowsheet Documentation  Taken 7/9/2024 1600 by Master Ram RN  Body Position:   patient/family refused   position changed independently  Taken 7/9/2024 1200 by Master Ram RN  Body Position:   patient/family refused   position changed independently  Taken 7/9/2024 0800 by Master Ram RN  Body Position:   patient/family refused   position changed independently  Intervention: Prevent and Manage VTE (Venous Thromboembolism) Risk  Description: Assess for VTE (venous thromboembolism) risk.  Encourage and assist with early ambulation.  Initiate and maintain compression or other therapy, as indicated, based on identified risk in accordance with organizational protocol and provider order.  Encourage both active and passive leg exercises while in bed, if  unable to ambulate.  Recent Flowsheet Documentation  Taken 7/9/2024 1600 by Master Ram RN  Activity Management: bedrest  Taken 7/9/2024 1200 by Master Ram RN  Activity Management: bedrest  Taken 7/9/2024 0800 by Master Ram RN  Activity Management: bedrest  VTE Prevention/Management: (See MAR) other (see comments)  Intervention: Prevent Infection  Description: Maintain skin and mucous membrane integrity; promote hand, oral and pulmonary hygiene.  Optimize fluid balance, nutrition, sleep and glycemic control to maximize infection resistance.  Identify potential sources of infection early to prevent or mitigate progression of infection (e.g., wound, lines, devices).  Evaluate ongoing need for invasive devices; remove promptly when no longer indicated.  Recent Flowsheet Documentation  Taken 7/9/2024 1600 by Master Ram RN  Infection Prevention:   single patient room provided   hand hygiene promoted  Taken 7/9/2024 1200 by Master Ram RN  Infection Prevention:   single patient room provided   hand hygiene promoted  Goal: Optimal Comfort and Wellbeing  Outcome: Ongoing, Progressing  Goal: Readiness for Transition of Care  Outcome: Ongoing, Progressing     Problem: Skin Injury Risk Increased  Goal: Skin Health and Integrity  Outcome: Ongoing, Progressing  Intervention: Optimize Skin Protection  Description: Perform a full pressure injury risk assessment, as indicated by screening, upon admission to care unit.  Reassess skin (injury risk, full inspection) frequently (e.g., scheduled interval, with change in condition) to provide optimal early detection and prevention.  Maintain adequate tissue perfusion (e.g., encourage fluid balance; avoid crossing legs, constrictive clothing or devices) to promote tissue oxygenation.  Maintain head of bed at lowest degree of elevation tolerated, considering medical condition and other restrictions.  Avoid positioning onto an area that remains reddened.  Minimize  incontinence and moisture (e.g., toileting schedule; moisture-wicking pad, diaper or incontinence collection device; skin moisture barrier).  Cleanse skin promptly and gently when soiled utilizing a pH-balanced cleanser.  Relieve and redistribute pressure (e.g., scheduled position changes, weight shifts, use of support surface, medical device repositioning, protective dressing application, use of positioning device, microclimate control, use of pressure-injury-monitor  Encourage increased activity, such as sitting in a chair at the bedside or early mobilization, when able to tolerate.  Recent Flowsheet Documentation  Taken 7/9/2024 1600 by Master Ram RN  Head of Bed (HOB) Positioning: HOB at 30-45 degrees  Taken 7/9/2024 0800 by Master Ram RN  Head of Bed (HOB) Positioning: HOB at 30-45 degrees     Problem: Fall Injury Risk  Goal: Absence of Fall and Fall-Related Injury  Outcome: Ongoing, Progressing  Intervention: Identify and Manage Contributors  Description: Develop a fall prevention plan with the patient and caregiver/family.  Provide reorientation, appropriate sensory stimulation and routines with changes in mental status to decrease risk of fall.  Promote use of personal vision and auditory aids.  Assess assistance level required for safe and effective self-care; provide support as needed, such as toileting, mobilization. For age 65 and older, implement timed toileting with assistance.  Encourage physical activity, such as performance of mobility and self-care at highest level of patient ability, multicomponent exercise program and provision of appropriate assistive devices.  If fall occurs, assess the severity of injury; implement fall injury protocol. Determine the cause and revise fall injury prevention plan.  Regularly review medication contribution to fall risk; adjust medication administration times to minimize risk of falling.  Consider risk related to polypharmacy and age.  Balance adequate  pain management with potential for oversedation.  Recent Flowsheet Documentation  Taken 7/9/2024 1600 by Master Ram RN  Medication Review/Management: medications reviewed  Taken 7/9/2024 1200 by Master Ram RN  Medication Review/Management: medications reviewed  Taken 7/9/2024 0800 by Master Ram RN  Medication Review/Management: medications reviewed  Intervention: Promote Injury-Free Environment  Description: Provide a safe, barrier-free environment that encourages independent activity.  Keep care area uncluttered and well-lighted.  Determine need for increased observation or monitoring.  Avoid use of devices that minimize mobility, such as restraints or indwelling urinary catheter.  Recent Flowsheet Documentation  Taken 7/9/2024 1700 by Master Ram RN  Safety Promotion/Fall Prevention: safety round/check completed  Taken 7/9/2024 1600 by Master Ram RN  Safety Promotion/Fall Prevention: safety round/check completed  Taken 7/9/2024 1500 by Master Ram RN  Safety Promotion/Fall Prevention: safety round/check completed  Taken 7/9/2024 1400 by Master Ram RN  Safety Promotion/Fall Prevention: safety round/check completed  Taken 7/9/2024 1300 by Master Ram RN  Safety Promotion/Fall Prevention: safety round/check completed  Taken 7/9/2024 1200 by Master Ram RN  Safety Promotion/Fall Prevention: safety round/check completed  Taken 7/9/2024 1100 by Master Ram RN  Safety Promotion/Fall Prevention: safety round/check completed  Taken 7/9/2024 1000 by Master Ram RN  Safety Promotion/Fall Prevention: safety round/check completed  Taken 7/9/2024 0900 by Master Ram RN  Safety Promotion/Fall Prevention: safety round/check completed  Taken 7/9/2024 0800 by Master Ram RN  Safety Promotion/Fall Prevention: safety round/check completed  Taken 7/9/2024 0700 by Master Ram RN  Safety Promotion/Fall Prevention: safety round/check completed   Goal Outcome Evaluation:

## 2024-07-10 LAB
ANION GAP SERPL CALCULATED.3IONS-SCNC: 5 MMOL/L (ref 5–15)
ANION GAP SERPL CALCULATED.3IONS-SCNC: 5 MMOL/L (ref 5–15)
ANION GAP SERPL CALCULATED.3IONS-SCNC: 6 MMOL/L (ref 5–15)
ANION GAP SERPL CALCULATED.3IONS-SCNC: 6 MMOL/L (ref 5–15)
ANION GAP SERPL CALCULATED.3IONS-SCNC: 7 MMOL/L (ref 5–15)
BUN SERPL-MCNC: 23 MG/DL (ref 6–20)
BUN SERPL-MCNC: 24 MG/DL (ref 6–20)
BUN SERPL-MCNC: 24 MG/DL (ref 6–20)
BUN/CREAT SERPL: 23.5 (ref 7–25)
BUN/CREAT SERPL: 23.7 (ref 7–25)
BUN/CREAT SERPL: 24.2 (ref 7–25)
BUN/CREAT SERPL: 25.3 (ref 7–25)
BUN/CREAT SERPL: 27 (ref 7–25)
CALCIUM SPEC-SCNC: 8 MG/DL (ref 8.6–10.5)
CALCIUM SPEC-SCNC: 8 MG/DL (ref 8.6–10.5)
CALCIUM SPEC-SCNC: 8.1 MG/DL (ref 8.6–10.5)
CALCIUM SPEC-SCNC: 8.1 MG/DL (ref 8.6–10.5)
CALCIUM SPEC-SCNC: 8.3 MG/DL (ref 8.6–10.5)
CHLORIDE SERPL-SCNC: 84 MMOL/L (ref 98–107)
CHLORIDE SERPL-SCNC: 86 MMOL/L (ref 98–107)
CHLORIDE SERPL-SCNC: 88 MMOL/L (ref 98–107)
CHLORIDE SERPL-SCNC: 88 MMOL/L (ref 98–107)
CHLORIDE SERPL-SCNC: 90 MMOL/L (ref 98–107)
CO2 SERPL-SCNC: 27 MMOL/L (ref 22–29)
CO2 SERPL-SCNC: 29 MMOL/L (ref 22–29)
CO2 SERPL-SCNC: 29 MMOL/L (ref 22–29)
CO2 SERPL-SCNC: 30 MMOL/L (ref 22–29)
CO2 SERPL-SCNC: 30 MMOL/L (ref 22–29)
CORTIS SERPL-MCNC: 2.76 MCG/DL
CREAT SERPL-MCNC: 0.89 MG/DL (ref 0.76–1.27)
CREAT SERPL-MCNC: 0.95 MG/DL (ref 0.76–1.27)
CREAT SERPL-MCNC: 0.95 MG/DL (ref 0.76–1.27)
CREAT SERPL-MCNC: 0.97 MG/DL (ref 0.76–1.27)
CREAT SERPL-MCNC: 0.98 MG/DL (ref 0.76–1.27)
DEPRECATED RDW RBC AUTO: 57.9 FL (ref 37–54)
EGFRCR SERPLBLD CKD-EPI 2021: 102.5 ML/MIN/1.73
EGFRCR SERPLBLD CKD-EPI 2021: 92.2 ML/MIN/1.73
EGFRCR SERPLBLD CKD-EPI 2021: 93.3 ML/MIN/1.73
EGFRCR SERPLBLD CKD-EPI 2021: 95.7 ML/MIN/1.73
EGFRCR SERPLBLD CKD-EPI 2021: 95.7 ML/MIN/1.73
ERYTHROCYTE [DISTWIDTH] IN BLOOD BY AUTOMATED COUNT: 18.3 % (ref 12.3–15.4)
GLUCOSE BLDC GLUCOMTR-MCNC: 182 MG/DL (ref 70–130)
GLUCOSE BLDC GLUCOMTR-MCNC: 199 MG/DL (ref 70–130)
GLUCOSE BLDC GLUCOMTR-MCNC: 215 MG/DL (ref 70–130)
GLUCOSE BLDC GLUCOMTR-MCNC: 217 MG/DL (ref 70–130)
GLUCOSE SERPL-MCNC: 149 MG/DL (ref 65–99)
GLUCOSE SERPL-MCNC: 152 MG/DL (ref 65–99)
GLUCOSE SERPL-MCNC: 157 MG/DL (ref 65–99)
GLUCOSE SERPL-MCNC: 161 MG/DL (ref 65–99)
GLUCOSE SERPL-MCNC: 183 MG/DL (ref 65–99)
HCT VFR BLD AUTO: 32 % (ref 37.5–51)
HGB BLD-MCNC: 9.4 G/DL (ref 13–17.7)
MCH RBC QN AUTO: 25.6 PG (ref 26.6–33)
MCHC RBC AUTO-ENTMCNC: 29.4 G/DL (ref 31.5–35.7)
MCV RBC AUTO: 87.2 FL (ref 79–97)
PLATELET # BLD AUTO: 243 10*3/MM3 (ref 140–450)
PMV BLD AUTO: 9.2 FL (ref 6–12)
POTASSIUM SERPL-SCNC: 5.2 MMOL/L (ref 3.5–5.2)
POTASSIUM SERPL-SCNC: 5.3 MMOL/L (ref 3.5–5.2)
POTASSIUM SERPL-SCNC: 5.4 MMOL/L (ref 3.5–5.2)
POTASSIUM SERPL-SCNC: 5.7 MMOL/L (ref 3.5–5.2)
POTASSIUM SERPL-SCNC: 6 MMOL/L (ref 3.5–5.2)
RBC # BLD AUTO: 3.67 10*6/MM3 (ref 4.14–5.8)
SODIUM SERPL-SCNC: 120 MMOL/L (ref 136–145)
SODIUM SERPL-SCNC: 122 MMOL/L (ref 136–145)
SODIUM SERPL-SCNC: 123 MMOL/L (ref 136–145)
SODIUM SERPL-SCNC: 125 MMOL/L (ref 136–145)
T3FREE SERPL-MCNC: 1.95 PG/ML (ref 2–4.4)
WBC NRBC COR # BLD AUTO: 12.52 10*3/MM3 (ref 3.4–10.8)

## 2024-07-10 PROCEDURE — 85027 COMPLETE CBC AUTOMATED: CPT | Performed by: PHYSICIAN ASSISTANT

## 2024-07-10 PROCEDURE — 80048 BASIC METABOLIC PNL TOTAL CA: CPT | Performed by: PHYSICIAN ASSISTANT

## 2024-07-10 PROCEDURE — 63710000001 INSULIN LISPRO (HUMAN) PER 5 UNITS: Performed by: PHYSICIAN ASSISTANT

## 2024-07-10 PROCEDURE — 25010000002 ENOXAPARIN PER 10 MG: Performed by: PHYSICIAN ASSISTANT

## 2024-07-10 PROCEDURE — 25810000003 SODIUM CHLORIDE 0.9 % SOLUTION: Performed by: PHYSICIAN ASSISTANT

## 2024-07-10 PROCEDURE — 80048 BASIC METABOLIC PNL TOTAL CA: CPT | Performed by: NURSE PRACTITIONER

## 2024-07-10 PROCEDURE — 63710000001 INSULIN REGULAR HUMAN PER 5 UNITS: Performed by: NURSE PRACTITIONER

## 2024-07-10 PROCEDURE — 63710000001 TACROLIMUS PER 1 MG: Performed by: INTERNAL MEDICINE

## 2024-07-10 PROCEDURE — 82948 REAGENT STRIP/BLOOD GLUCOSE: CPT

## 2024-07-10 RX ORDER — DEXAMETHASONE 0.5 MG/1
0.5 TABLET ORAL EVERY EVENING
Status: COMPLETED | OUTPATIENT
Start: 2024-07-10 | End: 2024-07-11

## 2024-07-10 RX ORDER — DEXTROSE MONOHYDRATE 25 G/50ML
25 INJECTION, SOLUTION INTRAVENOUS ONCE
Status: COMPLETED | OUTPATIENT
Start: 2024-07-10 | End: 2024-07-10

## 2024-07-10 RX ORDER — ALUMINA, MAGNESIA, AND SIMETHICONE 2400; 2400; 240 MG/30ML; MG/30ML; MG/30ML
15 SUSPENSION ORAL EVERY 6 HOURS PRN
Status: DISCONTINUED | OUTPATIENT
Start: 2024-07-10 | End: 2024-07-13 | Stop reason: HOSPADM

## 2024-07-10 RX ORDER — LEVOTHYROXINE SODIUM 0.03 MG/1
25 TABLET ORAL
Status: DISCONTINUED | OUTPATIENT
Start: 2024-07-10 | End: 2024-07-13 | Stop reason: HOSPADM

## 2024-07-10 RX ORDER — DEXAMETHASONE 0.5 MG/1
0.5 TABLET ORAL EVERY EVENING
Status: DISCONTINUED | OUTPATIENT
Start: 2024-07-10 | End: 2024-07-10

## 2024-07-10 RX ORDER — DEXAMETHASONE 0.5 MG/1
1 TABLET ORAL
Status: DISCONTINUED | OUTPATIENT
Start: 2024-07-11 | End: 2024-07-10

## 2024-07-10 RX ORDER — DEXAMETHASONE 0.5 MG/1
1 TABLET ORAL
Status: COMPLETED | OUTPATIENT
Start: 2024-07-10 | End: 2024-07-12

## 2024-07-10 RX ADMIN — ATORVASTATIN CALCIUM 10 MG: 10 TABLET, FILM COATED ORAL at 10:54

## 2024-07-10 RX ADMIN — Medication 1 APPLICATION: at 20:18

## 2024-07-10 RX ADMIN — PREGABALIN 75 MG: 75 CAPSULE ORAL at 10:54

## 2024-07-10 RX ADMIN — ENOXAPARIN SODIUM 40 MG: 40 INJECTION SUBCUTANEOUS at 05:57

## 2024-07-10 RX ADMIN — SENNOSIDES AND DOCUSATE SODIUM 2 TABLET: 50; 8.6 TABLET ORAL at 20:18

## 2024-07-10 RX ADMIN — DEXAMETHASONE 1 MG: 0.5 TABLET ORAL at 10:55

## 2024-07-10 RX ADMIN — MAGNESIUM GLUCONATE 500 MG ORAL TABLET 400 MG: 500 TABLET ORAL at 10:54

## 2024-07-10 RX ADMIN — TACROLIMUS 3 MG: 1 CAPSULE ORAL at 10:54

## 2024-07-10 RX ADMIN — Medication 10 ML: at 20:20

## 2024-07-10 RX ADMIN — SODIUM ZIRCONIUM CYCLOSILICATE 10 G: 10 POWDER, FOR SUSPENSION ORAL at 20:23

## 2024-07-10 RX ADMIN — Medication 10 ML: at 10:55

## 2024-07-10 RX ADMIN — OXYCODONE HYDROCHLORIDE 10 MG: 5 TABLET ORAL at 06:13

## 2024-07-10 RX ADMIN — SODIUM CHLORIDE 100 ML/HR: 9 INJECTION, SOLUTION INTRAVENOUS at 17:44

## 2024-07-10 RX ADMIN — INSULIN LISPRO 2 UNITS: 100 INJECTION, SOLUTION INTRAVENOUS; SUBCUTANEOUS at 17:48

## 2024-07-10 RX ADMIN — ENOXAPARIN SODIUM 40 MG: 40 INJECTION SUBCUTANEOUS at 17:43

## 2024-07-10 RX ADMIN — INSULIN LISPRO 3 UNITS: 100 INJECTION, SOLUTION INTRAVENOUS; SUBCUTANEOUS at 10:59

## 2024-07-10 RX ADMIN — OXYCODONE HYDROCHLORIDE 10 MG: 5 TABLET ORAL at 23:25

## 2024-07-10 RX ADMIN — PANTOPRAZOLE SODIUM 40 MG: 40 TABLET, DELAYED RELEASE ORAL at 10:54

## 2024-07-10 RX ADMIN — DEXAMETHASONE 0.5 MG: 0.5 TABLET ORAL at 17:43

## 2024-07-10 RX ADMIN — Medication 1 APPLICATION: at 10:54

## 2024-07-10 RX ADMIN — PREGABALIN 75 MG: 75 CAPSULE ORAL at 20:18

## 2024-07-10 RX ADMIN — LEVOTHYROXINE SODIUM 25 MCG: 25 TABLET ORAL at 10:54

## 2024-07-10 RX ADMIN — INSULIN HUMAN 10 UNITS: 100 INJECTION, SOLUTION PARENTERAL at 06:00

## 2024-07-10 RX ADMIN — MAGNESIUM GLUCONATE 500 MG ORAL TABLET 400 MG: 500 TABLET ORAL at 20:18

## 2024-07-10 RX ADMIN — OXYCODONE HYDROCHLORIDE 10 MG: 5 TABLET ORAL at 14:39

## 2024-07-10 RX ADMIN — SODIUM ZIRCONIUM CYCLOSILICATE 10 G: 10 POWDER, FOR SUSPENSION ORAL at 17:43

## 2024-07-10 RX ADMIN — SODIUM ZIRCONIUM CYCLOSILICATE 10 G: 10 POWDER, FOR SUSPENSION ORAL at 10:55

## 2024-07-10 RX ADMIN — DEXTROSE MONOHYDRATE 25 G: 25 INJECTION, SOLUTION INTRAVENOUS at 05:59

## 2024-07-10 RX ADMIN — INSULIN LISPRO 2 UNITS: 100 INJECTION, SOLUTION INTRAVENOUS; SUBCUTANEOUS at 20:22

## 2024-07-10 NOTE — PROGRESS NOTES
[facility] Intensivist Services  INPATIENT PROGRESS NOTE    Patient Name: Gil Monroy  Date of Admission: 7/8/2024  Today's Date: 07/10/24  Length of Stay: 2  Primary Care Physician: Feliberto Blount MD    Subjective   Chief Complaint: Hyponatremia  Weakness - Generalized       53-year-old male with a history of liver transplant in 2015, diabetes mellitus, esophageal varices, and thrombocytopenia admitted after presenting to the emergency department with complaints of edema, generalized weakness, and shortness of breath which has progressively worsened over the last few days.  He denies any pain at this time.  He tells me he has not taken his steroid medication in some time, but he is unsure how long this has been.  It is unclear if he is taking his other medication at this time.  Workup in the ER revealed a sodium of 115, potassium 5.4, chloride 79, , ALT 58, WBC 11.93, hemoglobin 10.8, hematocrit 34.7, urine drug screen positive for oxycodone, and CT abdomen/pelvis showing evidence of liver transplant with hepatic steatosis, but no abdominal ascites.  There is fluid and edema surrounding the mid to distal esophagus with suspected esophageal wall thickening.  Mild body anasarca also noted on CT abdomen/pelvis.     Patient was evaluated shortly after arriving to the ICU.  He is in no distress at this time.  He does complain of some shortness of breath and has noticed some edema over the last few days.  He denies any other complaints at this time.  We will continue to monitor his sodium levels closely.    Interval history  7/9/2024: Patient does appear little more somnolent today, but he arouses easily.  He denies any symptoms other than swelling to his lower extremities.  Chest x-ray was obtained today and is negative for any acute disease.  Sodium 117 and slightly improved.  TSH is elevated 8.580.  T3 and T4 are now pending.    7/10/2024: Patient more alert today.   His sodium level is now up to 122.  Potassium down to 5.4.  He is now on Lokelma 3 times a day.  We will start an ACTH stim test in 2 days.  Based on patient's thyroid labs, we have started him on low-dose Synthroid as he likely has subclinical hypothyroidism.  He has no complaints at time of my exam this morning.    Review of Systems   All pertinent negatives and positives are as above. All other systems have been reviewed and are negative unless otherwise stated.     Objective    Temp:  [97.7 °F (36.5 °C)-98.8 °F (37.1 °C)] 97.8 °F (36.6 °C)  Heart Rate:  [65-82] 72  Resp:  [14-22] 14  BP: (110-146)/(53-83) 127/72  Physical Exam  Constitutional:       General: He is awake.      Appearance: He is obese.   HENT:      Head: Normocephalic and atraumatic.   Eyes:      Extraocular Movements: Extraocular movements intact.   Cardiovascular:      Rate and Rhythm: Normal rate and regular rhythm.      Pulses: Normal pulses.      Heart sounds: Normal heart sounds.   Pulmonary:      Effort: Pulmonary effort is normal. No respiratory distress.      Breath sounds: Normal breath sounds.   Abdominal:      General: Abdomen is protuberant. Bowel sounds are normal.      Palpations: Abdomen is soft.      Tenderness: There is no abdominal tenderness.   Musculoskeletal:         General: Normal range of motion.      Cervical back: Normal range of motion and neck supple.   Skin:     General: Skin is warm and dry.      Capillary Refill: Capillary refill takes less than 2 seconds.   Neurological:      General: No focal deficit present.      Mental Status: He is alert and oriented to person, place, and time. Mental status is at baseline.   Psychiatric:         Behavior: Behavior is cooperative.         Results Review:    XR Chest 1 View    Result Date: 7/9/2024   1. No active disease in the chest.  This report was signed and finalized on 7/9/2024 12:00 PM by Vito Marrero.      CT Abdomen Pelvis With Contrast    Result Date: 7/8/2024    "Status post liver transplant with hepatic steatosis. No abdominal ascites.  There is fluid and edema surrounding the mid to distal esophagus with suspected esophageal wall thickening. Correlate for esophagitis.   Thickening of the appendix tip measuring up to 1.3 cm without adjacent inflammatory changes could represent normal variation of this patient as opposed to early tip appendicitis. Recommend correlation with prior outside imaging if available.  Perigastric and paraesophageal varices.  Mild body anasarca.  Mild cardiomegaly and trace right pleural effusion.   This report was signed and finalized on 7/8/2024 3:35 PM by True Juarez.       Result Review:  I have personally reviewed the results from the time of this admission to 7/10/2024 10:44 CDT and agree with these findings:  [x]  Laboratory list / accordion  []  Microbiology  [x]  Radiology  [x]  EKG/Telemetry   []  Cardiology/Vascular   []  Pathology  []  Old records  []  Other:  Most notable findings include: Sodium 122, potassium 5.4, glucose 149, TSH 8.580, free T4 1.22, free T31.5 WBC 15.41, chest x-ray showed no acute disease.  EKG shows normal sinus rhythm with a rate of 73 at time of my exam.      Culture Data:   No results found for: \"BLOODCX\", \"URINECX\", \"WOUNDCX\", \"MRSACX\", \"RESPCX\", \"STOOLCX\"    I have reviewed the patient's current medications.     Assessment/Plan   Assessment  Active Hospital Problems    Diagnosis     **Hyponatremia        Medical Decision Making  Number and Complexity of problems: 5  Differential Diagnosis: Hyponatremia, hyperkalemia, adrenal insufficiency, diabetes mellitus, cirrhosis of liver status post transplant, esophageal varices, leukocytosis    Conditions and Status        Condition is improving.     MDM Data  External documents reviewed: N/A  Cardiac tracing (EKG, telemetry) interpretation: Telemetry shows sinus rhythm with rate of 75  Radiology interpretation: Yes  Labs reviewed: Yes  Any tests that were " considered but not ordered: no     Decision rules/scores evaluated (example [id number], Wells, etc): N/A     Discussed with: Dr. Huerta, patient, nurse     Care Planning  Shared decision making: Dr. Huerta, patient  Code status and discussions: Full    Treatment Plan  Hyponatremia   -Sodium 122 and improving   -Urine osmolality 345, serum osmolality 248, urine sodium less than 20   -? adrenal insufficiency, cortisol morning level 2.76, patient is hyperkalemic   -Remains on saline infusion at 100 cc/h   -Continue serial sodium levels every 4 hours   -Patient is being monitored closely in the CCU   -Patient will go for ACTH stim test later this week    2.   Hyperkalemia   -Sodium 5.4, improved from 6.5   -Remains on Lokelma 3 times daily   -?Adrenal insufficiency as patient also has severe hyponatremia   -Cortisol morning lab 2.76   -Will continue to monitor potassium levels and treat accordingly    3.   History of cirrhosis, status post liver transplant   -?Compliance with home medications.  Patient states he is not sure if he is taking all of his medications   -Restarted tacrolimus recently    4.   Diabetes mellitus   -Continue sliding scale insulin with frequent Accu-Cheks      5.  Elevated TSH   -Free T4 1.22, free T3 1.95   -Starting Synthroid 25 mcg daily    Disposition  Social Determinants of Health that impact treatment or disposition: none  I expect the patient to be discharged to home in 3-5 days.       I provided 40 minutes of total critical care time. Due to the high probability of clinically significant, life-threatening deterioration, the patient required my direct and personal management. The critical care time does not include time spent on separately billable procedures.  Electronically signed by Abel Coelho PA-C on 7/10/2024 at 10:44 CDT

## 2024-07-10 NOTE — PLAN OF CARE
Problem: Adult Inpatient Plan of Care  Goal: Plan of Care Review  Outcome: Ongoing, Progressing  Goal: Patient-Specific Goal (Individualized)  Outcome: Ongoing, Progressing  Goal: Absence of Hospital-Acquired Illness or Injury  Outcome: Ongoing, Progressing  Goal: Optimal Comfort and Wellbeing  Outcome: Ongoing, Progressing  Goal: Readiness for Transition of Care  Outcome: Ongoing, Progressing     Problem: Skin Injury Risk Increased  Goal: Skin Health and Integrity  Outcome: Ongoing, Progressing     Problem: Fall Injury Risk  Goal: Absence of Fall and Fall-Related Injury  Outcome: Ongoing, Progressing   Goal Outcome Evaluation:

## 2024-07-10 NOTE — PLAN OF CARE
Goal Outcome Evaluation:      Alert and oriented x4 all shift. Refused turns. Sodium at 120. Potassium at 6. Notified APRN, who ordered D50 and insulin, see MAR for details. No BM this shift. . Switched patient to dolphin mattress.

## 2024-07-10 NOTE — CONSULTS
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 Mckeon on 7/10/2024 13:57 CDT

## 2024-07-11 ENCOUNTER — APPOINTMENT (OUTPATIENT)
Dept: CARDIOLOGY | Facility: HOSPITAL | Age: 54
End: 2024-07-11
Payer: MEDICARE

## 2024-07-11 LAB
ANION GAP SERPL CALCULATED.3IONS-SCNC: 4 MMOL/L (ref 5–15)
ANION GAP SERPL CALCULATED.3IONS-SCNC: 4 MMOL/L (ref 5–15)
ANION GAP SERPL CALCULATED.3IONS-SCNC: 7 MMOL/L (ref 5–15)
ANION GAP SERPL CALCULATED.3IONS-SCNC: 7 MMOL/L (ref 5–15)
ANION GAP SERPL CALCULATED.3IONS-SCNC: 8 MMOL/L (ref 5–15)
ANION GAP SERPL CALCULATED.3IONS-SCNC: 8 MMOL/L (ref 5–15)
BH CV ECHO MEAS - AO MAX PG: 9.1 MMHG
BH CV ECHO MEAS - AO MEAN PG: 5 MMHG
BH CV ECHO MEAS - AO ROOT DIAM: 4.5 CM
BH CV ECHO MEAS - AO V2 MAX: 151 CM/SEC
BH CV ECHO MEAS - AO V2 VTI: 26.1 CM
BH CV ECHO MEAS - AVA(I,D): 3.3 CM2
BH CV ECHO MEAS - EDV(CUBED): 185.1 ML
BH CV ECHO MEAS - EDV(MOD-SP2): 110 ML
BH CV ECHO MEAS - EDV(MOD-SP4): 103 ML
BH CV ECHO MEAS - EF(MOD-BP): 52.4 %
BH CV ECHO MEAS - EF(MOD-SP2): 51.5 %
BH CV ECHO MEAS - EF(MOD-SP4): 56.9 %
BH CV ECHO MEAS - ESV(CUBED): 94.2 ML
BH CV ECHO MEAS - ESV(MOD-SP2): 53.3 ML
BH CV ECHO MEAS - ESV(MOD-SP4): 44.4 ML
BH CV ECHO MEAS - FS: 20.2 %
BH CV ECHO MEAS - IVS/LVPW: 1.01 CM
BH CV ECHO MEAS - IVSD: 1.46 CM
BH CV ECHO MEAS - LA DIMENSION: 5.3 CM
BH CV ECHO MEAS - LAT PEAK E' VEL: 15.9 CM/SEC
BH CV ECHO MEAS - LV DIASTOLIC VOL/BSA (35-75): 38 CM2
BH CV ECHO MEAS - LV MASS(C)D: 375.2 GRAMS
BH CV ECHO MEAS - LV MAX PG: 5.8 MMHG
BH CV ECHO MEAS - LV MEAN PG: 3 MMHG
BH CV ECHO MEAS - LV SYSTOLIC VOL/BSA (12-30): 16.4 CM2
BH CV ECHO MEAS - LV V1 MAX: 120 CM/SEC
BH CV ECHO MEAS - LV V1 VTI: 22.7 CM
BH CV ECHO MEAS - LVIDD: 5.7 CM
BH CV ECHO MEAS - LVIDS: 4.6 CM
BH CV ECHO MEAS - LVOT AREA: 3.8 CM2
BH CV ECHO MEAS - LVOT DIAM: 2.2 CM
BH CV ECHO MEAS - LVPWD: 1.44 CM
BH CV ECHO MEAS - MED PEAK E' VEL: 8.5 CM/SEC
BH CV ECHO MEAS - MV A MAX VEL: 79.7 CM/SEC
BH CV ECHO MEAS - MV DEC TIME: 0.22 SEC
BH CV ECHO MEAS - MV E MAX VEL: 76.3 CM/SEC
BH CV ECHO MEAS - MV E/A: 0.96
BH CV ECHO MEAS - RAP SYSTOLE: 10 MMHG
BH CV ECHO MEAS - RVSP: 44.6 MMHG
BH CV ECHO MEAS - SV(LVOT): 86.3 ML
BH CV ECHO MEAS - SV(MOD-SP2): 56.7 ML
BH CV ECHO MEAS - SV(MOD-SP4): 58.6 ML
BH CV ECHO MEAS - SVI(LVOT): 31.8 ML/M2
BH CV ECHO MEAS - SVI(MOD-SP2): 20.9 ML/M2
BH CV ECHO MEAS - SVI(MOD-SP4): 21.6 ML/M2
BH CV ECHO MEAS - TR MAX PG: 34.6 MMHG
BH CV ECHO MEAS - TR MAX VEL: 294 CM/SEC
BH CV ECHO MEASUREMENTS AVERAGE E/E' RATIO: 6.25
BH CV XLRA - RV BASE: 4.6 CM
BH CV XLRA - RV LENGTH: 7.5 CM
BH CV XLRA - RV MID: 3 CM
BUN SERPL-MCNC: 23 MG/DL (ref 6–20)
BUN SERPL-MCNC: 24 MG/DL (ref 6–20)
BUN SERPL-MCNC: 24 MG/DL (ref 6–20)
BUN SERPL-MCNC: 25 MG/DL (ref 6–20)
BUN/CREAT SERPL: 27 (ref 7–25)
BUN/CREAT SERPL: 29.1 (ref 7–25)
BUN/CREAT SERPL: 29.9 (ref 7–25)
BUN/CREAT SERPL: 30.5 (ref 7–25)
BUN/CREAT SERPL: 30.9 (ref 7–25)
BUN/CREAT SERPL: 31.6 (ref 7–25)
CALCIUM SPEC-SCNC: 8 MG/DL (ref 8.6–10.5)
CALCIUM SPEC-SCNC: 8 MG/DL (ref 8.6–10.5)
CALCIUM SPEC-SCNC: 8.1 MG/DL (ref 8.6–10.5)
CALCIUM SPEC-SCNC: 8.2 MG/DL (ref 8.6–10.5)
CHLORIDE SERPL-SCNC: 90 MMOL/L (ref 98–107)
CHLORIDE SERPL-SCNC: 91 MMOL/L (ref 98–107)
CHLORIDE SERPL-SCNC: 91 MMOL/L (ref 98–107)
CHLORIDE SERPL-SCNC: 93 MMOL/L (ref 98–107)
CHLORIDE SERPL-SCNC: 93 MMOL/L (ref 98–107)
CHLORIDE SERPL-SCNC: 94 MMOL/L (ref 98–107)
CO2 SERPL-SCNC: 26 MMOL/L (ref 22–29)
CO2 SERPL-SCNC: 27 MMOL/L (ref 22–29)
CO2 SERPL-SCNC: 27 MMOL/L (ref 22–29)
CO2 SERPL-SCNC: 28 MMOL/L (ref 22–29)
CO2 SERPL-SCNC: 29 MMOL/L (ref 22–29)
CO2 SERPL-SCNC: 30 MMOL/L (ref 22–29)
CREAT SERPL-MCNC: 0.76 MG/DL (ref 0.76–1.27)
CREAT SERPL-MCNC: 0.77 MG/DL (ref 0.76–1.27)
CREAT SERPL-MCNC: 0.81 MG/DL (ref 0.76–1.27)
CREAT SERPL-MCNC: 0.82 MG/DL (ref 0.76–1.27)
CREAT SERPL-MCNC: 0.86 MG/DL (ref 0.76–1.27)
CREAT SERPL-MCNC: 0.89 MG/DL (ref 0.76–1.27)
DEPRECATED RDW RBC AUTO: 57.5 FL (ref 37–54)
EGFRCR SERPLBLD CKD-EPI 2021: 102.5 ML/MIN/1.73
EGFRCR SERPLBLD CKD-EPI 2021: 103.5 ML/MIN/1.73
EGFRCR SERPLBLD CKD-EPI 2021: 105 ML/MIN/1.73
EGFRCR SERPLBLD CKD-EPI 2021: 105.4 ML/MIN/1.73
EGFRCR SERPLBLD CKD-EPI 2021: 107.1 ML/MIN/1.73
EGFRCR SERPLBLD CKD-EPI 2021: 107.5 ML/MIN/1.73
ERYTHROCYTE [DISTWIDTH] IN BLOOD BY AUTOMATED COUNT: 18.3 % (ref 12.3–15.4)
GLUCOSE BLDC GLUCOMTR-MCNC: 145 MG/DL (ref 70–130)
GLUCOSE BLDC GLUCOMTR-MCNC: 150 MG/DL (ref 70–130)
GLUCOSE BLDC GLUCOMTR-MCNC: 175 MG/DL (ref 70–130)
GLUCOSE BLDC GLUCOMTR-MCNC: 209 MG/DL (ref 70–130)
GLUCOSE SERPL-MCNC: 144 MG/DL (ref 65–99)
GLUCOSE SERPL-MCNC: 145 MG/DL (ref 65–99)
GLUCOSE SERPL-MCNC: 146 MG/DL (ref 65–99)
GLUCOSE SERPL-MCNC: 153 MG/DL (ref 65–99)
GLUCOSE SERPL-MCNC: 156 MG/DL (ref 65–99)
GLUCOSE SERPL-MCNC: 164 MG/DL (ref 65–99)
HCT VFR BLD AUTO: 29.9 % (ref 37.5–51)
HGB BLD-MCNC: 9 G/DL (ref 13–17.7)
LEFT ATRIUM VOLUME INDEX: 55 ML/M2
MCH RBC QN AUTO: 25.9 PG (ref 26.6–33)
MCHC RBC AUTO-ENTMCNC: 30.1 G/DL (ref 31.5–35.7)
MCV RBC AUTO: 85.9 FL (ref 79–97)
PLATELET # BLD AUTO: 221 10*3/MM3 (ref 140–450)
PMV BLD AUTO: 9.1 FL (ref 6–12)
POTASSIUM SERPL-SCNC: 4.9 MMOL/L (ref 3.5–5.2)
POTASSIUM SERPL-SCNC: 4.9 MMOL/L (ref 3.5–5.2)
POTASSIUM SERPL-SCNC: 5 MMOL/L (ref 3.5–5.2)
POTASSIUM SERPL-SCNC: 5 MMOL/L (ref 3.5–5.2)
POTASSIUM SERPL-SCNC: 5.1 MMOL/L (ref 3.5–5.2)
POTASSIUM SERPL-SCNC: 5.2 MMOL/L (ref 3.5–5.2)
RBC # BLD AUTO: 3.48 10*6/MM3 (ref 4.14–5.8)
SODIUM SERPL-SCNC: 125 MMOL/L (ref 136–145)
SODIUM SERPL-SCNC: 125 MMOL/L (ref 136–145)
SODIUM SERPL-SCNC: 126 MMOL/L (ref 136–145)
SODIUM SERPL-SCNC: 127 MMOL/L (ref 136–145)
TACROLIMUS BLD LC/MS/MS-MCNC: 11.4 NG/ML (ref 2–20)
WBC NRBC COR # BLD AUTO: 9.62 10*3/MM3 (ref 3.4–10.8)

## 2024-07-11 PROCEDURE — 63710000001 TACROLIMUS PER 1 MG: Performed by: INTERNAL MEDICINE

## 2024-07-11 PROCEDURE — 80048 BASIC METABOLIC PNL TOTAL CA: CPT | Performed by: NURSE PRACTITIONER

## 2024-07-11 PROCEDURE — 25810000003 SODIUM CHLORIDE 0.9 % SOLUTION: Performed by: PHYSICIAN ASSISTANT

## 2024-07-11 PROCEDURE — 94761 N-INVAS EAR/PLS OXIMETRY MLT: CPT

## 2024-07-11 PROCEDURE — 63710000001 INSULIN LISPRO (HUMAN) PER 5 UNITS: Performed by: PHYSICIAN ASSISTANT

## 2024-07-11 PROCEDURE — P9047 ALBUMIN (HUMAN), 25%, 50ML: HCPCS | Performed by: NURSE PRACTITIONER

## 2024-07-11 PROCEDURE — 97162 PT EVAL MOD COMPLEX 30 MIN: CPT

## 2024-07-11 PROCEDURE — 25010000002 ENOXAPARIN PER 10 MG: Performed by: PHYSICIAN ASSISTANT

## 2024-07-11 PROCEDURE — 94799 UNLISTED PULMONARY SVC/PX: CPT

## 2024-07-11 PROCEDURE — 82948 REAGENT STRIP/BLOOD GLUCOSE: CPT

## 2024-07-11 PROCEDURE — 25010000002 ALBUMIN HUMAN 25% PER 50 ML: Performed by: NURSE PRACTITIONER

## 2024-07-11 PROCEDURE — 93306 TTE W/DOPPLER COMPLETE: CPT | Performed by: HOSPITALIST

## 2024-07-11 PROCEDURE — 93306 TTE W/DOPPLER COMPLETE: CPT

## 2024-07-11 PROCEDURE — 25010000002 FUROSEMIDE PER 20 MG: Performed by: NURSE PRACTITIONER

## 2024-07-11 PROCEDURE — 85027 COMPLETE CBC AUTOMATED: CPT | Performed by: PHYSICIAN ASSISTANT

## 2024-07-11 RX ORDER — ALBUMIN (HUMAN) 12.5 G/50ML
25 SOLUTION INTRAVENOUS ONCE
Status: COMPLETED | OUTPATIENT
Start: 2024-07-11 | End: 2024-07-11

## 2024-07-11 RX ORDER — COSYNTROPIN 0.25 MG/ML
0.25 INJECTION, POWDER, FOR SOLUTION INTRAMUSCULAR; INTRAVENOUS ONCE
Status: COMPLETED | OUTPATIENT
Start: 2024-07-12 | End: 2024-07-12

## 2024-07-11 RX ORDER — FUROSEMIDE 10 MG/ML
20 INJECTION INTRAMUSCULAR; INTRAVENOUS ONCE
Status: COMPLETED | OUTPATIENT
Start: 2024-07-11 | End: 2024-07-11

## 2024-07-11 RX ADMIN — SODIUM ZIRCONIUM CYCLOSILICATE 10 G: 10 POWDER, FOR SUSPENSION ORAL at 10:32

## 2024-07-11 RX ADMIN — ALBUMIN (HUMAN) 25 G: 0.25 INJECTION, SOLUTION INTRAVENOUS at 09:58

## 2024-07-11 RX ADMIN — MAGNESIUM GLUCONATE 500 MG ORAL TABLET 400 MG: 500 TABLET ORAL at 08:34

## 2024-07-11 RX ADMIN — SENNOSIDES AND DOCUSATE SODIUM 2 TABLET: 50; 8.6 TABLET ORAL at 21:16

## 2024-07-11 RX ADMIN — Medication 1 APPLICATION: at 08:34

## 2024-07-11 RX ADMIN — OXYCODONE HYDROCHLORIDE 10 MG: 5 TABLET ORAL at 20:12

## 2024-07-11 RX ADMIN — PANTOPRAZOLE SODIUM 40 MG: 40 TABLET, DELAYED RELEASE ORAL at 08:34

## 2024-07-11 RX ADMIN — ENOXAPARIN SODIUM 40 MG: 40 INJECTION SUBCUTANEOUS at 06:16

## 2024-07-11 RX ADMIN — INSULIN LISPRO 2 UNITS: 100 INJECTION, SOLUTION INTRAVENOUS; SUBCUTANEOUS at 16:38

## 2024-07-11 RX ADMIN — SENNOSIDES AND DOCUSATE SODIUM 2 TABLET: 50; 8.6 TABLET ORAL at 08:34

## 2024-07-11 RX ADMIN — SODIUM ZIRCONIUM CYCLOSILICATE 10 G: 10 POWDER, FOR SUSPENSION ORAL at 21:16

## 2024-07-11 RX ADMIN — PREGABALIN 75 MG: 75 CAPSULE ORAL at 08:34

## 2024-07-11 RX ADMIN — ATORVASTATIN CALCIUM 10 MG: 10 TABLET, FILM COATED ORAL at 08:35

## 2024-07-11 RX ADMIN — INSULIN LISPRO 2 UNITS: 100 INJECTION, SOLUTION INTRAVENOUS; SUBCUTANEOUS at 07:53

## 2024-07-11 RX ADMIN — FUROSEMIDE 20 MG: 10 INJECTION, SOLUTION INTRAMUSCULAR; INTRAVENOUS at 09:58

## 2024-07-11 RX ADMIN — INSULIN LISPRO 3 UNITS: 100 INJECTION, SOLUTION INTRAVENOUS; SUBCUTANEOUS at 21:32

## 2024-07-11 RX ADMIN — SODIUM CHLORIDE 100 ML/HR: 9 INJECTION, SOLUTION INTRAVENOUS at 15:40

## 2024-07-11 RX ADMIN — DEXAMETHASONE 1 MG: 0.5 TABLET ORAL at 07:53

## 2024-07-11 RX ADMIN — ENOXAPARIN SODIUM 40 MG: 40 INJECTION SUBCUTANEOUS at 18:38

## 2024-07-11 RX ADMIN — PREGABALIN 75 MG: 75 CAPSULE ORAL at 21:15

## 2024-07-11 RX ADMIN — Medication 10 ML: at 21:26

## 2024-07-11 RX ADMIN — DEXAMETHASONE 0.5 MG: 0.5 TABLET ORAL at 16:36

## 2024-07-11 RX ADMIN — MAGNESIUM GLUCONATE 500 MG ORAL TABLET 400 MG: 500 TABLET ORAL at 21:15

## 2024-07-11 RX ADMIN — Medication 10 ML: at 08:35

## 2024-07-11 RX ADMIN — LEVOTHYROXINE SODIUM 25 MCG: 25 TABLET ORAL at 05:32

## 2024-07-11 RX ADMIN — Medication 1 APPLICATION: at 21:16

## 2024-07-11 RX ADMIN — TACROLIMUS 3 MG: 1 CAPSULE ORAL at 08:34

## 2024-07-11 RX ADMIN — OXYCODONE HYDROCHLORIDE 10 MG: 5 TABLET ORAL at 14:03

## 2024-07-11 RX ADMIN — SODIUM ZIRCONIUM CYCLOSILICATE 10 G: 10 POWDER, FOR SUSPENSION ORAL at 16:36

## 2024-07-11 NOTE — PROGRESS NOTES
[facility]  INPATIENT PROGRESS NOTE    Patient Name: Gil Monroy  Date of Admission: 7/8/2024  Today's Date: 07/11/24  Length of Stay: 3  Primary Care Physician: Feliberto Blount MD    Subjective   Chief Complaint: Hyponatremia  Weakness - Generalized       53-year-old male with a history of liver transplant in 2015, diabetes mellitus, esophageal varices, and thrombocytopenia admitted after presenting to the emergency department with complaints of edema, generalized weakness, and shortness of breath which has progressively worsened over the last few days.  He denies any pain at this time.  He tells me he has not taken his steroid medication in some time, but he is unsure how long this has been.  It is unclear if he is taking his other medication at this time.  Workup in the ER revealed a sodium of 115, potassium 5.4, chloride 79, , ALT 58, WBC 11.93, hemoglobin 10.8, hematocrit 34.7, urine drug screen positive for oxycodone, and CT abdomen/pelvis showing evidence of liver transplant with hepatic steatosis, but no abdominal ascites.  There is fluid and edema surrounding the mid to distal esophagus with suspected esophageal wall thickening.  Mild body anasarca also noted on CT abdomen/pelvis.     Patient was evaluated shortly after arriving to the ICU.  He is in no distress at this time.  He does complain of some shortness of breath and has noticed some edema over the last few days.  He denies any other complaints at this time.  We will continue to monitor his sodium levels closely.    7/11    Patient serum cortisol in the morning was low the patient will also responded well to the hydrocortisone treatment his sodium improved and his potassium also improved he is to have hyperkalemia which is getting better and his hyponatremia is getting better were planning to do ACTH stimulation test IEC  Test however because the patient received  hydrocortisone we switched him to dexamethasone which would not affect the cortisol assay in the lab and the plan is to do the ACTH on him in couple of days  Patient feels better    Interval history  7/9/2024: Patient does appear little more somnolent today, but he arouses easily.  He denies any symptoms other than swelling to his lower extremities.  Chest x-ray was obtained today and is negative for any acute disease.  Sodium 117 and slightly improved.  TSH is elevated 8.580.  T3 and T4 are now pending.    7/10/2024: Patient more alert today.  His sodium level is now up to 122.  Potassium down to 5.4.  He is now on Lokelma 3 times a day.  We will start an ACTH stim test in 2 days.  Based on patient's thyroid labs, we have started him on low-dose Synthroid as he likely has subclinical hypothyroidism.  He has no complaints at time of my exam this morning.    Review of Systems   All pertinent negatives and positives are as above. All other systems have been reviewed and are negative unless otherwise stated.     Objective    Temp:  [97 °F (36.1 °C)-98.7 °F (37.1 °C)] 98.1 °F (36.7 °C)  Heart Rate:  [69-93] 80  Resp:  [16-19] 16  BP: (106-161)/() 148/83  Physical Exam  Constitutional:       General: He is awake.      Appearance: He is obese.   HENT:      Head: Normocephalic and atraumatic.   Eyes:      Extraocular Movements: Extraocular movements intact.   Cardiovascular:      Rate and Rhythm: Normal rate and regular rhythm.      Pulses: Normal pulses.      Heart sounds: Normal heart sounds.   Pulmonary:      Effort: Pulmonary effort is normal. No respiratory distress.      Breath sounds: Normal breath sounds.   Abdominal:      General: Abdomen is protuberant. Bowel sounds are normal.      Palpations: Abdomen is soft.      Tenderness: There is no abdominal tenderness.   Musculoskeletal:         General: Normal range of motion.      Cervical back: Normal range of motion and neck supple.   Skin:     General: Skin is  "warm and dry.      Capillary Refill: Capillary refill takes less than 2 seconds.   Neurological:      General: No focal deficit present.      Mental Status: He is alert and oriented to person, place, and time. Mental status is at baseline.   Psychiatric:         Behavior: Behavior is cooperative.         Results Review:    No radiology results for the last day    Result Review:  I have personally reviewed the results from the time of this admission to 7/11/2024 18:17 CDT and agree with these findings:  [x]  Laboratory list / accordion  []  Microbiology  [x]  Radiology  [x]  EKG/Telemetry   []  Cardiology/Vascular   []  Pathology  []  Old records  []  Other:  Most notable findings include: Sodium 122, potassium 5.4, glucose 149, TSH 8.580, free T4 1.22, free T31.5 WBC 15.41, chest x-ray showed no acute disease.  EKG shows normal sinus rhythm with a rate of 73 at time of my exam.      Culture Data:   No results found for: \"BLOODCX\", \"URINECX\", \"WOUNDCX\", \"MRSACX\", \"RESPCX\", \"STOOLCX\"    I have reviewed the patient's current medications.     Assessment/Plan   Assessment  Active Hospital Problems    Diagnosis     **Hyponatremia        Medical Decision Making  Number and Complexity of problems: 5  Differential Diagnosis: Hyponatremia, hyperkalemia, adrenal insufficiency, diabetes mellitus, cirrhosis of liver status post transplant, esophageal varices, leukocytosis    Conditions and Status        Condition is improving.     MDM Data  External documents reviewed: N/A  Cardiac tracing (EKG, telemetry) interpretation: Telemetry shows sinus rhythm with rate of 75  Radiology interpretation: Yes  Labs reviewed: Yes  Any tests that were considered but not ordered: no     Decision rules/scores evaluated (example [id number], [place], etc): N/A     Discussed with: Dr. Huerta, patient, nurse     Care Planning  Shared decision making: Dr. Huerta, patient  Code status and discussions: Full    Treatment Plan  Hyponatremia   Factorial " secondary to adrenal insufficiency most likely in addition to hypothyroidism there is also element of dehydration which was also corrected    Sodium improved after starting steroid replacement therapy    2.   Hyperkalemia   Hyperkalemia improved after last starting steroid replacement therapy  3.   History of cirrhosis, status post liver transplant   -?Compliance with home medications.  Patient states he is not sure if he is taking all of his medications   -Restarted tacrolimus recently    4.   Diabetes mellitus   -Continue sliding scale insulin with frequent Accu-Cheks      5.  Elevated TSH   -Free T4 1.22, free T3 1.95   Patient serum cortisol in the morning was low the patient will also responded well to the hydrocortisone treatment his sodium improved and his potassium also improved he is to have hyperkalemia which is getting better and his hyponatremia is getting better were planning to do ACTH stimulation test IEC  Test however because the patient received hydrocortisone we switched him to dexamethasone which would not affect the cortisol assay in the lab and the plan is to do the ACTH on him in couple of days  Patient feels better-Starting Synthroid 25 mcg daily        Disposition  Social Determinants of Health that impact treatment or disposition: none  I expect the patient to be discharged to home in 3-5 days.       I provided 45  minutes of total critical care time. Due to the high probability of clinically significant, life-threatening deterioration, the patient required my direct and personal management. The critical care time does not include time spent on separately billable procedures.  Electronically signed by Sharon Huerta MD on 7/11/2024 at 18:17 CDT

## 2024-07-11 NOTE — THERAPY EVALUATION
Patient Name: Gil Monroy  : 1970    MRN: [id number]                              Today's Date: 2024       Admit Date: 2024    Visit Dx:     ICD-10-CM ICD-9-CM   1. Hyponatremia  E87.1 276.1   2. Morbid obesity  E66.01 278.01   3. Generalized weakness  R53.1 780.79   4. Anasarca  R60.1 782.3   5. Status post liver transplant  Z94.4 V42.7   6. Hepatic steatosis  K76.0 571.8   7. Wound of abdomen  S31.109A 879.2   8. Impaired mobility [Z74.09]  Z74.09 799.89     Patient Active Problem List   Diagnosis    Insulin dependent type 2 diabetes mellitus    History of liver transplant    Acute pain    Transaminitis    Hyponatremia    Abscess of groin, right    Leg abscess     Past Medical History:   Diagnosis Date    Cirrhosis of liver     Diabetes mellitus     Hep C w/o coma, chronic     Thrombocytopenia     Varices, esophageal      Past Surgical History:   Procedure Laterality Date    GROIN ABSCESS INCISION AND DRAINAGE Right 1/15/2024    Procedure: GROIN ABSCESS INCISION AND DRAINAGE WITH APPLICATION OF WOUND VAC;  Surgeon: Manas Fernandes MD;  Location:  [facility] OR;  Service: General;  Laterality: Right;    GROIN ABSCESS INCISION AND DRAINAGE Right 2024    Procedure: wound vac change;  Surgeon: Manas Fernandes MD;  Location:  [facility];  Service: General;  Laterality: Right;    LIVER TRANSPLANTATION        General Information       Row Name 24 1445          Physical Therapy Time and Intention    Document Type evaluation  CC: edema, generalized weakness, SOB. Dx: hyponatremia. PMH: liver transplant in , DM, esophageal varices, thrombocytopenia, hepatic steatosis.  -PATIENCE (r) HT (t) PATIENCE (c)     Mode of Treatment physical therapy  -PATIENCE (r) HT (t) PATIENCE (c)       Row Name 24 1442          General Information    Patient Profile Reviewed yes  -PATIENCE (r) HT (t) PATIENCE (c)     Prior Level of Function independent:;all household mobility;gait;ADL's;driving  -PATIENCE (r) HT (t) PATIENCE (c)     Existing  Precautions/Restrictions oxygen therapy device and L/min;fall  3L per NC  -PATIENCE (r) HT (t) PATIENCE (c)     Barriers to Rehab medically complex;previous functional deficit  -PATIENCE (r) HT (t) PATIENCE (c)       Row Name 07/11/24 1445          Living Environment    People in Home significant other  -PATIENCE (r) HT (t) PATIENCE (c)       Row Name 07/11/24 1445          Home Main Entrance    Number of Stairs, Main Entrance three  -PATIENCE (r) HT (t) PATIENCE (c)     Stair Railings, Main Entrance railing on right side (ascending)  -PATIENCE (r) HT (t) PATIENCE (c)       Row Name 07/11/24 1445          Stairs Within Home, Primary    Number of Stairs, Within Home, Primary none  -PATIENCE (r) HT (t) PATIENCE (c)     Stair Railings, Within Home, Primary none  -PATIENCE (r) HT (t) PATIENCE (c)       Row Name 07/11/24 1445          Cognition    Orientation Status (Cognition) oriented x 3;disoriented to;time;verbal cues/prompts needed for orientation  knew year not month  -PATIENCE (r) HT (t) PATIENCE (c)       Row Name 07/11/24 1445          Safety Issues, Functional Mobility    Safety Issues Affecting Function (Mobility) insight into deficits/self-awareness;awareness of need for assistance  -PATIENCE (r) HT (t) PATIENCE (c)     Impairments Affecting Function (Mobility) balance;endurance/activity tolerance;pain;sensation/sensory awareness;strength  -PATIENCE (r) HT (t) PATIENCE (c)               User Key  (r) = Recorded By, (t) = Taken By, (c) = Cosigned By      Initials Name Provider Type    Javed Arthur, PT DPT Physical Therapist    Nicole Collier, PT Student PT Student                   Mobility       Row Name 07/11/24 1445          Bed Mobility    Bed Mobility supine-sit;sit-supine;scooting/bridging  -PATIENCE (r) HT (t) PATIENCE (c)     Scooting/Bridging [place] (Bed Mobility) maximum assist (25% patient effort);2 person assist;verbal cues  -PATIENCE (r) HT (t) PATIENCE (c)     Supine-Sit [place] (Bed Mobility) contact guard;minimum assist (75% patient effort)  -PATIENCE (r) HT (t) PATIENCE (c)     Sit-Supine [place] (Bed Mobility)  contact guard  -PATIENCE (r) HT (t) PATIENCE (c)     Assistive Device (Bed Mobility) bed rails;head of bed elevated;draw sheet  -PATIENCE (r) HT (t) PATIENCE (c)       Row Name 07/11/24 1445          Sit-Stand Transfer    Sit-Stand [place] (Transfers) contact guard;minimum assist (75% patient effort)  -PATIENCE (r) HT (t) PATIENCE (c)               User Key  (r) = Recorded By, (t) = Taken By, (c) = Cosigned By      Initials Name Provider Type    Javed Arthur, PT DPT Physical Therapist     Nicole Núñez, PT Student PT Student                   Obj/Interventions       Row Name 07/11/24 1445          Range of Motion Comprehensive    General Range of Motion bilateral lower extremity ROM WFL  -PATIENCE (r) HT (t) PATIENCE (c)       Row Name 07/11/24 1445          Strength Comprehensive (MMT)    Comment, General Manual Muscle Testing (MMT) Assessment R knee ext 4/5 d/t pain, L hip flex 3+/5, R hip flex 4-/5, all other WFL  -PATIENCE (r) HT (t) PATIENCE (c)       Row Name 07/11/24 1445          Balance    Balance Assessment sitting static balance;sitting dynamic balance;standing static balance  -PATIENCE (r) HT (t) PATIENCE (c)     Static Sitting Balance standby assist  -PATIENCE (r) HT (t) PATIENCE (c)     Dynamic Sitting Balance contact guard  -PATIENCE (r) HT (t) PATIENCE (c)     Position, Sitting Balance unsupported;sitting edge of bed  -PATIENCE (r) HT (t) PATIENCE (c)     Static Standing Balance contact guard;minimal assist  -PATIENCE (r) HT (t) PATIENCE (c)     Position/Device Used, Standing Balance supported  HHA  -PATIENCE (r) HT (t) PATIENCE (c)       Row Name 07/11/24 1445          Sensory Assessment (Somatosensory)    Sensory Assessment (Somatosensory) LE sensation intact  pt reports numbness and tingling in bottom of feet intermittently  -PATIENCE (r) HT (t) PATIENCE (c)               User Key  (r) = Recorded By, (t) = Taken By, (c) = Cosigned By      Initials Name Provider Type    Javed Arthur, PT DPT Physical Therapist     Nicole Núñez, PT Student PT Student                   Goals/Plan       Row Name 07/11/24 1449           Bed Mobility Goal 1 (PT)    Activity/Assistive Device (Bed Mobility Goal 1, PT) sit to supine;supine to sit  -PATIENCE (r) HT (t) PATIENCE (c)     [place] Level/Cues Needed (Bed Mobility Goal 1, PT) contact guard required;verbal cues required  -PATIENCE (r) HT (t) PATIENCE (c)     Time Frame (Bed Mobility Goal 1, PT) long term goal (LTG);10 days  -PATIENCE (r) HT (t) PATIENCE (c)     Progress/Outcomes (Bed Mobility Goal 1, PT) new goal  -PATIENCE (r) HT (t) PATIENCE (c)       Row Name 07/11/24 1445          Transfer Goal 1 (PT)    Activity/Assistive Device (Transfer Goal 1, PT) bed-to-chair/chair-to-bed;sit-to-stand/stand-to-sit  -PATIENCE (r) HT (t) PATIENCE (c)     [place] Level/Cues Needed (Transfer Goal 1, PT) contact guard required  -PATIENCE (r) HT (t) PATIENCE (c)     Time Frame (Transfer Goal 1, PT) long term goal (LTG);10 days  -PATIENCE (r) HT (t) PATIENCE (c)     Progress/Outcome (Transfer Goal 1, PT) new goal  -PATIENCE (r) HT (t) PATIENCE (c)       Row Name 07/11/24 1444          Gait Training Goal 1 (PT)    Activity/Assistive Device (Gait Training Goal 1, PT) gait (walking locomotion);assistive device use;decrease fall risk;diminish gait deviation;improve balance and speed;increase endurance/gait distance  -PATIENCE (r) HT (t) PATIENCE (c)     [place] Level (Gait Training Goal 1, PT) contact guard required  -PATIENCE (r) HT (t) PATIENCE (c)     Distance (Gait Training Goal 1, PT) 20ft  -PATIENCE (r) HT (t) PATIENCE (c)     Time Frame (Gait Training Goal 1, PT) long term goal (LTG);10 days  -PATIENCE (r) HT (t) PATIENCE (c)     Progress/Outcome (Gait Training Goal 1, PT) new goal  -PATIENCE (r) HT (t) PATIENCE (c)       Row Name 07/11/24 1449          Therapy Assessment/Plan (PT)    Planned Therapy Interventions (PT) balance training;bed mobility training;gait training;home exercise program;patient/family education;strengthening;transfer training;postural re-education  -PATIENCE               User Key  (r) = Recorded By, (t) = Taken By, (c) = Cosigned By      Initials Name Provider Type    Javed Arthur, PT DPT Physical Therapist     Nicole Collier, PT Student PT Student                   Clinical Impression       Row Name 07/11/24 1445          Pain    Pretreatment Pain Rating 7/10  -PATIENCE (r) HT (t) PATIENCE (c)     Posttreatment Pain Rating 7/10  -PATIENCE (r) HT (t) PATIENCE (c)     Pain Location - knee;back  -PATIENCE (r) HT (t) PATIENCE (c)     Pain Intervention(s) Medication (See MAR);Repositioned;Ambulation/increased activity  -PATIENCE (r) HT (t) PATIENCE (c)       Row Name 07/11/24 1444          Plan of Care Review    Plan of Care Reviewed With patient  -PATIENCE (r) HT (t) PATIENCE (c)     Outcome Evaluation PT eval complete. Pt AOx3 need cues to month although he knew the year. Agreeable to therapy with encouragement and education. He c/o 7/10 pain in his knees. He reports being I with all ADLs, house management, and driving prior to admission. Pt req. Nehemiah to sit up EOB. R knee ext 4/5 d/t pain, L hip flex 3+/5, R hip flex 4-/5, all other WFL, ROM WFL, LE sensation appears intact although he does report N&T in bottoms of his feet intermittently. Pt req CGA/Nehemiah to stand and remained upright for 1-2 min while bed was being straightened. Pt denied wanting to sit up in the chair today. He does c/o some dizziness while standing. Pt t/f STS and sit>supine CGA with vcs and c/o pain in knees and back which is his baseline. Pt modAx2 to scoot up in bed and required significant cueing and encouragement to bend his knees and try and scoot. Pt would benefit from skilled PT to address strength, endurance, and balance deficits to increase safety and I upon d/c. Pt would benefit from short-term rehab placement pending progress to increase safety prior to d/c home.  -PATIENCE (r) HT (t) PATIENCE (c)       Row Name 07/11/24 1446          Therapy Assessment/Plan (PT)    Patient/Family Therapy Goals Statement (PT) none stated  -PATIENCE (r) HT (t) PATIENCE (c)     Rehab Potential (PT) fair, will monitor progress closely  -PATIENCE (r) HT (t) PATIENCE (c)     Criteria for Skilled Interventions Met (PT) yes;meets criteria;skilled  treatment is necessary  -PATIENCE (r) HT (t) PATIENCE (c)     Therapy Frequency (PT) 2 times/day  -PATIENCE (r) HT (t) PATIENCE (c)     Predicted Duration of Therapy Intervention (PT) until d/c  -PATIENCE (r) HT (t) PATIENCE (c)       Row Name 07/11/24 1445          Positioning and Restraints    Pre-Treatment Position in bed  -PATIENCE (r) HT (t) PATIENCE (c)     Post Treatment Position bed  -PATIENCE (r) HT (t) PATIENCE (c)     In Bed notified nsg;fowlers;call light within reach;encouraged to call for assist;patient within staff view;side rails up x2  -PATIENCE (r) HT (t) PATIENCE (c)               User Key  (r) = Recorded By, (t) = Taken By, (c) = Cosigned By      Initials Name Provider Type    Javed Arthur, PT DPT Physical Therapist    Nicole Collier, PT Student PT Student                   Outcome Measures       Row Name 07/11/24 1445 07/11/24 0800       How much help from another person do you currently need...    Turning from your back to your side while in flat bed without using bedrails? 3  -PATIENCE (r) HT (t) PATIENCE (c) 2  -TH    Moving from lying on back to sitting on the side of a flat bed without bedrails? 3  -PATIENCE (r) HT (t) PATIENCE (c) 2  -TH    Moving to and from a bed to a chair (including a wheelchair)? 3  -PATIENCE (r) HT (t) PATIENCE (c) 2  -TH    Standing up from a chair using your arms (e.g., wheelchair, bedside chair)? 3  -PATIENCE (r) HT (t) PATIENCE (c) 2  -TH    Climbing 3-5 steps with a railing? 2  -PATIENCE (r) HT (t) PATIENCE (c) 2  -TH    To walk in hospital room? 2  -PATIENCE (r) HT (t) PATIENCE (c) 2  -TH    AM-PAC 6 Clicks Score (PT) 16  -PATIENCE (r) HT (t) 12  -TH    Highest Level of Mobility Goal 5 --> Static standing  -PATIENCE (r) HT (t) 4 --> Transfer to chair/commode  -TH      Row Name 07/11/24 1445          Functional Assessment    Outcome Measure Options AM-PAC 6 Clicks Basic Mobility (PT)  -PATIENCE (r) HT (t) PATIENCE (c)               User Key  (r) = Recorded By, (t) = Taken By, (c) = Cosigned By      Initials Name Provider Type    Javed Arthur, PT DPT Physical Therapist    Kacie Gil, RN Registered  Nurse     Nicole Núñez, PT Student PT Student                                 Physical Therapy Education       Title: PT OT SLP Therapies (In Progress)       Topic: Physical Therapy (In Progress)       Point: Mobility training (Done)       Learning Progress Summary             Patient TAYLOR Celis VU by  at 7/11/2024 [id number]    Comment: Pt educated on role of PT and POC                         Point: Home exercise program (Not Started)       Learner Progress:  Not documented in this visit.              Point: Body mechanics (Not Started)       Learner Progress:  Not documented in this visit.              Point: Precautions (Not Started)       Learner Progress:  Not documented in this visit.                              User Key       Initials Effective Dates Name Provider Type Discipline     04/15/24 -  Nicole Núñez, PT Student PT Student PT                  PT Recommendation and Plan     Plan of Care Reviewed With: patient  Outcome Evaluation: PT eval complete. Pt AOx3 need cues to month although he knew the year. Agreeable to therapy with encouragement and education. He c/o 7/10 pain in his knees. He reports being I with all ADLs, house management, and driving prior to admission. Pt req. Nehemiah to sit up EOB. R knee ext 4/5 d/t pain, L hip flex 3+/5, R hip flex 4-/5, all other WFL, ROM WFL, LE sensation appears intact although he does report N&T in bottoms of his feet intermittently. Pt req CGA/Nehemiah to stand and remained upright for 1-2 min while bed was being straightened. Pt denied wanting to sit up in the chair today. He does c/o some dizziness while standing. Pt t/f STS and sit>supine CGA with vcs and c/o pain in knees and back which is his baseline. Pt modAx2 to scoot up in bed and required significant cueing and encouragement to bend his knees and try and scoot. Pt would benefit from skilled PT to address strength, endurance, and balance deficits to increase safety and I upon d/c. Pt would benefit  from short-term rehab placement pending progress to increase safety prior to d/c home.     Time Calculation:         PT Charges       Row Name 07/11/24 1445             Time Calculation    Start Time 1500  -PATIENCE (r) HT (t) PATIENCE (c)      Stop Time 1528  CR: 3780-1226 = 5 min (total time 33 min)  -PATIENCE (r) HT (t) PATIENCE (c)      Time Calculation (min) 28 min  -PATIENCE (r) HT (t)      PT Received On 07/11/24  -PATIENCE (r) HT (t) PATIENCE (c)      PT Goal Re-Cert Due Date 07/21/24  -PATIENCE (r) HT (t) PATIENCE (c)                User Key  (r) = Recorded By, (t) = Taken By, (c) = Cosigned By      Initials Name Provider Type    Javed Arthur, PT DPT Physical Therapist    Nicole Collier, PT Student PT Student                      PT G-Codes  Outcome Measure Options: AM-PAC 6 Clicks Basic Mobility (PT)  AM-PAC 6 Clicks Score (PT): 16  PT Discharge Summary  Anticipated Discharge Disposition (PT): sub acute care setting, skilled nursing facility    Nicole Núñez, JANIE Student  7/11/2024

## 2024-07-11 NOTE — PLAN OF CARE
Goal Outcome Evaluation:  Plan of Care Reviewed With: patient           Outcome Evaluation: PT eval complete. Pt AOx3 need cues to month although he knew the year. Agreeable to therapy with encouragement and education. He c/o 7/10 pain in his knees. He reports being I with all ADLs, house management, and driving prior to admission. Pt req. Nehemiah to sit up EOB. R knee ext 4/5 d/t pain, L hip flex 3+/5, R hip flex 4-/5, all other WFL, ROM WFL, LE sensation appears intact although he does report N&T in bottoms of his feet intermittently. Pt req CGA/Nehemiah to stand and remained upright for 1-2 min while bed was being straightened. Pt denied wanting to sit up in the chair today. He does c/o some dizziness while standing. Pt t/f STS and sit>supine CGA with vcs and c/o pain in knees and back which is his baseline. Pt modAx2 to scoot up in bed and required significant cueing and encouragement to bend his knees and try and scoot. Pt would benefit from skilled PT to address strength, endurance, and balance deficits to increase safety and I upon d/c. Pt would benefit from short-term rehab placement pending progress to increase safety prior to d/c home.      Anticipated Discharge Disposition (PT): sub acute care setting, skilled nursing facility

## 2024-07-11 NOTE — PLAN OF CARE
Goal Outcome Evaluation:              Outcome Evaluation: Refusing turns or getting out of bed, alert and oriented, slept most of the shift, albumin and lasix given along with lokelma.

## 2024-07-12 LAB
AMMONIA BLD-SCNC: 46 UMOL/L (ref 16–60)
ANION GAP SERPL CALCULATED.3IONS-SCNC: 10 MMOL/L (ref 5–15)
ANION GAP SERPL CALCULATED.3IONS-SCNC: 3 MMOL/L (ref 5–15)
ANION GAP SERPL CALCULATED.3IONS-SCNC: 5 MMOL/L (ref 5–15)
ANION GAP SERPL CALCULATED.3IONS-SCNC: 5 MMOL/L (ref 5–15)
ANION GAP SERPL CALCULATED.3IONS-SCNC: 7 MMOL/L (ref 5–15)
ANION GAP SERPL CALCULATED.3IONS-SCNC: 7 MMOL/L (ref 5–15)
ARTERIAL PATENCY WRIST A: POSITIVE
ATMOSPHERIC PRESS: 753 MMHG
BASE EXCESS BLDA CALC-SCNC: 4.3 MMOL/L (ref 0–2)
BDY SITE: ABNORMAL
BODY TEMPERATURE: 37
BUN SERPL-MCNC: 22 MG/DL (ref 6–20)
BUN SERPL-MCNC: 23 MG/DL (ref 6–20)
BUN SERPL-MCNC: 24 MG/DL (ref 6–20)
BUN SERPL-MCNC: 24 MG/DL (ref 6–20)
BUN/CREAT SERPL: 25.6 (ref 7–25)
BUN/CREAT SERPL: 26.2 (ref 7–25)
BUN/CREAT SERPL: 26.5 (ref 7–25)
BUN/CREAT SERPL: 27.6 (ref 7–25)
BUN/CREAT SERPL: 30.7 (ref 7–25)
BUN/CREAT SERPL: 31.2 (ref 7–25)
CALCIUM SPEC-SCNC: 7.9 MG/DL (ref 8.6–10.5)
CALCIUM SPEC-SCNC: 7.9 MG/DL (ref 8.6–10.5)
CALCIUM SPEC-SCNC: 8 MG/DL (ref 8.6–10.5)
CALCIUM SPEC-SCNC: 8.1 MG/DL (ref 8.6–10.5)
CHLORIDE SERPL-SCNC: 94 MMOL/L (ref 98–107)
CHLORIDE SERPL-SCNC: 95 MMOL/L (ref 98–107)
CHLORIDE SERPL-SCNC: 96 MMOL/L (ref 98–107)
CHLORIDE SERPL-SCNC: 97 MMOL/L (ref 98–107)
CO2 SERPL-SCNC: 25 MMOL/L (ref 22–29)
CO2 SERPL-SCNC: 26 MMOL/L (ref 22–29)
CO2 SERPL-SCNC: 29 MMOL/L (ref 22–29)
CO2 SERPL-SCNC: 30 MMOL/L (ref 22–29)
CORTIS SERPL-MCNC: 16.9 MCG/DL
CORTIS SERPL-MCNC: 21 MCG/DL
CORTIS SERPL-MCNC: 4.63 MCG/DL
CREAT SERPL-MCNC: 0.75 MG/DL (ref 0.76–1.27)
CREAT SERPL-MCNC: 0.77 MG/DL (ref 0.76–1.27)
CREAT SERPL-MCNC: 0.83 MG/DL (ref 0.76–1.27)
CREAT SERPL-MCNC: 0.84 MG/DL (ref 0.76–1.27)
CREAT SERPL-MCNC: 0.86 MG/DL (ref 0.76–1.27)
CREAT SERPL-MCNC: 0.87 MG/DL (ref 0.76–1.27)
EGFRCR SERPLBLD CKD-EPI 2021: 103.2 ML/MIN/1.73
EGFRCR SERPLBLD CKD-EPI 2021: 103.5 ML/MIN/1.73
EGFRCR SERPLBLD CKD-EPI 2021: 104.3 ML/MIN/1.73
EGFRCR SERPLBLD CKD-EPI 2021: 104.7 ML/MIN/1.73
EGFRCR SERPLBLD CKD-EPI 2021: 107.1 ML/MIN/1.73
EGFRCR SERPLBLD CKD-EPI 2021: 107.9 ML/MIN/1.73
GAS FLOW AIRWAY: 2 LPM
GLUCOSE BLDC GLUCOMTR-MCNC: 123 MG/DL (ref 70–130)
GLUCOSE BLDC GLUCOMTR-MCNC: 180 MG/DL (ref 70–130)
GLUCOSE BLDC GLUCOMTR-MCNC: 181 MG/DL (ref 70–130)
GLUCOSE BLDC GLUCOMTR-MCNC: 223 MG/DL (ref 70–130)
GLUCOSE SERPL-MCNC: 115 MG/DL (ref 65–99)
GLUCOSE SERPL-MCNC: 120 MG/DL (ref 65–99)
GLUCOSE SERPL-MCNC: 148 MG/DL (ref 65–99)
GLUCOSE SERPL-MCNC: 159 MG/DL (ref 65–99)
GLUCOSE SERPL-MCNC: 170 MG/DL (ref 65–99)
GLUCOSE SERPL-MCNC: 172 MG/DL (ref 65–99)
HCO3 BLDA-SCNC: 30.4 MMOL/L (ref 20–26)
Lab: ABNORMAL
MODALITY: ABNORMAL
PCO2 BLDA: 53.2 MM HG (ref 35–45)
PCO2 TEMP ADJ BLD: 53.2 MM HG (ref 35–45)
PH BLDA: 7.37 PH UNITS (ref 7.35–7.45)
PH, TEMP CORRECTED: 7.37 PH UNITS (ref 7.35–7.45)
PO2 BLDA: 77.7 MM HG (ref 83–108)
PO2 TEMP ADJ BLD: 77.7 MM HG (ref 83–108)
POTASSIUM SERPL-SCNC: 4.9 MMOL/L (ref 3.5–5.2)
POTASSIUM SERPL-SCNC: 5 MMOL/L (ref 3.5–5.2)
POTASSIUM SERPL-SCNC: 5.1 MMOL/L (ref 3.5–5.2)
POTASSIUM SERPL-SCNC: 5.1 MMOL/L (ref 3.5–5.2)
POTASSIUM SERPL-SCNC: 5.3 MMOL/L (ref 3.5–5.2)
POTASSIUM SERPL-SCNC: 5.4 MMOL/L (ref 3.5–5.2)
SAO2 % BLDCOA: 95.9 % (ref 94–99)
SODIUM SERPL-SCNC: 127 MMOL/L (ref 136–145)
SODIUM SERPL-SCNC: 129 MMOL/L (ref 136–145)
SODIUM SERPL-SCNC: 129 MMOL/L (ref 136–145)
SODIUM SERPL-SCNC: 130 MMOL/L (ref 136–145)
VENTILATOR MODE: ABNORMAL

## 2024-07-12 PROCEDURE — 25010000002 COSYNTROPIN PER 0.25 MG: Performed by: INTERNAL MEDICINE

## 2024-07-12 PROCEDURE — 25810000003 SODIUM CHLORIDE 0.9 % SOLUTION: Performed by: PHYSICIAN ASSISTANT

## 2024-07-12 PROCEDURE — 82140 ASSAY OF AMMONIA: CPT | Performed by: NURSE PRACTITIONER

## 2024-07-12 PROCEDURE — 80048 BASIC METABOLIC PNL TOTAL CA: CPT | Performed by: NURSE PRACTITIONER

## 2024-07-12 PROCEDURE — 36600 WITHDRAWAL OF ARTERIAL BLOOD: CPT

## 2024-07-12 PROCEDURE — 82948 REAGENT STRIP/BLOOD GLUCOSE: CPT

## 2024-07-12 PROCEDURE — 63710000001 INSULIN LISPRO (HUMAN) PER 5 UNITS: Performed by: PHYSICIAN ASSISTANT

## 2024-07-12 PROCEDURE — 25010000002 ENOXAPARIN PER 10 MG: Performed by: PHYSICIAN ASSISTANT

## 2024-07-12 PROCEDURE — 97530 THERAPEUTIC ACTIVITIES: CPT

## 2024-07-12 PROCEDURE — 63710000001 TACROLIMUS PER 1 MG: Performed by: INTERNAL MEDICINE

## 2024-07-12 PROCEDURE — 82803 BLOOD GASES ANY COMBINATION: CPT

## 2024-07-12 PROCEDURE — 25010000002 HYDROCORTISONE SOD SUC (PF) 100 MG RECONSTITUTED SOLUTION: Performed by: INTERNAL MEDICINE

## 2024-07-12 PROCEDURE — 82533 TOTAL CORTISOL: CPT | Performed by: INTERNAL MEDICINE

## 2024-07-12 RX ADMIN — INSULIN LISPRO 2 UNITS: 100 INJECTION, SOLUTION INTRAVENOUS; SUBCUTANEOUS at 21:27

## 2024-07-12 RX ADMIN — OXYCODONE HYDROCHLORIDE 10 MG: 5 TABLET ORAL at 22:29

## 2024-07-12 RX ADMIN — COSYNTROPIN 0.25 MG: 0.25 INJECTION, POWDER, LYOPHILIZED, FOR SOLUTION INTRAMUSCULAR; INTRAVENOUS at 05:58

## 2024-07-12 RX ADMIN — TACROLIMUS 3 MG: 1 CAPSULE ORAL at 08:05

## 2024-07-12 RX ADMIN — PREGABALIN 75 MG: 75 CAPSULE ORAL at 08:05

## 2024-07-12 RX ADMIN — SENNOSIDES AND DOCUSATE SODIUM 2 TABLET: 50; 8.6 TABLET ORAL at 08:05

## 2024-07-12 RX ADMIN — MAGNESIUM GLUCONATE 500 MG ORAL TABLET 400 MG: 500 TABLET ORAL at 21:28

## 2024-07-12 RX ADMIN — SODIUM ZIRCONIUM CYCLOSILICATE 10 G: 10 POWDER, FOR SUSPENSION ORAL at 15:52

## 2024-07-12 RX ADMIN — Medication 10 ML: at 21:29

## 2024-07-12 RX ADMIN — Medication 10 ML: at 08:06

## 2024-07-12 RX ADMIN — Medication 1 APPLICATION: at 08:05

## 2024-07-12 RX ADMIN — CHLORHEXIDINE GLUCONATE 1 APPLICATION: 500 CLOTH TOPICAL at 04:22

## 2024-07-12 RX ADMIN — INSULIN LISPRO 3 UNITS: 100 INJECTION, SOLUTION INTRAVENOUS; SUBCUTANEOUS at 16:40

## 2024-07-12 RX ADMIN — PANTOPRAZOLE SODIUM 40 MG: 40 TABLET, DELAYED RELEASE ORAL at 08:05

## 2024-07-12 RX ADMIN — OXYCODONE HYDROCHLORIDE 10 MG: 5 TABLET ORAL at 06:10

## 2024-07-12 RX ADMIN — MAGNESIUM GLUCONATE 500 MG ORAL TABLET 400 MG: 500 TABLET ORAL at 08:05

## 2024-07-12 RX ADMIN — OXYCODONE HYDROCHLORIDE 10 MG: 5 TABLET ORAL at 16:28

## 2024-07-12 RX ADMIN — HYDROCORTISONE SODIUM SUCCINATE 10 MG: 100 INJECTION, POWDER, FOR SOLUTION INTRAMUSCULAR; INTRAVENOUS at 16:41

## 2024-07-12 RX ADMIN — SODIUM ZIRCONIUM CYCLOSILICATE 10 G: 10 POWDER, FOR SUSPENSION ORAL at 21:29

## 2024-07-12 RX ADMIN — LEVOTHYROXINE SODIUM 25 MCG: 25 TABLET ORAL at 06:10

## 2024-07-12 RX ADMIN — INSULIN LISPRO 2 UNITS: 100 INJECTION, SOLUTION INTRAVENOUS; SUBCUTANEOUS at 11:26

## 2024-07-12 RX ADMIN — ENOXAPARIN SODIUM 40 MG: 40 INJECTION SUBCUTANEOUS at 18:13

## 2024-07-12 RX ADMIN — SODIUM CHLORIDE 100 ML/HR: 9 INJECTION, SOLUTION INTRAVENOUS at 02:41

## 2024-07-12 RX ADMIN — DEXAMETHASONE 1 MG: 0.5 TABLET ORAL at 08:06

## 2024-07-12 RX ADMIN — ATORVASTATIN CALCIUM 10 MG: 10 TABLET, FILM COATED ORAL at 08:05

## 2024-07-12 RX ADMIN — PREGABALIN 75 MG: 75 CAPSULE ORAL at 21:28

## 2024-07-12 RX ADMIN — ENOXAPARIN SODIUM 40 MG: 40 INJECTION SUBCUTANEOUS at 06:11

## 2024-07-12 NOTE — CASE MANAGEMENT/SOCIAL WORK
Discharge Planning Assessment  [facility]     Patient Name: Gil Monroy  MRN: [id number]  Today's Date: 7/12/2024    Admit Date: 7/8/2024        Discharge Needs Assessment       Row Name 07/12/24 1104       Living Environment    People in Home significant other    Name(s) of People in Home Mariya    Current Living Arrangements home    Primary Care Provided by self    Family Caregiver if Needed significant other    Family Caregiver Names Mariya    Able to Return to Prior Arrangements yes       Resource/Environmental Concerns    Resource/Environmental Concerns none       Transition Planning    Patient/Family Anticipates Transition to home with family    Transportation Anticipated family or friend will provide       Discharge Needs Assessment    Readmission Within the Last 30 Days no previous admission in last 30 days    Equipment Currently Used at Home walker, rolling    Concerns to be Addressed no discharge needs identified    Equipment Needed After Discharge none    Discharge Coordination/Progress spoke to patient who lives with significant other and she is his caregiver; has RX coverage and PCP; will follow for DC needs                   Discharge Plan    No documentation.                 Continued Care and Services - Admitted Since 7/8/2024    No active coordination exists for this encounter.          Demographic Summary    No documentation.                  Functional Status    No documentation.                  Psychosocial    No documentation.                  Abuse/Neglect    No documentation.                  Legal    No documentation.                  Substance Abuse    No documentation.                  Patient Forms    No documentation.                     Dunia Cobian RN

## 2024-07-12 NOTE — NURSING NOTE
[facility] vict[facility] ordered confirmation #[id number].

## 2024-07-12 NOTE — NURSING NOTE
[facility]  INPATIENT WOUND & OSTOMY CARE    Today's Date: 07/12/24    Patient Name: Gil Monroy  MRN: [id number]  [facility]: [id number]  PCP: Feliberto Blount MD  Attending Provider: Sharon Huerta MD  Length of Stay: 4    Patient continues to refuse all pressure injury prevention measures per nursing. He has bene educated multiple times on importance of doing all prevention measures to avoid any skin breakdown. Nursing did get him to sign a refusal of treatment form. He is on a dolphin mattress.     Inpatient wound care will continue to follow during hospital stay.  Please contact if any issues or concerns arise.     This document has been electronically signed by Sunitha Tom RN on 7/12/2024 09:01 CDT

## 2024-07-12 NOTE — THERAPY TREATMENT NOTE
Acute Care - Physical Therapy Treatment Note  [facility]     Patient Name: Gil Monroy  : 1970  MRN: [id number]  Today's Date: 2024      Visit Dx:     ICD-10-CM ICD-9-CM   1. Hyponatremia  E87.1 276.1   2. Morbid obesity  E66.01 278.01   3. Generalized weakness  R53.1 780.79   4. Anasarca  R60.1 782.3   5. Status post liver transplant  Z94.4 V42.7   6. Hepatic steatosis  K76.0 571.8   7. Wound of abdomen  S31.109A 879.2   8. Impaired mobility [Z74.09]  Z74.09 799.89     Patient Active Problem List   Diagnosis    Insulin dependent type 2 diabetes mellitus    History of liver transplant    Acute pain    Transaminitis    Hyponatremia    Abscess of groin, right    Leg abscess     Past Medical History:   Diagnosis Date    Cirrhosis of liver     Diabetes mellitus     Hep C w/o coma, chronic     Thrombocytopenia     Varices, esophageal      Past Surgical History:   Procedure Laterality Date    GROIN ABSCESS INCISION AND DRAINAGE Right 1/15/2024    Procedure: GROIN ABSCESS INCISION AND DRAINAGE WITH APPLICATION OF WOUND VAC;  Surgeon: Manas Fernandes MD;  Location: [facility] OR;  Service: General;  Laterality: Right;    GROIN ABSCESS INCISION AND DRAINAGE Right 2024    Procedure: wound vac change;  Surgeon: Manas Fernandes MD;  Location:  [facility] OR;  Service: General;  Laterality: Right;    LIVER TRANSPLANTATION       PT Assessment (Last 12 Hours)       PT Evaluation and Treatment       Row Name 24 0920 24       Physical Therapy Time and Intention    Subjective Information complains of;pain  -MF --    Document Type therapy note (daily note)  - --  -    Mode of Treatment physical therapy  - --      Row Name 24          General Information    Existing Precautions/Restrictions fall  -       Row Name 24          Pain    Pretreatment Pain Rating 7/10  -     Posttreatment Pain Rating 7/10  -     Pain Location - Side/Orientation Bilateral  -     Pain Location  lower  -     Pain Location - extremity  -     Pain Intervention(s) Repositioned;Ambulation/increased activity  -       Row Name 07/12/24 0920          Bed Mobility    Supine-Sit Austin (Bed Mobility) verbal cues;contact guard  -     Assistive Device (Bed Mobility) head of bed elevated;bed rails  -       Row Name 07/12/24 0920          Bed-Chair Transfer    Bed-Chair Austin (Transfers) verbal cues;contact guard;minimum assist (75% patient effort)  -     Assistive Device (Bed-Chair Transfers) --  HHA  -       Row Name 07/12/24 0920          Sit-Stand Transfer    Sit-Stand Austin (Transfers) verbal cues;contact guard;minimum assist (75% patient effort)  -       Row Name 07/12/24 0920          Stand-Sit Transfer    Stand-Sit Austin (Transfers) verbal cues;contact guard  -       Row Name 07/12/24 0920          Hip (Therapeutic Exercise)    Hip (Therapeutic Exercise) AROM (active range of motion)  -     Hip AROM (Therapeutic Exercise) bilateral;flexion;sitting;5 repetitions;10 repetitions  -       Row Name 07/12/24 0920          Knee (Therapeutic Exercise)    Knee (Therapeutic Exercise) AROM (active range of motion)  -     Knee AROM (Therapeutic Exercise) bilateral;LAQ (long arc quad);sitting;20 repititions  -       Row Name 07/12/24 0920          Ankle (Therapeutic Exercise)    Ankle (Therapeutic Exercise) AROM (active range of motion)  -     Ankle AROM (Therapeutic Exercise) bilateral;dorsiflexion;sitting;20 repititions  -       Row Name             Wound 07/08/24 1715 midline abdomen    Wound - Properties Group Placement Date: 07/08/24 -TW Placement Time: 1715  -TW Present on Original Admission: Y  -TW Orientation: midline  -TW Location: abdomen  -TW    Retired Wound - Properties Group Placement Date: 07/08/24  -TW Placement Time: 1715  -TW Present on Original Admission: Y  -TW Orientation: midline  -TW Location: abdomen  -TW    Retired Wound - Properties Group  Date first assessed: 07/08/24  - Time first assessed: 1715 -TW Present on Original Admission: Y  -TW Location: abdomen  -TW      Row Name 07/12/24 0920          Plan of Care Review    Plan of Care Reviewed With patient  -     Progress no change  -     Outcome Evaluation Pt. agreeable to therapy. He c/o bilateral leg pain-more so on Right  leg. Pt. was CGA for bed mobility while using bed railing. He stood with CGA-MIN x 1 and took steps from bed to chair. He participated with LE exercises while in chair, but limited due to pain and weakness. Will continue to work on progressing activity.  -       Row Name 07/12/24 0920          Positioning and Restraints    Pre-Treatment Position in bed  -     Post Treatment Position chair  -MF     In Chair reclined;call light within reach;encouraged to call for assist  -               User Key  (r) = Recorded By, (t) = Taken By, (c) = Cosigned By      Initials Name Provider Type     Rita Guevara PTA Physical Therapist Assistant     Shaun Prasad, RN Registered Nurse                    Physical Therapy Education       Title: PT OT SLP Therapies (In Progress)       Topic: Physical Therapy (In Progress)       Point: Mobility training (Done)       Learning Progress Summary             Patient Acceptance, E, VU by  at 7/11/2024 6532    Comment: Pt educated on role of PT and POC                         Point: Home exercise program (Not Started)       Learner Progress:  Not documented in this visit.              Point: Body mechanics (Not Started)       Learner Progress:  Not documented in this visit.              Point: Precautions (Not Started)       Learner Progress:  Not documented in this visit.                              User Key       Initials Effective Dates Name Provider Type Discipline     04/15/24 -  Nicole Núñez, PT Student PT Student PT                  PT Recommendation and Plan     Plan of Care Reviewed With: patient  Progress: no  change  Outcome Evaluation: Pt. agreeable to therapy. He c/o bilateral leg pain-more so on Right  leg. Pt. was CGA for bed mobility while using bed railing. He stood with CGA-MIN x 1 and took steps from bed to chair. He participated with LE exercises while in chair, but limited due to pain and weakness. Will continue to work on progressing activity.   Outcome Measures       Row Name 07/12/24 0920             How much help from another person do you currently need...    Turning from your back to your side while in flat bed without using bedrails? 3  -MF      Moving from lying on back to sitting on the side of a flat bed without bedrails? 3  -MF      Moving to and from a bed to a chair (including a wheelchair)? 3  -MF      Standing up from a chair using your arms (e.g., wheelchair, bedside chair)? 3  -MF      Climbing 3-5 steps with a railing? 2  -MF      To walk in hospital room? 3  -MF      AM-PAC 6 Clicks Score (PT) 17  -MF      Highest Level of Mobility Goal 5 --> Static standing  -MF         Functional Assessment    Outcome Measure Options AM-PAC 6 Clicks Basic Mobility (PT)  -MF                User Key  (r) = Recorded By, (t) = Taken By, (c) = Cosigned By      Initials Name Provider Type    Rita Stapleton PTA Physical Therapist Assistant                     Time Calculation:    PT Charges       Row Name 07/12/24 0959             Time Calculation    Start Time 0920  -MF      Stop Time 0936  -MF      Time Calculation (min) 16 min  -MF      PT Received On 07/12/24  -MF         Time Calculation- PT    Total Timed Code Minutes- PT 16 minute(s)  -MF         Timed Charges    86549 - PT Therapeutic Activity Minutes 16  -MF         Total Minutes    Timed Charges Total Minutes 16  -MF       Total Minutes 16  -MF                User Key  (r) = Recorded By, (t) = Taken By, (c) = Cosigned By      Initials Name Provider Type    Rita Stapleton PTA Physical Therapist Assistant                  Therapy Charges  for Today       Code Description Service Date Service Provider Modifiers Qty    46659563713 HC PT THERAPEUTIC ACT EA 15 MIN 7/12/2024 Rita Guevara, PTA GP 1            PT G-Codes  Outcome Measure Options: AM-PAC 6 Clicks Basic Mobility (PT)  AM-PAC 6 Clicks Score (PT): 17    Rita Guveara, FRANKIE  7/12/2024

## 2024-07-12 NOTE — PLAN OF CARE
Goal Outcome Evaluation:              Outcome Evaluation: up to chair for a few hours, c/o pain at catheter and legs, mostly slept the shift, treadwell to be d/c this shift, IVF stopped and lokelma resumed.

## 2024-07-12 NOTE — PROGRESS NOTES
[facility] Intensivist Services  INPATIENT PROGRESS NOTE    Patient Name: Gil Monroy  Date of Admission: 7/8/2024  Today's Date: 07/12/24  Length of Stay: 4  Primary Care Physician: Feliberto Blount MD    Subjective   Chief Complaint: Hyponatremia     53-year-old male with a history of liver transplant in 2015, diabetes mellitus, esophageal varices, and thrombocytopenia admitted after presenting to the emergency department with complaints of edema, generalized weakness, and shortness of breath which has progressively worsened over the last few days.  He denies any pain at this time.  He tells me he has not taken his steroid medication in some time, but he is unsure how long this has been.  It is unclear if he is taking his other medication at this time.  Workup in the ER revealed a sodium of 115, potassium 5.4, chloride 79, , ALT 58, WBC 11.93, hemoglobin 10.8, hematocrit 34.7, urine drug screen positive for oxycodone, and CT abdomen/pelvis showing evidence of liver transplant with hepatic steatosis, but no abdominal ascites.  There is fluid and edema surrounding the mid to distal esophagus with suspected esophageal wall thickening.  Mild body anasarca also noted on CT abdomen/pelvis.     Patient was evaluated shortly after arriving to the ICU.  He is in no distress at this time.  He does complain of some shortness of breath and has noticed some edema over the last few days.  He denies any other complaints at this time.  We will continue to monitor his sodium levels closely.      Interval history  7/9/2024: Patient does appear little more somnolent today, but he arouses easily.  He denies any symptoms other than swelling to his lower extremities.  Chest x-ray was obtained today and is negative for any acute disease.  Sodium 117 and slightly improved.  TSH is elevated 8.580.  T3 and T4 are now pending.    7/10/2024: Patient more alert today.  His sodium level is  now up to 122.  Potassium down to 5.4.  He is now on Lokelma 3 times a day.  We will start an ACTH stim test in 2 days.  Based on patient's thyroid labs, we have started him on low-dose Synthroid as he likely has subclinical hypothyroidism.  He has no complaints at time of my exam this morning.    7/11: Patient serum cortisol in the morning was low the patient will also responded well to the hydrocortisone treatment his sodium improved and his potassium also improved he is to have hyperkalemia which is getting better and his hyponatremia is getting better were planning to do ACTH stimulation test IEC  Test however because the patient received hydrocortisone we switched him to dexamethasone which would not affect the cortisol assay in the lab and the plan is to do the ACTH on him in couple of days  Patient feels better    7/12: Serum sodium continuing to rise, 129 at last check.  Undergoing ACTH stim test today.  Continues to be hyperkalemic, continuing Lokelma.    Review of Systems   All pertinent negatives and positives are as above. All other systems have been reviewed and are negative unless otherwise stated.     Objective    Temp:  [97.5 °F (36.4 °C)-97.6 °F (36.4 °C)] 97.5 °F (36.4 °C)  Heart Rate:  [74-92] 81  Resp:  [16-21] 21  BP: (112-163)/(61-98) 134/79  Physical Exam  Vitals and nursing note reviewed.   Constitutional:       General: He is awake.      Comments: Drowsy   HENT:      Head: Normocephalic.   Eyes:      Pupils: Pupils are equal, round, and reactive to light.   Cardiovascular:      Rate and Rhythm: Normal rate and regular rhythm.      Pulses: Normal pulses.      Comments: Lower extremity edema  Pulmonary:      Effort: Pulmonary effort is normal. No respiratory distress.   Abdominal:      General: Abdomen is protuberant. There is no distension.      Palpations: Abdomen is soft.   Musculoskeletal:         General: Normal range of motion.      Cervical back: Normal range of motion and neck supple.    Skin:     General: Skin is warm and dry.      Capillary Refill: Capillary refill takes less than 2 seconds.   Neurological:      General: No focal deficit present.   Psychiatric:         Behavior: Behavior is cooperative.         Results Review:    Adult Transthoracic Echo Complete W/ Cont if Necessary Per Protocol    Addendum Date: 7/11/2024      Left ventricular systolic function is normal. Left ventricular ejection fraction appears to be 56 - 60%.   The left ventricular cavity is borderline dilated.   Left ventricular wall thickness is consistent with moderate concentric hypertrophy.   Left ventricular diastolic function is consistent with (grade II w/high LAP) pseudonormalization.   The right ventricular cavity is mild to moderately dilated with borderline low systolic function..   The left atrial cavity is severely dilated.   The right atrial cavity is moderate to severely  dilated.   Estimated right ventricular systolic pressure from tricuspid regurgitation is mildly elevated (35-45 mmHg).   Moderate dilation of the aortic root is present (4.5 cm).      Result Review:  I have personally reviewed the results from the time of this admission to 7/12/2024 15:20 CDT and agree with these findings:  [x]  Laboratory list / accordion  []  Microbiology  [x]  Radiology  [x]  EKG/Telemetry   []  Cardiology/Vascular   []  Pathology  []  Old records  []  Other:  Most notable findings include: Serum sodium 129, potassium 5.4    ACTH stim test pending    I have reviewed the patient's current medications.     Assessment/Plan   Assessment  Active Hospital Problems    Diagnosis     **Hyponatremia    Hyponatremia  -Improving with corticosteroid treatment, ACTH stim test pending  -Continuing Hydrocort  -Stop normal saline, patient edematous    Hyperkalemia  -Potassium 5.4 today  -Restart Lokelma for 2 days    DM type II  -Continue sliding scale insulin    50 minutes minimum spent on patient  This time included obtaining a  history; examining the patient; pulse oximetry; ordering and review of studies; arranging urgent treatment with development of a management plan; evaluation of patient's response to treatment; frequent reassessment; and, discussions with other providers.    Part of this note may be an electronic transcription/translation of spoken language to printed text using the Dragon dictation system      Electronically signed by ANDREW Lizarraga on 7/12/2024 at 15:31 CDT

## 2024-07-12 NOTE — PLAN OF CARE
Goal Outcome Evaluation:  Plan of Care Reviewed With: patient        Progress: no change  Outcome Evaluation: Pt. agreeable to therapy. He c/o bilateral leg pain-more so on Right  leg. Pt. was CGA for bed mobility while using bed railing. He stood with CGA-MIN x 1 and took steps from bed to chair. He participated with LE exercises while in chair, but limited due to pain and weakness. Will continue to work on progressing activity.

## 2024-07-12 NOTE — PLAN OF CARE
Goal Outcome Evaluation:  Plan of Care Reviewed With: patient        Progress: no change  Outcome Evaluation: Nutrition follow up. Pt is feeling better and he is eating well. He is on a C.CHO diet. He has consumed 67% of the last 3 meals. Na+ lab is improving, currently 129. Cont to follow.

## 2024-07-13 ENCOUNTER — READMISSION MANAGEMENT (OUTPATIENT)
Dept: CALL CENTER | Facility: HOSPITAL | Age: 54
End: 2024-07-13
Payer: MEDICARE

## 2024-07-13 VITALS
TEMPERATURE: 98.3 F | BODY MASS INDEX: 41.75 KG/M2 | SYSTOLIC BLOOD PRESSURE: 143 MMHG | WEIGHT: 315 LBS | HEART RATE: 81 BPM | RESPIRATION RATE: 18 BRPM | DIASTOLIC BLOOD PRESSURE: 79 MMHG | OXYGEN SATURATION: 96 % | HEIGHT: 73 IN

## 2024-07-13 LAB
ANION GAP SERPL CALCULATED.3IONS-SCNC: 4 MMOL/L (ref 5–15)
ANION GAP SERPL CALCULATED.3IONS-SCNC: 5 MMOL/L (ref 5–15)
ANION GAP SERPL CALCULATED.3IONS-SCNC: 5 MMOL/L (ref 5–15)
ANION GAP SERPL CALCULATED.3IONS-SCNC: 6 MMOL/L (ref 5–15)
BUN SERPL-MCNC: 20 MG/DL (ref 6–20)
BUN SERPL-MCNC: 20 MG/DL (ref 6–20)
BUN SERPL-MCNC: 22 MG/DL (ref 6–20)
BUN SERPL-MCNC: 22 MG/DL (ref 6–20)
BUN/CREAT SERPL: 25.3 (ref 7–25)
BUN/CREAT SERPL: 27 (ref 7–25)
BUN/CREAT SERPL: 27.5 (ref 7–25)
BUN/CREAT SERPL: 28.6 (ref 7–25)
CALCIUM SPEC-SCNC: 8 MG/DL (ref 8.6–10.5)
CALCIUM SPEC-SCNC: 8.1 MG/DL (ref 8.6–10.5)
CALCIUM SPEC-SCNC: 8.1 MG/DL (ref 8.6–10.5)
CALCIUM SPEC-SCNC: 8.2 MG/DL (ref 8.6–10.5)
CHLORIDE SERPL-SCNC: 96 MMOL/L (ref 98–107)
CHLORIDE SERPL-SCNC: 97 MMOL/L (ref 98–107)
CHLORIDE SERPL-SCNC: 97 MMOL/L (ref 98–107)
CHLORIDE SERPL-SCNC: 98 MMOL/L (ref 98–107)
CO2 SERPL-SCNC: 28 MMOL/L (ref 22–29)
CO2 SERPL-SCNC: 29 MMOL/L (ref 22–29)
CO2 SERPL-SCNC: 30 MMOL/L (ref 22–29)
CO2 SERPL-SCNC: 30 MMOL/L (ref 22–29)
CREAT SERPL-MCNC: 0.74 MG/DL (ref 0.76–1.27)
CREAT SERPL-MCNC: 0.77 MG/DL (ref 0.76–1.27)
CREAT SERPL-MCNC: 0.79 MG/DL (ref 0.76–1.27)
CREAT SERPL-MCNC: 0.8 MG/DL (ref 0.76–1.27)
EGFRCR SERPLBLD CKD-EPI 2021: 105.8 ML/MIN/1.73
EGFRCR SERPLBLD CKD-EPI 2021: 106.2 ML/MIN/1.73
EGFRCR SERPLBLD CKD-EPI 2021: 107.1 ML/MIN/1.73
EGFRCR SERPLBLD CKD-EPI 2021: 108.3 ML/MIN/1.73
GLUCOSE BLDC GLUCOMTR-MCNC: 123 MG/DL (ref 70–130)
GLUCOSE BLDC GLUCOMTR-MCNC: 179 MG/DL (ref 70–130)
GLUCOSE BLDC GLUCOMTR-MCNC: 206 MG/DL (ref 70–130)
GLUCOSE SERPL-MCNC: 113 MG/DL (ref 65–99)
GLUCOSE SERPL-MCNC: 154 MG/DL (ref 65–99)
GLUCOSE SERPL-MCNC: 166 MG/DL (ref 65–99)
GLUCOSE SERPL-MCNC: 177 MG/DL (ref 65–99)
POTASSIUM SERPL-SCNC: 4.8 MMOL/L (ref 3.5–5.2)
POTASSIUM SERPL-SCNC: 4.9 MMOL/L (ref 3.5–5.2)
QT INTERVAL: 374 MS
QT INTERVAL: 402 MS
QTC INTERVAL: 436 MS
QTC INTERVAL: 460 MS
SODIUM SERPL-SCNC: 130 MMOL/L (ref 136–145)
SODIUM SERPL-SCNC: 131 MMOL/L (ref 136–145)
SODIUM SERPL-SCNC: 131 MMOL/L (ref 136–145)
SODIUM SERPL-SCNC: 133 MMOL/L (ref 136–145)

## 2024-07-13 PROCEDURE — 63710000001 TACROLIMUS PER 1 MG: Performed by: INTERNAL MEDICINE

## 2024-07-13 PROCEDURE — 63710000001 INSULIN LISPRO (HUMAN) PER 5 UNITS: Performed by: PHYSICIAN ASSISTANT

## 2024-07-13 PROCEDURE — 80048 BASIC METABOLIC PNL TOTAL CA: CPT | Performed by: NURSE PRACTITIONER

## 2024-07-13 PROCEDURE — 25010000002 HYDROCORTISONE SOD SUC (PF) 100 MG RECONSTITUTED SOLUTION: Performed by: INTERNAL MEDICINE

## 2024-07-13 PROCEDURE — 82948 REAGENT STRIP/BLOOD GLUCOSE: CPT

## 2024-07-13 PROCEDURE — 25010000002 ENOXAPARIN PER 10 MG: Performed by: PHYSICIAN ASSISTANT

## 2024-07-13 RX ADMIN — PREGABALIN 75 MG: 75 CAPSULE ORAL at 08:10

## 2024-07-13 RX ADMIN — HYDROCORTISONE SODIUM SUCCINATE 20 MG: 100 INJECTION, POWDER, FOR SOLUTION INTRAMUSCULAR; INTRAVENOUS at 06:29

## 2024-07-13 RX ADMIN — LEVOTHYROXINE SODIUM 25 MCG: 25 TABLET ORAL at 06:28

## 2024-07-13 RX ADMIN — PANTOPRAZOLE SODIUM 40 MG: 40 TABLET, DELAYED RELEASE ORAL at 08:10

## 2024-07-13 RX ADMIN — MAGNESIUM GLUCONATE 500 MG ORAL TABLET 400 MG: 500 TABLET ORAL at 08:10

## 2024-07-13 RX ADMIN — Medication 10 ML: at 06:28

## 2024-07-13 RX ADMIN — HYDROCORTISONE SODIUM SUCCINATE 10 MG: 100 INJECTION, POWDER, FOR SOLUTION INTRAMUSCULAR; INTRAVENOUS at 17:14

## 2024-07-13 RX ADMIN — Medication 10 ML: at 08:13

## 2024-07-13 RX ADMIN — TACROLIMUS 3 MG: 1 CAPSULE ORAL at 08:10

## 2024-07-13 RX ADMIN — INSULIN LISPRO 2 UNITS: 100 INJECTION, SOLUTION INTRAVENOUS; SUBCUTANEOUS at 17:15

## 2024-07-13 RX ADMIN — ATORVASTATIN CALCIUM 10 MG: 10 TABLET, FILM COATED ORAL at 08:11

## 2024-07-13 RX ADMIN — SODIUM ZIRCONIUM CYCLOSILICATE 10 G: 10 POWDER, FOR SUSPENSION ORAL at 08:10

## 2024-07-13 RX ADMIN — SODIUM ZIRCONIUM CYCLOSILICATE 10 G: 10 POWDER, FOR SUSPENSION ORAL at 16:40

## 2024-07-13 RX ADMIN — INSULIN LISPRO 3 UNITS: 100 INJECTION, SOLUTION INTRAVENOUS; SUBCUTANEOUS at 11:52

## 2024-07-13 RX ADMIN — OXYCODONE HYDROCHLORIDE 10 MG: 5 TABLET ORAL at 06:31

## 2024-07-13 RX ADMIN — ENOXAPARIN SODIUM 40 MG: 40 INJECTION SUBCUTANEOUS at 06:28

## 2024-07-13 RX ADMIN — OXYCODONE HYDROCHLORIDE 10 MG: 5 TABLET ORAL at 12:05

## 2024-07-13 NOTE — PLAN OF CARE
Goal Outcome Evaluation:  Plan of Care Reviewed With: patient   Pt now being d/c home per order.

## 2024-07-13 NOTE — NURSING NOTE
"Patient was due to void post treadwell removal from the ICU before leaving for the floor at shift change. Patient had asked this nurse for pain medication. Upon return to the room, the patient was in the middle of urinating freely upon himself/blankets in the bed. Patient was asked if he knows when he has to go and stated \"yes\" but continued to have urinary incontinence at this time. Patient had partially urinated into a cup on his bedside table despite a urinal being within his reach at the bedside as well. This nurse and NT went to change the patient's gown and linens. Patient stated \"no\" at first, but this nurse advised he had urinated all over his blankets/gown and chux. Patient allowed this nurse and NT to change/turn at this time with limited participation. Patient had a mepilex on his backside that was balled up and beyond smoothing out to replace. Patient declined replacement of this dressing at this time. Patient only allowed limited ravin care at this time. Patient had a very large bowel movement and was not aware of his fecal incontinence. Patient was repositioned after this. No other needs at this time.   "

## 2024-07-13 NOTE — PLAN OF CARE
Goal Outcome Evaluation:  Plan of Care Reviewed With: patient      Pt is a&ox4. C/o pain- see mar. BG tx and monitored per order. Encouraging turns. Voiding- inct at times.

## 2024-07-13 NOTE — PROGRESS NOTES
[facility] Intensivist Services  INPATIENT PROGRESS NOTE    Patient Name: Gil Monroy  Date of Admission: 7/8/2024  Today's Date: 07/13/24  Length of Stay: 5  Primary Care Physician: Feliberto Blount MD    Subjective   Chief Complaint: Hyponatremia    Today  Patient has no new complaint, discussed about discharge planning and need to start PT/OT.     HPI  53-year-old male with a history of liver transplant in 2015, diabetes mellitus, esophageal varices, and thrombocytopenia admitted after presenting to the emergency department with complaints of edema, generalized weakness, and shortness of breath which has progressively worsened over the last few days.  He denies any pain at this time.  He tells me he has not taken his steroid medication in some time, but he is unsure how long this has been.  It is unclear if he is taking his other medication at this time.  Workup in the ER revealed a sodium of 115, potassium 5.4, chloride 79, , ALT 58, WBC 11.93, hemoglobin 10.8, hematocrit 34.7, urine drug screen positive for oxycodone, and CT abdomen/pelvis showing evidence of liver transplant with hepatic steatosis, but no abdominal ascites.  There is fluid and edema surrounding the mid to distal esophagus with suspected esophageal wall thickening.  Mild body anasarca also noted on CT abdomen/pelvis.     Patient was evaluated shortly after arriving to the ICU.  He is in no distress at this time.  He does complain of some shortness of breath and has noticed some edema over the last few days.  He denies any other complaints at this time.  We will continue to monitor his sodium levels closely.    Review of Systems   All pertinent negatives and positives are as above. All other systems have been reviewed and are negative unless otherwise stated.     Objective    Temp:  [97.7 °F (36.5 °C)-98.4 °F (36.9 °C)] 98 °F (36.7 °C)  Heart Rate:  [80-93] 82  Resp:  [16-20] 18  BP:  ()/(61-90) 148/79  Physical Exam  Vitals and nursing note reviewed.   Constitutional:       General: He is awake.      Comments: Drowsy   HENT:      Head: Normocephalic.   Eyes:      Pupils: Pupils are equal, round, and reactive to light.   Cardiovascular:      Rate and Rhythm: Normal rate and regular rhythm.      Pulses: Normal pulses.      Comments: Lower extremity edema  Pulmonary:      Effort: Pulmonary effort is normal. No respiratory distress.   Abdominal:      General: Abdomen is protuberant. There is no distension.      Palpations: Abdomen is soft.   Musculoskeletal:         General: Normal range of motion.      Cervical back: Normal range of motion and neck supple.   Skin:     General: Skin is warm and dry.      Capillary Refill: Capillary refill takes less than 2 seconds.   Neurological:      General: No focal deficit present.   Psychiatric:         Behavior: Behavior is cooperative.         Results Review:    No radiology results for the last day    Result Review:  I have personally reviewed the results from the time of this admission to 7/13/2024 12:25 CDT and agree with these findings:  [x]  Laboratory list / accordion  []  Microbiology  [x]  Radiology  [x]  EKG/Telemetry   []  Cardiology/Vascular   []  Pathology  []  Old records  []  Other:  Most notable findings include: Serum sodium 129, potassium 5.4    ACTH stim test pending    I have reviewed the patient's current medications.     Assessment/Plan   Assessment  Active Hospital Problems    Diagnosis     **Hyponatremia      Hyponatremia  -Improving with corticosteroid treatment, ACTH stim test pending  -Continuing Hydrocort  -Stop normal saline, patient edematous    Hyperkalemia  -Potassium 4.8 today  -Restart Lokelma for 2 days    DM type II  -Continue sliding scale insulin    DVT prophylaxis-Lovenox    Disposition-discharge planning for tomorrow.    Electronically signed by Nikita Hutton MD on 7/13/2024 at 12:25 CDT

## 2024-07-13 NOTE — DISCHARGE SUMMARY
[facility] Medicine Services  DISCHARGE SUMMARY       Date of Admission: 7/8/2024  Date of Discharge:  7/13/2024  Primary Care Physician: Feliberot Blount MD    Presenting Problem/History of Present Illness:  Shortness of breath    Final Discharge Diagnoses:  Active Hospital Problems    Diagnosis     **Hyponatremia        Consults: Intensivist    Procedures Performed: None    Pertinent Test Results:   Results for orders placed during the hospital encounter of 07/08/24    Adult Transthoracic Echo Complete W/ Cont if Necessary Per Protocol    Interpretation Summary    Left ventricular systolic function is normal. Left ventricular ejection fraction appears to be 56 - 60%.    The left ventricular cavity is borderline dilated.    Left ventricular wall thickness is consistent with moderate concentric hypertrophy.    Left ventricular diastolic function is consistent with (grade II w/high LAP) pseudonormalization.    The right ventricular cavity is mild to moderately dilated with borderline low systolic function..    The left atrial cavity is severely dilated.    The right atrial cavity is moderate to severely  dilated.    Estimated right ventricular systolic pressure from tricuspid regurgitation is mildly elevated (35-45 mmHg).    Moderate dilation of the aortic root is present (4.5 cm).      Imaging Results (All)       Procedure Component Value Units Date/Time    XR Chest 1 View [[id number]] Collected: 07/09/24 1159     Updated: 07/09/24 1203    Narrative:      EXAM: XR CHEST 1 VW-      DATE: 7/9/2024 10:35 AM     HISTORY: dyspnea; E87.2-Dset-wputepmrax and hyponatremia; E66.01-Morbid  (severe) obesity due to excess calories; R53.1-Weakness;  R60.1-Generalized edema; Z94.4-Liver transplant status; K76.0-Fatty  (change of) liver, not elsewhere classified       COMPARISON: None available.     TECHNIQUE:  Frontal view(s) of the chest submitted.     FINDINGS:    The lungs are  grossly clear. No effusion or pneumothorax is visualized.  Heart and mediastinum are unremarkable.          Impression:         1. No active disease in the chest.     This report was signed and finalized on 7/9/2024 12:00 PM by Vito Marrero.       CT Abdomen Pelvis With Contrast [[id number]] Collected: 07/08/24 1524     Updated: 07/08/24 1538    Narrative:      EXAM: CT ABDOMEN PELVIS W CONTRAST-     INDICATION: ascites status post liver transplant       TECHNIQUE: Helically acquired CT images were obtained of the abdomen and  pelvis after the administration of intravenous contrast. Coronal and  sagittal reformations were performed.         DOSE LENGTH PRODUCT: 4698.49 mGy.cm mGy cm. Automated exposure control  was also utilized to decrease patient radiation dose.     COMPARISON: Non available.     FINDINGS:     Lower Chest: Mild cardiomegaly. Trace right pleural effusion.     Liver: Status post liver transplant. Hepatic steatosis.     Biliary Tree: Prior cholecystectomy.     Spleen: Unremarkable.     Pancreas: Unremarkable.     Adrenal Glands: Mild right adrenal gland thickening, likely hyperplasia.     Kidneys/Ureters/Bladder: Unremarkable.     Reproductive Organs: Unremarkable.     Gastrointestinal Tract: There is thickening of the appendix tip without  adjacent inflammatory changes measuring up to 1.3 cm (series 2 image  68). Partially visualized paraesophageal fluid/edema with adjacent  paraesophageal varices and circumferential esophageal wall thickening.     Lymphatics: Unremarkable.     Vasculature: Mild atherosclerosis. Perigastric and paraesophageal  varices.     Peritoneum/Retroperitoneum: No ascites.     Abdominal Wall/Soft Tissues: Mild body wall anasarca.     Osseous Structures: No acute or suspicious findings.       Impression:         Status post liver transplant with hepatic steatosis. No abdominal  ascites.     There is fluid and edema surrounding the mid to distal esophagus with  suspected  esophageal wall thickening. Correlate for esophagitis.       Thickening of the appendix tip measuring up to 1.3 cm without adjacent  inflammatory changes could represent normal variation of this patient as  opposed to early tip appendicitis. Recommend correlation with prior  outside imaging if available.     Perigastric and paraesophageal varices.     Mild body anasarca.     Mild cardiomegaly and trace right pleural effusion.        This report was signed and finalized on 7/8/2024 3:35 PM by True Juarez.             LAB RESULTS:      Lab 07/11/24  0419 07/10/24  0357 07/09/24  0523 07/08/24  1412   WBC 9.62 12.52* 15.41* 11.93*   HEMOGLOBIN 9.0* 9.4* 10.7* 10.8*   HEMATOCRIT 29.9* 32.0* 33.9* 34.7*   PLATELETS 221 243 247 266   NEUTROS ABS  --   --   --  9.80*   IMMATURE GRANS (ABS)  --   --   --  0.47*   LYMPHS ABS  --   --   --  0.47*   MONOS ABS  --   --   --  0.92*   EOS ABS  --   --   --  0.16   MCV 85.9 87.2 83.9 83.4   PROTIME  --   --   --  13.7         Lab 07/13/24  1219 07/13/24  0950 07/13/24  0354 07/12/24  2343 07/12/24  1946 07/09/24  1221 07/09/24  0947   SODIUM 131* 131* 133* 130* 130*   < > 117*   POTASSIUM 4.9 4.8 4.8 4.8 5.1   < > 5.9*   CHLORIDE 96* 97* 98 97* 96*   < > 78*   CO2 29.0 30.0* 30.0* 28.0 29.0   < > 29.0   ANION GAP 6.0 4.0* 5.0 5.0 5.0   < > 10.0   BUN 20 20 22* 22* 22*   < > 14   CREATININE 0.79 0.74* 0.77 0.80 0.86   < > 0.76   EGFR 106.2 108.3 107.1 105.8 103.5   < > 107.5   GLUCOSE 177* 166* 113* 154* 148*   < > 184*   CALCIUM 8.1* 8.2* 8.1* 8.0* 8.1*   < > 8.3*   TSH  --   --   --   --   --   --  8.580*    < > = values in this interval not displayed.         Lab 07/09/24  0523 07/08/24  1412   TOTAL PROTEIN 6.4 6.6   ALBUMIN 3.0* 3.0*   GLOBULIN 3.4 3.6   ALT (SGPT) 50* 58*   AST (SGOT) 95* 135*   BILIRUBIN 0.5 0.6   ALK PHOS 293* 321*         Lab 07/08/24  1412   PROBNP 445.2   PROTIME 13.7   INR 1.01                 Lab 07/12/24  0459   PH, ARTERIAL 7.365   PCO2, ARTERIAL  "53.2*   PO2 ART 77.7*   O2 SATURATION ART 95.9   HCO3 ART 30.4*   BASE EXCESS ART 4.3*     Brief Urine Lab Results       None          Microbiology Results (last 10 days)       ** No results found for the last 240 hours. **            Hospital Course:   -Hyponatremia patient was initially started on normal saline infusion but because of fluid overload it was discontinued and furosemide was used.  Serum sodium has improved to 133 this morning, she will be discharged with recommendation to continue fluid restriction at home of about 1400 daily.  He will follow-up with his PCP within 1 week to recheck chemistry.    -Hyperkalemia  Patient was hyperkalemic on admission resolved with treatment with diuretics.    -History of liver transplant  Patient has not been taking his steroids, therefore he was started on Solu-Cortef intravenously in the hospital.  He will be discharged to continue his home prednisone as prescribed..    -Diabetes mellitus  We will restart patient's home medication on discharge.    Physical Exam on Discharge:  /79 (BP Location: Right arm, Patient Position: Lying)   Pulse 81   Temp 98.3 °F (36.8 °C) (Oral)   Resp 18   Ht 185.4 cm (73\")   Wt (!) 155 kg (341 lb 11.2 oz)   SpO2 96%   BMI 45.08 kg/m²   Physical Exam  Constitutional:       General: He is awake.      Comments: Drowsy   HENT:      Head: Normocephalic.   Eyes:      Pupils: Pupils are equal, round, and reactive to light.   Cardiovascular:      Rate and Rhythm: Normal rate and regular rhythm.      Pulses: Normal pulses.      Comments: Lower extremity edema  Pulmonary:      Effort: Pulmonary effort is normal. No respiratory distress.   Abdominal:      General: Abdomen is protuberant. There is no distension.      Palpations: Abdomen is soft.   Musculoskeletal:         General: Normal range of motion.      Cervical back: Normal range of motion and neck supple.   Skin:     General: Skin is warm and dry.      Capillary Refill: Capillary " refill takes less than 2 seconds.   Neurological:      General: No focal deficit present.   Psychiatric:         Behavior: Behavior is cooperative.    Condition on Discharge: Stable    Discharge Disposition:  Home or Self Care    Discharge Medications:     Discharge Medications        Continue These Medications        Instructions Start Date   atorvastatin 10 MG tablet  Commonly known as: LIPITOR   10 mg, Oral, Daily      busPIRone 7.5 MG tablet  Commonly known as: BUSPAR   7.5 mg, Oral, 3 Times Daily      Insulin Lispro 100 UNIT/ML injection  Commonly known as: humaLOG   1-6 Units, Subcutaneous, 3 Times Daily Before Meals      Magnesium Oxide -Mg Supplement 400 (240 Mg) MG tablet   400 mg, Oral, 2 Times Daily      oxyCODONE 10 MG tablet  Commonly known as: ROXICODONE   10 mg, Oral, Every 6 Hours PRN      pantoprazole 40 MG EC tablet  Commonly known as: PROTONIX   40 mg, Oral, Daily      potassium chloride ER 20 MEQ tablet controlled-release ER tablet  Commonly known as: K-TAB   20 mEq, Oral, Daily      predniSONE 5 MG tablet  Commonly known as: DELTASONE   5 mg, Oral, 2 Times Daily      pregabalin 75 MG capsule  Commonly known as: LYRICA   75 mg, Oral, 2 Times Daily      Tacrolimus ER 1 MG tablet sustained-release 24 hour   3 tablets, Oral, Daily               Discharge Diet: Diabetic    Activity at Discharge: As tolerated    Follow-up Appointments:   No future appointments.    Test Results Pending at Discharge: None    Electronically signed by Nikita Hutton MD, 07/13/24, 17:03 CDT.    Time: 40 minutes minutes.

## 2024-07-13 NOTE — PLAN OF CARE
Goal Outcome Evaluation:      Patient A/O x3 (self, place, some situation). Patient treated for pain per MAR. Patient refused some care/treatments as previously established. Patient incontinent of both bladder/bowel overnight. Will update oncoming dayshift nurse at bedside shift report in the a.m. as appropriate.

## 2024-07-14 NOTE — OUTREACH NOTE
Prep Survey      Flowsheet Row Responses   [facility] facility patient discharged from? [facility]   Is LACE score < 7 ? No   Eligibility Readm Mgmt   Discharge diagnosis Hyponatremia   Does the patient have one of the following disease processes/diagnoses(primary or secondary)? Other   Does the patient have Home health ordered? No   Is there a DME ordered? No   Prep survey completed? Yes            FAHEEM MCKENNA - Registered Nurse

## 2024-07-14 NOTE — THERAPY DISCHARGE NOTE
Acute Care - Physical Therapy Discharge Summary  [facility]       Patient Name: Gil Monroy  : 1970  MRN: [id number]    Today's Date: 2024                 Admit Date: 2024      PT Recommendation and Plan    Visit Dx:    ICD-10-CM ICD-9-CM   1. Hyponatremia  E87.1 276.1   2. Morbid obesity  E66.01 278.01   3. Generalized weakness  R53.1 780.79   4. Anasarca  R60.1 782.3   5. Status post liver transplant  Z94.4 V42.7   6. Hepatic steatosis  K76.0 571.8   7. Wound of abdomen  S31.109A 879.2   8. Impaired mobility [Z74.09]  Z74.09 799.89        Outcome Measures       Row Name 24 0920             How much help from another person do you currently need...    Turning from your back to your side while in flat bed without using bedrails? 3  -MF      Moving from lying on back to sitting on the side of a flat bed without bedrails? 3  -MF      Moving to and from a bed to a chair (including a wheelchair)? 3  -MF      Standing up from a chair using your arms (e.g., wheelchair, bedside chair)? 3  -MF      Climbing 3-5 steps with a railing? 2  -MF      To walk in hospital room? 3  -MF      AM-PAC 6 Clicks Score (PT) 17  -MF      Highest Level of Mobility Goal 5 --> Static standing  -MF         Functional Assessment    Outcome Measure Options AM-PAC 6 Clicks Basic Mobility (PT)  -MF                User Key  (r) = Recorded By, (t) = Taken By, (c) = Cosigned By      Initials Name Provider Type    Rita Stapleton PTA Physical Therapist Assistant                         PT Rehab Goals       Row Name 24 1608             Bed Mobility Goal 1 (PT)    Activity/Assistive Device (Bed Mobility Goal 1, PT) sit to supine;supine to sit  -NW      [place]man Level/Cues Needed (Bed Mobility Goal 1, PT) contact guard required;verbal cues required  -NW      Time Frame (Bed Mobility Goal 1, PT) long term goal (LTG);10 days  -NW      Progress/Outcomes (Bed Mobility Goal 1, PT) goal met  -NW         Transfer Goal 1  (PT)    Activity/Assistive Device (Transfer Goal 1, PT) bed-to-chair/chair-to-bed;sit-to-stand/stand-to-sit  -NW      Dixie Level/Cues Needed (Transfer Goal 1, PT) contact guard required  -NW      Time Frame (Transfer Goal 1, PT) long term goal (LTG);10 days  -NW      Progress/Outcome (Transfer Goal 1, PT) goal not met  -NW         Gait Training Goal 1 (PT)    Activity/Assistive Device (Gait Training Goal 1, PT) gait (walking locomotion);assistive device use;decrease fall risk;diminish gait deviation;improve balance and speed;increase endurance/gait distance  -NW      Dixie Level (Gait Training Goal 1, PT) contact guard required  -NW      Distance (Gait Training Goal 1, PT) 20ft  -NW      Time Frame (Gait Training Goal 1, PT) long term goal (LTG);10 days  -NW      Progress/Outcome (Gait Training Goal 1, PT) goal not met  -NW                User Key  (r) = Recorded By, (t) = Taken By, (c) = Cosigned By      Initials Name Provider Type Discipline    Elly Rasmussen PTA Physical Therapist Assistant PT                        PT Discharge Summary  Anticipated Discharge Disposition (PT): home  Reason for Discharge: Discharge from facility  Outcomes Achieved: Refer to plan of care for updates on goals achieved  Discharge Destination: Home      Elly Juarez PTA   7/14/2024

## 2024-07-15 ENCOUNTER — TELEPHONE (OUTPATIENT)
Dept: INTERNAL MEDICINE | Age: 54
End: 2024-07-15

## 2024-07-15 NOTE — TELEPHONE ENCOUNTER
Care Transitions Initial Follow Up Call    Outreach made within 2 business days of discharge: Yes  Workman's comp claim?No  Patient: Driss Aldrich   Patient : 1970   MRN: [id number]    Reason for Admission: Hyponatremia  Admission Date:24  Discharge Date: 24       Spoke with: Generic voicemail, unable to leave a message. First attempt    Discharge department/facility: [facility]    Scheduled appointment with PCP within 7-14 days    Follow Up  No future appointments.    Yomaira Tolentino MA

## 2024-07-16 ENCOUNTER — TELEPHONE (OUTPATIENT)
Dept: INTERNAL MEDICINE | Age: 54
End: 2024-07-16

## 2024-07-16 NOTE — TELEPHONE ENCOUNTER
Care Transitions Initial Follow Up Call    Outreach made within 2 business days of discharge: Yes  Workman's comp claim?No  Patient: Driss Aldrich   Patient : 1970   MRN: [id number]    Reason for Admission: Hyponatremia  Admission Date:24  Discharge Date: 24       Spoke with: Generic voicemail, unable to leave a message. Second attempt    Discharge department/facility: [facility]    Scheduled appointment with PCP within 7-14 days    Follow Up  No future appointments.    Yomaira Tolentino MA

## 2024-07-18 ENCOUNTER — READMISSION MANAGEMENT (OUTPATIENT)
Dept: CALL CENTER | Facility: HOSPITAL | Age: 54
End: 2024-07-18
Payer: MEDICARE

## 2024-07-18 NOTE — OUTREACH NOTE
Medical Week 1 Survey      Flowsheet Row Responses   [facility] facility patient discharged from? [place]   Does the patient have one of the following disease processes/diagnoses(primary or secondary)? Other   Week 1 attempt successful? No   Unsuccessful attempts Attempt 1            Mart JETT - Registered Nurse

## 2024-07-25 ENCOUNTER — READMISSION MANAGEMENT (OUTPATIENT)
Dept: CALL CENTER | Facility: HOSPITAL | Age: 54
End: 2024-07-25
Payer: MEDICARE

## 2024-07-25 NOTE — OUTREACH NOTE
Medical Week 2 Survey      Flowsheet Row Responses   [facility] patient discharged from? [place]   Does the patient have one of the following disease processes/diagnoses(primary or secondary)? Other   Week 2 attempt successful? Yes   Discharge diagnosis Hyponatremia   Call end time 1504   Person spoke with today (if not patient) and relationship patient   Meds reviewed with patient/caregiver? Yes   Is the patient having any side effects they believe may be caused by any medication additions or changes? No   Does the patient have all medications ordered at discharge? Yes   Is the patient taking all medications as directed (includes completed medication regime)? Yes   Does the patient have a primary care provider?  Yes   Does the patient have an appointment with their PCP within 7 days of discharge? No   Comments regarding PCP Followup appt is 8/11   What is preventing the patient from scheduling follow up appointments within 7 days of discharge? Earlier appointment not available   Has the patient kept scheduled appointments due by today? N/A   Did the patient receive a copy of their discharge instructions? Yes   Nursing interventions Reviewed instructions with patient   What is the patient's perception of their health status since discharge? Same  [Patient reports hhe has been having diarrhea and has vomited x 1 . He denies s/s of dehydration]   Is the patient/caregiver able to teach back signs and symptoms related to disease process for when to call PCP? Yes   Is the patient/caregiver able to teach back signs and symptoms related to disease process for when to call 911? Yes   Is the patient/caregiver able to teach back the hierarchy of who to call/visit for symptoms/problems? PCP, Specialist, Home health nurse, Urgent Care, ED, 911 Yes   If the patient is a current smoker, are they able to teach back resources for cessation? Not a smoker   Week 2 Call Completed? Yes   Did the patient feel the follow up calls  were helpful during their recovery period? No   Was the number of calls appropriate? No   Wrap up additional comments If worsening symptoms of diarrhea/dehydration patient will seek medical care.   Call end time 1504            Mart JETT - Registered Nurse

## 2024-08-14 DIAGNOSIS — K21.9 GASTROESOPHAGEAL REFLUX DISEASE, UNSPECIFIED WHETHER ESOPHAGITIS PRESENT: Primary | ICD-10-CM

## 2024-08-14 DIAGNOSIS — Z94.4 HISTORY OF LIVER TRANSPLANT (HCC): ICD-10-CM

## 2024-08-14 RX ORDER — PREDNISONE 5 MG/1
5 TABLET ORAL 2 TIMES DAILY
Qty: 180 TABLET | Refills: 3 | Status: SHIPPED | OUTPATIENT
Start: 2024-08-14

## 2024-08-14 RX ORDER — PANTOPRAZOLE SODIUM 40 MG/1
40 TABLET, DELAYED RELEASE ORAL DAILY
Qty: 90 TABLET | Refills: 3 | Status: SHIPPED | OUTPATIENT
Start: 2024-08-14

## 2024-08-14 NOTE — TELEPHONE ENCOUNTER
Driss Aldrich called to request a refill on his medication.      Last office visit : 1/30/2024   Next office visit : 8/20/2024     Requested Prescriptions     Pending Prescriptions Disp Refills    predniSONE (DELTASONE) 5 MG tablet 180 tablet 3     Sig: Take 1 tablet by mouth 2 times daily    pantoprazole (PROTONIX) 40 MG tablet 90 tablet 3     Sig: Take 1 tablet by mouth daily            Roxie Young MA

## 2024-08-19 DIAGNOSIS — Z79.4 TYPE 2 DIABETES MELLITUS WITH OTHER SKIN COMPLICATION, WITH LONG-TERM CURRENT USE OF INSULIN (HCC): ICD-10-CM

## 2024-08-19 DIAGNOSIS — Z51.81 MEDICATION MONITORING ENCOUNTER: ICD-10-CM

## 2024-08-19 DIAGNOSIS — E11.628 TYPE 2 DIABETES MELLITUS WITH OTHER SKIN COMPLICATION, WITH LONG-TERM CURRENT USE OF INSULIN (HCC): ICD-10-CM

## 2024-08-19 LAB
BASOPHILS # BLD: 0.1 K/UL (ref 0–0.2)
BASOPHILS NFR BLD: 0.9 % (ref 0–1)
EOSINOPHIL # BLD: 0.6 K/UL (ref 0–0.6)
EOSINOPHIL NFR BLD: 6 % (ref 0–5)
ERYTHROCYTE [DISTWIDTH] IN BLOOD BY AUTOMATED COUNT: 18.5 % (ref 11.5–14.5)
GAMMA GLUTAMYL TRANSFERASE: 289 U/L (ref 7–54)
HBA1C MFR BLD: 6.1 % (ref 4–6)
HCT VFR BLD AUTO: 36.5 % (ref 42–52)
HGB BLD-MCNC: 11.1 G/DL (ref 14–18)
IMM GRANULOCYTES # BLD: 0 K/UL
LYMPHOCYTES # BLD: 1.6 K/UL (ref 1.1–4.5)
LYMPHOCYTES NFR BLD: 17.2 % (ref 20–40)
MAGNESIUM SERPL-MCNC: 1.4 MG/DL (ref 1.6–2.6)
MCH RBC QN AUTO: 24.9 PG (ref 27–31)
MCHC RBC AUTO-ENTMCNC: 30.4 G/DL (ref 33–37)
MCV RBC AUTO: 81.8 FL (ref 80–94)
MONOCYTES # BLD: 0.8 K/UL (ref 0–0.9)
MONOCYTES NFR BLD: 8.2 % (ref 0–10)
NEUTROPHILS # BLD: 6.4 K/UL (ref 1.5–7.5)
NEUTS SEG NFR BLD: 67.3 % (ref 50–65)
PLATELET # BLD AUTO: 421 K/UL (ref 130–400)
PMV BLD AUTO: 10.2 FL (ref 9.4–12.4)
RBC # BLD AUTO: 4.46 M/UL (ref 4.7–6.1)
WBC # BLD AUTO: 9.5 K/UL (ref 4.8–10.8)

## 2024-08-19 SDOH — ECONOMIC STABILITY: FOOD INSECURITY: WITHIN THE PAST 12 MONTHS, THE FOOD YOU BOUGHT JUST DIDN'T LAST AND YOU DIDN'T HAVE MONEY TO GET MORE.: NEVER TRUE

## 2024-08-19 SDOH — ECONOMIC STABILITY: INCOME INSECURITY: HOW HARD IS IT FOR YOU TO PAY FOR THE VERY BASICS LIKE FOOD, HOUSING, MEDICAL CARE, AND HEATING?: NOT VERY HARD

## 2024-08-19 SDOH — ECONOMIC STABILITY: FOOD INSECURITY: WITHIN THE PAST 12 MONTHS, YOU WORRIED THAT YOUR FOOD WOULD RUN OUT BEFORE YOU GOT MONEY TO BUY MORE.: NEVER TRUE

## 2024-08-19 SDOH — HEALTH STABILITY: PHYSICAL HEALTH: ON AVERAGE, HOW MANY DAYS PER WEEK DO YOU ENGAGE IN MODERATE TO STRENUOUS EXERCISE (LIKE A BRISK WALK)?: 1 DAY

## 2024-08-19 SDOH — HEALTH STABILITY: PHYSICAL HEALTH: ON AVERAGE, HOW MANY MINUTES DO YOU ENGAGE IN EXERCISE AT THIS LEVEL?: 10 MIN

## 2024-08-19 ASSESSMENT — LIFESTYLE VARIABLES
HOW MANY STANDARD DRINKS CONTAINING ALCOHOL DO YOU HAVE ON A TYPICAL DAY: PATIENT DOES NOT DRINK
HOW OFTEN DO YOU HAVE A DRINK CONTAINING ALCOHOL: NEVER

## 2024-08-19 ASSESSMENT — PATIENT HEALTH QUESTIONNAIRE - PHQ9
SUM OF ALL RESPONSES TO PHQ9 QUESTIONS 1 & 2: 2
SUM OF ALL RESPONSES TO PHQ QUESTIONS 1-9: 2
1. LITTLE INTEREST OR PLEASURE IN DOING THINGS: SEVERAL DAYS
2. FEELING DOWN, DEPRESSED OR HOPELESS: SEVERAL DAYS

## 2024-08-21 LAB — TACROLIMUS BLD-MCNC: <2 NG/ML

## 2024-09-23 ENCOUNTER — OFFICE VISIT (OUTPATIENT)
Dept: PRIMARY CARE CLINIC | Age: 54
End: 2024-09-23
Payer: MEDICARE

## 2024-09-23 VITALS
DIASTOLIC BLOOD PRESSURE: 64 MMHG | HEIGHT: 73 IN | BODY MASS INDEX: 40.82 KG/M2 | OXYGEN SATURATION: 98 % | SYSTOLIC BLOOD PRESSURE: 122 MMHG | WEIGHT: 308 LBS | HEART RATE: 76 BPM

## 2024-09-23 DIAGNOSIS — Z94.4 HISTORY OF LIVER TRANSPLANT (HCC): ICD-10-CM

## 2024-09-23 DIAGNOSIS — E87.6 HYPOKALEMIA: ICD-10-CM

## 2024-09-23 DIAGNOSIS — R53.82 CHRONIC FATIGUE: ICD-10-CM

## 2024-09-23 DIAGNOSIS — R60.0 BILATERAL LEG EDEMA: ICD-10-CM

## 2024-09-23 DIAGNOSIS — K21.9 GASTROESOPHAGEAL REFLUX DISEASE, UNSPECIFIED WHETHER ESOPHAGITIS PRESENT: ICD-10-CM

## 2024-09-23 DIAGNOSIS — R06.01 ORTHOPNEA: ICD-10-CM

## 2024-09-23 DIAGNOSIS — E87.1 HYPONATREMIA: ICD-10-CM

## 2024-09-23 DIAGNOSIS — I73.9 CLAUDICATION (HCC): ICD-10-CM

## 2024-09-23 DIAGNOSIS — F33.40 MDD (RECURRENT MAJOR DEPRESSIVE DISORDER) IN REMISSION (HCC): ICD-10-CM

## 2024-09-23 DIAGNOSIS — E66.01 OBESITY, CLASS III, BMI 40-49.9 (MORBID OBESITY): ICD-10-CM

## 2024-09-23 DIAGNOSIS — Z79.4 TYPE 2 DIABETES MELLITUS WITH OTHER SKIN ULCER, WITH LONG-TERM CURRENT USE OF INSULIN (HCC): Chronic | ICD-10-CM

## 2024-09-23 DIAGNOSIS — E11.622 TYPE 2 DIABETES MELLITUS WITH OTHER SKIN ULCER, WITH LONG-TERM CURRENT USE OF INSULIN (HCC): Chronic | ICD-10-CM

## 2024-09-23 DIAGNOSIS — I27.0 PRIMARY PULMONARY HYPERTENSION (HCC): ICD-10-CM

## 2024-09-23 DIAGNOSIS — D50.8 IRON DEFICIENCY ANEMIA SECONDARY TO INADEQUATE DIETARY IRON INTAKE: ICD-10-CM

## 2024-09-23 DIAGNOSIS — E55.9 VITAMIN D DEFICIENCY: ICD-10-CM

## 2024-09-23 DIAGNOSIS — Z00.00 MEDICARE ANNUAL WELLNESS VISIT, SUBSEQUENT: Primary | ICD-10-CM

## 2024-09-23 PROBLEM — L02.214 ABSCESS OF GROIN, RIGHT: Status: ACTIVE | Noted: 2024-01-15

## 2024-09-23 PROBLEM — R74.01 TRANSAMINITIS: Status: ACTIVE | Noted: 2024-01-15

## 2024-09-23 PROBLEM — R52 ACUTE PAIN: Status: ACTIVE | Noted: 2024-01-15

## 2024-09-23 PROBLEM — L02.419 LEG ABSCESS: Status: ACTIVE | Noted: 2024-01-16

## 2024-09-23 LAB
ALBUMIN SERPL-MCNC: 2.5 G/DL (ref 3.5–5.2)
ALP SERPL-CCNC: 472 U/L (ref 40–129)
ALT SERPL-CCNC: 57 U/L (ref 5–41)
ANION GAP SERPL CALCULATED.3IONS-SCNC: 8 MMOL/L (ref 7–19)
ANISOCYTOSIS BLD QL SMEAR: ABNORMAL
AST SERPL-CCNC: 173 U/L (ref 5–40)
BASO STIPL BLD QL SMEAR: ABNORMAL
BASOPHILS # BLD: 0.1 K/UL (ref 0–0.2)
BASOPHILS NFR BLD: 1 % (ref 0–1)
BILIRUB DIRECT SERPL-MCNC: 0.4 MG/DL (ref 0–0.3)
BILIRUB INDIRECT SERPL-MCNC: 0.2 MG/DL (ref 0–1)
BILIRUB SERPL-MCNC: 0.6 MG/DL (ref 0.2–1.2)
BNP BLD-MCNC: 1257 PG/ML (ref 0–124)
BUN SERPL-MCNC: 13 MG/DL (ref 6–20)
CALCIUM SERPL-MCNC: 7.9 MG/DL (ref 8.6–10)
CHLORIDE SERPL-SCNC: 103 MMOL/L (ref 98–111)
CO2 SERPL-SCNC: 26 MMOL/L (ref 22–29)
CREAT SERPL-MCNC: 0.8 MG/DL (ref 0.7–1.2)
CRP SERPL HS-MCNC: 3.59 MG/DL (ref 0–0.5)
EOSINOPHIL # BLD: 0.2 K/UL (ref 0–0.6)
EOSINOPHIL NFR BLD: 2.1 % (ref 0–5)
ERYTHROCYTE [DISTWIDTH] IN BLOOD BY AUTOMATED COUNT: 24.4 % (ref 11.5–14.5)
ERYTHROCYTE [SEDIMENTATION RATE] IN BLOOD BY WESTERGREN METHOD: 48 MM/HR (ref 0–15)
FOLATE SERPL-MCNC: 3.3 NG/ML (ref 4.5–32.2)
GLUCOSE SERPL-MCNC: 96 MG/DL (ref 70–99)
HCT VFR BLD AUTO: 35.2 % (ref 42–52)
HGB BLD-MCNC: 10.5 G/DL (ref 14–18)
HYPOCHROMIA BLD QL SMEAR: ABNORMAL
IMM GRANULOCYTES # BLD: 0.1 K/UL
IRON SATN MFR SERPL: 11 % (ref 14–50)
IRON SERPL-MCNC: 26 UG/DL (ref 59–158)
LYMPHOCYTES # BLD: 1.8 K/UL (ref 1.1–4.5)
LYMPHOCYTES NFR BLD: 15.6 % (ref 20–40)
MCH RBC QN AUTO: 27.4 PG (ref 27–31)
MCHC RBC AUTO-ENTMCNC: 29.8 G/DL (ref 33–37)
MCV RBC AUTO: 91.9 FL (ref 80–94)
MONOCYTES # BLD: 1 K/UL (ref 0–0.9)
MONOCYTES NFR BLD: 8.3 % (ref 0–10)
NEUTROPHILS # BLD: 8.3 K/UL (ref 1.5–7.5)
NEUTS SEG NFR BLD: 72.1 % (ref 50–65)
PLATELET # BLD AUTO: 327 K/UL (ref 130–400)
PLATELET SLIDE REVIEW: ADEQUATE
PMV BLD AUTO: 10.4 FL (ref 9.4–12.4)
POLYCHROMASIA BLD QL SMEAR: ABNORMAL
POTASSIUM SERPL-SCNC: 4.6 MMOL/L (ref 3.5–5)
PROT SERPL-MCNC: 6.7 G/DL (ref 6.4–8.3)
RBC # BLD AUTO: 3.83 M/UL (ref 4.7–6.1)
SODIUM SERPL-SCNC: 137 MMOL/L (ref 136–145)
SPHEROCYTES BLD QL SMEAR: ABNORMAL
TARGETS BLD QL SMEAR: ABNORMAL
TIBC SERPL-MCNC: 247 UG/DL (ref 250–400)
VIT B12 SERPL-MCNC: 466 PG/ML (ref 232–1245)
WBC # BLD AUTO: 11.5 K/UL (ref 4.8–10.8)

## 2024-09-23 PROCEDURE — G8417 CALC BMI ABV UP PARAM F/U: HCPCS | Performed by: FAMILY MEDICINE

## 2024-09-23 PROCEDURE — 99214 OFFICE O/P EST MOD 30 MIN: CPT | Performed by: FAMILY MEDICINE

## 2024-09-23 PROCEDURE — G8427 DOCREV CUR MEDS BY ELIG CLIN: HCPCS | Performed by: FAMILY MEDICINE

## 2024-09-23 PROCEDURE — 2022F DILAT RTA XM EVC RTNOPTHY: CPT | Performed by: FAMILY MEDICINE

## 2024-09-23 PROCEDURE — 3044F HG A1C LEVEL LT 7.0%: CPT | Performed by: FAMILY MEDICINE

## 2024-09-23 PROCEDURE — 3017F COLORECTAL CA SCREEN DOC REV: CPT | Performed by: FAMILY MEDICINE

## 2024-09-23 PROCEDURE — 1036F TOBACCO NON-USER: CPT | Performed by: FAMILY MEDICINE

## 2024-09-23 PROCEDURE — G0439 PPPS, SUBSEQ VISIT: HCPCS | Performed by: FAMILY MEDICINE

## 2024-09-23 RX ORDER — ERGOCALCIFEROL 1.25 MG/1
50000 CAPSULE ORAL WEEKLY
Qty: 12 CAPSULE | Refills: 3 | Status: SHIPPED | OUTPATIENT
Start: 2024-09-23 | End: 2024-10-23

## 2024-09-23 RX ORDER — ATORVASTATIN CALCIUM 10 MG/1
10 TABLET, FILM COATED ORAL NIGHTLY
Qty: 90 TABLET | Refills: 3 | Status: SHIPPED | OUTPATIENT
Start: 2024-09-23

## 2024-09-23 RX ORDER — INSULIN LISPRO 100 [IU]/ML
1-6 INJECTION, SOLUTION INTRAVENOUS; SUBCUTANEOUS
COMMUNITY
End: 2024-09-23

## 2024-09-23 RX ORDER — LANOLIN ALCOHOL/MO/W.PET/CERES
400 CREAM (GRAM) TOPICAL 2 TIMES DAILY
Qty: 60 TABLET | Refills: 11 | Status: SHIPPED | OUTPATIENT
Start: 2024-09-23

## 2024-09-23 RX ORDER — ASPIRIN 81 MG/1
81 TABLET, CHEWABLE ORAL DAILY
Qty: 90 TABLET | Refills: 3 | Status: SHIPPED | OUTPATIENT
Start: 2024-09-23

## 2024-09-23 RX ORDER — POTASSIUM CHLORIDE 1500 MG/1
20 TABLET, EXTENDED RELEASE ORAL DAILY
Qty: 90 TABLET | Refills: 3 | Status: SHIPPED | OUTPATIENT
Start: 2024-09-23

## 2024-09-23 RX ORDER — INSULIN ASPART 100 [IU]/ML
1-6 INJECTION, SOLUTION INTRAVENOUS; SUBCUTANEOUS
Qty: 5 ADJUSTABLE DOSE PRE-FILLED PEN SYRINGE | Refills: 2 | Status: SHIPPED | OUTPATIENT
Start: 2024-09-23

## 2024-09-23 RX ORDER — PANTOPRAZOLE SODIUM 40 MG/1
40 TABLET, DELAYED RELEASE ORAL DAILY
Qty: 90 TABLET | Refills: 3 | Status: SHIPPED | OUTPATIENT
Start: 2024-09-23

## 2024-09-23 SDOH — ECONOMIC STABILITY: FOOD INSECURITY: WITHIN THE PAST 12 MONTHS, THE FOOD YOU BOUGHT JUST DIDN'T LAST AND YOU DIDN'T HAVE MONEY TO GET MORE.: NEVER TRUE

## 2024-09-23 SDOH — HEALTH STABILITY: PHYSICAL HEALTH: ON AVERAGE, HOW MANY MINUTES DO YOU ENGAGE IN EXERCISE AT THIS LEVEL?: 10 MIN

## 2024-09-23 SDOH — ECONOMIC STABILITY: FOOD INSECURITY: WITHIN THE PAST 12 MONTHS, YOU WORRIED THAT YOUR FOOD WOULD RUN OUT BEFORE YOU GOT MONEY TO BUY MORE.: NEVER TRUE

## 2024-09-23 SDOH — HEALTH STABILITY: PHYSICAL HEALTH: ON AVERAGE, HOW MANY DAYS PER WEEK DO YOU ENGAGE IN MODERATE TO STRENUOUS EXERCISE (LIKE A BRISK WALK)?: 1 DAY

## 2024-09-23 SDOH — ECONOMIC STABILITY: INCOME INSECURITY: HOW HARD IS IT FOR YOU TO PAY FOR THE VERY BASICS LIKE FOOD, HOUSING, MEDICAL CARE, AND HEATING?: NOT HARD AT ALL

## 2024-09-23 ASSESSMENT — LIFESTYLE VARIABLES
HOW MANY STANDARD DRINKS CONTAINING ALCOHOL DO YOU HAVE ON A TYPICAL DAY: PATIENT DOES NOT DRINK
HOW OFTEN DO YOU HAVE A DRINK CONTAINING ALCOHOL: 1
HOW OFTEN DO YOU HAVE SIX OR MORE DRINKS ON ONE OCCASION: 1
HOW MANY STANDARD DRINKS CONTAINING ALCOHOL DO YOU HAVE ON A TYPICAL DAY: 0
HOW OFTEN DO YOU HAVE A DRINK CONTAINING ALCOHOL: NEVER

## 2024-09-23 ASSESSMENT — PATIENT HEALTH QUESTIONNAIRE - PHQ9
SUM OF ALL RESPONSES TO PHQ QUESTIONS 1-9: 2
1. LITTLE INTEREST OR PLEASURE IN DOING THINGS: SEVERAL DAYS
2. FEELING DOWN, DEPRESSED OR HOPELESS: SEVERAL DAYS
2. FEELING DOWN, DEPRESSED OR HOPELESS: SEVERAL DAYS
SUM OF ALL RESPONSES TO PHQ QUESTIONS 1-9: 2
SUM OF ALL RESPONSES TO PHQ9 QUESTIONS 1 & 2: 2
7. TROUBLE CONCENTRATING ON THINGS, SUCH AS READING THE NEWSPAPER OR WATCHING TELEVISION: MORE THAN HALF THE DAYS
6. FEELING BAD ABOUT YOURSELF - OR THAT YOU ARE A FAILURE OR HAVE LET YOURSELF OR YOUR FAMILY DOWN: NEARLY EVERY DAY
5. POOR APPETITE OR OVEREATING: NEARLY EVERY DAY
SUM OF ALL RESPONSES TO PHQ QUESTIONS 1-9: 17
1. LITTLE INTEREST OR PLEASURE IN DOING THINGS: SEVERAL DAYS
SUM OF ALL RESPONSES TO PHQ9 QUESTIONS 1 & 2: 2
4. FEELING TIRED OR HAVING LITTLE ENERGY: NEARLY EVERY DAY
SUM OF ALL RESPONSES TO PHQ QUESTIONS 1-9: 15
SUM OF ALL RESPONSES TO PHQ QUESTIONS 1-9: 17
8. MOVING OR SPEAKING SO SLOWLY THAT OTHER PEOPLE COULD HAVE NOTICED. OR THE OPPOSITE, BEING SO FIGETY OR RESTLESS THAT YOU HAVE BEEN MOVING AROUND A LOT MORE THAN USUAL: NOT AT ALL
SUM OF ALL RESPONSES TO PHQ QUESTIONS 1-9: 2
SUM OF ALL RESPONSES TO PHQ QUESTIONS 1-9: 2
SUM OF ALL RESPONSES TO PHQ QUESTIONS 1-9: 17
3. TROUBLE FALLING OR STAYING ASLEEP: MORE THAN HALF THE DAYS
9. THOUGHTS THAT YOU WOULD BE BETTER OFF DEAD, OR OF HURTING YOURSELF: MORE THAN HALF THE DAYS
10. IF YOU CHECKED OFF ANY PROBLEMS, HOW DIFFICULT HAVE THESE PROBLEMS MADE IT FOR YOU TO DO YOUR WORK, TAKE CARE OF THINGS AT HOME, OR GET ALONG WITH OTHER PEOPLE: EXTREMELY DIFFICULT

## 2024-09-23 ASSESSMENT — COLUMBIA-SUICIDE SEVERITY RATING SCALE - C-SSRS
2. HAVE YOU ACTUALLY HAD ANY THOUGHTS OF KILLING YOURSELF?: YES
3. HAVE YOU BEEN THINKING ABOUT HOW YOU MIGHT KILL YOURSELF?: YES
6. HAVE YOU EVER DONE ANYTHING, STARTED TO DO ANYTHING, OR PREPARED TO DO ANYTHING TO END YOUR LIFE?: NO
1. WITHIN THE PAST MONTH, HAVE YOU WISHED YOU WERE DEAD OR WISHED YOU COULD GO TO SLEEP AND NOT WAKE UP?: YES
4. HAVE YOU HAD THESE THOUGHTS AND HAD SOME INTENTION OF ACTING ON THEM?: NO

## 2024-09-23 NOTE — PROGRESS NOTES
(ADULT NUTRITIONAL SUPPLEMENT + PO) Take by mouth sucontral d Yes Provider, MD Tiffanie   Insulin Pen Needle (MEIJER PEN NEEDLES) 31G X 6 MM MISC 1 each by Does not apply route daily Yes Idris Milan MD   Lancets MISC Use to test blood glucose four times a day and as needed for hypoglycemic events  DXE11.622 Z79.4 Yes TrinhViviane Liana, APRN - NP   folic acid (FOLVITE) 1 MG tablet Take 1 tablet by mouth daily  Gurinder Simmons MD   ferrous sulfate (IRON 325) 325 (65 Fe) MG tablet Take 1 tablet by mouth daily (with breakfast)  Gurinder Simmons MD   furosemide (LASIX) 40 MG tablet Take 1 tablet by mouth daily  Gurinder Simmons MD   predniSONE (DELTASONE) 5 MG tablet Take 1 tablet by mouth 2 times daily  Patient not taking: Reported on 9/23/2024  Gurinder Simmons MD   tacrolimus ER (ENVARSUS XR) 1 MG TB24 tablet TAKE 3 TABLETS BY MOUTH EVERY DAY  Patient not taking: Reported on 9/23/2024  Gurinder Simmons MD   naloxone 4 MG/0.1ML LIQD nasal spray   Provider, MD Tiffanie   Continuous Blood Gluc  (FREESTYLE LIZETTE READER) VARGAS 1 FreeStyle Lizette reader  Patient not taking: Reported on 1/11/2024  Idris Milan MD   Continuous Blood Gluc Sensor (FREESTYLE LIZETTE SENSOR SYSTEM) MISC 30-day supply FreeStyle Lizette sensors (3 sensors/month)  Patient not taking: Reported on 1/11/2024  Idris Milan MD       [facility] (Including outside providers/suppliers regularly involved in providing care):   Patient Care Team:  Gurinder Simmons MD as PCP - General (Family Medicine)  Gurinder Simmons MD as PCP - Empaneled Provider  Portia Mcleod DO as Consulting Physician (Vascular Surgery)  Robin Clemens MD (Hepatology)      Reviewed and updated this visit:  Tobacco  Allergies  Meds  Med Hx  Surg Hx  Soc Hx  Fam Hx

## 2024-09-30 DIAGNOSIS — D50.8 IRON DEFICIENCY ANEMIA SECONDARY TO INADEQUATE DIETARY IRON INTAKE: Primary | ICD-10-CM

## 2024-09-30 DIAGNOSIS — E87.70 HYPERVOLEMIA, UNSPECIFIED HYPERVOLEMIA TYPE: ICD-10-CM

## 2024-09-30 DIAGNOSIS — D64.9 ANEMIA, UNSPECIFIED TYPE: ICD-10-CM

## 2024-09-30 DIAGNOSIS — E53.8 FOLIC ACID DEFICIENCY: ICD-10-CM

## 2024-09-30 RX ORDER — FOLIC ACID 1 MG/1
1 TABLET ORAL DAILY
Qty: 90 TABLET | Refills: 1 | Status: SHIPPED | OUTPATIENT
Start: 2024-09-30

## 2024-09-30 RX ORDER — FUROSEMIDE 40 MG
40 TABLET ORAL DAILY
Qty: 60 TABLET | Refills: 3 | Status: SHIPPED | OUTPATIENT
Start: 2024-09-30

## 2024-09-30 RX ORDER — FERROUS SULFATE 325(65) MG
325 TABLET ORAL
Qty: 90 TABLET | Refills: 1 | Status: SHIPPED | OUTPATIENT
Start: 2024-09-30

## 2024-09-30 ASSESSMENT — ENCOUNTER SYMPTOMS
BACK PAIN: 1
ABDOMINAL PAIN: 0
CONSTIPATION: 0
TROUBLE SWALLOWING: 0
DIARRHEA: 0
COUGH: 0
NAUSEA: 0
SHORTNESS OF BREATH: 0
VOMITING: 0

## 2024-10-14 LAB
AFP SERPL-MCNC: <1.8 NG/ML (ref 0–8.3)
ALBUMIN SERPL-MCNC: 2.9 G/DL (ref 3.5–5.2)
ALP SERPL-CCNC: 405 U/L (ref 40–129)
ALT SERPL-CCNC: 35 U/L (ref 5–41)
ANION GAP SERPL CALCULATED.3IONS-SCNC: 10 MMOL/L (ref 7–19)
AST SERPL-CCNC: 124 U/L (ref 5–40)
BASOPHILS # BLD: 0.1 K/UL (ref 0–0.2)
BASOPHILS NFR BLD: 1.3 % (ref 0–1)
BILIRUB SERPL-MCNC: 0.6 MG/DL (ref 0.2–1.2)
BUN SERPL-MCNC: 5 MG/DL (ref 6–20)
CALCIUM SERPL-MCNC: 8 MG/DL (ref 8.6–10)
CHLORIDE SERPL-SCNC: 92 MMOL/L (ref 98–111)
CO2 SERPL-SCNC: 31 MMOL/L (ref 22–29)
CREAT SERPL-MCNC: 0.8 MG/DL (ref 0.7–1.2)
EOSINOPHIL # BLD: 0.4 K/UL (ref 0–0.6)
EOSINOPHIL NFR BLD: 4 % (ref 0–5)
ERYTHROCYTE [DISTWIDTH] IN BLOOD BY AUTOMATED COUNT: 18.8 % (ref 11.5–14.5)
GAMMA GLUTAMYL TRANSFERASE: 485 U/L (ref 8–61)
GLUCOSE SERPL-MCNC: 116 MG/DL (ref 70–99)
HCT VFR BLD AUTO: 37.4 % (ref 42–52)
HGB BLD-MCNC: 11.6 G/DL (ref 14–18)
IMM GRANULOCYTES # BLD: 0.1 K/UL
INR PPP: 1.07 (ref 0.88–1.18)
LYMPHOCYTES # BLD: 1.2 K/UL (ref 1.1–4.5)
LYMPHOCYTES NFR BLD: 13.1 % (ref 20–40)
MAGNESIUM SERPL-MCNC: 1.3 MG/DL (ref 1.6–2.6)
MCH RBC QN AUTO: 28.4 PG (ref 27–31)
MCHC RBC AUTO-ENTMCNC: 31 G/DL (ref 33–37)
MCV RBC AUTO: 91.7 FL (ref 80–94)
MONOCYTES # BLD: 0.8 K/UL (ref 0–0.9)
MONOCYTES NFR BLD: 8.5 % (ref 0–10)
NEUTROPHILS # BLD: 6.8 K/UL (ref 1.5–7.5)
NEUTS SEG NFR BLD: 72.5 % (ref 50–65)
PLATELET # BLD AUTO: 348 K/UL (ref 130–400)
PMV BLD AUTO: 10.5 FL (ref 9.4–12.4)
POTASSIUM SERPL-SCNC: 4.3 MMOL/L (ref 3.5–5)
PROT SERPL-MCNC: 7 G/DL (ref 6.4–8.3)
PROTHROMBIN TIME: 13.6 SEC (ref 12–14.6)
RBC # BLD AUTO: 4.08 M/UL (ref 4.7–6.1)
SODIUM SERPL-SCNC: 133 MMOL/L (ref 136–145)
WBC # BLD AUTO: 9.4 K/UL (ref 4.8–10.8)

## 2024-10-17 LAB — TACROLIMUS BLD-MCNC: <2 NG/ML

## 2024-10-24 DIAGNOSIS — E11.628 TYPE 2 DIABETES MELLITUS WITH OTHER SKIN COMPLICATION, WITH LONG-TERM CURRENT USE OF INSULIN (HCC): Primary | Chronic | ICD-10-CM

## 2024-10-24 DIAGNOSIS — Z79.4 TYPE 2 DIABETES MELLITUS WITH OTHER SKIN COMPLICATION, WITH LONG-TERM CURRENT USE OF INSULIN (HCC): Primary | Chronic | ICD-10-CM

## 2024-10-24 NOTE — TELEPHONE ENCOUNTER
I spoke with my coordinator from [facility] today and she is having their pharmacy mail Tacro to me. I am to take it for a complete week then do labs and call to see if I need an appointment right away or if I can wait until their next availability in February.       I told her you wanted to put me on Rybelsus & B12 shots for my diabetes & she said it was fine too. Please call in scripts to [facility].      I called [facility] about a bridge for my front teeth & they won’t even fill a tooth much less a cleaning.     ~Driss

## 2024-10-25 RX ORDER — CYANOCOBALAMIN 1000 UG/ML
1000 INJECTION, SOLUTION INTRAMUSCULAR; SUBCUTANEOUS
Qty: 1 ML | Refills: 5 | Status: SHIPPED | OUTPATIENT
Start: 2024-10-25

## 2024-11-07 ENCOUNTER — PATIENT MESSAGE (OUTPATIENT)
Dept: PRIMARY CARE CLINIC | Age: 54
End: 2024-11-07

## 2024-11-07 DIAGNOSIS — R53.83 OTHER FATIGUE: ICD-10-CM

## 2024-11-07 DIAGNOSIS — E53.8 VITAMIN B 12 DEFICIENCY: Primary | ICD-10-CM

## 2024-11-07 NOTE — TELEPHONE ENCOUNTER
Driss NAZANIN Aldrich called to request a refill on his medication.      Last office visit : 2024   Next office visit : Visit date not found     Requested Prescriptions     Pending Prescriptions Disp Refills    Syringe/Needle, Disp, (SYRINGE 3CC/25GX1\") 25G X 1\" 3 ML MISC 3 each 3     Si each by Does not apply route every 30 days            Roxie Young MA     1

## 2024-11-08 RX ORDER — SYRINGE W-NEEDLE,DISPOSAB,3 ML 25GX5/8"
1 SYRINGE, EMPTY DISPOSABLE MISCELLANEOUS
Qty: 3 EACH | Refills: 3 | Status: SHIPPED | OUTPATIENT
Start: 2024-11-08

## 2024-11-22 DIAGNOSIS — M79.671 PAIN IN BOTH FEET: Primary | ICD-10-CM

## 2024-11-22 DIAGNOSIS — E13.40 NEUROPATHY DUE TO SECONDARY DIABETES MELLITUS (HCC): ICD-10-CM

## 2024-11-22 DIAGNOSIS — M79.672 PAIN IN BOTH FEET: Primary | ICD-10-CM

## 2025-01-01 ENCOUNTER — HOSPITAL ENCOUNTER (EMERGENCY)
Facility: HOSPITAL | Age: 55
End: 2025-05-20
Attending: FAMILY MEDICINE
Payer: MEDICARE

## 2025-01-01 DIAGNOSIS — I46.9 CARDIOPULMONARY ARREST: Primary | ICD-10-CM

## 2025-01-01 PROCEDURE — 31500 INSERT EMERGENCY AIRWAY: CPT

## 2025-01-01 PROCEDURE — 94799 UNLISTED PULMONARY SVC/PX: CPT

## 2025-01-01 PROCEDURE — 92950 HEART/LUNG RESUSCITATION CPR: CPT

## 2025-01-01 PROCEDURE — 99285 EMERGENCY DEPT VISIT HI MDM: CPT | Performed by: FAMILY MEDICINE

## 2025-01-01 PROCEDURE — 25010000002 EPINEPHRINE 1 MG/10ML SOLUTION PREFILLED SYRINGE: Performed by: FAMILY MEDICINE

## 2025-01-01 RX ORDER — INDOMETHACIN 25 MG/1
CAPSULE ORAL
Status: COMPLETED | OUTPATIENT
Start: 2025-01-01 | End: 2025-01-01

## 2025-01-01 RX ADMIN — SODIUM BICARBONATE 50 MEQ: 84 INJECTION INTRAVENOUS at 09:45

## 2025-01-01 RX ADMIN — EPINEPHRINE 1 MG: 0.1 INJECTION INTRAVENOUS at 09:42

## 2025-01-01 RX ADMIN — EPINEPHRINE 1 MG: 0.1 INJECTION INTRAVENOUS at 09:45

## 2025-02-24 DIAGNOSIS — F32.1 CURRENT MODERATE EPISODE OF MAJOR DEPRESSIVE DISORDER WITHOUT PRIOR EPISODE (HCC): Primary | ICD-10-CM

## 2025-03-03 ENCOUNTER — TELEPHONE (OUTPATIENT)
Dept: PRIMARY CARE CLINIC | Age: 55
End: 2025-03-03

## 2025-03-03 NOTE — TELEPHONE ENCOUNTER
Berto from [facility] called today 03- at [phone] option 3 to  let the provider, Dr. Simmons know that she has not been able to reach the patient. The patient has calling restrictions that prevents the call.

## 2025-03-07 ENCOUNTER — TELEPHONE (OUTPATIENT)
Dept: PSYCHIATRY | Age: 55
End: 2025-03-07

## 2025-03-07 NOTE — TELEPHONE ENCOUNTER
Called pt to schedule an appt from a referral.    Unable to leave a message because the pt has calling restrictions.    Electronically signed by Ana Lilia Solo MA on 3/7/2025 at 10:30 AM

## 2025-03-11 ENCOUNTER — TELEPHONE (OUTPATIENT)
Dept: PSYCHIATRY | Age: 55
End: 2025-03-11

## 2025-03-11 NOTE — TELEPHONE ENCOUNTER
Called pt to schedule an appt from a referral    No answer and unable to leave a voicemail because the pt has calling restrictions   This is the third attempt to reach the pt  It is up to the pt to call and schedule an appt    Sending back to referring provider.    Electronically signed by Ana Lilia Solo MA on 3/11/2025 at 2:29 PM

## 2025-05-06 ENCOUNTER — PATIENT MESSAGE (OUTPATIENT)
Dept: PRIMARY CARE CLINIC | Age: 55
End: 2025-05-06

## 2025-05-06 DIAGNOSIS — E11.42 TYPE 2 DIABETES MELLITUS WITH DIABETIC POLYNEUROPATHY, WITH LONG-TERM CURRENT USE OF INSULIN (HCC): ICD-10-CM

## 2025-05-06 DIAGNOSIS — Z79.4 TYPE 2 DIABETES MELLITUS WITH DIABETIC POLYNEUROPATHY, WITH LONG-TERM CURRENT USE OF INSULIN (HCC): ICD-10-CM

## 2025-05-06 NOTE — TELEPHONE ENCOUNTER
Driss called requesting a refill of the below medication which has been pended for you:     Requested Prescriptions     Pending Prescriptions Disp Refills    blood glucose test strips (CONTOUR TEST) strip 420 each 3     Si each by In Vitro route 3 times daily As needed.    ondansetron (ZOFRAN-ODT) 4 MG disintegrating tablet 21 tablet 0     Sig: Take 1 tablet by mouth 3 times daily as needed for Nausea or Vomiting       Last Appointment Date: 2024  Next Appointment Date: Visit date not found    No Known Allergies

## 2025-05-08 RX ORDER — CARVEDILOL 25 MG/1
1 TABLET, FILM COATED ORAL 3 TIMES DAILY
Qty: 420 EACH | Refills: 3 | Status: SHIPPED | OUTPATIENT
Start: 2025-05-08

## 2025-05-08 RX ORDER — ONDANSETRON 4 MG/1
4 TABLET, ORALLY DISINTEGRATING ORAL 3 TIMES DAILY PRN
Qty: 21 TABLET | Refills: 0 | Status: SHIPPED | OUTPATIENT
Start: 2025-05-08

## 2025-05-20 NOTE — ED PROVIDER NOTES
CHIEF COMPLAINT  Chief Complaint   Patient presents with    Cardiac Arrest     HPI  Gil Monroy is a 54 y.o. white male who presents to the emergency room in full arrest.  Patient last known normal last night.  Fiancé of the patient noted this when he was not awake and speaking and moving and he had discoloration and was not breathing so called 911.  Upon arrival by EMS it was noted the patient been in asystole with profuse amount of vomiting and cyanosis.  It took EMS 40 minutes to arrive here to this emergency room while the patient was in asystole the entire time.  An LMA was placed for oxygenation there was profuse vomiting according to the EMS from the patient and intubation was attempted but unsuccessful.  IO's were placed in the patient's lower extremities.  Patient received 4 epinephrines and route to the emergency room and upon arrival to the emergency room and coming into the emergency room received his fifth epinephrine.  All tracings were noted to be asystole.  Upon arrival in the emergency room and into the room bed 5 it was confirmed patient is in asystole and immediate decision to intubate for definitive airway was started while CPR continued.      EMS PRE-ARRIVAL TREATMENT:       REVIEW OF SYSTEMS  13 point review of systems is noted to be positive for cardiac and pulmonary rest.  With positive for vomiting.    All other review of systems unable to be determined due to the patient's mental state of not active      PAST MEDICAL HISTORY  Past Medical History:   Diagnosis Date    Cirrhosis of liver     Diabetes mellitus     Hep C w/o coma, chronic     Thrombocytopenia     Varices, esophageal        FAMILY HISTORY  Family History   Problem Relation Age of Onset    Breast cancer Mother     Diabetes Father        SOCIAL HISTORY  Social History     Socioeconomic History    Marital status: Single   Tobacco Use    Smoking status: Former     Current packs/day: 0.00     Average packs/day: 0.5 packs/day for  25.0 years (12.5 ttl pk-yrs)     Types: Cigarettes     Start date:      Quit date: 2016     Years since quittin.3   Vaping Use    Vaping status: Some Days   Substance and Sexual Activity    Drug use: Never       IMMUNIZATION HISTORY  Deferred to primary care physician.    SURGICAL HISTORY  Past Surgical History:   Procedure Laterality Date    GROIN ABSCESS INCISION AND DRAINAGE Right 1/15/2024    Procedure: GROIN ABSCESS INCISION AND DRAINAGE WITH APPLICATION OF WOUND VAC;  Surgeon: Manas Fernandes MD;  Location:  [facility] OR;  Service: General;  Laterality: Right;    GROIN ABSCESS INCISION AND DRAINAGE Right 2024    Procedure: wound vac change;  Surgeon: Manas Fernandes MD;  Location:  [facility];  Service: General;  Laterality: Right;    LIVER TRANSPLANTATION         CURRENT MEDICATIONS  No current facility-administered medications for this encounter.    Current Outpatient Medications:     atorvastatin (LIPITOR) 10 MG tablet, Take 1 tablet by mouth Daily., Disp: , Rfl:     busPIRone (BUSPAR) 7.5 MG tablet, Take 1 tablet by mouth 3 (Three) Times a Day., Disp: , Rfl:     Insulin Lispro (humaLOG) 100 UNIT/ML injection, Inject 1-6 Units under the skin into the appropriate area as directed 3 (Three) Times a Day Before Meals., Disp: , Rfl:     Magnesium Oxide -Mg Supplement 400 (240 Mg) MG tablet, Take 1 tablet by mouth 2 (Two) Times a Day., Disp: , Rfl:     oxyCODONE (ROXICODONE) 10 MG tablet, Take 1 tablet by mouth Every 6 (Six) Hours As Needed for Moderate Pain., Disp: , Rfl:     pantoprazole (PROTONIX) 40 MG EC tablet, Take 1 tablet by mouth Daily., Disp: , Rfl:     potassium chloride ER (K-TAB) 20 MEQ tablet controlled-release ER tablet, Take 1 tablet by mouth Daily., Disp: , Rfl:     predniSONE (DELTASONE) 5 MG tablet, Take 1 tablet by mouth 2 (Two) Times a Day., Disp: , Rfl:     pregabalin (LYRICA) 75 MG capsule, Take 1 capsule by mouth 2 (Two) Times a Day., Disp: , Rfl:     Tacrolimus ER 1 MG tablet  sustained-release 24 hour, Take 3 tablets by mouth Daily., Disp: , Rfl:     ALLERGIES  No Known Allergies    PHYSICAL EXAM  VITAL SIGNS:   There were no vitals taken for this visit.    Constitutional: Obese white male with no response on hard board with CPR occurring       HEENT: LMA in mouth with copious amount of vomiting it coming from around the area.    Eyes: Dilated fixed with no response     Cardiovascular: No spontaneous cardiac activity pulses palpable with compressions in the right femoral area    Thorax & Lungs: No spontaneous breathing is noted.  Diminished breath sounds in both lung fields with faint breaths in both lung fields noted    Skin: Cyanotic appearing face and upper chest with mottled dull appearing skin the remaining of the body.  The abdomen has a large area of skin deformity of unknown cause.    Abdomen: Obese abdomen    Extremities: No distal pulses in the 4 extremities with cold clammy extremities no cyanosis    Neurologic: No neurological activity or spontaneous movement to noxious stimulus          RADIOLOGY/PROCEDURES    Successful intubation on the first attempt with a 4 Mac over a bougie with a 8-0 ET tube with visualization going between the vocal cords and immediate CO2 change.    No orders to display          FUTURE APPOINTMENTS     No future appointments.             COURSE & MEDICAL DECISION MAKING     Patient's partial differential diagnosis can include:    Immediate decision to intubate upon arrival successful first attempt.    Patient had 4 additional rounds of CPR while in the emergency room receiving another 2 doses of epinephrine and sodium bicarb.  Unfortunately with the length of time the patient was down patient remained in asystole with no spontaneous return of circulation or spontaneous respirations.  Code was called at 9:49 AM as .    Spoke with the family members at 10 AM.  Present was the patient's fiancé, mother, and older sister.  They have been informed  of the unfortunate passing of their fiancé, her son and younger brother.    The  has been called.    Patient's level of risk: Moderate        CRITICAL CARE    CRITICAL CARE: No    CRITICAL CARE TIME: None    Also Old charts were reviewed per [employer] EMR.  Pertinent details are summarized above.  All laboratory, radiologic, and EKG studies that were performed in the Emergency Department were a necessary part of the evaluation needed to exclude unstable or  emergent medical conditions:     Patient was hemodynamically and neurologically stable in the ED.   Pertinent studies were reviewed as above.     No results found for this or any previous visit (from the past 24 hours).    The patient received:  Medications   EPINEPHrine (ADRENALIN) injection (1 mg Intravenous Given 25 0942)   sodium bicarbonate injection 8.4% (50 mEq Intravenous Given 25 0945)   EPINEPHrine (ADRENALIN) injection (1 mg Intravenous Given 25 0945)            Disposition:       Dragon disclaimer:  Part of this note may be an electronic transcription/translation of spoken language to printed text using the Dragon Dictation System.     I have reviewed the patient’s prescription history via a prescription monitoring program.  This information is consistent with my knowledge of the patient’s controlled substance use history.    Patient evaluate during Coronavirus Pandemic. Isolation practices followed according to [facility] policy.     FINAL IMPRESSION   Diagnosis Plan   1. Cardiopulmonary arrest              MD Cheikh Galarza Jr, Thomas Mark Jr., MD  [id number]

## (undated) DEVICE — SOL IRR NACL 0.9PCT 3000ML

## (undated) DEVICE — SUTURE VCRL SZ 3-0 L27IN ABSRB UD L26MM SH 1/2 CIR J416H

## (undated) DEVICE — DRP SURG LAP T/DRP 102X78X121IN STRL

## (undated) DEVICE — SYR CONTRL PRESS/LO FIX/M/LL W/THMB/RNG 10ML

## (undated) DEVICE — SURGICAL PROCEDURE PACK LOWER EXTREMITY LOURDES HOSP

## (undated) DEVICE — HANDPIECE SET WITH COAXIAL MULTI-ORIFICE TIP AND SUCTION TUBE: Brand: INTERPULSE

## (undated) DEVICE — GLOVE SURG SZ 8 L12IN FNGR THK79MIL GRN LTX FREE

## (undated) DEVICE — ELECTRD BLD EZ CLN MOD XLNG 2.75IN

## (undated) DEVICE — STRIP CLS WND CURAD MEDI/STRIP HYPOALLERG 0.25X4IN PK/10

## (undated) DEVICE — GLOVE SURG SZ 85 L12IN FNGR ORTHO 126MIL CRM LTX FREE

## (undated) DEVICE — SOLUTION IV 250ML 0.9% SOD CHL PH 5 INJ USP VIAFLX PLAS

## (undated) DEVICE — KT CANSTR VAC WND W/ISOLYSER SENSATRAC 500CC 10CS

## (undated) DEVICE — PK MINOR UNIV 30

## (undated) DEVICE — SPNG LAP 12X12IN LF STRL PK/5

## (undated) DEVICE — E-Z CLEAN, NON-STICK, PTFE COATED, ELECTROSURGICAL BLADE ELECTRODE, 6.5 INCH (16.5 CM): Brand: MEGADYNE

## (undated) DEVICE — GLOVE SURG SZ 8 L12IN FNGR THK13MIL BRN LTX SYN POLYMER W

## (undated) DEVICE — GLOVE SURG SZ 85 L12IN FNGR THK79MIL GRN LTX FREE

## (undated) DEVICE — SYSTEM SKIN CLSR 22CM DERMBND PRINEO

## (undated) DEVICE — SUT VIC 4/0 P3 18IN UD VCP494H

## (undated) DEVICE — MANIFLD WAST MGMT SYS NEPTUNE2 4PT

## (undated) DEVICE — TUBE ET 7.5MM NSL ORAL BASIC CUF INTMED MURPHY EYE RADPQ

## (undated) DEVICE — SUTURE VCRL SZ 2-0 L36IN ABSRB UD L36MM CT-1 1/2 CIR J945H

## (undated) DEVICE — SUT VIC 3/0 SUTUPAK TIES 18IN J910T

## (undated) DEVICE — SUTURE VCRL SZ 0 L27IN ABSRB UD L36MM CT-1 1/2 CIR J260H

## (undated) DEVICE — 3M™ STERI-DRAPE™ INSTRUMENT POUCH 1018: Brand: STERI-DRAPE™

## (undated) DEVICE — SUT VIC 3/0 RB1 27IN UD VCP215H

## (undated) DEVICE — TRY PREP SCRB VAG

## (undated) DEVICE — UNDERGLOVE SURG SZ 8 FNGR THK0.21MIL GRN LTX BEAD CUF

## (undated) DEVICE — SUTURE ABSRB BRAID COAT UD CP NO 2 27IN VCRL J195H

## (undated) DEVICE — BIPOLAR SEALER 23-112-1 AQM 6.0: Brand: AQUAMANTYS ®

## (undated) DEVICE — LARYNGOSCOPE BLDE MAC HNDL M SZ 35 ST CURAPLEX CURAVIEW LED

## (undated) DEVICE — GOWN,PRECEPT,XLNG/XXLARGE,STRL: Brand: MEDLINE

## (undated) DEVICE — Z INACTIVE USE 2660664 SOLUTION IRRIG 3000ML 0.9% SOD CHL USP UROMATIC PLAS CONT

## (undated) DEVICE — Z DISCONTINUED USE 2272117 DRAPE SURG 3 QTR N INVASIVE 2 LAYR DISP

## (undated) DEVICE — GLOVE SURG SZ 85 CRM LTX FREE POLYISOPRENE POLYMER BEAD ANTI

## (undated) DEVICE — TRAP FLD MINIVAC MEGADYNE 100ML

## (undated) DEVICE — DRP IOBAN ANTIMICRO 23X17IN

## (undated) DEVICE — DRAPE,U/ SHT,SPLIT,PLAS,STERIL: Brand: MEDLINE

## (undated) DEVICE — DRSNG WND VAC GRANUFOAM SENSATRAC MD 5PK

## (undated) DEVICE — ADHS LIQ MASTISOL 2/3ML

## (undated) DEVICE — DRSNG WND VAC GRANUFOAM SENSATRAC MD 10PK

## (undated) DEVICE — SOLUTION IV IRRIG POUR BRL 0.9% SODIUM CHL 2F7124

## (undated) DEVICE — STERILE POLYISOPRENE POWDER-FREE SURGICAL GLOVES: Brand: PROTEXIS

## (undated) DEVICE — ST IRR CYSTO W/SPK 77IN LF